# Patient Record
Sex: FEMALE | Race: WHITE | NOT HISPANIC OR LATINO | Employment: OTHER | URBAN - METROPOLITAN AREA
[De-identification: names, ages, dates, MRNs, and addresses within clinical notes are randomized per-mention and may not be internally consistent; named-entity substitution may affect disease eponyms.]

---

## 2017-07-09 ENCOUNTER — HOSPITAL ENCOUNTER (EMERGENCY)
Facility: HOSPITAL | Age: 72
Discharge: HOME | End: 2017-07-09
Attending: EMERGENCY MEDICINE | Admitting: EMERGENCY MEDICINE
Payer: MEDICARE

## 2017-07-09 ENCOUNTER — APPOINTMENT (EMERGENCY)
Dept: RADIOLOGY | Facility: HOSPITAL | Age: 72
End: 2017-07-09
Payer: MEDICARE

## 2017-07-09 VITALS
HEIGHT: 59 IN | RESPIRATION RATE: 26 BRPM | DIASTOLIC BLOOD PRESSURE: 69 MMHG | WEIGHT: 140 LBS | BODY MASS INDEX: 28.22 KG/M2 | SYSTOLIC BLOOD PRESSURE: 113 MMHG | HEART RATE: 92 BPM | TEMPERATURE: 97.7 F | OXYGEN SATURATION: 99 %

## 2017-07-09 DIAGNOSIS — J44.1 ACUTE EXACERBATION OF CHRONIC OBSTRUCTIVE PULMONARY DISEASE (COPD) (HCC): Primary | ICD-10-CM

## 2017-07-09 LAB
ALBUMIN SERPL BCP-MCNC: 3.1 G/DL (ref 3.5–5)
ALP SERPL-CCNC: 63 U/L (ref 46–116)
ALT SERPL W P-5'-P-CCNC: 56 U/L (ref 12–78)
ANION GAP SERPL CALCULATED.3IONS-SCNC: 10 MMOL/L (ref 4–13)
APTT PPP: 22 SECONDS (ref 23–35)
AST SERPL W P-5'-P-CCNC: 51 U/L (ref 5–45)
BASOPHILS # BLD AUTO: 0.02 THOUSANDS/ΜL (ref 0–0.1)
BASOPHILS NFR BLD AUTO: 0 % (ref 0–1)
BILIRUB SERPL-MCNC: 0.2 MG/DL (ref 0.2–1)
BUN SERPL-MCNC: 24 MG/DL (ref 5–25)
CALCIUM SERPL-MCNC: 9.6 MG/DL (ref 8.3–10.1)
CHLORIDE SERPL-SCNC: 103 MMOL/L (ref 100–108)
CO2 SERPL-SCNC: 27 MMOL/L (ref 21–32)
CREAT SERPL-MCNC: 1.67 MG/DL (ref 0.6–1.3)
EOSINOPHIL # BLD AUTO: 0 THOUSAND/ΜL (ref 0–0.61)
EOSINOPHIL NFR BLD AUTO: 0 % (ref 0–6)
ERYTHROCYTE [DISTWIDTH] IN BLOOD BY AUTOMATED COUNT: 14.6 % (ref 11.6–15.1)
GFR SERPL CREATININE-BSD FRML MDRD: 30.3 ML/MIN/1.73SQ M
GLUCOSE SERPL-MCNC: 146 MG/DL (ref 65–140)
HCT VFR BLD AUTO: 39.6 % (ref 34.8–46.1)
HGB BLD-MCNC: 13.4 G/DL (ref 11.5–15.4)
INR PPP: 1.03 (ref 0.86–1.16)
LACTATE SERPL-SCNC: 2.9 MMOL/L (ref 0.5–2)
LYMPHOCYTES # BLD AUTO: 3.04 THOUSANDS/ΜL (ref 0.6–4.47)
LYMPHOCYTES NFR BLD AUTO: 26 % (ref 14–44)
MCH RBC QN AUTO: 30.4 PG (ref 26.8–34.3)
MCHC RBC AUTO-ENTMCNC: 33.8 G/DL (ref 31.4–37.4)
MCV RBC AUTO: 90 FL (ref 82–98)
MONOCYTES # BLD AUTO: 0.61 THOUSAND/ΜL (ref 0.17–1.22)
MONOCYTES NFR BLD AUTO: 5 % (ref 4–12)
NEUTROPHILS # BLD AUTO: 8.13 THOUSANDS/ΜL (ref 1.85–7.62)
NEUTS SEG NFR BLD AUTO: 69 % (ref 43–75)
PLATELET # BLD AUTO: 304 THOUSANDS/UL (ref 149–390)
PMV BLD AUTO: 9.4 FL (ref 8.9–12.7)
POTASSIUM SERPL-SCNC: 4 MMOL/L (ref 3.5–5.3)
PROT SERPL-MCNC: 7.4 G/DL (ref 6.4–8.2)
PROTHROMBIN TIME: 13.3 SECONDS (ref 12.1–14.4)
RBC # BLD AUTO: 4.41 MILLION/UL (ref 3.81–5.12)
SODIUM SERPL-SCNC: 140 MMOL/L (ref 136–145)
WBC # BLD AUTO: 11.8 THOUSAND/UL (ref 4.31–10.16)

## 2017-07-09 PROCEDURE — 71020 HB CHEST X-RAY 2VW FRONTAL&LATL: CPT

## 2017-07-09 PROCEDURE — 99284 EMERGENCY DEPT VISIT MOD MDM: CPT

## 2017-07-09 PROCEDURE — 36415 COLL VENOUS BLD VENIPUNCTURE: CPT | Performed by: EMERGENCY MEDICINE

## 2017-07-09 PROCEDURE — 85025 COMPLETE CBC W/AUTO DIFF WBC: CPT | Performed by: EMERGENCY MEDICINE

## 2017-07-09 PROCEDURE — 80053 COMPREHEN METABOLIC PANEL: CPT | Performed by: EMERGENCY MEDICINE

## 2017-07-09 PROCEDURE — 94640 AIRWAY INHALATION TREATMENT: CPT

## 2017-07-09 PROCEDURE — 83605 ASSAY OF LACTIC ACID: CPT | Performed by: EMERGENCY MEDICINE

## 2017-07-09 PROCEDURE — 87040 BLOOD CULTURE FOR BACTERIA: CPT | Performed by: EMERGENCY MEDICINE

## 2017-07-09 PROCEDURE — 85610 PROTHROMBIN TIME: CPT | Performed by: EMERGENCY MEDICINE

## 2017-07-09 PROCEDURE — 85730 THROMBOPLASTIN TIME PARTIAL: CPT | Performed by: EMERGENCY MEDICINE

## 2017-07-09 RX ORDER — ALBUTEROL SULFATE 90 UG/1
2 AEROSOL, METERED RESPIRATORY (INHALATION) EVERY 4 HOURS PRN
Qty: 1 INHALER | Refills: 0 | Status: SHIPPED | OUTPATIENT
Start: 2017-07-09 | End: 2018-06-17 | Stop reason: SDUPTHER

## 2017-07-09 RX ORDER — PREDNISONE 10 MG/1
TABLET ORAL
Qty: 31 TABLET | Refills: 0 | Status: SHIPPED | OUTPATIENT
Start: 2017-07-09 | End: 2018-06-17 | Stop reason: ALTCHOICE

## 2017-07-09 RX ORDER — ALBUTEROL SULFATE 90 UG/1
2 AEROSOL, METERED RESPIRATORY (INHALATION) EVERY 6 HOURS PRN
COMMUNITY

## 2017-07-09 RX ORDER — DEXTROMETHORPHAN HYDROBROMIDE AND PROMETHAZINE HYDROCHLORIDE 15; 6.25 MG/5ML; MG/5ML
5 SYRUP ORAL 4 TIMES DAILY PRN
Qty: 118 ML | Refills: 0 | Status: SHIPPED | OUTPATIENT
Start: 2017-07-09 | End: 2018-06-17 | Stop reason: SDUPTHER

## 2017-07-09 RX ADMIN — IPRATROPIUM BROMIDE 0.5 MG: 0.5 SOLUTION RESPIRATORY (INHALATION) at 21:07

## 2017-07-09 RX ADMIN — ALBUTEROL SULFATE 2.5 MG: 2.5 SOLUTION RESPIRATORY (INHALATION) at 21:10

## 2017-07-09 RX ADMIN — ALBUTEROL SULFATE 2.5 MG: 2.5 SOLUTION RESPIRATORY (INHALATION) at 21:07

## 2017-07-15 LAB
BACTERIA BLD CULT: NORMAL
BACTERIA BLD CULT: NORMAL

## 2018-01-15 ENCOUNTER — TRANSCRIBE ORDERS (OUTPATIENT)
Dept: ADMINISTRATIVE | Facility: HOSPITAL | Age: 73
End: 2018-01-15

## 2018-01-15 ENCOUNTER — HOSPITAL ENCOUNTER (OUTPATIENT)
Dept: RADIOLOGY | Facility: HOSPITAL | Age: 73
Discharge: HOME | End: 2018-01-15
Payer: MEDICARE

## 2018-01-15 ENCOUNTER — GENERIC CONVERSION - ENCOUNTER (OUTPATIENT)
Dept: OTHER | Facility: OTHER | Age: 73
End: 2018-01-15

## 2018-01-15 DIAGNOSIS — M25.561 RIGHT KNEE PAIN, UNSPECIFIED CHRONICITY: ICD-10-CM

## 2018-01-15 DIAGNOSIS — M25.561 RIGHT KNEE PAIN, UNSPECIFIED CHRONICITY: Primary | ICD-10-CM

## 2018-01-15 PROCEDURE — 73562 X-RAY EXAM OF KNEE 3: CPT

## 2018-01-23 ENCOUNTER — APPOINTMENT (OUTPATIENT)
Dept: URGENT CARE | Facility: CLINIC | Age: 73
End: 2018-01-23
Payer: MEDICARE

## 2018-01-23 ENCOUNTER — APPOINTMENT (OUTPATIENT)
Dept: RADIOLOGY | Facility: CLINIC | Age: 73
End: 2018-01-23
Payer: MEDICARE

## 2018-01-23 DIAGNOSIS — R05.9 COUGH: ICD-10-CM

## 2018-01-23 PROCEDURE — 71046 X-RAY EXAM CHEST 2 VIEWS: CPT

## 2018-01-29 ENCOUNTER — TRANSCRIBE ORDERS (OUTPATIENT)
Dept: ADMINISTRATIVE | Facility: HOSPITAL | Age: 73
End: 2018-01-29

## 2018-01-29 ENCOUNTER — HOSPITAL ENCOUNTER (OUTPATIENT)
Dept: RADIOLOGY | Facility: HOSPITAL | Age: 73
Discharge: HOME | End: 2018-01-29
Payer: MEDICARE

## 2018-01-29 DIAGNOSIS — M19.041 PRIMARY LOCALIZED OSTEOARTHROSIS OF RIGHT HAND: Primary | ICD-10-CM

## 2018-01-29 DIAGNOSIS — M19.041 PRIMARY LOCALIZED OSTEOARTHROSIS OF RIGHT HAND: ICD-10-CM

## 2018-01-29 PROCEDURE — 73130 X-RAY EXAM OF HAND: CPT

## 2018-06-17 ENCOUNTER — OFFICE VISIT (OUTPATIENT)
Dept: URGENT CARE | Facility: CLINIC | Age: 73
End: 2018-06-17
Payer: MEDICARE

## 2018-06-17 VITALS
WEIGHT: 140 LBS | SYSTOLIC BLOOD PRESSURE: 149 MMHG | HEART RATE: 88 BPM | DIASTOLIC BLOOD PRESSURE: 86 MMHG | HEIGHT: 60 IN | BODY MASS INDEX: 27.48 KG/M2 | OXYGEN SATURATION: 96 % | TEMPERATURE: 97.1 F

## 2018-06-17 DIAGNOSIS — L03.213 PRESEPTAL CELLULITIS: Primary | ICD-10-CM

## 2018-06-17 PROCEDURE — 99213 OFFICE O/P EST LOW 20 MIN: CPT | Performed by: PHYSICIAN ASSISTANT

## 2018-06-17 PROCEDURE — G0463 HOSPITAL OUTPT CLINIC VISIT: HCPCS | Performed by: PHYSICIAN ASSISTANT

## 2018-06-17 RX ORDER — CLINDAMYCIN HYDROCHLORIDE 150 MG/1
300 CAPSULE ORAL EVERY 8 HOURS SCHEDULED
Qty: 21 CAPSULE | Refills: 0 | Status: SHIPPED | OUTPATIENT
Start: 2018-06-17 | End: 2018-06-24

## 2018-06-17 NOTE — PROGRESS NOTES
NAME: Javy Sotelo is a 71 y o  female  : 1945    MRN: [id number]      Assessment and Plan   Preseptal cellulitis [X69 [id number]]  1  Preseptal cellulitis  clindamycin (CLEOCIN) 150 mg capsule       Patient unable to take NSAIDs or steroids  Has been taking benadrly and zyrtec  Not considering steroid cream as it is too close to the eye  Instructed patient to f/u with ophthalmology for further evaluation  Will cover for infection with clinda as patient is allergic to cephalosporins  Patient Instructions   Patient Instructions   Take clinda as directed  Apply cool compresses to the area  Remove make up and keep area clean and do not itch  F/u with ophtho /PCP for further evaluation   If symptoms worsen, go to the ER    Proceed to ER if symptoms worsen  History of Present Illness     Patient presents complaining of swelling below her right eye  She states it started 2 days ago and has been getting bigger since  She reports the area is itchy along with the rest of her face  She denies new soaps, make up, detergents, or foods  She has a hx of allergies and takes OTC zyrtec for it  She has been taking benadryl for this and has not had any improvement  She denies injury or inciting event, blurry or double vision, fevers, chills, pain with movement of eye or eye pain  She states something like this happened before and she was given prednisone and it went away  Patient states she is no longer able to take steroids or NSAIDs due to long hx of "stomach issues" including gastroparesis, gastritis, and a failed nissen fundoplication  Review of Systems   Review of Systems   Constitutional: Negative for chills and fever  HENT: Negative for congestion, ear pain, postnasal drip, rhinorrhea, sinus pain, sinus pressure, sneezing and sore throat  Respiratory: Negative for cough, shortness of breath, wheezing and stridor      Skin:        Area of swelling below right eye         Current Medications Current Outpatient Prescriptions:     albuterol (PROVENTIL HFA,VENTOLIN HFA) 90 mcg/act inhaler, Inhale 2 puffs every 6 (six) hours as needed for wheezing, Disp: , Rfl:     amitriptyline (ELAVIL) 10 mg tablet, Take 10 mg by mouth daily at bedtime  , Disp: , Rfl:     amLODIPine (NORVASC) 5 mg tablet, Take 5 mg by mouth daily  , Disp: , Rfl:     cetirizine (ZyrTEC) 10 MG chewable tablet, Chew 10 mg daily  , Disp: , Rfl:     famotidine (PEPCID) 10 mg tablet, Take 10 mg by mouth once   , Disp: , Rfl:     multivitamin (THERAGRAN) TABS, Take 1 tablet by mouth daily  , Disp: , Rfl:     clindamycin (CLEOCIN) 150 mg capsule, Take 2 capsules (300 mg total) by mouth every 8 (eight) hours for 7 days, Disp: 21 capsule, Rfl: 0    Mometasone Furo-Formoterol Fum (DULERA) 100-5 MCG/ACT AERO, Inhale 1 puff 2 (two) times a day, Disp: , Rfl:     Current Allergies     Allergies as of 2018 - Reviewed 2018   Allergen Reaction Noted    Cephalosporins  2016    Latex  2016    Nsaids  2016    Penicillins  2016    Prednisone GI Intolerance 2017    Sulphasomidine  2017              Past Medical History:   Diagnosis Date    Anxiety     Arthritis     Chronic pain     Depression     Gastroparesis     GERD (gastroesophageal reflux disease)     Psychiatric disorder        Past Surgical History:   Procedure Laterality Date    BACK SURGERY       SECTION      CHOLECYSTECTOMY      HIATAL HERNIA REPAIR      SHOULDER SURGERY      WISDOM TOOTH EXTRACTION         No family history on file  Medications have been verified      The following portions of the patient's history were reviewed and updated as appropriate: allergies, current medications, past family history, past medical history, past social history, past surgical history and problem list     Objective   /86   Pulse 88   Temp (!) 97 1 °F (36 2 °C)   Ht 4' 11 5" (1 511 m)   Wt 63 5 kg (140 lb)   SpO2 96%   BMI 27 80 kg/m²      Physical Exam     Physical Exam   Constitutional: She appears well-developed and well-nourished  No distress  HENT:   TMs clear b/l without bulging  Nasal mucosa pale without erythema  Oropharynx clear without exudates  Cardiovascular: Normal rate, regular rhythm and normal heart sounds  Pulmonary/Chest: Effort normal and breath sounds normal  No respiratory distress  She has no wheezes  She has no rales  Lymphadenopathy:     She has no cervical adenopathy  Skin:   2 cm x  5 cm of edema without induration or warmth blow right eye  NTTP in that area  No pain with EOMI  Pupils PERRLA  No other lesions noted on the face  No scaling over the area of edema  Eye without injection or discharge

## 2018-06-17 NOTE — PATIENT INSTRUCTIONS
Take clinda as directed  Apply cool compresses to the area  Remove make up and keep area clean and do not itch  F/u with ophtho /PCP for further evaluation   If symptoms worsen, go to the ER

## 2018-11-20 ENCOUNTER — TRANSCRIBE ORDERS (OUTPATIENT)
Dept: ADMINISTRATIVE | Facility: HOSPITAL | Age: 73
End: 2018-11-20

## 2018-11-20 ENCOUNTER — HOSPITAL ENCOUNTER (OUTPATIENT)
Dept: RADIOLOGY | Facility: HOSPITAL | Age: 73
Discharge: HOME | End: 2018-11-20
Payer: MEDICARE

## 2018-11-20 DIAGNOSIS — M46.96 UNSPECIFIED INFLAMMATORY SPONDYLOPATHY, LUMBAR REGION (HCC): ICD-10-CM

## 2018-11-20 DIAGNOSIS — M19.049 PRIMARY LOCALIZED OSTEOARTHROSIS OF HAND, UNSPECIFIED LATERALITY: ICD-10-CM

## 2018-11-20 DIAGNOSIS — M19.049 PRIMARY LOCALIZED OSTEOARTHROSIS OF HAND, UNSPECIFIED LATERALITY: Primary | ICD-10-CM

## 2018-11-20 PROCEDURE — 72110 X-RAY EXAM L-2 SPINE 4/>VWS: CPT

## 2019-03-08 ENCOUNTER — OFFICE VISIT (OUTPATIENT)
Dept: GASTROENTEROLOGY | Facility: CLINIC | Age: 74
End: 2019-03-08
Payer: MEDICARE

## 2019-03-08 VITALS
HEART RATE: 92 BPM | HEIGHT: 60 IN | BODY MASS INDEX: 27.68 KG/M2 | WEIGHT: 141 LBS | SYSTOLIC BLOOD PRESSURE: 130 MMHG | DIASTOLIC BLOOD PRESSURE: 84 MMHG

## 2019-03-08 DIAGNOSIS — K21.9 GASTROESOPHAGEAL REFLUX DISEASE WITHOUT ESOPHAGITIS: ICD-10-CM

## 2019-03-08 DIAGNOSIS — K31.84 GASTROPARESIS: ICD-10-CM

## 2019-03-08 DIAGNOSIS — F32.9 REACTIVE DEPRESSION: ICD-10-CM

## 2019-03-08 DIAGNOSIS — Z12.11 COLON CANCER SCREENING: ICD-10-CM

## 2019-03-08 DIAGNOSIS — R13.19 ESOPHAGEAL DYSPHAGIA: Primary | ICD-10-CM

## 2019-03-08 DIAGNOSIS — R10.12 LEFT UPPER QUADRANT PAIN: ICD-10-CM

## 2019-03-08 DIAGNOSIS — Z98.890 HISTORY OF NISSEN FUNDOPLICATION: ICD-10-CM

## 2019-03-08 PROCEDURE — 99214 OFFICE O/P EST MOD 30 MIN: CPT | Performed by: INTERNAL MEDICINE

## 2019-03-08 RX ORDER — GABAPENTIN 100 MG/1
100 CAPSULE ORAL 3 TIMES DAILY
Qty: 90 CAPSULE | Refills: 3 | Status: SHIPPED | OUTPATIENT
Start: 2019-03-08 | End: 2020-03-13 | Stop reason: SDUPTHER

## 2019-03-08 RX ORDER — RABEPRAZOLE SODIUM 20 MG/1
TABLET, DELAYED RELEASE ORAL
Refills: 4 | COMMUNITY
Start: 2019-02-05 | End: 2019-05-12 | Stop reason: SDUPTHER

## 2019-03-08 RX ORDER — FAMOTIDINE 40 MG/1
40 TABLET, FILM COATED ORAL
Qty: 30 TABLET | Refills: 6 | Status: SHIPPED | OUTPATIENT
Start: 2019-03-08 | End: 2020-03-18 | Stop reason: SDUPTHER

## 2019-03-08 NOTE — PROGRESS NOTES
[facility] - Outpatient Follow-up Note  Basilio Alcantar 68 y o  female MRN: [id number]  Encounter: [id number]    ASSESSMENT AND PLAN:      1  Left upper quadrant pain  Patient has had this over the last several years  Interventions have not help so far  She has been on amitriptyline and is now at 50 mg daily  I believe this may be functional type of abdominal pain or perhaps related to her gastro paresis  - gabapentin (NEURONTIN) 100 mg capsule; Take 1 capsule (100 mg total) by mouth 3 (three) times a day for 30 days  Dispense: 90 capsule; Refill: 3  -I have told the patient to start with the gabapentin at 1 tablet a day for about 5 days then go up to 2 tablets a day for 5 days and if tolerated she can go up to a tablet 3 times a day  -note the patient had been placed on low-dose Elavil  This has been increased to 50 mg per day  She does not feel as though it has helped her all that much  2  Gastroesophageal reflux disease without esophagitis  -review of upper GI series from 2016 did reveal some pooling of contrast at the top of her Nissen fundoplication and reflux into the midesophagus  The patient has ongoing symptoms despite being on twice a day PPI  Will re-evaluate with upper endoscopy to evaluate mucosal integrity  - famotidine (PEPCID) 40 MG tablet; Take 1 tablet (40 mg total) by mouth daily at bedtime  Dispense: 30 tablet; Refill: 6    EGD at [facility]     3  Esophageal dysphagia  Has for solids and liquids  Probably related to her previous surgery    4  History of Nissen fundoplication  Status post surgery 10 years ago 2009 [facility]  -most likely I would be reluctant to recommend any type of attempted surgical revision given the patient's age and other factors    5  Gastroparesis  Last gastric emptying study 2016  This showed significant delay  60% emptying after 4 hours were reading should be 90%    6   Colon cancer screening  Up today with screening  Last colonoscopy was 2013  Negative exam     7  Reactive depression  Patient with a very difficult home situation  She is raising her 6year-old grandson  Her daughter has moved back within the house and this has created some complex  Patient is having counseling  I suspect that issues as outlined impact on some of the patient's digestive issues and pain  Followup Appointment[de-identified]  3 months   ______________________________________________________________________    Chief Complaint   Patient presents with    Follow-up     NISSEN FUNDOPLICATION    Reflux symptoms, left upper quadrant pain    HPI:  The patient is a very pleasant 67-year-old white female who returns to the office again for ongoing problems with reflux, left-sided abdominal pain, and difficulty swallowing  The patient had issues with gastroesophageal reflux gaining back over 10 years ago  She underwent a Nissen fundoplication for this problem in initially had very good results  She subsequently developed increasing problems with swallowing and recurrent heartburn  The patient had extensive workup by our group, Dr Basim Jarrett was  of [address] [place] [address] and expert and esophageal disease, and subsequently Dr Iftikhar Hollingsworth at [facility]  AND [facility] for her continued problems  The patient had multiple esophageal dilations between our group in the tertiary care centers  This did not really improve her swallowing  She also has a diagnosis a gastro paresis  Medication adjustment and Botox injection into the pyloric area has not helped her situation all that much  Her last upper GI series in 2016 did show significant gastroesophageal reflux  There was some pooling of barium better EG junction near the Nissen wrap which might indicate a breakdown of the wrap    When the patient was seen at [facility], surgical revision of the Nissen was discussed however it was decided not to proceed  The patient continues to complain of significant heartburn despite being on proton pump inhibitor therapy and H2 blockers at night  She also has left-sided abdominal pain  She has difficulty swallowing with both liquids and solids  It should be noted that prior to her referral to [facility] to the tertiary care center she did have 1 documented episode of esophageal candidiasis  She has not lost weight  It should be noted that the patient is under significant stress  She has been raising her 8year-old grandchild as her daughter had significant issues with drug abuse in the past   Now, her daughter has moved back in with her as well as her son who is undergoing divorce proceedings along with her grandson  There is frequent conflict between the grandson and his mother who is now closer in his life  The patient has to mediated between these difficult conflicts       Historical Information   Past Medical History:   Diagnosis Date    Anxiety     Arthritis     Asthma     Candida infection, esophageal (HCC)     Chronic pain     Depression     Depression     Gastroparesis     Gastroparesis     GERD (gastroesophageal reflux disease)     Hypertension     Irritable bowel syndrome (IBS)     Multiple thyroid nodules     Osteoporosis     Psychiatric disorder      Past Surgical History:   Procedure Laterality Date    BACK SURGERY       SECTION      CHOLECYSTECTOMY      COLONOSCOPY      ESOPHAGEAL DILATION       [place] [id number]    HIATAL HERNIA REPAIR      NISSEN FUNDOPLICATION      ROTATOR CUFF REPAIR      SHOULDER SURGERY      SPINAL FUSION      UPPER GASTROINTESTINAL ENDOSCOPY      US GUIDED THYROID BIOPSY      WISDOM TOOTH EXTRACTION       Social History     Substance and Sexual Activity   Alcohol Use No     Social History     Substance and Sexual Activity   Drug Use No     Social History     Tobacco Use   Smoking Status Former Smoker   Smokeless Tobacco Never Used     Family History   Problem Relation Age of Onset    Stomach cancer Father     Cancer Father     Hypertension Maternal Aunt     Colon cancer Neg Hx     Colon polyps Neg Hx          Current Outpatient Medications:     albuterol (PROVENTIL HFA,VENTOLIN HFA) 90 mcg/act inhaler    amitriptyline (ELAVIL) 10 mg tablet    amLODIPine (NORVASC) 5 mg tablet    cetirizine (ZyrTEC) 10 MG chewable tablet    multivitamin (THERAGRAN) TABS    RABEprazole (ACIPHEX) 20 MG tablet    famotidine (PEPCID) 40 MG tablet    gabapentin (NEURONTIN) 100 mg capsule    Mometasone Furo-Formoterol Fum (DULERA) 100-5 MCG/ACT AERO  Allergies   Allergen Reactions    Cephalosporins     Latex     Nsaids     Penicillins      Patient can only take levaquin and cipro    Prednisone GI Intolerance    Sulphasomidine        10 Point REVIEW OF SYSTEMS   --general positive for weight gain positive for fatigue  ENT positive for hoarseness   Respiratory positive for cough positive for shortness of breath with exertion  Cardiac positive for palpitation  GI see HPI  Hematologic-positive for bruising  Musculoskeletal positive for joint stiffness positive for muscle aches painful joints and leg cramps neurologic negative  Psych positive depression positive for anxiety trouble, positive for sleeping difficulty  Neurologic negative  Skin negative    PHYSICAL EXAM:    Blood pressure 130/84, pulse 92, height 5' (1 524 m), weight 64 kg (141 lb)  Body mass index is 27 54 kg/m²  General Appearance:  Alert, cooperative, no distress  HEENT:  Normocephalic, atraumatic, anicteric  Neck: Supple, symmetrical, trachea midline  Chest positive foot and kyphosis  Lungs: Clear to auscultation bilaterally; no rales, rhonchi or wheezing; respirations unlabored, slightly decreased breath sounds    Heart: Regular rate and rhythm; no murmur, rub, or gallop    Abdomen:   Soft, non-tender, non-distended; normal bowel sounds; no masses, no organomegaly   Rectal:  Deferred   Extremities:  No cyanosis, clubbing or edema   Skin:  No jaundice, rashes, or lesions   Lymph nodes: No palpable cervical lymphadenopathy     Lab Results:   November 2018 CMP essentially normal AST 37 ALT 35 minor elevated CBC normal limits hemoglobin 12 7 hematocrit 37 6    Radiology Results:   No results found

## 2019-03-08 NOTE — LETTER
March 8, 2019     Ermias Morin DO  [address]    Patient: Fracisco Padilla   YOB: 1945   Date of Visit: 3/8/2019       Dear Dr Ricky James Recipients: Thank you for referring Edson Anderson to me for evaluation  Below are my notes for this consultation  If you have questions, please do not hesitate to call me  I look forward to following your patient along with you  Sincerely,        Colette Chicas MD        CC: No Recipients  Colette Chicas MD  3/8/2019 11:01 PM  Sign at close encounter  [address] - Outpatient Follow-up Note  Fracisco Padilla 68 y o  female MRN: [id number]  Encounter: [id number]    ASSESSMENT AND PLAN:      1  Left upper quadrant pain  Patient has had this over the last several years  Interventions have not help so far  She has been on amitriptyline and is now at 50 mg daily  I believe this may be functional type of abdominal pain or perhaps related to her gastro paresis  - gabapentin (NEURONTIN) 100 mg capsule; Take 1 capsule (100 mg total) by mouth 3 (three) times a day for 30 days  Dispense: 90 capsule; Refill: 3  -I have told the patient to start with the gabapentin at 1 tablet a day for about 5 days then go up to 2 tablets a day for 5 days and if tolerated she can go up to a tablet 3 times a day  -note the patient had been placed on low-dose Elavil  This has been increased to 50 mg per day  She does not feel as though it has helped her all that much  2  Gastroesophageal reflux disease without esophagitis  -review of upper GI series from 2016 did reveal some pooling of contrast at the top of her Nissen fundoplication and reflux into the midesophagus  The patient has ongoing symptoms despite being on twice a day PPI  Will re-evaluate with upper endoscopy to evaluate mucosal integrity  - famotidine (PEPCID) 40 MG tablet;  Take 1 tablet (40 mg total) by mouth daily at bedtime      Dispense: 30 tablet; Refill: 6  EGD at [facility]     3  Esophageal dysphagia  Has for solids and liquids  Probably related to her previous surgery    4  History of Nissen fundoplication  Status post surgery 10 years ago [facility]  -most likely I would be reluctant to recommend any type of attempted surgical revision given the patient's age and other factors    5  Gastroparesis  Last gastric emptying study 2016  This showed significant delay  60% emptying after 4 hours were reading should be 90%    6  Colon cancer screening  Up today with screening  Last colonoscopy was 2013  Negative exam     7  Reactive depression  Patient with a very difficult home situation  She is raising her 6year-old grandson  Her daughter has moved back within the house and this has created some complex  Patient is having counseling  I suspect that issues as outlined impact on some of the patient's digestive issues and pain  Followup Appointment[de-identified]  3 months   ______________________________________________________________________    Chief Complaint   Patient presents with    Follow-up     NISSEN FUNDOPLICATION    Reflux symptoms, left upper quadrant pain    HPI:  The patient is a very pleasant 78-year-old white female who returns to the office again for ongoing problems with reflux, left-sided abdominal pain, and difficulty swallowing  The patient had issues with gastroesophageal reflux gaining back over 10 years ago  She underwent a Nissen fundoplication for this problem in initially had very good results  She subsequently developed increasing problems with swallowing and recurrent heartburn  The patient had extensive workup by our group, Dr Ivone Villasenor was  of [address] [place] [address] and expert and esophageal disease, and subsequently Dr Cori Fox at [facility]  AND [facility] for her continued problems  The patient had multiple esophageal dilations between our group in the tertiary care centers  This did not really improve her swallowing  She also has a diagnosis a gastro paresis  Medication adjustment and Botox injection into the pyloric area has not helped her situation all that much  Her last upper GI series in 2016 did show significant gastroesophageal reflux  There was some pooling of barium better EG junction near the Nissen wrap which might indicate a breakdown of the wrap  When the patient was seen at [facility], surgical revision of the Nissen was discussed however it was decided not to proceed  The patient continues to complain of significant heartburn despite being on proton pump inhibitor therapy and H2 blockers at night  She also has left-sided abdominal pain  She has difficulty swallowing with both liquids and solids  It should be noted that prior to her referral to [place] to the tertiary care center she did have 1 documented episode of esophageal candidiasis  She has not lost weight  It should be noted that the patient is under significant stress  She has been raising her 8year-old grandchild as her daughter had significant issues with drug abuse in the past   Now, her daughter has moved back in with her as well as her son who is undergoing divorce proceedings along with her grandson  There is frequent conflict between the grandson and his mother who is now closer in his life  The patient has to mediated between these difficult conflicts       Historical Information   Past Medical History:   Diagnosis Date    Anxiety     Arthritis     Asthma     Candida infection, esophageal (HCC)     Chronic pain     Depression     Depression     Gastroparesis     Gastroparesis     GERD (gastroesophageal reflux disease)     Hypertension     Irritable bowel syndrome (IBS)     Multiple thyroid nodules     Osteoporosis     Psychiatric disorder      Past Surgical History:   Procedure Laterality Date    BACK SURGERY       SECTION      CHOLECYSTECTOMY      COLONOSCOPY      ESOPHAGEAL DILATION      2017 [facility]    HIATAL HERNIA REPAIR      NISSEN FUNDOPLICATION      ROTATOR CUFF REPAIR      SHOULDER SURGERY      SPINAL FUSION      UPPER GASTROINTESTINAL ENDOSCOPY      US GUIDED THYROID BIOPSY      WISDOM TOOTH EXTRACTION       Social History     Substance and Sexual Activity   Alcohol Use No     Social History     Substance and Sexual Activity   Drug Use No     Social History     Tobacco Use   Smoking Status Former Smoker   Smokeless Tobacco Never Used     Family History   Problem Relation Age of Onset    Stomach cancer Father     Cancer Father     Hypertension Maternal Aunt     Colon cancer Neg Hx     Colon polyps Neg Hx          Current Outpatient Medications:     albuterol (PROVENTIL HFA,VENTOLIN HFA) 90 mcg/act inhaler    amitriptyline (ELAVIL) 10 mg tablet    amLODIPine (NORVASC) 5 mg tablet    cetirizine (ZyrTEC) 10 MG chewable tablet    multivitamin (THERAGRAN) TABS    RABEprazole (ACIPHEX) 20 MG tablet    famotidine (PEPCID) 40 MG tablet    gabapentin (NEURONTIN) 100 mg capsule    Mometasone Furo-Formoterol Fum (DULERA) 100-5 MCG/ACT AERO  Allergies   Allergen Reactions    Cephalosporins     Latex     Nsaids     Penicillins      Patient can only take levaquin and cipro    Prednisone GI Intolerance    Sulphasomidine        10 Point REVIEW OF SYSTEMS   --general positive for weight gain positive for fatigue  ENT positive for hoarseness   Respiratory positive for cough positive for shortness of breath with exertion  Cardiac positive for palpitation  GI see HPI  Hematologic-positive for bruising  Musculoskeletal positive for joint stiffness positive for muscle aches painful joints and leg cramps neurologic negative  Psych positive depression positive for anxiety trouble, positive for sleeping difficulty  Neurologic negative  Skin negative    PHYSICAL EXAM:    Blood pressure 130/84, pulse 92, height 5' (1 524 m), weight 64 kg (141 lb)   Body mass index is 27 54 kg/m²  General Appearance:  Alert, cooperative, no distress  HEENT:  Normocephalic, atraumatic, anicteric  Neck: Supple, symmetrical, trachea midline  Chest positive foot and kyphosis  Lungs: Clear to auscultation bilaterally; no rales, rhonchi or wheezing; respirations unlabored, slightly decreased breath sounds    Heart: Regular rate and rhythm; no murmur, rub, or gallop  Abdomen:   Soft, non-tender, non-distended; normal bowel sounds; no masses, no organomegaly   Rectal:  Deferred   Extremities:  No cyanosis, clubbing or edema   Skin:  No jaundice, rashes, or lesions   Lymph nodes: No palpable cervical lymphadenopathy     Lab Results:   November 2018 CMP essentially normal AST 37 ALT 35 minor elevated CBC normal limits hemoglobin 12 7 hematocrit 37 6    Radiology Results:   No results found

## 2019-03-08 NOTE — PATIENT INSTRUCTIONS
[facility] - Outpatient Follow-up Note  Micheal Hernandez 68 y o  female MRN: [id number]  Encounter: [id number]    ASSESSMENT AND PLAN:      1  Left upper quadrant pain  Patient has had this over the last several years  Interventions have not help so far  She has been on amitriptyline and is now at 50 mg daily  I believe this may be functional type of abdominal pain or perhaps related to her gastro paresis  - gabapentin (NEURONTIN) 100 mg capsule; Take 1 capsule (100 mg total) by mouth 3 (three) times a day for 30 days  Dispense: 90 capsule; Refill: 3  -I have told the patient to start with the gabapentin at 1 tablet a day for about 5 days then go up to 2 tablets a day for 5 days and if tolerated she can go up to a tablet 3 times a day    2  Gastroesophageal reflux disease without esophagitis  -review of upper GI series from 2016 did reveal some pooling of contrast at the top of her Nissen fundoplication and reflux into the midesophagus  The patient has ongoing symptoms despite being on twice a day PPI  Will re-evaluate with upper endoscopy to evaluate mucosal integrity  - famotidine (PEPCID) 40 MG tablet; Take 1 tablet (40 mg total) by mouth daily at bedtime      Dispense: 30 tablet; Refill: 6  EGD at [facility]     3  Esophageal dysphagia  Has for solids and liquids  Probably related to her previous surgery    4  History of Nissen fundoplication  Status post surgery 10 years ago 2009 [facility]    5  Gastroparesis  Last gastric emptying study 2016  This showed significant delay  60% emptying after 4 hours were reading should be 90%    6  Colon cancer screening  Up today with screening  Last colonoscopy was 2013  Negative exam     7  Reactive depression  Patient with a very difficult home situation  She is raising her 6year-old grandson  Her daughter has moved back within the house and this has created some complex  Patient is having counseling    Wonders if this helps impact on some of the patient's digestive issues and pain        Followup Appointment[de-identified]  3 months

## 2019-03-08 NOTE — LETTER
March 8, 2019     No Recipients    Patient: Arlin Sanchez   YOB: 1945   Date of Visit: 3/8/2019       Dear Dr Hanh Dickens Recipients: Thank you for referring Candice Cr to me for evaluation  Below are my notes for this consultation  If you have questions, please do not hesitate to call me  I look forward to following your patient along with you  Sincerely,        Marcio Souza MD        CC: No Recipients  Marcio Souza MD  3/8/2019 10:39 PM  Sign at close encounter  [id number] - Outpatient Follow-up Note  Arlin Sanchez 68 y o  female MRN: [id number]  Encounter: [id number]    ASSESSMENT AND PLAN:      1  Left upper quadrant pain  Patient has had this over the last several years  Interventions have not help so far  She has been on amitriptyline and is now at 50 mg daily  I believe this may be functional type of abdominal pain or perhaps related to her gastro paresis  - gabapentin (NEURONTIN) 100 mg capsule; Take 1 capsule (100 mg total) by mouth 3 (three) times a day for 30 days  Dispense: 90 capsule; Refill: 3  -I have told the patient to start with the gabapentin at 1 tablet a day for about 5 days then go up to 2 tablets a day for 5 days and if tolerated she can go up to a tablet 3 times a day  -note the patient had been placed on low-dose Elavil  This has been increased to 50 mg per day  She does not feel as though it has helped her all that much  2  Gastroesophageal reflux disease without esophagitis  -review of upper GI series from 2016 did reveal some pooling of contrast at the top of her Nissen fundoplication and reflux into the midesophagus  The patient has ongoing symptoms despite being on twice a day PPI  Will re-evaluate with upper endoscopy to evaluate mucosal integrity  - famotidine (PEPCID) 40 MG tablet; Take 1 tablet (40 mg total) by mouth daily at bedtime      Dispense: 30 tablet; Refill: 6  EGD at [facility]     3  Esophageal dysphagia  Has for solids and liquids  Probably related to her previous surgery    4  History of Nissen fundoplication  Status post surgery 10 years ago 2009 [facility]  -most likely I would be reluctant to recommend any type of attempted surgical revision given the patient's age and other factors    5  Gastroparesis  Last gastric emptying study 2016  This showed significant delay  60% emptying after 4 hours were reading should be 90%    6  Colon cancer screening  Up today with screening  Last colonoscopy was 2013  Negative exam     7  Reactive depression  Patient with a very difficult home situation  She is raising her 6year-old grandson  Her daughter has moved back within the house and this has created some complex  Patient is having counseling  I suspect that issues as outlined impact on some of the patient's digestive issues and pain  Followup Appointment[de-identified]  3 months   ______________________________________________________________________    Chief Complaint   Patient presents with    Follow-up     NISSEN FUNDOPLICATION    Reflux symptoms, left upper quadrant pain    HPI:  The patient is a very pleasant 76-year-old white female who returns to the office again for ongoing problems with reflux, left-sided abdominal pain, and difficulty swallowing  The patient had issues with gastroesophageal reflux gaining back over 10 years ago  She underwent a Nissen fundoplication for this problem in initially had very good results  She subsequently developed increasing problems with swallowing and recurrent heartburn  The patient had extensive workup by our group, Dr Isabel Vora was  of [address] [place] [address] and expert and esophageal disease, and subsequently Dr Rolan Fragoso at [facility], [facility]  AND [facility] for her continued problems  The patient had multiple esophageal dilations between our group in the tertiary care centers    This did not really improve her swallowing  She also has a diagnosis a gastro paresis  Medication adjustment and Botox injection into the pyloric area has not helped her situation all that much  Her last upper GI series in 2016 did show significant gastroesophageal reflux  There was some pooling of barium better EG junction near the Nissen wrap which might indicate a breakdown of the wrap  When the patient was seen at [facility], surgical revision of the Nissen was discussed however it was decided not to proceed  The patient continues to complain of significant heartburn despite being on proton pump inhibitor therapy and H2 blockers at night  She also has left-sided abdominal pain  She has difficulty swallowing with both liquids and solids  It should be noted that prior to her referral to Loida Carrington to the tertiary care center she did have 1 documented episode of esophageal candidiasis  She has not lost weight  It should be noted that the patient is under significant stress  She has been raising her 8year-old grandchild as her daughter had significant issues with drug abuse in the past   Now, her daughter has moved back in with her as well as her son who is undergoing divorce proceedings along with her grandson  There is frequent conflict between the grandson and his mother who is now closer in his life  The patient has to mediated between these difficult conflicts       Historical Information   Past Medical History:   Diagnosis Date    Anxiety     Arthritis     Asthma     Candida infection, esophageal (HCC)     Chronic pain     Depression     Depression     Gastroparesis     Gastroparesis     GERD (gastroesophageal reflux disease)     Hypertension     Irritable bowel syndrome (IBS)     Multiple thyroid nodules     Osteoporosis     Psychiatric disorder      Past Surgical History:   Procedure Laterality Date    BACK SURGERY       SECTION      CHOLECYSTECTOMY      COLONOSCOPY      ESOPHAGEAL DILATION 2017 [facility] 113    HIATAL HERNIA REPAIR      NISSEN FUNDOPLICATION      ROTATOR CUFF REPAIR      SHOULDER SURGERY      SPINAL FUSION      UPPER GASTROINTESTINAL ENDOSCOPY      US GUIDED THYROID BIOPSY      WISDOM TOOTH EXTRACTION       Social History     Substance and Sexual Activity   Alcohol Use No     Social History     Substance and Sexual Activity   Drug Use No     Social History     Tobacco Use   Smoking Status Former Smoker   Smokeless Tobacco Never Used     Family History   Problem Relation Age of Onset    Stomach cancer Father     Cancer Father     Hypertension Maternal Aunt     Colon cancer Neg Hx     Colon polyps Neg Hx          Current Outpatient Medications:     albuterol (PROVENTIL HFA,VENTOLIN HFA) 90 mcg/act inhaler    amitriptyline (ELAVIL) 10 mg tablet    amLODIPine (NORVASC) 5 mg tablet    cetirizine (ZyrTEC) 10 MG chewable tablet    multivitamin (THERAGRAN) TABS    RABEprazole (ACIPHEX) 20 MG tablet    famotidine (PEPCID) 40 MG tablet    gabapentin (NEURONTIN) 100 mg capsule    Mometasone Furo-Formoterol Fum (DULERA) 100-5 MCG/ACT AERO  Allergies   Allergen Reactions    Cephalosporins     Latex     Nsaids     Penicillins      Patient can only take levaquin and cipro    Prednisone GI Intolerance    Sulphasomidine        10 Point REVIEW OF SYSTEMS   --general positive for weight gain positive for fatigue  ENT positive for hoarseness   Respiratory positive for cough positive for shortness of breath with exertion  Cardiac positive for palpitation  GI see HPI  Hematologic-positive for bruising  Musculoskeletal positive for joint stiffness positive for muscle aches painful joints and leg cramps neurologic negative  Psych positive depression positive for anxiety trouble, positive for sleeping difficulty  Neurologic negative  Skin negative    PHYSICAL EXAM:    Blood pressure 130/84, pulse 92, height 5' (1 524 m), weight 64 kg (141 lb)  Body mass index is 27 54 kg/m²    General Appearance:  Alert, cooperative, no distress  HEENT:  Normocephalic, atraumatic, anicteric  Neck: Supple, symmetrical, trachea midline  Chest positive foot and kyphosis  Lungs: Clear to auscultation bilaterally; no rales, rhonchi or wheezing; respirations unlabored, slightly decreased breath sounds    Heart: Regular rate and rhythm; no murmur, rub, or gallop  Abdomen:   Soft, non-tender, non-distended; normal bowel sounds; no masses, no organomegaly   Rectal:  Deferred   Extremities:  No cyanosis, clubbing or edema   Skin:  No jaundice, rashes, or lesions   Lymph nodes: No palpable cervical lymphadenopathy     Lab Results:   November 2018 CMP essentially normal AST 37 ALT 35 minor elevated CBC normal limits hemoglobin 12 7 hematocrit 37 6    Radiology Results:   No results found

## 2019-03-25 ENCOUNTER — PREP FOR PROCEDURE (OUTPATIENT)
Dept: GASTROENTEROLOGY | Facility: CLINIC | Age: 74
End: 2019-03-25

## 2019-03-25 DIAGNOSIS — K21.9 CHRONIC GERD: Primary | ICD-10-CM

## 2019-03-25 DIAGNOSIS — R13.10 DYSPHAGIA: ICD-10-CM

## 2019-03-27 ENCOUNTER — APPOINTMENT (EMERGENCY)
Dept: CT IMAGING | Facility: HOSPITAL | Age: 74
End: 2019-03-27
Payer: MEDICARE

## 2019-03-27 ENCOUNTER — APPOINTMENT (EMERGENCY)
Dept: RADIOLOGY | Facility: HOSPITAL | Age: 74
End: 2019-03-27
Payer: MEDICARE

## 2019-03-27 ENCOUNTER — HOSPITAL ENCOUNTER (EMERGENCY)
Facility: HOSPITAL | Age: 74
Discharge: HOME | End: 2019-03-27
Attending: EMERGENCY MEDICINE
Payer: MEDICARE

## 2019-03-27 VITALS
OXYGEN SATURATION: 100 % | WEIGHT: 135.75 LBS | HEART RATE: 75 BPM | SYSTOLIC BLOOD PRESSURE: 145 MMHG | TEMPERATURE: 98.2 F | RESPIRATION RATE: 22 BRPM | DIASTOLIC BLOOD PRESSURE: 82 MMHG | BODY MASS INDEX: 26.51 KG/M2

## 2019-03-27 DIAGNOSIS — R07.89 ATYPICAL CHEST PAIN: Primary | ICD-10-CM

## 2019-03-27 LAB
ALBUMIN SERPL BCP-MCNC: 3.4 G/DL (ref 3.5–5)
ALP SERPL-CCNC: 68 U/L (ref 46–116)
ALT SERPL W P-5'-P-CCNC: 62 U/L (ref 12–78)
ANION GAP SERPL CALCULATED.3IONS-SCNC: 10 MMOL/L (ref 4–13)
AST SERPL W P-5'-P-CCNC: 38 U/L (ref 5–45)
BASOPHILS # BLD AUTO: 0.03 THOUSANDS/ΜL (ref 0–0.1)
BASOPHILS NFR BLD AUTO: 0 % (ref 0–1)
BILIRUB SERPL-MCNC: 0.3 MG/DL (ref 0.2–1)
BUN SERPL-MCNC: 32 MG/DL (ref 5–25)
CALCIUM SERPL-MCNC: 9.3 MG/DL (ref 8.3–10.1)
CHLORIDE SERPL-SCNC: 104 MMOL/L (ref 100–108)
CO2 SERPL-SCNC: 27 MMOL/L (ref 21–32)
CREAT SERPL-MCNC: 1.43 MG/DL (ref 0.6–1.3)
EOSINOPHIL # BLD AUTO: 0.07 THOUSAND/ΜL (ref 0–0.61)
EOSINOPHIL NFR BLD AUTO: 1 % (ref 0–6)
ERYTHROCYTE [DISTWIDTH] IN BLOOD BY AUTOMATED COUNT: 14 % (ref 11.6–15.1)
GFR SERPL CREATININE-BSD FRML MDRD: 37 ML/MIN/1.73SQ M
GLUCOSE SERPL-MCNC: 112 MG/DL (ref 65–140)
HCT VFR BLD AUTO: 40.7 % (ref 34.8–46.1)
HGB BLD-MCNC: 14 G/DL (ref 11.5–15.4)
IMM GRANULOCYTES # BLD AUTO: 0.09 THOUSAND/UL (ref 0–0.2)
IMM GRANULOCYTES NFR BLD AUTO: 1 % (ref 0–2)
LYMPHOCYTES # BLD AUTO: 3.1 THOUSANDS/ΜL (ref 0.6–4.47)
LYMPHOCYTES NFR BLD AUTO: 25 % (ref 14–44)
MCH RBC QN AUTO: 32.6 PG (ref 26.8–34.3)
MCHC RBC AUTO-ENTMCNC: 34.4 G/DL (ref 31.4–37.4)
MCV RBC AUTO: 95 FL (ref 82–98)
MONOCYTES # BLD AUTO: 0.74 THOUSAND/ΜL (ref 0.17–1.22)
MONOCYTES NFR BLD AUTO: 6 % (ref 4–12)
NEUTROPHILS # BLD AUTO: 8.5 THOUSANDS/ΜL (ref 1.85–7.62)
NEUTS SEG NFR BLD AUTO: 67 % (ref 43–75)
NRBC BLD AUTO-RTO: 0 /100 WBCS
PLATELET # BLD AUTO: 284 THOUSANDS/UL (ref 149–390)
PMV BLD AUTO: 8.8 FL (ref 8.9–12.7)
POTASSIUM SERPL-SCNC: 4.4 MMOL/L (ref 3.5–5.3)
PROT SERPL-MCNC: 7.9 G/DL (ref 6.4–8.2)
RBC # BLD AUTO: 4.3 MILLION/UL (ref 3.81–5.12)
SODIUM SERPL-SCNC: 141 MMOL/L (ref 136–145)
TROPONIN I SERPL-MCNC: <0.02 NG/ML
WBC # BLD AUTO: 12.53 THOUSAND/UL (ref 4.31–10.16)

## 2019-03-27 PROCEDURE — 99285 EMERGENCY DEPT VISIT HI MDM: CPT

## 2019-03-27 PROCEDURE — 80053 COMPREHEN METABOLIC PANEL: CPT | Performed by: EMERGENCY MEDICINE

## 2019-03-27 PROCEDURE — 84484 ASSAY OF TROPONIN QUANT: CPT | Performed by: EMERGENCY MEDICINE

## 2019-03-27 PROCEDURE — 96361 HYDRATE IV INFUSION ADD-ON: CPT

## 2019-03-27 PROCEDURE — 71046 X-RAY EXAM CHEST 2 VIEWS: CPT

## 2019-03-27 PROCEDURE — 85025 COMPLETE CBC W/AUTO DIFF WBC: CPT | Performed by: EMERGENCY MEDICINE

## 2019-03-27 PROCEDURE — 96374 THER/PROPH/DIAG INJ IV PUSH: CPT

## 2019-03-27 PROCEDURE — 74174 CTA ABD&PLVS W/CONTRAST: CPT

## 2019-03-27 PROCEDURE — 36415 COLL VENOUS BLD VENIPUNCTURE: CPT | Performed by: EMERGENCY MEDICINE

## 2019-03-27 PROCEDURE — 71275 CT ANGIOGRAPHY CHEST: CPT

## 2019-03-27 PROCEDURE — 93005 ELECTROCARDIOGRAM TRACING: CPT

## 2019-03-27 RX ADMIN — SODIUM CHLORIDE 1000 ML: 0.9 INJECTION, SOLUTION INTRAVENOUS at 11:53

## 2019-03-27 RX ADMIN — FAMOTIDINE 20 MG: 10 INJECTION, SOLUTION INTRAVENOUS at 14:27

## 2019-03-27 RX ADMIN — IODIXANOL 75 ML: 320 INJECTION, SOLUTION INTRAVASCULAR at 13:45

## 2019-03-27 NOTE — DISCHARGE INSTRUCTIONS
Follow up with family doctor for recheck in 1 day  Return to ER if symptoms worsen  Call your GI doctor to reschedule endoscopy

## 2019-03-27 NOTE — ED PROVIDER NOTES
History  Chief Complaint   Patient presents with    Chest Pain     To ED from [address] [place] [address] evaluation of chest pain for one week  Patient was scheduled for EGD today and was referred  States that pain has been on and off, denies any SOB, dizziness, radiation of pain  Patient is a 67 y/o F with h/o GERD, asthma, HTN, gastroparesis that presents to the ED with chest pain off and on x 1 week  SHe states she cannot describe the pain  It does radiate to her back  Nothing makes the pain worse, nothing makes it better  SHe denies SOB  She states she noticed her BP has been elevated  No recent surgeries, long trips or h/o blood clots  Patient was supposed to get an EGD today, but they sent her here since she had chest pain  SHe states this pain does not feel like her normal GERD pain  History provided by:  Patient  Chest Pain   Pain location:  Substernal area and epigastric  Pain quality: aching    Pain radiates to:  Upper back  Pain radiates to the back: yes    Pain severity:  Moderate  Onset quality:  Gradual  Duration:  1 week  Timing:  Intermittent  Progression:  Worsening  Chronicity:  New  Context: at rest    Relieved by:  Nothing  Worsened by:  Nothing tried  Ineffective treatments:  None tried  Associated symptoms: back pain    Associated symptoms: no abdominal pain, no altered mental status, no anorexia, no anxiety, no cough, no dizziness, no fever, no nausea, no numbness, no palpitations, no shortness of breath and no weakness    Risk factors: hypertension    Risk factors: no high cholesterol, no immobilization, not obese, no prior DVT/PE, no smoking and no surgery        Prior to Admission Medications   Prescriptions Last Dose Informant Patient Reported? Taking?    Mometasone Furo-Formoterol Fum (DULERA) 100-5 MCG/ACT AERO  Self Yes No   Sig: Inhale 1 puff 2 (two) times a day   RABEprazole (ACIPHEX) 20 MG tablet   Yes No   Sig: TAKE 2 TABLET ONCE A DAY   albuterol (PROVENTIL HFA,VENTOLIN HFA) 90 mcg/act inhaler  Self Yes No   Sig: Inhale 2 puffs every 6 (six) hours as needed for wheezing   amLODIPine (NORVASC) 5 mg tablet  Self Yes No   Sig: Take 5 mg by mouth daily  amitriptyline (ELAVIL) 10 mg tablet  Self Yes No   Sig: Take 50 mg by mouth daily at bedtime    cetirizine (ZyrTEC) 10 MG chewable tablet  Self Yes No   Sig: Chew 10 mg daily  cholecalciferol (VITAMIN D3) 1,000 units tablet  Self Yes No   Sig: Take 5,000 Units by mouth daily   famotidine (PEPCID) 40 MG tablet  Self No No   Sig: Take 1 tablet (40 mg total) by mouth daily at bedtime   Patient taking differently: Take 40 mg by mouth 2 (two) times a day    gabapentin (NEURONTIN) 100 mg capsule   No No   Sig: Take 1 capsule (100 mg total) by mouth 3 (three) times a day for 30 days   multivitamin (THERAGRAN) TABS  Self Yes No   Sig: Take 1 tablet by mouth daily  Facility-Administered Medications: None       Past Medical History:   Diagnosis Date    Anxiety     Arthritis     Asthma     Candida infection, esophageal (HCC)     Chronic pain     Depression     Depression     Gastroparesis     Gastroparesis     GERD (gastroesophageal reflux disease)     Hypertension     Irritable bowel syndrome (IBS)     Multiple thyroid nodules     Osteoporosis     Psychiatric disorder        Past Surgical History:   Procedure Laterality Date    BACK SURGERY       SECTION      CHOLECYSTECTOMY      COLONOSCOPY      [facility]       [facility]    HIATAL HERNIA REPAIR      NISSEN FUNDOPLICATION      ROTATOR CUFF REPAIR      SHOULDER SURGERY      SPINAL FUSION      UPPER GASTROINTESTINAL ENDOSCOPY      US GUIDED THYROID BIOPSY      WISDOM TOOTH EXTRACTION         Family History   Problem Relation Age of Onset    Stomach cancer Father     Cancer Father     Hypertension Maternal Aunt     Colon cancer Neg Hx     Colon polyps Neg Hx      I have reviewed and agree with the history as documented      Social History     Tobacco Use    Smoking status: Former Smoker     Last attempt to quit: 10/2003     Years since quitting: 15 4    Smokeless tobacco: Never Used   Substance Use Topics    Alcohol use: No    Drug use: No        Review of Systems   Constitutional: Negative for chills and fever  HENT: Negative  Respiratory: Negative for cough and shortness of breath  Cardiovascular: Positive for chest pain  Negative for palpitations and leg swelling  Gastrointestinal: Negative for abdominal pain, anorexia and nausea  Musculoskeletal: Positive for back pain  Skin: Negative for color change  Neurological: Negative for dizziness, weakness and numbness  Psychiatric/Behavioral: Negative for confusion  All other systems reviewed and are negative  Physical Exam  Physical Exam   Constitutional: She is oriented to person, place, and time  She appears well-developed and well-nourished  She is cooperative  She does not appear ill  No distress  HENT:   Head: Normocephalic and atraumatic  Nose: Nose normal    Mouth/Throat: Oropharynx is clear and moist    Eyes: Conjunctivae are normal    Neck: Normal range of motion  Cardiovascular: Normal rate, regular rhythm and normal heart sounds  No murmur heard  Pulmonary/Chest: Effort normal and breath sounds normal  She has no wheezes  Abdominal: Soft  Normal appearance and bowel sounds are normal  There is no tenderness  Musculoskeletal: Normal range of motion  She exhibits no edema or tenderness  Neurological: She is alert and oriented to person, place, and time  She has normal strength  No sensory deficit  Gait normal    Skin: Skin is warm and dry  No rash noted  She is not diaphoretic  No pallor  Psychiatric: She has a normal mood and affect  Cognition and memory are normal    Nursing note and vitals reviewed        Vital Signs  ED Triage Vitals [03/27/19 1028]   Temperature Pulse Respirations Blood Pressure SpO2   98 2 °F (36 8 °C) 95 20 (!) 181/93 98 %      Temp Source Heart Rate Source Patient Position - Orthostatic VS BP Location FiO2 (%)   Tympanic Monitor Lying Right arm --      Pain Score       7           Vitals:    03/27/19 1345 03/27/19 1400 03/27/19 1415 03/27/19 1430   BP: 165/77   145/82   Pulse:  74 77 75   Patient Position - Orthostatic VS:             Visual Acuity      ED Medications  Medications   sodium chloride 0 9 % bolus 1,000 mL (0 mL Intravenous Stopped 3/27/19 1426)   iodixanol (VISIPAQUE) 320 MG/ML injection 75 mL (75 mL Intravenous Given 3/27/19 1345)   famotidine (PEPCID) injection 20 mg (20 mg Intravenous Given 3/27/19 1427)       Diagnostic Studies  Results Reviewed     Procedure Component Value Units Date/Time    Troponin I [[id number]]  (Normal) Collected:  03/27/19 1036    Lab Status:  Final result Specimen:  Blood from Arm, Right Updated:  03/27/19 1105     Troponin I <0 02 ng/mL     Comprehensive metabolic panel [[id number]]  (Abnormal) Collected:  03/27/19 1036    Lab Status:  Final result Specimen:  Blood from Arm, Right Updated:  03/27/19 1103     Sodium 141 mmol/L      Potassium 4 4 mmol/L      Chloride 104 mmol/L      CO2 27 mmol/L      ANION GAP 10 mmol/L      BUN 32 mg/dL      Creatinine 1 43 mg/dL      Glucose 112 mg/dL      Calcium 9 3 mg/dL      AST 38 U/L      ALT 62 U/L      Alkaline Phosphatase 68 U/L      Total Protein 7 9 g/dL      Albumin 3 4 g/dL      Total Bilirubin 0 30 mg/dL      eGFR 37 ml/min/1 73sq m     Narrative:       National Kidney Disease Education Program recommendations are as follows:  GFR calculation is accurate only with a steady state creatinine  Chronic Kidney disease less than 60 ml/min/1 73 sq  meters  Kidney failure less than 15 ml/min/1 73 sq  meters      CBC and differential [[id number]]  (Abnormal) Collected:  03/27/19 1036    Lab Status:  Final result Specimen:  Blood from Arm, Right Updated:  03/27/19 1046     WBC 12 53 Thousand/uL      RBC 4 30 Million/uL      Hemoglobin 14 0 g/dL      Hematocrit 40 7 % MCV 95 fL      MCH 32 6 pg      MCHC 34 4 g/dL      RDW 14 0 %      MPV 8 8 fL      Platelets 094 Thousands/uL      nRBC 0 /100 WBCs      Neutrophils Relative 67 %      Immat GRANS % 1 %      Lymphocytes Relative 25 %      Monocytes Relative 6 %      Eosinophils Relative 1 %      Basophils Relative 0 %      Neutrophils Absolute 8 50 Thousands/µL      Immature Grans Absolute 0 09 Thousand/uL      Lymphocytes Absolute 3 10 Thousands/µL      Monocytes Absolute 0 74 Thousand/µL      Eosinophils Absolute 0 07 Thousand/µL      Basophils Absolute 0 03 Thousands/µL                  CTA dissection protocol chest abdomen pelvis w wo contrast   Final Result by Cherie Jean-Baptiste MD (03/27 1417)      No aortic dissection  No pulmonary embolus  No acute CT abnormality to account for the patient's symptoms  Complete fatty replacement of the pancreas  Evaluate for reduction and extracranial function of the pancreas and possible malabsorption syndrome  Hepatic steatosis  Surgical changes of hiatal hernia repair with mesh in place, unchanged in appearance from August 3, 2016  Workstation performed: [id number]         XR chest 2 views   Final Result by Prabhakar Sandoval MD (03/27 1318)      No acute cardiopulmonary disease              Workstation performed: [id number]                    Procedures  ECG 12 Lead Documentation  Date/Time: 3/27/2019 11:33 AM  Performed by: Golden Davis PA-C  Authorized by: Golden Davis PA-C     Indications / Diagnosis:  Chest pain  Patient location:  ED  Previous ECG:     Previous ECG:  Unavailable  Rate:     ECG rate:  82  Rhythm:     Rhythm: sinus rhythm    Conduction:     Conduction: normal    ST segments:     ST segments:  Normal  T waves:     T waves: normal             Phone Contacts  ED Phone Contact    ED Course         HEART Risk Score      Most Recent Value   History  0 Filed at: 03/27/2019 1156   ECG  0 Filed at: 03/27/2019 1156   Age  2 Filed at: 03/27/2019 1156   Risk Factors  1 Filed at: 03/27/2019 1156   Troponin  0 Filed at: 03/27/2019 1156   Heart Score Risk Calculator   History  0 Filed at: 03/27/2019 1156   ECG  0 Filed at: 03/27/2019 1156   Age  2 Filed at: 03/27/2019 1156   Risk Factors  1 Filed at: 03/27/2019 1156   Troponin  0 Filed at: 03/27/2019 1156   HEART Score  3 Filed at: 03/27/2019 1156   HEART Score  3 Filed at: 03/27/2019 1156                            [facility]  Number of Diagnoses or Management Options  Atypical chest pain: minor  Diagnosis management comments: Patient with chest pain, will order labs to r/o cardiac or pulmonary disease  Pain does radiate to back, will order CT to r/o dissection  Patient instructed to f/u with PCP concerning fatty pancreas  Amount and/or Complexity of Data Reviewed  Clinical lab tests: ordered and reviewed  Tests in the radiology section of CPT®: ordered and reviewed    Patient Progress  Patient progress: stable      Disposition  Final diagnoses:   Atypical chest pain     Time reflects when diagnosis was documented in both MDM as applicable and the Disposition within this note     Time User Action Codes Description Comment    3/27/2019  2:24 PM César Marquez Add [R07 89] Atypical chest pain       ED Disposition     ED Disposition Condition Date/Time Comment    Discharge Stable Wed Mar 27, 2019  2:24 PM Evert Monroe discharge to home/self care  Follow-up Information     Follow up With Specialties Details Why Contact Madie Buchanan DO  Call in 1 day For recheck P O   [address]  [phone]            Discharge Medication List as of 3/27/2019  2:25 PM      CONTINUE these medications which have NOT CHANGED    Details   albuterol (PROVENTIL HFA,VENTOLIN HFA) 90 mcg/act inhaler Inhale 2 puffs every 6 (six) hours as needed for wheezing, Historical Med      amitriptyline (ELAVIL) 10 mg tablet Take 50 mg by mouth daily at bedtime , Historical Med amLODIPine (NORVASC) 5 mg tablet Take 5 mg by mouth daily  , Historical Med      cetirizine (ZyrTEC) 10 MG chewable tablet Chew 10 mg daily  , Historical Med      cholecalciferol (VITAMIN D3) 1,000 units tablet Take 5,000 Units by mouth daily, Historical Med      famotidine (PEPCID) 40 MG tablet Take 1 tablet (40 mg total) by mouth daily at bedtime, Starting Fri 3/8/2019, Normal      gabapentin (NEURONTIN) 100 mg capsule Take 1 capsule (100 mg total) by mouth 3 (three) times a day for 30 days, Starting Fri 3/8/2019, Until Sun 4/7/2019, Normal      Mometasone Furo-Formoterol Fum (DULERA) 100-5 MCG/ACT AERO Inhale 1 puff 2 (two) times a day, Historical Med      multivitamin (THERAGRAN) TABS Take 1 tablet by mouth daily  , Historical Med      RABEprazole (ACIPHEX) 20 MG tablet TAKE 2 TABLET ONCE A DAY, Historical Med           No discharge procedures on file      ED Provider  Electronically Signed by           Yessy Cameron PA-C  03/27/19 1623

## 2019-03-28 LAB
ATRIAL RATE: 82 BPM
P AXIS: 43 DEGREES
PR INTERVAL: 132 MS
QRS AXIS: 40 DEGREES
QRSD INTERVAL: 90 MS
QT INTERVAL: 372 MS
QTC INTERVAL: 434 MS
T WAVE AXIS: 44 DEGREES
VENTRICULAR RATE: 82 BPM

## 2019-03-28 PROCEDURE — 93010 ELECTROCARDIOGRAM REPORT: CPT | Performed by: INTERNAL MEDICINE

## 2019-03-29 ENCOUNTER — TELEPHONE (OUTPATIENT)
Dept: GASTROENTEROLOGY | Facility: CLINIC | Age: 74
End: 2019-03-29

## 2019-03-29 NOTE — TELEPHONE ENCOUNTER
Dr Esperanza Fournier, patient called today to reschedule EGD at [facility]  She prefers you, but you do not have block time in April or May  She is flexible with another physician doing this as long as you are agreeable  She ended up going to ER and that is why it was cancelled- she cannot eat and having pain, that ER says is related to GI per patient  Please advise, thank you

## 2019-03-30 NOTE — TELEPHONE ENCOUNTER
I could try to do her exam at [facility] if this would work for her  Otherwise can have another provider to her exam at [address]  Given issues that the patient has had postprocedure with EGDs in the past at prefer not to have her done in the endoscopy center

## 2019-04-01 ENCOUNTER — PREP FOR PROCEDURE (OUTPATIENT)
Dept: GASTROENTEROLOGY | Facility: CLINIC | Age: 74
End: 2019-04-01

## 2019-04-01 ENCOUNTER — TELEPHONE (OUTPATIENT)
Dept: GASTROENTEROLOGY | Facility: CLINIC | Age: 74
End: 2019-04-01

## 2019-04-01 DIAGNOSIS — K21.9 CHRONIC GERD: Primary | ICD-10-CM

## 2019-04-01 PROCEDURE — 1124F ACP DISCUSS-NO DSCNMKR DOCD: CPT | Performed by: PATHOLOGY

## 2019-04-01 NOTE — TELEPHONE ENCOUNTER
Called patient and scheduled this Weds 4/3/19 at [facility] with Dr Alexandrea Kurtz  Will  not available at [facility] until mid may  Patient will try to make arrangements for transportation and let me know if she cannot

## 2019-04-03 ENCOUNTER — ANESTHESIA (OUTPATIENT)
Dept: PERIOP | Facility: HOSPITAL | Age: 74
End: 2019-04-03
Payer: MEDICARE

## 2019-04-03 ENCOUNTER — HOSPITAL ENCOUNTER (OUTPATIENT)
Facility: HOSPITAL | Age: 74
Setting detail: OUTPATIENT SURGERY
Discharge: HOME | End: 2019-04-03
Attending: INTERNAL MEDICINE | Admitting: INTERNAL MEDICINE
Payer: MEDICARE

## 2019-04-03 ENCOUNTER — ANESTHESIA EVENT (OUTPATIENT)
Dept: PERIOP | Facility: HOSPITAL | Age: 74
End: 2019-04-03
Payer: MEDICARE

## 2019-04-03 VITALS
HEART RATE: 82 BPM | TEMPERATURE: 98.6 F | RESPIRATION RATE: 18 BRPM | WEIGHT: 132.2 LBS | BODY MASS INDEX: 25.95 KG/M2 | SYSTOLIC BLOOD PRESSURE: 128 MMHG | DIASTOLIC BLOOD PRESSURE: 80 MMHG | OXYGEN SATURATION: 96 % | HEIGHT: 60 IN

## 2019-04-03 DIAGNOSIS — K21.9 CHRONIC GERD: ICD-10-CM

## 2019-04-03 PROCEDURE — 88305 TISSUE EXAM BY PATHOLOGIST: CPT | Performed by: PATHOLOGY

## 2019-04-03 PROCEDURE — 87077 CULTURE AEROBIC IDENTIFY: CPT | Performed by: INTERNAL MEDICINE

## 2019-04-03 PROCEDURE — 43239 EGD BIOPSY SINGLE/MULTIPLE: CPT | Performed by: INTERNAL MEDICINE

## 2019-04-03 PROCEDURE — 43450 DILATE ESOPHAGUS 1/MULT PASS: CPT | Performed by: INTERNAL MEDICINE

## 2019-04-03 RX ORDER — PROPOFOL 10 MG/ML
INJECTION, EMULSION INTRAVENOUS AS NEEDED
Status: DISCONTINUED | OUTPATIENT
Start: 2019-04-03 | End: 2019-04-03 | Stop reason: SURG

## 2019-04-03 RX ORDER — SODIUM CHLORIDE 9 MG/ML
20 INJECTION, SOLUTION INTRAVENOUS CONTINUOUS
Status: DISCONTINUED | OUTPATIENT
Start: 2019-04-03 | End: 2019-04-03 | Stop reason: HOSPADM

## 2019-04-03 RX ADMIN — PROPOFOL 50 MG: 10 INJECTION, EMULSION INTRAVENOUS at 09:29

## 2019-04-03 RX ADMIN — PROPOFOL 20 MG: 10 INJECTION, EMULSION INTRAVENOUS at 09:38

## 2019-04-03 RX ADMIN — PROPOFOL 30 MG: 10 INJECTION, EMULSION INTRAVENOUS at 09:36

## 2019-04-03 RX ADMIN — SODIUM CHLORIDE 20 ML/HR: 0.9 INJECTION, SOLUTION INTRAVENOUS at 09:02

## 2019-04-03 RX ADMIN — PROPOFOL 50 MG: 10 INJECTION, EMULSION INTRAVENOUS at 09:32

## 2019-04-04 LAB — UREASE TISS QL: NEGATIVE

## 2019-04-16 DIAGNOSIS — R10.12 LEFT UPPER QUADRANT PAIN: Primary | ICD-10-CM

## 2019-04-16 RX ORDER — AMITRIPTYLINE HYDROCHLORIDE 10 MG/1
50 TABLET, FILM COATED ORAL
Qty: 150 TABLET | Refills: 2 | Status: SHIPPED | OUTPATIENT
Start: 2019-04-16 | End: 2019-04-19 | Stop reason: SDUPTHER

## 2019-04-19 DIAGNOSIS — R10.12 LEFT UPPER QUADRANT PAIN: ICD-10-CM

## 2019-04-19 RX ORDER — AMITRIPTYLINE HYDROCHLORIDE 50 MG/1
50 TABLET, FILM COATED ORAL
Qty: 30 TABLET | Refills: 2 | Status: SHIPPED | OUTPATIENT
Start: 2019-04-19 | End: 2019-07-12 | Stop reason: SDUPTHER

## 2019-05-06 ENCOUNTER — OFFICE VISIT (OUTPATIENT)
Dept: GASTROENTEROLOGY | Facility: CLINIC | Age: 74
End: 2019-05-06
Payer: MEDICARE

## 2019-05-06 VITALS
BODY MASS INDEX: 26.5 KG/M2 | DIASTOLIC BLOOD PRESSURE: 82 MMHG | HEIGHT: 60 IN | SYSTOLIC BLOOD PRESSURE: 120 MMHG | HEART RATE: 68 BPM | WEIGHT: 135 LBS

## 2019-05-06 DIAGNOSIS — K59.03 DRUG-INDUCED CONSTIPATION: ICD-10-CM

## 2019-05-06 DIAGNOSIS — K31.84 GASTROPARESIS: ICD-10-CM

## 2019-05-06 DIAGNOSIS — Z12.11 COLON CANCER SCREENING: ICD-10-CM

## 2019-05-06 DIAGNOSIS — K21.9 GASTROESOPHAGEAL REFLUX DISEASE WITHOUT ESOPHAGITIS: Primary | ICD-10-CM

## 2019-05-06 DIAGNOSIS — R10.12 LEFT UPPER QUADRANT PAIN: ICD-10-CM

## 2019-05-06 PROCEDURE — 99214 OFFICE O/P EST MOD 30 MIN: CPT | Performed by: INTERNAL MEDICINE

## 2019-05-06 PROCEDURE — 1124F ACP DISCUSS-NO DSCNMKR DOCD: CPT | Performed by: INTERNAL MEDICINE

## 2019-05-12 DIAGNOSIS — K21.9 CHRONIC GERD: Primary | ICD-10-CM

## 2019-05-13 RX ORDER — RABEPRAZOLE SODIUM 20 MG/1
TABLET, DELAYED RELEASE ORAL
Qty: 60 TABLET | Refills: 4 | Status: SHIPPED | OUTPATIENT
Start: 2019-05-13 | End: 2020-03-12 | Stop reason: ALTCHOICE

## 2019-06-15 ENCOUNTER — APPOINTMENT (OUTPATIENT)
Dept: RADIOLOGY | Facility: CLINIC | Age: 74
End: 2019-06-15
Payer: MEDICARE

## 2019-06-15 ENCOUNTER — OFFICE VISIT (OUTPATIENT)
Dept: URGENT CARE | Facility: CLINIC | Age: 74
End: 2019-06-15
Payer: MEDICARE

## 2019-06-15 VITALS
SYSTOLIC BLOOD PRESSURE: 132 MMHG | RESPIRATION RATE: 16 BRPM | HEIGHT: 60 IN | HEART RATE: 102 BPM | BODY MASS INDEX: 26.5 KG/M2 | DIASTOLIC BLOOD PRESSURE: 88 MMHG | OXYGEN SATURATION: 97 % | WEIGHT: 135 LBS | TEMPERATURE: 98.4 F

## 2019-06-15 DIAGNOSIS — M79.674 PAIN AND SWELLING OF TOE, RIGHT: ICD-10-CM

## 2019-06-15 DIAGNOSIS — M79.674 PAIN AND SWELLING OF TOE, RIGHT: Primary | ICD-10-CM

## 2019-06-15 DIAGNOSIS — M79.89 PAIN AND SWELLING OF TOE, RIGHT: Primary | ICD-10-CM

## 2019-06-15 DIAGNOSIS — M79.89 PAIN AND SWELLING OF TOE, RIGHT: ICD-10-CM

## 2019-06-15 PROCEDURE — 73630 X-RAY EXAM OF FOOT: CPT

## 2019-06-15 PROCEDURE — 99213 OFFICE O/P EST LOW 20 MIN: CPT | Performed by: NURSE PRACTITIONER

## 2019-06-15 PROCEDURE — G0463 HOSPITAL OUTPT CLINIC VISIT: HCPCS | Performed by: NURSE PRACTITIONER

## 2019-07-12 DIAGNOSIS — R10.12 LEFT UPPER QUADRANT PAIN: ICD-10-CM

## 2019-07-12 RX ORDER — AMITRIPTYLINE HYDROCHLORIDE 50 MG/1
50 TABLET, FILM COATED ORAL
Qty: 30 TABLET | Refills: 1 | Status: SHIPPED | OUTPATIENT
Start: 2019-07-12 | End: 2019-08-08 | Stop reason: SDUPTHER

## 2019-07-12 NOTE — TELEPHONE ENCOUNTER
[facility] sent request for patient's amitriptyline 50mg refill  Patient is scheduled for follow up in August 2019  Please sign off to release

## 2019-08-08 DIAGNOSIS — R10.12 LEFT UPPER QUADRANT PAIN: ICD-10-CM

## 2019-08-08 RX ORDER — AMITRIPTYLINE HYDROCHLORIDE 50 MG/1
50 TABLET, FILM COATED ORAL
Qty: 30 TABLET | Refills: 1 | Status: SHIPPED | OUTPATIENT
Start: 2019-08-08 | End: 2019-10-15 | Stop reason: SDUPTHER

## 2019-09-30 ENCOUNTER — OFFICE VISIT (OUTPATIENT)
Dept: URGENT CARE | Facility: CLINIC | Age: 74
End: 2019-09-30
Payer: MEDICARE

## 2019-09-30 VITALS
SYSTOLIC BLOOD PRESSURE: 156 MMHG | DIASTOLIC BLOOD PRESSURE: 90 MMHG | HEART RATE: 98 BPM | BODY MASS INDEX: 26.7 KG/M2 | TEMPERATURE: 98.7 F | WEIGHT: 136 LBS | RESPIRATION RATE: 16 BRPM | HEIGHT: 60 IN

## 2019-09-30 DIAGNOSIS — L30.9 DERMATITIS: Primary | ICD-10-CM

## 2019-09-30 PROCEDURE — 99213 OFFICE O/P EST LOW 20 MIN: CPT | Performed by: PHYSICIAN ASSISTANT

## 2019-09-30 PROCEDURE — G0463 HOSPITAL OUTPT CLINIC VISIT: HCPCS | Performed by: PHYSICIAN ASSISTANT

## 2019-09-30 NOTE — PROGRESS NOTES
NAME: Baljinder Escobedo is a 68 y o  female  : 1945    MRN: [id number]      Assessment and Plan   Dermatitis [L30 9]  1  Dermatitis      Etiology unclear  Patient is reluctant to try triamcinolone cream         At this time recommended OTC Benadryl at night and Benadryl ointment during the day  Advised patient to follow up with dermatologist for further evaluation  Discussed plans and visit summary with patient  Patient verbalized understanding, all questions answered and patient in agreement  Educated patient that if signs and symptoms get worse go to ER  Ira was seen today for rash  Diagnoses and all orders for this visit:    Dermatitis        Patient Instructions   There are no Patient Instructions on file for this visit  Proceed to ER if symptoms worsen  Chief Complaint     Chief Complaint   Patient presents with    Rash     Itcy rash on extremities x 2 weeks and is getting worse  Pt is putting skin lotion for dryness  O2 is 93% RA         History of Present Illness        67 yo pt presents for rash on bilateral lower extremities x 2 wks  Pt reports rash having a rash past 2 weeks unsure of cause  Patient states she is allergic to prednisone and cortisone cream   Admits to itching  Patient reports she had a rash to months ago and had an in ointment prescribed by rheumatologist states she was unable to afford co-pay which was $40 at the time  Denies redness, swelling, open wound, discharge  Denies new soaps, detergents, clothes, sheets, foods, medications  Patient denies history of psoriasis or eczema  Review of Systems   Review of Systems   Constitutional: Negative  Respiratory: Negative  Cardiovascular: Negative  Skin: Positive for rash           Current Medications       Current Outpatient Medications:     albuterol (PROVENTIL HFA,VENTOLIN HFA) 90 mcg/act inhaler, Inhale 2 puffs every 6 (six) hours as needed for wheezing, Disp: , Rfl:     amitriptyline (ELAVIL) 50 mg tablet, Take 1 tablet (50 mg total) by mouth daily at bedtime, Disp: 30 tablet, Rfl: 1    amLODIPine (NORVASC) 5 mg tablet, Take 5 mg by mouth daily  , Disp: , Rfl:     cetirizine (ZyrTEC) 10 MG chewable tablet, Chew 10 mg daily  , Disp: , Rfl:     cholecalciferol (VITAMIN D3) 1,000 units tablet, Take 5,000 Units by mouth daily, Disp: , Rfl:     famotidine (PEPCID) 40 MG tablet, Take 1 tablet (40 mg total) by mouth daily at bedtime (Patient taking differently: Take 40 mg by mouth 2 (two) times a day ), Disp: 30 tablet, Rfl: 6    multivitamin (THERAGRAN) TABS, Take 1 tablet by mouth daily  , Disp: , Rfl:     RABEprazole (ACIPHEX) 20 MG tablet, TAKE 2 TABLET ONCE A DAY, Disp: 60 tablet, Rfl: 4    gabapentin (NEURONTIN) 100 mg capsule, Take 1 capsule (100 mg total) by mouth 3 (three) times a day for 30 days, Disp: 90 capsule, Rfl: 3    lubiprostone (AMITIZA) 24 mcg capsule, Take 1 capsule (24 mcg total) by mouth 2 (two) times a day with meals for 30 days, Disp: 60 capsule, Rfl: 0    Current Allergies     Allergies as of 2019 - Reviewed 2019   Allergen Reaction Noted    Latex Hives 2016    Doxycycline Hives 2019    Cephalosporins GI Intolerance 2016    Nsaids GI Intolerance 2016    Penicillins GI Intolerance 2016    Prednisone GI Intolerance 2017    Sulphasomidine GI Intolerance 2017              Past Medical History:   Diagnosis Date    Anxiety     Arthritis     Asthma     Candida infection, esophageal ([address] )     Chronic pain     COPD (chronic obstructive pulmonary disease) ([address]     Depression     Depression     Gastroparesis     Gastroparesis     GERD (gastroesophageal reflux disease)     Hypertension     Irritable bowel syndrome (IBS)     Migraines     Multiple thyroid nodules     Osteoporosis     Pneumonia     Psychiatric disorder        Past Surgical History:   Procedure Laterality Date    BACK SURGERY       SECTION      CHOLECYSTECTOMY      COLONOSCOPY  10/09/2013    ESOPHAGEAL DILATION      [address]    HIATAL HERNIA REPAIR      NISSEN FUNDOPLICATION      [place] ESOPHAGOGASTRODUODENOSCOPY TRANSORAL DIAGNOSTIC N/A 4/3/2019    Procedure: ESOPHAGOGASTRODUODENOSCOPY (EGD); Surgeon: Cristy Moser MD;  Location:  MAIN OR;  Service: Gastroenterology    ROTATOR CUFF REPAIR      SHOULDER SURGERY      SPINAL FUSION      UPPER GASTROINTESTINAL ENDOSCOPY      US GUIDED THYROID BIOPSY      WISDOM TOOTH EXTRACTION         Family History   Problem Relation Age of Onset    Other Mother         Esophageal disorder    Stomach cancer Father     Cancer Father     Hypertension Maternal Aunt     Colon cancer Neg Hx     Colon polyps Neg Hx          Medications have been verified  The following portions of the patient's history were reviewed and updated as appropriate: allergies, current medications, past family history, past medical history, past social history, past surgical history and problem list     Objective   /90 (BP Location: Left arm, Patient Position: Sitting, Cuff Size: Standard)   Pulse 98   Temp 98 7 °F (37 1 °C) (Tympanic)   Resp 16   Ht 5' (1 524 m)   Wt 61 7 kg (136 lb)   BMI 26 56 kg/m²      Physical Exam     Physical Exam   Constitutional: She is oriented to person, place, and time  She appears well-developed and well-nourished  No distress  Cardiovascular: Normal rate, regular rhythm, normal heart sounds and intact distal pulses  Exam reveals no gallop and no friction rub  No murmur heard  Pulmonary/Chest: Effort normal and breath sounds normal  No stridor  No respiratory distress  She has no wheezes  She has no rales  She exhibits no tenderness  Neurological: She is alert and oriented to person, place, and time  Skin: Skin is warm  Rash noted  Rash is maculopapular (Multiple maculopapular rash noted on bilateral lower leg extends to feet)  She is not diaphoretic     Nursing note and vitals reviewed        Diana Lanza PA-C

## 2019-10-14 ENCOUNTER — OFFICE VISIT (OUTPATIENT)
Dept: DERMATOLOGY | Facility: CLINIC | Age: 74
End: 2019-10-14
Payer: MEDICARE

## 2019-10-14 VITALS — WEIGHT: 135 LBS | BODY MASS INDEX: 26.5 KG/M2 | HEIGHT: 60 IN

## 2019-10-14 DIAGNOSIS — L82.1 SEBORRHEIC KERATOSES: ICD-10-CM

## 2019-10-14 DIAGNOSIS — D22.9 MULTIPLE MELANOCYTIC NEVI: ICD-10-CM

## 2019-10-14 DIAGNOSIS — R21 RASH: Primary | ICD-10-CM

## 2019-10-14 DIAGNOSIS — L57.8 ACTINIC SKIN DAMAGE: ICD-10-CM

## 2019-10-14 PROCEDURE — 88305 TISSUE EXAM BY PATHOLOGIST: CPT | Performed by: PATHOLOGY

## 2019-10-14 PROCEDURE — 88312 SPECIAL STAINS GROUP 1: CPT | Performed by: PATHOLOGY

## 2019-10-14 PROCEDURE — 99202 OFFICE O/P NEW SF 15 MIN: CPT | Performed by: DERMATOLOGY

## 2019-10-14 PROCEDURE — 11104 PUNCH BX SKIN SINGLE LESION: CPT | Performed by: DERMATOLOGY

## 2019-10-14 RX ORDER — IVERMECTIN 3 MG/1
TABLET ORAL
Qty: 8 TABLET | Refills: 0 | Status: SHIPPED | OUTPATIENT
Start: 2019-10-14 | End: 2019-10-21

## 2019-10-14 NOTE — PATIENT INSTRUCTIONS
1  RASH    Assessment and Plan:  Based on a thorough discussion of this condition and the management approach to it (including a comprehensive discussion of the known risks, side effects and potential benefits of treatment), the patient (family) agrees to implement the following specific plan:   Please take Ivermectin 4 tablets today and then 4 tablets in 7 days 10/21/2019   Triamcinolone Ointment can be applied two times a day to all affected areas avoiding face, groin, and armpits   It is recommended to thoroughly wash all bedding and clothing for every household member   Dr Meka Bravo will call you with results of biopsy       ACTINIC DAMAGE (Chronic Ultraviolet Radiation Exposure)    Photo-aging and actinic damage of skin is common on sites repeatedly exposed to the sun, especially the backs of the hands and the face, most often affecting the ears, nose, cheeks, upper lip, vermilion of the lower lip, temples, forehead and balding scalp  In severely chronically sun-exposed individuals, this condition may also be found on the upper trunk, upper and lower limbs, and dorsum of feet  Photo-aging induces cutaneous changes that vary among individuals, reflecting inherent differences in vulnerability to sun exposure and repair capacity  We discussed further steps to minimize or avoid UV exposure:     Be aware of daily UV index levels  In the [facility], this index is often reported on the [address]   Avoid outdoor activities during the middle of the day   Wear sun-protective clothing (e g , UPF-rated, broad-brimmed hats, long sleeves, and trousers or skirts)   Apply a high sun protection factor (60+) broad-spectrum sunscreen moisturizer at least three times a day to affected areas, year-round  I recommended Neutrogena Daily Defense or CeraVe AM or Aveeno   Do not smoke, and where possible, avoid exposure to pollutants     Get plenty of exercise -- active people appear younger than inactive people   Eat fruit and vegetables daily   Many oral supplements with antioxidant and anti-inflammatory properties have been advocated to mitigate skin aging and to improve skin health  These include carotenoids; polyphenols; chlorophyll; aloe vera; vitamins B, C, and E; red ginseng; squalene; and omega-3 fatty acids  Their role in combatting skin aging is unclear  SEBORRHEIC KERATOSIS; NON-INFLAMED    Seborrheic Keratosis  A seborrheic keratosis is a harmless warty spot that appears during adult life as a common sign of skin aging  Seborrheic keratoses can arise on any area of skin, covered or uncovered, with the usual exception of the palms and soles  They do not arise from mucous membranes  Seborrheic keratoses can have highly variable appearance  Seborrheic keratoses are extremely common  It has been estimated that over 90% of adults over the age of 61 years have one or more of them  They occur in males and females of all races, typically beginning to erupt in the 35s or 45s  They are uncommon under the age of 21 years  The precise cause of seborrhoeic keratoses is not known  Seborrhoeic keratoses are considered degenerative in nature  As time goes by, seborrheic keratoses tend to become more numerous  Some people inherit a tendency to develop a very large number of them; some people may have hundreds of them  The name "seborrheic keratosis" is misleading, because these lesions are not limited to a seborrhoeic distribution (scalp, mid-face, chest, upper back), nor are they formed from sebaceous glands, nor are they associated with sebum -- which is greasy    Seborrheic keratosis may also be called "SK," "Seb K," "basal cell papilloma," "senile wart," or "barnacle "      Researchers have noted:   Eruptive seborrhoeic keratoses can follow sunburn or dermatitis   Skin friction may be the reason they appear in body folds   Viral cause (e g , human papillomavirus) seems unlikely   Stable and clonal mutations or activation of FRFR3, PIK3CA, CHICHI, AKT1 and EGFR genes are found in seborrhoeic keratoses   Seborrhoeic keratosis can arise from solar lentigo   FRFR3 mutations also arise in solar lentigines  These mutations are associated with increased age and location on the head and neck, suggesting a role of ultraviolet radiation in these lesions   Seborrheic keratoses do not harbour tumour suppressor gene mutations   Epidermal growth factor receptor inhibitors, which are used to treat some cancers, often result in an increase in verrucal (warty) keratoses  There is no easy way to remove multiple lesions on a single occasion  Unless a specific lesion is "inflamed" and is causing pain or stinging/burning or is bleeding, most insurance companies do not authorize treatment  MELANOCYTIC NEVI ("Moles")     Melanocytic Nevi  Melanocytic nevi ("moles") are tan or brown, raised or flat areas of the skin which have an increased number of melanocytes  Melanocytes are the cells in our body which make pigment and account for skin color  Some moles are present at birth (I e , "congenital nevi"), while others come up later in life (i e , "acquired nevi")  The sun can stimulate the body to make more moles  Sunburns are not the only thing that triggers more moles  Chronic sun exposure can do it too  Clinically distinguishing a healthy mole from melanoma may be difficult, even for experienced dermatologists  The "ABCDE's" of moles have been suggested as a means of helping to alert a person to a suspicious mole and the possible increased risk of melanoma  The suggestions for raising alert are as follows:    Asymmetry: Healthy moles tend to be symmetric, while melanomas are often asymmetric  Asymmetry means if you draw a line through the mole, the two halves do not match in color, size, shape, or surface texture   Asymmetry can be a result of rapid enlargement of a mole, the development of a raised area on a previously flat lesion, scaling, ulceration, bleeding or scabbing within the mole  Any mole that starts to demonstrate "asymmetry" should be examined promptly by a board certified dermatologist      Border: Healthy moles tend to have discrete, even borders  The border of a melanoma often blends into the normal skin and does not sharply delineate the mole from normal skin  Any mole that starts to demonstrate "uneven borders" should be examined promptly by a board certified dermatologist      Color: Healthy moles tend to be one color throughout  Melanomas tend to be made up of different colors ranging from dark black, blue, white, or red  Any mole that demonstrates a color change should be examined promptly by a board certified dermatologist      Diameter: Healthy moles tend to be smaller than 0 6 cm in size; an exception are "congenital nevi" that can be larger  Melanomas tend to grow and can often be greater than 0 6 cm (1/4 of an inch, or the size of a pencil eraser)  This is only a guideline, and many normal moles may be larger than 0 6 cm without being unhealthy  Any mole that starts to change in size (small to bigger or bigger to smaller) should be examined promptly by a board certified dermatologist      Evolving: Healthy moles tend to "stay the same "  Melanomas may often show signs of change or evolution such as a change in size, shape, color, or elevation  Any mole that starts to itch, bleed, crust, burn, hurt, or ulcerate or demonstrate a change or evolution should be examined promptly by a board certified dermatologist       Dysplastic Nevi  Dysplastic moles are moles that fit the ABCDE rules of melanoma but are not identified as melanomas when examined under the microscope  They may indicate an increased risk of melanoma in that person  If there is a family history of melanoma, most experts agree that the person may be at an increased risk for developing a melanoma    Experts still do not agree on what dysplastic moles mean in patients without a personal or family history of melanoma  Dysplastic moles are usually larger than common moles and have different colors within it with irregular borders  The appearance can be very similar to a melanoma  Biopsies of dysplastic moles may show abnormalities which are different from a regular mole  Melanoma  Malignant melanoma is a type of skin cancer that can be deadly if it spreads throughout the body  The incidence of melanoma in the [place] is growing faster than any other cancer  Melanoma usually grows near the surface of the skin for a period of time, and then begins to grow deeper into the skin  Once it grows deeper into the skin, the risk of spread to other organs greatly increases  Therefore, early detection and removal of a malignant melanoma may result in a better chance at a complete cure; removal after the tumor has spread may not be as effective, leading to worse clinical outcomes such as death  The true rate of nevus transformation into a melanoma is unknown  It has been estimated that the lifetime risk for any acquired melanocytic nevus on any 21year-old individual transforming into melanoma by age [de-identified] is 0 03% (1 in 3,164) for men and 0 009% (1 in 10,800) for women  The appearance of a "new mole" remains one of the most reliable methods for identifying a malignant melanoma  Occasionally, melanomas appear as rapidly growing, blue-black, dome-shaped bumps within a previous mole or previous area of normal skin  Other times, melanomas are suspected when a mole suddenly appears or changes  Itching, burning, or pain in a pigmented lesion should increase suspicion, but most patients with early melanoma have no skin discomfort whatsoever  Melanoma can occur anywhere on the skin, including areas that are difficult for self-examination  Many melanomas are first noticed by other family members    Suspicious-looking moles may be removed for microscopic examination  You may be able to prevent death from melanoma by doing two simple things:    1  Try to avoid unnecessary sun exposure and protect your skin when it is exposed to the sun  People who live near the equator, people who have intermittent exposures to large amounts of sun, and people who have had sunburns in childhood or adolescence have an increased risk for melanoma  Sun sense and vigilant sun protection may be keys to helping to prevent melanoma  We recommend wearing UPF-rated sun protective clothing and sunglasses whenever possible and applying a moisturizer-sunscreen combination product (SPF 50+) such as Neutrogena Daily Defense to sun exposed areas of skin at least three times a day  2  Have your moles regularly examined by a board certified dermatologist AND by yourself or a family member/friend at home  We recommend that you have your moles examined at least once a year by a board certified dermatologist   Use your birthday as an annual reminder to have your "Birthday Suit" (I e , your skin) examined; it is a nice birthday gift to yourself to know that your skin is healthy appearing! Additionally, at-home self examinations may be helpful for detecting a possible melanoma  Use the ABCDEs we discussed and check your moles once a month at home  VERBAL INFORMED CONSENT FOR SKIN PUNCH BIOPSY    I   RATIONALE FOR PROCEDURE  I understand that a skin biopsy allows the dermatologist to test a lesion or rash under the microscope to obtain a diagnosis  I understand that it usually involves numbing the area with numbing medication and removing a small piece of skin; sometimes the area will be closed with sutures  I understand that if any sutures are placed, then they are usually need to be removed in 2 weeks or less  I understand that my Dermatologist recommends that a skin "punch" biopsy be performed today    A local anesthetic, similar to the kind that a dentist uses when filling a cavity, will be injected with a very small needle into the skin area to be sampled  The injected skin and tissue underneath "will go to sleep and become numb so no pain should be felt afterwards  An instrument shaped like a tiny "cookie cutter" (punch biopsy instrument) will be used to cut a small round piece of tissue and skin from the area so that a sample of tissue can be taken and examined more closely under the microscope  A slight amount of bleeding will occur, but it will be stopped with direct pressure and a pressure bandage and any other appropriate methods  A scar will form where the wound was created  Surgical ointment will be applied to help protect the wound  I understand that a few sutures/stitches may be applied to help aid in wound healing  II   RISKS AND POTENTIAL COMPLICATIONS   I understand that risks and potential complications of a skin biopsy include but are not limited to the following:   Bleeding   Infection   Pain   Scar/keloid   Skin discoloration   Incomplete Removal   Recurrence   Nerve Damage/Numbness/Loss of Function   Allergic Reaction to Anesthesia   Biopsies are diagnostic procedures and based on findings additional treatment or evaluation may be required   Loss or destruction of specimen resulting in no additional findings    My Dermatologist has explained to me the nature of the skin condition, the nature of the procedure, and the benefits to be reasonably expected compared with alternative approaches  My Dermatologist has discussed the likelihood of major risks or complications of this procedure including the specific risks listed above, such as bleeding, infection, and scarring/keloid  I understand that a scar is expected after this procedure  I understand that my Dermatologist cannot predict if the scar will form a "keloid," which extends beyond the borders of the wound that is created  A keloid is a thick, painful, and bumpy scar  A keloid can be difficult to treat, as it does not always respond well to therapy, which includes injecting cortisone directly into the keloid every few weeks  While this usually reduces the pain and size of the scar, it does not eliminate it  I understand that photographs may be taken before and after the procedure  These will be maintained as part of the medical providers confidential records and may not be made available to me  I further authorize the medical provider to use the photographs for teaching purposes or to illustrate scientific papers, books, or lectures if in his/her judgment, medical research, education, or science may benefit from its use  I have had an opportunity to fully inquire about the risks and benefits of this procedure and its alternatives  I have been given ample time and opportunity to ask questions and to seek a second opinion if I wished to do so  I acknowledge that there have specifically been no guarantees as to the cosmetic results from the procedure  I am aware that with any procedure there is always the possibility of an unexpected complication  III  POST-PROCEDURAL CARE (WHAT YOU WILL NEED TO DO "AFTER THE BIOPSY" TO OPTIMIZE HEALING)    3  Keep the area clean and dry  Try NOT to remove the bandage or get it wet for the first 24 hours  4  Gently clean the area and apply surgical ointment (such as Vaseline petrolatum ointment, which is available "over the counter" and not a prescription) to the biopsy site for up to 2 weeks straight  This acts to protect the wound from the outside world  5  If any sutures were placed, return for suture removal as instructed (generally 1 week for the face, 2 weeks for the body)  6  Take Acetaminophen (Tylenol) for discomfort, if no contraindications  Ibuprofen or aspirin could make bleeding worse      7  Call our office immediately for signs of infection: fever, chills, increased redness, warmth, tenderness, discomfort/pain, or pus or foul smell coming from the wound  WHAT TO DO IF THERE IS ANY BLEEDING? If a small amount of bleeding is noticed, place a clean cloth over the area and apply firm pressure for ten minutes  Check the wound after 10 minutes of direct pressure  If bleeding persists, try one more time for an additional 10 minutes of direct pressure on the area  If the bleeding becomes heavier or does not stop after the second attempt, or if you have any other questions about this procedure, then please call your [facility]atologist by calling [phone] (SKIN)  I hereby acknowledge that I have reviewed and verified the site with my Dermatologist and have requested and authorized my Dermatologist to proceed with the procedure  SCABIES    Scabies  Scabies is a very itchy rash that is caused by a mite named Sarcoptes scabiei that burrows into the skin surface  It can affect anyone but is most common in children and young adults  Risk factors include overcrowded living conditions such as prisons, rest homes, hospitals and refugee camps, and immunodeficiency  Scabies is almost always acquired by skin-to-skin contact with someone infected with scabies  The contact can be very brief such as holding hands or via bedding or furnishings  Typically, the female scabies mite burrows into the outside layers of the skin where she will lay eggs that adhikari in roughly 5-7 days; this is why the patient must repeat treatments in 5-7 days  Classically, scabies causes a very itchy rash that may be seen in multiple close contacts to the patient  The itch usually begins 4-6 weeks after first transmission and can be more severe at night   Clinically, the following features may be present:     Scabies affects the trunk and limbs, often sparing the scalp and face   Itch can persist for several weeks after successful treatment    You may see 0 5-1 5cm grey tracks in the web spaces between the fingers, one the palms, and wrists   Red rash that can have a varied appearance similar to hives, pimples, blisters, or crusty bumps   In children less than 3years old, the rash can be all over the body   Secondary infection of the rash by staphylococcal and streptococcal bacteria due to scratching can lead to painful swelling, redness, and fevers    Different medications can be used to treat scabies infections  These include the following approaches:   5% permethrin cream (NOT 1%) applied to the entire skin surface from neck to toes, being sure to cover the space between fingers, toes, and all body folds  It should be left on the skin for 8-14 hours (overnight) before being rinsed off the body  Treatment should be repeated in 5 to 7 days to kill the eggs that adhikari at that time   Oral ivermectin may be used, especially in severe or recurrent infections   Sulfur creams may be used in special situations    Other treatments include 0 5% malathion lotion left on for 24 hours and 25% benzyl benzoate lotion for 3 days   Crotamiton cream and calamine lotion can also be used to reduce itch     It is important to note that treatment is required at the same time for all household members and close contacts such as grandparents and babysitters  Careful cleaning of bed linens, clothing, towels and car seats are also recommended to completely eradicate the infestation  Fumigation of living areas is not necessary

## 2019-10-14 NOTE — PROGRESS NOTES
[address] [facility] Note     Patient Name: Sony Nichols  Encounter Date: 10/14/2019     Today's Chief Concerns:  Maggie Magana Concern #1:  Rash on body       Past Medical History:  Have you ever had or currently have any of the following medical conditions or treatments? · HIV/AIDS: No  · Hepatitis B: No  · Hepatitis C: No   · Diabetes: No  · Tuberculosis: No  · Biologic Therapy/Chemotherapy: No  · Organ or Bone Marrow Transplantation: No  · Radiation Treatment: No  · Cancer (If Yes, which types)- No      Have you ever had any of the following skin conditions? · Melanoma? (If Yes, please provide more detail)- No  · Basal Cell Carcinoma: No  · Squamous Cell Carcinoma: No  · Sebaceous Cell Carcinoma: No  · Merkel Cell Carcinoma: No  · Angiosarcoma: No  · Blistering Sunburns: No  · Eczema: No  · Psoriasis: No    Social History:    What is your current Smoking Status? Non smoker    What is/was your primary occupation? Retired    What are your hobbies/past-times? Play games on phone     Family history:  Do any of your "first degree relatives" (parent, brother, sister, or child) have any of the following conditions? · Melanoma? (If Yes, which relatives?) No  · Eczema: No  · Asthma: No  · Hay Fever/Seasonal Allergies: No  · Psoriasis: No  · Arthritis: No  · Thyroid Problems: No  · Lupus/Connective Tissue Disease: No  · Diabetes: No  · Stroke: No  · Blood Clots: No  · IBD/Crohn's/Ulcerative Colitis: No  · Vitiligo: No  · Scarring/Keloids: No  · Severe Acne: No  · Pancreatic Cancer: No  · Other known Skin Condition? If Yes, what condition and which relatives?   No    Current Medications:    Current Outpatient Medications:     albuterol (PROVENTIL HFA,VENTOLIN HFA) 90 mcg/act inhaler, Inhale 2 puffs every 6 (six) hours as needed for wheezing, Disp: , Rfl:     amitriptyline (ELAVIL) 50 mg tablet, Take 1 tablet (50 mg total) by mouth daily at bedtime, Disp: 30 tablet, Rfl: 1    amLODIPine (NORVASC) 5 mg tablet, Take 5 mg by mouth daily  , Disp: , Rfl:     cetirizine (ZyrTEC) 10 MG chewable tablet, Chew 10 mg daily  , Disp: , Rfl:     cholecalciferol (VITAMIN D3) 1,000 units tablet, Take 5,000 Units by mouth daily, Disp: , Rfl:     famotidine (PEPCID) 40 MG tablet, Take 1 tablet (40 mg total) by mouth daily at bedtime (Patient taking differently: Take 40 mg by mouth 2 (two) times a day ), Disp: 30 tablet, Rfl: 6    multivitamin (THERAGRAN) TABS, Take 1 tablet by mouth daily  , Disp: , Rfl:     RABEprazole (ACIPHEX) 20 MG tablet, TAKE 2 TABLET ONCE A DAY, Disp: 60 tablet, Rfl: 4    gabapentin (NEURONTIN) 100 mg capsule, Take 1 capsule (100 mg total) by mouth 3 (three) times a day for 30 days, Disp: 90 capsule, Rfl: 3    lubiprostone (AMITIZA) 24 mcg capsule, Take 1 capsule (24 mcg total) by mouth 2 (two) times a day with meals for 30 days, Disp: 60 capsule, Rfl: 0    Specific Alerts:    Have you been seen by a [facility]atologist in the last 3 years? No    Are you pregnant or planning to become pregnant? N/A    Are you currently or planning to be nursing or breast feeding? N/A    Allergies   Allergen Reactions    Latex Hives    Doxycycline Hives    Cephalosporins GI Intolerance    Nsaids GI Intolerance    Penicillins GI Intolerance     Patient can only take levaquin and cipro    Prednisone GI Intolerance    Sulphasomidine GI Intolerance       May we call your Preferred Phone number to discuss your specific medical information? YES    May we leave a detailed message that includes your specific medical information? YES     Have you traveled outside of the [facility] in the past 3 months? No    Do you currently have a pacemaker or defibrillator? No    Do you have any artificial heart valves, joints, plates, screws, rods, stents, pins, etc? Rods in back 2008   - If Yes, were any placed within the last 2 years? Do you require any medications prior to a surgical procedure?  No   - If Yes, for which procedure? - If Yes, what medications to you require? Are you taking any medications that cause you to bleed more easily ("blood thinners") No    Have you ever experienced a rapid heartbeat with epinephrine? No    Have you ever been treated with "gold" (gold sodium thiomalate) therapy? No    [facility] can help with wrinkles, "laugh lines," facial volume loss, "double chin," "love handles," age spots, and more  Are you interested in learning today about some of the skin enhancement procedures that we offer? (If Yes, please provide more detail) No    Review of Systems:  Have you recently had or currently have any of the following? · Fever or chills: No  · Night Sweats: No  · Headaches: No  · Weight Gain: No  · Weight Loss: No  · Blurry Vision: No  · Nausea: No  · Vomiting: No  · Diarrhea: YES  · Blood in Stool: No  · Abdominal Pain: No  · Itchy Skin: YES  · Painful Joints: No  · Swollen Joints: No  · Muscle Pain: No  · Irregular Mole: No  · Sun Burn: No  · Dry Skin: No  · Skin Color Changes: No  · Scar or Keloid: No  · Cold Sores/Fever Blisters: No  · Bacterial Infections/MRSA: No  · Anxiety: YES: sees a counselor   · Depression: YES  · Suicidal or Homicidal Thoughts: No      PHYSICAL EXAM:      Was a chaperone (Derm Clinical Assistant) present for the entirety of the Physical Exam? YES    Did the Dermatology Team specifically ask and  the patient on the importance of a Full Skin Exam to be sure that nothing is missed clinically?  YES    Did the patient request or accept a Full Skin Exam?  YES    Did the patient specifically refuse to have the areas "under-the-bra" examined by the Dermatologist? No    Did the patient specifically refuse to have the areas "under-the-underwear" examined by the Dermatologist? No      CONSTITUTIONAL:   Vitals:    10/14/19 1339   Weight: 61 2 kg (135 lb)   Height: 5' (1 524 m)       PSYCH: Normal mood and affect  EYES: Normal conjunctiva  ENT: Normal lips and oral mucosa  CARDIOVASCULAR: No edema  RESPIRATORY: Normal respirations      FULL ORGAN SYSTEM SKIN EXAM (SKIN)  Hair, Scalp, Ears, Face Normal except as noted below in Assessment   Neck, Cervical Chain Nodes Normal except as noted below in Assessment   Right Arm/Hand/Fingers Normal except as noted below in Assessment   Left Arm/Hand/Fingers Normal except as noted below in Assessment   Chest/Breasts/Axillae Viewed areas Normal except as noted below in Assessment   Abdomen, Umbilicus Normal except as noted below in Assessment   Back/Spine Normal except as noted below in Assessment   Groin/Genitalia/Buttocks deferred   Right Leg, Foot, Toes Normal except as noted below in Assessment   Left Leg, Foot, Toes Normal except as noted below in Assessment        ASSESSMENT AND PLAN BY DIAGNOSIS:    History of Present Condition:      Duration:  How long has this been an issue for you?    o  Two weeks   Location Affected:  Where on the body is this affecting you?    o  Bilateral legs, arms, and back; started on legs   Quality:  Is there any bleeding, pain, itch, burning/irritation, or redness associated with the skin lesion?    o  Itch   Severity:  Describe any bleeding, pain, itch, burning/irritation, or redness on a scale of 1 to 10 (with 10 being the worst)    o  10   Timing:  Does this condition seem to be there pretty constantly or do you notice it more at specific times throughout the day?    o  Constantly and is getting worse   Context:  Have you ever noticed that this condition seems to be associated with specific activities you do?    o  Denies   Modifying Factors:    o Anything that seems to make the condition worse?    -  Denies  o What have you tried to do to make the condition better?    -  Tried OTC hydrocortisone    Associated Signs and Symptoms:  Does this skin lesion seem to be associated with any of the following:  o  DERM ASSOCIATED SIGNS AND SYMPTOMS: Redness and Itching and Scratching, pain 1  Eczematous dermatitis- differential includes arthropod assault (scabies) vs contact dermatitis    Physical Exam:   (Anatomic Location); (Size and Morphological Description); (Differential Diagnosis):  o Excoriated scaly, pink papules  Coalescing to plaques on lower legs, thighs, inner arms and back; rule out arthropod(scabies)versus allergic contact dermatitis, not in photodistribution, finger web spaces clear, face spared    Pertinent Negatives: mineral oil prep negative for mites, scybala, and eggs    Additional History of Present Condition:  Began two weeks ago, started on legs and has spread to arms and back  Extremely pruritic  Of note, recently had bed bug infestation in home  Patient had a visitor who brought them  Had an   Also housed two people and their dog for several weeks recently  Lives with daughter and grandson, neither of who are itchy  Was prescribed a topical from PCP recently but was too expensive  Assessment and Plan:  Based on a thorough discussion of this condition and the management approach to it (including a comprehensive discussion of the known risks, side effects and potential benefits of treatment), the patient (family) agrees to implement the following specific plan:   Discussed different possible causes of widespread rash such as scabies or allergic contact dermatitis   Pt had recent outbreak of bedbugs in the house three weeks ago  Hired  who came twice   Punch biopsy was done in clinic today   Pt was prescribed Ivermectin; Pt to take 4 tablets today and 4 tablets on 10/21/2019   Pt was prescribed Triamcinolone Ointment to be applied two times a day to all affected areas avoiding face, groin, and armpits    · It is recommended to thoroughly wash all bedding and clothing for every household member    PROCEDURE NOTE:  PUNCH BIOPSY    Pre-operative Diagnosis: Arthropod (scabies) versus Allergic Contact Dermatitis    Performing Physician:  Luna     Anatomic Location; Clinical Description with size (cm); Pre-Op Diagnosis:     Excoriated scaly, pink papules coalescing on lower legs, thighs, inner arms and back; rule out arthropod(scabies)versus allergic contact dermatitis      Anesthesia: 1% xylocaine with epi       Topical anesthesia: None       Indications: To indicate diagnosis and management plan  Procedure Details     Patient informed of the risks (including bleeding,scaring and infection) and benefits of the procedure explained  Verbal and written informed consent obtained  The area was prepped and draped in the usual fashion  Anesthesia was obtained with 1% lidocaine with epinephrine  The skin was then stretched perpendicular to the skin tension lines and a punch biopsy to an appropriate sampling depth was obtained with a 4 mm punch with a forceps and iris scissors  Hemostasis was obtained with 4-0 Ethilon x 1 sutures  Complications:  None      Specimen has been sent for review by Dermatopathology  Plan:  1  Instructed to keep the wound dry and covered for 24-48h and clean thereafter  2  Warning signs of infection were reviewed  3  Recommended that the patient use acetaminophen as needed for pain  4  Sutures if any should be removed in 14 days      Standard post-procedure care has been explained and has been included in written form within the patient's copy of Informed Consent        2  ACTINIC DAMAGE (Chronic Ultraviolet Radiation Exposure)    Physical Exam:   Anatomic Location Affected:  Trunk and extremities   Morphological Description:  Mottled (hyper- and hypo-pigmented), slightly atrophic skin with overlying telangiectasia      Assessment and Plan:  Based on a thorough discussion of this condition and the management approach to it (including a comprehensive discussion of the known risks, side effects and potential benefits of treatment), the patient (family) agrees to implement the following specific plan:   Recommend using a moisturizer with SPF 30 or higher     Photo-aging and actinic damage of skin is common on sites repeatedly exposed to the sun, especially the backs of the hands and the face, most often affecting the ears, nose, cheeks, upper lip, vermilion of the lower lip, temples, forehead and balding scalp  In severely chronically sun-exposed individuals, this condition may also be found on the upper trunk, upper and lower limbs, and dorsum of feet  Photo-aging induces cutaneous changes that vary among individuals, reflecting inherent differences in vulnerability to sun exposure and repair capacity  We discussed further steps to minimize or avoid UV exposure:     Be aware of daily UV index levels  In the [facility], this index is often reported on the [address]   Avoid outdoor activities during the middle of the day   Wear sun-protective clothing (e g , UPF-rated, broad-brimmed hats, long sleeves, and trousers or skirts)   Apply a high sun protection factor (60+) broad-spectrum sunscreen moisturizer at least three times a day to affected areas, year-round  I recommended Neutrogena Daily Defense or CeraVe AM or Aveeno   Do not smoke, and where possible, avoid exposure to pollutants   Get plenty of exercise -- active people appear younger than inactive people   Eat fruit and vegetables daily   Many oral supplements with antioxidant and anti-inflammatory properties have been advocated to mitigate skin aging and to improve skin health  These include carotenoids; polyphenols; chlorophyll; aloe vera; vitamins B, C, and E; red ginseng; squalene; and omega-3 fatty acids  Their role in combatting skin aging is unclear      3  SEBORRHEIC KERATOSIS; NON-INFLAMED    Physical Exam:   Anatomic Location Affected:  Left arm, bilateral lower extremties   Morphological Description:  Flat and raised, waxy, smooth to warty textured, yellow to brownish-grey to dark brown to blackish, discrete, "stuck-on" appearing papules  Additional History of Present Condition:  Patient reports new bumps on the skin  Denies itch, burn, pain, bleeding or ulceration  Present constantly; nothing seems to make it worse or better  No prior treatment  Assessment and Plan:  Based on a thorough discussion of this condition and the management approach to it (including a comprehensive discussion of the known risks, side effects and potential benefits of treatment), the patient (family) agrees to implement the following specific plan:   No treatment required, discussed benign lesion, monitor for changes     Seborrheic Keratosis  A seborrheic keratosis is a harmless warty spot that appears during adult life as a common sign of skin aging  Seborrheic keratoses can arise on any area of skin, covered or uncovered, with the usual exception of the palms and soles  They do not arise from mucous membranes  Seborrheic keratoses can have highly variable appearance  Seborrheic keratoses are extremely common  It has been estimated that over 90% of adults over the age of 61 years have one or more of them  They occur in males and females of all races, typically beginning to erupt in the 35s or 45s  They are uncommon under the age of 21 years  The precise cause of seborrhoeic keratoses is not known  Seborrhoeic keratoses are considered degenerative in nature  As time goes by, seborrheic keratoses tend to become more numerous  Some people inherit a tendency to develop a very large number of them; some people may have hundreds of them  The name "seborrheic keratosis" is misleading, because these lesions are not limited to a seborrhoeic distribution (scalp, mid-face, chest, upper back), nor are they formed from sebaceous glands, nor are they associated with sebum -- which is greasy    Seborrheic keratosis may also be called "SK," "Seb K," "basal cell papilloma," "senile wart," or "barnacle "      Researchers have noted:   Eruptive seborrhoeic keratoses can follow sunburn or dermatitis   Skin friction may be the reason they appear in body folds   Viral cause (e g , human papillomavirus) seems unlikely   Stable and clonal mutations or activation of FRFR3, PIK3CA, CHICHI, AKT1 and EGFR genes are found in seborrhoeic keratoses   Seborrhoeic keratosis can arise from solar lentigo   FRFR3 mutations also arise in solar lentigines  These mutations are associated with increased age and location on the head and neck, suggesting a role of ultraviolet radiation in these lesions   Seborrheic keratoses do not harbour tumour suppressor gene mutations   Epidermal growth factor receptor inhibitors, which are used to treat some cancers, often result in an increase in verrucal (warty) keratoses  There is no easy way to remove multiple lesions on a single occasion  Unless a specific lesion is "inflamed" and is causing pain or stinging/burning or is bleeding, most insurance companies do not authorize treatment  4  MELANOCYTIC NEVI ("Moles")    Physical Exam:   Anatomic Location Affected:   Mostly on sun-exposed areas of the trunk and extremties   Morphological Description:  Scattered, 1-4mm round to ovoid, symmetrical-appearing, even bordered, brown to dark brown macules/papules, mostly in sun-exposed areas      Assessment and Plan:  Based on a thorough discussion of this condition and the management approach to it (including a comprehensive discussion of the known risks, side effects and potential benefits of treatment), the patient (family) agrees to implement the following specific plan:  · Recommend using a moisturizer with SPF 30 or higher  · Monitor for any changes      Melanocytic Nevi  Melanocytic nevi ("moles") are tan or brown, raised or flat areas of the skin which have an increased number of melanocytes  Melanocytes are the cells in our body which make pigment and account for skin color      Some moles are present at birth (I e , "congenital nevi"), while others come up later in life (i e , "acquired nevi")  The sun can stimulate the body to make more moles  Sunburns are not the only thing that triggers more moles  Chronic sun exposure can do it too  Clinically distinguishing a healthy mole from melanoma may be difficult, even for experienced dermatologists  The "ABCDE's" of moles have been suggested as a means of helping to alert a person to a suspicious mole and the possible increased risk of melanoma  The suggestions for raising alert are as follows:    Asymmetry: Healthy moles tend to be symmetric, while melanomas are often asymmetric  Asymmetry means if you draw a line through the mole, the two halves do not match in color, size, shape, or surface texture  Asymmetry can be a result of rapid enlargement of a mole, the development of a raised area on a previously flat lesion, scaling, ulceration, bleeding or scabbing within the mole  Any mole that starts to demonstrate "asymmetry" should be examined promptly by a board certified dermatologist      Border: Healthy moles tend to have discrete, even borders  The border of a melanoma often blends into the normal skin and does not sharply delineate the mole from normal skin  Any mole that starts to demonstrate "uneven borders" should be examined promptly by a board certified dermatologist      Color: Healthy moles tend to be one color throughout  Melanomas tend to be made up of different colors ranging from dark black, blue, white, or red  Any mole that demonstrates a color change should be examined promptly by a board certified dermatologist      Diameter: Healthy moles tend to be smaller than 0 6 cm in size; an exception are "congenital nevi" that can be larger  Melanomas tend to grow and can often be greater than 0 6 cm (1/4 of an inch, or the size of a pencil eraser)   This is only a guideline, and many normal moles may be larger than 0 6 cm without being unhealthy  Any mole that starts to change in size (small to bigger or bigger to smaller) should be examined promptly by a board certified dermatologist      Evolving: Healthy moles tend to "stay the same "  Melanomas may often show signs of change or evolution such as a change in size, shape, color, or elevation  Any mole that starts to itch, bleed, crust, burn, hurt, or ulcerate or demonstrate a change or evolution should be examined promptly by a board certified dermatologist       Dysplastic Nevi  Dysplastic moles are moles that fit the ABCDE rules of melanoma but are not identified as melanomas when examined under the microscope  They may indicate an increased risk of melanoma in that person  If there is a family history of melanoma, most experts agree that the person may be at an increased risk for developing a melanoma  Experts still do not agree on what dysplastic moles mean in patients without a personal or family history of melanoma  Dysplastic moles are usually larger than common moles and have different colors within it with irregular borders  The appearance can be very similar to a melanoma  Biopsies of dysplastic moles may show abnormalities which are different from a regular mole  Melanoma  Malignant melanoma is a type of skin cancer that can be deadly if it spreads throughout the body  The incidence of melanoma in the [place] is growing faster than any other cancer  Melanoma usually grows near the surface of the skin for a period of time, and then begins to grow deeper into the skin  Once it grows deeper into the skin, the risk of spread to other organs greatly increases  Therefore, early detection and removal of a malignant melanoma may result in a better chance at a complete cure; removal after the tumor has spread may not be as effective, leading to worse clinical outcomes such as death  The true rate of nevus transformation into a melanoma is unknown   It has been estimated that the lifetime risk for any acquired melanocytic nevus on any 21year-old individual transforming into melanoma by age [de-identified] is 0 03% (1 in 3,164) for men and 0 009% (1 in 10,800) for women  The appearance of a "new mole" remains one of the most reliable methods for identifying a malignant melanoma  Occasionally, melanomas appear as rapidly growing, blue-black, dome-shaped bumps within a previous mole or previous area of normal skin  Other times, melanomas are suspected when a mole suddenly appears or changes  Itching, burning, or pain in a pigmented lesion should increase suspicion, but most patients with early melanoma have no skin discomfort whatsoever  Melanoma can occur anywhere on the skin, including areas that are difficult for self-examination  Many melanomas are first noticed by other family members  Suspicious-looking moles may be removed for microscopic examination  You may be able to prevent death from melanoma by doing two simple things:    1  Try to avoid unnecessary sun exposure and protect your skin when it is exposed to the sun  People who live near the equator, people who have intermittent exposures to large amounts of sun, and people who have had sunburns in childhood or adolescence have an increased risk for melanoma  Sun sense and vigilant sun protection may be keys to helping to prevent melanoma  We recommend wearing UPF-rated sun protective clothing and sunglasses whenever possible and applying a moisturizer-sunscreen combination product (SPF 50+) such as Neutrogena Daily Defense to sun exposed areas of skin at least three times a day  2  Have your moles regularly examined by a board certified dermatologist AND by yourself or a family member/friend at home    We recommend that you have your moles examined at least once a year by a board certified dermatologist   Use your birthday as an annual reminder to have your "Birthday Suit" (I e , your skin) examined; it is a nice birthday gift to yourself to know that your skin is healthy appearing! Additionally, at-home self examinations may be helpful for detecting a possible melanoma  Use the   3  ABCDEs we discussed and check your moles once a month at home          Scribe Attestation    I,:   Harshil Marley RN am acting as a scribe while in the presence of the attending physician :        I,:   Lydia Sanderson MD personally performed the services described in this documentation    as scribed in my presence :

## 2019-10-15 DIAGNOSIS — R10.12 LEFT UPPER QUADRANT PAIN: ICD-10-CM

## 2019-10-15 RX ORDER — AMITRIPTYLINE HYDROCHLORIDE 50 MG/1
TABLET, FILM COATED ORAL
Qty: 30 TABLET | Refills: 1 | Status: SHIPPED | OUTPATIENT
Start: 2019-10-15 | End: 2019-12-20 | Stop reason: SDUPTHER

## 2019-10-16 ENCOUNTER — TELEPHONE (OUTPATIENT)
Dept: DERMATOLOGY | Facility: CLINIC | Age: 74
End: 2019-10-16

## 2019-10-16 ENCOUNTER — TELEPHONE (OUTPATIENT)
Dept: OTHER | Facility: OTHER | Age: 74
End: 2019-10-16

## 2019-10-16 NOTE — TELEPHONE ENCOUNTER
Called pt back in regards to follow up appt  Left voicemail stating to callback and schedule an appt for Oct 28th

## 2019-10-29 ENCOUNTER — OFFICE VISIT (OUTPATIENT)
Dept: DERMATOLOGY | Facility: CLINIC | Age: 74
End: 2019-10-29
Payer: MEDICARE

## 2019-10-29 VITALS — WEIGHT: 135 LBS | TEMPERATURE: 98.4 F | BODY MASS INDEX: 26.5 KG/M2 | HEIGHT: 60 IN

## 2019-10-29 DIAGNOSIS — L85.3 XEROSIS OF SKIN: ICD-10-CM

## 2019-10-29 DIAGNOSIS — B86 SCABIES: Primary | ICD-10-CM

## 2019-10-29 PROCEDURE — 99213 OFFICE O/P EST LOW 20 MIN: CPT | Performed by: DERMATOLOGY

## 2019-10-29 NOTE — PROGRESS NOTES
[facility] Follow Up Note    Patient Name: Sam Lopez  Encounter Date: 10/29/2019    Today's Chief Concerns:  Aleasamuel Lieberman Concern #1:  Follow up biopsy      Current Medications:    Current Outpatient Medications:     albuterol (PROVENTIL HFA,VENTOLIN HFA) 90 mcg/act inhaler, Inhale 2 puffs every 6 (six) hours as needed for wheezing, Disp: , Rfl:     amitriptyline (ELAVIL) 50 mg tablet, TAKE 1 TABLET BY MOUTH DAILY AT BEDTIME, Disp: 30 tablet, Rfl: 1    amLODIPine (NORVASC) 5 mg tablet, Take 5 mg by mouth daily  , Disp: , Rfl:     cetirizine (ZyrTEC) 10 MG chewable tablet, Chew 10 mg daily  , Disp: , Rfl:     cholecalciferol (VITAMIN D3) 1,000 units tablet, Take 5,000 Units by mouth daily, Disp: , Rfl:     famotidine (PEPCID) 40 MG tablet, Take 1 tablet (40 mg total) by mouth daily at bedtime (Patient taking differently: Take 40 mg by mouth 2 (two) times a day ), Disp: 30 tablet, Rfl: 6    multivitamin (THERAGRAN) TABS, Take 1 tablet by mouth daily  , Disp: , Rfl:     RABEprazole (ACIPHEX) 20 MG tablet, TAKE 2 TABLET ONCE A DAY, Disp: 60 tablet, Rfl: 4    triamcinolone (KENALOG) 0 1 % ointment, Apply topically to all affected areas two times a day  Avoid face, armpits, and groin , Disp: 453 6 g, Rfl: 0    gabapentin (NEURONTIN) 100 mg capsule, Take 1 capsule (100 mg total) by mouth 3 (three) times a day for 30 days, Disp: 90 capsule, Rfl: 3    lubiprostone (AMITIZA) 24 mcg capsule, Take 1 capsule (24 mcg total) by mouth 2 (two) times a day with meals for 30 days, Disp: 60 capsule, Rfl: 0    CONSTITUTIONAL:   Vitals:    10/29/19 1022   Temp: 98 4 °F (36 9 °C)   Weight: 61 2 kg (135 lb)   Height: 5' (1 524 m)           Specific Alerts:    Have you been seen by a [facility] in the last 3 years? YES    Are you pregnant or planning to become pregnant? No    Are you currently or planning to be nursing or breast feeding?  No    Allergies   Allergen Reactions    Latex Hives    Doxycycline Hives    Cephalosporins GI Intolerance    Nsaids GI Intolerance    Penicillins GI Intolerance     Patient can only take levaquin and cipro    Prednisone GI Intolerance    Sulphasomidine GI Intolerance       May we call your Preferred Phone number to discuss your specific medical information? No    May we leave a detailed message that includes your specific medical information? No    Have you traveled outside of the [facility] in the past 3 months? No    Do you currently have a pacemaker or defibrillator? No    Do you have any artificial heart valves, joints, plates, screws, rods, stents, pins, etc? No   - If Yes, were any placed within the last 2 years? Do you require any medications prior to a surgical procedure? No   - If Yes, for which procedure? - If Yes, what medications to you require? Are you taking any medications that cause you to bleed more easily ("blood thinners") No    Have you ever experienced a rapid heartbeat with epinephrine? No    Have you ever been treated with "gold" (gold sodium thiomalate) therapy? No    [facility] can help with wrinkles, "laugh lines," facial volume loss, "double chin," "love handles," age spots, and more  Are you interested in learning today about some of the skin enhancement procedures that we offer? (If Yes, please provide more detail) No    Review of Systems:  Have you recently had or currently have any of the following?     · Fever or chills: No  · Night Sweats: No  · Headaches: No  · Weight Gain: No  · Weight Loss: No  · Blurry Vision: No  · Nausea: No  · Vomiting: No  · Diarrhea: No  · Blood in Stool: No  · Abdominal Pain: No  · Itchy Skin: No  · Painful Joints: No  · Swollen Joints: No  · Muscle Pain: No  · Irregular Mole: No  · Sun Burn: No  · Dry Skin: No  · Skin Color Changes: No  · Scar or Keloid: No  · Cold Sores/Fever Blisters: No  · Bacterial Infections/MRSA: No  · Anxiety: No  · Depression: No  · Suicidal or Homicidal Thoughts: No      PSYCH: Normal mood and affect  EYES: Normal conjunctiva  ENT: Normal lips and oral mucosa  CARDIOVASCULAR: No edema  RESPIRATORY: Normal respirations      FULL ORGAN SYSTEM SKIN EXAM (SKIN)  Hair, Scalp, Ears, Face Normal except as noted below in Assessment   Neck, Cervical Chain Nodes Normal except as noted below in Assessment   Right Arm/Hand/Fingers Normal except as noted below in Assessment   Left Arm/Hand/Fingers Normal except as noted below in Assessment           Back/Spine Normal except as noted below in Assessment       Right Leg,  Normal except as noted below in Assessment   Left Leg,  Normal except as noted below in Assessment       1  SCABIES (RESOLVED)  Physical Exam:   Anatomic Location Affected:back and legs   Morphological Description:   clear (2 weeks ago, had pink eczematous eruption)    Additional History of Present Condition:  Patient states she only using the triamcinolone ointment at night since the ointment is too greasy  Patient states she is still itching with no improvement since last visit  Biopsy did confirm no mites  Finished course of ivermectin and cleaned linens  Of note, dog and family members were not treated      Tissue Exam: [id number]   Order: [id number]   Collected:  10/14/2019  4:23 PM   Status:  Final result   Visible to patient:  No (Not Released)   Dx:  Rash   Component    Case Report   Surgical Pathology Report                         Case: [id number]                                    Authorizing Provider: Terry Blevins MD            Collected:           10/14/2019 1625               Ordering Location:     [facility]      Received:            10/14/2019 1623                                    [address] [place] [address]                                                                 Pathologist:           Opal Becerril MD                                                                   Specimen:    Skin, Other, Right Back, 4mm punch                                                         Final Diagnosis   A  Skin, Right Back, 4mm punch biopsy:  - Epidermal excoriation with focal spongiotic vesicle and mild dermal inflammation; see note      Note:  Sections show an epidermis with foci of excoriation, a focal small spongiotic vesicle, mild spongiosis, and a rare eosinophil within the epidermis  The superficial dermis shows mild inflammation including perivascular neutrophils  A PAS stain does not reveal fungal hyphae  Scabietic parts are not identified  The extent of epidermal spongiosis (only involves a part of the biopsy) is somewhat less than expected for most allergic contact dermatitis  If lesions persist with scabies treatment, additional biopsy may be helpful  Clinical correlation is recommended  Assessment and Plan:  Based on a thorough discussion of this condition and the management approach to it (including a comprehensive discussion of the known risks, side effects and potential benefits of treatment), the patient (family) agrees to implement the following specific plan:   Continue the triamcinolone ointment along with the wet pajamas at night   Rash completely resolved, 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   Call office if symptoms become worse    Suture removed today, wound care discussed   Discussed importance of treating household members and prevent re-infection      Suture removal  Date/Time: 10/29/2019 10:54 AM  Performed by: [employer]  Authorized by: Senthil Espinal MD     Location:     Location:  Trunk    Trunk location:  Back  Procedure details: Tools used:  Suture removal kit    Wound appearance:  No sign(s) of infection    Number of sutures removed:  1  Post-procedure details:     Post-procedure assessment: Vaseline was applied  Patient tolerance of procedure:   Tolerated well, no immediate complications  Comments:      Pathology was discussed and copy was given to patient        XEROSIS ("DRY SKIN")    Physical Exam:   Anatomic Location Affected:  Diffuse    Morphological Description:  xerosis       Additional History of Present Condition:  Patient states she currently using Vaseline intensive itching lotion       Assessment and Plan:  Based on a thorough discussion of this condition and the management approach to it (including a comprehensive discussion of the known risks, side effects and potential benefits of treatment), the patient (family) agrees to implement the following specific plan:   Can continue using the Vaseline intensive care lotion but must keep it close so she can reapply often  Can try using  moisturizers like Eucerin,Cerave or Aveeno          Scribe Attestation    I,:   [employer] am acting as a scribe while in the presence of the attending physician :        I,:   Chantal Velazquez MD personally performed the services described in this documentation    as scribed in my presence :

## 2019-12-20 DIAGNOSIS — R10.12 LEFT UPPER QUADRANT PAIN: ICD-10-CM

## 2019-12-20 RX ORDER — AMITRIPTYLINE HYDROCHLORIDE 50 MG/1
TABLET, FILM COATED ORAL
Qty: 30 TABLET | Refills: 1 | Status: SHIPPED | OUTPATIENT
Start: 2019-12-20 | End: 2020-02-12

## 2020-02-12 DIAGNOSIS — R10.12 LEFT UPPER QUADRANT PAIN: ICD-10-CM

## 2020-02-12 RX ORDER — AMITRIPTYLINE HYDROCHLORIDE 50 MG/1
TABLET, FILM COATED ORAL
Qty: 30 TABLET | Refills: 1 | Status: SHIPPED | OUTPATIENT
Start: 2020-02-12 | End: 2020-04-10

## 2020-03-12 ENCOUNTER — OFFICE VISIT (OUTPATIENT)
Dept: GASTROENTEROLOGY | Facility: CLINIC | Age: 75
End: 2020-03-12
Payer: MEDICARE

## 2020-03-12 VITALS
SYSTOLIC BLOOD PRESSURE: 118 MMHG | WEIGHT: 136 LBS | BODY MASS INDEX: 26.56 KG/M2 | DIASTOLIC BLOOD PRESSURE: 80 MMHG | HEART RATE: 92 BPM

## 2020-03-12 DIAGNOSIS — K21.9 GASTROESOPHAGEAL REFLUX DISEASE WITHOUT ESOPHAGITIS: ICD-10-CM

## 2020-03-12 DIAGNOSIS — R11.2 NON-INTRACTABLE VOMITING WITH NAUSEA, UNSPECIFIED VOMITING TYPE: Primary | ICD-10-CM

## 2020-03-12 DIAGNOSIS — K31.84 GASTROPARESIS: ICD-10-CM

## 2020-03-12 PROCEDURE — 99214 OFFICE O/P EST MOD 30 MIN: CPT | Performed by: INTERNAL MEDICINE

## 2020-03-12 RX ORDER — SUCRALFATE 1 G/1
1 TABLET ORAL 4 TIMES DAILY PRN
Qty: 60 TABLET | Refills: 2 | Status: SHIPPED | OUTPATIENT
Start: 2020-03-12

## 2020-03-12 RX ORDER — RABEPRAZOLE SODIUM 20 MG/1
20 TABLET, DELAYED RELEASE ORAL 2 TIMES DAILY
COMMUNITY
End: 2020-03-17 | Stop reason: SDUPTHER

## 2020-03-12 RX ORDER — ONDANSETRON 4 MG/1
4 TABLET, FILM COATED ORAL EVERY 8 HOURS PRN
Qty: 5 TABLET | Refills: 3 | Status: SHIPPED | OUTPATIENT
Start: 2020-03-12 | End: 2022-02-20 | Stop reason: SDUPTHER

## 2020-03-12 NOTE — PROGRESS NOTES
[facility] - Outpatient Follow-up Note  Patel Kruger 69 y o  female MRN: [id number]  Encounter: [id number]    ASSESSMENT AND PLAN:      1  Non-intractable vomiting with nausea, unspecified vomiting type  Suspect infectious gastroenteritis  Less likely exacerbation of underlying gastroparesis and functional bowel disease   - continue AcipHex  - ondansetron (ZOFRAN) 4 mg tablet; Take 1 tablet (4 mg total) by mouth every 8 (eight) hours as needed for nausea or vomiting  Dispense: 5 tablet; Refill: 3  - sucralfate (CARAFATE) 1 g tablet; Take 1 tablet (1 g total) by mouth 4 (four) times a day as needed (nausea and vomiting)  Dispense: 60 tablet; Refill: 2    2  Gastroesophageal reflux disease without esophagitis  3  Gastroparesis  Chronic problem  History of Nissen fundoplication  Probably significant functional component      Followup Appointment: 4-6 weeks  ______________________________________________________________________    Chief Complaint   Patient presents with    Nausea, gagging and stomach pain     HPI:  The patient is seen in the office today emergently secondary to acute onset of nausea and vomiting  She has a long history of upper GI complaints felt secondary to GERD, gastroparesis, and functional dyspepsia  She has had a Nissen fundoplication in the past   She is chronically on AcipHex and Elavil  She reports this morning her grandson woke her up complaining of abdominal pain  Following this she began belching and then nauseous  She began trying to vomit but is unable to do so because of her fundoplication and just ended up dry heaving  She denies any fevers or chills  She denies any diarrhea  She denies any exposure to anybody known sick  She has been taking AcipHex 20 mg twice a day which has controlled her heartburn  She continues to have issues with intermittent solid food dysphagia    She is complaining of diffuse abdominal pain now which she attributes to the vomiting  Historical Information   Past Medical History:   Diagnosis Date    Anxiety     Arthritis     Asthma     Candida infection, esophageal (HCC)     Chronic pain     COPD (chronic obstructive pulmonary disease) (HCC)     Depression     Depression     Gastroparesis     Gastroparesis     GERD (gastroesophageal reflux disease)     Hypertension     Irritable bowel syndrome (IBS)     Migraines     Multiple thyroid nodules     Osteoporosis     Pneumonia     Psychiatric disorder      Past Surgical History:   Procedure Laterality Date    BACK SURGERY       SECTION      CHOLECYSTECTOMY      COLONOSCOPY  10/09/2013    ESOPHAGEAL DILATION       [address]    HIATAL HERNIA REPAIR      NISSEN FUNDOPLICATION      MS ESOPHAGOGASTRODUODENOSCOPY TRANSORAL DIAGNOSTIC N/A 4/3/2019    Procedure: ESOPHAGOGASTRODUODENOSCOPY (EGD);   Surgeon: Kristen Trejo MD;  Location:  MAIN OR;  Service: Gastroenterology    ROTATOR CUFF REPAIR      SHOULDER SURGERY      SPINAL FUSION      UPPER GASTROINTESTINAL ENDOSCOPY      US GUIDED THYROID BIOPSY      WISDOM TOOTH EXTRACTION       Social History     Substance and Sexual Activity   Alcohol Use No     Social History     Substance and Sexual Activity   Drug Use No     Social History     Tobacco Use   Smoking Status Former Smoker    Last attempt to quit: 10/2003    Years since quittin 4   Smokeless Tobacco Never Used     Family History   Problem Relation Age of Onset    Other Mother         Esophageal disorder    Stomach cancer Father     Cancer Father     Hypertension Maternal Aunt     Colon cancer Neg Hx     Colon polyps Neg Hx          Current Outpatient Medications:     amitriptyline (ELAVIL) 50 mg tablet    amLODIPine (NORVASC) 5 mg tablet    cetirizine (ZyrTEC) 10 MG chewable tablet    cholecalciferol (VITAMIN D3) 1,000 units tablet    famotidine (PEPCID) 40 MG tablet    gabapentin (NEURONTIN) 100 mg capsule   multivitamin (THERAGRAN) TABS    RABEprazole (ACIPHEX) 20 MG tablet    albuterol (PROVENTIL HFA,VENTOLIN HFA) 90 mcg/act inhaler    ondansetron (ZOFRAN) 4 mg tablet    sucralfate (CARAFATE) 1 g tablet    triamcinolone (KENALOG) 0 1 % ointment  Allergies   Allergen Reactions    Latex Hives    Doxycycline Hives    Cephalosporins GI Intolerance    Nsaids GI Intolerance    Penicillins GI Intolerance     Patient can only take levaquin and cipro    Prednisone GI Intolerance    Sulphasomidine GI Intolerance     Reviewed medications and allergies and updated as indicated    PHYSICAL EXAM:    Blood pressure 118/80, pulse 92, weight 61 7 kg (136 lb), not currently breastfeeding  Body mass index is 26 56 kg/m²  General Appearance: NAD, cooperative, alert  Eyes: Anicteric, PERRLA, EOMI  ENT:  Normocephalic, atraumatic, normal mucosa  Neck:  Supple, symmetrical, trachea midline  Resp:  Clear to auscultation bilaterally; no rales, rhonchi or wheezing; respirations unlabored   CV:  S1 S2, Regular rate and rhythm; no murmur, rub, or gallop  GI:  Soft, mild diffuse abdominal tenderness without rebound or guarding, non-distended; normal bowel sounds; no masses, no organomegaly   Rectal: Deferred  Musculoskeletal: No cyanosis, clubbing or edema  Normal ROM    Skin:  No jaundice, rashes, or lesions   Heme/Lymph: No palpable cervical lymphadenopathy  Psych: Normal affect, good eye contact  Neuro: No gross deficits, AAOx3    Lab Results:   Lab Results   Component Value Date    WBC 12 53 (H) 03/27/2019    HGB 14 0 03/27/2019    HCT 40 7 03/27/2019    MCV 95 03/27/2019     03/27/2019     Lab Results   Component Value Date     04/09/2015    K 4 4 03/27/2019     03/27/2019    CO2 27 03/27/2019    ANIONGAP 7 04/09/2015    BUN 32 (H) 03/27/2019    CREATININE 1 43 (H) 03/27/2019    GLUCOSE 110 04/09/2015    CALCIUM 9 3 03/27/2019    AST 38 03/27/2019    ALT 62 03/27/2019    ALKPHOS 68 03/27/2019    PROT 7 2 02/05/2015    BILITOT 0 4 02/05/2015    EGFR 37 03/27/2019     No results found for: IRON, TIBC, FERRITIN  Lab Results   Component Value Date    LIPASE 37 (L) 08/03/2016       Radiology Results:   No results found

## 2020-03-12 NOTE — PATIENT INSTRUCTIONS
Carafate suspension 4 times a day as needed  Zofran as needed for breakthrough nausea /vomiting  Continue AcipHex    Follow up in office 4-6 weeks

## 2020-03-12 NOTE — LETTER
March 12, 2020     Ivan Montero DO  P O  [address]    Patient: Link Ernie   YOB: 1945   Date of Visit: 3/12/2020       Dear Dr Casper Evans: Thank you for referring Everardo Paredes to me for evaluation  Below are my notes for this consultation  If you have questions, please do not hesitate to call me  I look forward to following your patient along with you  Sincerely,        Sherryle Finger Reyna Nixon, MD        CC: No Recipients  Sherryle Finger Reyna Nixon, MD  3/12/2020  4:43 PM  Sign at close encounter  [id number] - Outpatient Follow-up Note  Link Ernie 69 y o  female MRN: [id number]  Encounter: [id number]    ASSESSMENT AND PLAN:      1  Non-intractable vomiting with nausea, unspecified vomiting type  Suspect infectious gastroenteritis  Less likely exacerbation of underlying gastroparesis and functional bowel disease   - continue AcipHex  - ondansetron (ZOFRAN) 4 mg tablet; Take 1 tablet (4 mg total) by mouth every 8 (eight) hours as needed for nausea or vomiting  Dispense: 5 tablet; Refill: 3  - sucralfate (CARAFATE) 1 g tablet; Take 1 tablet (1 g total) by mouth 4 (four) times a day as needed (nausea and vomiting)  Dispense: 60 tablet; Refill: 2    2  Gastroesophageal reflux disease without esophagitis  3  Gastroparesis  Chronic problem  History of Nissen fundoplication  Probably significant functional component      Followup Appointment: 4-6 weeks  ______________________________________________________________________    Chief Complaint   Patient presents with    Nausea, gagging and stomach pain     HPI:  The patient is seen in the office today emergently secondary to acute onset of nausea and vomiting  She has a long history of upper GI complaints felt secondary to GERD, gastroparesis, and functional dyspepsia  She has had a Nissen fundoplication in the past   She is chronically on AcipHex and Elavil    She reports this morning her grandson woke her up complaining of abdominal pain  Following this she began belching and then nauseous  She began trying to vomit but is unable to do so because of her fundoplication and just ended up dry heaving  She denies any fevers or chills  She denies any diarrhea  She denies any exposure to anybody known sick  She has been taking AcipHex 20 mg twice a day which has controlled her heartburn  She continues to have issues with intermittent solid food dysphagia  She is complaining of diffuse abdominal pain now which she attributes to the vomiting  Historical Information   Past Medical History:   Diagnosis Date    Anxiety     Arthritis     Asthma     Candida infection, esophageal ([address] )     Chronic pain     COPD (chronic obstructive pulmonary disease) (HCC)     Depression     Depression     Gastroparesis     Gastroparesis     GERD (gastroesophageal reflux disease)     Hypertension     Irritable bowel syndrome (IBS)     Migraines     Multiple thyroid nodules     Osteoporosis     Pneumonia     Psychiatric disorder      Past Surgical History:   Procedure Laterality Date    BACK SURGERY       SECTION      CHOLECYSTECTOMY      COLONOSCOPY  10/09/2013    ESOPHAGEAL DILATION       [facility]    HIATAL HERNIA REPAIR      NISSEN FUNDOPLICATION      GA ESOPHAGOGASTRODUODENOSCOPY TRANSORAL DIAGNOSTIC N/A 4/3/2019    Procedure: ESOPHAGOGASTRODUODENOSCOPY (EGD);   Surgeon: Jalil Casarez MD;  Location:  MAIN OR;  Service: Gastroenterology    ROTATOR CUFF REPAIR      SHOULDER SURGERY      SPINAL FUSION      UPPER GASTROINTESTINAL ENDOSCOPY      US GUIDED THYROID BIOPSY      WISDOM TOOTH EXTRACTION       Social History     Substance and Sexual Activity   Alcohol Use No     Social History     Substance and Sexual Activity   Drug Use No     Social History     Tobacco Use   Smoking Status Former Smoker    Last attempt to quit: 10/2003    Years since quittin 4 Smokeless Tobacco Never Used     Family History   Problem Relation Age of Onset    Other Mother         Esophageal disorder    Stomach cancer Father     Cancer Father     Hypertension Maternal Aunt     Colon cancer Neg Hx     Colon polyps Neg Hx          Current Outpatient Medications:     amitriptyline (ELAVIL) 50 mg tablet    amLODIPine (NORVASC) 5 mg tablet    cetirizine (ZyrTEC) 10 MG chewable tablet    cholecalciferol (VITAMIN D3) 1,000 units tablet    famotidine (PEPCID) 40 MG tablet    gabapentin (NEURONTIN) 100 mg capsule    multivitamin (THERAGRAN) TABS    RABEprazole (ACIPHEX) 20 MG tablet    albuterol (PROVENTIL HFA,VENTOLIN HFA) 90 mcg/act inhaler    ondansetron (ZOFRAN) 4 mg tablet    sucralfate (CARAFATE) 1 g tablet    triamcinolone (KENALOG) 0 1 % ointment  Allergies   Allergen Reactions    Latex Hives    Doxycycline Hives    Cephalosporins GI Intolerance    Nsaids GI Intolerance    Penicillins GI Intolerance     Patient can only take levaquin and cipro    Prednisone GI Intolerance    Sulphasomidine GI Intolerance     Reviewed medications and allergies and updated as indicated    PHYSICAL EXAM:    Blood pressure 118/80, pulse 92, weight 61 7 kg (136 lb), not currently breastfeeding  Body mass index is 26 56 kg/m²  General Appearance: NAD, cooperative, alert  Eyes: Anicteric, PERRLA, EOMI  ENT:  Normocephalic, atraumatic, normal mucosa  Neck:  Supple, symmetrical, trachea midline  Resp:  Clear to auscultation bilaterally; no rales, rhonchi or wheezing; respirations unlabored   CV:  S1 S2, Regular rate and rhythm; no murmur, rub, or gallop  GI:  Soft,  mild diffuse abdominal tenderness without rebound or guarding, non-distended; normal bowel sounds; no masses, no organomegaly   Rectal: Deferred  Musculoskeletal: No cyanosis, clubbing or edema  Normal ROM    Skin:  No jaundice, rashes, or lesions   Heme/Lymph: No palpable cervical lymphadenopathy  Psych: Normal affect, good eye contact  Neuro: No gross deficits, AAOx3    Lab Results:   Lab Results   Component Value Date    WBC 12 53 (H) 03/27/2019    HGB 14 0 03/27/2019    HCT 40 7 03/27/2019    MCV 95 03/27/2019     03/27/2019     Lab Results   Component Value Date     04/09/2015    K 4 4 03/27/2019     03/27/2019    CO2 27 03/27/2019    ANIONGAP 7 04/09/2015    BUN 32 (H) 03/27/2019    CREATININE 1 43 (H) 03/27/2019    GLUCOSE 110 04/09/2015    CALCIUM 9 3 03/27/2019    AST 38 03/27/2019    ALT 62 03/27/2019    ALKPHOS 68 03/27/2019    PROT 7 2 02/05/2015    BILITOT 0 4 02/05/2015    EGFR 37 03/27/2019     No results found for: IRON, TIBC, FERRITIN  Lab Results   Component Value Date    LIPASE 37 (L) 08/03/2016       Radiology Results:   No results found

## 2020-03-13 DIAGNOSIS — R10.12 LEFT UPPER QUADRANT PAIN: ICD-10-CM

## 2020-03-13 RX ORDER — GABAPENTIN 100 MG/1
100 CAPSULE ORAL 3 TIMES DAILY
Qty: 90 CAPSULE | Refills: 3 | Status: SHIPPED | OUTPATIENT
Start: 2020-03-13 | End: 2022-03-08

## 2020-03-17 DIAGNOSIS — R13.19 ESOPHAGEAL DYSPHAGIA: Primary | ICD-10-CM

## 2020-03-17 RX ORDER — RABEPRAZOLE SODIUM 20 MG/1
20 TABLET, DELAYED RELEASE ORAL 2 TIMES DAILY
Qty: 60 TABLET | Refills: 6 | Status: SHIPPED | OUTPATIENT
Start: 2020-03-17 | End: 2020-10-27 | Stop reason: SDUPTHER

## 2020-03-18 DIAGNOSIS — K21.9 GASTROESOPHAGEAL REFLUX DISEASE WITHOUT ESOPHAGITIS: ICD-10-CM

## 2020-03-18 RX ORDER — FAMOTIDINE 40 MG/1
40 TABLET, FILM COATED ORAL
Qty: 30 TABLET | Refills: 12 | Status: SHIPPED | OUTPATIENT
Start: 2020-03-18 | End: 2020-11-12

## 2020-04-09 DIAGNOSIS — R10.12 LEFT UPPER QUADRANT PAIN: ICD-10-CM

## 2020-04-10 ENCOUNTER — TELEMEDICINE (OUTPATIENT)
Dept: GASTROENTEROLOGY | Facility: CLINIC | Age: 75
End: 2020-04-10
Payer: MEDICARE

## 2020-04-10 VITALS — BODY MASS INDEX: 26.11 KG/M2 | WEIGHT: 133 LBS | HEIGHT: 60 IN

## 2020-04-10 DIAGNOSIS — R10.12 LEFT UPPER QUADRANT PAIN: ICD-10-CM

## 2020-04-10 DIAGNOSIS — K31.84 GASTROPARESIS: ICD-10-CM

## 2020-04-10 DIAGNOSIS — K21.9 GASTROESOPHAGEAL REFLUX DISEASE WITHOUT ESOPHAGITIS: Primary | ICD-10-CM

## 2020-04-10 PROCEDURE — 99213 OFFICE O/P EST LOW 20 MIN: CPT | Performed by: INTERNAL MEDICINE

## 2020-04-10 RX ORDER — PREDNISONE 20 MG/1
TABLET ORAL DAILY
COMMUNITY

## 2020-04-10 RX ORDER — AMITRIPTYLINE HYDROCHLORIDE 50 MG/1
50 TABLET, FILM COATED ORAL
Qty: 30 TABLET | Refills: 12 | Status: SHIPPED | OUTPATIENT
Start: 2020-04-10 | End: 2021-05-16 | Stop reason: SDUPTHER

## 2020-04-10 RX ORDER — COLCHICINE 0.6 MG/1
0.6 TABLET ORAL DAILY
COMMUNITY

## 2020-04-10 RX ORDER — AMITRIPTYLINE HYDROCHLORIDE 50 MG/1
TABLET, FILM COATED ORAL
Qty: 30 TABLET | Refills: 1 | Status: SHIPPED | OUTPATIENT
Start: 2020-04-10 | End: 2020-04-10 | Stop reason: SDUPTHER

## 2020-05-07 ENCOUNTER — TELEMEDICINE (OUTPATIENT)
Dept: GASTROENTEROLOGY | Facility: CLINIC | Age: 75
End: 2020-05-07
Payer: MEDICARE

## 2020-05-07 VITALS — HEIGHT: 60 IN | BODY MASS INDEX: 26.11 KG/M2 | WEIGHT: 133 LBS

## 2020-05-07 DIAGNOSIS — R13.19 ESOPHAGEAL DYSPHAGIA: ICD-10-CM

## 2020-05-07 DIAGNOSIS — R10.12 LEFT UPPER QUADRANT PAIN: ICD-10-CM

## 2020-05-07 DIAGNOSIS — K31.84 GASTROPARESIS: ICD-10-CM

## 2020-05-07 DIAGNOSIS — K21.9 GASTROESOPHAGEAL REFLUX DISEASE WITHOUT ESOPHAGITIS: Primary | ICD-10-CM

## 2020-05-07 PROCEDURE — 99214 OFFICE O/P EST MOD 30 MIN: CPT | Performed by: INTERNAL MEDICINE

## 2020-06-18 ENCOUNTER — TELEMEDICINE (OUTPATIENT)
Dept: GASTROENTEROLOGY | Facility: CLINIC | Age: 75
End: 2020-06-18
Payer: MEDICARE

## 2020-06-18 VITALS — WEIGHT: 134 LBS | HEIGHT: 60 IN | BODY MASS INDEX: 26.31 KG/M2

## 2020-06-18 DIAGNOSIS — K21.9 GASTROESOPHAGEAL REFLUX DISEASE WITHOUT ESOPHAGITIS: Primary | ICD-10-CM

## 2020-06-18 DIAGNOSIS — R13.19 ESOPHAGEAL DYSPHAGIA: ICD-10-CM

## 2020-06-18 DIAGNOSIS — K31.84 GASTROPARESIS: ICD-10-CM

## 2020-06-18 PROCEDURE — 99213 OFFICE O/P EST LOW 20 MIN: CPT | Performed by: INTERNAL MEDICINE

## 2020-06-18 RX ORDER — LORAZEPAM 0.5 MG/1
0.5 TABLET ORAL
COMMUNITY

## 2020-07-21 ENCOUNTER — TELEPHONE (OUTPATIENT)
Dept: DERMATOLOGY | Facility: CLINIC | Age: 75
End: 2020-07-21

## 2020-07-29 ENCOUNTER — OFFICE VISIT (OUTPATIENT)
Dept: DERMATOLOGY | Facility: CLINIC | Age: 75
End: 2020-07-29
Payer: MEDICARE

## 2020-07-29 VITALS — WEIGHT: 137.87 LBS | BODY MASS INDEX: 26.93 KG/M2 | TEMPERATURE: 98.2 F

## 2020-07-29 DIAGNOSIS — D48.9 NEOPLASM OF UNCERTAIN BEHAVIOR: Primary | ICD-10-CM

## 2020-07-29 PROCEDURE — 99213 OFFICE O/P EST LOW 20 MIN: CPT | Performed by: STUDENT IN AN ORGANIZED HEALTH CARE EDUCATION/TRAINING PROGRAM

## 2020-07-29 PROCEDURE — 11102 TANGNTL BX SKIN SINGLE LES: CPT | Performed by: STUDENT IN AN ORGANIZED HEALTH CARE EDUCATION/TRAINING PROGRAM

## 2020-07-29 PROCEDURE — 88305 TISSUE EXAM BY PATHOLOGIST: CPT | Performed by: STUDENT IN AN ORGANIZED HEALTH CARE EDUCATION/TRAINING PROGRAM

## 2020-07-29 RX ORDER — KETOCONAZOLE 20 MG/G
CREAM TOPICAL AS NEEDED
COMMUNITY
Start: 2020-06-11 | End: 2022-02-20

## 2020-07-29 NOTE — PROGRESS NOTES
[facility] Follow Up Note    Patient Name: Evert Monroe  Encounter Date: 07/29/2020    Today's Chief Concerns:  [place] Concern #1:  Spot of concern on left cheek   Concern #2:  Spots on Face    Current Medications:    Current Outpatient Medications:     albuterol (PROVENTIL HFA,VENTOLIN HFA) 90 mcg/act inhaler, Inhale 2 puffs every 6 (six) hours as needed for wheezing, Disp: , Rfl:     amitriptyline (ELAVIL) 50 mg tablet, Take 1 tablet (50 mg total) by mouth daily at bedtime, Disp: 30 tablet, Rfl: 12    amLODIPine (NORVASC) 5 mg tablet, Take 5 mg by mouth daily  , Disp: , Rfl:     cetirizine (ZyrTEC) 10 MG chewable tablet, Chew 10 mg daily  , Disp: , Rfl:     cholecalciferol (VITAMIN D3) 1,000 units tablet, Take 5,000 Units by mouth daily, Disp: , Rfl:     colchicine (COLCRYS) 0 6 mg tablet, Take 0 6 mg by mouth daily, Disp: , Rfl:     famotidine (PEPCID) 40 MG tablet, Take 1 tablet (40 mg total) by mouth daily at bedtime, Disp: 30 tablet, Rfl: 12    gabapentin (NEURONTIN) 100 mg capsule, Take 1 capsule (100 mg total) by mouth 3 (three) times a day, Disp: 90 capsule, Rfl: 3    ketoconazole (NIZORAL) 2 % cream, 1 APPLICATION ONCE A DAY EXTERNALLY 14 DAYS, Disp: , Rfl:     LORazepam (ATIVAN) 0 5 mg tablet, Take 0 5 mg by mouth daily at bedtime, Disp: , Rfl:     multivitamin (THERAGRAN) TABS, Take 1 tablet by mouth daily  , Disp: , Rfl:     ondansetron (ZOFRAN) 4 mg tablet, Take 1 tablet (4 mg total) by mouth every 8 (eight) hours as needed for nausea or vomiting, Disp: 5 tablet, Rfl: 3    other medication, see sig,, Medication/product name: preservision  Strength:  Sig (include dose, route, frequency):, Disp: , Rfl:     predniSONE 20 mg tablet, Take by mouth daily For five days for gout flare, Disp: , Rfl:     RABEprazole (ACIPHEX) 20 MG tablet, Take 1 tablet (20 mg total) by mouth 2 (two) times a day, Disp: 60 tablet, Rfl: 6    sucralfate (CARAFATE) 1 g tablet, Take 1 tablet (1 g total) by mouth 4 (four) times a day as needed (nausea and vomiting), Disp: 60 tablet, Rfl: 2    triamcinolone (KENALOG) 0 1 % ointment, Apply topically to all affected areas two times a day  Avoid face, armpits, and groin , Disp: 453 6 g, Rfl: 0    CONSTITUTIONAL:   There were no vitals filed for this visit  Specific Alerts:    Have you been seen by a [facility] Dermatologist in the last 3 years? YES    Are you pregnant or planning to become pregnant? No    Are you currently or planning to be nursing or breast feeding? No    Allergies   Allergen Reactions    Latex Hives    Doxycycline Hives    Cephalosporins GI Intolerance    Nsaids GI Intolerance    Penicillins GI Intolerance     Patient can only take levaquin and cipro    Prednisone GI Intolerance    Sulphasomidine GI Intolerance       May we call your Preferred Phone number to discuss your specific medical information? YES    May we leave a detailed message that includes your specific medical information? YES    Have you traveled outside of the Health system in the past 3 months? No        Review of Systems:  Have you recently had or currently have any of the following?     · Fever or chills: No  · Night Sweats: No  · Headaches: No  · Weight Gain: No  · Weight Loss: No  · Blurry Vision: YES, treated with eye specialist   · Nausea: No  · Vomiting: No  · Diarrhea: No  · Blood in Stool: No  · Abdominal Pain: No  · Itchy Skin: YES  · Painful Joints: No  · Swollen Joints: No  · Muscle Pain: No  · Irregular Mole: No  · Sun Burn: No  · Dry Skin: No  · Skin Color Changes: No  · Scar or Keloid: No  · Cold Sores/Fever Blisters: No  · Bacterial Infections/MRSA: No  · Anxiety: YES  · Depression: No  · Suicidal or Homicidal Thoughts: No      PSYCH: Normal mood and affect  EYES: Normal conjunctiva  ENT: Normal lips and oral mucosa  CARDIOVASCULAR: No edema  RESPIRATORY: Normal respirations  HEME/LYMPH/IMMUNO:  No ostensible subQ swelling except as noted below in [address], [address] DIAGNOSIS    FULL ORGAN SYSTEM SKIN EXAM (SKIN)   Hair,Face Normal except as noted below in Assessment   Neck Normal except as noted below in Assessment   Right Fingers Normal except as noted below in Assessment   Left Hand/Fingers Normal except as noted below in Assessment                                 NEOPLASM OF UNCERTAIN BEHAVIOR OF SKIN    Physical Exam:   (Anatomic Location); (Size and Morphological Description); (Differential Diagnosis):  o Left Cheek; 2 4 by 2 cm pink plaque Rule out seborrheic keratosis versus other (pic taken)   Pertinent Positives:   Pertinent Negatives: Ivan Manuel Additional History of Present Condition:  Patient reports new spot on cheek that she assumed was a age spot but now its more irritated thinks it may have been caused with the coloring of her hair a month ago  Has been applying Triamcinolone Ointment  Denies itchiness, bleeding  Assessment and Plan:   I have discussed with the patient that a sample of skin via a "skin biopsy would be potentially helpful to further make a specific diagnosis under the microscope   Based on a thorough discussion of this condition and the management approach to it (including a comprehensive discussion of the known risks, side effects and potential benefits of treatment), the patient (family) agrees to implement the following specific plan:    o Procedure:  Skin Biopsy  After a thorough discussion of treatment options and risk/benefits/alternatives (including but not limited to local pain, scarring, dyspigmentation, blistering, possible superinfection, and inability to confirm a diagnosis via histopathology), verbal and written consent were obtained and portion of the rash was biopsied for tissue sample  See below for consent that was obtained from patient and subsequent Procedure Note      PROCEDURE SHAVE BIOPSY NOTE:     Performing Physician: Tami Carnes Anatomic Location; Clinical Description with size (cm); Pre-Op Diagnosis:   o Left Cheek; 2 4 by 2 cm pink plaque Rule out seborrheic keratosis versus other     Post-op diagnosis: Same      Local anesthesia: 1% xylocaine with epi       Topical anesthesia: None     Hemostasis: Aluminum chloride       After obtaining informed consent  at which time there was a discussion about the purpose of biopsy  and low risks of infection and bleeding  The area was prepped and draped in the usual fashion  Anesthesia was obtained with 1% lidocaine with epinephrine  A shave biopsy to an appropriate sampling depth was obtained with a sterile blade (such as a 15-blade or DermaBlade)  The resulting wound was covered with surgical ointment and bandaged appropriately  The patient tolerated the procedure well without complications and was without signs of functional compromise  Specimen has been sent for review by Dermatopathology  Standard post-procedure care has been explained and has been included in written form within the patient's copy of Informed Consent  INFORMED CONSENT DISCUSSION AND POST-OPERATIVE INSTRUCTIONS FOR PATIENT    I   RATIONALE FOR PROCEDURE  I understand that a skin biopsy allows the Dermatologist to test a lesion or rash under the microscope to obtain a diagnosis  It usually involves numbing the area with numbing medication and removing a small piece of skin; sometimes the area will be closed with sutures  In this specific procedure, sutures are not usually needed  If any sutures are placed, then they are usually need to be removed in 2 weeks or less  I understand that my Dermatologist recommends that a skin "shave" biopsy be performed today  A local anesthetic, similar to the kind that a dentist uses when filling a cavity, will be injected with a very small needle into the skin area to be sampled  The injected skin and tissue underneath "will go to sleep and become numb so no pain should be felt afterwards    An instrument shaped like a tiny "razor blade" (shave biopsy instrument) will be used to cut a small piece of tissue and skin from the area so that a sample of tissue can be taken and examined more closely under the microscope  A slight amount of bleeding will occur, but it will be stopped with direct pressure and a pressure bandage and any other appropriate methods  I understands that a scar will form where the wound was created  Surgical ointment will be applied to help protect the wound  Sutures are not usually needed  II   RISKS AND POTENTIAL COMPLICATIONS   I understand the risks and potential complications of a skin biopsy include but are not limited to the following:   Bleeding   Infection   Pain   Scar/keloid   Skin discoloration   Incomplete Removal   Recurrence   Nerve Damage/Numbness/Loss of Function   Allergic Reaction to Anesthesia   Biopsies are diagnostic procedures and based on findings additional treatment or evaluation may be required   Loss or destruction of specimen resulting in no additional findings    My Dermatologist has explained to me the nature of the condition, the nature of the procedure, and the benefits to be reasonably expected compared with alternative approaches  My Dermatologist has discussed the likelihood of major risks or complications of this procedure including the specific risks listed above, such as bleeding, infection, and scarring/keloid  I understand that a scar is expected after this procedure  I understand that my physician cannot predict if the scar will form a "keloid," which extends beyond the borders of the wound that is created  A keloid is a thick, painful, and bumpy scar  A keloid can be difficult to treat, as it does not always respond well to therapy, which includes injecting cortisone directly into the keloid every few weeks  While this usually reduces the pain and size of the scar, it does not eliminate it        I understand that photographs may be taken before and after the procedure  These will be maintained as part of the medical providers confidential records and may not be made available to me  I further authorize the medical provider to use the photographs for teaching purposes or to illustrate scientific papers, books, or lectures if in his/her judgment, medical research, education, or science may benefit from its use  I have had an opportunity to fully inquire about the risks and benefits of this procedure and its alternatives  I have been given ample time and opportunity to ask questions and to seek a second opinion if I wished to do so  I acknowledge that there have specifically been no guarantees as to the cosmetic results from the procedure  I am aware that with any procedure there is always the possibility of an unexpected complication  III  POST-PROCEDURAL CARE (WHAT YOU WILL NEED TO DO "AFTER THE BIOPSY" TO OPTIMIZE HEALING)     Keep the area clean and dry  Try NOT to remove the bandage or get it wet for the first 24 hours   Gently clean the area and apply surgical ointment (such as Vaseline petrolatum ointment, which is available "over the counter" and not a prescription) to the biopsy site for up to 2 weeks straight  This acts to protect the wound from the outside world   Sutures are not usually placed in this procedure  If any sutures were placed, return for suture removal as instructed (generally 1 week for the face, 2 weeks for the body)   Take Acetaminophen (Tylenol) for discomfort, if no contraindications  Ibuprofen or aspirin could make bleeding worse   Call our office immediately for signs of infection: fever, chills, increased redness, warmth, tenderness, discomfort/pain, or pus or foul smell coming from the wound  WHAT TO DO IF THERE IS ANY BLEEDING? If a small amount of bleeding is noticed, place a clean cloth over the area and apply firm pressure for ten minutes    Check the wound after 10 minutes of direct pressure  If bleeding persists, try one more time for an additional 10 minutes of direct pressure on the area  If the bleeding becomes heavier or does not stop after the second attempt, or if you have any other questions about this procedure, then please call your [facility] Dermatologist by calling [phone] (SKIN)  I hereby acknowledge that I have reviewed and verified the site with my Dermatologist and have requested and authorized my Dermatologist to proceed with the procedure  RASH on face: likely allergic contact dermatitis     Physical Exam:   (Anatomic Location); (Size and Morphological Description); (Differential Diagnosis):  o Pink faint papules over bilateral cheeks and neck   Pertinent Positives:   Pertinent Negatives: Additional History of Present Condition:  Patient reports new spot on cheek that she assumed was a age spot but now its more irritated thinks it may have been caused with the coloring of her hair a month ago  Has been applying Triamcinolone Ointment  Denies itchiness, bleeding  Assessment and Plan:  Based on a thorough discussion of this condition and the management approach to it (including a comprehensive discussion of the known risks, side effects and potential benefits of treatment), the patient (family) agrees to implement the following specific plan:   Apply Triamcinolone ointment  1 to 2 times a day for two weeks and avoid eye area  Discussed risk of thinning of skin if used for a long period of time   Pt has triamcinolone ointment at home and seems eager just to use that   Probably related to product that you are applying to face (concealer)    Follow up 3 months with [facility] at that time    Scribe Attestation    I,:   Jaja Meadows MA am acting as a scribe while in the presence of the attending physician :        I,:   Xiao Cee MD personally performed the services described in this documentation    as scribed in my presence :

## 2020-08-04 ENCOUNTER — TELEPHONE (OUTPATIENT)
Dept: DERMATOLOGY | Facility: CLINIC | Age: 75
End: 2020-08-04

## 2020-08-04 NOTE — RESULT ENCOUNTER NOTE
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

## 2020-08-04 NOTE — TELEPHONE ENCOUNTER
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

## 2020-10-16 ENCOUNTER — TELEMEDICINE (OUTPATIENT)
Dept: GASTROENTEROLOGY | Facility: CLINIC | Age: 75
End: 2020-10-16
Payer: MEDICARE

## 2020-10-16 VITALS — HEIGHT: 60 IN | WEIGHT: 135 LBS | BODY MASS INDEX: 26.5 KG/M2

## 2020-10-16 DIAGNOSIS — K21.9 GASTROESOPHAGEAL REFLUX DISEASE WITHOUT ESOPHAGITIS: ICD-10-CM

## 2020-10-16 DIAGNOSIS — R13.19 ESOPHAGEAL DYSPHAGIA: Primary | ICD-10-CM

## 2020-10-16 PROCEDURE — 99213 OFFICE O/P EST LOW 20 MIN: CPT | Performed by: INTERNAL MEDICINE

## 2020-10-26 DIAGNOSIS — R13.19 ESOPHAGEAL DYSPHAGIA: ICD-10-CM

## 2020-10-27 DIAGNOSIS — R13.19 ESOPHAGEAL DYSPHAGIA: ICD-10-CM

## 2020-10-27 RX ORDER — RABEPRAZOLE SODIUM 20 MG/1
TABLET, DELAYED RELEASE ORAL
Qty: 60 TABLET | Refills: 4 | Status: SHIPPED | OUTPATIENT
Start: 2020-10-27 | End: 2020-10-28

## 2020-10-28 RX ORDER — RABEPRAZOLE SODIUM 20 MG/1
TABLET, DELAYED RELEASE ORAL
Qty: 60 TABLET | Refills: 4 | Status: SHIPPED | OUTPATIENT
Start: 2020-10-28 | End: 2021-03-22

## 2020-11-12 DIAGNOSIS — K21.9 GASTROESOPHAGEAL REFLUX DISEASE WITHOUT ESOPHAGITIS: ICD-10-CM

## 2020-11-12 RX ORDER — FAMOTIDINE 40 MG/1
TABLET, FILM COATED ORAL
Qty: 30 TABLET | Refills: 12 | Status: SHIPPED | OUTPATIENT
Start: 2020-11-12 | End: 2021-02-08

## 2020-12-08 ENCOUNTER — OFFICE VISIT (OUTPATIENT)
Dept: URGENT CARE | Facility: CLINIC | Age: 75
End: 2020-12-08
Payer: MEDICARE

## 2020-12-08 VITALS
DIASTOLIC BLOOD PRESSURE: 100 MMHG | OXYGEN SATURATION: 96 % | SYSTOLIC BLOOD PRESSURE: 200 MMHG | TEMPERATURE: 97 F | WEIGHT: 144 LBS | HEART RATE: 96 BPM | BODY MASS INDEX: 28.27 KG/M2 | HEIGHT: 60 IN | RESPIRATION RATE: 18 BRPM

## 2020-12-08 DIAGNOSIS — L50.6 ALLERGIC CONTACT URTICARIA: Primary | ICD-10-CM

## 2020-12-08 PROCEDURE — 99213 OFFICE O/P EST LOW 20 MIN: CPT | Performed by: FAMILY MEDICINE

## 2020-12-08 PROCEDURE — G0463 HOSPITAL OUTPT CLINIC VISIT: HCPCS | Performed by: FAMILY MEDICINE

## 2020-12-08 RX ORDER — PREDNISONE 20 MG/1
40 TABLET ORAL DAILY
Qty: 10 TABLET | Refills: 0 | Status: SHIPPED | OUTPATIENT
Start: 2020-12-08 | End: 2020-12-11

## 2021-02-07 DIAGNOSIS — K21.9 GASTROESOPHAGEAL REFLUX DISEASE WITHOUT ESOPHAGITIS: ICD-10-CM

## 2021-02-08 RX ORDER — FAMOTIDINE 40 MG/1
TABLET, FILM COATED ORAL
Qty: 90 TABLET | Refills: 3 | Status: SHIPPED | OUTPATIENT
Start: 2021-02-08 | End: 2022-02-05

## 2021-03-15 ENCOUNTER — TRANSCRIBE ORDERS (OUTPATIENT)
Dept: PHYSICAL THERAPY | Facility: CLINIC | Age: 76
End: 2021-03-15

## 2021-03-15 ENCOUNTER — EVALUATION (OUTPATIENT)
Dept: OCCUPATIONAL THERAPY | Facility: CLINIC | Age: 76
End: 2021-03-15
Payer: COMMERCIAL

## 2021-03-15 DIAGNOSIS — M18.12 PRIMARY OSTEOARTHRITIS OF FIRST CARPOMETACARPAL JOINT OF LEFT HAND: Primary | ICD-10-CM

## 2021-03-15 PROCEDURE — 97166 OT EVAL MOD COMPLEX 45 MIN: CPT | Performed by: OCCUPATIONAL THERAPIST

## 2021-03-15 NOTE — PROGRESS NOTES
OT Evaluation     Today's date: 3/15/2021  Patient name: Adore Benjamin  : 1945  MRN: [id number]  Referring provider: Darwin Darnell MD  Dx:   Encounter Diagnosis     ICD-10-CM    1  Primary osteoarthritis of first carpometacarpal joint of left hand  M18 12                   Assessment  Assessment details: Pt  Presents to hand therapy evaluation s/p L thumb CMC arthroplasty  Pt  Has limited ROM, joint stiffness, limited opposition and FMC, edema and hand weakness impeding function  Pt  To benefit from hand therapy with treatment to include modalities, scar mobilization, joint mobilization, ROM, stretching and progressive strengthening  Impairments: abnormal or restricted ROM, impaired physical strength, lacks appropriate home exercise program and pain with function  Functional limitations: Pt  has pain with lifting and gripping  She wears a TSS for support of the thumb  She is unable to hold a pan, broom, wash dishes, and cook  Understanding of Dx/Px/POC: good   Prognosis: good    Goals  STG( 6 visits)  1  Compliant with splint and HEP  2  Improve L thumb ROM by 10 degrees  3  Decrease L hand edema to WNLS  LTG( 12 visits)  1  L thumb AROM WNLs for [address]  2  L thumb pinch strength >10lbs for ADS  3  Improve FOTO score to predicted outcome or greater  4  Full opposition of thumb for ADLS      Plan  Patient would benefit from: skilled occupational therapy  Planned modality interventions: thermotherapy: hydrocollator packs, cryotherapy and ultrasound  Planned therapy interventions: joint mobilization, manual therapy, orthotic fitting/training, stretching, strengthening, home exercise program, graded exercise, functional ROM exercises, fine motor coordination training, therapeutic activities and therapeutic exercise  Frequency: 2x week  Duration in visits: 2  Duration in weeks: 6  Plan of Care beginning date: 3/15/2021  Plan of Care expiration date: 2021  Treatment plan discussed with: patient        Subjective Evaluation    History of Present Illness  Mechanism of injury: Pt  S/p L CMC arthroplasty on 2021  She was casted for about 4 weeks and then transitioned to a TSS  Pt  Is referred to hand therapy for evaluation and treatment of L CMC  Quality of life: good    Pain  Current pain ratin  At worst pain ratin  Quality: discomfort, sharp, throbbing and tight  Relieving factors: heat  Progression: improved    Social Support  Lives in: multiple-level home  Lives with: adult children    Employment status: not working  Hand dominance: right    Treatments  Current treatment: occupational therapy  Patient Goals  Patient goals for therapy: increased motion, decreased pain, increased strength and independence with ADLs/IADLs          Objective     Tenderness     Left Wrist/Hand   Tenderness in the [facility]       Neurological Testing     Sensation     Wrist/Hand   Left   Intact: light touch    Active Range of Motion     Left Wrist   Wrist flexion: 10 degrees with pain  Wrist extension: 45 degrees with pain  Radial deviation: 10 degrees with pain  Ulnar deviation: 28 degrees with pain      Left Thumb   Flexion     CMC: 10 degrees    MP: 10 degrees    DIP: 20 degrees  Extension     CMC: 0 degrees    MP: 0 degrees    DIP: 0 degrees  Palmar Abduction     CMC: 40 degrees  Opposition: Opposes to 2nd digit with pain    Additional Active Range of Motion Details  Full ROM    Swelling     Left Wrist/Hand   Thumb     Proximal: 7 4 cm  Circumference wrist: 13 6 cm             Precautions: Universal       Manuals 3/15            retrograde 4m            Scar mob 3m            IP joint mob 2m            Wrist mob 2m            Neuro Re-Ed                          HEP- Thumb/ wrist A/PROM reviewd, HB  TSS                                                                             Ther Ex             Wrist PROM 1x10            Thumb PROM 1x10            opposition 1x10 Ther Activity                                       Gait Training                                       Modalities             P L  10m

## 2021-03-15 NOTE — LETTER
March 15, 2021    Esequiel Redd MD  [address] [facility]  [address] [facility] [address]    Patient: Eric Hu   YOB: 1945   Date of Visit: 3/15/2021     Encounter Diagnosis     ICD-10-CM    1  Primary osteoarthritis of first carpometacarpal joint of left hand  M18 12        Dear Dr Koby Adair: Thank you for your recent referral of Eric Hu  Please review the attached evaluation summary from Ira's recent visit  Please verify that you agree with the plan of care by signing the attached order  If you have any questions or concerns, please do not hesitate to call  I sincerely appreciate the opportunity to share in the care of one of your patients and hope to have another opportunity to work with you in the near future  Sincerely,    Isabell Erazo, OT      Referring Provider:     I certify that I have read the below Plan of Care and certify the need for these services furnished under this plan of treatment while under my care  Esequiel Redd MD  [address]  [address] [place] [address] [facility] [address]  Via Fax: [phone]        OT Evaluation     Today's date: 3/15/2021  Patient name: Eric Hu  : 1945  MRN: [id number]  Referring provider: Christina Unger MD  Dx:   Encounter Diagnosis     ICD-10-CM    1  Primary osteoarthritis of first carpometacarpal joint of left hand  M18 12                   Assessment  Assessment details: Pt  Presents to hand therapy evaluation s/p L thumb CMC arthroplasty  Pt  Has limited ROM, joint stiffness, limited opposition and FMC, edema and hand weakness impeding function  Pt  To benefit from hand therapy with treatment to include modalities, scar mobilization, joint mobilization, ROM, stretching and progressive strengthening    Impairments: abnormal or restricted ROM, impaired physical strength, lacks appropriate home exercise program and pain with function  Functional limitations: Pt  has pain with lifting and gripping  She wears a TSS for support of the thumb  She is unable to hold a pan, broom, wash dishes, and cook  Understanding of Dx/Px/POC: good   Prognosis: good    Goals  STG( 6 visits)  1  Compliant with splint and HEP  2  Improve L thumb ROM by 10 degrees  3  Decrease L hand edema to WNLS  LTG( 12 visits)  1  L thumb AROM WNLs for [address]  2  L thumb pinch strength >10lbs for ADS  3  Improve FOTO score to predicted outcome or greater  4  Full opposition of thumb for ADLS  Plan  Patient would benefit from: skilled occupational therapy  Planned modality interventions: thermotherapy: hydrocollator packs, cryotherapy and ultrasound  Planned therapy interventions: joint mobilization, manual therapy, orthotic fitting/training, stretching, strengthening, home exercise program, graded exercise, functional ROM exercises, fine motor coordination training, therapeutic activities and therapeutic exercise  Frequency: 2x week  Duration in visits: 2  Duration in weeks: 6  Plan of Care beginning date: 3/15/2021  Plan of Care expiration date: 2021  Treatment plan discussed with: patient        Subjective Evaluation    History of Present Illness  Mechanism of injury: Pt  S/p L CMC arthroplasty on 2021  She was casted for about 4 weeks and then transitioned to a TSS  Pt  Is referred to hand therapy for evaluation and treatment of L CMC  Quality of life: good    Pain  Current pain ratin  At worst pain ratin  Quality: discomfort, sharp, throbbing and tight  Relieving factors: heat  Progression: improved    Social Support  Lives in: multiple-level home  Lives with: adult children    Employment status: not working  Hand dominance: right    Treatments  Current treatment: occupational therapy  Patient Goals  Patient goals for therapy: increased motion, decreased pain, increased strength and independence with ADLs/IADLs          Objective     Tenderness     Left Wrist/Hand   Tenderness in the [facility]  Neurological Testing     Sensation     Wrist/Hand   Left   Intact: light touch    Active Range of Motion     Left Wrist   Wrist flexion: 10 degrees with pain  Wrist extension: 45 degrees with pain  Radial deviation: 10 degrees with pain  Ulnar deviation: 28 degrees with pain      Left Thumb   Flexion     CMC: 10 degrees    MP: 10 degrees    DIP: 20 degrees  Extension     CMC: 0 degrees    MP: 0 degrees    DIP: 0 degrees  Palmar Abduction     CMC: 40 degrees  Opposition: Opposes to 2nd digit with pain    Additional Active Range of Motion Details  Full ROM    Swelling     Left Wrist/Hand   Thumb     Proximal: 7 4 cm  Circumference wrist: 13 6 cm             Precautions: Universal       Manuals 3/15            retrograde 4m            Scar mob 3m            IP joint mob 2m            Wrist mob 2m            Neuro Re-Ed                          HEP- Thumb/ wrist A/PROM reviewd, HB  TSS                                                                             Ther Ex             Wrist PROM 1x10            Thumb PROM 1x10            opposition 1x10                                                                             Ther Activity                                       Gait Training                                       Modalities             MHP L  10m

## 2021-03-19 ENCOUNTER — OFFICE VISIT (OUTPATIENT)
Dept: OCCUPATIONAL THERAPY | Facility: CLINIC | Age: 76
End: 2021-03-19
Payer: COMMERCIAL

## 2021-03-19 DIAGNOSIS — M18.12 PRIMARY OSTEOARTHRITIS OF FIRST CARPOMETACARPAL JOINT OF LEFT HAND: Primary | ICD-10-CM

## 2021-03-19 PROCEDURE — 97530 THERAPEUTIC ACTIVITIES: CPT | Performed by: OCCUPATIONAL THERAPIST

## 2021-03-19 PROCEDURE — 97110 THERAPEUTIC EXERCISES: CPT | Performed by: OCCUPATIONAL THERAPIST

## 2021-03-19 PROCEDURE — 97140 MANUAL THERAPY 1/> REGIONS: CPT | Performed by: OCCUPATIONAL THERAPIST

## 2021-03-19 NOTE — PROGRESS NOTES
Daily Note     Today's date: 3/19/2021  Patient name: Francy Killian  : 1945  MRN: [id number]  Referring provider: Ayse Aldrich MD  Dx:   Encounter Diagnosis     ICD-10-CM    1  Primary osteoarthritis of first carpometacarpal joint of left hand  M18 12                   Subjective: My thumb hurts  Objective: See treatment diary below      Assessment: Tolerated treatment well  Patient has complaint of increased achiness, and pain in [facility]  Improved wrist ROM today  She has limited opposition to only the index finger  Plan: Continue per plan of care        Precautions: Universal       Manuals 3/15 3/19           retrograde 4m 4m           Scar mob 3m 3m           IP joint mob 2m 2m           Wrist mob 2m 2m           Neuro Re-Ed                          HEP- Thumb/ wrist A/PROM reviewd, HB  TSS                                                                             Ther Ex             Wrist PROM 1x10 1x10           Thumb PROM 1x10 1x10           opposition 1x10 1x10           Wrist curls  Cone 3x10                                                               Ther Activity             FMC/opposition  Small pegs x30                        Gait Training                                       Modalities             MHP L  10m 10m           CP post  6m

## 2021-03-20 DIAGNOSIS — R13.19 ESOPHAGEAL DYSPHAGIA: ICD-10-CM

## 2021-03-22 RX ORDER — RABEPRAZOLE SODIUM 20 MG/1
TABLET, DELAYED RELEASE ORAL
Qty: 60 TABLET | Refills: 6 | Status: SHIPPED | OUTPATIENT
Start: 2021-03-22 | End: 2021-06-09

## 2021-03-22 NOTE — TELEPHONE ENCOUNTER
Medication is entered via [employer], pharmacy confirmed  Could you please sign this as DR KRAUS is not available    Thanks

## 2021-03-22 NOTE — TELEPHONE ENCOUNTER
Pt left  mssg stating she is completely out of Rabeprazole; needs Rx called in to [facility]; it went to [facility] by mistake and they said [facility] needs to fill  # [phone]

## 2021-03-24 ENCOUNTER — APPOINTMENT (OUTPATIENT)
Dept: OCCUPATIONAL THERAPY | Facility: CLINIC | Age: 76
End: 2021-03-24
Payer: COMMERCIAL

## 2021-03-25 ENCOUNTER — IMMUNIZATIONS (OUTPATIENT)
Dept: FAMILY MEDICINE CLINIC | Facility: HOSPITAL | Age: 76
End: 2021-03-25

## 2021-03-25 DIAGNOSIS — Z23 ENCOUNTER FOR IMMUNIZATION: Primary | ICD-10-CM

## 2021-03-25 PROCEDURE — 0001A SARS-COV-2 / COVID-19 MRNA VACCINE (PFIZER-BIONTECH) 30 MCG: CPT

## 2021-03-25 PROCEDURE — 91300 SARS-COV-2 / COVID-19 MRNA VACCINE (PFIZER-BIONTECH) 30 MCG: CPT

## 2021-03-26 ENCOUNTER — OFFICE VISIT (OUTPATIENT)
Dept: OCCUPATIONAL THERAPY | Facility: CLINIC | Age: 76
End: 2021-03-26
Payer: COMMERCIAL

## 2021-03-26 DIAGNOSIS — M18.12 PRIMARY OSTEOARTHRITIS OF FIRST CARPOMETACARPAL JOINT OF LEFT HAND: Primary | ICD-10-CM

## 2021-03-26 PROCEDURE — 97140 MANUAL THERAPY 1/> REGIONS: CPT | Performed by: OCCUPATIONAL THERAPIST

## 2021-03-26 PROCEDURE — 97110 THERAPEUTIC EXERCISES: CPT | Performed by: OCCUPATIONAL THERAPIST

## 2021-03-26 NOTE — PROGRESS NOTES
Daily Note     Today's date: 3/26/2021  Patient name: Sony Nichols  : 1945  MRN: [id number]  Referring provider: Margo Briceno MD  Dx:   Encounter Diagnosis     ICD-10-CM    1  Primary osteoarthritis of first carpometacarpal joint of left hand  M18 12                   Subjective: I have burning pain, it really hurts  Objective: See treatment diary below      Assessment: Tolerated treatment well  Patient has complaint of burning pain in thumb and forearm  Her AROM is functional, she is now able to oppose to 4th digit  Pt  Able to perform there ex without complaint  Plan: Continue per plan of care        Precautions: Universal       Manuals 3/15 3/19 3/26          retrograde 4m 4m 4m          Scar mob 3m 3m 3m          IP joint mob 2m 2m 2m          Wrist mob 2m 2m 2m          Neuro Re-Ed                          HEP- Thumb/ wrist A/PROM reviewd, HB  TSS                                                                             Ther Ex             Wrist PROM 1x10 1x10 1x10          Thumb PROM 1x10 1x10 1x10          opposition 1x10 1x10 1x10          Wrist curls  Cone 3x10 #1 3x10                                                              Ther Activity             FMC/opposition  Small pegs x30 Small pegs x30                       Gait Training                                       Modalities             MHP L  10m 10m 10m          CP post  6m

## 2021-03-31 ENCOUNTER — OFFICE VISIT (OUTPATIENT)
Dept: OCCUPATIONAL THERAPY | Facility: CLINIC | Age: 76
End: 2021-03-31
Payer: COMMERCIAL

## 2021-03-31 DIAGNOSIS — M18.12 PRIMARY OSTEOARTHRITIS OF FIRST CARPOMETACARPAL JOINT OF LEFT HAND: Primary | ICD-10-CM

## 2021-03-31 PROCEDURE — 97110 THERAPEUTIC EXERCISES: CPT | Performed by: OCCUPATIONAL THERAPIST

## 2021-03-31 PROCEDURE — 97140 MANUAL THERAPY 1/> REGIONS: CPT | Performed by: OCCUPATIONAL THERAPIST

## 2021-03-31 NOTE — PROGRESS NOTES
Daily Note     Today's date: 3/31/2021  Patient name: Syeda Jones  : 1945  MRN: [id number]  Referring provider: Joann Lopez MD  Dx:   Encounter Diagnosis     ICD-10-CM    1  Primary osteoarthritis of first carpometacarpal joint of left hand  M18 12                   Subjective: I have really sharp pain, it feels like it is on fire  Objective: See treatment diary below      Assessment: Tolerated treatment well  Patient has complaint of burning pain and hypersensitivity to clothing, and tenderness over [address]  She has improved ROM, able to oppose to tip of 5th  Plan: Continue per plan of care        Precautions: Universal       Manuals 3/15 3/19 3/26 3/31         retrograde 4m 4m 4m 4m         Scar mob 3m 3m 3m 3m         IP joint mob 2m 2m 2m 2m         Wrist mob 2m 2m 2m 2m         Neuro Re-Ed                          HEP- Thumb/ wrist A/PROM reviewd, HB  TSS                                                                             Ther Ex             Wrist PROM 1x10 1x10 1x10 1x10         Thumb PROM 1x10 1x10 1x10 1x10         opposition 1x10 1x10 1x10 1x10         Wrist curls  Cone 3x10 #1 3x10 #1 3x10                                                             Ther Activity             FMC/opposition  Small pegs x30 Small pegs x30 Small pegs x30                      Gait Training                                       Modalities             MHP L  10m 10m 10m 10m         CP post  6m  5m

## 2021-04-02 ENCOUNTER — TELEPHONE (OUTPATIENT)
Dept: GASTROENTEROLOGY | Facility: CLINIC | Age: 76
End: 2021-04-02

## 2021-04-02 ENCOUNTER — OFFICE VISIT (OUTPATIENT)
Dept: OCCUPATIONAL THERAPY | Facility: CLINIC | Age: 76
End: 2021-04-02
Payer: COMMERCIAL

## 2021-04-02 DIAGNOSIS — M18.12 PRIMARY OSTEOARTHRITIS OF FIRST CARPOMETACARPAL JOINT OF LEFT HAND: Primary | ICD-10-CM

## 2021-04-02 PROCEDURE — 97140 MANUAL THERAPY 1/> REGIONS: CPT | Performed by: OCCUPATIONAL THERAPIST

## 2021-04-02 PROCEDURE — 97110 THERAPEUTIC EXERCISES: CPT | Performed by: OCCUPATIONAL THERAPIST

## 2021-04-02 NOTE — PROGRESS NOTES
Daily Note     Today's date: 2021  Patient name: Yunior Turner  : 1945  MRN: [id number]  Referring provider: Burman Schwab, MD  Dx:   Encounter Diagnosis     ICD-10-CM    1  Primary osteoarthritis of first carpometacarpal joint of left hand  M18 12                   Subjective: I am in pain  Objective: See treatment diary below      Assessment: Tolerated treatment well  Patient has complaint of burning pain and hypersensitivity to clothing, and tenderness over [address]  She has improved ROM, pain level reported is 7/10  Plan: Continue per plan of care        Precautions: Universal       Manuals 3/15 3/19 3/26 3/31 4/2        retrograde 4m 4m 4m 4m 4m        Scar mob 3m 3m 3m 3m 3m        IP joint mob 2m 2m 2m 2m 2m        Wrist mob 2m 2m 2m 2m 2m        Neuro Re-Ed                          HEP- Thumb/ wrist A/PROM reviewd, HB  TSS                                                                             Ther Ex             Wrist PROM 1x10 1x10 1x10 1x10 1x10        Thumb PROM 1x10 1x10 1x10 1x10 1x10        opposition 1x10 1x10 1x10 1x10 1x10        Wrist curls  Cone 3x10 #1 3x10 #1 3x10 #1 3x10        Manipulation balls    Small 2m Small 2m                                               Ther Activity             FMC/opposition  Small pegs x30 Small pegs x30 Small pegs x30 Small pegs  x30                     Gait Training                                       Modalities             MHP L  10m 10m 10m 10m 10m        CP post  6m  5m 5m

## 2021-04-02 NOTE — TELEPHONE ENCOUNTER
aciphex is formulary for patient but only after we let them know she has completed step therapy meds and justify why needs BID dosing    I started PA but we don't have info for insurance for future scripts/indpendence PC [facility]    Message left for pt to call us with rx card info

## 2021-04-05 ENCOUNTER — OFFICE VISIT (OUTPATIENT)
Dept: OCCUPATIONAL THERAPY | Facility: CLINIC | Age: 76
End: 2021-04-05
Payer: COMMERCIAL

## 2021-04-05 DIAGNOSIS — M18.12 PRIMARY OSTEOARTHRITIS OF FIRST CARPOMETACARPAL JOINT OF LEFT HAND: Primary | ICD-10-CM

## 2021-04-05 PROCEDURE — 97110 THERAPEUTIC EXERCISES: CPT | Performed by: OCCUPATIONAL THERAPIST

## 2021-04-05 PROCEDURE — 97140 MANUAL THERAPY 1/> REGIONS: CPT | Performed by: OCCUPATIONAL THERAPIST

## 2021-04-05 NOTE — PROGRESS NOTES
Daily Note     Today's date: 2021  Patient name: Marbin Thomas  : 1945  MRN: [id number]  Referring provider: Christian Huitron MD  Dx:   Encounter Diagnosis     ICD-10-CM    1  Primary osteoarthritis of first carpometacarpal joint of left hand  M18 12                   Subjective: I get burning pains  Objective: See treatment diary below      Assessment: Tolerated treatment well  Patient continues to have pain CMC  Limited opposition pre tx  Pt  Able to oppose to tip of 5th with tx  Plan: Continue per plan of care        Precautions: Universal       Manuals 3/15 3/19 3/26 3/31 4/2 4/5       retrograde 4m 4m 4m 4m 4m 4m       Scar mob 3m 3m 3m 3m 3m 3m       IP joint mob 2m 2m 2m 2m 2m 2m       Wrist mob 2m 2m 2m 2m 2m 2m       Neuro Re-Ed                          HEP- Thumb/ wrist A/PROM reviewd, HB  TSS                                                                             Ther Ex             Wrist PROM 1x10 1x10 1x10 1x10 1x10 1x10       Thumb PROM 1x10 1x10 1x10 1x10 1x10 1x10       opposition 1x10 1x10 1x10 1x10 1x10 1x10       Wrist curls  Cone 3x10 #1 3x10 #1 3x10 #1 3x10 #1 3x10       Manipulation balls    Small 2m Small 2m        Clips w/ rotation      Red 3 sets                                 Ther Activity             FMC/opposition  Small pegs x30 Small pegs x30 Small pegs x30 Small pegs  x30                     Gait Training                                       Modalities             MHP L  10m 10m 10m 10m 10m 10m       CP post  6m  5m 5m 5m

## 2021-04-07 ENCOUNTER — APPOINTMENT (OUTPATIENT)
Dept: OCCUPATIONAL THERAPY | Facility: CLINIC | Age: 76
End: 2021-04-07
Payer: COMMERCIAL

## 2021-04-09 ENCOUNTER — OFFICE VISIT (OUTPATIENT)
Dept: OCCUPATIONAL THERAPY | Facility: CLINIC | Age: 76
End: 2021-04-09
Payer: COMMERCIAL

## 2021-04-09 DIAGNOSIS — M18.12 PRIMARY OSTEOARTHRITIS OF FIRST CARPOMETACARPAL JOINT OF LEFT HAND: Primary | ICD-10-CM

## 2021-04-09 PROCEDURE — 97140 MANUAL THERAPY 1/> REGIONS: CPT | Performed by: OCCUPATIONAL THERAPIST

## 2021-04-09 PROCEDURE — 97110 THERAPEUTIC EXERCISES: CPT | Performed by: OCCUPATIONAL THERAPIST

## 2021-04-09 NOTE — PROGRESS NOTES
Daily Note     Today's date: 2021  Patient name: Soila Parikh  : 1945  MRN: [id number]  Referring provider: Victor Hugo Dawson MD  Dx:   Encounter Diagnosis     ICD-10-CM    1  Primary osteoarthritis of first carpometacarpal joint of left hand  M18 12                   Subjective: It still hurts    Objective: See treatment diary below      Assessment: Tolerated treatment well  Patient continues to have pain CMC  Limited opposition pre tx  Pt  Able to oppose to tip of 5th with tx  Plan: Continue per plan of care        Precautions: Universal       Manuals 3/15 3/19 3/26 3/31 4/2 4/5 4/9      retrograde 4m 4m 4m 4m 4m 4m 4m      Scar mob 3m 3m 3m 3m 3m 3m 3m      IP joint mob 2m 2m 2m 2m 2m 2m 2m      Wrist mob 2m 2m 2m 2m 2m 2m 2m      Neuro Re-Ed                          HEP- Thumb/ wrist A/PROM reviewd, HB  TSS                                                                             Ther Ex             Wrist PROM 1x10 1x10 1x10 1x10 1x10 1x10 1x10      Thumb PROM 1x10 1x10 1x10 1x10 1x10 1x10 1x10      opposition 1x10 1x10 1x10 1x10 1x10 1x10 1x10      Wrist curls  Cone 3x10 #1 3x10 #1 3x10 #1 3x10 #1 3x10 #1 3x10      Manipulation balls    Small 2m Small 2m        Clips w/ rotation      Red 3 sets Red 3 sets                                Ther Activity             FMC/opposition  Small pegs x30 Small pegs x30 Small pegs x30 Small pegs  x30                     Gait Training                                       Modalities             MHP L  10m 10m 10m 10m 10m 10m 10m      CP post  6m  5m 5m 5m 5m

## 2021-04-14 ENCOUNTER — OFFICE VISIT (OUTPATIENT)
Dept: OCCUPATIONAL THERAPY | Facility: CLINIC | Age: 76
End: 2021-04-14
Payer: COMMERCIAL

## 2021-04-14 DIAGNOSIS — M18.12 PRIMARY OSTEOARTHRITIS OF FIRST CARPOMETACARPAL JOINT OF LEFT HAND: Primary | ICD-10-CM

## 2021-04-14 PROCEDURE — 97140 MANUAL THERAPY 1/> REGIONS: CPT | Performed by: OCCUPATIONAL THERAPIST

## 2021-04-14 PROCEDURE — 97110 THERAPEUTIC EXERCISES: CPT | Performed by: OCCUPATIONAL THERAPIST

## 2021-04-14 NOTE — PROGRESS NOTES
Daily Note     Today's date: 2021  Patient name: Tk Alexis  : 1945  MRN: [id number]  Referring provider: Renee Crane MD  Dx:   Encounter Diagnosis     ICD-10-CM    1  Primary osteoarthritis of first carpometacarpal joint of left hand  M18 12                   Subjective: I am having really sharp pains  Objective: See treatment diary below      Assessment: Tolerated treatment well  Patient continues to have pain CMC  Progressing well with functional ROM and strength despite complaints of pain  Plan: Continue per plan of care        Precautions: Universal       Manuals 3/15 3/19 3/26 3/31 4/2 4/5 4/9 4/14     retrograde 4m 4m 4m 4m 4m 4m 4m 4m     Scar mob 3m 3m 3m 3m 3m 3m 3m 3m     IP joint mob 2m 2m 2m 2m 2m 2m 2m 2m     Wrist mob 2m 2m 2m 2m 2m 2m 2m 2m     Neuro Re-Ed                          HEP- Thumb/ wrist A/PROM reviewd, HB  TSS                                                                             Ther Ex             Wrist PROM 1x10 1x10 1x10 1x10 1x10 1x10 1x10 1x10     Thumb PROM 1x10 1x10 1x10 1x10 1x10 1x10 1x10 1x10     opposition 1x10 1x10 1x10 1x10 1x10 1x10 1x10 1x10     Wrist curls  Cone 3x10 #1 3x10 #1 3x10 #1 3x10 #1 3x10 #1 3x10 #2 3x10     Manipulation balls    Small 2m Small 2m        Clips w/ rotation      Red 3 sets Red 3 sets Red 3 sets     P/s therabar        Red 3x10                  Ther Activity             FMC/opposition  Small pegs x30 Small pegs x30 Small pegs x30 Small pegs  x30                     Gait Training                                       Modalities             MHP L  10m 10m 10m 10m 10m 10m 10m 10m     CP post  6m  5m 5m 5m 5m 5m

## 2021-04-15 ENCOUNTER — IMMUNIZATIONS (OUTPATIENT)
Dept: FAMILY MEDICINE CLINIC | Facility: HOSPITAL | Age: 76
End: 2021-04-15

## 2021-04-15 DIAGNOSIS — Z23 ENCOUNTER FOR IMMUNIZATION: Primary | ICD-10-CM

## 2021-04-15 PROCEDURE — 0002A SARS-COV-2 / COVID-19 MRNA VACCINE (PFIZER-BIONTECH) 30 MCG: CPT

## 2021-04-15 PROCEDURE — 91300 SARS-COV-2 / COVID-19 MRNA VACCINE (PFIZER-BIONTECH) 30 MCG: CPT

## 2021-04-16 ENCOUNTER — APPOINTMENT (OUTPATIENT)
Dept: OCCUPATIONAL THERAPY | Facility: CLINIC | Age: 76
End: 2021-04-16
Payer: COMMERCIAL

## 2021-04-19 ENCOUNTER — TRANSCRIBE ORDERS (OUTPATIENT)
Dept: OCCUPATIONAL THERAPY | Facility: CLINIC | Age: 76
End: 2021-04-19

## 2021-04-19 ENCOUNTER — OFFICE VISIT (OUTPATIENT)
Dept: OCCUPATIONAL THERAPY | Facility: CLINIC | Age: 76
End: 2021-04-19
Payer: COMMERCIAL

## 2021-04-19 DIAGNOSIS — M18.12 PRIMARY OSTEOARTHRITIS OF FIRST CARPOMETACARPAL JOINT OF LEFT HAND: Primary | ICD-10-CM

## 2021-04-19 PROCEDURE — 97140 MANUAL THERAPY 1/> REGIONS: CPT | Performed by: OCCUPATIONAL THERAPIST

## 2021-04-19 PROCEDURE — 97110 THERAPEUTIC EXERCISES: CPT | Performed by: OCCUPATIONAL THERAPIST

## 2021-04-19 NOTE — LETTER
2021    Ravin Guillen MD  [address] [facility]  [address] [facility] [address]    Patient: Ivette Lucas   YOB: 1945   Date of Visit: 2021     Encounter Diagnosis     ICD-10-CM    1  Primary osteoarthritis of first carpometacarpal joint of left hand  M18 12        Dear Dr Dick Certain: Thank you for your recent referral of Ivette Lucas  Please review the attached evaluation summary from Falkner's recent visit  Please verify that you agree with the plan of care by signing the attached order  If you have any questions or concerns, please do not hesitate to call  I sincerely appreciate the opportunity to share in the care of one of your patients and hope to have another opportunity to work with you in the near future  Sincerely,    Dorita Roblero, OT      Referring Provider:     I certify that I have read the below Plan of Care and certify the need for these services furnished under this plan of treatment while under my care  Ravin Guillen MD  [address]  [address] [address] [facility] [address]  Via Fax: [phone]        Daily Note     Today's date: 2021  Patient name: Ivette Lucas  : 1945  MRN: [id number]  Referring provider: Laly Crespo MD  Dx:   Encounter Diagnosis     ICD-10-CM    1  Primary osteoarthritis of first carpometacarpal joint of left hand  M18 12                   Subjective: I see the doctor on Wednesday  Objective: See treatment diary below      Assessment: Tolerated treatment well  Patient continues to have pain CMC  Progressing well with functional ROM and strength despite complaints of pain  She is able to oppose to 5th digit with effort  L pinch strength-  4lbs  Will continue to address weakness and pain in L hand with treatment  Goals  STG( 6 visits)  1  Compliant with splint and HEP- met  2  Improve L thumb ROM by 10 degrees- met  3   Decrease L hand edema to WNLS- met  LTG( 12 visits)  1  L thumb AROM WNLs for [facility]- met  2  L thumb pinch strength >10lbs for ADS- progress  3  Improve FOTO score to predicted outcome or greater  - progress  4  Full opposition of thumb for ADLS  - progress    Plan: Continue per plan of care        Precautions: Universal       Manuals 3/15 3/19 3/26 3/31 4/2 4/5 4/9 4/14 4/19    retrograde 4m 4m 4m 4m 4m 4m 4m 4m 4m    Scar mob 3m 3m 3m 3m 3m 3m 3m 3m 3m    IP joint mob 2m 2m 2m 2m 2m 2m 2m 2m 2m    Wrist mob 2m 2m 2m 2m 2m 2m 2m 2m 2m    Neuro Re-Ed                          HEP- Thumb/ wrist A/PROM reviewd, HB  TSS                                                                             Ther Ex             Wrist PROM 1x10 1x10 1x10 1x10 1x10 1x10 1x10 1x10 1x10    Thumb PROM 1x10 1x10 1x10 1x10 1x10 1x10 1x10 1x10 1x10    opposition 1x10 1x10 1x10 1x10 1x10 1x10 1x10 1x10 1x10    Wrist curls  Cone 3x10 #1 3x10 #1 3x10 #1 3x10 #1 3x10 #1 3x10 #2 3x10 #2 3x10    Manipulation balls    Small 2m Small 2m        Clips w/ rotation      Red 3 sets Red 3 sets Red 3 sets Red 3 sets    P/s therabar        Red 3x10 Red 3x10                 Ther Activity             FMC/opposition  Small pegs x30 Small pegs x30 Small pegs x30 Small pegs  x30                     Gait Training                                       Modalities             MHP L  10m 10m 10m 10m 10m 10m 10m 10m 10m    CP post  6m  5m 5m 5m 5m 5m 5m

## 2021-04-19 NOTE — PROGRESS NOTES
Daily Note     Today's date: 2021  Patient name: Clarissa Blancas  : 1945  MRN: [id number]  Referring provider: Zachary Mills MD  Dx:   Encounter Diagnosis     ICD-10-CM    1  Primary osteoarthritis of first carpometacarpal joint of left hand  M18 12                   Subjective: I see the doctor on Wednesday  Objective: See treatment diary below      Assessment: Tolerated treatment well  Patient continues to have pain CMC  Progressing well with functional ROM and strength despite complaints of pain  She is able to oppose to 5th digit with effort  L pinch strength-  4lbs  Will continue to address weakness and pain in L hand with treatment  Goals  STG( 6 visits)  1  Compliant with splint and HEP- met  2  Improve L thumb ROM by 10 degrees- met  3  Decrease L hand edema to WNLS- met  LTG( 12 visits)  1  L thumb AROM WNLs for [facility]- met  2  L thumb pinch strength >10lbs for ADS- progress  3  Improve FOTO score to predicted outcome or greater  - progress  4  Full opposition of thumb for ADLS  - progress    Plan: Continue per plan of care        Precautions: Universal       Manuals 3/15 3/19 3/26 3/31 4/2 4/5 4/9 4/14 4/19    retrograde 4m 4m 4m 4m 4m 4m 4m 4m 4m    Scar mob 3m 3m 3m 3m 3m 3m 3m 3m 3m    IP joint mob 2m 2m 2m 2m 2m 2m 2m 2m 2m    Wrist mob 2m 2m 2m 2m 2m 2m 2m 2m 2m    Neuro Re-Ed                          HEP- Thumb/ wrist A/PROM reviewd, HB  TSS                                                                             Ther Ex             Wrist PROM 1x10 1x10 1x10 1x10 1x10 1x10 1x10 1x10 1x10    Thumb PROM 1x10 1x10 1x10 1x10 1x10 1x10 1x10 1x10 1x10    opposition 1x10 1x10 1x10 1x10 1x10 1x10 1x10 1x10 1x10    Wrist curls  Cone 3x10 #1 3x10 #1 3x10 #1 3x10 #1 3x10 #1 3x10 #2 3x10 #2 3x10    Manipulation balls    Small 2m Small 2m        Clips w/ rotation      Red 3 sets Red 3 sets Red 3 sets Red 3 sets    P/s therabar        Red 3x10 Red 3x10                 Ther Activity FMC/opposition  Small pegs x30 Small pegs x30 Small pegs x30 Small pegs  x30                     Gait Training                                       Modalities             MHP L  10m 10m 10m 10m 10m 10m 10m 10m 10m    CP post  6m  5m 5m 5m 5m 5m 5m

## 2021-04-21 ENCOUNTER — APPOINTMENT (OUTPATIENT)
Dept: OCCUPATIONAL THERAPY | Facility: CLINIC | Age: 76
End: 2021-04-21
Payer: COMMERCIAL

## 2021-04-23 ENCOUNTER — OFFICE VISIT (OUTPATIENT)
Dept: OCCUPATIONAL THERAPY | Facility: CLINIC | Age: 76
End: 2021-04-23
Payer: COMMERCIAL

## 2021-04-23 DIAGNOSIS — M18.12 PRIMARY OSTEOARTHRITIS OF FIRST CARPOMETACARPAL JOINT OF LEFT HAND: Primary | ICD-10-CM

## 2021-04-23 PROCEDURE — 97140 MANUAL THERAPY 1/> REGIONS: CPT | Performed by: OCCUPATIONAL THERAPIST

## 2021-04-23 PROCEDURE — 97110 THERAPEUTIC EXERCISES: CPT | Performed by: OCCUPATIONAL THERAPIST

## 2021-04-23 NOTE — PROGRESS NOTES
Daily Note     Today's date: 2021  Patient name: Micheal Hernandez  : 1945  MRN: [id number]  Referring provider: Jose Palomo MD  Dx:   Encounter Diagnosis     ICD-10-CM    1  Primary osteoarthritis of first carpometacarpal joint of left hand  M18 12                   Subjective: The doctor said to just keep doing my exercises  Objective: See treatment diary below      Assessment: Tolerated treatment well  Patient continues to have pain CMC  Progressing well with functional ROM and strength despite complaints of pain  She will transition to independent HEP at this time  MD has released patient from treatment  No further skilled OT needed at this time  Goals  STG( 6 visits)  1  Compliant with splint and HEP- met  2  Improve L thumb ROM by 10 degrees- met  3  Decrease L hand edema to WNLS- met  LTG( 12 visits)  1  L thumb AROM WNLs for [address]- met  2  L thumb pinch strength >10lbs for ADS- progress  3  Improve FOTO score to predicted outcome or greater  - progress  4  Full opposition of thumb for ADLS  - progress    Plan: D/C from OT     Precautions: Universal       Manuals 3/15 3/19 3/26 3/31 4/2 4/5 4/9 4/14 4/19 4/23   retrograde 4m 4m 4m 4m 4m 4m 4m 4m 4m 4m   Scar mob 3m 3m 3m 3m 3m 3m 3m 3m 3m 3m   IP joint mob 2m 2m 2m 2m 2m 2m 2m 2m 2m 2m   Wrist mob 2m 2m 2m 2m 2m 2m 2m 2m 2m 2m   Neuro Re-Ed                          HEP- Thumb/ wrist A/PROM reviewd, HB  TSS                                                                             Ther Ex             Wrist PROM 1x10 1x10 1x10 1x10 1x10 1x10 1x10 1x10 1x10 1x10   Thumb PROM 1x10 1x10 1x10 1x10 1x10 1x10 1x10 1x10 1x10 1x10   opposition 1x10 1x10 1x10 1x10 1x10 1x10 1x10 1x10 1x10 1x10   Wrist curls  Cone 3x10 #1 3x10 #1 3x10 #1 3x10 #1 3x10 #1 3x10 #2 3x10 #2 3x10 #2 3x10   Manipulation balls    Small 2m Small 2m        Clips w/ rotation      Red 3 sets Red 3 sets Red 3 sets Red 3 sets Red 3 sets   P/s therabar        Red 3x10 Red 3x10 Green 3x10                Ther Activity             FMC/opposition  Small pegs x30 Small pegs x30 Small pegs x30 Small pegs  x30                     Gait Training                                       Modalities             MHP L  10m 10m 10m 10m 10m 10m 10m 10m 10m 10m   CP post  6m  5m 5m 5m 5m 5m 5m 5m

## 2021-05-15 DIAGNOSIS — R10.12 LEFT UPPER QUADRANT PAIN: ICD-10-CM

## 2021-05-16 RX ORDER — AMITRIPTYLINE HYDROCHLORIDE 50 MG/1
TABLET, FILM COATED ORAL
Qty: 90 TABLET | Refills: 4 | Status: SHIPPED | OUTPATIENT
Start: 2021-05-16 | End: 2021-11-22

## 2021-05-23 ENCOUNTER — TELEPHONE (OUTPATIENT)
Dept: GASTROENTEROLOGY | Facility: CLINIC | Age: 76
End: 2021-05-23

## 2021-05-23 NOTE — TELEPHONE ENCOUNTER
ON CALL PHYSICIAN called by patient for ongoing symptoms of nausea and belching  On PPI BID and pepcid at night  Able to tolerate liquids without dysphagia or vomiting  Advised if symptoms worsen, she can go to ER  Otherwise, she will need follow up in the office - patient would like to see Dr Robe Phan or Dr Manjit Albright  I will forward to scheduling

## 2021-06-09 ENCOUNTER — OFFICE VISIT (OUTPATIENT)
Dept: GASTROENTEROLOGY | Facility: CLINIC | Age: 76
End: 2021-06-09
Payer: COMMERCIAL

## 2021-06-09 VITALS
BODY MASS INDEX: 27.29 KG/M2 | SYSTOLIC BLOOD PRESSURE: 122 MMHG | DIASTOLIC BLOOD PRESSURE: 78 MMHG | HEART RATE: 88 BPM | WEIGHT: 139 LBS | HEIGHT: 60 IN

## 2021-06-09 DIAGNOSIS — K31.84 GASTROPARESIS: ICD-10-CM

## 2021-06-09 DIAGNOSIS — K21.9 CHRONIC GERD: ICD-10-CM

## 2021-06-09 DIAGNOSIS — Z98.890 HISTORY OF NISSEN FUNDOPLICATION: ICD-10-CM

## 2021-06-09 DIAGNOSIS — K21.9 CHRONIC GERD: Primary | ICD-10-CM

## 2021-06-09 DIAGNOSIS — R11.0 NAUSEA: ICD-10-CM

## 2021-06-09 DIAGNOSIS — R13.19 ESOPHAGEAL DYSPHAGIA: ICD-10-CM

## 2021-06-09 DIAGNOSIS — Z12.11 COLON CANCER SCREENING: ICD-10-CM

## 2021-06-09 PROCEDURE — 3008F BODY MASS INDEX DOCD: CPT | Performed by: INTERNAL MEDICINE

## 2021-06-09 PROCEDURE — 1160F RVW MEDS BY RX/DR IN RCRD: CPT | Performed by: INTERNAL MEDICINE

## 2021-06-09 PROCEDURE — 99214 OFFICE O/P EST MOD 30 MIN: CPT | Performed by: INTERNAL MEDICINE

## 2021-06-09 PROCEDURE — 1036F TOBACCO NON-USER: CPT | Performed by: INTERNAL MEDICINE

## 2021-06-09 RX ORDER — ONDANSETRON 4 MG/1
4 TABLET, FILM COATED ORAL EVERY 12 HOURS PRN
Qty: 30 TABLET | Refills: 3 | OUTPATIENT
Start: 2021-06-09 | End: 2022-02-20

## 2021-06-09 NOTE — LETTER
Hanny 10, 2021     Freddy Porter DO  P O  [address]    Patient: Marilyn Booker   YOB: 1945   Date of Visit: 6/9/2021       Dear Dr Kings Gerco Recipients: Thank you for referring Cloretkylee Budds to me for evaluation  Below are my notes for this consultation  If you have questions, please do not hesitate to call me  I look forward to following your patient along with you  Sincerely,        Joanna Ibrahim MD        CC: No Recipients  Joanna Ibrahim MD  6/10/2021 11:55 AM  Incomplete  [facility]and[facility] - Outpatient Follow-up Note  Marilyn Booker 77 y o  female MRN: [id number]  Encounter: [id number]    ASSESSMENT AND PLAN:      1  Chronic GERD  -- patient with ongoing issues with gastroesophageal reflux  Symptoms are manifested by belching and burning sensation in the esophageal area  Patient did have an  Upper GI series within the last 2 years which actually did demonstrate the reflux    - dexlansoprazole (Dexilant) 60 MG capsule; Take 1 capsule (60 mg total) by mouth daily  Dispense: 30 capsule; Refill: 5    - could potentially benefit from baclofen  - probably not a good candidate for repeat anti-reflux surgery    - might consider repeat manometry and evaluation in the network by  Dr Nohelia Kenny    2  Esophageal dysphagia  -- old patient without significant stricturing  Had esophageal dilations in the past without relief of symptoms  Patient has dysphagia for solids greater than liquids but even will have some pain and discomfort with swallowing water  Suspect a motility problem than structural issue  - FL barium swallow; Future    -EGD - at -[facility]- possible dilation but would like to avoid as the have not really helped the patient in the past      3  Gastroparesis  --- age a patient with previous history of abnormal gastric emptying study    Does feel a sense of early satiety   -  Had been on Reglan but there was concerns about long-term safety  Had pyloric Botox administered at [facility] which was not successful  Might be best to just repeat gastric emptying study at this time    - NM gastric emptying; Future    4  History of Nissen fundoplication  -- patient had anti-reflux surgery [facility] 2008  Was well for about 2 years but then began having multiple problems since that time    5  Colon cancer screening  -- patient up-to-date  Last examination 2013 -she would be due in 2 years    6  Nausea  -- patient does report nausea is been a recent symptom for her this is relatively new  Has had Zofran    In the past  - ondansetron (ZOFRAN) 4 mg tablet; Take 1 tablet (4 mg total) by mouth every 12 (twelve) hours as needed for nausea or vomiting  Dispense: 30 tablet; Refill: 3      Followup Appointment: 3 mo   ______________________________________________________________________    Chief Complaint   Patient presents with    Nausea, belching, abd  pain     HPI:  Patient returns to the office ongoing issues with heartburn and swallowing dysfunction if she has extensive evaluation many studies over the last 10 years  Patient had a successful Nissen chronic gastroesophageal reflux 2008  She was well for about 2 years then began having ongoing issues continued  Specifically patient had problems with dysphagia for solids and liquids  Currently she still has that problem  Patient reports significant problems with belching and upper abdominal chest gas  She has evaluated manometry around 6 years ago which showed clearly normal esophageal function somewhat LES relaxation probably related medication  She was seen by Don Pulliam  in [facility] and actually underwent a balloon esophageal dilation under his care  I personally done several Gaviria dilators on the patient for  symptomatic relief  None of these efforts however have been successful    Patient also has symptoms early satiety and 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 by the gastric emptying studies  Had been on Reglan with some very mild modest success  This had been discontinued a year side effects  Dr Angulo performed empiric Botox injection into the pyloric area time of endoscopy in the symptoms  She has has been on PPIs in the past   She has had double dose PPI for now just taking AcipHex once daily and famotidine evening  She has never  has really not any sustained  response to PPI on 1 occasion patient did test positive for Candida esophagitis  At last visit at [facility] patient was told by Courtney Nye -an expert in esophageal motility that the only thing that could perhaps fix her problem the a repeat Nissen procedure but risk of the procedure given her age and that it would be a redo procedure would be prohibitive  He advised that he did not have further therapeutic options to offer  Patient is under stress  She adopted her grandson is now 15years old   He is son patient's daughter had longstanding problems with addiction and substance abuse and unfortunately passed away from an overdose past year  The grandson's biologic father is was nursing home not seen his son years has threatening patient legal action  Patient has not weight loss  She does have chronic abdominal pain which is located left upper quadrant  She has had this years negative that  This is stable  Bowels regular  With the patient new symptoms is nausea at this time this is been a little bit different  Her upper GI may be escalated somewhat  About 5 years ago patient actually had GI series did show moderate reflux variant fluoroscopic  Patient is up-to-date with colorectal cancer screening  He has not had change bowel habit    Last 2000    Past Medical History:   Diagnosis Date    Anxiety     Arthritis     Asthma     Candida infection, esophageal (HCC)     Chronic pain     COPD (chronic obstructive pulmonary disease) (HCC)     Depression     Depression     Gastroparesis     Gastroparesis     GERD (gastroesophageal reflux disease)     Hypertension     Irritable bowel syndrome (IBS)     Migraines     Multiple thyroid nodules     Osteoporosis     Pneumonia     Psychiatric disorder      Past Surgical History:   Procedure Laterality Date    BACK SURGERY       SECTION      CHOLECYSTECTOMY      COLONOSCOPY  10/09/2013    ESOPHAGEAL DILATION       [address]    HIATAL HERNIA REPAIR      NISSEN FUNDOPLICATION      OK ESOPHAGOGASTRODUODENOSCOPY TRANSORAL DIAGNOSTIC N/A 4/3/2019    Procedure: ESOPHAGOGASTRODUODENOSCOPY (EGD);   Surgeon: Elzbieta Silverio MD;  Location:  MAIN OR;  Service: Gastroenterology    ROTATOR CUFF REPAIR      SHOULDER SURGERY      SPINAL FUSION      UPPER GASTROINTESTINAL ENDOSCOPY      US GUIDED THYROID BIOPSY      WISDOM TOOTH EXTRACTION       Social History     Substance and Sexual Activity   Alcohol Use No     Social History     Substance and Sexual Activity   Drug Use No     Social History     Tobacco Use   Smoking Status Former Smoker    Types: Cigarettes    Quit date: 10/2003    Years since quittin 7   Smokeless Tobacco Never Used     Family History   Problem Relation Age of Onset    Other Mother         Esophageal disorder    Stomach cancer Father     Cancer Father     Hypertension Maternal Aunt     Colon cancer Neg Hx     Colon polyps Neg Hx          Current Outpatient Medications:     albuterol (PROVENTIL HFA,VENTOLIN HFA) 90 mcg/act inhaler    amitriptyline (ELAVIL) 50 mg tablet    amLODIPine (NORVASC) 5 mg tablet    cetirizine (ZyrTEC) 10 MG chewable tablet    cholecalciferol (VITAMIN D3) 1,000 units tablet    famotidine (PEPCID) 40 MG tablet    ketoconazole (NIZORAL) 2 % cream    LORazepam (ATIVAN) 0 5 mg tablet    LUTEIN PO    multivitamin (THERAGRAN) TABS    Omega-3 Fat Ac-Cholecalciferol (DRY EYE OMEGA BENEFITS/VIT D-3 PO)    triamcinolone (KENALOG) 0 1 % ointment    colchicine (COLCRYS) 0 6 mg tablet   Dexilant 60 MG capsule    gabapentin (NEURONTIN) 100 mg capsule    ondansetron (ZOFRAN) 4 mg tablet    ondansetron (ZOFRAN) 4 mg tablet    other medication, see sig,    predniSONE 20 mg tablet    sucralfate (CARAFATE) 1 g tablet  Allergies   Allergen Reactions    Latex Hives    Doxycycline Hives    Cephalosporins GI Intolerance    Nsaids GI Intolerance    Penicillins GI Intolerance     Patient can only take levaquin and cipro    Prednisone GI Intolerance    Sulphasomidine GI Intolerance     Reviewed medications and allergies and updated as indicated  GI please see above   Psych positive for anxiety stress related issues  Rest of the 10 point of systems negative    PHYSICAL EXAM:    Blood pressure 122/78, pulse 88, height 5' (1 524 m), weight 63 kg (139 lb), not currently breastfeeding  Body mass index is 27 15 kg/m²  General Appearance: NAD, cooperative, alert  Eyes: Anicteric, conjunctiva pink  ENT:  Normocephalic, atraumatic, normal mucosa  Neck:  Supple, symmetrical, trachea midline  Resp:  Clear to auscultation bilaterally; no rales, rhonchi or wheezing; respirations unlabored   CV:  S1 S2, Regular rate and rhythm; no murmur, rub, or gallop  GI:  Soft, non-tender, non-distended; normal bowel sounds; no masses, no organomegaly   Rectal: Deferred  Musculoskeletal: No cyanosis, clubbing or edema  Normal ROM  Skin:  No jaundice, rashes, or lesions   Heme/Lymph: No palpable cervical lymphadenopathy  Psych: Normal affect, good eye contact  Neuro: No gross deficits, AAOx3        Crow Fairbanks MD  6/9/2021 12:25 PM  Sign when Signing Visit  [facility] - Outpatient Follow-up Note  Evert Monroe 77 y o  female MRN: [id number]  Encounter: [id number]    ASSESSMENT AND PLAN:      1  Chronic GERD  -- patient with ongoing issues with gastroesophageal reflux  Symptoms are manifested by belching and burning sensation in the esophageal area    Patient did have an  Upper GI series within the last 2 years which actually did demonstrate the reflux    - dexlansoprazole (Dexilant) 60 MG capsule; Take 1 capsule (60 mg total) by mouth daily  Dispense: 30 capsule; Refill: 5    - could potentially benefit from baclofen  - probably not a good candidate for repeat anti-reflux surgery    - might consider repeat manometry and evaluation in the network by  Dr Melanie Viera    2  Esophageal dysphagia  -- old patient without significant stricturing  Had esophageal dilations in the past without relief of symptoms  Patient has dysphagia for solids greater than liquids but even will have some pain and discomfort with swallowing water  Suspect a motility problem than structural issue  - FL barium swallow; Future    -EGD - at -[facility]- possible dilation but would like to avoid as the have not really helped the patient in the past      3  Gastroparesis  --- age a patient with previous history of abnormal gastric emptying study  Does feel a sense of early satiety   -  Had been on Reglan but there was concerns about long-term safety  Had pyloric Botox administered at [facility] which was not successful  Might be best to just repeat gastric emptying study at this time    - NM gastric emptying; Future    4  History of Nissen fundoplication  -- patient had anti-reflux surgery [facility] 2008  Was well for about 2 years but then began having multiple problems since that time    5  Colon cancer screening  -- patient up-to-date  Last examination 2013 2 she would be due in 2 years    6  Nausea  -- patient does report nausea is been a recent symptom for her this is relatively new  Has had Zofran    In the past  - ondansetron (ZOFRAN) 4 mg tablet; Take 1 tablet (4 mg total) by mouth every 12 (twelve) hours as needed for nausea or vomiting  Dispense: 30 tablet;  Refill: 3      Followup Appointment: 3 mo   ______________________________________________________________________    Chief Complaint   Patient presents with    Nausea, belching, abd  pain     HPI:      Historical Information   Past Medical History:   Diagnosis Date    Anxiety     Arthritis     Asthma     Candida infection, esophageal (HCC)     Chronic pain     COPD (chronic obstructive pulmonary disease) (HCC)     Depression     Depression     Gastroparesis     Gastroparesis     GERD (gastroesophageal reflux disease)     Hypertension     Irritable bowel syndrome (IBS)     Migraines     Multiple thyroid nodules     Osteoporosis     Pneumonia     Psychiatric disorder      Past Surgical History:   Procedure Laterality Date    BACK SURGERY       SECTION      CHOLECYSTECTOMY      COLONOSCOPY  10/09/2013    ESOPHAGEAL DILATION       [address]    HIATAL HERNIA REPAIR      NISSEN FUNDOPLICATION      DE ESOPHAGOGASTRODUODENOSCOPY TRANSORAL DIAGNOSTIC N/A 4/3/2019    Procedure: ESOPHAGOGASTRODUODENOSCOPY (EGD);   Surgeon: Baldemar Jc MD;  Location:  MAIN OR;  Service: Gastroenterology    ROTATOR CUFF REPAIR      SHOULDER SURGERY      SPINAL FUSION      UPPER GASTROINTESTINAL ENDOSCOPY      US GUIDED THYROID BIOPSY      WISDOM TOOTH EXTRACTION       Social History     Substance and Sexual Activity   Alcohol Use No     Social History     Substance and Sexual Activity   Drug Use No     Social History     Tobacco Use   Smoking Status Former Smoker    Types: Cigarettes    Quit date: 10/2003    Years since quittin 7   Smokeless Tobacco Never Used     Family History   Problem Relation Age of Onset    Other Mother         Esophageal disorder    Stomach cancer Father     Cancer Father     Hypertension Maternal Aunt     Colon cancer Neg Hx     Colon polyps Neg Hx          Current Outpatient Medications:     albuterol (PROVENTIL HFA,VENTOLIN HFA) 90 mcg/act inhaler    amitriptyline (ELAVIL) 50 mg tablet    amLODIPine (NORVASC) 5 mg tablet    cetirizine (ZyrTEC) 10 MG chewable tablet    cholecalciferol (VITAMIN D3) 1,000 units tablet    famotidine (PEPCID) 40 MG tablet    ketoconazole (NIZORAL) 2 % cream    LORazepam (ATIVAN) 0 5 mg tablet    LUTEIN PO    multivitamin (THERAGRAN) TABS    Omega-3 Fat Ac-Cholecalciferol (DRY EYE OMEGA BENEFITS/VIT D-3 PO)    triamcinolone (KENALOG) 0 1 % ointment    colchicine (COLCRYS) 0 6 mg tablet    dexlansoprazole (Dexilant) 60 MG capsule    gabapentin (NEURONTIN) 100 mg capsule    ondansetron (ZOFRAN) 4 mg tablet    ondansetron (ZOFRAN) 4 mg tablet    other medication, see sig,    predniSONE 20 mg tablet    sucralfate (CARAFATE) 1 g tablet  Allergies   Allergen Reactions    Latex Hives    Doxycycline Hives    Cephalosporins GI Intolerance    Nsaids GI Intolerance    Penicillins GI Intolerance     Patient can only take levaquin and cipro    Prednisone GI Intolerance    Sulphasomidine GI Intolerance     Reviewed medications and allergies and updated as indicated    PHYSICAL EXAM:    Blood pressure 122/78, pulse 88, height 5' (1 524 m), weight 63 kg (139 lb), not currently breastfeeding  Body mass index is 27 15 kg/m²  General Appearance: NAD, cooperative, alert  Eyes: Anicteric, PERRLA, EOMI  ENT:  Normocephalic, atraumatic, normal mucosa  Neck:  Supple, symmetrical, trachea midline  Resp:  Clear to auscultation bilaterally; no rales, rhonchi or wheezing; respirations unlabored   CV:  S1 S2, Regular rate and rhythm; no murmur, rub, or gallop  GI:  Soft, non-tender, non-distended; normal bowel sounds; no masses, no organomegaly   Rectal: Deferred  Musculoskeletal: No cyanosis, clubbing or edema  Normal ROM    Skin:  No jaundice, rashes, or lesions   Heme/Lymph: No palpable cervical lymphadenopathy  Psych: Normal affect, good eye contact  Neuro: No gross deficits, AAOx3    Lab Results:   Lab Results   Component Value Date    WBC 12 53 (H) 03/27/2019    HGB 14 0 03/27/2019    HCT 40 7 03/27/2019    MCV 95 03/27/2019     03/27/2019     Lab Results   Component Value Date     04/09/2015    K 4 4 03/27/2019     03/27/2019    CO2 27 03/27/2019    ANIONGAP 7 04/09/2015    BUN 32 (H) 03/27/2019    CREATININE 1 43 (H) 03/27/2019    GLUCOSE 110 04/09/2015    CALCIUM 9 3 03/27/2019    AST 38 03/27/2019    ALT 62 03/27/2019    ALKPHOS 68 03/27/2019    PROT 7 2 02/05/2015    BILITOT 0 4 02/05/2015    EGFR 37 03/27/2019     No results found for: IRON, TIBC, FERRITIN  Lab Results   Component Value Date    LIPASE 37 (L) 08/03/2016       Radiology Results:   No results found

## 2021-06-09 NOTE — TELEPHONE ENCOUNTER
Why does your doctor want you to have this procedure? Nausea, GERD    Do you have kidney disease?  no  If yes, are you on dialysis :     Have you had diverticulitis within the past 2 months? no    Are you diabetic?  no  If yes, insulin dependent: If yes, provide diabetic instructions sheet     Do take iron supplements?  no  If yes, instruct patient to hold iron supplement for 7 days prior    Are you on a blood thinner? no   Was the blood thinner sheet complete and faxed to cardiologist no  Plavix (clopidogrel), Coumadin (warfarin), Lovenox (enoxaparin), Xarelto (rivaroxaban), Pradaxa(dabigatran), Eliquis(apixaban) Savaysa/Lixiana (edoxapan)    Do you have an automatic implantable cardiac defibrillator (AICD)/pacemaker ([facility])? no  Was AICD/pacemaker sheet completed and faxed to cardiologist? no    Are you on home oxygen? no  If yes, continuous or nocturnal:     Have you been treated for MRSA, VRE or any communicable diseases? no    Heart attack, stroke, or stent within 3 months? no  Schedule at Hospital if within 3-6 months   Use nitroglycerin for chest pain in the last 6 months? no    History of organ  transplant?  no   If yes, notify Endo      History of neck/throat/tongue surgery or cancer? no  IF yes, notify Endo      Any problems with anesthesia in the past? no     Was stool C diff ordered?  no Stool specimen needs to be completed prior to procedure    Do have any facial or body piercings?no     Do you have a latex allergy? yes     Do have an allergy to metals? (Bravo study only) no     If pediatric patient, was consent faxed to pediatrician no     Patient rights reviewed yes    Instructions given at ov

## 2021-06-09 NOTE — PATIENT INSTRUCTIONS
[id number] - Outpatient Follow-up Note  Niharika Mcmahan 77 y o  female MRN: [id number]  Encounter: [id number]    ASSESSMENT AND PLAN:      1  Chronic GERD  -- patient with ongoing issues with gastroesophageal reflux  Symptoms are manifested by belching and burning sensation in the esophageal area  Patient did have an  Upper GI series within the last 2 years which actually did demonstrate the reflux    - dexlansoprazole (Dexilant) 60 MG capsule; Take 1 capsule (60 mg total) by mouth daily  Dispense: 30 capsule; Refill: 5    - could potentially benefit from baclofen  - probably not a good candidate for repeat anti-reflux surgery    - might consider repeat manometry and evaluation in the network by  Dr Jessica Aranda    2  Esophageal dysphagia  -- old patient without significant stricturing  Had esophageal dilations in the past without relief of symptoms  Patient has dysphagia for solids greater than liquids but even will have some pain and discomfort with swallowing water  Suspect a motility problem than structural issue  - FL barium swallow; Future    -EGD - at -[facility]- possible dilation but would like to avoid as the have not really helped the patient in the past      3  Gastroparesis  --- age a patient with previous history of abnormal gastric emptying study  Does feel a sense of early satiety   -  Had been on Reglan but there was concerns about long-term safety  Had pyloric Botox administered at [facility] which was not successful  Might be best to just repeat gastric emptying study at this time    - NM gastric emptying; Future    4  History of Nissen fundoplication  -- patient had anti-reflux surgery [facility] 2008  Was well for about 2 years but then began having multiple problems since that time    5  Colon cancer screening  -- patient up-to-date  Last examination 2013 2 she would be due in 2 years    6   Nausea  -- patient does report nausea is been a recent symptom for her this is relatively new  Has had Zofran    In the past  - ondansetron (ZOFRAN) 4 mg tablet; Take 1 tablet (4 mg total) by mouth every 12 (twelve) hours as needed for nausea or vomiting  Dispense: 30 tablet;  Refill: 3      Followup Appointment: 3 mo

## 2021-06-09 NOTE — PROGRESS NOTES
[id number] - Outpatient Follow-up Note  Radha Blood 77 y o  female MRN: [id number]  Encounter: [id number]    ASSESSMENT AND PLAN:      1  Chronic GERD  -- patient with ongoing issues with gastroesophageal reflux  Symptoms are manifested by belching and burning sensation in the esophageal area  Patient did have an  Upper GI series within the last 2 years which actually did demonstrate the reflux    - dexlansoprazole (Dexilant) 60 MG capsule; Take 1 capsule (60 mg total) by mouth daily  Dispense: 30 capsule; Refill: 5    - could potentially benefit from baclofen  - probably not a good candidate for repeat anti-reflux surgery    - might consider repeat manometry and evaluation in the network by  Dr Naima Linares    2  Esophageal dysphagia  -- old patient without significant stricturing  Had esophageal dilations in the past without relief of symptoms  Patient has dysphagia for solids greater than liquids but even will have some pain and discomfort with swallowing water  Suspect a motility problem than structural issue  - FL barium swallow; Future    -EGD - at [facility]- possible dilation but would like to avoid as the have not really helped the patient in the past      3  Gastroparesis  --- age a patient with previous history of abnormal gastric emptying study  Does feel a sense of early satiety   -  Had been on Reglan but there was concerns about long-term safety  Had pyloric Botox administered at [place] which was not successful  Might be best to just repeat gastric emptying study at this time    - NM gastric emptying; Future    4  History of Nissen fundoplication  -- patient had anti-reflux surgery [facility] 2008  Was well for about 2 years but then began having multiple problems since that time    5  Colon cancer screening  -- patient up-to-date  Last examination 2013 -she would be due in 2 years    6   Nausea  -- patient does report nausea is been a recent symptom for her this is relatively new  Has had Zofran    In the past  - ondansetron (ZOFRAN) 4 mg tablet; Take 1 tablet (4 mg total) by mouth every 12 (twelve) hours as needed for nausea or vomiting  Dispense: 30 tablet; Refill: 3      Followup Appointment: 3 mo   ______________________________________________________________________    Chief Complaint   Patient presents with    Nausea, belching, abd  pain     HPI:  Patient returns to the office ongoing issues with heartburn and swallowing dysfunction if she has extensive evaluation many studies over the last 10 years  Patient had a successful Nissen chronic gastroesophageal reflux 2008  She was well for about 2 years then began having ongoing issues continued  Specifically patient had problems with dysphagia for solids and liquids  Currently she still has that problem  Patient reports significant problems with belching and upper abdominal chest gas  She has evaluated manometry around 6 years ago which showed clearly normal esophageal function somewhat LES relaxation probably related medication  She was seen by Mirta Morales  in [facility] and actually underwent a balloon esophageal dilation under his care  I personally done several Gaviria dilators on the patient for  symptomatic relief  None of these efforts however have been successful  Patient also has symptoms early satiety and 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 by the gastric emptying studies  Had been on Reglan with some very mild modest success  This had been discontinued a year side effects  Dr Angulo performed empiric Botox injection into the pyloric area time of endoscopy in the symptoms  She has has been on PPIs in the past   She has had double dose PPI for now just taking AcipHex once daily and famotidine evening  She has never  has really not any sustained  response to PPI on 1 occasion patient did test positive for Candida esophagitis    At last visit at [facility] patient was told by Abdi Maldonado -an expert in esophageal motility that the only thing that could perhaps fix her problem the a repeat Nissen procedure but risk of the procedure given her age and that it would be a redo procedure would be prohibitive  He advised that he did not have further therapeutic options to offer  Patient is under stress  She adopted her grandson is now 15years old   He is son patient's daughter had longstanding problems with addiction and substance abuse and unfortunately passed away from an overdose past year  The grandson's biologic father is was group home not seen his son years has threatening patient legal action  Patient has not weight loss  She does have chronic abdominal pain which is located left upper quadrant  She has had this years negative that  This is stable  Bowels regular  With the patient new symptoms is nausea at this time this is been a little bit different  Her upper GI may be escalated somewhat  About 5 years ago patient actually had GI series did show moderate reflux variant fluoroscopic  Patient is up-to-date with colorectal cancer screening  He has not had change bowel habit    Last     Past Medical History:   Diagnosis Date    Anxiety     Arthritis     Asthma     Candida infection, esophageal (HCC)     Chronic pain     COPD (chronic obstructive pulmonary disease) (HCC)     Depression     Depression     Gastroparesis     Gastroparesis     GERD (gastroesophageal reflux disease)     Hypertension     Irritable bowel syndrome (IBS)     Migraines     Multiple thyroid nodules     Osteoporosis     Pneumonia     Psychiatric disorder      Past Surgical History:   Procedure Laterality Date    BACK SURGERY       SECTION      CHOLECYSTECTOMY      COLONOSCOPY  10/09/2013    ESOPHAGEAL DILATION       [id number]    HIATAL HERNIA REPAIR      NISSEN FUNDOPLICATION      NH ESOPHAGOGASTRODUODENOSCOPY TRANSORAL DIAGNOSTIC N/A 4/3/2019 Procedure: ESOPHAGOGASTRODUODENOSCOPY (EGD);   Surgeon: Warden Lavell MD;  Location: [facility];  Service: Gastroenterology    ROTATOR CUFF REPAIR      SHOULDER SURGERY      SPINAL FUSION      UPPER GASTROINTESTINAL ENDOSCOPY      US GUIDED THYROID BIOPSY      WISDOM TOOTH EXTRACTION       Social History     Substance and Sexual Activity   Alcohol Use No     Social History     Substance and Sexual Activity   Drug Use No     Social History     Tobacco Use   Smoking Status Former Smoker    Types: Cigarettes    Quit date: 10/2003    Years since quittin 7   Smokeless Tobacco Never Used     Family History   Problem Relation Age of Onset    Other Mother         Esophageal disorder    Stomach cancer Father     Cancer Father     Hypertension Maternal Aunt     Colon cancer Neg Hx     Colon polyps Neg Hx          Current Outpatient Medications:     albuterol (PROVENTIL HFA,VENTOLIN HFA) 90 mcg/act inhaler    amitriptyline (ELAVIL) 50 mg tablet    amLODIPine (NORVASC) 5 mg tablet    cetirizine (ZyrTEC) 10 MG chewable tablet    cholecalciferol (VITAMIN D3) 1,000 units tablet    famotidine (PEPCID) 40 MG tablet    ketoconazole (NIZORAL) 2 % cream    LORazepam (ATIVAN) 0 5 mg tablet    LUTEIN PO    multivitamin (THERAGRAN) TABS    Omega-3 Fat Ac-Cholecalciferol (DRY EYE OMEGA BENEFITS/VIT D-3 PO)    triamcinolone (KENALOG) 0 1 % ointment    colchicine (COLCRYS) 0 6 mg tablet    Dexilant 60 MG capsule    gabapentin (NEURONTIN) 100 mg capsule    ondansetron (ZOFRAN) 4 mg tablet    ondansetron (ZOFRAN) 4 mg tablet    other medication, see sig,    predniSONE 20 mg tablet    sucralfate (CARAFATE) 1 g tablet  Allergies   Allergen Reactions    Latex Hives    Doxycycline Hives    Cephalosporins GI Intolerance    Nsaids GI Intolerance    Penicillins GI Intolerance     Patient can only take levaquin and cipro    Prednisone GI Intolerance    Sulphasomidine GI Intolerance     Reviewed medications and allergies and updated as indicated  GI please see above   Psych positive for anxiety stress related issues  Rest of the 10 point of systems negative    PHYSICAL EXAM:    Blood pressure 122/78, pulse 88, height 5' (1 524 m), weight 63 kg (139 lb), not currently breastfeeding  Body mass index is 27 15 kg/m²  General Appearance: NAD, cooperative, alert  Eyes: Anicteric, conjunctiva pink  ENT:  Normocephalic, atraumatic, normal mucosa  Neck:  Supple, symmetrical, trachea midline  Resp:  Clear to auscultation bilaterally; no rales, rhonchi or wheezing; respirations unlabored   CV:  S1 S2, Regular rate and rhythm; no murmur, rub, or gallop  GI:  Soft, non-tender, non-distended; normal bowel sounds; no masses, no organomegaly   Rectal: Deferred  Musculoskeletal: No cyanosis, clubbing or edema  Normal ROM    Skin:  No jaundice, rashes, or lesions   Heme/Lymph: No palpable cervical lymphadenopathy  Psych: Normal affect, good eye contact  Neuro: No gross deficits, AAOx3

## 2021-06-10 PROBLEM — R11.0 NAUSEA: Status: ACTIVE | Noted: 2021-06-10

## 2021-06-24 ENCOUNTER — HOSPITAL ENCOUNTER (OUTPATIENT)
Dept: NUCLEAR MEDICINE | Facility: HOSPITAL | Age: 76
Discharge: HOME | End: 2021-06-24
Attending: INTERNAL MEDICINE
Payer: COMMERCIAL

## 2021-06-24 DIAGNOSIS — K31.84 GASTROPARESIS: ICD-10-CM

## 2021-06-24 PROCEDURE — 78264 GASTRIC EMPTYING IMG STUDY: CPT

## 2021-06-24 PROCEDURE — G1004 CDSM NDSC: HCPCS

## 2021-06-24 PROCEDURE — A9541 TC99M SULFUR COLLOID: HCPCS

## 2021-06-28 ENCOUNTER — TELEPHONE (OUTPATIENT)
Dept: GASTROENTEROLOGY | Facility: CLINIC | Age: 76
End: 2021-06-28

## 2021-06-28 DIAGNOSIS — K31.84 GASTROPARESIS: Primary | ICD-10-CM

## 2021-06-28 RX ORDER — METOCLOPRAMIDE 10 MG/1
10 TABLET ORAL
Qty: 90 TABLET | Refills: 2 | Status: SHIPPED | OUTPATIENT
Start: 2021-06-28

## 2021-06-28 NOTE — RESULT ENCOUNTER NOTE
I called the patient left voice message  Patient with fairly severe gastro paresis  Will try Reglan 10 mg twice a day before meals  Can increase to 3 times a day  Prescription sent in electronically

## 2021-06-28 NOTE — TELEPHONE ENCOUNTER
I called the patient and left a voice message  Patient does appear to have severe gastro paresis --50% material and the at 4:00 a m  where it should be over 90%  Advise Reglan 10 mg 1-1/2 hour a c  twice a day  Can increase to see ID  Will prescribe  If the patient has side effects she does discontinued

## 2021-06-30 NOTE — TELEPHONE ENCOUNTER
Pt has ques about new med Reglan and whether or not to continue other med Rabeprazole  # [phone]

## 2021-07-16 DIAGNOSIS — K21.9 CHRONIC GERD: Primary | ICD-10-CM

## 2021-07-16 RX ORDER — RABEPRAZOLE SODIUM 20 MG/1
TABLET, DELAYED RELEASE ORAL
Qty: 60 TABLET | Refills: 6 | Status: SHIPPED | OUTPATIENT
Start: 2021-07-16

## 2021-07-16 NOTE — TELEPHONE ENCOUNTER
Pt states she tried to get refill of Rabeprazole but [facility] said no more refills   If ques -[phone]   (sending to Clinical/looks like interface req is in to Raul)

## 2021-07-16 NOTE — TELEPHONE ENCOUNTER
Spoke with patient  She gets her rabeprazole BID with a coupon from [facility] [place]  The rx was cancelled (probably as result of ordering Dexilant instead)  The patient had called after being prescribed the Reglan and Dexilant by Gerardo Holden and asked if she was to continue the rabeprazole and I told her yes  Today when she requested this refill I noted that Gerardo Holden had sent in rx for Dexilant  Pt states [facility] said her insurance would not cover and they would let us know it needed PA  This did not occur which is why the pt had called asking about taking rabeprazole and Reglan together  Pt has continued the rabeprazole BID and only has 2 pills left  I am requesting refill so patient has the medication and will also forward message to Dr Esperanza Swain to advise if okay to continue with Rabeprazole  Patient also states that the Reglan is not really helping  Is scheduled for EGD 7/28/2021

## 2021-07-17 NOTE — TELEPHONE ENCOUNTER
I called the patient and left a voice mail --   yes continue BID Rabeprazole - stop reglan ( given for slow gastric emptying )  if not helping

## 2021-07-20 NOTE — TELEPHONE ENCOUNTER
Pt left  mssg stating she has pain in the center of her chest/when she eats it gets worse; has endo the 28th and Jose Carreon is aware; feels like food does not go down; is worse the last couple days  # [phone]

## 2021-07-25 ENCOUNTER — HOSPITAL ENCOUNTER (EMERGENCY)
Facility: HOSPITAL | Age: 76
Discharge: HOME | End: 2021-07-26
Attending: EMERGENCY MEDICINE | Admitting: EMERGENCY MEDICINE
Payer: COMMERCIAL

## 2021-07-25 ENCOUNTER — APPOINTMENT (EMERGENCY)
Dept: CT IMAGING | Facility: HOSPITAL | Age: 76
End: 2021-07-25
Payer: COMMERCIAL

## 2021-07-25 DIAGNOSIS — K11.20 PAROTITIS: Primary | ICD-10-CM

## 2021-07-25 DIAGNOSIS — E04.1 THYROID NODULE: ICD-10-CM

## 2021-07-25 LAB
ANION GAP SERPL CALCULATED.3IONS-SCNC: 12 MMOL/L (ref 4–13)
BASOPHILS # BLD AUTO: 0.05 THOUSANDS/ΜL (ref 0–0.1)
BASOPHILS NFR BLD AUTO: 1 % (ref 0–1)
BUN SERPL-MCNC: 22 MG/DL (ref 5–25)
CALCIUM SERPL-MCNC: 9.3 MG/DL (ref 8.3–10.1)
CHLORIDE SERPL-SCNC: 105 MMOL/L (ref 100–108)
CO2 SERPL-SCNC: 25 MMOL/L (ref 21–32)
CREAT SERPL-MCNC: 1.44 MG/DL (ref 0.6–1.3)
EOSINOPHIL # BLD AUTO: 0.21 THOUSAND/ΜL (ref 0–0.61)
EOSINOPHIL NFR BLD AUTO: 2 % (ref 0–6)
ERYTHROCYTE [DISTWIDTH] IN BLOOD BY AUTOMATED COUNT: 13.6 % (ref 11.6–15.1)
GFR SERPL CREATININE-BSD FRML MDRD: 36 ML/MIN/1.73SQ M
GLUCOSE SERPL-MCNC: 159 MG/DL (ref 65–140)
HCT VFR BLD AUTO: 37.9 % (ref 34.8–46.1)
HGB BLD-MCNC: 12.6 G/DL (ref 11.5–15.4)
IMM GRANULOCYTES # BLD AUTO: 0.04 THOUSAND/UL (ref 0–0.2)
IMM GRANULOCYTES NFR BLD AUTO: 0 % (ref 0–2)
LYMPHOCYTES # BLD AUTO: 3.25 THOUSANDS/ΜL (ref 0.6–4.47)
LYMPHOCYTES NFR BLD AUTO: 35 % (ref 14–44)
MCH RBC QN AUTO: 30.7 PG (ref 26.8–34.3)
MCHC RBC AUTO-ENTMCNC: 33.2 G/DL (ref 31.4–37.4)
MCV RBC AUTO: 92 FL (ref 82–98)
MONOCYTES # BLD AUTO: 0.5 THOUSAND/ΜL (ref 0.17–1.22)
MONOCYTES NFR BLD AUTO: 5 % (ref 4–12)
NEUTROPHILS # BLD AUTO: 5.34 THOUSANDS/ΜL (ref 1.85–7.62)
NEUTS SEG NFR BLD AUTO: 57 % (ref 43–75)
NRBC BLD AUTO-RTO: 0 /100 WBCS
PLATELET # BLD AUTO: 249 THOUSANDS/UL (ref 149–390)
PMV BLD AUTO: 9 FL (ref 8.9–12.7)
POTASSIUM SERPL-SCNC: 3.9 MMOL/L (ref 3.5–5.3)
RBC # BLD AUTO: 4.11 MILLION/UL (ref 3.81–5.12)
SODIUM SERPL-SCNC: 142 MMOL/L (ref 136–145)
WBC # BLD AUTO: 9.39 THOUSAND/UL (ref 4.31–10.16)

## 2021-07-25 PROCEDURE — 99284 EMERGENCY DEPT VISIT MOD MDM: CPT | Performed by: EMERGENCY MEDICINE

## 2021-07-25 PROCEDURE — 70491 CT SOFT TISSUE NECK W/DYE: CPT

## 2021-07-25 PROCEDURE — 36415 COLL VENOUS BLD VENIPUNCTURE: CPT | Performed by: EMERGENCY MEDICINE

## 2021-07-25 PROCEDURE — 99284 EMERGENCY DEPT VISIT MOD MDM: CPT

## 2021-07-25 PROCEDURE — 85025 COMPLETE CBC W/AUTO DIFF WBC: CPT | Performed by: EMERGENCY MEDICINE

## 2021-07-25 PROCEDURE — 80048 BASIC METABOLIC PNL TOTAL CA: CPT | Performed by: EMERGENCY MEDICINE

## 2021-07-25 RX ADMIN — IOHEXOL 100 ML: 350 INJECTION, SOLUTION INTRAVENOUS at 23:27

## 2021-07-26 VITALS
DIASTOLIC BLOOD PRESSURE: 93 MMHG | BODY MASS INDEX: 27.48 KG/M2 | HEART RATE: 78 BPM | WEIGHT: 140 LBS | OXYGEN SATURATION: 98 % | RESPIRATION RATE: 18 BRPM | HEIGHT: 60 IN | SYSTOLIC BLOOD PRESSURE: 182 MMHG | TEMPERATURE: 98.4 F

## 2021-07-26 RX ORDER — CIPROFLOXACIN 500 MG/1
500 TABLET, FILM COATED ORAL ONCE
Status: COMPLETED | OUTPATIENT
Start: 2021-07-26 | End: 2021-07-26

## 2021-07-26 RX ORDER — CIPROFLOXACIN 500 MG/1
500 TABLET, FILM COATED ORAL 2 TIMES DAILY
Qty: 14 TABLET | Refills: 0 | Status: SHIPPED | OUTPATIENT
Start: 2021-07-26 | End: 2021-08-02

## 2021-07-26 RX ADMIN — CIPROFLOXACIN HYDROCHLORIDE 500 MG: 500 TABLET, FILM COATED ORAL at 00:33

## 2021-07-26 NOTE — DISCHARGE INSTRUCTIONS
THYROID GLAND: 1 5 cm right thyroid lobe nodule  Incidental discovery of one or more thyroid nodule(s) measuring more than 1 5 cm and without suspicious features is noted in this patient who is above 28years old; according to guidelines published in the February 2015 white paper on incidental thyroid nodules in the Journal of the [employer] of Radiology Randi Rodriguez), further characterization with thyroid ultrasound is recommended

## 2021-07-26 NOTE — ED PROVIDER NOTES
History  Chief Complaint   Patient presents with    Facial Swelling     Pt c/o of left sided facial swelling thta started after eating  58-year-old female presents for evaluation of left-sided neck swelling that started shortly prior to arrival   Patient states she had dinner with her son and grandson but did not eat anything out of the ordinary  Shortly after dinner, she went into the bathroom and noticed in the mirror that the left side of her neck was swollen  She denies associated pain at that time but states over the last hour it has progressively become tender to touch  Denies having similar symptoms previously  Denies difficulty breathing or tongue swelling  Slightly painful to palpation  No specific alleviating factors  Prior to Admission Medications   Prescriptions Last Dose Informant Patient Reported? Taking? Dexilant 60 MG capsule   No No   Sig: TAKE 1 CAPSULE BY MOUTH EVERY DAY   LORazepam (ATIVAN) 0 5 mg tablet  Self Yes No   Sig: Take 0 5 mg by mouth daily at bedtime   LUTEIN PO  Self Yes No   Sig: Take by mouth daily   Omega-3 Fat Ac-Cholecalciferol (DRY EYE OMEGA BENEFITS/VIT D-3 PO)  Self Yes No   Sig: Take by mouth daily   RABEprazole (ACIPHEX) 20 MG tablet   No No   Sig: TAKE ONE TABLET BY MOUTH TWICE A DAY   albuterol (PROVENTIL HFA,VENTOLIN HFA) 90 mcg/act inhaler  Self Yes No   Sig: Inhale 2 puffs every 6 (six) hours as needed for wheezing   amLODIPine (NORVASC) 5 mg tablet  Self Yes No   Sig: Take 5 mg by mouth daily  amitriptyline (ELAVIL) 50 mg tablet  Self No No   Sig: TAKE 1 TABLET BY MOUTH DAILY AT BEDTIME   cetirizine (ZyrTEC) 10 MG chewable tablet  Self Yes No   Sig: Chew 10 mg daily     cholecalciferol (VITAMIN D3) 1,000 units tablet  Self Yes No   Sig: Take 5,000 Units by mouth daily   colchicine (COLCRYS) 0 6 mg tablet  Self Yes No   Sig: Take 0 6 mg by mouth daily   famotidine (PEPCID) 40 MG tablet  Self No No   Sig: TAKE 1 TABLET BY MOUTH EVERYDAY AT BEDTIME gabapentin (NEURONTIN) 100 mg capsule  Self No No   Sig: Take 1 capsule (100 mg total) by mouth 3 (three) times a day   Patient not taking: Reported on 10/16/2020   ketoconazole (NIZORAL) 2 % cream  Self Yes No   Sig: as needed    metoclopramide (REGLAN) 10 mg tablet   No No   Sig: Take 1 tablet (10 mg total) by mouth 3 (three) times a day before meals For the 1st 1-2 weeks just take twice a day before meals  multivitamin (THERAGRAN) TABS  Self Yes No   Sig: Take 1 tablet by mouth daily  ondansetron (ZOFRAN) 4 mg tablet  Self No No   Sig: Take 1 tablet (4 mg total) by mouth every 8 (eight) hours as needed for nausea or vomiting   Patient not taking: Reported on 6/9/2021   ondansetron (ZOFRAN) 4 mg tablet   No No   Sig: Take 1 tablet (4 mg total) by mouth every 12 (twelve) hours as needed for nausea or vomiting   other medication, see sig,  Self Yes No   Sig: Medication/product name: preservision   Strength:   Sig (include dose, route, frequency):   predniSONE 20 mg tablet  Self Yes No   Sig: Take by mouth daily For five days for gout flare   sucralfate (CARAFATE) 1 g tablet  Self No No   Sig: Take 1 tablet (1 g total) by mouth 4 (four) times a day as needed (nausea and vomiting)   Patient not taking: Reported on 6/9/2021   triamcinolone (KENALOG) 0 1 % ointment  Self No No   Sig: Apply topically to all affected areas two times a day  Avoid face, armpits, and groin  Patient taking differently: as needed Apply topically to all affected areas two times a day  Avoid face, armpits, and groin        Facility-Administered Medications: None       Past Medical History:   Diagnosis Date    Anxiety     Arthritis     Asthma     Candida infection, esophageal (HCC)     Chronic pain     COPD (chronic obstructive pulmonary disease) (HCC)     Depression     Depression     Gastroparesis     Gastroparesis     GERD (gastroesophageal reflux disease)     Hypertension     Irritable bowel syndrome (IBS)     Migraines  Multiple thyroid nodules     Osteoporosis     Pneumonia     Psychiatric disorder        Past Surgical History:   Procedure Laterality Date    BACK SURGERY       SECTION      CHOLECYSTECTOMY      COLONOSCOPY  10/09/2013    ESOPHAGEAL DILATION      [address]    HIATAL HERNIA REPAIR      NISSEN FUNDOPLICATION      [place] ESOPHAGOGASTRODUODENOSCOPY TRANSORAL DIAGNOSTIC N/A 4/3/2019    Procedure: ESOPHAGOGASTRODUODENOSCOPY (EGD); Surgeon: Lisandro Torres MD;  Location:  MAIN OR;  Service: Gastroenterology    ROTATOR CUFF REPAIR      SHOULDER SURGERY      SPINAL FUSION      UPPER GASTROINTESTINAL ENDOSCOPY      US GUIDED THYROID BIOPSY      WISDOM TOOTH EXTRACTION         Family History   Problem Relation Age of Onset    Other Mother         Esophageal disorder    Stomach cancer Father     Cancer Father     Hypertension Maternal Aunt     Colon cancer Neg Hx     Colon polyps Neg Hx      I have reviewed and agree with the history as documented  E-Cigarette/Vaping    E-Cigarette Use Never User      E-Cigarette/Vaping Substances    Nicotine No     THC No     CBD No     Flavoring No     Other No     Unknown No      Social History     Tobacco Use    Smoking status: Former Smoker     Types: Cigarettes     Quit date: 10/2003     Years since quittin 8    Smokeless tobacco: Never Used   Vaping Use    Vaping Use: Never used   Substance Use Topics    Alcohol use: No    Drug use: No       Review of Systems   Constitutional: Negative for fever  HENT: Positive for facial swelling  Negative for congestion, drooling, sinus pressure, sinus pain, sore throat, trouble swallowing and voice change  Left-sided neck swelling   Cardiovascular: Negative for chest pain  All other systems reviewed and are negative  Physical Exam  Physical Exam  Vitals and nursing note reviewed  Constitutional:       Appearance: She is well-developed     HENT:      Head: Normocephalic and atraumatic  Right Ear: External ear normal       Left Ear: External ear normal       Nose: Nose normal    Eyes:      General: No scleral icterus  Neck:      Comments: Mild left-sided anterior neck swelling  Mildly tender adenopathy  No sublingual or submandibular edema  Normal phonation  Cardiovascular:      Rate and Rhythm: Normal rate  Pulmonary:      Effort: Pulmonary effort is normal  No respiratory distress  Abdominal:      General: There is no distension  Musculoskeletal:         General: No deformity  Normal range of motion  Cervical back: Normal range of motion  Tenderness present  Skin:     Findings: No rash  Neurological:      General: No focal deficit present  Mental Status: She is alert and oriented to person, place, and time     Psychiatric:         Mood and Affect: Mood normal          Vital Signs  ED Triage Vitals   Temperature Pulse Respirations Blood Pressure SpO2   07/25/21 2243 07/25/21 2204 07/25/21 2204 07/25/21 2204 07/25/21 2204   98 4 °F (36 9 °C) 102 18 (!) 182/93 98 %      Temp Source Heart Rate Source Patient Position - Orthostatic VS BP Location FiO2 (%)   07/25/21 2243 07/25/21 2204 -- -- --   Oral Monitor         Pain Score       --                  Vitals:    07/25/21 2204 07/26/21 0026   BP: (!) 182/93    Pulse: 102 78         Visual Acuity      ED Medications  Medications   iohexol (OMNIPAQUE) 350 MG/ML injection (SINGLE-DOSE) 100 mL (100 mL Intravenous Given 7/25/21 2327)   ciprofloxacin (CIPRO) tablet 500 mg (500 mg Oral Given 7/26/21 0033)       Diagnostic Studies  Results Reviewed     Procedure Component Value Units Date/Time    Basic metabolic panel [[id number]]  (Abnormal) Collected: 07/25/21 2223    Lab Status: Final result Specimen: Blood from Line, Venous Updated: 07/25/21 2303     Sodium 142 mmol/L      Potassium 3 9 mmol/L      Chloride 105 mmol/L      CO2 25 mmol/L      ANION GAP 12 mmol/L      BUN 22 mg/dL      Creatinine 1 44 mg/dL Glucose 159 mg/dL      Calcium 9 3 mg/dL      eGFR 36 ml/min/1 73sq m     Narrative:      National Kidney Disease Foundation guidelines for Chronic Kidney Disease (CKD):     Stage 1 with normal or high GFR (GFR > 90 mL/min/1 73 square meters)    Stage 2 Mild CKD (GFR = 60-89 mL/min/1 73 square meters)    Stage 3A Moderate CKD (GFR = 45-59 mL/min/1 73 square meters)    Stage 3B Moderate CKD (GFR = 30-44 mL/min/1 73 square meters)    Stage 4 Severe CKD (GFR = 15-29 mL/min/1 73 square meters)    Stage 5 End Stage CKD (GFR <15 mL/min/1 73 square meters)  Note: GFR calculation is accurate only with a steady state creatinine    CBC and differential [[id number]] Collected: 07/25/21 2223    Lab Status: Final result Specimen: Blood from Line, Venous Updated: 07/25/21 2246     WBC 9 39 Thousand/uL      RBC 4 11 Million/uL      Hemoglobin 12 6 g/dL      Hematocrit 37 9 %      MCV 92 fL      MCH 30 7 pg      MCHC 33 2 g/dL      RDW 13 6 %      MPV 9 0 fL      Platelets 580 Thousands/uL      nRBC 0 /100 WBCs      Neutrophils Relative 57 %      Immat GRANS % 0 %      Lymphocytes Relative 35 %      Monocytes Relative 5 %      Eosinophils Relative 2 %      Basophils Relative 1 %      Neutrophils Absolute 5 34 Thousands/µL      Immature Grans Absolute 0 04 Thousand/uL      Lymphocytes Absolute 3 25 Thousands/µL      Monocytes Absolute 0 50 Thousand/µL      Eosinophils Absolute 0 21 Thousand/µL      Basophils Absolute 0 05 Thousands/µL                  CT soft tissue neck with contrast   Final Result by Luann Welch MD (07/26 0015)      Findings consistent with left parotitis  Workstation performed: [id number]                    Procedures  Procedures         ED Course                             SBIRT 20yo+      Most Recent Value   SBIRT (24 yo +)   In order to provide better care to our patients, we are screening all of our patients for alcohol and drug use  Would it be okay to ask you these screening questions? No Filed at: 07/25/2021 2324   Initial Alcohol Screen: US AUDIT-C    1  How often do you have a drink containing alcohol?  0 Filed at: 07/25/2021 2324   2  How many drinks containing alcohol do you have on a typical day you are drinking? 0 Filed at: 07/25/2021 2324   3b  FEMALE Any Age, or MALE 65+: How often do you have 4 or more drinks on one occassion? 0 Filed at: 07/25/2021 2324   Audit-C Score  0 Filed at: 07/25/2021 2324   SAMRA: How many times in the past year have you    Used an illegal drug or used a prescription medication for non-medical reasons? Never Filed at: 07/25/2021 2324                    MDM  Number of Diagnoses or Management Options  Parotitis: new and requires workup  Thyroid nodule: new and requires workup  Diagnosis management comments: 69-year-old female presenting with acute onset left sided adenopathy  Obtain labs and CT soft tissue neck  Discussed all results with patient's  Will administer antibiotics  Patient also states that she had a prior thyroid biopsy but has not been able to follow-up yet  Discussed importance of close follow-up  Will see PCP         Amount and/or Complexity of Data Reviewed  Clinical lab tests: ordered and reviewed  Tests in the radiology section of CPT®: ordered and reviewed  Tests in the medicine section of CPT®: reviewed and ordered  Decide to obtain previous medical records or to obtain history from someone other than the patient: yes  Review and summarize past medical records: yes        Disposition  Final diagnoses:   Parotitis   Thyroid nodule     Time reflects when diagnosis was documented in both MDM as applicable and the Disposition within this note     Time User Action Codes Description Comment    7/26/2021 12:26 AM Jeffrey Meredith Add [K11 20] Parotitis     7/26/2021 12:26 AM Jeffrey Meredith Add [E04 1] Thyroid nodule       ED Disposition     ED Disposition Condition Date/Time Comment    Discharge Stable Mon Jul 26, 2021 12:26 AM Ira KRAUS Amira Padilla discharge to home/self care  Follow-up Information     Follow up With Specialties Details Why Contact Info Additional Information    Yo Meredith, DO  In 3 days For reassessment of parotitis and also further evaluation of thyroid nodule [address] [facility] Emergency Department Emergency Medicine  If symptoms worsen [address] [place],[address] [id number]  [address] [facility] Emergency Department, [address] [place], [place], [address]          Discharge Medication List as of 7/26/2021 12:28 AM      START taking these medications    Details   ciprofloxacin (CIPRO) 500 mg tablet Take 1 tablet (500 mg total) by mouth 2 (two) times a day for 7 days, Starting Mon 7/26/2021, Until Mon 8/2/2021, Normal         CONTINUE these medications which have NOT CHANGED    Details   albuterol (PROVENTIL HFA,VENTOLIN HFA) 90 mcg/act inhaler Inhale 2 puffs every 6 (six) hours as needed for wheezing, Historical Med      amitriptyline (ELAVIL) 50 mg tablet TAKE 1 TABLET BY MOUTH DAILY AT BEDTIME, Normal      amLODIPine (NORVASC) 5 mg tablet Take 5 mg by mouth daily  , Historical Med      cetirizine (ZyrTEC) 10 MG chewable tablet Chew 10 mg daily  , Historical Med      cholecalciferol (VITAMIN D3) 1,000 units tablet Take 5,000 Units by mouth daily, Historical Med      colchicine (COLCRYS) 0 6 mg tablet Take 0 6 mg by mouth daily, Historical Med      Dexilant 60 MG capsule TAKE 1 CAPSULE BY MOUTH EVERY DAY, Normal      famotidine (PEPCID) 40 MG tablet TAKE 1 TABLET BY MOUTH EVERYDAY AT BEDTIME, Normal      gabapentin (NEURONTIN) 100 mg capsule Take 1 capsule (100 mg total) by mouth 3 (three) times a day, Starting Fri 3/13/2020, Until Thu 6/18/2020, Normal      ketoconazole (NIZORAL) 2 % cream as needed , Starting Thu 6/11/2020, Historical Med      LORazepam (ATIVAN) 0 5 mg tablet Take 0 5 mg by mouth daily at bedtime, Historical Med      LUTEIN PO Take by mouth daily, Historical Med      metoclopramide (REGLAN) 10 mg tablet Take 1 tablet (10 mg total) by mouth 3 (three) times a day before meals For the 1st 1-2 weeks just take twice a day before meals  , Starting Mon 6/28/2021, Normal      multivitamin (THERAGRAN) TABS Take 1 tablet by mouth daily  , Historical Med      Omega-3 Fat Ac-Cholecalciferol (DRY EYE OMEGA BENEFITS/VIT D-3 PO) Take by mouth daily, Historical Med      !! ondansetron (ZOFRAN) 4 mg tablet Take 1 tablet (4 mg total) by mouth every 8 (eight) hours as needed for nausea or vomiting, Starting Thu 3/12/2020, Normal      !! ondansetron (ZOFRAN) 4 mg tablet Take 1 tablet (4 mg total) by mouth every 12 (twelve) hours as needed for nausea or vomiting, Starting Wed 6/9/2021, Normal      other medication, see sig, Medication/product name: preservision   Strength:   Sig (include dose, route, frequency):, Historical Med      predniSONE 20 mg tablet Take by mouth daily For five days for gout flare, Historical Med      RABEprazole (ACIPHEX) 20 MG tablet TAKE ONE TABLET BY MOUTH TWICE A DAY, Normal      sucralfate (CARAFATE) 1 g tablet Take 1 tablet (1 g total) by mouth 4 (four) times a day as needed (nausea and vomiting), Starting Thu 3/12/2020, Normal      triamcinolone (KENALOG) 0 1 % ointment Apply topically to all affected areas two times a day  Avoid face, armpits, and groin , Normal       !! - Potential duplicate medications found  Please discuss with provider  No discharge procedures on file      PDMP Review     None          ED Provider  Electronically Signed by           Emiliano Rizvi DO  07/26/21 0106

## 2021-07-27 ENCOUNTER — TELEPHONE (OUTPATIENT)
Dept: SURGERY | Facility: HOSPITAL | Age: 76
End: 2021-07-27

## 2021-07-28 ENCOUNTER — ANESTHESIA (OUTPATIENT)
Dept: GASTROENTEROLOGY | Facility: HOSPITAL | Age: 76
End: 2021-07-28

## 2021-07-28 ENCOUNTER — HOSPITAL ENCOUNTER (OUTPATIENT)
Dept: GASTROENTEROLOGY | Facility: HOSPITAL | Age: 76
Setting detail: OUTPATIENT SURGERY
Discharge: HOME | End: 2021-07-28
Attending: INTERNAL MEDICINE
Payer: COMMERCIAL

## 2021-07-28 ENCOUNTER — ANESTHESIA EVENT (OUTPATIENT)
Dept: GASTROENTEROLOGY | Facility: HOSPITAL | Age: 76
End: 2021-07-28

## 2021-07-28 VITALS
DIASTOLIC BLOOD PRESSURE: 78 MMHG | HEIGHT: 60 IN | BODY MASS INDEX: 27.48 KG/M2 | HEART RATE: 81 BPM | TEMPERATURE: 97.5 F | RESPIRATION RATE: 22 BRPM | WEIGHT: 140 LBS | SYSTOLIC BLOOD PRESSURE: 137 MMHG | OXYGEN SATURATION: 94 %

## 2021-07-28 DIAGNOSIS — K31.84 GASTROPARESIS: ICD-10-CM

## 2021-07-28 DIAGNOSIS — K21.9 CHRONIC GERD: ICD-10-CM

## 2021-07-28 DIAGNOSIS — Z98.890 HISTORY OF NISSEN FUNDOPLICATION: ICD-10-CM

## 2021-07-28 DIAGNOSIS — R13.19 ESOPHAGEAL DYSPHAGIA: ICD-10-CM

## 2021-07-28 PROCEDURE — 43239 EGD BIOPSY SINGLE/MULTIPLE: CPT | Performed by: INTERNAL MEDICINE

## 2021-07-28 PROCEDURE — 88305 TISSUE EXAM BY PATHOLOGIST: CPT | Performed by: PATHOLOGY

## 2021-07-28 RX ORDER — SODIUM CHLORIDE 9 MG/ML
100 INJECTION, SOLUTION INTRAVENOUS CONTINUOUS
Status: DISCONTINUED | OUTPATIENT
Start: 2021-07-28 | End: 2021-08-01 | Stop reason: HOSPADM

## 2021-07-28 RX ORDER — PROPOFOL 10 MG/ML
INJECTION, EMULSION INTRAVENOUS AS NEEDED
Status: DISCONTINUED | OUTPATIENT
Start: 2021-07-28 | End: 2021-07-28

## 2021-07-28 RX ORDER — SODIUM CHLORIDE, SODIUM LACTATE, POTASSIUM CHLORIDE, CALCIUM CHLORIDE 600; 310; 30; 20 MG/100ML; MG/100ML; MG/100ML; MG/100ML
INJECTION, SOLUTION INTRAVENOUS CONTINUOUS PRN
Status: DISCONTINUED | OUTPATIENT
Start: 2021-07-28 | End: 2021-07-28

## 2021-07-28 RX ORDER — LIDOCAINE HYDROCHLORIDE 10 MG/ML
INJECTION, SOLUTION EPIDURAL; INFILTRATION; INTRACAUDAL; PERINEURAL AS NEEDED
Status: DISCONTINUED | OUTPATIENT
Start: 2021-07-28 | End: 2021-07-28

## 2021-07-28 RX ADMIN — LIDOCAINE HYDROCHLORIDE 50 MG: 10 INJECTION, SOLUTION EPIDURAL; INFILTRATION; INTRACAUDAL; PERINEURAL at 09:43

## 2021-07-28 RX ADMIN — SODIUM CHLORIDE, SODIUM LACTATE, POTASSIUM CHLORIDE, AND CALCIUM CHLORIDE: .6; .31; .03; .02 INJECTION, SOLUTION INTRAVENOUS at 09:39

## 2021-07-28 RX ADMIN — PROPOFOL 140 MG: 10 INJECTION, EMULSION INTRAVENOUS at 09:43

## 2021-07-28 RX ADMIN — PROPOFOL 20 MG: 10 INJECTION, EMULSION INTRAVENOUS at 09:44

## 2021-07-28 RX ADMIN — SODIUM CHLORIDE, SODIUM LACTATE, POTASSIUM CHLORIDE, AND CALCIUM CHLORIDE: .6; .31; .03; .02 INJECTION, SOLUTION INTRAVENOUS at 09:43

## 2021-07-28 RX ADMIN — PROPOFOL 20 MG: 10 INJECTION, EMULSION INTRAVENOUS at 09:50

## 2021-07-28 RX ADMIN — PROPOFOL 20 MG: 10 INJECTION, EMULSION INTRAVENOUS at 09:45

## 2021-07-28 RX ADMIN — PROPOFOL 20 MG: 10 INJECTION, EMULSION INTRAVENOUS at 09:47

## 2021-07-28 NOTE — ANESTHESIA PREPROCEDURE EVALUATION
Procedure:  EGD    Relevant Problems   ANESTHESIA (within normal limits)      CARDIO   (+) Hypertension      GI/HEPATIC   (+) Chronic GERD   (+) Esophageal dysphagia   (+) Gastroesophageal reflux disease without esophagitis      /RENAL   (+) Stage 3 chronic kidney disease (HCC)      NEURO/PSYCH   (+) Anxiety   (+) Depression      PULMONARY   (+) Asthma   (+) COPD (chronic obstructive pulmonary disease) (HCC) (used inhaler this am; chronic cough)   (-) Sleep apnea   (-) Smoking   (-) URI (upper respiratory infection)      Other   (+) Gastroparesis (has had food in stomach on prior EGDs)   (+) History of Nissen fundoplication      Physical Exam    Airway  Comment: Appears anterior  Mallampati score: III  TM Distance: <3 FB  Neck ROM: full     Dental   No notable dental hx     Cardiovascular      Pulmonary      Other Findings       Lab Results   Component Value Date    WBC 9 39 07/25/2021    HGB 12 6 07/25/2021     07/25/2021     Lab Results   Component Value Date    SODIUM 142 07/25/2021    K 3 9 07/25/2021    BUN 22 07/25/2021    CREATININE 1 44 (H) 07/25/2021    EGFR 36 07/25/2021    GLUCOSE 110 04/09/2015     No results found for: HGBA1C      Anesthesia Plan  ASA Score- 3     Anesthesia Type- IV sedation with anesthesia with ASA Monitors  Additional Monitors:   Airway Plan:           Plan Factors-Exercise tolerance (METS): >4 METS  Chart reviewed  Existing labs reviewed  Patient summary reviewed  Patient is not a current smoker  Induction- intravenous  Postoperative Plan-     Informed Consent- Anesthetic plan and risks discussed with patient  I personally reviewed this patient with the CRNA  Discussed and agreed on the Anesthesia Plan with the CRNA  Georgette Ormond

## 2021-07-28 NOTE — H&P
History and Physical - [facility]  Hugo Hwang 77 y o  female MRN: [id number]    HPI: Hugo Hwang is a 77y o  year old female who presents for longstanding history of GERD  She has had anti-reflux surgery  She also has dysphagia for solids and liquids  Has not been responsive to esophageal dilation in the past    REVIEW OF SYSTEMS: Per the HPI, and otherwise unremarkable  Historical Information   Past Medical History:   Diagnosis Date    Anxiety     Arthritis     Asthma     Candida infection, esophageal (HCC)     Chronic pain     COPD (chronic obstructive pulmonary disease) (HCC)     Depression     Depression     Gastroparesis     Gastroparesis     GERD (gastroesophageal reflux disease)     Hypertension     Irritable bowel syndrome (IBS)     Migraines     Multiple thyroid nodules     Osteoporosis     Pneumonia     Psychiatric disorder      Past Surgical History:   Procedure Laterality Date    BACK SURGERY       SECTION      CHOLECYSTECTOMY      COLONOSCOPY  10/09/2013    ESOPHAGEAL DILATION       [place]    HIATAL HERNIA REPAIR      NISSEN FUNDOPLICATION      [place] ESOPHAGOGASTRODUODENOSCOPY TRANSORAL DIAGNOSTIC N/A 4/3/2019    Procedure: ESOPHAGOGASTRODUODENOSCOPY (EGD);   Surgeon: Caroline Arce MD;  Location: [facility] OR;  Service: Gastroenterology    ROTATOR CUFF REPAIR      SHOULDER SURGERY      SPINAL FUSION      UPPER GASTROINTESTINAL ENDOSCOPY      US GUIDED THYROID BIOPSY      WISDOM TOOTH EXTRACTION       Social History   Social History     Substance and Sexual Activity   Alcohol Use No     Social History     Substance and Sexual Activity   Drug Use No     Social History     Tobacco Use   Smoking Status Former Smoker    Types: Cigarettes    Quit date: 10/2003    Years since quittin 8   Smokeless Tobacco Never Used     Family History   Problem Relation Age of Onset    Other Mother         Esophageal disorder    Stomach cancer Father     Cancer Father     Hypertension Maternal Aunt     Colon cancer Neg Hx     Colon polyps Neg Hx        Meds/Allergies       Current Outpatient Medications:     albuterol (PROVENTIL HFA,VENTOLIN HFA) 90 mcg/act inhaler    amitriptyline (ELAVIL) 50 mg tablet    amLODIPine (NORVASC) 5 mg tablet    cetirizine (ZyrTEC) 10 MG chewable tablet    cholecalciferol (VITAMIN D3) 1,000 units tablet    ciprofloxacin (CIPRO) 500 mg tablet    famotidine (PEPCID) 40 MG tablet    LUTEIN PO    metoclopramide (REGLAN) 10 mg tablet    multivitamin (THERAGRAN) TABS    Omega-3 Fat Ac-Cholecalciferol (DRY EYE OMEGA BENEFITS/VIT D-3 PO)    RABEprazole (ACIPHEX) 20 MG tablet    colchicine (COLCRYS) 0 6 mg tablet    Dexilant 60 MG capsule    gabapentin (NEURONTIN) 100 mg capsule    ketoconazole (NIZORAL) 2 % cream    LORazepam (ATIVAN) 0 5 mg tablet    ondansetron (ZOFRAN) 4 mg tablet    ondansetron (ZOFRAN) 4 mg tablet    other medication, see sig,    predniSONE 20 mg tablet    sucralfate (CARAFATE) 1 g tablet    triamcinolone (KENALOG) 0 1 % ointment    Current Facility-Administered Medications:     sodium chloride 0 9 % infusion, 100 mL/hr, Intravenous, Continuous    Allergies   Allergen Reactions    Latex Hives    Doxycycline Hives    Cephalosporins GI Intolerance    Nsaids GI Intolerance    Penicillins GI Intolerance     Patient can only take levaquin and cipro    Prednisone GI Intolerance    Sulphasomidine GI Intolerance       Objective     /88   Pulse 94   Temp 98 2 °F (36 8 °C) (Oral)   Resp 16   Ht 5' (1 524 m)   Wt 63 5 kg (140 lb)   SpO2 97%   BMI 27 34 kg/m²     PHYSICAL EXAM    Gen: NAD AAOx3  Head: Normocephalic, Atraumatic  CV: S1S2 RRR no m/r/g  CHEST: Clear b/l no c/r/w  ABD: soft, +BS NT/ND  EXT: no edema    ASSESSMENT/PLAN:  This is a 77y o  year old female here for upper endoscopy and she is stable and optimized for her procedure

## 2021-07-28 NOTE — ANESTHESIA POSTPROCEDURE EVALUATION
Post-Op Assessment Note    CV Status:  Stable    Pain management: adequate     Mental Status:  Sleepy   Hydration Status:  Euvolemic   PONV Controlled:  Controlled   Airway Patency:  Patent      Post Op Vitals Reviewed: Yes      Staff: CRNA         No complications documented      BP   132/69   Temp      Pulse 86   Resp   18   SpO2   97

## 2021-08-02 NOTE — RESULT ENCOUNTER NOTE
I called the patient and discuss biopsy results  No pathology noted on EGD or esophageal biopsies  Polyps were benign fundic gland polyps  Patient still has ongoing issues with heartburn  See if we could do a prior Auth for Dexilant as patient has not responded to twice a day AcipHex  Also schedule patient for barium swallow  Will consider baclofen to help with the reflux  Also consider consultation with Dr Kacy Buchanan for further evaluation    Will review patient's old records which are fairly extensive  No recall EGD

## 2021-08-06 ENCOUNTER — HOSPITAL ENCOUNTER (OUTPATIENT)
Dept: ULTRASOUND IMAGING | Facility: HOSPITAL | Age: 76
Discharge: HOME | End: 2021-08-06
Payer: COMMERCIAL

## 2021-08-06 DIAGNOSIS — E04.1 NONTOXIC SINGLE THYROID NODULE: ICD-10-CM

## 2021-08-06 PROCEDURE — 76536 US EXAM OF HEAD AND NECK: CPT

## 2021-09-13 ENCOUNTER — TELEPHONE (OUTPATIENT)
Dept: GASTROENTEROLOGY | Facility: CLINIC | Age: 76
End: 2021-09-13

## 2021-09-13 DIAGNOSIS — K21.9 CHRONIC GERD: ICD-10-CM

## 2021-09-13 DIAGNOSIS — R13.19 ESOPHAGEAL DYSPHAGIA: Primary | ICD-10-CM

## 2021-09-13 NOTE — TELEPHONE ENCOUNTER
----- Message from Panchito Arce MD sent at 8/2/2021 12:21 PM EDT -----  I called the patient and discuss biopsy results  No pathology noted on EGD or esophageal biopsies  Polyps were benign fundic gland polyps  Patient still has ongoing issues with heartburn  See if we could do a prior Auth for Dexilant as patient has not responded to twice a day AcipHex  Also schedule patient for barium swallow  Will consider baclofen to help with the reflux  Also consider consultation with Dr Sylvie Zepeda for further evaluation    Will review patient's old records which are fairly extensive  No recall EGD

## 2021-09-16 NOTE — TELEPHONE ENCOUNTER
I called patient, left  requesting callback  I wanted to follow up in regards to Dr Carlos De La Cruz recommendations from 8/2/21  I see the Barium Swallow order is in place since June 2021, it appears she uses [facility] as her imaging facility  In regards to the [address] [place] [address], I need to confirm prescription benefit information in order to proceed with Dexilant PA

## 2021-09-21 NOTE — TELEPHONE ENCOUNTER
I spoke with patient she has not completed her barium swallow as she is fearful of the Barium, she is afraid she will not make it home due to diarrhea  She has had so many test in the past, she doesn't want to go through it again  Unfortunately she continues with  "all day" reflux despite aciphex BID, famotidine 40 mg at HS  Uses Tums or Rolaids in between  She hasn't taken the sucralafate in a while, she was previously on 1 gm QID  She stopped because she didn't feel it was effective  She's on reglan QID for her gastroparesis  She experiences reflux even when just drinking water  She begins to feel full, has burning in the middle of her esophagus  She has a hx COPD, lately has been experiencing a lot of coughing/phlegm draining which sometimes makes her nauseated  I obtained her prescription insurance information to try to obtain a PA for Dexilant 60 mg daily    PCN: Sam Saldana: [id number]  RXGRP: [id number]  PA submitted through [id number] [facility] Key: [id number] - PA Case ID: [id number]  Hx: Nissen Fundoplication  Tried and failed lansoprazole 30 mg, Nexium 40 mg BID,  omeprazole 40 mg BID, Famotidine 40 mg, carafate

## 2021-09-22 NOTE — TELEPHONE ENCOUNTER
Dexilant Approved through 12/31/21  I spoke with Jus Mccallum at [address]  Phil Kelly goes through for $15 co-pay, available for  in 1 hour  I called her home phone; no answer and vmb full   I then tried calling her cell [phone] no answer I left a vm informing her of approval

## 2021-09-29 ENCOUNTER — OFFICE VISIT (OUTPATIENT)
Dept: GASTROENTEROLOGY | Facility: CLINIC | Age: 76
End: 2021-09-29
Payer: COMMERCIAL

## 2021-09-29 VITALS
DIASTOLIC BLOOD PRESSURE: 86 MMHG | BODY MASS INDEX: 27.8 KG/M2 | HEIGHT: 60 IN | SYSTOLIC BLOOD PRESSURE: 136 MMHG | WEIGHT: 141.6 LBS

## 2021-09-29 DIAGNOSIS — Z12.11 COLON CANCER SCREENING: ICD-10-CM

## 2021-09-29 DIAGNOSIS — R79.89 LFT ELEVATION: ICD-10-CM

## 2021-09-29 DIAGNOSIS — R13.14 PHARYNGOESOPHAGEAL DYSPHAGIA: ICD-10-CM

## 2021-09-29 DIAGNOSIS — K21.9 GASTROESOPHAGEAL REFLUX DISEASE WITHOUT ESOPHAGITIS: Primary | ICD-10-CM

## 2021-09-29 DIAGNOSIS — Z79.899 ENCOUNTER FOR LONG-TERM (CURRENT) USE OF MEDICATIONS: ICD-10-CM

## 2021-09-29 PROCEDURE — 99214 OFFICE O/P EST MOD 30 MIN: CPT | Performed by: INTERNAL MEDICINE

## 2021-09-29 NOTE — PATIENT INSTRUCTIONS
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 but on Reglan as this did not really  - continue Dexilant 60 mg daily and monitor response  - if symptoms of reflux get worse could consider consultation with Dr Rivera Gonzalez    - has some functional related chest pain  Seems to be in improved with amitriptyline 50 mg at bedtime    2  Pharyngoesophageal dysphagia  -- some ongoing issues but overall stable  Had solid and liquid food dysphagia  Did not respond to dilation in the past    3  Encounter for long-term (current) use of medications  -- has a history of osteoporosis  Probably should take supplemental calcium 1000- 1200 mg per day   taking a vitamin-D supplement  Does see rheumatology  Probably could check a vitamin-D level  Long-term PPI use can be associated bone related issues  - Vitamin D 25 hydroxy; Future      4  Colon cancer screening  - up-to-date with exams  Last colonoscopy 2013  She would be due in 2023      5  LFT-elevation --had elevation of liver function tests--patient did have elevated transaminases in July  In reviewing records no previous exam showed abnormal LFTs  Does not drink alcohol   -patient for repeat studies in the very near future    I would like a copy of these to see if further workup is indicated    Followup Appointment: 6 mo

## 2021-09-29 NOTE — LETTER
September 29, 2021     Nehemias Duenas DO  P [address]  [address]    Patient: Audra Crawford   YOB: 1945   Date of Visit: 9/29/2021       Dear Dr Philipp Priest: Thank you for referring Gabriela Ocasio to me for evaluation  Below are my notes for this consultation  If you have questions, please do not hesitate to call me  I look forward to following your patient along with you  Sincerely,        Emily Rogers MD        CC: No Recipients  Emily Rogers MD  9/29/2021  5:03 PM  [facility] - Outpatient Follow-up Note  Audra Crawford 77 y o  female MRN: [id number]  Encounter: [id number]    ASSESSMENT AND PLAN:      1  Gastroesophageal reflux disease without esophagitis  -Patient with recent upper endoscopy July 2021  No significant esophagitis or eosinophilic esophagitis  Recently changed over from AcipHex 20 mg twice a day to Dexilant 60 mg daily  Continues on famotidine at night  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 but on Reglan as this did not really  - continue Dexilant 60 mg daily and monitor response  - if symptoms of reflux get worse could consider consultation with Dr Rosmery Loaiza    - has some functional related chest pain  Seems to be in improved with amitriptyline 50 mg at bedtime    2  Pharyngoesophageal dysphagia  -- some ongoing issues but overall stable  Had solid and liquid food dysphagia  Did not respond to dilation in the past    3  Encounter for long-term (current) use of medications  -- has a history of osteoporosis  Probably should take supplemental calcium 1000- 1200 mg per day   taking a vitamin-D supplement  Does see rheumatology  Probably could check a vitamin-D level  Long-term PPI use can be associated bone related issues  - Vitamin D 25 hydroxy; Future      4  Colon cancer screening  - up-to-date with exams  Last colonoscopy 2013  She would be due in 2023    5   LFT-elevation --had elevation of liver function tests--patient did have elevated transaminases in July  In reviewing records no previous exam showed abnormal LFTs  Does not drink alcohol   -patient for repeat studies in the very near future  I would like a copy of these to see if further workup is indicated      Followup Appointment: 6 mo   ______________________________________________________________________    Chief Complaint   Patient presents with    Follow up EGD     still with pain     HPI:  Patient returns to the office for routine follow-up  She has had a lot issues with chronic gastroesophageal reflux and dysphagia in the past multiple procedures  She had been on rabeprazole 20 mg twice a day before meals and famotidine at night  Despite this she was having breakthrough symptoms of reflux  We did do a recent endoscopy on the patient--this past June and note was made of normal esophageal mucosa  Biopsies did not show any evidence of eosinophilic esophagitis  Our office obtain prior authorization for the use of Dexilant  Patient began taking this last week  A little early to tell but patient reports that her reflux seems a little bit better  She still has the problems with dysphagia  This had not been improved with multiple dilations  Patient does report some abdominal cramping and had wondered if this could be a potential side effect of the Dexilant  Patient is up-to-date with her colon cancer screening lb last examination was 2013  She would be due again in 2023  She is average risk  In reviewing patient's records she had elevated LFTs in June  At that time she had some type of facial swelling or reaction  She actually went to the ED  Other LFT checks in the past have been normal   Patient does not drink alcohol  Has no known history of hepatitis  She is due to have lab work done again soon  Lastly patient does have a history of osteoporosis    She does not take supplemental calcium-taking some vitamin-D but is not exactly sure the content  Has been seen by Rheumatology    Historical Information   Past Medical History:   Diagnosis Date    Anxiety     Arthritis     Asthma     Candida infection, esophageal (HCC)     Chronic pain     COPD (chronic obstructive pulmonary disease) (HCC)     Depression     Depression     Gastroparesis     Gastroparesis     GERD (gastroesophageal reflux disease)     Hypertension     Irritable bowel syndrome (IBS)     Migraines     Multiple thyroid nodules     Osteoporosis     Pneumonia     Psychiatric disorder      Past Surgical History:   Procedure Laterality Date    BACK SURGERY       SECTION      CHOLECYSTECTOMY      COLONOSCOPY  10/09/2013    [address]AL DILATION      [address]    HIATAL HERNIA REPAIR      NISSEN FUNDOPLICATION      [place] ESOPHAGOGASTRODUODENOSCOPY TRANSORAL DIAGNOSTIC N/A 4/3/2019    Procedure: ESOPHAGOGASTRODUODENOSCOPY (EGD);   Surgeon: Soila García MD;  Location:  MAIN OR;  Service: Gastroenterology    ROTATOR CUFF REPAIR      SHOULDER SURGERY      SPINAL FUSION      UPPER GASTROINTESTINAL ENDOSCOPY      US GUIDED THYROID BIOPSY      WISDOM TOOTH EXTRACTION       Social History     Substance and Sexual Activity   Alcohol Use No     Social History     Substance and Sexual Activity   Drug Use No     Social History     Tobacco Use   Smoking Status Former Smoker    Types: Cigarettes    Quit date: 10/2003    Years since quittin 0   Smokeless Tobacco Never Used     Family History   Problem Relation Age of Onset    Other Mother         Esophageal disorder    Stomach cancer Father     Cancer Father     Hypertension Maternal Aunt     Colon cancer Neg Hx     Colon polyps Neg Hx          Current Outpatient Medications:     albuterol (PROVENTIL HFA,VENTOLIN HFA) 90 mcg/act inhaler    amitriptyline (ELAVIL) 50 mg tablet    amLODIPine (NORVASC) 5 mg tablet    cetirizine (ZyrTEC) 10 MG chewable tablet    cholecalciferol (VITAMIN D3) 1,000 units tablet    colchicine (COLCRYS) 0 6 mg tablet    Dexilant 60 MG capsule    famotidine (PEPCID) 40 MG tablet    gabapentin (NEURONTIN) 100 mg capsule    ketoconazole (NIZORAL) 2 % cream    LORazepam (ATIVAN) 0 5 mg tablet    LUTEIN PO    Omega-3 Fat Ac-Cholecalciferol (DRY EYE OMEGA BENEFITS/VIT D-3 PO)    ondansetron (ZOFRAN) 4 mg tablet    metoclopramide (REGLAN) 10 mg tablet    multivitamin (THERAGRAN) TABS    ondansetron (ZOFRAN) 4 mg tablet    other medication, see sig,    predniSONE 20 mg tablet    RABEprazole (ACIPHEX) 20 MG tablet    sucralfate (CARAFATE) 1 g tablet    triamcinolone (KENALOG) 0 1 % ointment  Allergies   Allergen Reactions    Latex Hives    Doxycycline Hives    Cephalosporins GI Intolerance    Nsaids GI Intolerance    Penicillins GI Intolerance     Patient can only take levaquin and cipro    Prednisone GI Intolerance    Sulphasomidine GI Intolerance     Reviewed medications and allergies and updated as indicated    PHYSICAL EXAM:    Blood pressure 136/86, height 5' (1 524 m), weight 64 2 kg (141 lb 9 6 oz), not currently breastfeeding  Body mass index is 27 65 kg/m²  General Appearance: NAD, cooperative, alert  Eyes: Anicteric, conjunctiva pink  ENT:  Normocephalic, atraumatic, normal mucosa  Neck:  Supple, symmetrical, trachea midline  Resp:  Clear to auscultation bilaterally; no rales, rhonchi or wheezing; respirations unlabored   CV:  S1 S2, Regular rate and rhythm; no murmur, rub, or gallop  GI:  Soft, non-tender, non-distended; normal bowel sounds; no masses, no organomegaly   Rectal: Deferred  Musculoskeletal: No cyanosis, clubbing or edema  Normal ROM    Skin:  No jaundice, rashes, or lesions   Heme/Lymph: No palpable cervical lymphadenopathy  Psych: Normal affect, good eye contact  Neuro: No gross deficits, AAOx3    Lab Results:   Lab Results   Component Value Date    WBC 9 39 07/25/2021    HGB 12 6 07/25/2021    HCT 37 9 07/25/2021    MCV 92 07/25/2021     07/25/2021 July 30, 2021- CMP --AST 82  upper limits of normal 40 ALT 51--upper limits of normal 32  Other LFTs were normal CBC was normal     Radiology Results:   No results found  Efrain Smith MD  9/29/2021 12:41 PM  Sign when Signing Visit  [facility] - Outpatient Follow-up Note  Fabiana Mcfadden 77 y o  female MRN: [id number]  Encounter: [id number]    ASSESSMENT AND PLAN:      1  Gastroesophageal reflux disease without esophagitis  -Patient with recent upper endoscopy July 2021  No significant esophagitis or eosinophilic esophagitis  Recently changed over from AcipHex 20 mg twice a day to Dexilant 60 mg daily  Continues on famotidine at night  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 but on Reglan as this did not really  - continue Dexilant 60 mg daily and monitor response  - if symptoms of reflux get worse could consider consultation with Dr Geovanna Pierre    - has some functional related chest pain  Seems to be in improved with amitriptyline 50 mg at bedtime    2  Pharyngoesophageal dysphagia  -- some ongoing issues but overall stable  Had solid and liquid food dysphagia  Did not respond to dilation in the past    3  Encounter for long-term (current) use of medications  -- has a history of osteoporosis  Probably should take supplemental calcium 1000- 1200 mg per day   taking a vitamin-D supplement  Does see rheumatology  Probably could check a vitamin-D level  Long-term PPI use can be associated bone related issues  - Vitamin D 25 hydroxy; Future      4  Colon cancer screening  - up-to-date with exams  Last colonoscopy 2013    She would be due in 2023      Followup Appointment: 6 mo   ______________________________________________________________________    Chief Complaint   Patient presents with    Follow up EGD     still with pain     HPI:      Historical Information   Past Medical History:   Diagnosis Date    Anxiety     Arthritis     Asthma     Candida infection, esophageal (HCC)     Chronic pain     COPD (chronic obstructive pulmonary disease) (HCC)     Depression     Depression     Gastroparesis     Gastroparesis     GERD (gastroesophageal reflux disease)     Hypertension     Irritable bowel syndrome (IBS)     Migraines     Multiple thyroid nodules     Osteoporosis     Pneumonia     Psychiatric disorder      Past Surgical History:   Procedure Laterality Date    BACK SURGERY       SECTION      CHOLECYSTECTOMY      COLONOSCOPY  10/09/2013    ES[address]AL DILATION       [address]    HIATAL HERNIA REPAIR      NISSEN FUNDOPLICATION      [place] ESOPHAGOGASTRODUODENOSCOPY TRANSORAL DIAGNOSTIC N/A 4/3/2019    Procedure: ESOPHAGOGASTRODUODENOSCOPY (EGD);   Surgeon: Angely Chambers MD;  Location:  MAIN OR;  Service: Gastroenterology    ROTATOR CUFF REPAIR      SHOULDER SURGERY      SPINAL FUSION      UPPER GASTROINTESTINAL ENDOSCOPY      US GUIDED THYROID BIOPSY      WISDOM TOOTH EXTRACTION       Social History     Substance and Sexual Activity   Alcohol Use No     Social History     Substance and Sexual Activity   Drug Use No     Social History     Tobacco Use   Smoking Status Former Smoker    Types: Cigarettes    Quit date: 10/2003    Years since quittin 0   Smokeless Tobacco Never Used     Family History   Problem Relation Age of Onset    Other Mother         Esophageal disorder    Stomach cancer Father     Cancer Father     Hypertension Maternal Aunt     Colon cancer Neg Hx     Colon polyps Neg Hx          Current Outpatient Medications:     albuterol (PROVENTIL HFA,VENTOLIN HFA) 90 mcg/act inhaler    amitriptyline (ELAVIL) 50 mg tablet    amLODIPine (NORVASC) 5 mg tablet    cetirizine (ZyrTEC) 10 MG chewable tablet    cholecalciferol (VITAMIN D3) 1,000 units tablet    colchicine (COLCRYS) 0 6 mg tablet    Dexilant 60 MG capsule    famotidine (PEPCID) 40 MG tablet    gabapentin (NEURONTIN) 100 mg capsule    ketoconazole (NIZORAL) 2 % cream    LORazepam (ATIVAN) 0 5 mg tablet    LUTEIN PO    Omega-3 Fat Ac-Cholecalciferol (DRY EYE OMEGA BENEFITS/VIT D-3 PO)    ondansetron (ZOFRAN) 4 mg tablet    metoclopramide (REGLAN) 10 mg tablet    multivitamin (THERAGRAN) TABS    ondansetron (ZOFRAN) 4 mg tablet    other medication, see sig,    predniSONE 20 mg tablet    RABEprazole (ACIPHEX) 20 MG tablet    sucralfate (CARAFATE) 1 g tablet    triamcinolone (KENALOG) 0 1 % ointment  Allergies   Allergen Reactions    Latex Hives    Doxycycline Hives    Cephalosporins GI Intolerance    Nsaids GI Intolerance    Penicillins GI Intolerance     Patient can only take levaquin and cipro    Prednisone GI Intolerance    Sulphasomidine GI Intolerance     Reviewed medications and allergies and updated as indicated    PHYSICAL EXAM:    Blood pressure 136/86, height 5' (1 524 m), weight 64 2 kg (141 lb 9 6 oz), not currently breastfeeding  Body mass index is 27 65 kg/m²  General Appearance: NAD, cooperative, alert  Eyes: Anicteric, PERRLA, EOMI  ENT:  Normocephalic, atraumatic, normal mucosa  Neck:  Supple, symmetrical, trachea midline  Resp:  Clear to auscultation bilaterally; no rales, rhonchi or wheezing; respirations unlabored   CV:  S1 S2, Regular rate and rhythm; no murmur, rub, or gallop  GI:  Soft, non-tender, non-distended; normal bowel sounds; no masses, no organomegaly   Rectal: Deferred  Musculoskeletal: No cyanosis, clubbing or edema  Normal ROM    Skin:  No jaundice, rashes, or lesions   Heme/Lymph: No palpable cervical lymphadenopathy  Psych: Normal affect, good eye contact  Neuro: No gross deficits, AAOx3    Lab Results:   Lab Results   Component Value Date    WBC 9 39 07/25/2021    HGB 12 6 07/25/2021    HCT 37 9 07/25/2021    MCV 92 07/25/2021     07/25/2021     Lab Results   Component Value Date     04/09/2015    K 3 9 07/25/2021     07/25/2021    CO2 25 07/25/2021 ANIONGAP 7 04/09/2015    BUN 22 07/25/2021    CREATININE 1 44 (H) 07/25/2021    GLUCOSE 110 04/09/2015    CALCIUM 9 3 07/25/2021    AST 38 03/27/2019    ALT 62 03/27/2019    ALKPHOS 68 03/27/2019    PROT 7 2 02/05/2015    BILITOT 0 4 02/05/2015    EGFR 36 07/25/2021     No results found for: IRON, TIBC, FERRITIN  Lab Results   Component Value Date    LIPASE 37 (L) 08/03/2016       Radiology Results:   No results found

## 2021-09-29 NOTE — PROGRESS NOTES
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 but on Reglan as this did not really  - continue Dexilant 60 mg daily and monitor response  - if symptoms of reflux get worse could consider consultation with Dr Alexy Gomez    - has some functional related chest pain  Seems to be in improved with amitriptyline 50 mg at bedtime    2  Pharyngoesophageal dysphagia  -- some ongoing issues but overall stable  Had solid and liquid food dysphagia  Did not respond to dilation in the past    3  Encounter for long-term (current) use of medications  -- has a history of osteoporosis  Probably should take supplemental calcium 1000- 1200 mg per day   taking a vitamin-D supplement  Does see rheumatology  Probably could check a vitamin-D level  Long-term PPI use can be associated bone related issues  - Vitamin D 25 hydroxy; Future      4  Colon cancer screening  - up-to-date with exams  Last colonoscopy 2013  She would be due in 2023    5  LFT-elevation --had elevation of liver function tests--patient did have elevated transaminases in July  In reviewing records no previous exam showed abnormal LFTs  Does not drink alcohol   -patient for repeat studies in the very near future  I would like a copy of these to see if further workup is indicated      Followup Appointment: 6 mo   ______________________________________________________________________    Chief Complaint   Patient presents with    Follow up EGD     still with pain     HPI:  Patient returns to the office for routine follow-up    She has had a lot issues with chronic gastroesophageal reflux and dysphagia in the past multiple procedures  She had been on rabeprazole 20 mg twice a day before meals and famotidine at night  Despite this she was having breakthrough symptoms of reflux  We did do a recent endoscopy on the patient--this past June and note was made of normal esophageal mucosa  Biopsies did not show any evidence of eosinophilic esophagitis  Our office obtain prior authorization for the use of Dexilant  Patient began taking this last week  A little early to tell but patient reports that her reflux seems a little bit better  She still has the problems with dysphagia  This had not been improved with multiple dilations  Patient does report some abdominal cramping and had wondered if this could be a potential side effect of the Dexilant  Patient is up-to-date with her colon cancer screening lb last examination was 2013  She would be due again in 2023  She is average risk  In reviewing patient's records she had elevated LFTs in June  At that time she had some type of facial swelling or reaction  She actually went to the ED  Other LFT checks in the past have been normal   Patient does not drink alcohol  Has no known history of hepatitis  She is due to have lab work done again soon  Lastly patient does have a history of osteoporosis  She does not take supplemental calcium-taking some vitamin-D but is not exactly sure the content    Has been seen by Rheumatology    Historical Information   Past Medical History:   Diagnosis Date    Anxiety     Arthritis     Asthma     Candida infection, esophageal (HCC)     Chronic pain     COPD (chronic obstructive pulmonary disease) (HCC)     Depression     Depression     Gastroparesis     Gastroparesis     GERD (gastroesophageal reflux disease)     Hypertension     Irritable bowel syndrome (IBS)     Migraines     Multiple thyroid nodules     Osteoporosis     Pneumonia     Psychiatric disorder      Past Surgical History:   Procedure Laterality Date    BACK SURGERY       SECTION      CHOLECYSTECTOMY      COLONOSCOPY  10/09/2013    [address]AL DILATION      [address]    HIATAL HERNIA REPAIR      NISSEN FUNDOPLICATION      [place] ESOPHAGOGASTRODUODENOSCOPY TRANSORAL DIAGNOSTIC N/A 4/3/2019    Procedure: ESOPHAGOGASTRODUODENOSCOPY (EGD);   Surgeon: Bill Tabares MD;  Location:  MAIN OR;  Service: Gastroenterology    ROTATOR CUFF REPAIR      SHOULDER SURGERY      SPINAL FUSION      UPPER GASTROINTESTINAL ENDOSCOPY      US GUIDED THYROID BIOPSY      WISDOM TOOTH EXTRACTION       Social History     Substance and Sexual Activity   Alcohol Use No     Social History     Substance and Sexual Activity   Drug Use No     Social History     Tobacco Use   Smoking Status Former Smoker    Types: Cigarettes    Quit date: 10/2003    Years since quittin 0   Smokeless Tobacco Never Used     Family History   Problem Relation Age of Onset    Other Mother         Esophageal disorder    Stomach cancer Father     Cancer Father     Hypertension Maternal Aunt     Colon cancer Neg Hx     Colon polyps Neg Hx          Current Outpatient Medications:     albuterol (PROVENTIL HFA,VENTOLIN HFA) 90 mcg/act inhaler    amitriptyline (ELAVIL) 50 mg tablet    amLODIPine (NORVASC) 5 mg tablet    cetirizine (ZyrTEC) 10 MG chewable tablet    cholecalciferol (VITAMIN D3) 1,000 units tablet    colchicine (COLCRYS) 0 6 mg tablet    Dexilant 60 MG capsule    famotidine (PEPCID) 40 MG tablet    gabapentin (NEURONTIN) 100 mg capsule    ketoconazole (NIZORAL) 2 % cream    LORazepam (ATIVAN) 0 5 mg tablet    LUTEIN PO    Omega-3 Fat Ac-Cholecalciferol (DRY EYE OMEGA BENEFITS/VIT D-3 PO)    ondansetron (ZOFRAN) 4 mg tablet    metoclopramide (REGLAN) 10 mg tablet    multivitamin (THERAGRAN) TABS    ondansetron (ZOFRAN) 4 mg tablet    other medication, see sig,    predniSONE 20 mg tablet    RABEprazole (ACIPHEX) 20 MG tablet    sucralfate (CARAFATE) 1 g tablet    triamcinolone (KENALOG) 0 1 % ointment  Allergies   Allergen Reactions    Latex Hives    Doxycycline Hives    Cephalosporins GI Intolerance    Nsaids GI Intolerance    Penicillins GI Intolerance     Patient can only take levaquin and cipro    Prednisone GI Intolerance    Sulphasomidine GI Intolerance     Reviewed medications and allergies and updated as indicated    PHYSICAL EXAM:    Blood pressure 136/86, height 5' (1 524 m), weight 64 2 kg (141 lb 9 6 oz), not currently breastfeeding  Body mass index is 27 65 kg/m²  General Appearance: NAD, cooperative, alert  Eyes: Anicteric, conjunctiva pink  ENT:  Normocephalic, atraumatic, normal mucosa  Neck:  Supple, symmetrical, trachea midline  Resp:  Clear to auscultation bilaterally; no rales, rhonchi or wheezing; respirations unlabored   CV:  S1 S2, Regular rate and rhythm; no murmur, rub, or gallop  GI:  Soft, non-tender, non-distended; normal bowel sounds; no masses, no organomegaly   Rectal: Deferred  Musculoskeletal: No cyanosis, clubbing or edema  Normal ROM  Skin:  No jaundice, rashes, or lesions   Heme/Lymph: No palpable cervical lymphadenopathy  Psych: Normal affect, good eye contact  Neuro: No gross deficits, AAOx3    Lab Results:   Lab Results   Component Value Date    WBC 9 39 07/25/2021    HGB 12 6 07/25/2021    HCT 37 9 07/25/2021    MCV 92 07/25/2021     07/25/2021 July 30, 2021- CMP --AST 82  upper limits of normal 40 ALT 51--upper limits of normal 32  Other LFTs were normal CBC was normal     Radiology Results:   No results found

## 2021-10-13 LAB — 25(OH)D3+25(OH)D2 SERPL-MCNC: 41.5 NG/ML (ref 30–100)

## 2021-10-20 ENCOUNTER — TELEPHONE (OUTPATIENT)
Dept: GASTROENTEROLOGY | Facility: CLINIC | Age: 76
End: 2021-10-20

## 2021-11-20 DIAGNOSIS — R10.12 LEFT UPPER QUADRANT PAIN: ICD-10-CM

## 2021-11-22 RX ORDER — AMITRIPTYLINE HYDROCHLORIDE 50 MG/1
TABLET, FILM COATED ORAL
Qty: 90 TABLET | Refills: 4 | Status: SHIPPED | OUTPATIENT
Start: 2021-11-22

## 2021-12-27 DIAGNOSIS — K21.9 CHRONIC GERD: ICD-10-CM

## 2022-01-18 ENCOUNTER — TELEPHONE (OUTPATIENT)
Dept: GASTROENTEROLOGY | Facility: CLINIC | Age: 77
End: 2022-01-18

## 2022-01-18 NOTE — TELEPHONE ENCOUNTER
Called YESICA at [facility] [phone], information given and transferred to clinical pharmacist Severiano Overland  More info provided  She was able to approve the tier reduction for Dexilant from a tier 4 to a tier 3  Appproval dates are 1/1/22-12/31/22 Case # [id number]  The other option would be Pantoprazole which is a tier 1 if pt is willing to try for a cheaper price  She did note pt may have a high deductible at this time of year and may want to contact member services if has any questions  Pt notified, she will look into the price and call back with continued issues  Transferred to scheduling for an apt

## 2022-01-18 NOTE — TELEPHONE ENCOUNTER
Tier Lowering approved to Tier 3 01/01/22-12/31/2022  Patient notified  She will contact pharmacy at some point to check on cost  If still too high will need alternate tx

## 2022-02-05 DIAGNOSIS — K21.9 GASTROESOPHAGEAL REFLUX DISEASE WITHOUT ESOPHAGITIS: ICD-10-CM

## 2022-02-05 RX ORDER — FAMOTIDINE 40 MG/1
TABLET, FILM COATED ORAL
Qty: 90 TABLET | Refills: 3 | Status: SHIPPED | OUTPATIENT
Start: 2022-02-05

## 2022-02-20 ENCOUNTER — HOSPITAL ENCOUNTER (EMERGENCY)
Facility: HOSPITAL | Age: 77
Discharge: HOME | End: 2022-02-20
Attending: EMERGENCY MEDICINE
Payer: COMMERCIAL

## 2022-02-20 ENCOUNTER — APPOINTMENT (EMERGENCY)
Dept: CT IMAGING | Facility: HOSPITAL | Age: 77
End: 2022-02-20
Payer: COMMERCIAL

## 2022-02-20 VITALS
HEIGHT: 60 IN | HEART RATE: 101 BPM | RESPIRATION RATE: 20 BRPM | DIASTOLIC BLOOD PRESSURE: 70 MMHG | TEMPERATURE: 97.5 F | OXYGEN SATURATION: 97 % | SYSTOLIC BLOOD PRESSURE: 153 MMHG | BODY MASS INDEX: 27.68 KG/M2 | WEIGHT: 141 LBS

## 2022-02-20 DIAGNOSIS — K52.9 GASTROENTERITIS: ICD-10-CM

## 2022-02-20 DIAGNOSIS — R10.9 ABDOMINAL PAIN: Primary | ICD-10-CM

## 2022-02-20 DIAGNOSIS — K31.84 GASTROPARESIS: ICD-10-CM

## 2022-02-20 DIAGNOSIS — R11.2 NON-INTRACTABLE VOMITING WITH NAUSEA, UNSPECIFIED VOMITING TYPE: ICD-10-CM

## 2022-02-20 LAB
2HR DELTA HS TROPONIN: 0 NG/L
4HR DELTA HS TROPONIN: -1 NG/L
ALBUMIN SERPL BCP-MCNC: 3.2 G/DL (ref 3.5–5)
ALP SERPL-CCNC: 100 U/L (ref 46–116)
ALT SERPL W P-5'-P-CCNC: 73 U/L (ref 12–78)
ANION GAP SERPL CALCULATED.3IONS-SCNC: 14 MMOL/L (ref 4–13)
AST SERPL W P-5'-P-CCNC: 86 U/L (ref 5–45)
BASOPHILS # BLD AUTO: 0.03 THOUSANDS/ΜL (ref 0–0.1)
BASOPHILS NFR BLD AUTO: 0 % (ref 0–1)
BILIRUB SERPL-MCNC: 0.3 MG/DL (ref 0.2–1)
BUN SERPL-MCNC: 22 MG/DL (ref 5–25)
CALCIUM ALBUM COR SERPL-MCNC: 9.2 MG/DL (ref 8.3–10.1)
CALCIUM SERPL-MCNC: 8.6 MG/DL (ref 8.3–10.1)
CARDIAC TROPONIN I PNL SERPL HS: 6 NG/L
CARDIAC TROPONIN I PNL SERPL HS: 7 NG/L
CARDIAC TROPONIN I PNL SERPL HS: 7 NG/L
CHLORIDE SERPL-SCNC: 104 MMOL/L (ref 100–108)
CO2 SERPL-SCNC: 25 MMOL/L (ref 21–32)
CREAT SERPL-MCNC: 1.57 MG/DL (ref 0.6–1.3)
EOSINOPHIL # BLD AUTO: 0.01 THOUSAND/ΜL (ref 0–0.61)
EOSINOPHIL NFR BLD AUTO: 0 % (ref 0–6)
ERYTHROCYTE [DISTWIDTH] IN BLOOD BY AUTOMATED COUNT: 13.7 % (ref 11.6–15.1)
FLUAV RNA RESP QL NAA+PROBE: NEGATIVE
FLUBV RNA RESP QL NAA+PROBE: NEGATIVE
GFR SERPL CREATININE-BSD FRML MDRD: 32 ML/MIN/1.73SQ M
GLUCOSE SERPL-MCNC: 154 MG/DL (ref 65–140)
HCT VFR BLD AUTO: 42.1 % (ref 34.8–46.1)
HGB BLD-MCNC: 13.4 G/DL (ref 11.5–15.4)
IMM GRANULOCYTES # BLD AUTO: 0.05 THOUSAND/UL (ref 0–0.2)
IMM GRANULOCYTES NFR BLD AUTO: 0 % (ref 0–2)
LIPASE SERPL-CCNC: 13 U/L (ref 73–393)
LYMPHOCYTES # BLD AUTO: 0.6 THOUSANDS/ΜL (ref 0.6–4.47)
LYMPHOCYTES NFR BLD AUTO: 5 % (ref 14–44)
MCH RBC QN AUTO: 29.9 PG (ref 26.8–34.3)
MCHC RBC AUTO-ENTMCNC: 31.8 G/DL (ref 31.4–37.4)
MCV RBC AUTO: 94 FL (ref 82–98)
MONOCYTES # BLD AUTO: 0.26 THOUSAND/ΜL (ref 0.17–1.22)
MONOCYTES NFR BLD AUTO: 2 % (ref 4–12)
NEUTROPHILS # BLD AUTO: 11.87 THOUSANDS/ΜL (ref 1.85–7.62)
NEUTS SEG NFR BLD AUTO: 93 % (ref 43–75)
NRBC BLD AUTO-RTO: 0 /100 WBCS
PLATELET # BLD AUTO: 271 THOUSANDS/UL (ref 149–390)
PMV BLD AUTO: 8.8 FL (ref 8.9–12.7)
POTASSIUM SERPL-SCNC: 4.3 MMOL/L (ref 3.5–5.3)
PROT SERPL-MCNC: 7.9 G/DL (ref 6.4–8.2)
RBC # BLD AUTO: 4.48 MILLION/UL (ref 3.81–5.12)
RSV RNA RESP QL NAA+PROBE: NEGATIVE
SARS-COV-2 RNA RESP QL NAA+PROBE: NEGATIVE
SODIUM SERPL-SCNC: 143 MMOL/L (ref 136–145)
WBC # BLD AUTO: 12.82 THOUSAND/UL (ref 4.31–10.16)

## 2022-02-20 PROCEDURE — 36415 COLL VENOUS BLD VENIPUNCTURE: CPT

## 2022-02-20 PROCEDURE — 96374 THER/PROPH/DIAG INJ IV PUSH: CPT

## 2022-02-20 PROCEDURE — 99284 EMERGENCY DEPT VISIT MOD MDM: CPT

## 2022-02-20 PROCEDURE — 0241U HB NFCT DS VIR RESP RNA 4 TRGT: CPT | Performed by: EMERGENCY MEDICINE

## 2022-02-20 PROCEDURE — 83690 ASSAY OF LIPASE: CPT | Performed by: EMERGENCY MEDICINE

## 2022-02-20 PROCEDURE — 93005 ELECTROCARDIOGRAM TRACING: CPT

## 2022-02-20 PROCEDURE — G1004 CDSM NDSC: HCPCS

## 2022-02-20 PROCEDURE — 99285 EMERGENCY DEPT VISIT HI MDM: CPT | Performed by: EMERGENCY MEDICINE

## 2022-02-20 PROCEDURE — 85025 COMPLETE CBC W/AUTO DIFF WBC: CPT | Performed by: EMERGENCY MEDICINE

## 2022-02-20 PROCEDURE — 74177 CT ABD & PELVIS W/CONTRAST: CPT

## 2022-02-20 PROCEDURE — 80053 COMPREHEN METABOLIC PANEL: CPT | Performed by: EMERGENCY MEDICINE

## 2022-02-20 PROCEDURE — 84484 ASSAY OF TROPONIN QUANT: CPT | Performed by: EMERGENCY MEDICINE

## 2022-02-20 PROCEDURE — 96361 HYDRATE IV INFUSION ADD-ON: CPT

## 2022-02-20 RX ORDER — ONDANSETRON 4 MG/1
4 TABLET, ORALLY DISINTEGRATING ORAL ONCE
Status: COMPLETED | OUTPATIENT
Start: 2022-02-20 | End: 2022-02-20

## 2022-02-20 RX ORDER — ONDANSETRON 4 MG/1
4 TABLET, FILM COATED ORAL EVERY 8 HOURS PRN
Qty: 15 TABLET | Refills: 0 | Status: SHIPPED | OUTPATIENT
Start: 2022-02-20

## 2022-02-20 RX ORDER — DICYCLOMINE HCL 20 MG
20 TABLET ORAL ONCE
Status: COMPLETED | OUTPATIENT
Start: 2022-02-20 | End: 2022-02-20

## 2022-02-20 RX ORDER — DICYCLOMINE HCL 20 MG
20 TABLET ORAL EVERY 6 HOURS PRN
Qty: 20 TABLET | Refills: 0 | Status: SHIPPED | OUTPATIENT
Start: 2022-02-20 | End: 2022-07-14 | Stop reason: SDUPTHER

## 2022-02-20 RX ADMIN — IOHEXOL 100 ML: 350 INJECTION, SOLUTION INTRAVENOUS at 14:55

## 2022-02-20 RX ADMIN — SODIUM CHLORIDE 1000 ML: 0.9 INJECTION, SOLUTION INTRAVENOUS at 15:24

## 2022-02-20 RX ADMIN — MORPHINE SULFATE 2 MG: 2 INJECTION, SOLUTION INTRAMUSCULAR; INTRAVENOUS at 15:19

## 2022-02-20 RX ADMIN — ONDANSETRON 4 MG: 4 TABLET, ORALLY DISINTEGRATING ORAL at 12:22

## 2022-02-20 RX ADMIN — DICYCLOMINE HYDROCHLORIDE 20 MG: 20 TABLET ORAL at 16:39

## 2022-02-20 NOTE — ED NOTES
This staff member assisted the patient in the restroom in the waiting room  The patient had an episode of uncontrolled diarrhea  The patient was given wipes, scrub pants, a brief, and socks to change into and take off her soiled clothing  A bag was provided for the soiled clothing        Fay Kim  02/20/22 2879

## 2022-02-20 NOTE — ED PROVIDER NOTES
History  Chief Complaint   Patient presents with    Abdominal Pain     patient presents to ED with left sided abdominal pain that began last night, patient reports having pain now radiating across upper abdomen  patient reports multiple episodes of vomitting and diarrhea that began this morning     77year old female presents for evaluation of abdominal pain which has been worsening since 8 pm last night, 1 hour after eating [employer]  Patient describes the pain as a severe pain in the left abdomen which radiates to the right upper quadrant  Pain is associated with 2 episodes of diarrhea today and nausea since last night  Patient states she has not vomited as she is unable to do so since undergoing Serafin fundoplication  She denies fevers or chills, but had been diaphoretic last night with lightheadedness  No episodes of syncope  Patient has history of gastroparesis, IBS and GERD  She has not taken anything for her symptoms prior to arrival  She was recently exposed to a family member with URI symptoms  She has been vaccinated against COVID, but not influenza  Patient's last colonoscopy 9 years ago  She states she had been told she had polyps, but does not recal being diagnosed with diverticulosis  History provided by:  Patient  Abdominal Pain  Pain location:  LUQ and LLQ  Pain quality: sharp    Pain radiates to:  Epigastric region and RUQ  Pain severity:  Severe  Onset quality:  Sudden  Duration:  6 hours  Timing:  Constant  Progression:  Worsening  Relieved by:  Nothing  Worsened by:  Nothing  Ineffective treatments:  None tried  Associated symptoms: cough (chronic cough, unchanged), diarrhea and nausea    Associated symptoms: no chills, no constipation, no dysuria, no fever, no shortness of breath and no vomiting        Prior to Admission Medications   Prescriptions Last Dose Informant Patient Reported? Taking?    LORazepam (ATIVAN) 0 5 mg tablet  Self Yes Yes   Sig: Take 0 5 mg by mouth daily at bedtime LUTEIN PO Not Taking at Unknown time Self Yes No   Sig: Take by mouth daily   Patient not taking: Reported on 2/20/2022    Omega-3 Fat Ac-Cholecalciferol (DRY EYE OMEGA BENEFITS/VIT D-3 PO)  Self Yes Yes   Sig: Take by mouth daily   Omega-3 Fatty Acids (OMEGA 3 PO)   Yes Yes   Sig: Take by mouth   RABEprazole (ACIPHEX) 20 MG tablet  Self No No   Sig: TAKE ONE TABLET BY MOUTH TWICE A DAY   Patient not taking: Reported on 9/29/2021   albuterol (PROVENTIL HFA,VENTOLIN HFA) 90 mcg/act inhaler  Self Yes Yes   Sig: Inhale 2 puffs every 6 (six) hours as needed for wheezing   amLODIPine (NORVASC) 5 mg tablet  Self Yes Yes   Sig: Take 5 mg by mouth daily  amitriptyline (ELAVIL) 50 mg tablet   No Yes   Sig: TAKE 1 TABLET BY MOUTH DAILY AT BEDTIME   cetirizine (ZyrTEC) 10 MG chewable tablet  Self Yes Yes   Sig: Chew 10 mg daily  cholecalciferol (VITAMIN D3) 1,000 units tablet  Self Yes Yes   Sig: Take 5,000 Units by mouth daily   colchicine (COLCRYS) 0 6 mg tablet Not Taking at Unknown time Self Yes No   Sig: Take 0 6 mg by mouth daily   Patient not taking: Reported on 2/20/2022    dexlansoprazole (Dexilant) 60 MG capsule   No Yes   Sig: Take 1 capsule (60 mg total) by mouth daily   famotidine (PEPCID) 40 MG tablet   No Yes   Sig: TAKE 1 TABLET BY MOUTH EVERYDAY AT BEDTIME   gabapentin (NEURONTIN) 100 mg capsule  Self No No   Sig: Take 1 capsule (100 mg total) by mouth 3 (three) times a day   metoclopramide (REGLAN) 10 mg tablet Not Taking at Unknown time Self No No   Sig: Take 1 tablet (10 mg total) by mouth 3 (three) times a day before meals For the 1st 1-2 weeks just take twice a day before meals  Patient not taking: Reported on 9/29/2021   multivitamin (THERAGRAN) TABS Not Taking at Unknown time Self Yes No   Sig: Take 1 tablet by mouth daily     Patient not taking: Reported on 9/29/2021   ondansetron (ZOFRAN) 4 mg tablet Not Taking at Unknown time Self No No   Sig: Take 1 tablet (4 mg total) by mouth every 8 (eight) hours as needed for nausea or vomiting   Patient not taking: Reported on 2021   ondansetron (ZOFRAN) 4 mg tablet Not Taking at Unknown time Self No No   Sig: Take 1 tablet (4 mg total) by mouth every 12 (twelve) hours as needed for nausea or vomiting   Patient not taking: Reported on 2022    ondansetron (ZOFRAN) 4 mg tablet   No No   Sig: Take 1 tablet (4 mg total) by mouth every 8 (eight) hours as needed for nausea or vomiting   other medication, see sig,  Self Yes No   Sig: Medication/product name: preservision   Strength:   Sig (include dose, route, frequency):   Patient not taking: Reported on 2021   predniSONE 20 mg tablet  Self Yes No   Sig: Take by mouth daily For five days for gout flare   Patient not taking: Reported on 2021   sucralfate (CARAFATE) 1 g tablet  Self No No   Sig: Take 1 tablet (1 g total) by mouth 4 (four) times a day as needed (nausea and vomiting)   Patient not taking: Reported on 2021   triamcinolone (KENALOG) 0 1 % ointment  Self No No   Sig: Apply topically to all affected areas two times a day  Avoid face, armpits, and groin     Patient not taking: Reported on 2021      Facility-Administered Medications: None       Past Medical History:   Diagnosis Date    Anxiety     Arthritis     Asthma     Candida infection, esophageal (HCC)     Chronic pain     COPD (chronic obstructive pulmonary disease) (HCC)     Depression     Depression     Gastroparesis     Gastroparesis     GERD (gastroesophageal reflux disease)     Hypertension     Irritable bowel syndrome (IBS)     Migraines     Multiple thyroid nodules     Osteoporosis     Pneumonia     Psychiatric disorder        Past Surgical History:   Procedure Laterality Date    BACK SURGERY       SECTION      CHOLECYSTECTOMY      COLONOSCOPY  10/09/2013    ESOPHAGEAL DILATION       [address]    HIATAL HERNIA REPAIR      NISSEN FUNDOPLICATION      AK ESOPHAGOGASTRODUODENOSCOPY TRANSORAL DIAGNOSTIC N/A 4/3/2019    Procedure: ESOPHAGOGASTRODUODENOSCOPY (EGD); Surgeon: Julio Dumont MD;  Location:  MAIN OR;  Service: Gastroenterology    ROTATOR CUFF REPAIR      SHOULDER SURGERY      SPINAL FUSION      UPPER GASTROINTESTINAL ENDOSCOPY      US GUIDED THYROID BIOPSY      WISDOM TOOTH EXTRACTION         Family History   Problem Relation Age of Onset    Other Mother         Esophageal disorder    Stomach cancer Father     Cancer Father     Hypertension Maternal Aunt     Colon cancer Neg Hx     Colon polyps Neg Hx      I have reviewed and agree with the history as documented  E-Cigarette/Vaping    E-Cigarette Use Never User      E-Cigarette/Vaping Substances    Nicotine No     THC No     CBD No     Flavoring No     Other No     Unknown No      Social History     Tobacco Use    Smoking status: Former Smoker     Types: Cigarettes     Quit date: 10/2003     Years since quittin 4    Smokeless tobacco: Never Used   Vaping Use    Vaping Use: Never used   Substance Use Topics    Alcohol use: No    Drug use: No       Review of Systems   Constitutional: Positive for appetite change (unable to eat since 7 pm last night) and diaphoresis  Negative for chills and fever  HENT: Negative for congestion  Respiratory: Positive for cough (chronic cough, unchanged)  Negative for shortness of breath  Gastrointestinal: Positive for abdominal pain, diarrhea and nausea  Negative for constipation and vomiting  Genitourinary: Negative for dysuria  Musculoskeletal: Negative for back pain  Skin: Negative for rash and wound  Neurological: Positive for light-headedness  Negative for syncope and headaches  All other systems reviewed and are negative  Physical Exam  Physical Exam  Vitals and nursing note reviewed  Constitutional:       General: She is not in acute distress  Appearance: She is well-developed  She is not toxic-appearing or diaphoretic     HENT:      Head: Normocephalic and atraumatic  Right Ear: External ear normal       Left Ear: External ear normal       Nose: Nose normal    Eyes:      General: No scleral icterus  Cardiovascular:      Rate and Rhythm: Normal rate and regular rhythm  Heart sounds: Normal heart sounds  Pulmonary:      Effort: Pulmonary effort is normal  No respiratory distress  Breath sounds: Normal breath sounds  Abdominal:      General: There is no distension  Palpations: Abdomen is soft  Tenderness: There is abdominal tenderness in the left lower quadrant  There is no guarding or rebound  Musculoskeletal:         General: No deformity  Normal range of motion  Skin:     General: Skin is warm and dry  Findings: No rash  Neurological:      General: No focal deficit present  Mental Status: She is alert        Gait: Gait normal    Psychiatric:         Mood and Affect: Mood normal          Vital Signs  ED Triage Vitals [02/20/22 1206]   Temperature Pulse Respirations Blood Pressure SpO2   97 5 °F (36 4 °C) (!) 117 16 (!) 176/88 100 %      Temp Source Heart Rate Source Patient Position - Orthostatic VS BP Location FiO2 (%)   Tympanic Monitor Sitting Left arm --      Pain Score       9           Vitals:    02/20/22 1206 02/20/22 1518   BP: (!) 176/88 153/70   Pulse: (!) 117 101   Patient Position - Orthostatic VS: Sitting Lying         Visual Acuity      ED Medications  Medications   dicyclomine (BENTYL) tablet 20 mg (has no administration in time range)   ondansetron (ZOFRAN-ODT) dispersible tablet 4 mg (4 mg Oral Given 2/20/22 1222)   morphine injection 2 mg (2 mg Intravenous Given 2/20/22 1519)   sodium chloride 0 9 % bolus 1,000 mL (1,000 mL Intravenous New Bag 2/20/22 1524)   iohexol (OMNIPAQUE) 350 MG/ML injection (SINGLE-DOSE) 100 mL (100 mL Intravenous Given 2/20/22 1455)       Diagnostic Studies  Results Reviewed     Procedure Component Value Units Date/Time    HS Troponin I 4hr [[id number]] Collected: 02/20/22 1618    Lab Status: In process Specimen: Blood from Arm, Right Updated: 02/20/22 1629    COVID/FLU/RSV - 2 hour TAT [[id number]]  (Normal) Collected: 02/20/22 1531    Lab Status: Final result Specimen: Nares from Nasopharyngeal Swab Updated: 02/20/22 1619     SARS-CoV-2 Negative     INFLUENZA A PCR Negative     INFLUENZA B PCR Negative     RSV PCR Negative    Narrative:      FOR PEDIATRIC PATIENTS - copy/paste COVID Guidelines URL to browser: [email or web address]  [employer]    SARS-CoV-2 assay is a Nucleic Acid Amplification assay intended for the  qualitative detection of nucleic acid from SARS-CoV-2 in nasopharyngeal  swabs  Results are for the presumptive identification of SARS-CoV-2 RNA  Positive results are indicative of infection with SARS-CoV-2, the virus  causing COVID-19, but do not rule out bacterial infection or co-infection  with other viruses  Laboratories within the [place] and its  territories are required to report all positive results to the appropriate  public health authorities  Negative results do not preclude SARS-CoV-2  infection and should not be used as the sole basis for treatment or other  patient management decisions  Negative results must be combined with  clinical observations, patient history, and epidemiological information  This test has not been FDA cleared or approved  This test has been authorized by FDA under an Emergency Use Authorization  (EUA)  This test is only authorized for the duration of time the  declaration that circumstances exist justifying the authorization of the  emergency use of an in vitro diagnostic tests for detection of SARS-CoV-2  virus and/or diagnosis of COVID-19 infection under section 564(b)(1) of  the Act, 21 U  S C  968QSP-4(B)(1), unless the authorization is terminated  or revoked sooner  The test has been validated but independent review by FDA  and CLIA is pending      Test performed using [employer] GeneXpert: This RT-PCR assay targets N2,  a region unique to SARS-CoV-2  A conserved region in the E-gene was chosen  for pan-Sarbecovirus detection which includes SARS-CoV-2  HS Troponin I 2hr [[id number]]  (Normal) Collected: 02/20/22 1429    Lab Status: Final result Specimen: Blood from Arm, Right Updated: 02/20/22 1518     hs TnI 2hr 7 ng/L      Delta 2hr hsTnI 0 ng/L     CBC and differential [[id number]]  (Abnormal) Collected: 02/20/22 1218    Lab Status: Final result Specimen: Blood from Arm, Left Updated: 02/20/22 1304     WBC 12 82 Thousand/uL      RBC 4 48 Million/uL      Hemoglobin 13 4 g/dL      Hematocrit 42 1 %      MCV 94 fL      MCH 29 9 pg      MCHC 31 8 g/dL      RDW 13 7 %      MPV 8 8 fL      Platelets 234 Thousands/uL      nRBC 0 /100 WBCs      Neutrophils Relative 93 %      Immat GRANS % 0 %      Lymphocytes Relative 5 %      Monocytes Relative 2 %      Eosinophils Relative 0 %      Basophils Relative 0 %      Neutrophils Absolute 11 87 Thousands/µL      Immature Grans Absolute 0 05 Thousand/uL      Lymphocytes Absolute 0 60 Thousands/µL      Monocytes Absolute 0 26 Thousand/µL      Eosinophils Absolute 0 01 Thousand/µL      Basophils Absolute 0 03 Thousands/µL     Narrative: This is an appended report  These results have been appended to a previously verified report      HS Troponin 0hr (reflex protocol) [[id number]]  (Normal) Collected: 02/20/22 1218    Lab Status: Final result Specimen: Blood from Arm, Left Updated: 02/20/22 1252     hs TnI 0hr 7 ng/L     Comprehensive metabolic panel [[id number]]  (Abnormal) Collected: 02/20/22 1218    Lab Status: Final result Specimen: Blood from Arm, Left Updated: 02/20/22 1243     Sodium 143 mmol/L      Potassium 4 3 mmol/L      Chloride 104 mmol/L      CO2 25 mmol/L      ANION GAP 14 mmol/L      BUN 22 mg/dL      Creatinine 1 57 mg/dL      Glucose 154 mg/dL      Calcium 8 6 mg/dL      Corrected Calcium 9 2 mg/dL      AST 86 U/L      ALT 73 U/L      Alkaline Phosphatase 100 U/L      Total Protein 7 9 g/dL      Albumin 3 2 g/dL      Total Bilirubin 0 30 mg/dL      eGFR 32 ml/min/1 73sq m     Narrative:      [place] guidelines for Chronic Kidney Disease (CKD):     Stage 1 with normal or high GFR (GFR > 90 mL/min/1 73 square meters)    Stage 2 Mild CKD (GFR = 60-89 mL/min/1 73 square meters)    Stage 3A Moderate CKD (GFR = 45-59 mL/min/1 73 square meters)    Stage 3B Moderate CKD (GFR = 30-44 mL/min/1 73 square meters)    Stage 4 Severe CKD (GFR = 15-29 mL/min/1 73 square meters)    Stage 5 End Stage CKD (GFR <15 mL/min/1 73 square meters)  Note: GFR calculation is accurate only with a steady state creatinine    Lipase [[id number]]  (Abnormal) Collected: 02/20/22 1218    Lab Status: Final result Specimen: Blood from Arm, Left Updated: 02/20/22 1243     Lipase 13 u/L                  CT abdomen pelvis with contrast   Final Result by Himanshu Pak MD (02/20 9594)      Findings suggestive of mild enteritis and diarrhea  No evidence of colitis, diverticulitis or bowel obstruction              Workstation performed: [id number]                    Procedures  ECG 12 Lead Documentation Only    Date/Time: 2/20/2022 12:20 PM  Performed by: Juan Neff MD  Authorized by: Juan Neff MD     Indications / Diagnosis:  Abdominal pain  ECG reviewed by me, the ED Provider: yes    Patient location:  ED  Previous ECG:     Previous ECG:  Unavailable  Interpretation:     Interpretation: abnormal    Rate:     ECG rate:  120    ECG rate assessment: tachycardic    Rhythm:     Rhythm: sinus tachycardia    Ectopy:     Ectopy: none    QRS:     QRS axis:  Normal    QRS intervals:  Normal  Conduction:     Conduction: normal    ST segments:     ST segments:  Normal  T waves:     T waves: inverted      Inverted:  III             ED Course  ED Course as of 02/20/22 1634   Sun Feb 20, 2022   1406 Creatinine(!): 1 57  1 44 seven months ago SBIRT 20yo+      Most Recent Value   SBIRT (24 yo +)    In order to provide better care to our patients, we are screening all of our patients for alcohol and drug use  Would it be okay to ask you these screening questions? No Filed at: 02/20/2022 1232   Initial Alcohol Screen: US AUDIT-C     1  How often do you have a drink containing alcohol? 0 Filed at: 02/20/2022 1232   3b  FEMALE Any Age, or MALE 65+: How often do you have 4 or more drinks on one occassion? 0 Filed at: 02/20/2022 1232   Audit-C Score 0 Filed at: 02/20/2022 1232   SAMRA: How many times in the past year have you    Used an illegal drug or used a prescription medication for non-medical reasons? Never Filed at: 02/20/2022 1232                    [facility]  Number of Diagnoses or Management Options  Abdominal pain: new and requires workup  Gastroenteritis: new and requires workup  Diagnosis management comments: 77year old female presents for evaluation of abdominal pain associated with nausea and diarrhea  Diaphoresis and lightheadedness last night  LLQ tenderness on exam  Labs with nonspecific leukocytosis  Creatinine slightly above baseline likely secondary to dehydration  1 L NS  CT consistent with enteritis  No hematochezia  Afebrile  Symptomatic management  PCP follow up  Discussed return precautions with patient         Amount and/or Complexity of Data Reviewed  Clinical lab tests: ordered and reviewed  Tests in the radiology section of CPT®: ordered and reviewed    Patient Progress  Patient progress: stable      Disposition  Final diagnoses:   Abdominal pain   Gastroenteritis     Time reflects when diagnosis was documented in both MDM as applicable and the Disposition within this note     Time User Action Codes Description Comment    2/20/2022  4:21 PM Rajiv Chan [R10 9] Abdominal pain     2/20/2022  4:21 PM Bridget King [K52 9] Gastroenteritis     2/20/2022  4:23 PM Maciel MENDEZ Add [K31 84] Gastroparesis     2/20/2022  4:24 PM Dowell Lo Modify [K31 84] Gastroparesis     2/20/2022  4:24 PM Dowell Lo Modify [K31 84] Gastroparesis     2/20/2022  4:24 PM Desirae Eubanks Free Add [R11 2] Non-intractable vomiting with nausea, unspecified vomiting type     2/20/2022  4:24 PM Dowell Lo Modify [Z03 47] Gastroparesis     2/20/2022  4:25 PM Dowell Lo Modify [D72 34] Gastroparesis       ED Disposition     ED Disposition Condition Date/Time Comment    Discharge Stable Sun Feb 20, 2022  4:21 PM Jen Garcia discharge to home/self care  Follow-up Information     Follow up With Specialties Details Why Contact Info Additional Information    Vannesa Cortez DO  Schedule an appointment as soon as possible for a visit in 1 week for re-evaluation [address] [facility] Emergency Department Emergency Medicine Go to  If symptoms worsen, fever >101F, blood in your bowel movements [address] [id number]  [address] [facility] Emergency Department, [address] [place] [address] [address]          Patient's Medications   Discharge Prescriptions    DICYCLOMINE (BENTYL) 20 MG TABLET    Take 1 tablet (20 mg total) by mouth every 6 (six) hours as needed (abdominal cramping)       Start Date: 2/20/2022 End Date: --       Order Dose: 20 mg       Quantity: 20 tablet    Refills: 0       No discharge procedures on file      PDMP Review     None          ED Provider  Electronically Signed by           Brittany Kinney MD  02/20/22 9690

## 2022-02-20 NOTE — DISCHARGE INSTRUCTIONS
Abdominal Pain   WHAT YOU NEED TO KNOW:   Abdominal pain can be dull, achy, or sharp  You may have pain in one area of your abdomen, or in your entire abdomen  Your pain may be caused by a condition such as constipation, food sensitivity or poisoning, infection, or a blockage  Abdominal pain can also be from a hernia, appendicitis, or an ulcer  Liver, gallbladder, or kidney conditions can also cause abdominal pain  The cause of your abdominal pain may not be known  DISCHARGE INSTRUCTIONS:   Call your local emergency number (911 in the [address] [facility]) if:   · You have new chest pain or shortness of breath  Return to the emergency department if:   · You have pulsing pain in your upper abdomen or lower back that suddenly becomes constant  · Your pain is in the right lower abdominal area and worsens with movement  · You have a fever over 100 4°F (38°C) or shaking chills  · You are vomiting and cannot keep food or liquids down  · Your pain does not improve or gets worse over the next 8 to 12 hours  · You see blood in your vomit or bowel movements, or they look black and tarry  · Your skin or the whites of your eyes turn yellow  · You are a woman and have a large amount of vaginal bleeding that is not your monthly period  Call your doctor if:   · You have pain in your lower back  · You are a man and have pain in your testicles  · You have pain when you urinate  · You have questions or concerns about your condition or care  Medicines:   · Prescription pain medicine  may be given  Ask your healthcare provider how to take this medicine safely  Some prescription pain medicines contain acetaminophen  Do not take other medicines that contain acetaminophen without talking to your healthcare provider  Too much acetaminophen may cause liver damage  Prescription pain medicine may cause constipation  Ask your healthcare provider how to prevent or treat constipation       · Medicines  may be given to calm your stomach or prevent vomiting  · Take your medicine as directed  Contact your healthcare provider if you think your medicine is not helping or if you have side effects  Tell him of her if you are allergic to any medicine  Keep a list of the medicines, vitamins, and herbs you take  Include the amounts, and when and why you take them  Bring the list or the pill bottles to follow-up visits  Carry your medicine list with you in case of an emergency  Manage your symptoms:   · Apply heat  on your abdomen for 20 to 30 minutes every 2 hours for as many days as directed  Heat helps decrease pain and muscle spasms  · Make changes to the food you eat, if needed  Do not eat foods that cause abdominal pain or other symptoms  Eat small meals more often  The following changes may also help:    ? Eat more high-fiber foods if you are constipated  High-fiber foods include fruits, vegetables, whole-grain foods, and legumes  ? Do not eat foods that cause gas if you have bloating  Examples include broccoli, cabbage, and cauliflower  Do not drink soda or carbonated drinks  These may also cause gas  ? Do not eat foods or drinks that contain sorbitol or fructose if you have diarrhea and bloating  Some examples are fruit juices, candy, jelly, and sugar-free gum  ? Do not eat high-fat foods  Examples include fried foods, cheeseburgers, hot dogs, and desserts  ? Limit or do not have caffeine  Caffeine may make symptoms such as heartburn or nausea worse  ? Drink more liquids to prevent dehydration from diarrhea or vomiting  Ask your healthcare provider how much liquid to drink each day and which liquids are best for you  · Keep a diary of your abdominal pain  A diary may help your healthcare provider learn what is causing your abdominal pain  Include when the pain happens, how long it lasts, and what the pain feels like  Write down any other symptoms you have with abdominal pain   Also write down what you eat, and what symptoms you have after you eat  · Manage your stress  Stress may cause abdominal pain  Your healthcare provider may recommend relaxation techniques and deep breathing exercises to help decrease your stress  Your healthcare provider may recommend you talk to someone about your stress or anxiety, such as a counselor or a trusted friend  Get plenty of sleep and exercise regularly  · Limit or do not drink alcohol  Alcohol can make your abdominal pain worse  Ask your healthcare provider if it is safe for you to drink alcohol  Also ask how much is safe for you to drink  · Do not smoke  Nicotine and other chemicals in cigarettes can damage your esophagus and stomach  Ask your healthcare provider for information if you currently smoke and need help to quit  E-cigarettes or smokeless tobacco still contain nicotine  Talk to your healthcare provider before you use these products  Follow up with your doctor within 24 hours or as directed:  Write down your questions so you remember to ask them during your visits  © [employer] 2021 Information is for End User's use only and may not be sold, redistributed or otherwise used for commercial purposes  All illustrations and images included in CareNotes® are the copyrighted property of [employer]  or [facility] Msiael Randolph   The above information is an  only  It is not intended as medical advice for individual conditions or treatments  Talk to your doctor, nurse or pharmacist before following any medical regimen to see if it is safe and effective for you

## 2022-02-21 DIAGNOSIS — K21.9 CHRONIC GERD: ICD-10-CM

## 2022-02-21 LAB
ATRIAL RATE: 120 BPM
P AXIS: 45 DEGREES
PR INTERVAL: 130 MS
QRS AXIS: 192 DEGREES
QRSD INTERVAL: 114 MS
QT INTERVAL: 360 MS
QTC INTERVAL: 508 MS
T WAVE AXIS: 16 DEGREES
VENTRICULAR RATE: 120 BPM

## 2022-02-21 PROCEDURE — 93010 ELECTROCARDIOGRAM REPORT: CPT | Performed by: INTERNAL MEDICINE

## 2022-02-21 RX ORDER — DEXLANSOPRAZOLE 60 MG/1
CAPSULE, DELAYED RELEASE ORAL
Qty: 30 CAPSULE | Refills: 5 | Status: SHIPPED | OUTPATIENT
Start: 2022-02-21

## 2022-03-08 ENCOUNTER — OFFICE VISIT (OUTPATIENT)
Dept: GASTROENTEROLOGY | Facility: CLINIC | Age: 77
End: 2022-03-08
Payer: COMMERCIAL

## 2022-03-08 VITALS
DIASTOLIC BLOOD PRESSURE: 90 MMHG | WEIGHT: 132 LBS | HEIGHT: 60 IN | BODY MASS INDEX: 25.91 KG/M2 | SYSTOLIC BLOOD PRESSURE: 128 MMHG

## 2022-03-08 DIAGNOSIS — K31.84 GASTROPARESIS: ICD-10-CM

## 2022-03-08 DIAGNOSIS — K21.9 GASTROESOPHAGEAL REFLUX DISEASE WITHOUT ESOPHAGITIS: ICD-10-CM

## 2022-03-08 DIAGNOSIS — R13.14 PHARYNGOESOPHAGEAL DYSPHAGIA: Primary | ICD-10-CM

## 2022-03-08 DIAGNOSIS — R79.89 LFTS ABNORMAL: ICD-10-CM

## 2022-03-08 DIAGNOSIS — K52.9 GASTROENTERITIS: ICD-10-CM

## 2022-03-08 DIAGNOSIS — R10.13 EPIGASTRIC PAIN: ICD-10-CM

## 2022-03-08 DIAGNOSIS — Z98.890 HISTORY OF NISSEN FUNDOPLICATION: ICD-10-CM

## 2022-03-08 PROCEDURE — 99214 OFFICE O/P EST MOD 30 MIN: CPT | Performed by: INTERNAL MEDICINE

## 2022-03-08 RX ORDER — BACLOFEN 10 MG/1
10 TABLET ORAL 2 TIMES DAILY
Qty: 30 TABLET | Refills: 2 | Status: SHIPPED | OUTPATIENT
Start: 2022-03-08 | End: 2022-04-02

## 2022-03-08 RX ORDER — SUCRALFATE 1 G/1
1 TABLET ORAL 2 TIMES DAILY
Qty: 60 TABLET | Refills: 3 | Status: SHIPPED | OUTPATIENT
Start: 2022-03-08

## 2022-03-08 NOTE — PROGRESS NOTES
A of this is due to his [facility] - Outpatient Follow-up Note  Preeti Horne 77 y o  female MRN: [id number]  Encounter: [id number]    ASSESSMENT AND PLAN:      1  Pharyngoesophageal dysphagia  -Issues of dysphagia  Patient has had multiple endoscopies and esophageal dilations without success and extensive evaluation  Hopefully patient will just get back to baseline after her recent gastroenteritis  Will hold on barium swallow and upper endoscopy at this point-  Considered a barium swallow but patient does not want undergo testing at this    2  Gastroesophageal reflux disease without esophagitis  - patient on maximum therapy for gastroesophageal reflux  She still has symptoms however major symptom now is epigastric abdominal pain  Once that resolves and patient has more issues with reflux can try the baclofen  Hold for present for which she takes it she could take 1 tablet twice a day  This is a low dose  - wait for epigastric pain to improve and start Carafate alone initially     - baclofen 10 mg tablet; Take 1 tablet (10 mg total) by mouth 2 (two) times a day  Dispense: 30 tablet; Refill: 2    3  Epigastric pain  --  Patient with more epigastric and left upper quadrant pain  This has been worse since February 20th when she was seen in the ED for acute nausea retching and diarrhea  CT scan revealed fluid filled colon and small bowel consistent with gastroenteritis  Has possibly responded to Carafate in the past     - Carafate 1 g twice a day    4  Gastroenteritis  -- patient with recent gastroenteritis  Family members including grandson and son and son's partner all came down with similar symptoms  Patient much more slow to recover    5  History of Nissen fundoplication  -- had been  Intact on upper endoscopy last June and on scanning    Patient having trouble with vomiting because of the presence of the fundoplication when she was ill   -  Probably contributed to problem number some    6  LFTs abnormal  --  Laboratory values when patient was in the ED revealed an elevated AST  She has had this in past as well  Last July LFTs were normal   Will re-evaluate with autoimmune serologies and viral serologies along repeat LFTs  - LFTs, CHAYO, smooth muscle antibody, hepatitis-C antibody hepatitis B surface antigen and surface antibodies ordered    7  Gastroparesis  -- had significantly abnormal study in the past year  70% retention after 4 hours  Reglan really not helping  Could consider  Motegrity      Followup Appointment: 3 mo   ______________________________________________________________________    Chief Complaint   Patient presents with    Follow-up     HPI:  Patient returns to the office for routine follow-up  He had followed the patient over the years for chronic reflux symptoms in spite of the fact that she had a Nissen fundoplication in the remote past   This seems to be intact still  Major symptoms over the years have included dysphagia for solids and less so to live with her she has had multiple esophageal dilations which was not help the situation  She has been seen down at [facility] for tertiary consultation with no conclusive recommendations on her management  Reflux symptoms are reasonably controlled at this point  Patient had a recent problem with nausea and diarrhea  She had a lot of dry heaving but was unable to vomit  Other relatives including her son son's girlfriend a grandson all had similar symptoms albeit for much shorter duration  Patient has had abdominal pain since that time and continues with the same  Somewhat frustrated in that she almost is afraid to eat presently  CT scan was unremarkable except for postsurgical changes but she did have some fluid-filled loops of small and large bowel  It should be noted the patient did have some abnormal liver function studies  She did have an isolated elevation of her AST    About 8 months ago she had lab studies in liver function studies were normal   Prior the patient did have some elevation of her transaminases of other examinations  Patient does have a history of gastro paresis  Trials of Reglan have not been helpful  Patient's most recent upper endoscopy was performed in 2021 and was essentially normal   There was some incidental gastric polyps  Biopsies were negative for eosinophilic esophagitis  Historical Information   Past Medical History:   Diagnosis Date    Anxiety     Arthritis     Asthma     Candida infection, esophageal (HCC)     Chronic pain     COPD (chronic obstructive pulmonary disease) (HCC)     Depression     Depression     Gastroparesis     Gastroparesis     GERD (gastroesophageal reflux disease)     Hypertension     Irritable bowel syndrome (IBS)     Migraines     Multiple thyroid nodules     Osteoporosis     Pneumonia     Psychiatric disorder      Past Surgical History:   Procedure Laterality Date    BACK SURGERY       SECTION      CHOLECYSTECTOMY      COLONOSCOPY  10/09/2013    ESOPHAGEAL DILATION       [facility]    HIATAL HERNIA REPAIR      NISSEN FUNDOPLICATION      PA ESOPHAGOGASTRODUODENOSCOPY TRANSORAL DIAGNOSTIC N/A 4/3/2019    Procedure: ESOPHAGOGASTRODUODENOSCOPY (EGD);   Surgeon: Pablo Laurent MD;  Location:  MAIN OR;  Service: Gastroenterology    ROTATOR CUFF REPAIR      SHOULDER SURGERY      SPINAL FUSION      UPPER GASTROINTESTINAL ENDOSCOPY      US GUIDED THYROID BIOPSY      WISDOM TOOTH EXTRACTION       Social History     Substance and Sexual Activity   Alcohol Use No     Social History     Substance and Sexual Activity   Drug Use No     Social History     Tobacco Use   Smoking Status Former Smoker    Types: Cigarettes    Quit date: 10/2003    Years since quittin 4   Smokeless Tobacco Never Used     Family History   Problem Relation Age of Onset    Other Mother         Esophageal disorder    Stomach cancer Father     Cancer Father     Hypertension Maternal Aunt     Colon cancer Neg Hx     Colon polyps Neg Hx          Current Outpatient Medications:     albuterol (PROVENTIL HFA,VENTOLIN HFA) 90 mcg/act inhaler    amitriptyline (ELAVIL) 50 mg tablet    cetirizine (ZyrTEC) 10 MG chewable tablet    cholecalciferol (VITAMIN D3) 1,000 units tablet    dexlansoprazole (DEXILANT) 60 MG capsule    dicyclomine (BENTYL) 20 mg tablet    famotidine (PEPCID) 40 MG tablet    LORazepam (ATIVAN) 0 5 mg tablet    Omega-3 Fat Ac-Cholecalciferol (DRY EYE OMEGA BENEFITS/VIT D-3 PO)    Omega-3 Fatty Acids (OMEGA 3 PO)    ondansetron (ZOFRAN) 4 mg tablet    baclofen 10 mg tablet    colchicine (COLCRYS) 0 6 mg tablet    LUTEIN PO    metoclopramide (REGLAN) 10 mg tablet    multivitamin (THERAGRAN) TABS    other medication, see sig,    predniSONE 20 mg tablet    RABEprazole (ACIPHEX) 20 MG tablet    sucralfate (CARAFATE) 1 g tablet    triamcinolone (KENALOG) 0 1 % ointment  Allergies   Allergen Reactions    Latex Hives    Doxycycline Hives    Cephalosporins GI Intolerance    Nsaids GI Intolerance    Penicillins GI Intolerance     Patient can only take levaquin and cipro    Prednisone GI Intolerance    Sulphasomidine GI Intolerance     Reviewed medications and allergies and updated as indicated  GI please see above   General positive for recent weight loss   Psych positive for anxiety and stress  The rest of the review of systems essentially negative    PHYSICAL EXAM:    Blood pressure 128/90, height 5' (1 524 m), weight 59 9 kg (132 lb), not currently breastfeeding  Body mass index is 25 78 kg/m²  General Appearance: NAD, cooperative, alert  Eyes: Anicteric, conjunctiva pink  ENT:  Normocephalic, atraumatic, normal mucosa      Neck:  Supple, symmetrical, trachea midline  Resp:  Clear to auscultation bilaterally; no rales, rhonchi or wheezing; respirations unlabored   CV:  S1 S2, Regular rate and rhythm; no murmur, rub, or gallop  GI:  Soft, non-tender, non-distended; normal bowel sounds; no masses, no organomegaly   Rectal: Deferred  Musculoskeletal: No cyanosis, clubbing or edema  Normal ROM  Skin:  No jaundice, rashes, or lesions   Heme/Lymph: No palpable cervical lymphadenopathy  Psych: Normal affect, good eye contact  Neuro: No gross deficits, AAOx3    Lab Results:   Lab Results   Component Value Date    WBC 12 82 (H) 02/20/2022    HGB 13 4 02/20/2022    HCT 42 1 02/20/2022    MCV 94 02/20/2022     02/20/2022     Lab Results   Component Value Date     04/09/2015    K 4 3 02/20/2022     02/20/2022    CO2 25 02/20/2022    ANIONGAP 7 04/09/2015    BUN 22 02/20/2022    CREATININE 1 57 (H) 02/20/2022    GLUCOSE 110 04/09/2015    CALCIUM 8 6 02/20/2022    CORRECTEDCA 9 2 02/20/2022    AST 86 (H) 02/20/2022    ALT 73 02/20/2022    ALKPHOS 100 02/20/2022    PROT 7 2 02/05/2015    BILITOT 0 4 02/05/2015    EGFR 32 02/20/2022     No results found for: IRON, TIBC, FERRITIN  Lab Results   Component Value Date    LIPASE 13 (L) 02/20/2022       Radiology Results:   CT abdomen pelvis with contrast    Result Date: 2/20/2022  Narrative: CT ABDOMEN AND PELVIS WITH IV CONTRAST INDICATION: Left lower quadrant pain  COMPARISON:  3/27/2019  TECHNIQUE:  CT examination of the abdomen and pelvis was performed  Axial, sagittal, and coronal 2D reformatted images were created from the source data and submitted for interpretation  Radiation dose length product (DLP) for this visit:  445 79 mGy-cm   This examination, like all CT scans performed in the [facility], was performed utilizing techniques to minimize radiation dose exposure, including the use of iterative  reconstruction and automated exposure control  IV Contrast:  100 mL of iohexol (OMNIPAQUE) Enteric Contrast:  Enteric contrast was not administered  FINDINGS: ABDOMEN LOWER CHEST:  Clear lung bases  LIVER/BILIARY TREE:  Unremarkable  GALLBLADDER:  Cholecystectomy  SPLEEN:  Unremarkable  PANCREAS:  February replacement  ADRENAL GLANDS:  Unremarkable  KIDNEYS/URETERS:  Symmetric nephrographic phase enhancement of the kidneys  Stable right renal 2 cm cyst   No obstructive uropathy  [place] Organ STOMACH AND BOWEL:  Postsurgical change at the gastroesophageal junction  Fluid-filled loops of distal small bowel  Fluid throughout the colon  No evidence of colitis, diverticulitis or bowel obstruction  APPENDIX:  Normal appendix  ABDOMINOPELVIC CAVITY:  No ascites  No pneumoperitoneum  No lymphadenopathy  VESSELS:  Extensive calcified plaque in the abdominal aorta  No aneurysm  PELVIS REPRODUCTIVE ORGANS:  No adnexal mass or cyst  URINARY BLADDER:  Unremarkable  ABDOMINAL WALL/INGUINAL REGIONS:  Unremarkable  OSSEOUS STRUCTURES:  L4 S1 posterior fusion  Normal alignment No acute fracture or destructive osseous lesion  Impression: Findings suggestive of mild enteritis and diarrhea  No evidence of colitis, diverticulitis or bowel obstruction   Workstation performed: [id number]

## 2022-03-08 NOTE — LETTER
March 10, 2022     Vannesa Cortez DO  [address]    Patient: Jen Garcia   YOB: 1945   Date of Visit: 3/8/2022       Dear Dr Yamila Medina: Thank you for referring Gino Pena to me for evaluation  Below are my notes for this consultation  If you have questions, please do not hesitate to call me  I look forward to following your patient along with you  Sincerely,        Bria Conner MD        CC: No Recipients  Bria Conner MD  3/10/2022  8:22 PM  Incomplete  A of this is due to his [address] [facility] - Outpatient Follow-up Note  Jen Garcia 77 y o  female MRN: [id number]  Encounter: [id number]    ASSESSMENT AND PLAN:      1  Pharyngoesophageal dysphagia  -Issues of dysphagia  Patient has had multiple endoscopies and esophageal dilations without success and extensive evaluation  Hopefully patient will just get back to baseline after her recent gastroenteritis  Will hold on barium swallow and upper endoscopy at this point-  Considered a barium swallow but patient does not want undergo testing at this    2  Gastroesophageal reflux disease without esophagitis  - patient on maximum therapy for gastroesophageal reflux  She still has symptoms however major symptom now is epigastric abdominal pain  Once that resolves and patient has more issues with reflux can try the baclofen  Hold for present for which she takes it she could take 1 tablet twice a day  This is a low dose  - wait for epigastric pain to improve and start Carafate alone initially     - baclofen 10 mg tablet; Take 1 tablet (10 mg total) by mouth 2 (two) times a day  Dispense: 30 tablet; Refill: 2    3  Epigastric pain  --  Patient with more epigastric and left upper quadrant pain  This has been worse since February 20th when she was seen in the ED for acute nausea retching and diarrhea    CT scan revealed fluid filled colon and small bowel consistent with gastroenteritis  Has possibly responded to Carafate in the past     - Carafate 1 g twice a day    4  Gastroenteritis  -- patient with recent gastroenteritis  Family members including grandson and son and son's partner all came down with similar symptoms  Patient much more slow to recover    5  History of Nissen fundoplication  -- had been  Intact on upper endoscopy last June and on scanning  Patient having trouble with vomiting because of the presence of the fundoplication when she was ill   -  Probably contributed to problem number some    6  LFTs abnormal  --  Laboratory values when patient was in the ED revealed an elevated AST  She has had this in past as well  Last July LFTs were normal   Will re-evaluate with autoimmune serologies and viral serologies along repeat LFTs  - LFTs, CHAYO, smooth muscle antibody, hepatitis-C antibody hepatitis B surface antigen and surface antibodies ordered    7  Gastroparesis  -- had significantly abnormal study in the past year  70% retention after 4 hours  Reglan really not helping  Could consider  Motegrity      Followup Appointment: 3 mo   ______________________________________________________________________    Chief Complaint   Patient presents with    Follow-up     HPI:  Patient returns to the office for routine follow-up  He had followed the patient over the years for chronic reflux symptoms in spite of the fact that she had a Nissen fundoplication in the remote past   This seems to be intact still  Major symptoms over the years have included dysphagia for solids and less so to live with her she has had multiple esophageal dilations which was not help the situation  She has been seen [facility] at [facility] for tertiary consultation with no conclusive recommendations on her management  Reflux symptoms are reasonably controlled at this point  Patient had a recent problem with nausea and diarrhea  She had a lot of dry heaving but was unable to vomit    Other relatives including her son son's girlfriend a grandson all had similar symptoms albeit for much shorter duration  Patient has had abdominal pain since that time and continues with the same  Somewhat frustrated in that she almost is afraid to eat presently  CT scan was unremarkable except for postsurgical changes but she did have some fluid-filled loops of small and large bowel  It should be noted the patient did have some abnormal liver function studies  She did have an isolated elevation of her AST  About 8 months ago she had lab studies in liver function studies were normal   Prior the patient did have some elevation of her transaminases of other examinations  Patient does have a history of gastro paresis  Trials of Reglan have not been helpful  Patient's most recent upper endoscopy was performed in 2021 and was essentially normal   There was some incidental gastric polyps  Biopsies were negative for eosinophilic esophagitis  Historical Information   Past Medical History:   Diagnosis Date    Anxiety     Arthritis     Asthma     Candida infection, esophageal (HCC)     Chronic pain     COPD (chronic obstructive pulmonary disease) (HCC)     Depression     Depression     Gastroparesis     Gastroparesis     GERD (gastroesophageal reflux disease)     Hypertension     Irritable bowel syndrome (IBS)     Migraines     Multiple thyroid nodules     Osteoporosis     Pneumonia     Psychiatric disorder      Past Surgical History:   Procedure Laterality Date    BACK SURGERY       SECTION      CHOLECYSTECTOMY      COLONOSCOPY  10/09/2013    ESOPHAGEAL DILATION       [facility]    HIATAL HERNIA REPAIR      NISSEN FUNDOPLICATION      IN ESOPHAGOGASTRODUODENOSCOPY TRANSORAL DIAGNOSTIC N/A 4/3/2019    Procedure: ESOPHAGOGASTRODUODENOSCOPY (EGD);   Surgeon: Maurice Zavala MD;  Location: [facility];  Service: Gastroenterology    ROTATOR CUFF REPAIR      SHOULDER SURGERY      SPINAL FUSION      UPPER GASTROINTESTINAL ENDOSCOPY      US GUIDED THYROID BIOPSY      WISDOM TOOTH EXTRACTION       Social History     Substance and Sexual Activity   Alcohol Use No     Social History     Substance and Sexual Activity   Drug Use No     Social History     Tobacco Use   Smoking Status Former Smoker    Types: Cigarettes    Quit date: 10/2003    Years since quittin 4   Smokeless Tobacco Never Used     Family History   Problem Relation Age of Onset    Other Mother         Esophageal disorder    Stomach cancer Father     Cancer Father     Hypertension Maternal Aunt     Colon cancer Neg Hx     Colon polyps Neg Hx          Current Outpatient Medications:     albuterol (PROVENTIL HFA,VENTOLIN HFA) 90 mcg/act inhaler    amitriptyline (ELAVIL) 50 mg tablet    cetirizine (ZyrTEC) 10 MG chewable tablet    cholecalciferol (VITAMIN D3) 1,000 units tablet    dexlansoprazole (DEXILANT) 60 MG capsule    dicyclomine (BENTYL) 20 mg tablet    famotidine (PEPCID) 40 MG tablet    LORazepam (ATIVAN) 0 5 mg tablet    Omega-3 Fat Ac-Cholecalciferol (DRY EYE OMEGA BENEFITS/VIT D-3 PO)    Omega-3 Fatty Acids (OMEGA 3 PO)    ondansetron (ZOFRAN) 4 mg tablet    baclofen 10 mg tablet    colchicine (COLCRYS) 0 6 mg tablet    LUTEIN PO    metoclopramide (REGLAN) 10 mg tablet    multivitamin (THERAGRAN) TABS    other medication, see sig,    predniSONE 20 mg tablet    RABEprazole (ACIPHEX) 20 MG tablet    sucralfate (CARAFATE) 1 g tablet    triamcinolone (KENALOG) 0 1 % ointment  Allergies   Allergen Reactions    Latex Hives    Doxycycline Hives    Cephalosporins GI Intolerance    Nsaids GI Intolerance    Penicillins GI Intolerance     Patient can only take levaquin and cipro    Prednisone GI Intolerance    Sulphasomidine GI Intolerance     Reviewed medications and allergies and updated as indicated  GI please see above   General positive for recent weight loss   Psych positive for anxiety and stress  The rest of the review of systems essentially negative    PHYSICAL EXAM:    Blood pressure 128/90, height 5' (1 524 m), weight 59 9 kg (132 lb), not currently breastfeeding  Body mass index is 25 78 kg/m²  General Appearance: NAD, cooperative, alert  Eyes: Anicteric, conjunctiva pink  ENT:  Normocephalic, atraumatic, normal mucosa  Neck:  Supple, symmetrical, trachea midline  Resp:  Clear to auscultation bilaterally; no rales, rhonchi or wheezing; respirations unlabored   CV:  S1 S2, Regular rate and rhythm; no murmur, rub, or gallop  GI:  Soft, non-tender, non-distended; normal bowel sounds; no masses, no organomegaly   Rectal: Deferred  Musculoskeletal: No cyanosis, clubbing or edema  Normal ROM  Skin:  No jaundice, rashes, or lesions   Heme/Lymph: No palpable cervical lymphadenopathy  Psych: Normal affect, good eye contact  Neuro: No gross deficits, AAOx3    Lab Results:   Lab Results   Component Value Date    WBC 12 82 (H) 02/20/2022    HGB 13 4 02/20/2022    HCT 42 1 02/20/2022    MCV 94 02/20/2022     02/20/2022     Lab Results   Component Value Date     04/09/2015    K 4 3 02/20/2022     02/20/2022    CO2 25 02/20/2022    ANIONGAP 7 04/09/2015    BUN 22 02/20/2022    CREATININE 1 57 (H) 02/20/2022    GLUCOSE 110 04/09/2015    CALCIUM 8 6 02/20/2022    CORRECTEDCA 9 2 02/20/2022    AST 86 (H) 02/20/2022    ALT 73 02/20/2022    ALKPHOS 100 02/20/2022    PROT 7 2 02/05/2015    BILITOT 0 4 02/05/2015    EGFR 32 02/20/2022     No results found for: IRON, TIBC, FERRITIN  Lab Results   Component Value Date    LIPASE 13 (L) 02/20/2022       Radiology Results:   CT abdomen pelvis with contrast    Result Date: 2/20/2022  Narrative: CT ABDOMEN AND PELVIS WITH IV CONTRAST INDICATION: Left lower quadrant pain  COMPARISON:  3/27/2019  TECHNIQUE:  CT examination of the abdomen and pelvis was performed   Axial, sagittal, and coronal 2D reformatted images were created from the source data and submitted for interpretation  Radiation dose length product (DLP) for this visit:  445 79 mGy-cm   This examination, like all CT scans performed in the [facility], was performed utilizing techniques to minimize radiation dose exposure, including the use of iterative  reconstruction and automated exposure control  IV Contrast:  100 mL of iohexol (OMNIPAQUE) Enteric Contrast:  Enteric contrast was not administered  FINDINGS: ABDOMEN LOWER CHEST:  Clear lung bases  LIVER/BILIARY TREE:  Unremarkable  GALLBLADDER:  Cholecystectomy  SPLEEN:  Unremarkable  PANCREAS:  February replacement  ADRENAL GLANDS:  Unremarkable  KIDNEYS/URETERS:  Symmetric nephrographic phase enhancement of the kidneys  Stable right renal 2 cm cyst   No obstructive uropathy  [facility] STOMACH AND BOWEL:  Postsurgical change at the gastroesophageal junction  Fluid-filled loops of distal small bowel  Fluid throughout the colon  No evidence of colitis, diverticulitis or bowel obstruction  APPENDIX:  Normal appendix  ABDOMINOPELVIC CAVITY:  No ascites  No pneumoperitoneum  No lymphadenopathy  VESSELS:  Extensive calcified plaque in the abdominal aorta  No aneurysm  PELVIS REPRODUCTIVE ORGANS:  No adnexal mass or cyst  URINARY BLADDER:  Unremarkable  ABDOMINAL WALL/INGUINAL REGIONS:  Unremarkable  OSSEOUS STRUCTURES:  L4 S1 posterior fusion  Normal alignment No acute fracture or destructive osseous lesion  Impression: Findings suggestive of mild enteritis and diarrhea  No evidence of colitis, diverticulitis or bowel obstruction  Workstation performed: [id number]     Marcio Souza MD  3/10/2022  8:13 PM  Sign when Signing Visit  A of this is due to his [address] [facility] - Outpatient Follow-up Note  Arlin Sanchez 77 y o  female MRN: [id number]  Encounter: [id number]    ASSESSMENT AND PLAN:      1  Pharyngoesophageal dysphagia  -Issues of dysphagia    Patient has had multiple endoscopies and esophageal dilations without success and extensive evaluation  Hopefully patient will just get back to baseline after her recent gastroenteritis  Will hold on barium swallow and upper endoscopy at this point-  Considered a barium swallow but patient does not want undergo testing at this    2  Gastroesophageal reflux disease without esophagitis  - patient on maximum therapy for gastroesophageal reflux  She still has symptoms however major symptom now is epigastric abdominal pain  Once that resolves and patient has more issues with reflux can try the baclofen  Hold for present for which she takes it she could take 1 tablet twice a day  This is a low dose  - baclofen 10 mg tablet; Take 1 tablet (10 mg total) by mouth 2 (two) times a day  Dispense: 30 tablet; Refill: 2    3  Epigastric pain  --  Patient with more epigastric and left upper quadrant pain  This has been worse since February 20th when she was seen in the ED for acute nausea retching and diarrhea  CT scan revealed fluid filled colon and small bowel consistent with gastroenteritis  Has possibly responded to Carafate in the past     - Carafate 1 g twice a day    4  Gastroenteritis  -- patient with recent gastroenteritis  Family members including grandson and son and son's partner all came down with similar symptoms  Patient much more slow to recover    5  History of Nissen fundoplication  -- had been  Intact on upper endoscopy last June and on scanning  Patient having trouble with vomiting because of the presence of the fundoplication when she was ill   -  Probably contributed to problem number some    6  LFTs abnormal  --  Laboratory values when patient was in the ED revealed an elevated AST  She has had this in past as well    Last July LFTs were normal   Will re-evaluate with autoimmune serologies and viral serologies along repeat LFTs  - LFTs, CHAYO, smooth muscle antibody, hepatitis-C antibody hepatitis B surface antigen and surface antibodies ordered    7  Gastroparesis  -- had significantly abnormal study in the past year  70% retention after 4 hours  Reglan really not helping  Could consider  Motegrity      Followup Appointment: 3 mo   ______________________________________________________________________    Chief Complaint   Patient presents with    Follow-up     HPI:  Patient returns to the office for routine follow-up  He had followed the patient over the years for chronic reflux symptoms in spite of the fact that she had a Nissen fundoplication in the remote past   This seems to be intact still  Major symptoms over the years have included dysphagia for solids and less so to live with her she has had multiple esophageal dilations which was not help the situation  She has been seen down at [facility] for tertiary consultation with no conclusive recommendations on her management  Reflux symptoms are reasonably controlled at this point  Patient had a recent problem with nausea and diarrhea  She had a lot of dry heaving but was unable to vomit  Other relatives including her son son's girlfriend a grandson all had similar symptoms albeit for much shorter duration  Patient has had abdominal pain since that time and continues with the same  Somewhat frustrated in that she almost is afraid to eat presently  CT scan was unremarkable except for postsurgical changes but she did have some fluid-filled loops of small and large bowel  It should be noted the patient did have some abnormal liver function studies  She did have an isolated elevation of her AST  About 8 months ago she had lab studies in liver function studies were normal   Prior the patient did have some elevation of her transaminases of other examinations  Patient does have a history of gastro paresis  Trials of Reglan have not been helpful  Patient's most recent upper endoscopy was performed in June 2021 and was essentially normal   There was some incidental gastric polyps  Biopsies were negative for eosinophilic esophagitis  Historical Information   Past Medical History:   Diagnosis Date    Anxiety     Arthritis     Asthma     Candida infection, esophageal (HCC)     Chronic pain     COPD (chronic obstructive pulmonary disease) (HCC)     Depression     Depression     Gastroparesis     Gastroparesis     GERD (gastroesophageal reflux disease)     Hypertension     Irritable bowel syndrome (IBS)     Migraines     Multiple thyroid nodules     Osteoporosis     Pneumonia     Psychiatric disorder      Past Surgical History:   Procedure Laterality Date    BACK SURGERY       SECTION      CHOLECYSTECTOMY      COLONOSCOPY  10/09/2013    ESOPHAGEAL DILATION       [facility] [address]    HIATAL HERNIA REPAIR      NISSEN FUNDOPLICATION      AL ESOPHAGOGASTRODUODENOSCOPY TRANSORAL DIAGNOSTIC N/A 4/3/2019    Procedure: ESOPHAGOGASTRODUODENOSCOPY (EGD);   Surgeon: Maycol Faria MD;  Location:  MAIN OR;  Service: Gastroenterology    ROTATOR CUFF REPAIR      SHOULDER SURGERY      SPINAL FUSION      UPPER GASTROINTESTINAL ENDOSCOPY      US GUIDED THYROID BIOPSY      WISDOM TOOTH EXTRACTION       Social History     Substance and Sexual Activity   Alcohol Use No     Social History     Substance and Sexual Activity   Drug Use No     Social History     Tobacco Use   Smoking Status Former Smoker    Types: Cigarettes    Quit date: 10/2003    Years since quittin 4   Smokeless Tobacco Never Used     Family History   Problem Relation Age of Onset    Other Mother         Esophageal disorder    Stomach cancer Father     Cancer Father     Hypertension Maternal Aunt     Colon cancer Neg Hx     Colon polyps Neg Hx          Current Outpatient Medications:     albuterol (PROVENTIL HFA,VENTOLIN HFA) 90 mcg/act inhaler    amitriptyline (ELAVIL) 50 mg tablet    cetirizine (ZyrTEC) 10 MG chewable tablet    cholecalciferol (VITAMIN D3) 1,000 units tablet    dexlansoprazole (DEXILANT) 60 MG capsule    dicyclomine (BENTYL) 20 mg tablet    famotidine (PEPCID) 40 MG tablet    LORazepam (ATIVAN) 0 5 mg tablet    Omega-3 Fat Ac-Cholecalciferol (DRY EYE OMEGA BENEFITS/VIT D-3 PO)    Omega-3 Fatty Acids (OMEGA 3 PO)    ondansetron (ZOFRAN) 4 mg tablet    baclofen 10 mg tablet    colchicine (COLCRYS) 0 6 mg tablet    LUTEIN PO    metoclopramide (REGLAN) 10 mg tablet    multivitamin (THERAGRAN) TABS    other medication, see sig,    predniSONE 20 mg tablet    RABEprazole (ACIPHEX) 20 MG tablet    sucralfate (CARAFATE) 1 g tablet    triamcinolone (KENALOG) 0 1 % ointment  Allergies   Allergen Reactions    Latex Hives    Doxycycline Hives    Cephalosporins GI Intolerance    Nsaids GI Intolerance    Penicillins GI Intolerance     Patient can only take levaquin and cipro    Prednisone GI Intolerance    Sulphasomidine GI Intolerance     Reviewed medications and allergies and updated as indicated  GI please see above   General positive for recent weight loss   Psych positive for anxiety and stress  The rest of the review of systems essentially negative    PHYSICAL EXAM:    Blood pressure 128/90, height 5' (1 524 m), weight 59 9 kg (132 lb), not currently breastfeeding  Body mass index is 25 78 kg/m²  General Appearance: NAD, cooperative, alert  Eyes: Anicteric, conjunctiva pink  ENT:  Normocephalic, atraumatic, normal mucosa  Neck:  Supple, symmetrical, trachea midline  Resp:  Clear to auscultation bilaterally; no rales, rhonchi or wheezing; respirations unlabored   CV:  S1 S2, Regular rate and rhythm; no murmur, rub, or gallop  GI:  Soft, non-tender, non-distended; normal bowel sounds; no masses, no organomegaly   Rectal: Deferred  Musculoskeletal: No cyanosis, clubbing or edema  Normal ROM    Skin:  No jaundice, rashes, or lesions   Heme/Lymph: No palpable cervical lymphadenopathy  Psych: Normal affect, good eye contact  Neuro: No gross deficits, AAOx3    Lab Results: Lab Results   Component Value Date    WBC 12 82 (H) 02/20/2022    HGB 13 4 02/20/2022    HCT 42 1 02/20/2022    MCV 94 02/20/2022     02/20/2022     Lab Results   Component Value Date     04/09/2015    K 4 3 02/20/2022     02/20/2022    CO2 25 02/20/2022    ANIONGAP 7 04/09/2015    BUN 22 02/20/2022    CREATININE 1 57 (H) 02/20/2022    GLUCOSE 110 04/09/2015    CALCIUM 8 6 02/20/2022    CORRECTEDCA 9 2 02/20/2022    AST 86 (H) 02/20/2022    ALT 73 02/20/2022    ALKPHOS 100 02/20/2022    PROT 7 2 02/05/2015    BILITOT 0 4 02/05/2015    EGFR 32 02/20/2022     No results found for: IRON, TIBC, FERRITIN  Lab Results   Component Value Date    LIPASE 13 (L) 02/20/2022       Radiology Results:   CT abdomen pelvis with contrast    Result Date: 2/20/2022  Narrative: CT ABDOMEN AND PELVIS WITH IV CONTRAST INDICATION: Left lower quadrant pain  COMPARISON:  3/27/2019  TECHNIQUE:  CT examination of the abdomen and pelvis was performed  Axial, sagittal, and coronal 2D reformatted images were created from the source data and submitted for interpretation  Radiation dose length product (DLP) for this visit:  445 79 mGy-cm   This examination, like all CT scans performed in the [place], was performed utilizing techniques to minimize radiation dose exposure, including the use of iterative  reconstruction and automated exposure control  IV Contrast:  100 mL of iohexol (OMNIPAQUE) Enteric Contrast:  Enteric contrast was not administered  FINDINGS: ABDOMEN LOWER CHEST:  Clear lung bases  LIVER/BILIARY TREE:  Unremarkable  GALLBLADDER:  Cholecystectomy  SPLEEN:  Unremarkable  PANCREAS:  February replacement  ADRENAL GLANDS:  Unremarkable  KIDNEYS/URETERS:  Symmetric nephrographic phase enhancement of the kidneys  Stable right renal 2 cm cyst   No obstructive uropathy  [facility] STOMACH AND BOWEL:  Postsurgical change at the gastroesophageal junction  Fluid-filled loops of distal small bowel    Fluid throughout the colon  No evidence of colitis, diverticulitis or bowel obstruction  APPENDIX:  Normal appendix  ABDOMINOPELVIC CAVITY:  No ascites  No pneumoperitoneum  No lymphadenopathy  VESSELS:  Extensive calcified plaque in the abdominal aorta  No aneurysm  PELVIS REPRODUCTIVE ORGANS:  No adnexal mass or cyst  URINARY BLADDER:  Unremarkable  ABDOMINAL WALL/INGUINAL REGIONS:  Unremarkable  OSSEOUS STRUCTURES:  L4 S1 posterior fusion  Normal alignment No acute fracture or destructive osseous lesion  Impression: Findings suggestive of mild enteritis and diarrhea  No evidence of colitis, diverticulitis or bowel obstruction   Workstation performed: [id number]

## 2022-03-08 NOTE — PATIENT INSTRUCTIONS
[facility] - Outpatient Follow-up Note  Jen Garcia 77 y o  female MRN: [id number]  Encounter: [id number]    ASSESSMENT AND PLAN:      1  Pharyngoesophageal dysphagia  -Issues of dysphagia  Patient has had multiple endoscopies and esophageal dilations without success and extensive evaluation  Hopefully patient will just get back to baseline after her recent gastroenteritis  Will hold on barium swallow and upper endoscopy at this point-  Considered a barium swallow but patient does not want undergo testing at this    2  Gastroesophageal reflux disease without esophagitis  - patient on maximum therapy for gastroesophageal reflux  She still has symptoms however major symptom now is epigastric abdominal pain  Once that resolves and patient has more issues with reflux can try the baclofen  Hold for present for which she takes it she could take 1 tablet twice a day  This is a low dose  wait for epigastric pain to improve and start Carafate alone initially     - baclofen 10 mg tablet; Take 1 tablet (10 mg total) by mouth 2 (two) times a day  Dispense: 30 tablet; Refill: 2    3  Epigastric pain  --  Patient with more epigastric and left upper quadrant pain  This has been worse since February 20th when she was seen in the ED for acute nausea retching and diarrhea  CT scan revealed fluid filled colon and small bowel consistent with gastroenteritis  Has possibly responded to Carafate in the past     - Carafate 1 g twice a day    4  Gastroenteritis  -- patient with recent gastroenteritis  Family members including grandson and son and son's partner all came down with similar symptoms  Patient much more slow to recover    5  History of Nissen fundoplication  -- had been  Intact on upper endoscopy last June and on scanning    Patient having trouble with vomiting because of the presence of the fundoplication when she was ill   -  Probably contributed to problem number some    6  LFTs abnormal  --  Laboratory values when patient was in the ED revealed an elevated AST  She has had this in past as well  Last July LFTs were normal   Will re-evaluate with autoimmune serologies and viral serologies along repeat LFTs  - LFTs, CHAYO, smooth muscle antibody, hepatitis-C antibody hepatitis B surface antigen and surface antibodies ordered    7  Gastroparesis  -- had significantly abnormal study in the past year  70% retention after 4 hours  Reglan really not helping    Could consider  Motegrity      Followup Appointment: 3 mo

## 2022-03-17 LAB
ALBUMIN SERPL-MCNC: 3.8 G/DL (ref 3.6–5.1)
ALBUMIN/GLOB SERPL: 1.1 (CALC) (ref 1–2.5)
ALP SERPL-CCNC: 94 U/L (ref 37–153)
ALT SERPL-CCNC: 37 U/L (ref 6–29)
ANA SER QL IF: NEGATIVE
AST SERPL-CCNC: 51 U/L (ref 10–35)
BILIRUB DIRECT SERPL-MCNC: 0.1 MG/DL
BILIRUB INDIRECT SERPL-MCNC: 0.2 MG/DL (CALC) (ref 0.2–1.2)
BILIRUB SERPL-MCNC: 0.3 MG/DL (ref 0.2–1.2)
GLOBULIN SER CALC-MCNC: 3.5 G/DL (CALC) (ref 1.9–3.7)
HBV SURFACE AB SERPL IA-ACNC: <5 MIU/ML
HBV SURFACE AG SERPL QL IA: NORMAL
HCV AB S/CO SERPL IA: 0.03
HCV AB SERPL QL IA: NORMAL
MITOCHONDRIA AB SER QL IF: NEGATIVE
PROT SERPL-MCNC: 7.3 G/DL (ref 6.1–8.1)
SMOOTH MUSCLE AB SER QL IF: NEGATIVE

## 2022-03-21 DIAGNOSIS — R13.14 PHARYNGOESOPHAGEAL DYSPHAGIA: ICD-10-CM

## 2022-03-21 DIAGNOSIS — K21.9 GASTROESOPHAGEAL REFLUX DISEASE WITHOUT ESOPHAGITIS: ICD-10-CM

## 2022-03-21 DIAGNOSIS — R10.13 EPIGASTRIC PAIN: Primary | ICD-10-CM

## 2022-03-21 DIAGNOSIS — K31.84 GASTROPARESIS: ICD-10-CM

## 2022-03-21 DIAGNOSIS — R79.89 LFT ELEVATION: ICD-10-CM

## 2022-03-21 NOTE — TELEPHONE ENCOUNTER
Allyson Frausto considers FibroTest-ActiTest/HCV-FibroSure medically necessary for distinguishing hepatic cirrhosis from non-cirrhosis in persons with hepatitis C and other chronic liver diseases (e g , hereditary hemochromatosis, NAFLD and HAMILTON)  Performance of this test more than twice per year is considered not medically necessary  Performance of this test within 6 months following a liver biopsy or transient elastography is considered not medically necessary  This test is considered experimental and investigational for all other indications (e g , diagnosis and/or monitoring of primary biliary cholangitis)

## 2022-03-21 NOTE — TELEPHONE ENCOUNTER
Message left for patient Gerlene Landau wants repeat LFT's in six months  Will order and mail to her

## 2022-03-21 NOTE — RESULT ENCOUNTER NOTE
Reviewed results in left voice message for the patient  Viral hepatitis studies all negative  Autoimmune studies negative  AST slightly high 51 ALT slightly high  Probably related to mild fatty liver  Will check a fib 4 study  Patient did have a CT scan in February which showed normal liver and spleen   -likely no major worry or pathology

## 2022-03-21 NOTE — TELEPHONE ENCOUNTER
I called the patient and left voice mail -liver function studies appear improved  Studies for viral hepatitis all negative an autoimmune hepatitis negative  Suspect patient may have some mild fatty liver  Will check a fib 4 study  She did have a CT scan back in February when she was in the ED in the showed normal liver normal spleen  Could consider elasticity study in the future  Probably no major problem with respect to abnormal liver functions

## 2022-03-21 NOTE — TELEPHONE ENCOUNTER
Order that was placed is not for [facility]   In addition elevated liver enzymes will not work to get the test covered

## 2022-03-22 ENCOUNTER — HOSPITAL ENCOUNTER (OUTPATIENT)
Dept: ULTRASOUND IMAGING | Facility: HOSPITAL | Age: 77
Discharge: HOME | End: 2022-03-22
Payer: COMMERCIAL

## 2022-03-22 DIAGNOSIS — R33.9 RETENTION OF URINE, UNSPECIFIED: ICD-10-CM

## 2022-03-22 PROCEDURE — 76770 US EXAM ABDO BACK WALL COMP: CPT

## 2022-03-30 NOTE — TELEPHONE ENCOUNTER
Called pt back, she did not receive Dr Marley Holstein voice message so was unclear about the repeat labs  Reviewed his message and she will repeat as suggested  She also noted that the pharmacy informed her the baclofen that was ordered at her ov may not be covered  Called [facility] [phone]  Pharmacist notes needs a prior auth from [id number] [facility] at [phone]  ID [id number]  Called [facility], spoke with Evon Sorensen, he will fax form for completion

## 2022-03-31 DIAGNOSIS — K21.9 GASTROESOPHAGEAL REFLUX DISEASE WITHOUT ESOPHAGITIS: ICD-10-CM

## 2022-04-02 RX ORDER — BACLOFEN 10 MG/1
TABLET ORAL
Qty: 180 TABLET | Refills: 1 | Status: SHIPPED | OUTPATIENT
Start: 2022-04-02 | End: 2022-04-06

## 2022-04-04 NOTE — TELEPHONE ENCOUNTER
Baclofen denied  Pt called back, she feels about the same as she did at her 3/8 ov  About once weekly she has a 5 minute episode of feeling like she cant get anything down or up  "It just sits in my throat and then I have pain "  Sometimes occurs with her large pills as well  She takes Dexilant 60 mg daily, Pepcid 40 mg HS along with Amitriptyline HS  She does not need the Zofran or Bentyl and is not taking the Carafate  Any recommendations as the Baclofen is denied? [phone]  Pt aware Dr Jeremy Conley is off today

## 2022-04-04 NOTE — TELEPHONE ENCOUNTER
Called [facility] at [phone], spoke with Lexa Kennedy, the Baclofen has been denied by her insurance      Message left for pt that the Baclofen was denied and requested she call back with how she is feeling and confirm medications she is taking to relay to Dr Rashawn Bustillo

## 2022-04-05 NOTE — TELEPHONE ENCOUNTER
Baclofen prior Fleming Charissa has already been attempted and denied  Message left for pt to call back, can try [facility] if pt is willing

## 2022-04-06 NOTE — TELEPHONE ENCOUNTER
Prior Miguel Angel Klever was already attempted and denied  Pt called back, she is willing to use [facility] and  at [facility] as should cost her about $12 per month  Medication entered for your review and signature if appropriate

## 2022-04-06 NOTE — TELEPHONE ENCOUNTER
Pt states Jimbo Velez called but she cannot get the [facility]asks for [place]  CB# [phone]

## 2022-04-07 RX ORDER — BACLOFEN 10 MG/1
10 TABLET ORAL 2 TIMES DAILY
Qty: 60 TABLET | Refills: 5 | Status: SHIPPED | OUTPATIENT
Start: 2022-04-07 | End: 2022-05-07

## 2022-04-07 RX ORDER — BACLOFEN 10 MG/1
10 TABLET ORAL 2 TIMES DAILY
Qty: 60 TABLET | Refills: 5 | Status: SHIPPED | OUTPATIENT
Start: 2022-04-07

## 2022-04-11 NOTE — TELEPHONE ENCOUNTER
Pt called stating she went to  the Baclofen over the weekend and was told $47   Called [facility] and they noted when pt came in the computers were down and was unable to run the [facility] coupon  They now have run the coupon and the cost is $12 01  Pt notified  She also mentioned she had a GI bug (diarrhea and abdominal cramping) yesterday that her grandson had the day before  Encouraged her to call back with continued symptoms  She is increasing her hydration and able to keep food down with improvement today

## 2022-04-15 ENCOUNTER — TELEPHONE (OUTPATIENT)
Dept: GASTROENTEROLOGY | Facility: CLINIC | Age: 77
End: 2022-04-15

## 2022-04-15 NOTE — TELEPHONE ENCOUNTER
Pt reports she was given Baclofen from [facility]acyn[facility] Y[facility]ra to calm her esophagus; has taken for 2 days and is "out of it"; feels groggy/is lightheaded/is afraid to drive; no pain, no nausea; has ques about pharmacist questioning why she was given this med  # [phone]

## 2022-04-17 ENCOUNTER — OFFICE VISIT (OUTPATIENT)
Dept: URGENT CARE | Facility: CLINIC | Age: 77
End: 2022-04-17
Payer: COMMERCIAL

## 2022-04-17 ENCOUNTER — APPOINTMENT (OUTPATIENT)
Dept: RADIOLOGY | Facility: CLINIC | Age: 77
End: 2022-04-17
Payer: COMMERCIAL

## 2022-04-17 VITALS
DIASTOLIC BLOOD PRESSURE: 99 MMHG | OXYGEN SATURATION: 97 % | HEART RATE: 97 BPM | TEMPERATURE: 97.5 F | RESPIRATION RATE: 20 BRPM | SYSTOLIC BLOOD PRESSURE: 178 MMHG

## 2022-04-17 DIAGNOSIS — S23.41XA RIB SPRAIN, INITIAL ENCOUNTER: Primary | ICD-10-CM

## 2022-04-17 DIAGNOSIS — S23.41XA RIB SPRAIN, INITIAL ENCOUNTER: ICD-10-CM

## 2022-04-17 PROCEDURE — 99213 OFFICE O/P EST LOW 20 MIN: CPT | Performed by: PHYSICIAN ASSISTANT

## 2022-04-17 PROCEDURE — S9083 URGENT CARE CENTER GLOBAL: HCPCS | Performed by: PHYSICIAN ASSISTANT

## 2022-04-17 PROCEDURE — 71101 X-RAY EXAM UNILAT RIBS/CHEST: CPT

## 2022-04-17 RX ORDER — LIDOCAINE 50 MG/G
1 PATCH TOPICAL DAILY
Qty: 7 PATCH | Refills: 0 | Status: SHIPPED | OUTPATIENT
Start: 2022-04-17 | End: 2022-04-24

## 2022-04-17 NOTE — PROGRESS NOTES
[facility]        NAME: Kimberley Tsai is a 77 y o  female  : 1945    MRN: [id number]  DATE: 2022  TIME: 1:37 PM    BP (!) 178/99   Pulse 97   Temp 97 5 °F (36 4 °C)   Resp 20   SpO2 97%     Assessment and Plan   Rib sprain, initial encounter [S23 41XA]  1  Rib sprain, initial encounter  XR ribs left w pa chest min 3 views    lidocaine (Lidoderm) 5 %         Patient Instructions       Follow up with PCP in 3-5 days  Proceed to  ER if symptoms worsen  Chief Complaint     Chief Complaint   Patient presents with    Rib Injury     Today:  Pt was kneed in the R ribcage by 15year old grandson  Pain /10, L anteriolateral ribcage  Painful to breath  History of Present Illness       Pt with left lower rib  Pain since playing around and getting hit in left lower rib       Review of Systems   Review of Systems   Constitutional: Negative  HENT: Negative  Eyes: Negative  Respiratory: Negative  Cardiovascular: Negative  Gastrointestinal: Negative  Endocrine: Negative  Genitourinary: Negative  Musculoskeletal: Negative  Skin: Negative  Allergic/Immunologic: Negative  Neurological: Negative  Hematological: Negative  Psychiatric/Behavioral: Negative  All other systems reviewed and are negative  Current Medications       Current Outpatient Medications:     cetirizine (ZyrTEC) 10 MG chewable tablet, Chew 10 mg daily  , Disp: , Rfl:     cholecalciferol (VITAMIN D3) 1,000 units tablet, Take 5,000 Units by mouth daily, Disp: , Rfl:     dexlansoprazole (DEXILANT) 60 MG capsule, TAKE 1 CAPSULE BY MOUTH EVERY DAY, Disp: 30 capsule, Rfl: 5    dicyclomine (BENTYL) 20 mg tablet, Take 1 tablet (20 mg total) by mouth every 6 (six) hours as needed (abdominal cramping), Disp: 20 tablet, Rfl: 0    famotidine (PEPCID) 40 MG tablet, TAKE 1 TABLET BY MOUTH EVERYDAY AT BEDTIME, Disp: 90 tablet, Rfl: 3    LORazepam (ATIVAN) 0 5 mg tablet, Take 0 5 mg by mouth daily at bedtime, Disp: , Rfl:     Omega-3 Fat Ac-Cholecalciferol (DRY EYE OMEGA BENEFITS/VIT D-3 PO), Take by mouth daily, Disp: , Rfl:     Omega-3 Fatty Acids (OMEGA 3 PO), Take by mouth, Disp: , Rfl:     sucralfate (CARAFATE) 1 g tablet, Take 1 tablet (1 g total) by mouth 2 (two) times a day, Disp: 60 tablet, Rfl: 3    albuterol (PROVENTIL HFA,VENTOLIN HFA) 90 mcg/act inhaler, Inhale 2 puffs every 6 (six) hours as needed for wheezing, Disp: , Rfl:     amitriptyline (ELAVIL) 50 mg tablet, TAKE 1 TABLET BY MOUTH DAILY AT BEDTIME, Disp: 90 tablet, Rfl: 4    baclofen 10 mg tablet, Take 1 tablet (10 mg total) by mouth 2 (two) times a day (Patient not taking: Reported on 4/17/2022 ), Disp: 60 tablet, Rfl: 5    baclofen 10 mg tablet, Take 1 tablet (10 mg total) by mouth 2 (two) times a day (Patient not taking: Reported on 4/17/2022 ), Disp: 60 tablet, Rfl: 5    colchicine (COLCRYS) 0 6 mg tablet, Take 0 6 mg by mouth daily (Patient not taking: Reported on 2/20/2022 ), Disp: , Rfl:     lidocaine (Lidoderm) 5 %, Apply 1 patch topically daily for 7 days Remove & Discard patch within 12 hours or as directed by MD, Disp: 7 patch, Rfl: 0    LUTEIN PO, Take by mouth daily (Patient not taking: Reported on 2/20/2022 ), Disp: , Rfl:     metoclopramide (REGLAN) 10 mg tablet, Take 1 tablet (10 mg total) by mouth 3 (three) times a day before meals For the 1st 1-2 weeks just take twice a day before meals  (Patient not taking: Reported on 9/29/2021), Disp: 90 tablet, Rfl: 2    multivitamin (THERAGRAN) TABS, Take 1 tablet by mouth daily   (Patient not taking: Reported on 9/29/2021), Disp: , Rfl:     ondansetron (ZOFRAN) 4 mg tablet, Take 1 tablet (4 mg total) by mouth every 8 (eight) hours as needed for nausea or vomiting, Disp: 15 tablet, Rfl: 0    other medication, see sig,, Medication/product name: preservision  Strength:  Sig (include dose, route, frequency): (Patient not taking: Reported on 9/29/2021), Disp: , Rfl:     predniSONE 20 mg tablet, Take by mouth daily For five days for gout flare (Patient not taking: Reported on 2021), Disp: , Rfl:     RABEprazole (ACIPHEX) 20 MG tablet, TAKE ONE TABLET BY MOUTH TWICE A DAY (Patient not taking: Reported on 2021), Disp: 60 tablet, Rfl: 6    sucralfate (CARAFATE) 1 g tablet, Take 1 tablet (1 g total) by mouth 4 (four) times a day as needed (nausea and vomiting) (Patient not taking: Reported on 2021), Disp: 60 tablet, Rfl: 2    triamcinolone (KENALOG) 0 1 % ointment, Apply topically to all affected areas two times a day  Avoid face, armpits, and groin   (Patient not taking: Reported on 2021), Disp: 453 6 g, Rfl: 0    Current Allergies     Allergies as of 2022 - Reviewed 2022   Allergen Reaction Noted    Latex Hives 2016    Doxycycline Hives 2019    Cephalosporins GI Intolerance 2016    Nsaids GI Intolerance 2016    Penicillins GI Intolerance 2016    Prednisone GI Intolerance 2017    Sulphasomidine GI Intolerance 2017            The following portions of the patient's history were reviewed and updated as appropriate: allergies, current medications, past family history, past medical history, past social history, past surgical history and problem list      Past Medical History:   Diagnosis Date    Anxiety     Arthritis     Asthma     Candida infection, esophageal [facility] [address] )     Chronic pain     COPD (chronic obstructive pulmonary disease) ([facility]     Depression     Depression     Gastroparesis     Gastroparesis     GERD (gastroesophageal reflux disease)     Hypertension     Irritable bowel syndrome (IBS)     Migraines     Multiple thyroid nodules     Osteoporosis     Pneumonia     Psychiatric disorder        Past Surgical History:   Procedure Laterality Date    BACK SURGERY       SECTION      CHOLECYSTECTOMY      COLONOSCOPY  10/09/2013    ESOPHAGEAL DILATION       [address]    HIATAL HERNIA REPAIR      NISSEN FUNDOPLICATION      DE ESOPHAGOGASTRODUODENOSCOPY TRANSORAL DIAGNOSTIC N/A 4/3/2019    Procedure: ESOPHAGOGASTRODUODENOSCOPY (EGD); Surgeon: Constanza New MD;  Location:  MAIN OR;  Service: Gastroenterology    ROTATOR CUFF REPAIR      SHOULDER SURGERY      SPINAL FUSION      UPPER GASTROINTESTINAL ENDOSCOPY      US GUIDED THYROID BIOPSY      WISDOM TOOTH EXTRACTION         Family History   Problem Relation Age of Onset    Other Mother         Esophageal disorder    Stomach cancer Father     Cancer Father     Hypertension Maternal Aunt     Colon cancer Neg Hx     Colon polyps Neg Hx          Medications have been verified  Objective   BP (!) 178/99   Pulse 97   Temp 97 5 °F (36 4 °C)   Resp 20   SpO2 97%        Physical Exam     Physical Exam  Vitals and nursing note reviewed  Constitutional:       Appearance: Normal appearance  She is normal weight  HENT:      Head: Normocephalic and atraumatic  Right Ear: Tympanic membrane, ear canal and external ear normal       Left Ear: Tympanic membrane, ear canal and external ear normal       Nose: Nose normal       Mouth/Throat:      Mouth: Mucous membranes are moist       Pharynx: Oropharynx is clear  Eyes:      Extraocular Movements: Extraocular movements intact  Conjunctiva/sclera: Conjunctivae normal       Pupils: Pupils are equal, round, and reactive to light  Cardiovascular:      Rate and Rhythm: Normal rate and regular rhythm  Pulses: Normal pulses  Heart sounds: Normal heart sounds  Comments: Left lower rib pain mid axillary to mid clavicular line   noluq or left abd pain   Pulmonary:      Effort: Pulmonary effort is normal       Breath sounds: Normal breath sounds  Abdominal:      General: Abdomen is flat  Bowel sounds are normal       Palpations: Abdomen is soft  Musculoskeletal:         General: Normal range of motion        Cervical back: Normal range of motion and neck supple  Skin:     General: Skin is warm  Capillary Refill: Capillary refill takes less than 2 seconds  Neurological:      General: No focal deficit present  Mental Status: She is alert and oriented to person, place, and time     Psychiatric:         Mood and Affect: Mood normal          Behavior: Behavior normal

## 2022-04-17 NOTE — PATIENT INSTRUCTIONS
Rib Contusion   WHAT YOU NEED TO KNOW:   A rib contusion is a bruise on one or more of your ribs  DISCHARGE INSTRUCTIONS:   Return to the emergency department if:   · You have increased chest pain  · You have shortness of breath  · You start to cough up blood  · Your pain does not improve with pain medicine  Contact your healthcare provider if:   · You have a cough  · You have a fever  · You have questions or concerns about your condition or care  Medicines: You may need any of the following:  · NSAIDs , such as ibuprofen, help decrease swelling, pain, and fever  This medicine is available with or without a doctor's order  NSAIDs can cause stomach bleeding or kidney problems in certain people  If you take blood thinner medicine, always ask if NSAIDs are safe for you  Always read the medicine label and follow directions  Do not give these medicines to children under 10months of age without direction from your child's healthcare provider  · Prescription pain medicine  may be given  Ask how to take this medicine safely  · Take your medicine as directed  Contact your healthcare provider if you think your medicine is not helping or if you have side effects  Tell him of her if you are allergic to any medicine  Keep a list of the medicines, vitamins, and herbs you take  Include the amounts, and when and why you take them  Bring the list or the pill bottles to follow-up visits  Carry your medicine list with you in case of an emergency  Deep breathing:   · To help prevent pneumonia, take 10 deep breaths every hour, even when you wake up during the night  Brace your ribs with your hands or a pillow while you take deep breaths or cough  This will help decrease your pain  · You may need to use an incentive spirometer to help you take deeper breaths  Put the plastic piece into your mouth and take a very deep breath  Hold your breath as long as you can  Then let out your breath   Do this 10 times in a row every hour while you are awake  Rest:  Rest your ribs to decrease swelling and allow the injury to heal faster  Avoid activities that may cause more pain or damage to your ribs  As your pain decreases, begin movements slowly  Ice:  Ice helps decrease swelling and pain  Ice may also help prevent tissue damage  Use an ice pack or put crushed ice in a plastic bag  Cover it with a towel and place it on your bruised area for 15 to 20 minutes every hour as directed  Follow up with your doctor as directed:  Write down your questions so you remember to ask them during your visits  © Copyright [employer] 2022 Information is for End User's use only and may not be sold, redistributed or otherwise used for commercial purposes  All illustrations and images included in CareNotes® are the copyrighted property of [employer]  or [facility] Misael Randolph   The above information is an  only  It is not intended as medical advice for individual conditions or treatments  Talk to your doctor, nurse or pharmacist before following any medical regimen to see if it is safe and effective for you

## 2022-07-13 ENCOUNTER — NURSE TRIAGE (OUTPATIENT)
Dept: OTHER | Facility: OTHER | Age: 77
End: 2022-07-13

## 2022-07-14 ENCOUNTER — OFFICE VISIT (OUTPATIENT)
Dept: GASTROENTEROLOGY | Facility: CLINIC | Age: 77
End: 2022-07-14
Payer: COMMERCIAL

## 2022-07-14 VITALS
SYSTOLIC BLOOD PRESSURE: 134 MMHG | HEIGHT: 60 IN | DIASTOLIC BLOOD PRESSURE: 82 MMHG | BODY MASS INDEX: 25.25 KG/M2 | WEIGHT: 128.6 LBS

## 2022-07-14 DIAGNOSIS — K21.9 GASTROESOPHAGEAL REFLUX DISEASE WITHOUT ESOPHAGITIS: ICD-10-CM

## 2022-07-14 DIAGNOSIS — R13.19 ESOPHAGEAL DYSPHAGIA: Primary | ICD-10-CM

## 2022-07-14 DIAGNOSIS — R10.84 GENERALIZED ABDOMINAL PAIN: ICD-10-CM

## 2022-07-14 DIAGNOSIS — K52.9 GASTROENTERITIS: ICD-10-CM

## 2022-07-14 PROCEDURE — 99214 OFFICE O/P EST MOD 30 MIN: CPT | Performed by: INTERNAL MEDICINE

## 2022-07-14 RX ORDER — DICYCLOMINE HCL 20 MG
20 TABLET ORAL EVERY 6 HOURS PRN
Qty: 20 TABLET | Refills: 0 | Status: SHIPPED | OUTPATIENT
Start: 2022-07-14

## 2022-07-14 NOTE — PROGRESS NOTES
[facility] - Outpatient Follow-up Note  Jessi Mckinney 77 y o  female MRN: [id number]  Encounter: [id number]    ASSESSMENT AND PLAN:      1  Esophageal dysphagia  2  Gastroesophageal reflux disease without esophagitis  Chronic issue  Related to esophageal dysmotility versus GERD but significant functional component  - increase Dexilant to twice a day for a week  - continue Pepcid at bedtime  - soft diet  - offered to do EGD with dilation but does not want to do that at this point time  I told her if she is no better in a couple weeks to let us know and we will arrange the procedure    3  Gastroenteritis  4  Generalized abdominal pain  Episode that resulted in emergency room visit last month  Now with diffuse abdominal pain  Suspect this is all irritable bowel related    - restart dicyclomine (BENTYL) 20 mg tablet; Take 1 tablet (20 mg total) by mouth every 6 (six) hours as needed (abdominal cramping)  Dispense: 20 tablet; Refill: 0        Followup Appointment:  3 months  ______________________________________________________________________    Chief Complaint   Patient presents with    Difficulty Swallowing    Abdominal Pain     Upper - esophagus       HPI:  Patient is seen in the office today emergently secondary to abdominal pain and an episode of dysphagia  She has a long history of upper GI symptoms related to GERD as well as functional dyspepsia  She has had a Nissen fundoplication in the past   She has had intermittent episodes of dysphagia meat impactions  She has had EGDs with dilations in the past   Her last EGD was 2021  Her last dilation was 2019  She is maintaine on Dexilant in the morning and Pepcid at bedtime  She reports last month had an episode of abdominal pain and diarrhea  She went to the emergency room  She was told she had gastroenteritis  She reports some intermittent diarrhea  She reports some abdominal pain  She is not taking Bentyl  Yesterday was he was eating had an episode of dysphagia  She has trouble swallowing her food  Eventually she vomited  She reports increasing abdominal pain during this episode  She denies any fevers or chills  She denies any GI bleeding  She reports some increasing abdominal bloating and diarrhea last night  She does admit that she is under lot of stress  She is in the process of selling her house and moving in with her son and grandson    Historical Information   Past Medical History:   Diagnosis Date    Anxiety     Arthritis     Asthma     Candida infection, esophageal ([address] )     Chronic pain     COPD (chronic obstructive pulmonary disease) (HCC)     Depression     Depression     Gastroparesis     Gastroparesis     GERD (gastroesophageal reflux disease)     Hypertension     Irritable bowel syndrome (IBS)     Migraines     Multiple thyroid nodules     Osteoporosis     Pneumonia     Psychiatric disorder      Past Surgical History:   Procedure Laterality Date    BACK SURGERY       SECTION      CHOLECYSTECTOMY      COLONOSCOPY  10/09/2013    ESOPHAGEAL DILATION       [facility]    HIATAL HERNIA REPAIR      NISSEN FUNDOPLICATION      [place] ESOPHAGOGASTRODUODENOSCOPY TRANSORAL DIAGNOSTIC N/A 4/3/2019    Procedure: ESOPHAGOGASTRODUODENOSCOPY (EGD);   Surgeon: Og Dickinson MD;  Location: [facility];  Service: Gastroenterology    ROTATOR CUFF REPAIR      SHOULDER SURGERY      SPINAL FUSION      UPPER GASTROINTESTINAL ENDOSCOPY      US GUIDED THYROID BIOPSY      WISDOM TOOTH EXTRACTION       Social History     Substance and Sexual Activity   Alcohol Use No     Social History     Substance and Sexual Activity   Drug Use No     Social History     Tobacco Use   Smoking Status Former Smoker    Types: Cigarettes    Quit date: 10/2003    Years since quittin 7   Smokeless Tobacco Never Used     Family History   Problem Relation Age of Onset    Other Mother         Esophageal disorder    Stomach cancer Father     Cancer Father     Hypertension Maternal Aunt     Colon cancer Neg Hx     Colon polyps Neg Hx          Current Outpatient Medications:     cetirizine (ZyrTEC) 10 MG chewable tablet    cholecalciferol (VITAMIN D3) 1,000 units tablet    dexlansoprazole (DEXILANT) 60 MG capsule    dicyclomine (BENTYL) 20 mg tablet    famotidine (PEPCID) 40 MG tablet    LORazepam (ATIVAN) 0 5 mg tablet    Omega-3 Fat Ac-Cholecalciferol (DRY EYE OMEGA BENEFITS/VIT D-3 PO)    Omega-3 Fatty Acids (OMEGA 3 PO)    ondansetron (ZOFRAN) 4 mg tablet    albuterol (PROVENTIL HFA,VENTOLIN HFA) 90 mcg/act inhaler    amitriptyline (ELAVIL) 50 mg tablet    baclofen 10 mg tablet    baclofen 10 mg tablet    colchicine (COLCRYS) 0 6 mg tablet    lidocaine (Lidoderm) 5 %    LUTEIN PO    metoclopramide (REGLAN) 10 mg tablet    multivitamin (THERAGRAN) TABS    other medication, see sig,    predniSONE 20 mg tablet    RABEprazole (ACIPHEX) 20 MG tablet    sucralfate (CARAFATE) 1 g tablet    sucralfate (CARAFATE) 1 g tablet    triamcinolone (KENALOG) 0 1 % ointment  Allergies   Allergen Reactions    Latex Hives    Doxycycline Hives    Cephalosporins GI Intolerance    Nsaids GI Intolerance    Penicillins GI Intolerance     Patient can only take levaquin and cipro    Prednisone GI Intolerance    Sulphasomidine GI Intolerance     Reviewed medications and allergies and updated as indicated    PHYSICAL EXAM:    Blood pressure 134/82, height 5' (1 524 m), weight 58 3 kg (128 lb 9 6 oz), not currently breastfeeding  Body mass index is 25 12 kg/m²  General Appearance: NAD, cooperative, alert  Eyes: Anicteric, PERRLA, EOMI  ENT:  Normocephalic, atraumatic, normal mucosa  Neck:  Supple, symmetrical, trachea midline  Resp:  Clear to auscultation bilaterally; no rales, rhonchi or wheezing; respirations unlabored   CV:  S1 S2, Regular rate and rhythm; no murmur, rub, or gallop    GI:  Soft, mild diffuse abdominal tenderness without rebound or guarding, non-distended; normal bowel sounds; no masses, no organomegaly   Rectal: Deferred  Musculoskeletal: No cyanosis, clubbing or edema  Normal ROM  Skin:  No jaundice, rashes, or lesions   Heme/Lymph: No palpable cervical lymphadenopathy  Psych: Normal affect, good eye contact  Neuro: No gross deficits, AAOx3    Lab Results:   Lab Results   Component Value Date    WBC 12 82 (H) 02/20/2022    HGB 13 4 02/20/2022    HCT 42 1 02/20/2022    MCV 94 02/20/2022     02/20/2022     Lab Results   Component Value Date     04/09/2015    K 4 3 02/20/2022     02/20/2022    CO2 25 02/20/2022    ANIONGAP 7 04/09/2015    BUN 22 02/20/2022    CREATININE 1 57 (H) 02/20/2022    GLUCOSE 110 04/09/2015    CALCIUM 8 6 02/20/2022    CORRECTEDCA 9 2 02/20/2022    AST 51 (H) 03/16/2022    ALT 37 (H) 03/16/2022    ALKPHOS 94 03/16/2022    PROT 7 2 02/05/2015    BILITOT 0 4 02/05/2015    EGFR 32 02/20/2022       Lab Results   Component Value Date    LIPASE 13 (L) 02/20/2022       Radiology Results:   No results found

## 2022-07-14 NOTE — TELEPHONE ENCOUNTER
I spoke with patient she is still having pain  I placed her on schedule to see [facility] today

## 2022-07-14 NOTE — TELEPHONE ENCOUNTER
I was contacted by health call nurse last night  Patient had dysphagia/food bolus sensation that eventually passed but she still had pain  I reviewed records  She is well known to Dr Gladys Skelton with similar complaints  I recommended a liquid antacid like Gaviscon and a clear liquid diet    Please contact the patient to see if symptoms persist   If so, please arrange office visit with any provider ASAP

## 2022-07-14 NOTE — TELEPHONE ENCOUNTER
Regarding: trouble swallowing  ----- Message from [place] sent at 7/13/2022  8:42 PM EDT -----  Pt called, " I am having issues swallowing my food   It feel it gets stuck in my esophagus "

## 2022-07-14 NOTE — TELEPHONE ENCOUNTER
Reason for Disposition   Patient sounds very sick or weak to the triager    Answer Assessment - Initial Assessment Questions  1  SYMPTOM: "Are you having difficulty swallowing liquids, solids, or both?"     Try to take a bite of food, but when goes into esophagus gets a lot of pain and unable to eat for about 10 minutes until she is able to swallow the food, unable to swallow due to pain and then she was throwing up, unable to swallow the liquids due to pain, now can get water to go down, but is afraid to eat due to pain    2  ONSET: "When did the swallowing problems begin?"       One week ago    3  CAUSE: "What do you think is causing the problem?"       Unknown    4  CHRONIC/RECURRENT: "Is this a new problem for you?"  If no, ask: "How long have you had this problem?" (e g , days, weeks, months)       Yes    5  OTHER SYMPTOMS: "Do you have any other symptoms?" (e g , difficulty breathing, sore throat, swollen tongue, chest pain)      Burping, acid reflux up to throat, gagging, diarrhea    6   PREGNANCY: "Is there any chance you are pregnant?" "When was your last menstrual period?"      N/A    Protocols used: SWALLOWING DIFFICULTY-ADULT-

## 2022-07-14 NOTE — PATIENT INSTRUCTIONS
Crease Dexilant to twice a day for 1 week and then decrease back down to once a day  Continue Pepcid at bedtime  Use Bentyl 4 times a day as needed for abdominal pain    If still having issues in a couple weeks reach out to us so we will arrange an EGD with dilation

## 2022-07-14 NOTE — LETTER
July 14, 2022     Vannesa Cortez DO  P O  [address]    Patient: Jen Garcia   YOB: 1945   Date of Visit: 7/14/2022       Dear Dr Yamila Medina: Thank you for referring Constantinricardo Pena to me for evaluation  Below are my notes for this consultation  If you have questions, please do not hesitate to call me  I look forward to following your patient along with you  Sincerely,        Elly Gyu MD        CC: No Recipients  Elly Guy MD  7/14/2022  3:23 PM  Sign when Signing Visit  [facility] - Outpatient Follow-up Note  Jen Garcia 77 y o  female MRN: [id number]  Encounter: [id number]    ASSESSMENT AND PLAN:      1  Esophageal dysphagia  2  Gastroesophageal reflux disease without esophagitis  Chronic issue  Related to esophageal dysmotility versus GERD but significant functional component  - increase Dexilant to twice a day for a week  - continue Pepcid at bedtime  - soft diet  - offered to do EGD with dilation but does not want to do that at this point time  I told her if she is no better in a couple weeks to let us know and we will arrange the procedure    3  Gastroenteritis  4  Generalized abdominal pain  Episode that resulted in emergency room visit last month  Now with diffuse abdominal pain  Suspect this is all irritable bowel related    - restart dicyclomine (BENTYL) 20 mg tablet; Take 1 tablet (20 mg total) by mouth every 6 (six) hours as needed (abdominal cramping)  Dispense: 20 tablet; Refill: 0        Followup Appointment:  3 months  ______________________________________________________________________    Chief Complaint   Patient presents with    Difficulty Swallowing    Abdominal Pain     Upper - esophagus       HPI:  Patient is seen in the office today emergently secondary to abdominal pain and an episode of dysphagia    She has a long history of upper GI symptoms related to GERD as well as functional dyspepsia  She has had a Nissen fundoplication in the past   She has had intermittent episodes of dysphagia meat impactions  She has had EGDs with dilations in the past   Her last EGD was   Her last dilation was   She is maintaine on Dexilant in the morning and Pepcid at bedtime  She reports last month had an episode of abdominal pain and diarrhea  She went to the emergency room  She was told she had gastroenteritis  She reports some intermittent diarrhea  She reports some abdominal pain  She is not taking Bentyl  Yesterday was he was eating had an episode of dysphagia  She has trouble swallowing her food  Eventually she vomited  She reports increasing abdominal pain during this episode  She denies any fevers or chills  She denies any GI bleeding  She reports some increasing abdominal bloating and diarrhea last night  She does admit that she is under lot of stress  She is in the process of selling her house and moving in with her son and grandson    Historical Information   Past Medical History:   Diagnosis Date    Anxiety     Arthritis     Asthma     Candida infection, esophageal ([address] )     Chronic pain     COPD (chronic obstructive pulmonary disease) (HCC)     Depression     Depression     Gastroparesis     Gastroparesis     GERD (gastroesophageal reflux disease)     Hypertension     Irritable bowel syndrome (IBS)     Migraines     Multiple thyroid nodules     Osteoporosis     Pneumonia     Psychiatric disorder      Past Surgical History:   Procedure Laterality Date    BACK SURGERY       SECTION      CHOLECYSTECTOMY      COLONOSCOPY  10/09/2013    ESOPHAGEAL DILATION       [facility]    HIATAL HERNIA REPAIR      NISSEN FUNDOPLICATION      [place] ESOPHAGOGASTRODUODENOSCOPY TRANSORAL DIAGNOSTIC N/A 4/3/2019    Procedure: ESOPHAGOGASTRODUODENOSCOPY (EGD);   Surgeon: Bea Maloney MD;  Location:  MAIN OR;  Service: Gastroenterology   Myrna Jean CUFF REPAIR      SHOULDER SURGERY      SPINAL FUSION      UPPER GASTROINTESTINAL ENDOSCOPY      US GUIDED THYROID BIOPSY      WISDOM TOOTH EXTRACTION       Social History     Substance and Sexual Activity   Alcohol Use No     Social History     Substance and Sexual Activity   Drug Use No     Social History     Tobacco Use   Smoking Status Former Smoker    Types: Cigarettes    Quit date: 10/2003    Years since quittin 7   Smokeless Tobacco Never Used     Family History   Problem Relation Age of Onset    Other Mother         Esophageal disorder    Stomach cancer Father     Cancer Father     Hypertension Maternal Aunt     Colon cancer Neg Hx     Colon polyps Neg Hx          Current Outpatient Medications:     cetirizine (ZyrTEC) 10 MG chewable tablet    cholecalciferol (VITAMIN D3) 1,000 units tablet    dexlansoprazole (DEXILANT) 60 MG capsule    dicyclomine (BENTYL) 20 mg tablet    famotidine (PEPCID) 40 MG tablet    LORazepam (ATIVAN) 0 5 mg tablet    Omega-3 Fat Ac-Cholecalciferol (DRY EYE OMEGA BENEFITS/VIT D-3 PO)    Omega-3 Fatty Acids (OMEGA 3 PO)    ondansetron (ZOFRAN) 4 mg tablet    albuterol (PROVENTIL HFA,VENTOLIN HFA) 90 mcg/act inhaler    amitriptyline (ELAVIL) 50 mg tablet    baclofen 10 mg tablet    baclofen 10 mg tablet    colchicine (COLCRYS) 0 6 mg tablet    lidocaine (Lidoderm) 5 %    LUTEIN PO    metoclopramide (REGLAN) 10 mg tablet    multivitamin (THERAGRAN) TABS    other medication, see sig,    predniSONE 20 mg tablet    RABEprazole (ACIPHEX) 20 MG tablet    sucralfate (CARAFATE) 1 g tablet    sucralfate (CARAFATE) 1 g tablet    triamcinolone (KENALOG) 0 1 % ointment  Allergies   Allergen Reactions    Latex Hives    Doxycycline Hives    Cephalosporins GI Intolerance    Nsaids GI Intolerance    Penicillins GI Intolerance     Patient can only take levaquin and cipro    Prednisone GI Intolerance    Sulphasomidine GI Intolerance     Reviewed medications and allergies and updated as indicated    PHYSICAL EXAM:    Blood pressure 134/82, height 5' (1 524 m), weight 58 3 kg (128 lb 9 6 oz), not currently breastfeeding  Body mass index is 25 12 kg/m²  General Appearance: NAD, cooperative, alert  Eyes: Anicteric, PERRLA, EOMI  ENT:  Normocephalic, atraumatic, normal mucosa  Neck:  Supple, symmetrical, trachea midline  Resp:  Clear to auscultation bilaterally; no rales, rhonchi or wheezing; respirations unlabored   CV:  S1 S2, Regular rate and rhythm; no murmur, rub, or gallop  GI:  Soft, mild diffuse abdominal tenderness without rebound or guarding, non-distended; normal bowel sounds; no masses, no organomegaly   Rectal: Deferred  Musculoskeletal: No cyanosis, clubbing or edema  Normal ROM  Skin:  No jaundice, rashes, or lesions   Heme/Lymph: No palpable cervical lymphadenopathy  Psych: Normal affect, good eye contact  Neuro: No gross deficits, AAOx3    Lab Results:   Lab Results   Component Value Date    WBC 12 82 (H) 02/20/2022    HGB 13 4 02/20/2022    HCT 42 1 02/20/2022    MCV 94 02/20/2022     02/20/2022     Lab Results   Component Value Date     04/09/2015    K 4 3 02/20/2022     02/20/2022    CO2 25 02/20/2022    ANIONGAP 7 04/09/2015    BUN 22 02/20/2022    CREATININE 1 57 (H) 02/20/2022    GLUCOSE 110 04/09/2015    CALCIUM 8 6 02/20/2022    CORRECTEDCA 9 2 02/20/2022    AST 51 (H) 03/16/2022    ALT 37 (H) 03/16/2022    ALKPHOS 94 03/16/2022    PROT 7 2 02/05/2015    BILITOT 0 4 02/05/2015    EGFR 32 02/20/2022       Lab Results   Component Value Date    LIPASE 13 (L) 02/20/2022       Radiology Results:   No results found

## 2022-07-15 ENCOUNTER — TELEPHONE (OUTPATIENT)
Dept: GASTROENTEROLOGY | Facility: CLINIC | Age: 77
End: 2022-07-15

## 2022-07-15 NOTE — TELEPHONE ENCOUNTER
[employer] approved coverage of dicyclomine 01/01/22-12/31/22  Msg left on [facility] pharmacy message line to update them  I also asked that per Dr Tino Tamez they add 2 refills to script since he did not place order on original Rx

## 2022-08-25 DIAGNOSIS — K21.9 CHRONIC GERD: ICD-10-CM

## 2022-08-25 RX ORDER — DEXLANSOPRAZOLE 60 MG/1
CAPSULE, DELAYED RELEASE ORAL
Qty: 30 CAPSULE | Refills: 5 | Status: SHIPPED | OUTPATIENT
Start: 2022-08-25 | End: 2022-09-17

## 2022-09-17 DIAGNOSIS — K21.9 CHRONIC GERD: ICD-10-CM

## 2022-09-17 RX ORDER — DEXLANSOPRAZOLE 60 MG/1
CAPSULE, DELAYED RELEASE ORAL
Qty: 90 CAPSULE | Refills: 2 | Status: SHIPPED | OUTPATIENT
Start: 2022-09-17

## 2022-11-02 ENCOUNTER — OFFICE VISIT (OUTPATIENT)
Dept: GASTROENTEROLOGY | Facility: CLINIC | Age: 77
End: 2022-11-02

## 2022-11-02 VITALS
WEIGHT: 130 LBS | HEIGHT: 60 IN | SYSTOLIC BLOOD PRESSURE: 122 MMHG | DIASTOLIC BLOOD PRESSURE: 82 MMHG | BODY MASS INDEX: 25.52 KG/M2

## 2022-11-02 DIAGNOSIS — K21.9 GASTROESOPHAGEAL REFLUX DISEASE WITHOUT ESOPHAGITIS: ICD-10-CM

## 2022-11-02 DIAGNOSIS — R13.19 ESOPHAGEAL DYSPHAGIA: ICD-10-CM

## 2022-11-02 DIAGNOSIS — R79.89 LFT ELEVATION: ICD-10-CM

## 2022-11-02 DIAGNOSIS — R10.13 EPIGASTRIC PAIN: Primary | ICD-10-CM

## 2022-11-02 DIAGNOSIS — L50.6 ALLERGIC CONTACT URTICARIA: ICD-10-CM

## 2022-11-02 DIAGNOSIS — N18.30 STAGE 3 CHRONIC KIDNEY DISEASE, UNSPECIFIED WHETHER STAGE 3A OR 3B CKD (HCC): ICD-10-CM

## 2022-11-02 NOTE — PATIENT INSTRUCTIONS
[facility] - Outpatient Follow-up Note  Arvind Monday 69 y o  female MRN: [id number]  Encounter: [id number]    ASSESSMENT AND PLAN:      1  Epigastric pain  ---Patient with crampy abdominal pain  Sometimes this will occur several times a week  Not necessarily with eating and sometimes without warning  Can be somewhat severe at times  -  Patient's abdominal pain can last within the time frame of 2 hours or so  - hyoscyamine (LEVSIN/SL) 0 125 mg SL tablet; Take 1 tablet (0 125 mg total) by mouth every 12 (twelve) hours  Dispense: 60 tablet; Refill: 3    -  Does not have nausea or vomiting  Could consider going to the ED for a CT scan at the time of the pain  -- does feel significant gaseousness and bloating  Has a remote history of a Nissen    - be at risk for small bowel bacterial overgrowth  Will check breath test if patient is willing    2  Gastroesophageal reflux disease without esophagitis  -- does have ongoing symptoms of reflux particularly with spicy foods or tomato based products  Is on Dexilant 60 mg daily  Symptoms may be worse if she was not on the medication    3  Esophageal dysphagia  -- still with solid-food dysphagia  Even crackers will sometimes get somewhat lodged in the esophagus  Patient has had multiple EGDs and dilations without improvement  Continue observation at least for now    4  Allergic contact urticaria  --- has not been an issue recently    5  Stage 3 chronic kidney disease, unspecified whether stage 3a or 3b CKD ([facility] [address] )  -- patient with chronic kidney disease  Creatinine is last spring 1 5  She reports higher levels and her family doctor seeing to Nephrology-- in the [place] group  Patient is on Dexilant so this could have an effect on the kidney  Will see if it is advisable to decrease her change her PPI  Might have some problems while off medication  Patient still has symptoms regardless       6  Elevated LFTs    - last examination mild elevation of transaminases an AST of 51 and ALT at 37  -- these were obtained last March  Needs follow-up     7  Colon cancer screening-- average risk  Patient is up-to-date with respect to screening  Could consider repeat colonoscopy next year  Last examination 2013    Also consider Cologuard study    Followup Appointment: 4 mo

## 2022-11-02 NOTE — LETTER
November 2, 2022     Marcia Green DO  P O  [address]    Patient: Cade Diaz   YOB: 1945   Date of Visit: 11/2/2022       Dear Dr Sherrell Bernabe: Thank you for referring Nat Lafleur to me for evaluation  Below are my notes for this consultation  If you have questions, please do not hesitate to call me  I look forward to following your patient along with you  Sincerely,        Enos Brittle, MD        CC: No Recipients  Enos Brittle, MD  11/2/2022  5:18 PM  Sign when Signing Visit  [id number] - Outpatient Follow-up Note  Cade Diaz 69 y o  female MRN: [id number]  Encounter: [id number]    ASSESSMENT AND PLAN:      1  Epigastric pain  ---Patient with crampy abdominal pain  Sometimes this will occur several times a week  Not necessarily with eating and sometimes without warning  Can be somewhat severe at times  -  Patient's abdominal pain can last within the time frame of 2 hours or so  - hyoscyamine (LEVSIN/SL) 0 125 mg SL tablet; Take 1 tablet (0 125 mg total) by mouth every 12 (twelve) hours  Dispense: 60 tablet; Refill: 3    -  Does not have nausea or vomiting  Could consider going to the ED for a CT scan at the time of the pain  -- does feel significant gaseousness and bloating  Has a remote history of a Nissen    - be at risk for small bowel bacterial overgrowth  Will check breath test if patient is willing    2  Gastroesophageal reflux disease without esophagitis  -- does have ongoing symptoms of reflux particularly with spicy foods or tomato based products  Is on Dexilant 60 mg daily  Symptoms may be worse if she was not on the medication    3  Esophageal dysphagia  -- still with solid-food dysphagia  Even crackers will sometimes get somewhat lodged in the esophagus  Patient has had multiple EGDs and dilations without improvement  Continue observation at least for now    4   Allergic contact urticaria  --- has not been an issue recently    5  Stage 3 chronic kidney disease, unspecified whether stage 3a or 3b CKD ([facility] [address] )  -- patient with chronic kidney disease  Creatinine is last spring 1 5  She reports higher levels and her family doctor seeing to Nephrology-- in the [place] group  Patient is on Dexilant so this could have an effect on the kidney  Will see if it is advisable to decrease her change her PPI  Might have some problems while off medication  Patient still has symptoms regardless       6  Elevated LFTs  - last examination mild elevation of transaminases an AST of 51 and ALT at 37  -- these were obtained last March  Needs follow-up     7  Colon cancer screening-- average risk  Patient is up-to-date with respect to screening  Could consider repeat colonoscopy next year  Last examination 2013  Also consider Cologuard study    Followup Appointment: 4 mo  ______________________________________________________________________    Chief Complaint   Patient presents with   • Follow up-GERD, dysphagia     HPI:  Patient returns to the office for routine follow-up  She has longstanding history GERD  Over 20 years ago she had Nissen fundoplication initial good results reflux  Within a couple of years after procedure she has had dysphagia and recurrent heartburn  EGDs have been performed the last being spring of 2021  Patient had esophageal dilations with balloon dilator Gaviria dilators without successful resolution her dysphagia  In spite of this she also significant problems with heartburn  Tomato based aggravate situation spicy foods  Been eating a little bit better since she is in her son  She has been maintained on Dexilant during the day and famotidine at night  Baclofen has not helped  She other PPIs prescribed in the past   Presently she is just continuing on    She had been seen previously at [facility] by Dr Ally Oliva - of esophageal motility specialist to advised that there was nothing much else that we could do to help manage her problem  in addition patient reports intermittent significant epigastric abdominal pain  Pain will come on for no apparent reason  Not necessarily associated with nausea vomiting  May or may not be related to eating  It is crampy and can last upwards of 2 hours  Sometimes patient was has to hold her abdomen in in that eventually will dissipate  She has been taking Bentyl as needed but not necessarily conjunction with the pain  Patient  Had some abnormal transaminases earlier this year  Screening for viral hepatitis was negative as well as CHAYO smooth muscle antibody anti mitochondrial antibodies  Iron studies also were normal   Presumably patient may have had some mild fatty liver  Last liver studies were done in the early spring  Patient has been diagnosed with chronic kidney disease  Creatinine is in the spring were about 1 5  They may have been worse on repeat exams  She is seeing [address] [place] [address] for nephrology consultation in the near future  Patient is up-to-date with respect to colon cancer screening  Last examination was 9 years ago      Historical Information   Past Medical History:   Diagnosis Date   • Anxiety    • Arthritis    • Asthma    • Cancer ([facility] )    • Candida infection, esophageal ([facility] )    • Chronic pain    • COPD (chronic obstructive pulmonary disease) (HCC)    • Depression    • Depression    • Gastroparesis    • Gastroparesis    • GERD (gastroesophageal reflux disease)    • Hypertension    • Irritable bowel syndrome (IBS)    • Lactose intolerance Not sure   • Migraines    • Multiple thyroid nodules    • Osteoporosis    • Pneumonia    • Psychiatric disorder      Past Surgical History:   Procedure Laterality Date   • BACK SURGERY     •  SECTION     • CHOLECYSTECTOMY     • COLONOSCOPY  10/09/2013   • ESOPHAGEAL DILATION       [address]   • HERNIA REPAIR     • HIATAL HERNIA REPAIR     • NISSEN FUNDOPLICATION • [place] ESOPHAGOGASTRODUODENOSCOPY TRANSORAL DIAGNOSTIC N/A 2019    Procedure: ESOPHAGOGASTRODUODENOSCOPY (EGD);   Surgeon: Payton Renner MD;  Location: [facility];  Service: Gastroenterology   • ROTATOR CUFF REPAIR     • SHOULDER SURGERY     • SPINAL FUSION     • TUBAL LIGATION  May 1975    Last child born   • UPPER GASTROINTESTINAL ENDOSCOPY     • US GUIDED THYROID BIOPSY     • WISDOM TOOTH EXTRACTION       Social History     Substance and Sexual Activity   Alcohol Use No     Social History     Substance and Sexual Activity   Drug Use No     Social History     Tobacco Use   Smoking Status Former Smoker   • Packs/day:    • Years: 15    • Pack years: 15 00   • Types: Cigarettes   • Start date: 10/5/1998   • Quit date: 10/1/2003   • Years since quittin    Smokeless Tobacco Former User     Family History   Problem Relation Age of Onset   • Other Mother         Esophageal disorder   • Stomach cancer Father    • Cancer Father    • Hypertension Maternal Aunt    • COPD Maternal Grandmother    • Colon cancer Neg Hx    • Colon polyps Neg Hx          Current Outpatient Medications:   •  albuterol (PROVENTIL HFA,VENTOLIN HFA) 90 mcg/act inhaler  •  amitriptyline (ELAVIL) 50 mg tablet  •  cetirizine (ZyrTEC) 10 MG chewable tablet  •  cholecalciferol (VITAMIN D3) 1,000 units tablet  •  dexlansoprazole (DEXILANT) 60 MG capsule  •  dicyclomine (BENTYL) 20 mg tablet  •  famotidine (PEPCID) 40 MG tablet  •  hyoscyamine (LEVSIN/SL) 0 125 mg SL tablet  •  LORazepam (ATIVAN) 0 5 mg tablet  •  Omega-3 Fat Ac-Cholecalciferol (DRY EYE OMEGA BENEFITS/VIT D-3 PO)  •  Omega-3 Fatty Acids (OMEGA 3 PO)  •  ondansetron (ZOFRAN) 4 mg tablet  •  colchicine (COLCRYS) 0 6 mg tablet  •  lidocaine (Lidoderm) 5 %  •  LUTEIN PO  •  metoclopramide (REGLAN) 10 mg tablet  •  multivitamin (THERAGRAN) TABS  •  other medication, see sig,  •  predniSONE 20 mg tablet  •  RABEprazole (ACIPHEX) 20 MG tablet  •  sucralfate (CARAFATE) 1 g tablet  •  sucralfate (CARAFATE) 1 g tablet  •  triamcinolone (KENALOG) 0 1 % ointment  Allergies   Allergen Reactions   • Latex Hives   • Doxycycline Hives   • Cephalosporins GI Intolerance   • Nsaids GI Intolerance   • Penicillins GI Intolerance     Patient can only take levaquin and cipro   • Prednisone GI Intolerance   • Sulphasomidine GI Intolerance     Reviewed medications and allergies and updated as indicated    PHYSICAL EXAM:    Blood pressure 122/82, height 5' (1 524 m), weight 59 kg (130 lb), not currently breastfeeding  Body mass index is 25 39 kg/m²  General Appearance: NAD, cooperative, alert  Eyes: Anicteric,   Conjunctiva pink  ENT:  Normocephalic, atraumatic, normal mucosa  Neck:  Supple, symmetrical, trachea midline  Resp:  Clear to auscultation bilaterally; no rales, rhonchi or wheezing; respirations unlabored   CV:  S1 S2, Regular rate and rhythm; no murmur, rub, or gallop  GI:  Soft, non-tender, non-distended; normal bowel sounds; no masses, no organomegaly   Rectal: Deferred  Musculoskeletal: No cyanosis, clubbing or edema  Normal ROM    Skin:  No jaundice, rashes, or lesions   Heme/Lymph: No palpable cervical lymphadenopathy  Psych: Normal affect, good eye contact  Neuro: No gross deficits, AAOx3    Lab Results:   Lab Results   Component Value Date    WBC 12 82 (H) 02/20/2022    HGB 13 4 02/20/2022    HCT 42 1 02/20/2022    MCV 94 02/20/2022     02/20/2022     Lab Results   Component Value Date     04/09/2015    K 4 3 02/20/2022     02/20/2022    CO2 25 02/20/2022    ANIONGAP 7 04/09/2015    BUN 22 02/20/2022    CREATININE 1 57 (H) 02/20/2022    GLUCOSE 110 04/09/2015    CALCIUM 8 6 02/20/2022    CORRECTEDCA 9 2 02/20/2022    AST 51 (H) 03/16/2022    ALT 37 (H) 03/16/2022    ALKPHOS 94 03/16/2022    PROT 7 2 02/05/2015    BILITOT 0 4 02/05/2015    EGFR 32 02/20/2022     No results found for: IRON, TIBC, FERRITIN  Lab Results   Component Value Date    LIPASE 13 (L) 02/20/2022       Radiology Results:   No results found    Answers for HPI/ROS submitted by the patient on 10/30/2022  Chronicity: recurrent  Onset: more than 1 year ago  Onset quality: gradual  Frequency: constantly  Episode duration: 24 months  Pain location: epigastric region  Pain - numeric: 7/10  Pain quality: a sensation of fullness  Radiates to: epigastric region  anorexia: Yes  arthralgias: Yes  belching: Yes  constipation: No  diarrhea: Yes  dysuria: No  fever: No  flatus: Yes  frequency: No  headaches: No  hematochezia: No  hematuria: No  melena: No  myalgias: Yes  nausea: Yes  weight loss: No  vomiting: No  Aggravated by: belching, bowel movement, eating  Relieved by: nothing

## 2022-11-02 NOTE — PROGRESS NOTES
[id number] - Outpatient Follow-up Note  Daniel Escobar 69 y o  female MRN: [id number]  Encounter: [id number]    ASSESSMENT AND PLAN:      1  Epigastric pain  ---Patient with crampy abdominal pain  Sometimes this will occur several times a week  Not necessarily with eating and sometimes without warning  Can be somewhat severe at times  -  Patient's abdominal pain can last within the time frame of 2 hours or so  - hyoscyamine (LEVSIN/SL) 0 125 mg SL tablet; Take 1 tablet (0 125 mg total) by mouth every 12 (twelve) hours  Dispense: 60 tablet; Refill: 3    -  Does not have nausea or vomiting  Could consider going to the ED for a CT scan at the time of the pain  -- does feel significant gaseousness and bloating  Has a remote history of a Nissen    - be at risk for small bowel bacterial overgrowth  Will check breath test if patient is willing    2  Gastroesophageal reflux disease without esophagitis  -- does have ongoing symptoms of reflux particularly with spicy foods or tomato based products  Is on Dexilant 60 mg daily  Symptoms may be worse if she was not on the medication    3  Esophageal dysphagia  -- still with solid-food dysphagia  Even crackers will sometimes get somewhat lodged in the esophagus  Patient has had multiple EGDs and dilations without improvement  Continue observation at least for now    4  Allergic contact urticaria  --- has not been an issue recently    5  Stage 3 chronic kidney disease, unspecified whether stage 3a or 3b CKD ([facility] [address] )  -- patient with chronic kidney disease  Creatinine is last spring 1 5  She reports higher levels and her family doctor seeing to Nephrology-- in the [place] group  Patient is on Dexilant so this could have an effect on the kidney  Will see if it is advisable to decrease her change her PPI  Might have some problems while off medication  Patient still has symptoms regardless       6  Elevated LFTs    - last examination mild elevation of transaminases an AST of 51 and ALT at 37  -- these were obtained last March  Needs follow-up     7  Colon cancer screening-- average risk  Patient is up-to-date with respect to screening  Could consider repeat colonoscopy next year  Last examination 2013  Also consider Cologuard study    Followup Appointment: 4 mo  ______________________________________________________________________    Chief Complaint   Patient presents with   • Follow up-GERD, dysphagia     HPI:  Patient returns to the office for routine follow-up  She has longstanding history GERD  Over 20 years ago she had Nissen fundoplication initial good results reflux  Within a couple of years after procedure she has had dysphagia and recurrent heartburn  EGDs have been performed the last being spring of 2021  Patient had esophageal dilations with balloon dilator Gaviria dilators without successful resolution her dysphagia  In spite of this she also significant problems with heartburn  Tomato based aggravate situation spicy foods  Been eating a little bit better since she is in her son  She has been maintained on Dexilant during the day and famotidine at night  Baclofen has not helped  She other PPIs prescribed in the past   Presently she is just continuing on  She had been seen previously at [facility] by Dr Audra Parmar - of esophageal motility specialist to advised that there was nothing much else that we could do to help manage her problem  in addition patient reports intermittent significant epigastric abdominal pain  Pain will come on for no apparent reason  Not necessarily associated with nausea vomiting  May or may not be related to eating  It is crampy and can last upwards of 2 hours  Sometimes patient was has to hold her abdomen in in that eventually will dissipate  She has been taking Bentyl as needed but not necessarily conjunction with the pain      Patient  Had some abnormal transaminases earlier this year  Screening for viral hepatitis was negative as well as CHAYO smooth muscle antibody anti mitochondrial antibodies  Iron studies also were normal   Presumably patient may have had some mild fatty liver  Last liver studies were done in the early spring  Patient has been diagnosed with chronic kidney disease  Creatinine is in the spring were about 1 5  They may have been worse on repeat exams  She is seeing [facility] group for nephrology consultation in the near future  Patient is up-to-date with respect to colon cancer screening  Last examination was 9 years ago  Historical Information   Past Medical History:   Diagnosis Date   • Anxiety    • Arthritis    • Asthma    • Cancer ([facility] )    • Candida infection, esophageal ([facility] )    • Chronic pain    • COPD (chronic obstructive pulmonary disease) (HCC)    • Depression    • Depression    • Gastroparesis    • Gastroparesis    • GERD (gastroesophageal reflux disease)    • Hypertension    • Irritable bowel syndrome (IBS)    • Lactose intolerance Not sure   • Migraines    • Multiple thyroid nodules    • Osteoporosis    • Pneumonia    • Psychiatric disorder      Past Surgical History:   Procedure Laterality Date   • BACK SURGERY     •  SECTION     • CHOLECYSTECTOMY     • COLONOSCOPY  10/09/2013   • ESOPHAGEAL DILATION      2017 113   • HERNIA REPAIR     • HIATAL HERNIA REPAIR     • NISSEN FUNDOPLICATION     • [place] ESOPHAGOGASTRODUODENOSCOPY TRANSORAL DIAGNOSTIC N/A 2019    Procedure: ESOPHAGOGASTRODUODENOSCOPY (EGD);   Surgeon: Carla Rasheed MD;  Location: [facility] OR;  Service: Gastroenterology   • ROTATOR CUFF REPAIR     • SHOULDER SURGERY     • SPINAL FUSION     • TUBAL LIGATION  May 1975    Last child born   • UPPER GASTROINTESTINAL ENDOSCOPY     • US GUIDED THYROID BIOPSY     • WISDOM TOOTH EXTRACTION       Social History     Substance and Sexual Activity   Alcohol Use No     Social History     Substance and Sexual Activity   Drug Use No     Social History     Tobacco Use   Smoking Status Former Smoker   • Packs/day: 1 00   • Years: 15 00   • Pack years: 15    • Types: Cigarettes   • Start date: 10/5/1998   • Quit date: 10/1/2003   • Years since quittin 1   Smokeless Tobacco Former User     Family History   Problem Relation Age of Onset   • Other Mother         Esophageal disorder   • Stomach cancer Father    • Cancer Father    • Hypertension Maternal Aunt    • COPD Maternal Grandmother    • Colon cancer Neg Hx    • Colon polyps Neg Hx          Current Outpatient Medications:   •  albuterol (PROVENTIL HFA,VENTOLIN HFA) 90 mcg/act inhaler  •  amitriptyline (ELAVIL) 50 mg tablet  •  cetirizine (ZyrTEC) 10 MG chewable tablet  •  cholecalciferol (VITAMIN D3) 1,000 units tablet  •  dexlansoprazole (DEXILANT) 60 MG capsule  •  dicyclomine (BENTYL) 20 mg tablet  •  famotidine (PEPCID) 40 MG tablet  •  hyoscyamine (LEVSIN/SL) 0 125 mg SL tablet  •  LORazepam (ATIVAN) 0 5 mg tablet  •  Omega-3 Fat Ac-Cholecalciferol (DRY EYE OMEGA BENEFITS/VIT D-3 PO)  •  Omega-3 Fatty Acids (OMEGA 3 PO)  •  ondansetron (ZOFRAN) 4 mg tablet  •  colchicine (COLCRYS) 0 6 mg tablet  •  lidocaine (Lidoderm) 5 %  •  LUTEIN PO  •  metoclopramide (REGLAN) 10 mg tablet  •  multivitamin (THERAGRAN) TABS  •  other medication, see sig,  •  predniSONE 20 mg tablet  •  RABEprazole (ACIPHEX) 20 MG tablet  •  sucralfate (CARAFATE) 1 g tablet  •  sucralfate (CARAFATE) 1 g tablet  •  triamcinolone (KENALOG) 0 1 % ointment  Allergies   Allergen Reactions   • Latex Hives   • Doxycycline Hives   • Cephalosporins GI Intolerance   • Nsaids GI Intolerance   • Penicillins GI Intolerance     Patient can only take levaquin and cipro   • Prednisone GI Intolerance   • Sulphasomidine GI Intolerance     Reviewed medications and allergies and updated as indicated    PHYSICAL EXAM:    Blood pressure 122/82, height 5' (1 524 m), weight 59 kg (130 lb), not currently breastfeeding   Body mass index is 25 39 kg/m²  General Appearance: NAD, cooperative, alert  Eyes: Anicteric,   Conjunctiva pink  ENT:  Normocephalic, atraumatic, normal mucosa  Neck:  Supple, symmetrical, trachea midline  Resp:  Clear to auscultation bilaterally; no rales, rhonchi or wheezing; respirations unlabored   CV:  S1 S2, Regular rate and rhythm; no murmur, rub, or gallop  GI:  Soft, non-tender, non-distended; normal bowel sounds; no masses, no organomegaly   Rectal: Deferred  Musculoskeletal: No cyanosis, clubbing or edema  Normal ROM  Skin:  No jaundice, rashes, or lesions   Heme/Lymph: No palpable cervical lymphadenopathy  Psych: Normal affect, good eye contact  Neuro: No gross deficits, AAOx3    Lab Results:   Lab Results   Component Value Date    WBC 12 82 (H) 02/20/2022    HGB 13 4 02/20/2022    HCT 42 1 02/20/2022    MCV 94 02/20/2022     02/20/2022     Lab Results   Component Value Date     04/09/2015    K 4 3 02/20/2022     02/20/2022    CO2 25 02/20/2022    ANIONGAP 7 04/09/2015    BUN 22 02/20/2022    CREATININE 1 57 (H) 02/20/2022    GLUCOSE 110 04/09/2015    CALCIUM 8 6 02/20/2022    CORRECTEDCA 9 2 02/20/2022    AST 51 (H) 03/16/2022    ALT 37 (H) 03/16/2022    ALKPHOS 94 03/16/2022    PROT 7 2 02/05/2015    BILITOT 0 4 02/05/2015    EGFR 32 02/20/2022     No results found for: IRON, TIBC, FERRITIN  Lab Results   Component Value Date    LIPASE 13 (L) 02/20/2022       Radiology Results:   No results found    Answers for HPI/ROS submitted by the patient on 10/30/2022  Chronicity: recurrent  Onset: more than 1 year ago  Onset quality: gradual  Frequency: constantly  Episode duration: 24 months  Pain location: epigastric region  Pain - numeric: 7/10  Pain quality: a sensation of fullness  Radiates to: epigastric region  anorexia: Yes  arthralgias: Yes  belching: Yes  constipation: No  diarrhea: Yes  dysuria: No  fever: No  flatus: Yes  frequency: No  headaches: No  hematochezia: No  hematuria: No  melena: No  myalgias: Yes  nausea: Yes  weight loss: No  vomiting: No  Aggravated by: belching, bowel movement, eating  Relieved by: nothing

## 2022-11-25 DIAGNOSIS — R10.13 EPIGASTRIC PAIN: ICD-10-CM

## 2022-11-28 DIAGNOSIS — R10.12 LEFT UPPER QUADRANT PAIN: ICD-10-CM

## 2022-11-28 DIAGNOSIS — K21.9 GASTROESOPHAGEAL REFLUX DISEASE WITHOUT ESOPHAGITIS: ICD-10-CM

## 2022-11-28 RX ORDER — AMITRIPTYLINE HYDROCHLORIDE 50 MG/1
TABLET, FILM COATED ORAL
Qty: 90 TABLET | Refills: 1 | Status: SHIPPED | OUTPATIENT
Start: 2022-11-28

## 2022-11-28 RX ORDER — FAMOTIDINE 40 MG/1
TABLET, FILM COATED ORAL
Qty: 90 TABLET | Refills: 3 | Status: SHIPPED | OUTPATIENT
Start: 2022-11-28

## 2023-03-03 ENCOUNTER — NURSE TRIAGE (OUTPATIENT)
Dept: OTHER | Facility: OTHER | Age: 78
End: 2023-03-03

## 2023-03-03 NOTE — TELEPHONE ENCOUNTER
Reason for Disposition  • SEVERE abdominal pain (e g , excruciating)    Answer Assessment - Initial Assessment Questions  1  LOCATION: "Where does it hurt?"       Left upper quadrant     2  RADIATION: "Does the pain shoot anywhere else?" (e g , chest, back)      Back     3  ONSET: "When did the pain begin?" (e g , minutes, hours or days ago)       Today     4  SUDDEN: "Gradual or sudden onset?"      Gradual     5  PATTERN "Does the pain come and go, or is it constant?"     - If constant: "Is it getting better, staying the same, or worsening?"       (Note: Constant means the pain never goes away completely; most serious pain is constant and it progresses)      - If intermittent: "How long does it last?" "Do you have pain now?"      (Note: Intermittent means the pain goes away completely between bouts)      Constant     6  SEVERITY: "How bad is the pain?"  (e g , Scale 1-10; mild, moderate, or severe)    - MILD (1-3): doesn't interfere with normal activities, abdomen soft and not tender to touch     - MODERATE (4-7): interferes with normal activities or awakens from sleep, tender to touch     - SEVERE (8-10): excruciating pain, doubled over, unable to do any normal activities       8 5/10    7  RECURRENT SYMPTOM: "Have you ever had this type of stomach pain before?" If Yes, ask: "When was the last time?" and "What happened that time?"       Yes    8  CAUSE: "What do you think is causing the stomach pain?"      Unknown     9  RELIEVING/AGGRAVATING FACTORS: "What makes it better or worse?" (e g , movement, antacids, bowel movement)      Nothing makes the pain better    10   OTHER SYMPTOMS: "Has there been any vomiting, diarrhea, constipation, or urine problems?"        Nausea, gagging a lot    Zofran at 1345-no relief in symptoms    Protocols used: ABDOMINAL PAIN - [facility] - YOUSIF PAPPAS

## 2023-03-03 NOTE — TELEPHONE ENCOUNTER
Patient states she is going to rest and see how she feels, if no improvement in pain will be going to [facility] Emergency Department

## 2023-03-03 NOTE — TELEPHONE ENCOUNTER
Regarding: stomach pains / gagging / nauseous  ----- Message from Dorina Badillo sent at 3/3/2023  3:10 PM EST -----  "Ariadna Gain been having stomach pains and Im gagging a lot, Im also nauseous"

## 2023-03-24 DIAGNOSIS — K21.9 CHRONIC GERD: ICD-10-CM

## 2023-03-24 RX ORDER — DEXLANSOPRAZOLE 60 MG/1
CAPSULE, DELAYED RELEASE ORAL
Qty: 90 CAPSULE | Refills: 2 | Status: SHIPPED | OUTPATIENT
Start: 2023-03-24

## 2023-05-01 ENCOUNTER — TELEPHONE (OUTPATIENT)
Dept: GASTROENTEROLOGY | Facility: CLINIC | Age: 78
End: 2023-05-01

## 2023-05-01 NOTE — TELEPHONE ENCOUNTER
Out of it since 3/23/23, conference call placed to [facility] with patient  Insurance & her PACE will not cover Dexilant (brand or generic)  A new authorization request will need to be initiated

## 2023-05-01 NOTE — TELEPHONE ENCOUNTER
Patients GI provider:  Dr Henrietta Salazar    Number to return call: [phone]    Reason for call: Pt calling stating her pharmacy informed her she will need a substitute for her dexilant medication  Please reach out to pt regarding this matter      Scheduled procedure/appointment date if applicable: Appt: 9/26/5693

## 2023-05-11 ENCOUNTER — OFFICE VISIT (OUTPATIENT)
Dept: GASTROENTEROLOGY | Facility: CLINIC | Age: 78
End: 2023-05-11

## 2023-05-11 VITALS
HEIGHT: 60 IN | SYSTOLIC BLOOD PRESSURE: 118 MMHG | BODY MASS INDEX: 26.5 KG/M2 | WEIGHT: 135 LBS | DIASTOLIC BLOOD PRESSURE: 76 MMHG

## 2023-05-11 DIAGNOSIS — Z98.890 HISTORY OF NISSEN FUNDOPLICATION: Primary | ICD-10-CM

## 2023-05-11 DIAGNOSIS — R13.19 ESOPHAGEAL DYSPHAGIA: ICD-10-CM

## 2023-05-11 DIAGNOSIS — K31.84 GASTROPARESIS: ICD-10-CM

## 2023-05-11 RX ORDER — LISINOPRIL 5 MG/1
5 TABLET ORAL DAILY
COMMUNITY
Start: 2023-05-04

## 2023-05-11 NOTE — PROGRESS NOTES
[id number] - Outpatient Follow-up Note  Lucia Valiente 69 y o  female MRN: [id number]  Encounter: [id number]    ASSESSMENT AND PLAN:      1  History of Nissen fundoplication  Due to her dysphagia and her most recent upper GI series showing pooling at the bottom of her esophagus above the Nissen, I think her fundoplication is too tight  Her symptoms of burping could be due to air trapping in the esophagus and her atypical heartburn could be due to stasis  I will check a barium esophagram with tablet to see if this is indeed too tight  She has no response to antisecretory therapy because I do not think she has reflux  My suspicion is confirmed on the barium esophagram, I will send her to a surgeon to discuss her options   - FL barium swallow; Future    2  Gastroparesis  I also think that this is at play in causing some of her symptoms, in particular her dry heaving  I am guessing that her stomach at times does not empty and that it tries to throw up, but because of the tightness and she cannot  3  Esophageal dysphagia  The fact that she has dysphagia in the setting of normal manometry (done at [facility]) after the Nissen tells me that the Nissen is likely too tight   - FL barium swallow; Future      Follow up appointment: For barium esophagram  ______________________________________________________________________    Chief Complaint   Patient presents with   • Follow up-dysphagia, epigastric pain     HPI:   Patient is a 69 y o  female with a significant PMH of GERD status post Nissen fundoplication presenting for follow up regarding continued issues with solid food dysphagia, belching, and heartburn  She states that soon after getting her of Nissen fundoplication, she had issues with food getting stuck at the base of her sternum and continued issues with belching and heartburn  She states that she is afraid to eat because of fear of things getting stuck    She has been to multiple gastroenterologist and specialists  It does sound like the esophageal specialist she is seeing at [facility] confirm that the Nissen fundoplication was too tight and that it could respond to balloon dilation, however her symptoms did not  She has had normal manometry and normal pH impedance off therapy since the Nissen, but continues to be prescribed PPI therapy  She is very frustrated that she has been told she has no further options  At one point it was brought up that she may need surgery, but this was ultimately decided against it  Her symptoms are severe and affects her way of life and even affects her relationships with family, as she cannot eat with them  Historical Information   Past Medical History:   Diagnosis Date   • Anxiety    • Arthritis    • Asthma    • Cancer ([facility] [address] )    • Candida infection, esophageal ([facility] [address] )    • Chronic pain    • COPD (chronic obstructive pulmonary disease) (HCC)    • Depression    • Depression    • Gastroparesis    • Gastroparesis    • GERD (gastroesophageal reflux disease)    • Hypertension    • Irritable bowel syndrome (IBS)    • Lactose intolerance Not sure   • Migraines    • Multiple thyroid nodules    • Osteoporosis    • Pneumonia    • Psychiatric disorder      Past Surgical History:   Procedure Laterality Date   • BACK SURGERY     •  SECTION     • CHOLECYSTECTOMY     • COLONOSCOPY  10/09/2013   • ESOPHAGEAL DILATION       [address]   • HERNIA REPAIR     • HIATAL HERNIA REPAIR     • NISSEN FUNDOPLICATION     • SD ESOPHAGOGASTRODUODENOSCOPY TRANSORAL DIAGNOSTIC N/A 2019    Procedure: ESOPHAGOGASTRODUODENOSCOPY (EGD);   Surgeon: Brianne Weber MD;  Location: [facility] OR;  Service: Gastroenterology   • ROTATOR CUFF REPAIR     • SHOULDER SURGERY     • SPINAL FUSION     • TUBAL LIGATION  May 1975    Last child born   • UPPER GASTROINTESTINAL ENDOSCOPY     • US GUIDED THYROID BIOPSY     • WISDOM TOOTH EXTRACTION       Social History     Substance and Sexual Activity   Alcohol Use No     Social History     Substance and Sexual Activity   Drug Use No     Social History     Tobacco Use   Smoking Status Former   • Packs/day: 1 00   • Years: 15 00   • Pack years: 15 00   • Types: Cigarettes   • Start date: 10/5/1998   • Quit date: 10/1/2003   • Years since quittin 6   Smokeless Tobacco Former     Family History   Problem Relation Age of Onset   • Other Mother         Esophageal disorder   • Stomach cancer Father    • Cancer Father    • Hypertension Maternal Aunt    • COPD Maternal Grandmother    • Colon cancer Neg Hx    • Colon polyps Neg Hx          Current Outpatient Medications:   •  albuterol (PROVENTIL HFA,VENTOLIN HFA) 90 mcg/act inhaler  •  amitriptyline (ELAVIL) 50 mg tablet  •  cetirizine (ZyrTEC) 10 MG chewable tablet  •  cholecalciferol (VITAMIN D3) 1,000 units tablet  •  dexlansoprazole (DEXILANT) 60 MG capsule  •  dicyclomine (BENTYL) 20 mg tablet  •  famotidine (PEPCID) 40 MG tablet  •  lisinopril (ZESTRIL) 5 mg tablet  •  LORazepam (ATIVAN) 0 5 mg tablet  •  multivitamin (THERAGRAN) TABS  •  Omega-3 Fat Ac-Cholecalciferol (DRY EYE OMEGA BENEFITS/VIT D-3 PO)  •  Omega-3 Fatty Acids (OMEGA 3 PO)  •  ondansetron (ZOFRAN) 4 mg tablet  •  colchicine (COLCRYS) 0 6 mg tablet  •  hyoscyamine (LEVSIN/SL) 0 125 mg SL tablet  •  lidocaine (Lidoderm) 5 %  •  LUTEIN PO  •  metoclopramide (REGLAN) 10 mg tablet  •  other medication, see sig,  •  predniSONE 20 mg tablet  •  RABEprazole (ACIPHEX) 20 MG tablet  •  sucralfate (CARAFATE) 1 g tablet  •  sucralfate (CARAFATE) 1 g tablet  •  triamcinolone (KENALOG) 0 1 % ointment  Allergies   Allergen Reactions   • Latex Hives   • Doxycycline Hives   • Cephalosporins GI Intolerance   • Nsaids GI Intolerance   • Penicillins GI Intolerance     Patient can only take levaquin and cipro   • Prednisone GI Intolerance   • Sulphasomidine GI Intolerance     Reviewed medications and allergies and updated as indicated    PHYSICAL EXAM: Blood pressure 118/76, height 5' (1 524 m), weight 61 2 kg (135 lb), not currently breastfeeding  Body mass index is 26 37 kg/m²  General Appearance: NAD, cooperative, alert  Eyes: Anicteric  GI:  Soft, non-tender, non-distended; normal bowel sounds; no masses, no organomegaly   Rectal: Deferred  Musculoskeletal: No edema  Skin:  No jaundice    Lab Results:   Lab Results   Component Value Date    WBC 12 82 (H) 02/20/2022    WBC 9 39 07/25/2021    WBC 12 53 (H) 03/27/2019    HGB 13 4 02/20/2022    HGB 12 6 07/25/2021    HGB 14 0 03/27/2019    MCV 94 02/20/2022     02/20/2022     07/25/2021     03/27/2019     Lab Results   Component Value Date     04/09/2015    K 4 3 02/20/2022     02/20/2022    CO2 25 02/20/2022    ANIONGAP 7 04/09/2015    BUN 22 02/20/2022    CREATININE 1 57 (H) 02/20/2022    GLUCOSE 110 04/09/2015    CALCIUM 8 6 02/20/2022    CORRECTEDCA 9 2 02/20/2022    AST 51 (H) 03/16/2022    AST 86 (H) 02/20/2022    AST 38 03/27/2019    ALT 37 (H) 03/16/2022    ALT 73 02/20/2022    ALT 62 03/27/2019    ALKPHOS 94 03/16/2022    ALKPHOS 100 02/20/2022    ALKPHOS 68 03/27/2019    PROT 7 2 02/05/2015    BILITOT 0 4 02/05/2015    BILITOT 0 4 06/24/2014    EGFR 32 02/20/2022     No results found for: IRON, TIBC, FERRITIN  Lab Results   Component Value Date    LIPASE 13 (L) 02/20/2022       Radiology Results:   No results found

## 2023-06-21 ENCOUNTER — NURSE TRIAGE (OUTPATIENT)
Age: 78
End: 2023-06-21

## 2023-06-21 ENCOUNTER — TELEPHONE (OUTPATIENT)
Age: 78
End: 2023-06-21

## 2023-06-21 NOTE — TELEPHONE ENCOUNTER
SPOKE WITH PT, TRYING TO SCHEDULE [place] BARIUM SWALLOW TEST ORDERED AT [facility], REQUESTING ADDITIONAL INFORMATION TO SCHEDULE- PLEASE CALL SCHEDULING [phone] AND F/U WITH PT, THANK YOU

## 2023-06-21 NOTE — TELEPHONE ENCOUNTER
----- Message from Mary Hansen sent at 6/21/2023  4:09 PM EDT -----  Pt calling stating she is going to [facility]] w/ Barium Swallow script Dr Shira Chu ordered  Pt stated [facility]] needs more information  Pt would like a call to discuss this

## 2023-06-26 DIAGNOSIS — R13.19 OTHER DYSPHAGIA: Primary | ICD-10-CM

## 2023-06-26 NOTE — TELEPHONE ENCOUNTER
Revised order for barium swallow was placed to include the following statement per [facility] radiology; barium swallow, esophagram with double contrast please  Copy of order will be faxed to [facility] radiology and patient will be notified that the order has been placed

## 2023-07-05 ENCOUNTER — HOSPITAL ENCOUNTER (OUTPATIENT)
Dept: RADIOLOGY | Facility: HOSPITAL | Age: 78
Discharge: HOME | End: 2023-07-05
Payer: COMMERCIAL

## 2023-07-05 DIAGNOSIS — R13.19 OTHER DYSPHAGIA: ICD-10-CM

## 2023-07-05 PROCEDURE — 74220 X-RAY XM ESOPHAGUS 1CNTRST: CPT

## 2023-07-11 ENCOUNTER — TELEPHONE (OUTPATIENT)
Age: 78
End: 2023-07-11

## 2023-07-11 NOTE — TELEPHONE ENCOUNTER
Patients GI provider:      Number to return call: [phone]    Reason for call: Pt calling for the results of her barium swallow done 7/5/2023 stating the doctor had mentioned there being some options based on the results. Patient has no follow up scheduled at this time.  Please contact with results    Scheduled procedure/appointment date if applicable: Apt/procedure

## 2023-07-14 ENCOUNTER — TELEPHONE (OUTPATIENT)
Age: 78
End: 2023-07-14

## 2023-07-14 NOTE — TELEPHONE ENCOUNTER
Patients GI provider:  Dr. Katheryn Brady    Number to return call: [phone]    Reason for call: Pt calling because she states she just missed a call and was waiting to hear back from Dr Katheryn Brady about her barium swallow results.      Scheduled procedure/appointment date if applicable: N/A

## 2023-07-14 NOTE — TELEPHONE ENCOUNTER
Called patient and reviewed Dr. Alan Boyle message with her. Patient cannot access her MyChart. Patient would appreciate recommendation for provider. She does not wish to return to the original provider who performed the fundiplication.

## 2023-07-18 ENCOUNTER — TELEPHONE (OUTPATIENT)
Dept: HEMATOLOGY ONCOLOGY | Facility: CLINIC | Age: 78
End: 2023-07-18

## 2023-07-18 NOTE — TELEPHONE ENCOUNTER
Patients GI provider:  Dr. Isabella Renner        Reason for call: Pt calling stating she called [facility], they stated a referral is needed for patient to be seen.  Please place referral and send to referring doctors office as per patient request.

## 2023-07-18 NOTE — TELEPHONE ENCOUNTER
Patient called to schedule a new patient appointment for [facility]. She doesn't have a referral.  She wasn't sure of diagnosis. I informed her to have physician send a referral and gave hopeline fax number.

## 2023-07-18 NOTE — TELEPHONE ENCOUNTER
Patient advised as below.     Derik Weber MD  Physician (Active)  [address]     (z) [phone]

## 2023-07-20 ENCOUNTER — TELEPHONE (OUTPATIENT)
Dept: HEMATOLOGY ONCOLOGY | Facility: CLINIC | Age: 78
End: 2023-07-20

## 2023-07-20 NOTE — TELEPHONE ENCOUNTER
I called Ira in response to a referral that was received for patient to establish care with [facility]. Outreach was made to complete patient's intake questionnaire . I left a voicemail explaining the reason for my call and advised patient to call [facility] at [phone]. Another attempt will be made to contact patient.

## 2023-07-21 ENCOUNTER — TELEPHONE (OUTPATIENT)
Dept: HEMATOLOGY ONCOLOGY | Facility: CLINIC | Age: 78
End: 2023-07-21

## 2023-07-21 NOTE — TELEPHONE ENCOUNTER
I called Ira in response to a referral that was received for patient to establish care with [facility]. Outreach was made to complete patient's intake questionnaire . Patient's intake questionnaire was reviewed and complete. Patient's intake has been sent to the team for clinical review.

## 2023-08-24 ENCOUNTER — OFFICE VISIT (OUTPATIENT)
Dept: CARDIAC SURGERY | Facility: CLINIC | Age: 78
End: 2023-08-24
Payer: COMMERCIAL

## 2023-08-24 VITALS
TEMPERATURE: 97.4 F | WEIGHT: 126.54 LBS | HEART RATE: 87 BPM | HEIGHT: 60 IN | OXYGEN SATURATION: 98 % | RESPIRATION RATE: 15 BRPM | BODY MASS INDEX: 24.84 KG/M2 | SYSTOLIC BLOOD PRESSURE: 124 MMHG | DIASTOLIC BLOOD PRESSURE: 77 MMHG

## 2023-08-24 DIAGNOSIS — R13.19 ESOPHAGEAL DYSPHAGIA: ICD-10-CM

## 2023-08-24 DIAGNOSIS — K21.9 GASTROESOPHAGEAL REFLUX DISEASE WITHOUT ESOPHAGITIS: Primary | ICD-10-CM

## 2023-08-24 DIAGNOSIS — R13.10 DYSPHAGIA, UNSPECIFIED TYPE: ICD-10-CM

## 2023-08-24 DIAGNOSIS — Z98.890 HISTORY OF NISSEN FUNDOPLICATION: ICD-10-CM

## 2023-08-24 DIAGNOSIS — K21.9 CHRONIC GERD: ICD-10-CM

## 2023-08-24 DIAGNOSIS — K31.84 GASTROPARESIS: ICD-10-CM

## 2023-08-24 PROCEDURE — 99215 OFFICE O/P EST HI 40 MIN: CPT | Performed by: THORACIC SURGERY (CARDIOTHORACIC VASCULAR SURGERY)

## 2023-08-24 RX ORDER — AMLODIPINE BESYLATE 10 MG/1
TABLET ORAL
COMMUNITY
Start: 2023-06-12

## 2023-08-24 RX ORDER — CLOTRIMAZOLE AND BETAMETHASONE DIPROPIONATE 10; .64 MG/G; MG/G
CREAM TOPICAL
COMMUNITY
Start: 2023-08-22

## 2023-08-24 RX ORDER — NEOMYCIN SULFATE, POLYMYXIN B SULFATE AND DEXAMETHASONE 3.5; 10000; 1 MG/ML; [USP'U]/ML; MG/ML
SUSPENSION/ DROPS OPHTHALMIC
COMMUNITY
Start: 2023-08-23

## 2023-08-24 RX ORDER — TAVABOROLE TOPICAL SOLUTION, 5% 43.5 MG/ML
SOLUTION TOPICAL
COMMUNITY
Start: 2023-08-23

## 2023-08-24 NOTE — ASSESSMENT & PLAN NOTE
Ms Gaye Johnson has a prior aparsoscopic hiatal hernia repair with Nissen fundoplication by Dr Trice Jimenez on 7/20/2009. She has reported that since about a year after surgery she has struggled with dysphagia, abdominal pain and GERD. She has been seen by multiple GI specialists at [facility] and [facility] and has undergone at least 3 esophageal dilations in 11/2016, 2/2017 and 8/2017 as well as pyloric botox injection in 8/2017. She reports no relief from any of these procedures. She continues to have daily symptoms of dysphagia and heartburn. We will have our office obtain the operative report from Dr Yo Wade surgery on 7/20/2009 as well as from office visits and procedures with [facility] and [facility]. Her barium swallow is concerning for a tight Nissen fundoplication and she has evidence of gastroparesis on her NM gastric emptying study. For further evaluation of we will obtain esopahgeal manometry with 24hr pH. We will refer her to Dr. Bing Puckett for evaluation of her gastroparesis to evaluate if we could perform a joint re-do surgery with a pyloroplasty. We will also schedule an EGD and dilation with Dr Josy Werner. We will see her back after these are all complete to discuss next steps. All questions were answered and she is in agreement with this plan.

## 2023-08-24 NOTE — ASSESSMENT & PLAN NOTE
AS evident on NM gastric emptying study. Referral placed to Dr Shagufta Bingham with general surgery to evaluate for possibility of a joint surgery with pyloroplasty.

## 2023-08-24 NOTE — LETTER
August 24, 2023     Gregg Calhoun DO  [address] [facility]    Patient: Preethi Shook   YOB: 1945   Date of Visit: 8/24/2023       Dear Dr. Nell Dick: Thank you for referring Magaly Ramsey to me for evaluation. Below are my notes for this consultation. If you have questions, please do not hesitate to call me. I look forward to following your patient along with you. Sincerely,        Leonardo Sapp MD        CC: Anand Silva DO    Lianne Castelan  8/24/2023 12:19 PM  Attested  Thoracic Consult  Assessment/Plan:    Gastroparesis  AS evident on NM gastric emptying study. Referral placed to Dr Jeff See with general surgery to evaluate for possibility of a joint surgery with pyloroplasty. Dysphagia  Ms Filomena Flanagan has a prior aparsoscopic hiatal hernia repair with Nissen fundoplication by Dr Juni Shelby on 7/20/2009. She has reported that since about a year after surgery she has struggled with dysphagia, abdominal pain and GERD. She has been seen by multiple GI specialists at [facility] and [facility] and has undergone at least 3 esophageal dilations in 11/2016, 2/2017 and 8/2017 as well as pyloric botox injection in 8/2017. She reports no relief from any of these procedures. She continues to have daily symptoms of dysphagia and heartburn. We will have our office obtain the operative report from Dr Jasiel Malloy surgery on 7/20/2009 as well as from office visits and procedures with [facility] and [facility]. Her barium swallow is concerning for a tight Nissen fundoplication and she has evidence of gastroparesis on her NM gastric emptying study. For further evaluation of we will obtain esopahgeal manometry with 24hr pH. We will refer her to Dr. Oracio Grimes for evaluation of her gastroparesis to evaluate if we could perform a joint re-do surgery with a pyloroplasty. We will also schedule an EGD and dilation with Dr Del Madrigal.  We will see her back after these are all complete to discuss next steps. All questions were answered and she is in agreement with this plan. Diagnoses and all orders for this visit:    Gastroesophageal reflux disease without esophagitis  -     Espoh manometry/24hr ph; Future  -     Case request operating room: ESOPHAGOGASTRODUODENOSCOPY (EGD), DILATATION ESOPHAGEAL; Standing  -     Type and screen; Future  -     Type and screen  -     Case request operating room: ESOPHAGOGASTRODUODENOSCOPY (EGD), DILATATION ESOPHAGEAL    History of Nissen fundoplication  -     Ambulatory Referral to Thoracic Surgery  -     Espoh manometry/24hr ph; Future  -     Case request operating room: ESOPHAGOGASTRODUODENOSCOPY (EGD), DILATATION ESOPHAGEAL; Standing  -     Type and screen; Future  -     Type and screen  -     Case request operating room: ESOPHAGOGASTRODUODENOSCOPY (EGD), DILATATION ESOPHAGEAL    Esophageal dysphagia  -     Espoh manometry/24hr ph; Future  -     Case request operating room: ESOPHAGOGASTRODUODENOSCOPY (EGD), DILATATION ESOPHAGEAL; Standing  -     Type and screen; Future  -     Type and screen  -     Case request operating room: ESOPHAGOGASTRODUODENOSCOPY (EGD), DILATATION ESOPHAGEAL    Chronic GERD  -     Espoh manometry/24hr ph; Future  -     Case request operating room: ESOPHAGOGASTRODUODENOSCOPY (EGD), DILATATION ESOPHAGEAL; Standing  -     Type and screen; Future  -     Type and screen  -     Case request operating room: ESOPHAGOGASTRODUODENOSCOPY (EGD), DILATATION ESOPHAGEAL    Dysphagia, unspecified type  -     Espoh manometry/24hr ph; Future  -     Case request operating room: ESOPHAGOGASTRODUODENOSCOPY (EGD), DILATATION ESOPHAGEAL; Standing  -     Type and screen; Future  -     Type and screen  -     Case request operating room: ESOPHAGOGASTRODUODENOSCOPY (EGD), DILATATION ESOPHAGEAL    Gastroparesis  -     Cancel: Ambulatory Referral to General Surgery;  Future  -     Ambulatory Referral to General Surgery; Future    Other orders  -     amLODIPine (NORVASC) 10 mg tablet; TAKE ONE TABLET BY MOUTH DAILY ORAL ONCE A DAY 90 DAYS  -     clotrimazole-betamethasone (LOTRISONE) 1-0.05 % cream  -     neomycin-polymyxin-dexamethasone (MAXITROL) ophthalmic suspension  -     Tavaborole 5 % SOLN; APPLY TO NAILS DAILY AS DIRECTED  -     Diet NPO; Sips with meds; Standing  -     Void on call to OR; Standing  -     Insert peripheral IV; Standing  -     Place sequential compression device; Standing         Thoracic History   Diagnosis: History of laparsoscopic hiatal hernia repair with Nissen fundoplication, now with dysphagia   Procedures/Surgeries:    Pathology:    Adjuvant Therapy:      Subjective:   Patient ID: Lauren Jeffries is a 69 y.o. female. HPI   ECOG 0    Ms Suzan Castellano is a 69year old female with PMH GERD s/p laparsoscopic hiatal hernia repair with Nissen fundoplication by Dr Talat Hill on 7/20/2009. A Bard crura soft patch was used for the repair who was referred to our office by Dr. Froylan Wallace for evaluation of dysphagia, GERD, gastroparesis. Per patient she report and review of medical records that after surgery in July 2009, she initially had control of her symptoms and felt well for about a year post-operatively. She then started again with dysphagia, abdominal pain and GERD. She was seen by Dr Viola Castro at [facility] for these complaints, we will obtain records as she does not remember what was performed or what intervention were trialed. She was seen by Dr. Sonja Chapa general surgery with [facility] who started her on Reglan for gastroparesis. She then saw Dr Sadnra Mathew at [facility] who performed multiple esophageal dilations (November 2016, February 2017, August 2017) and pyloric botox injection (August 2017). She reports no relief after those procedures. She most recently saw Dr Froylan Wallace with [facility] who ordered a barium swallow and referred her to us for further evaluation.          Barium swallow on 7/5/2023 was was personally reviewed by myself in PACS and reveals barium pill remained within the esophagus throughout the 10 minutes of the study indicative of narrowing at the GEJ in this patient with previous Nissen fundoplication. EGD on 7/28/2021Normal appearing esophagus without stricture-biopsies taken to rule out eosinophilic esophagitis. Normal gastric and duodenal mucosa Nissen fundoplication wrap appears to be intact    PSH: laparsoscopic hiatal hernia repair with Nissen fundoplication by Dr Mariela Sheppard on 7/20/2009, cholecystectomy (remote, unknown date), tubal ligation 1975    They are on no AC/AP     On discussion her most bothersome symptoms are dysphagia which occurs with just about everything she heats. She has pain in her chest that feels like the food is stuck/ She is unable to vomit but feels a gagging sensation with these events. She denies regurgitation. She also has increased gas and bloating as well as abdominal pain. She has heartburn and reflux. These have been ongoing since about 2010, a year after her initial surgery. She has recently lost 8lbs in the last about month, but has made some dietary changes due to recent dx of kidney disease.      The following portions of the patient's history were reviewed and updated as appropriate: allergies, current medications, past family history, past medical history, past social history, past surgical history and problem list.    Past Medical History:   Diagnosis Date   • Anxiety    • Arthritis    • Asthma    • Cancer ([address])    • Candida infection, esophageal ([address])    • Chronic pain    • COPD (chronic obstructive pulmonary disease) ([address])    • Depression    • Depression    • Gastroparesis    • Gastroparesis    • GERD (gastroesophageal reflux disease)    • Hypertension    • Irritable bowel syndrome (IBS)    • Lactose intolerance Not sure   • Migraines    • Multiple thyroid nodules    • Osteoporosis    • Pneumonia    • Psychiatric disorder       Past Surgical History: Procedure Laterality Date   • BACK SURGERY     •  SECTION     • CHOLECYSTECTOMY     • COLONOSCOPY  10/09/2013   • ESOPHAGEAL DILATION       [address] [place] [address] [place]   • HERNIA REPAIR     • HIATAL HERNIA REPAIR     • NISSEN FUNDOPLICATION     • VT ESOPHAGOGASTRODUODENOSCOPY TRANSORAL DIAGNOSTIC N/A 2019    Procedure: ESOPHAGOGASTRODUODENOSCOPY (EGD);   Surgeon: Lilly Velásquez MD;  Location: [facility] OR;  Service: Gastroenterology   • ROTATOR CUFF REPAIR     • SHOULDER SURGERY     • SPINAL FUSION     • TUBAL LIGATION  May 1975    Last child born   • UPPER GASTROINTESTINAL ENDOSCOPY     • US GUIDED THYROID BIOPSY     • WISDOM TOOTH EXTRACTION        Family History   Problem Relation Age of Onset   • Other Mother         Esophageal disorder   • Stomach cancer Father    • Cancer Father    • Hypertension Maternal Aunt    • COPD Maternal Grandmother    • Colon cancer Neg Hx    • Colon polyps Neg Hx       Social History     Socioeconomic History   • Marital status:      Spouse name: Not on file   • Number of children: Not on file   • Years of education: Not on file   • Highest education level: Not on file   Occupational History   • Not on file   Tobacco Use   • Smoking status: Former     Packs/day: 1.00     Years: 15.00     Total pack years: 15.00     Types: Cigarettes     Start date: 10/5/1998     Quit date: 10/1/2003     Years since quittin.9   • Smokeless tobacco: Former   Vaping Use   • Vaping Use: Never used   Substance and Sexual Activity   • Alcohol use: No   • Drug use: No   • Sexual activity: Not Currently     Partners: Male     Birth control/protection: None   Other Topics Concern   • Not on file   Social History Narrative   • Not on file     Social Determinants of Health     Financial Resource Strain: Not on file   Food Insecurity: Not on file   Transportation Needs: Not on file   Physical Activity: Not on file   Stress: Not on file   Social Connections: Not on file   Intimate Partner Violence: Not on file   Housing Stability: Not on file      Review of Systems   Constitutional: Negative for chills, diaphoresis and unexpected weight change. HENT: Negative. Eyes: Negative. Respiratory: Negative for cough, shortness of breath and wheezing. Cardiovascular: Negative for chest pain and leg swelling. Gastrointestinal: Positive for abdominal pain. Negative for diarrhea, nausea and vomiting.        + gagging, +dysphagia, +heartburn   Endocrine: Negative. Genitourinary: Negative. Musculoskeletal: Negative. Skin: Negative. Allergic/Immunologic: Negative. Neurological: Negative. Hematological: Negative for adenopathy. Does not bruise/bleed easily. Psychiatric/Behavioral: Negative. All other systems reviewed and are negative. Objective:  Physical Exam  Vitals reviewed. Constitutional:       General: She is not in acute distress. Appearance: Normal appearance. She is not ill-appearing. HENT:      Head: Normocephalic. Nose: Nose normal.      Mouth/Throat:      Mouth: Mucous membranes are moist.   Eyes:      Pupils: Pupils are equal, round, and reactive to light. Cardiovascular:      Rate and Rhythm: Normal rate and regular rhythm. Heart sounds: Normal heart sounds. Pulmonary:      Effort: Pulmonary effort is normal.      Breath sounds: Normal breath sounds. No wheezing, rhonchi or rales. Abdominal:      Palpations: Abdomen is soft. Musculoskeletal:         General: No swelling. Normal range of motion. Lymphadenopathy:      Cervical: No cervical adenopathy. Skin:     General: Skin is warm. Neurological:      General: No focal deficit present. Mental Status: She is alert and oriented to person, place, and time.    Psychiatric:         Mood and Affect: Mood normal.     /77 (BP Location: Right arm, Patient Position: Sitting, Cuff Size: Standard)   Pulse 87   Temp (!) 97.4 °F (36.3 °C) (Temporal)   Resp 15   Ht 5' (1.524 m)   Wt 57.4 kg (126 lb 8.7 oz)   SpO2 98%   BMI 24.71 kg/m²    No Chest XR results available for this patient. No CT Chest results available for this patient. No CT Chest,Abdomen,Pelvis results available for this patient. No NM PET CT results available for this patient. FL barium swallow    Result Date: 7/5/2023  Narrative BARIUM SWALLOW-ESOPHAGRAM INDICATION:   R13.19: Other dysphagia. COMPARISON: Upper GI fluoroscopic study May 26, 2016 IMAGES: 231 FLUOROSCOPY TIME:   2.9 minutes TECHNIQUE: The patient was given and barium pill and barium liquid and images of the esophagus were obtained. FINDINGS: Patient was given a barium pill at the start of the examination. The pill did not pass into the stomach. The patient remained in the upright position for at least 4 minutes and intermittently drank water, though the pill did not pass into the stomach. The patient then drank in the prone position to assess for dysmotility, and the pill did not pass into the stomach throughout this phase of the study. At time of study termination (total study time was 10 minutes), the pill was still within the distal esophagus. Fluid passed beyond the pill when drinking in the prone position. Esophageal dysmotility is evident with a weakened primary stripping wave, though the presence of the pill slightly limits assessment esophageal motility. No esophageal reflux was detected during the study. Impression Barium pill remained within the esophagus throughout the 10 minutes of the study indicative of narrowing at the gastroesophageal junction in this patient who has previous undergone a Nissen fundoplication. Workstation performed: [id number]       Attestation signed by Alfonso Kee MD at 8/24/2023  1:08 PM:  I supervised the Advanced Practitioner. I discussed the case with the Advanced Practitioner, reviewed the note and agree.     Ms. Chandrika Morales is a 69-year-old female who presents to me with a history of a laparoscopic Nissen fundoplication with hernia repair in 2009. Unfortunately, it appears that her wrap is too tight. She has undergone multiple dilations without success in abating her symptoms. She presents to me for surgical evaluation for takedown of her Nissen and redo partial fundoplication. We had a long conversation in the office today about the implications of a redo surgery and that this does not come without significant risk secondary to any adhesions that she may have from her for surgery. At this time, I do need the patient to undergo a work-up to see if she is a candidate for a redo operation. I have ordered esophageal manometry and 24-hour pH testing. Additionally, the patient has severe gastroparesis and I will send the patient to Dr. Danie Gutiérrez of invasive general surgery in order to potentially plan for a surgery to address her gastroparesis at the same time as the redo operation. Additionally, we will attempt to get her old op notes from [facility] from 2009. The patient understands and she agrees with the plan. In addition, I will plan to perform my own scope and dilation on the patient to see if she responds to this. Her last scope was approximately 1 to 2 years ago. The patient does not remember exactly when it was.     Justin Murray MD  Thoracic Surgery  (Available on [employer])  Office: [phone]      Fernando Engel MD 08/24/23

## 2023-08-24 NOTE — PROGRESS NOTES
Thoracic Consult  Assessment/Plan:    Gastroparesis  AS evident on NM gastric emptying study. Referral placed to Dr Rosamaria Larsen with general surgery to evaluate for possibility of a joint surgery with pyloroplasty. Dysphagia  Ms Azeem Herring has a prior aparsoscopic hiatal hernia repair with Nissen fundoplication by Dr Pattie Hughes on 7/20/2009. She has reported that since about a year after surgery she has struggled with dysphagia, abdominal pain and GERD. She has been seen by multiple GI specialists at [facility] and [facility] and has undergone at least 3 esophageal dilations in 11/2016, 2/2017 and 8/2017 as well as pyloric botox injection in 8/2017. She reports no relief from any of these procedures. She continues to have daily symptoms of dysphagia and heartburn. We will have our office obtain the operative report from Dr Beth Canada surgery on 7/20/2009 as well as from office visits and procedures with [facility] and [facility]. Her barium swallow is concerning for a tight Nissen fundoplication and she has evidence of gastroparesis on her NM gastric emptying study. For further evaluation of we will obtain esopahgeal manometry with 24hr pH. We will refer her to Dr. Sandy Carter for evaluation of her gastroparesis to evaluate if we could perform a joint re-do surgery with a pyloroplasty. We will also schedule an EGD and dilation with Dr Danika Hayden. We will see her back after these are all complete to discuss next steps. All questions were answered and she is in agreement with this plan. Diagnoses and all orders for this visit:    Gastroesophageal reflux disease without esophagitis  -     Espoh manometry/24hr ph; Future  -     Case request operating room: ESOPHAGOGASTRODUODENOSCOPY (EGD), DILATATION ESOPHAGEAL; Standing  -     Type and screen;  Future  -     Type and screen  -     Case request operating room: ESOPHAGOGASTRODUODENOSCOPY (EGD), DILATATION ESOPHAGEAL    History of Nissen fundoplication  - Ambulatory Referral to Thoracic Surgery  -     Espoh manometry/24hr ph; Future  -     Case request operating room: ESOPHAGOGASTRODUODENOSCOPY (EGD), DILATATION ESOPHAGEAL; Standing  -     Type and screen; Future  -     Type and screen  -     Case request operating room: ESOPHAGOGASTRODUODENOSCOPY (EGD), DILATATION ESOPHAGEAL    Esophageal dysphagia  -     Espoh manometry/24hr ph; Future  -     Case request operating room: ESOPHAGOGASTRODUODENOSCOPY (EGD), DILATATION ESOPHAGEAL; Standing  -     Type and screen; Future  -     Type and screen  -     Case request operating room: ESOPHAGOGASTRODUODENOSCOPY (EGD), DILATATION ESOPHAGEAL    Chronic GERD  -     Espoh manometry/24hr ph; Future  -     Case request operating room: ESOPHAGOGASTRODUODENOSCOPY (EGD), DILATATION ESOPHAGEAL; Standing  -     Type and screen; Future  -     Type and screen  -     Case request operating room: ESOPHAGOGASTRODUODENOSCOPY (EGD), DILATATION ESOPHAGEAL    Dysphagia, unspecified type  -     Espoh manometry/24hr ph; Future  -     Case request operating room: ESOPHAGOGASTRODUODENOSCOPY (EGD), DILATATION ESOPHAGEAL; Standing  -     Type and screen; Future  -     Type and screen  -     Case request operating room: ESOPHAGOGASTRODUODENOSCOPY (EGD), DILATATION ESOPHAGEAL    Gastroparesis  -     Cancel: Ambulatory Referral to General Surgery; Future  -     Ambulatory Referral to General Surgery; Future    Other orders  -     amLODIPine (NORVASC) 10 mg tablet; TAKE ONE TABLET BY MOUTH DAILY ORAL ONCE A DAY 90 DAYS  -     clotrimazole-betamethasone (LOTRISONE) 1-0.05 % cream  -     neomycin-polymyxin-dexamethasone (MAXITROL) ophthalmic suspension  -     Tavaborole 5 % SOLN; APPLY TO NAILS DAILY AS DIRECTED  -     Diet NPO; Sips with meds; Standing  -     Void on call to OR; Standing  -     Insert peripheral IV;  Standing  -     Place sequential compression device; Standing          Thoracic History   Diagnosis: History of laparsoscopic hiatal hernia repair with Nissen fundoplication, now with dysphagia   Procedures/Surgeries:    Pathology:    Adjuvant Therapy:       Subjective:    Patient ID: Jamal Copeland is a 69 y.o. female. HPI   ECOG 0    Ms Mallorie Flores is a 69year old female with PMH GERD s/p laparsoscopic hiatal hernia repair with Nissen fundoplication by Dr Toribio Dominguez on 7/20/2009. A Bard crura soft patch was used for the repair who was referred to our office by Dr. Teresa Walker for evaluation of dysphagia, GERD, gastroparesis. Per patient she report and review of medical records that after surgery in July 2009, she initially had control of her symptoms and felt well for about a year post-operatively. She then started again with dysphagia, abdominal pain and GERD. She was seen by Dr Cline Homans at [facility] for these complaints, we will obtain records as she does not remember what was performed or what intervention were trialed. She was seen by Dr. Gloria Neff general surgery with [facility] who started her on Reglan for gastroparesis. She then saw Dr Rohan Zaman at [facility] who performed multiple esophageal dilations (November 2016, February 2017, August 2017) and pyloric botox injection (August 2017). She reports no relief after those procedures. She most recently saw Dr Teresa Walker with [facility] who ordered a barium swallow and referred her to us for further evaluation. Barium swallow on 7/5/2023 was was personally reviewed by myself in PACS and reveals barium pill remained within the esophagus throughout the 10 minutes of the study indicative of narrowing at the GEJ in this patient with previous Nissen fundoplication. EGD on 7/28/2021Normal appearing esophagus without stricture-biopsies taken to rule out eosinophilic esophagitis.  Normal gastric and duodenal mucosa Nissen fundoplication wrap appears to be intact    PSH: laparsoscopic hiatal hernia repair with Nissen fundoplication by Dr Toribio Dominguez on 7/20/2009, cholecystectomy (remote, unknown date), tubal 1975    They are on no AC/AP     On discussion her most bothersome symptoms are dysphagia which occurs with just about everything she heats. She has pain in her chest that feels like the food is stuck/ She is unable to vomit but feels a gagging sensation with these events. She denies regurgitation. She also has increased gas and bloating as well as abdominal pain. She has heartburn and reflux. These have been ongoing since about , a year after her initial surgery. She has recently lost 8lbs in the last about month, but has made some dietary changes due to recent dx of kidney disease. The following portions of the patient's history were reviewed and updated as appropriate: allergies, current medications, past family history, past medical history, past social history, past surgical history and problem list.    Past Medical History:   Diagnosis Date   • Anxiety    • Arthritis    • Asthma    • Cancer ([address])    • Candida infection, esophageal ([address])    • Chronic pain    • COPD (chronic obstructive pulmonary disease) ([address])    • Depression    • Depression    • Gastroparesis    • Gastroparesis    • GERD (gastroesophageal reflux disease)    • Hypertension    • Irritable bowel syndrome (IBS)    • Lactose intolerance Not sure   • Migraines    • Multiple thyroid nodules    • Osteoporosis    • Pneumonia    • Psychiatric disorder       Past Surgical History:   Procedure Laterality Date   • BACK SURGERY     •  SECTION     • CHOLECYSTECTOMY     • COLONOSCOPY  10/09/2013   • ESOPHAGEAL DILATION       [address] [place] [address] [place]   • HERNIA REPAIR     • HIATAL HERNIA REPAIR     • NISSEN FUNDOPLICATION     • ID ESOPHAGOGASTRODUODENOSCOPY TRANSORAL DIAGNOSTIC N/A 2019    Procedure: ESOPHAGOGASTRODUODENOSCOPY (EGD);   Surgeon: Ron Wilkins MD;  Location:  MAIN OR;  Service: Gastroenterology   • ROTATOR CUFF REPAIR     • SHOULDER SURGERY     • SPINAL FUSION     • TUBAL LIGATION  May 1975    Last child born   • UPPER GASTROINTESTINAL ENDOSCOPY     • US GUIDED THYROID BIOPSY     • WISDOM TOOTH EXTRACTION        Family History   Problem Relation Age of Onset   • Other Mother         Esophageal disorder   • Stomach cancer Father    • Cancer Father    • Hypertension Maternal Aunt    • COPD Maternal Grandmother    • Colon cancer Neg Hx    • Colon polyps Neg Hx       Social History     Socioeconomic History   • Marital status:      Spouse name: Not on file   • Number of children: Not on file   • Years of education: Not on file   • Highest education level: Not on file   Occupational History   • Not on file   Tobacco Use   • Smoking status: Former     Packs/day: 1.00     Years: 15.00     Total pack years: 15.00     Types: Cigarettes     Start date: 10/5/1998     Quit date: 10/1/2003     Years since quittin.9   • Smokeless tobacco: Former   Vaping Use   • Vaping Use: Never used   Substance and Sexual Activity   • Alcohol use: No   • Drug use: No   • Sexual activity: Not Currently     Partners: Male     Birth control/protection: None   Other Topics Concern   • Not on file   Social History Narrative   • Not on file     Social Determinants of Health     Financial Resource Strain: Not on file   Food Insecurity: Not on file   Transportation Needs: Not on file   Physical Activity: Not on file   Stress: Not on file   Social Connections: Not on file   Intimate Partner Violence: Not on file   Housing Stability: Not on file      Review of Systems   Constitutional: Negative for chills, diaphoresis and unexpected weight change. HENT: Negative. Eyes: Negative. Respiratory: Negative for cough, shortness of breath and wheezing. Cardiovascular: Negative for chest pain and leg swelling. Gastrointestinal: Positive for abdominal pain. Negative for diarrhea, nausea and vomiting.        + gagging, +dysphagia, +heartburn   Endocrine: Negative. Genitourinary: Negative. Musculoskeletal: Negative. Skin: Negative. Allergic/Immunologic: Negative. Neurological: Negative. Hematological: Negative for adenopathy. Does not bruise/bleed easily. Psychiatric/Behavioral: Negative. All other systems reviewed and are negative. Objective:   Physical Exam  Vitals reviewed. Constitutional:       General: She is not in acute distress. Appearance: Normal appearance. She is not ill-appearing. HENT:      Head: Normocephalic. Nose: Nose normal.      Mouth/Throat:      Mouth: Mucous membranes are moist.   Eyes:      Pupils: Pupils are equal, round, and reactive to light. Cardiovascular:      Rate and Rhythm: Normal rate and regular rhythm. Heart sounds: Normal heart sounds. Pulmonary:      Effort: Pulmonary effort is normal.      Breath sounds: Normal breath sounds. No wheezing, rhonchi or rales. Abdominal:      Palpations: Abdomen is soft. Musculoskeletal:         General: No swelling. Normal range of motion. Lymphadenopathy:      Cervical: No cervical adenopathy. Skin:     General: Skin is warm. Neurological:      General: No focal deficit present. Mental Status: She is alert and oriented to person, place, and time. Psychiatric:         Mood and Affect: Mood normal.     /77 (BP Location: Right arm, Patient Position: Sitting, Cuff Size: Standard)   Pulse 87   Temp (!) 97.4 °F (36.3 °C) (Temporal)   Resp 15   Ht 5' (1.524 m)   Wt 57.4 kg (126 lb 8.7 oz)   SpO2 98%   BMI 24.71 kg/m²     No Chest XR results available for this patient. No CT Chest results available for this patient. No CT Chest,Abdomen,Pelvis results available for this patient. No NM PET CT results available for this patient. FL barium swallow    Result Date: 7/5/2023  Narrative BARIUM SWALLOW-ESOPHAGRAM INDICATION:   R13.19: Other dysphagia.  COMPARISON: Upper GI fluoroscopic study May 26, 2016 IMAGES: 231 FLUOROSCOPY TIME:   2.9 minutes TECHNIQUE: The patient was given and barium pill and barium liquid and images of the esophagus were obtained. FINDINGS: Patient was given a barium pill at the start of the examination. The pill did not pass into the stomach. The patient remained in the upright position for at least 4 minutes and intermittently drank water, though the pill did not pass into the stomach. The patient then drank in the prone position to assess for dysmotility, and the pill did not pass into the stomach throughout this phase of the study. At time of study termination (total study time was 10 minutes), the pill was still within the distal esophagus. Fluid passed beyond the pill when drinking in the prone position. Esophageal dysmotility is evident with a weakened primary stripping wave, though the presence of the pill slightly limits assessment esophageal motility. No esophageal reflux was detected during the study. Impression Barium pill remained within the esophagus throughout the 10 minutes of the study indicative of narrowing at the gastroesophageal junction in this patient who has previous undergone a Nissen fundoplication.  Workstation performed: [id number]

## 2023-08-24 NOTE — H&P (VIEW-ONLY)
Thoracic Consult  Assessment/Plan:    Gastroparesis  AS evident on NM gastric emptying study. Referral placed to Dr Sole Guy with general surgery to evaluate for possibility of a joint surgery with pyloroplasty. Dysphagia  Ms Gaye Johnson has a prior aparsoscopic hiatal hernia repair with Nissen fundoplication by Dr Trice Jimenez on 7/20/2009. She has reported that since about a year after surgery she has struggled with dysphagia, abdominal pain and GERD. She has been seen by multiple GI specialists at [facility] and [facility] and has undergone at least 3 esophageal dilations in 11/2016, 2/2017 and 8/2017 as well as pyloric botox injection in 8/2017. She reports no relief from any of these procedures. She continues to have daily symptoms of dysphagia and heartburn. We will have our office obtain the operative report from Dr Yo Wade surgery on 7/20/2009 as well as from office visits and procedures with [facility] and [facility]. Her barium swallow is concerning for a tight Nissen fundoplication and she has evidence of gastroparesis on her NM gastric emptying study. For further evaluation of we will obtain esopahgeal manometry with 24hr pH. We will refer her to Dr. Bing Puckett for evaluation of her gastroparesis to evaluate if we could perform a joint re-do surgery with a pyloroplasty. We will also schedule an EGD and dilation with Dr Josy Werner. We will see her back after these are all complete to discuss next steps. All questions were answered and she is in agreement with this plan. Diagnoses and all orders for this visit:    Gastroesophageal reflux disease without esophagitis  -     Espoh manometry/24hr ph; Future  -     Case request operating room: ESOPHAGOGASTRODUODENOSCOPY (EGD), DILATATION ESOPHAGEAL; Standing  -     Type and screen;  Future  -     Type and screen  -     Case request operating room: ESOPHAGOGASTRODUODENOSCOPY (EGD), DILATATION ESOPHAGEAL    History of Nissen fundoplication  - Ambulatory Referral to Thoracic Surgery  -     Espoh manometry/24hr ph; Future  -     Case request operating room: ESOPHAGOGASTRODUODENOSCOPY (EGD), DILATATION ESOPHAGEAL; Standing  -     Type and screen; Future  -     Type and screen  -     Case request operating room: ESOPHAGOGASTRODUODENOSCOPY (EGD), DILATATION ESOPHAGEAL    Esophageal dysphagia  -     Espoh manometry/24hr ph; Future  -     Case request operating room: ESOPHAGOGASTRODUODENOSCOPY (EGD), DILATATION ESOPHAGEAL; Standing  -     Type and screen; Future  -     Type and screen  -     Case request operating room: ESOPHAGOGASTRODUODENOSCOPY (EGD), DILATATION ESOPHAGEAL    Chronic GERD  -     Espoh manometry/24hr ph; Future  -     Case request operating room: ESOPHAGOGASTRODUODENOSCOPY (EGD), DILATATION ESOPHAGEAL; Standing  -     Type and screen; Future  -     Type and screen  -     Case request operating room: ESOPHAGOGASTRODUODENOSCOPY (EGD), DILATATION ESOPHAGEAL    Dysphagia, unspecified type  -     Espoh manometry/24hr ph; Future  -     Case request operating room: ESOPHAGOGASTRODUODENOSCOPY (EGD), DILATATION ESOPHAGEAL; Standing  -     Type and screen; Future  -     Type and screen  -     Case request operating room: ESOPHAGOGASTRODUODENOSCOPY (EGD), DILATATION ESOPHAGEAL    Gastroparesis  -     Cancel: Ambulatory Referral to General Surgery; Future  -     Ambulatory Referral to General Surgery; Future    Other orders  -     amLODIPine (NORVASC) 10 mg tablet; TAKE ONE TABLET BY MOUTH DAILY ORAL ONCE A DAY 90 DAYS  -     clotrimazole-betamethasone (LOTRISONE) 1-0.05 % cream  -     neomycin-polymyxin-dexamethasone (MAXITROL) ophthalmic suspension  -     Tavaborole 5 % SOLN; APPLY TO NAILS DAILY AS DIRECTED  -     Diet NPO; Sips with meds; Standing  -     Void on call to OR; Standing  -     Insert peripheral IV;  Standing  -     Place sequential compression device; Standing          Thoracic History   Diagnosis: History of laparsoscopic hiatal hernia repair with Nissen fundoplication, now with dysphagia   Procedures/Surgeries:    Pathology:    Adjuvant Therapy:       Subjective:    Patient ID: Gabe Bobo is a 69 y.o. female. HPI   ECOG 0    Ms Blake Michaud is a 69year old female with PMH GERD s/p laparsoscopic hiatal hernia repair with Nissen fundoplication by Dr Rupali Staplteon on 7/20/2009. A Bard crura soft patch was used for the repair who was referred to our office by Dr. Shaq Kat for evaluation of dysphagia, GERD, gastroparesis. Per patient she report and review of medical records that after surgery in July 2009, she initially had control of her symptoms and felt well for about a year post-operatively. She then started again with dysphagia, abdominal pain and GERD. She was seen by Dr Jenae Coelho at [facility] for these complaints, we will obtain records as she does not remember what was performed or what intervention were trialed. She was seen by Dr. Naida Lackey general surgery with [facility] who started her on Reglan for gastroparesis. She then saw Dr Rocky Metcalf at [facility] who performed multiple esophageal dilations (November 2016, February 2017, August 2017) and pyloric botox injection (August 2017). She reports no relief after those procedures. She most recently saw Dr Shaq Kat with [facility] who ordered a barium swallow and referred her to us for further evaluation. Barium swallow on 7/5/2023 was was personally reviewed by myself in PACS and reveals barium pill remained within the esophagus throughout the 10 minutes of the study indicative of narrowing at the GEJ in this patient with previous Nissen fundoplication. EGD on 7/28/2021Normal appearing esophagus without stricture-biopsies taken to rule out eosinophilic esophagitis.  Normal gastric and duodenal mucosa Nissen fundoplication wrap appears to be intact    PSH: laparsoscopic hiatal hernia repair with Nissen fundoplication by Dr Rupali Stapleton on 7/20/2009, cholecystectomy (remote, unknown date), tubal 1975    They are on no AC/AP     On discussion her most bothersome symptoms are dysphagia which occurs with just about everything she heats. She has pain in her chest that feels like the food is stuck/ She is unable to vomit but feels a gagging sensation with these events. She denies regurgitation. She also has increased gas and bloating as well as abdominal pain. She has heartburn and reflux. These have been ongoing since about , a year after her initial surgery. She has recently lost 8lbs in the last about month, but has made some dietary changes due to recent dx of kidney disease. The following portions of the patient's history were reviewed and updated as appropriate: allergies, current medications, past family history, past medical history, past social history, past surgical history and problem list.    Past Medical History:   Diagnosis Date   • Anxiety    • Arthritis    • Asthma    • Cancer ([address])    • Candida infection, esophageal ([address])    • Chronic pain    • COPD (chronic obstructive pulmonary disease) ([address])    • Depression    • Depression    • Gastroparesis    • Gastroparesis    • GERD (gastroesophageal reflux disease)    • Hypertension    • Irritable bowel syndrome (IBS)    • Lactose intolerance Not sure   • Migraines    • Multiple thyroid nodules    • Osteoporosis    • Pneumonia    • Psychiatric disorder       Past Surgical History:   Procedure Laterality Date   • BACK SURGERY     •  SECTION     • CHOLECYSTECTOMY     • COLONOSCOPY  10/09/2013   • ESOPHAGEAL DILATION       [address] [place] [address] [place]   • HERNIA REPAIR     • HIATAL HERNIA REPAIR     • NISSEN FUNDOPLICATION     • LA ESOPHAGOGASTRODUODENOSCOPY TRANSORAL DIAGNOSTIC N/A 2019    Procedure: ESOPHAGOGASTRODUODENOSCOPY (EGD);   Surgeon: Radha Carrington MD;  Location:  MAIN OR;  Service: Gastroenterology   • ROTATOR CUFF REPAIR     • SHOULDER SURGERY     • SPINAL FUSION     • TUBAL LIGATION  May 1975    Last child born   • UPPER GASTROINTESTINAL ENDOSCOPY     • US GUIDED THYROID BIOPSY     • WISDOM TOOTH EXTRACTION        Family History   Problem Relation Age of Onset   • Other Mother         Esophageal disorder   • Stomach cancer Father    • Cancer Father    • Hypertension Maternal Aunt    • COPD Maternal Grandmother    • Colon cancer Neg Hx    • Colon polyps Neg Hx       Social History     Socioeconomic History   • Marital status:      Spouse name: Not on file   • Number of children: Not on file   • Years of education: Not on file   • Highest education level: Not on file   Occupational History   • Not on file   Tobacco Use   • Smoking status: Former     Packs/day: 1.00     Years: 15.00     Total pack years: 15.00     Types: Cigarettes     Start date: 10/5/1998     Quit date: 10/1/2003     Years since quittin.9   • Smokeless tobacco: Former   Vaping Use   • Vaping Use: Never used   Substance and Sexual Activity   • Alcohol use: No   • Drug use: No   • Sexual activity: Not Currently     Partners: Male     Birth control/protection: None   Other Topics Concern   • Not on file   Social History Narrative   • Not on file     Social Determinants of Health     Financial Resource Strain: Not on file   Food Insecurity: Not on file   Transportation Needs: Not on file   Physical Activity: Not on file   Stress: Not on file   Social Connections: Not on file   Intimate Partner Violence: Not on file   Housing Stability: Not on file      Review of Systems   Constitutional: Negative for chills, diaphoresis and unexpected weight change. HENT: Negative. Eyes: Negative. Respiratory: Negative for cough, shortness of breath and wheezing. Cardiovascular: Negative for chest pain and leg swelling. Gastrointestinal: Positive for abdominal pain. Negative for diarrhea, nausea and vomiting.        + gagging, +dysphagia, +heartburn   Endocrine: Negative. Genitourinary: Negative. Musculoskeletal: Negative. Skin: Negative. Allergic/Immunologic: Negative. Neurological: Negative. Hematological: Negative for adenopathy. Does not bruise/bleed easily. Psychiatric/Behavioral: Negative. All other systems reviewed and are negative. Objective:   Physical Exam  Vitals reviewed. Constitutional:       General: She is not in acute distress. Appearance: Normal appearance. She is not ill-appearing. HENT:      Head: Normocephalic. Nose: Nose normal.      Mouth/Throat:      Mouth: Mucous membranes are moist.   Eyes:      Pupils: Pupils are equal, round, and reactive to light. Cardiovascular:      Rate and Rhythm: Normal rate and regular rhythm. Heart sounds: Normal heart sounds. Pulmonary:      Effort: Pulmonary effort is normal.      Breath sounds: Normal breath sounds. No wheezing, rhonchi or rales. Abdominal:      Palpations: Abdomen is soft. Musculoskeletal:         General: No swelling. Normal range of motion. Lymphadenopathy:      Cervical: No cervical adenopathy. Skin:     General: Skin is warm. Neurological:      General: No focal deficit present. Mental Status: She is alert and oriented to person, place, and time. Psychiatric:         Mood and Affect: Mood normal.     /77 (BP Location: Right arm, Patient Position: Sitting, Cuff Size: Standard)   Pulse 87   Temp (!) 97.4 °F (36.3 °C) (Temporal)   Resp 15   Ht 5' (1.524 m)   Wt 57.4 kg (126 lb 8.7 oz)   SpO2 98%   BMI 24.71 kg/m²     No Chest XR results available for this patient. No CT Chest results available for this patient. No CT Chest,Abdomen,Pelvis results available for this patient. No NM PET CT results available for this patient. FL barium swallow    Result Date: 7/5/2023  Narrative BARIUM SWALLOW-ESOPHAGRAM INDICATION:   R13.19: Other dysphagia.  COMPARISON: Upper GI fluoroscopic study May 26, 2016 IMAGES: 231 FLUOROSCOPY TIME:   2.9 minutes TECHNIQUE: The patient was given and barium pill and barium liquid and images of the esophagus were obtained. FINDINGS: Patient was given a barium pill at the start of the examination. The pill did not pass into the stomach. The patient remained in the upright position for at least 4 minutes and intermittently drank water, though the pill did not pass into the stomach. The patient then drank in the prone position to assess for dysmotility, and the pill did not pass into the stomach throughout this phase of the study. At time of study termination (total study time was 10 minutes), the pill was still within the distal esophagus. Fluid passed beyond the pill when drinking in the prone position. Esophageal dysmotility is evident with a weakened primary stripping wave, though the presence of the pill slightly limits assessment esophageal motility. No esophageal reflux was detected during the study. Impression Barium pill remained within the esophagus throughout the 10 minutes of the study indicative of narrowing at the gastroesophageal junction in this patient who has previous undergone a Nissen fundoplication.  Workstation performed: [id number]

## 2023-08-29 ENCOUNTER — TELEPHONE (OUTPATIENT)
Dept: GASTROENTEROLOGY | Facility: HOSPITAL | Age: 78
End: 2023-08-29

## 2023-08-31 RX ORDER — TOBRAMYCIN AND DEXAMETHASONE 3; 1 MG/ML; MG/ML
SUSPENSION/ DROPS OPHTHALMIC
COMMUNITY
Start: 2023-08-30

## 2023-08-31 NOTE — PRE-PROCEDURE INSTRUCTIONS
Pre-Surgery Instructions:   Medication Instructions   • albuterol (PROVENTIL HFA,VENTOLIN HFA) 90 mcg/act inhaler Uses PRN- OK to take day of surgery   • amitriptyline (ELAVIL) 50 mg tablet Take night before surgery   • amLODIPine (NORVASC) 10 mg tablet Take day of surgery. • cetirizine (ZyrTEC) 10 MG chewable tablet Hold day of surgery. • cholecalciferol (VITAMIN D3) 1,000 units tablet Stop taking 7 days prior to surgery. • clotrimazole-betamethasone (LOTRISONE) 1-0.05 % cream Uses PRN- DO NOT take day of surgery   • dexlansoprazole (DEXILANT) 60 MG capsule Take day of surgery. • dicyclomine (BENTYL) 20 mg tablet Uses PRN- OK to take day of surgery   • famotidine (PEPCID) 40 MG tablet Take night before surgery   • LORazepam (ATIVAN) 0.5 mg tablet Uses PRN- OK to take day of surgery   • multivitamin (THERAGRAN) TABS Stop taking 7 days prior to surgery. • Omega-3 Fatty Acids (OMEGA 3 PO) pt to discuss hold instructions with eye doctor who prescribes it. Anes guidelines to stop 7 days prior to sx   • ondansetron (ZOFRAN) 4 mg tablet Uses PRN- OK to take day of surgery   • Tavaborole 5 % SOLN Uses PRN- DO NOT take day of surgery   • tobramycin-dexamethasone (TOBRADEX) ophthalmic suspension Take day of surgery. Medication instructions for day surgery reviewed. Please use only a sip of water to take your instructed medications. Avoid all over the counter vitamins, supplements and NSAIDS for one week prior to surgery per anesthesia guidelines. Tylenol is ok to take as needed. You will receive a call one business day prior to surgery with an arrival time and hospital directions. If your surgery is scheduled on a Monday, the hospital will be calling you on the Friday prior to your surgery. If you have not heard from anyone by 8pm, please call the hospital supervisor through the hospital  at [phone]. Gary Orr [phone]).     Do not eat or drink anything after midnight the night before your surgery, including candy, mints, lifesavers, or chewing gum. Do not drink alcohol 24hrs before your surgery. Try not to smoke at least 24hrs before your surgery. Follow the pre surgery showering instructions as listed in the [facility] Surgical Experience Booklet” or otherwise provided by your surgeon's office. Do not shave the surgical area 24 hours before surgery. Do not apply any lotions, creams, including makeup, cologne, deodorant, or perfumes after showering on the day of your surgery. No contact lenses, eye make-up, or artificial eyelashes. Remove nail polish, including gel polish, and any artificial, gel, or acrylic nails if possible. Remove all jewelry including rings and body piercing jewelry. Wear causal clothing that is easy to take on and off. Consider your type of surgery. Keep any valuables, jewelry, piercings at home. Please bring any specially ordered equipment (sling, braces) if indicated. Arrange for a responsible person to drive you to and from the hospital on the day of your surgery. Visitor Guidelines discussed. Call the surgeon's office with any new illnesses, exposures, or additional questions prior to surgery. Please reference your [facility] Surgical Experience Booklet” for additional information to prepare for your upcoming surgery.

## 2023-09-01 ENCOUNTER — TELEPHONE (OUTPATIENT)
Dept: CARDIAC SURGERY | Facility: CLINIC | Age: 78
End: 2023-09-01

## 2023-09-01 NOTE — TELEPHONE ENCOUNTER
I called and LVM for pt to see if she is ok moving her surgery from 9/12 to 9/13.  Please call back to confirm

## 2023-09-01 NOTE — TELEPHONE ENCOUNTER
I called [facility] and spoke to a Mayra Colon to obtain an operative note from 07/20/2009 done by a Dr. Lauren Evans. They informed me that those records are archived being that it is more than 10 years ago. Mayra Colon states she will send the request to a manager to take a deeper look into the pt's record. If they recover the op note they will send it to us but if they don't have it anymore they will send us a letter informing us of this.

## 2023-09-05 ENCOUNTER — TELEPHONE (OUTPATIENT)
Dept: CARDIAC SURGERY | Facility: CLINIC | Age: 78
End: 2023-09-05

## 2023-09-06 ENCOUNTER — LAB (OUTPATIENT)
Dept: LAB | Facility: CLINIC | Age: 78
End: 2023-09-06
Payer: COMMERCIAL

## 2023-09-06 ENCOUNTER — LAB REQUISITION (OUTPATIENT)
Dept: LAB | Facility: HOSPITAL | Age: 78
End: 2023-09-06
Payer: COMMERCIAL

## 2023-09-06 ENCOUNTER — HOSPITAL ENCOUNTER (OUTPATIENT)
Dept: GASTROENTEROLOGY | Facility: HOSPITAL | Age: 78
Discharge: HOME | End: 2023-09-06
Payer: COMMERCIAL

## 2023-09-06 ENCOUNTER — TELEPHONE (OUTPATIENT)
Dept: GASTROENTEROLOGY | Facility: HOSPITAL | Age: 78
End: 2023-09-06

## 2023-09-06 DIAGNOSIS — Z98.890 HISTORY OF NISSEN FUNDOPLICATION: ICD-10-CM

## 2023-09-06 DIAGNOSIS — Z98.890 OTHER SPECIFIED POSTPROCEDURAL STATES: ICD-10-CM

## 2023-09-06 DIAGNOSIS — R13.10 DYSPHAGIA, UNSPECIFIED TYPE: ICD-10-CM

## 2023-09-06 DIAGNOSIS — K21.9 CHRONIC GERD: ICD-10-CM

## 2023-09-06 DIAGNOSIS — K21.9 GASTROESOPHAGEAL REFLUX DISEASE WITHOUT ESOPHAGITIS: ICD-10-CM

## 2023-09-06 DIAGNOSIS — R13.19 ESOPHAGEAL DYSPHAGIA: ICD-10-CM

## 2023-09-06 DIAGNOSIS — R79.89 LFT ELEVATION: ICD-10-CM

## 2023-09-06 LAB
ABO GROUP BLD: NORMAL
ALBUMIN SERPL BCP-MCNC: 4 G/DL (ref 3.5–5)
ALP SERPL-CCNC: 71 U/L (ref 34–104)
ALT SERPL W P-5'-P-CCNC: 32 U/L (ref 7–52)
AST SERPL W P-5'-P-CCNC: 29 U/L (ref 13–39)
BILIRUB DIRECT SERPL-MCNC: 0.02 MG/DL (ref 0–0.2)
BILIRUB SERPL-MCNC: 0.24 MG/DL (ref 0.2–1)
BLD GP AB SCN SERPL QL: NEGATIVE
PROT SERPL-MCNC: 7.1 G/DL (ref 6.4–8.4)
RH BLD: POSITIVE
SPECIMEN EXPIRATION DATE: NORMAL

## 2023-09-06 PROCEDURE — 80076 HEPATIC FUNCTION PANEL: CPT

## 2023-09-06 PROCEDURE — 86900 BLOOD TYPING SEROLOGIC ABO: CPT | Performed by: PHYSICIAN ASSISTANT

## 2023-09-06 PROCEDURE — 86901 BLOOD TYPING SEROLOGIC RH(D): CPT | Performed by: PHYSICIAN ASSISTANT

## 2023-09-06 PROCEDURE — 36415 COLL VENOUS BLD VENIPUNCTURE: CPT

## 2023-09-06 PROCEDURE — 86850 RBC ANTIBODY SCREEN: CPT | Performed by: PHYSICIAN ASSISTANT

## 2023-09-11 ENCOUNTER — HOSPITAL ENCOUNTER (OUTPATIENT)
Dept: GASTROENTEROLOGY | Facility: HOSPITAL | Age: 78
Discharge: HOME | End: 2023-09-11
Payer: COMMERCIAL

## 2023-09-11 VITALS
RESPIRATION RATE: 16 BRPM | SYSTOLIC BLOOD PRESSURE: 174 MMHG | TEMPERATURE: 97.8 F | DIASTOLIC BLOOD PRESSURE: 81 MMHG | HEART RATE: 83 BPM | OXYGEN SATURATION: 99 %

## 2023-09-11 PROCEDURE — 91038 ESOPH IMPED FUNCT TEST > 1HR: CPT

## 2023-09-11 PROCEDURE — 91010 ESOPHAGUS MOTILITY STUDY: CPT

## 2023-09-11 NOTE — PERIOPERATIVE NURSING NOTE
Patient brought in the room and explained the esophageal manometry procedure. After the confirmation of allergies, lidocaine 2% inserted via right /left nostril and  a transnasal insertion of the High Resolution esophageal manometry catheter was inserted via left nostril. Patient given water to drink during the insertion and once the catheter inserted pressure bands of both Upper esophageal sphincter  (UES) and Lower esophageal sphincter ( LES) were observed on the color contour. Patient instructed to take a deep breath to verify placement of the catheter, diaphragmatic pinch noted on inspiration. Catheter was secured to left cheek. Patient was assisted to supine position . Patient was instructed to relax  while acclimating the catheter for about 5 minutes. A 30 second baseline resting pressure was obtained to identify the UES and LES followed by a series of 10 liquid swallows using 5 cc room temperature normal saline to assess esophageal motility and bolus transit. Patient administered 10 viscous swallows using 5 cc viscous solution, 1 multiple rapid drink swallow using 2 cc room temperature normal saline given a total of 5 drinks. Patient instructed to sit up at the edge of the stretcher and given 5 upright liquid swallows using 5 cc room temperature normal saline and 1 rapid drink challenge using 190 cc room temperature water. At the end of the procedure the high resolution esophageal manometry catheter was removed from the nostril intact. sensor PH probe inserted via left nostril and secured. Zephr recorder teachback performed and patient verbalized understanding. Patient instructed to return next day to have probe remove. Discharge instructions given and patient ambulated out of room in stable condition.

## 2023-09-13 ENCOUNTER — CONSULT (OUTPATIENT)
Dept: SURGERY | Facility: CLINIC | Age: 78
End: 2023-09-13
Payer: COMMERCIAL

## 2023-09-13 VITALS
SYSTOLIC BLOOD PRESSURE: 152 MMHG | DIASTOLIC BLOOD PRESSURE: 89 MMHG | HEIGHT: 60 IN | WEIGHT: 125.25 LBS | HEART RATE: 92 BPM | TEMPERATURE: 96.9 F | BODY MASS INDEX: 24.59 KG/M2

## 2023-09-13 DIAGNOSIS — K31.84 GASTROPARESIS: ICD-10-CM

## 2023-09-13 PROCEDURE — 99205 OFFICE O/P NEW HI 60 MIN: CPT | Performed by: SURGERY

## 2023-09-13 NOTE — ASSESSMENT & PLAN NOTE
71-year-old female presents with dysphagia after Nissen fundoplication as well as gastroparesis. Plan:  I discussed at length with the patient the pathophysiology of gastroparesis, and its symptomatology. Given that the patient has objective findings on gastric emptying study of gastroparesis, and has failed medical management we had a discussion about surgical and endoscopic procedures that can aid is symptom resolution of gastroparesis. After lengthy discussion, we will plan to proceed with robotic pyloroplasty as a first-line procedure all option for management of gastroparesis. Will be done in conjunction with Dr. Josselyn Gordon of thoracic surgery who will take down the patient's prior Nissen fundoplication. We discussed at length the risks and benefits of surgery including, but not limited to suture line leakage, failure of symptom improvement, and need for additional procedures. After discussion, the patient was amenable to proceed to the operating room, and consent was signed.

## 2023-09-13 NOTE — PROGRESS NOTES
Assessment/Plan:    Gastroparesis  50-year-old female presents with dysphagia after Nissen fundoplication as well as gastroparesis. Plan:  I discussed at length with the patient the pathophysiology of gastroparesis, and its symptomatology. Given that the patient has objective findings on gastric emptying study of gastroparesis, and has failed medical management we had a discussion about surgical and endoscopic procedures that can aid is symptom resolution of gastroparesis. After lengthy discussion, we will plan to proceed with robotic pyloroplasty as a first-line procedure all option for management of gastroparesis. Will be done in conjunction with Dr. Eileen Rodriguez of thoracic surgery who will take down the patient's prior Nissen fundoplication. We discussed at length the risks and benefits of surgery including, but not limited to suture line leakage, failure of symptom improvement, and need for additional procedures. After discussion, the patient was amenable to proceed to the operating room, and consent was signed. Diagnoses and all orders for this visit:    Gastroparesis  -     Ambulatory Referral to General Surgery          Subjective:      Patient ID: Mayito Hansen is a 69 y.o. female. 50-year-old female presents with a history of gastroparesis. She has a history of laparoscopic Nissen fundoplication in 1281 at [facility], with subsequent recurrent reflux as well as dysphagia starting about a year postoperatively. She has undergone an extensive work-up and endoscopic management of this with numerous balloon dilations of her GE junction and Botox injections. Recent upper GI series shows retained barium with her esophagus that does not pass due to some tightening at her GE junction. Currently she complains of heartburn, dysphagia, as well as postprandial fullness, nausea, and abdominal distention.   She was diagnosed many years ago with gastroparesis and has undergone numerous gastric emptying studies. Emptying study which I reviewed in PACS from  shows 50% emptying at 4 hours consistent with gastroparesis. She has been seen and evaluated by Dr. Madelyn Han of thoracic surgery for potential takedown of her Nissen fundoplication and rewrapping with a partial fundoplication          The following portions of the patient's history were reviewed and updated as appropriate:   She  has a past medical history of Anxiety, Arthritis, Asthma, Cancer ([address]), Candida infection, esophageal ([address]), Chronic kidney disease, Chronic pain, COPD (chronic obstructive pulmonary disease) ([address]), Depression, Depression, Gastroparesis, Gastroparesis, GERD (gastroesophageal reflux disease), Hypertension, Irritable bowel syndrome (IBS), Lactose intolerance (Not sure), Migraines, Multiple thyroid nodules, Osteoporosis, Pneumonia, and Psychiatric disorder. She   Patient Active Problem List    Diagnosis Date Noted   • Asthma    • COPD (chronic obstructive pulmonary disease) ([address])    • Hypertension    • Migraines    • Depression    • Anxiety    • Stage 3 chronic kidney disease ([address])    • Nausea 06/10/2021   • Allergic contact urticaria 2020   • Chronic GERD 2019   • Dysphagia 2019   • Esophageal dysphagia 2019   • History of Nissen fundoplication    • Left upper quadrant pain 2019   • Gastroparesis 2019   • Colon cancer screening 2019   • Gastroesophageal reflux disease without esophagitis 2019     She  has a past surgical history that includes Shoulder surgery; Hiatal hernia repair; Cholecystectomy; Back surgery;  section; [place] tooth extraction; Spinal fusion; Rotator cuff repair; Nissen fundoplication; US guided thyroid biopsy; Colonoscopy (10/09/2013); Upper gastrointestinal endoscopy; Esophageal dilation; pr esophagogastroduodenoscopy transoral diagnostic (N/A, 2019); Hernia repair; and Tubal ligation (May 1975).   Her family history includes COPD in her maternal grandmother; Cancer in her father; Hypertension in her maternal aunt; Other in her mother; Stomach cancer in her father. She  reports that she quit smoking about 19 years ago. Her smoking use included cigarettes. She started smoking about 24 years ago. She has a 15.00 pack-year smoking history. She has quit using smokeless tobacco. She reports that she does not drink alcohol and does not use drugs. Current Outpatient Medications   Medication Sig Dispense Refill   • albuterol (PROVENTIL HFA,VENTOLIN HFA) 90 mcg/act inhaler Inhale 2 puffs every 6 (six) hours as needed for wheezing     • amitriptyline (ELAVIL) 50 mg tablet TAKE 1 TABLET BY MOUTH EVERYDAY AT BEDTIME 90 tablet 1   • amLODIPine (NORVASC) 10 mg tablet TAKE ONE TABLET BY MOUTH DAILY ORAL ONCE A DAY 90 DAYS     • cetirizine (ZyrTEC) 10 MG chewable tablet Chew 10 mg daily.      • cholecalciferol (VITAMIN D3) 1,000 units tablet Take 5,000 Units by mouth daily     • clotrimazole-betamethasone (LOTRISONE) 1-0.05 % cream      • colchicine (COLCRYS) 0.6 mg tablet Take 0.6 mg by mouth daily (Patient not taking: Reported on 2/20/2022)     • dexlansoprazole (DEXILANT) 60 MG capsule TAKE 1 CAPSULE BY MOUTH EVERY DAY 90 capsule 2   • dicyclomine (BENTYL) 20 mg tablet Take 1 tablet (20 mg total) by mouth every 6 (six) hours as needed (abdominal cramping) 20 tablet 0   • famotidine (PEPCID) 40 MG tablet TAKE 1 TABLET BY MOUTH EVERYDAY AT BEDTIME 90 tablet 3   • hyoscyamine (LEVSIN/SL) 0.125 mg SL tablet TAKE 1 TABLET (0.125 MG TOTAL) BY MOUTH EVERY 12 (TWELVE) HOURS (Patient not taking: Reported on 5/11/2023) 180 tablet 2   • lidocaine (Lidoderm) 5 % Apply 1 patch topically daily for 7 days Remove & Discard patch within 12 hours or as directed by MD (Patient not taking: Reported on 11/2/2022) 7 patch 0   • lisinopril (ZESTRIL) 5 mg tablet Take 5 mg by mouth daily (Patient not taking: Reported on 8/31/2023)     • LORazepam (ATIVAN) 0.5 mg tablet Take 0.5 mg by mouth daily at bedtime     • LUTEIN PO Take by mouth daily (Patient not taking: Reported on 2/20/2022)     • metoclopramide (REGLAN) 10 mg tablet Take 1 tablet (10 mg total) by mouth 3 (three) times a day before meals For the 1st 1-2 weeks just take twice a day before meals. (Patient not taking: Reported on 9/29/2021) 90 tablet 2   • multivitamin (THERAGRAN) TABS Take 1 tablet by mouth daily     • neomycin-polymyxin-dexamethasone (MAXITROL) ophthalmic suspension  (Patient not taking: Reported on 8/31/2023)     • Omega-3 Fat Ac-Cholecalciferol (DRY EYE OMEGA BENEFITS/VIT D-3 PO) Take by mouth daily (Patient not taking: Reported on 8/31/2023)     • Omega-3 Fatty Acids (OMEGA 3 PO) Take by mouth     • ondansetron (ZOFRAN) 4 mg tablet Take 1 tablet (4 mg total) by mouth every 8 (eight) hours as needed for nausea or vomiting 15 tablet 0   • other medication, see sig, Medication/product name: preservision   Strength:   Sig (include dose, route, frequency): (Patient not taking: Reported on 7/14/2022)     • predniSONE 20 mg tablet Take by mouth daily For five days for gout flare (Patient not taking: Reported on 9/29/2021)     • RABEprazole (ACIPHEX) 20 MG tablet TAKE ONE TABLET BY MOUTH TWICE A DAY (Patient not taking: No sig reported) 60 tablet 6   • sucralfate (CARAFATE) 1 g tablet Take 1 tablet (1 g total) by mouth 4 (four) times a day as needed (nausea and vomiting) (Patient not taking: Reported on 6/9/2021) 60 tablet 2   • sucralfate (CARAFATE) 1 g tablet Take 1 tablet (1 g total) by mouth 2 (two) times a day (Patient not taking: Reported on 7/14/2022) 60 tablet 3   • Tavaborole 5 % SOLN APPLY TO NAILS DAILY AS DIRECTED     • tobramycin-dexamethasone (TOBRADEX) ophthalmic suspension      • triamcinolone (KENALOG) 0.1 % ointment Apply topically to all affected areas two times a day. Avoid face, armpits, and groin.  (Patient not taking: Reported on 9/29/2021) 453.6 g 0     No current facility-administered medications for this visit. Current Outpatient Medications on File Prior to Visit   Medication Sig   • albuterol (PROVENTIL HFA,VENTOLIN HFA) 90 mcg/act inhaler Inhale 2 puffs every 6 (six) hours as needed for wheezing   • amitriptyline (ELAVIL) 50 mg tablet TAKE 1 TABLET BY MOUTH EVERYDAY AT BEDTIME   • amLODIPine (NORVASC) 10 mg tablet TAKE ONE TABLET BY MOUTH DAILY ORAL ONCE A DAY 90 DAYS   • cetirizine (ZyrTEC) 10 MG chewable tablet Chew 10 mg daily. • cholecalciferol (VITAMIN D3) 1,000 units tablet Take 5,000 Units by mouth daily   • clotrimazole-betamethasone (LOTRISONE) 1-0.05 % cream    • colchicine (COLCRYS) 0.6 mg tablet Take 0.6 mg by mouth daily (Patient not taking: Reported on 2/20/2022)   • dexlansoprazole (DEXILANT) 60 MG capsule TAKE 1 CAPSULE BY MOUTH EVERY DAY   • dicyclomine (BENTYL) 20 mg tablet Take 1 tablet (20 mg total) by mouth every 6 (six) hours as needed (abdominal cramping)   • famotidine (PEPCID) 40 MG tablet TAKE 1 TABLET BY MOUTH EVERYDAY AT BEDTIME   • hyoscyamine (LEVSIN/SL) 0.125 mg SL tablet TAKE 1 TABLET (0.125 MG TOTAL) BY MOUTH EVERY 12 (TWELVE) HOURS (Patient not taking: Reported on 5/11/2023)   • lidocaine (Lidoderm) 5 % Apply 1 patch topically daily for 7 days Remove & Discard patch within 12 hours or as directed by MD (Patient not taking: Reported on 11/2/2022)   • lisinopril (ZESTRIL) 5 mg tablet Take 5 mg by mouth daily (Patient not taking: Reported on 8/31/2023)   • LORazepam (ATIVAN) 0.5 mg tablet Take 0.5 mg by mouth daily at bedtime   • LUTEIN PO Take by mouth daily (Patient not taking: Reported on 2/20/2022)   • metoclopramide (REGLAN) 10 mg tablet Take 1 tablet (10 mg total) by mouth 3 (three) times a day before meals For the 1st 1-2 weeks just take twice a day before meals.  (Patient not taking: Reported on 9/29/2021)   • multivitamin (THERAGRAN) TABS Take 1 tablet by mouth daily   • neomycin-polymyxin-dexamethasone (MAXITROL) ophthalmic suspension  (Patient not taking: Reported on 8/31/2023)   • Omega-3 Fat Ac-Cholecalciferol (DRY EYE OMEGA BENEFITS/VIT D-3 PO) Take by mouth daily (Patient not taking: Reported on 8/31/2023)   • Omega-3 Fatty Acids (OMEGA 3 PO) Take by mouth   • ondansetron (ZOFRAN) 4 mg tablet Take 1 tablet (4 mg total) by mouth every 8 (eight) hours as needed for nausea or vomiting   • other medication, see sig, Medication/product name: preservision   Strength:   Sig (include dose, route, frequency): (Patient not taking: Reported on 7/14/2022)   • predniSONE 20 mg tablet Take by mouth daily For five days for gout flare (Patient not taking: Reported on 9/29/2021)   • RABEprazole (ACIPHEX) 20 MG tablet TAKE ONE TABLET BY MOUTH TWICE A DAY (Patient not taking: No sig reported)   • sucralfate (CARAFATE) 1 g tablet Take 1 tablet (1 g total) by mouth 4 (four) times a day as needed (nausea and vomiting) (Patient not taking: Reported on 6/9/2021)   • sucralfate (CARAFATE) 1 g tablet Take 1 tablet (1 g total) by mouth 2 (two) times a day (Patient not taking: Reported on 7/14/2022)   • Tavaborole 5 % SOLN APPLY TO NAILS DAILY AS DIRECTED   • tobramycin-dexamethasone (TOBRADEX) ophthalmic suspension    • triamcinolone (KENALOG) 0.1 % ointment Apply topically to all affected areas two times a day. Avoid face, armpits, and groin. (Patient not taking: Reported on 9/29/2021)     No current facility-administered medications on file prior to visit. She is allergic to latex, doxycycline, cephalosporins, nsaids, penicillins, prednisone, and sulphasomidine. .    Review of Systems   Constitutional: Negative for chills, fatigue and fever. HENT: Negative for ear pain, facial swelling, sinus pressure and sinus pain. Eyes: Negative for pain. Respiratory: Negative for cough, shortness of breath and wheezing. Cardiovascular: Negative for chest pain. Gastrointestinal: Positive for abdominal distention, abdominal pain and nausea.  Negative for constipation, diarrhea and vomiting. Endocrine: Negative for cold intolerance and heat intolerance. Genitourinary: Negative for dysuria and flank pain. Musculoskeletal: Negative for back pain and neck pain. Skin: Negative for wound. Neurological: Negative for syncope, facial asymmetry, light-headedness and numbness. Psychiatric/Behavioral: Negative for behavioral problems, confusion and suicidal ideas. Objective:      /89 (BP Location: Left arm, Patient Position: Sitting, Cuff Size: Standard)   Pulse 92   Temp (!) 96.9 °F (36.1 °C) (Skin)   Ht 5' (1.524 m)   Wt 56.8 kg (125 lb 4 oz)   BMI 24.46 kg/m²          Physical Exam  Vitals and nursing note reviewed. Constitutional:       General: She is not in acute distress. Appearance: Normal appearance. She is not toxic-appearing. HENT:      Head: Normocephalic and atraumatic. Mouth/Throat:      Mouth: Mucous membranes are moist.   Eyes:      Extraocular Movements: Extraocular movements intact. Pupils: Pupils are equal, round, and reactive to light. Cardiovascular:      Rate and Rhythm: Normal rate and regular rhythm. Pulses: Normal pulses. Pulmonary:      Effort: Pulmonary effort is normal. No respiratory distress. Breath sounds: Normal breath sounds. No wheezing. Abdominal:      General: There is no distension. Palpations: Abdomen is soft. There is no mass. Tenderness: There is no abdominal tenderness. There is no guarding or rebound. Hernia: No hernia is present. Comments: Healed laparoscopic port sites, no evidence of hernia. Musculoskeletal:         General: No swelling or deformity. Normal range of motion. Cervical back: Normal range of motion and neck supple. Right lower leg: No edema. Left lower leg: No edema. Skin:     General: Skin is warm and dry. Coloration: Skin is not jaundiced. Neurological:      General: No focal deficit present.       Mental Status: She is alert and oriented to person, place, and time.    Psychiatric:         Mood and Affect: Mood normal.         Behavior: Behavior normal.

## 2023-09-15 LAB — MISCELLANEOUS LAB TEST RESULT: NORMAL

## 2023-09-20 ENCOUNTER — ANESTHESIA EVENT (OUTPATIENT)
Dept: PERIOP | Facility: HOSPITAL | Age: 78
End: 2023-09-20
Payer: COMMERCIAL

## 2023-09-20 ENCOUNTER — HOSPITAL ENCOUNTER (OUTPATIENT)
Facility: HOSPITAL | Age: 78
Setting detail: OUTPATIENT SURGERY
Discharge: HOME | End: 2023-09-20
Attending: THORACIC SURGERY (CARDIOTHORACIC VASCULAR SURGERY) | Admitting: THORACIC SURGERY (CARDIOTHORACIC VASCULAR SURGERY)
Payer: COMMERCIAL

## 2023-09-20 ENCOUNTER — ANESTHESIA (OUTPATIENT)
Dept: PERIOP | Facility: HOSPITAL | Age: 78
End: 2023-09-20
Payer: COMMERCIAL

## 2023-09-20 ENCOUNTER — HOSPITAL ENCOUNTER (OUTPATIENT)
Dept: RADIOLOGY | Facility: HOSPITAL | Age: 78
Setting detail: OUTPATIENT SURGERY
Discharge: HOME | End: 2023-09-20
Payer: COMMERCIAL

## 2023-09-20 VITALS
TEMPERATURE: 98 F | BODY MASS INDEX: 24.54 KG/M2 | HEIGHT: 60 IN | DIASTOLIC BLOOD PRESSURE: 82 MMHG | OXYGEN SATURATION: 99 % | WEIGHT: 125 LBS | HEART RATE: 70 BPM | RESPIRATION RATE: 14 BRPM | SYSTOLIC BLOOD PRESSURE: 130 MMHG

## 2023-09-20 DIAGNOSIS — Z98.890 HISTORY OF NISSEN FUNDOPLICATION: ICD-10-CM

## 2023-09-20 PROCEDURE — C1726 CATH, BAL DIL, NON-VASCULAR: HCPCS | Performed by: THORACIC SURGERY (CARDIOTHORACIC VASCULAR SURGERY)

## 2023-09-20 PROCEDURE — 43249 ESOPH EGD DILATION <30 MM: CPT | Performed by: THORACIC SURGERY (CARDIOTHORACIC VASCULAR SURGERY)

## 2023-09-20 RX ORDER — FENTANYL CITRATE/PF 50 MCG/ML
25 SYRINGE (ML) INJECTION
Status: DISCONTINUED | OUTPATIENT
Start: 2023-09-20 | End: 2023-09-20 | Stop reason: HOSPADM

## 2023-09-20 RX ORDER — FENTANYL CITRATE 50 UG/ML
INJECTION, SOLUTION INTRAMUSCULAR; INTRAVENOUS AS NEEDED
Status: DISCONTINUED | OUTPATIENT
Start: 2023-09-20 | End: 2023-09-20

## 2023-09-20 RX ORDER — ALBUTEROL SULFATE 90 UG/1
AEROSOL, METERED RESPIRATORY (INHALATION) AS NEEDED
Status: DISCONTINUED | OUTPATIENT
Start: 2023-09-20 | End: 2023-09-20

## 2023-09-20 RX ORDER — ROCURONIUM BROMIDE 10 MG/ML
INJECTION, SOLUTION INTRAVENOUS AS NEEDED
Status: DISCONTINUED | OUTPATIENT
Start: 2023-09-20 | End: 2023-09-20

## 2023-09-20 RX ORDER — SODIUM CHLORIDE, SODIUM LACTATE, POTASSIUM CHLORIDE, CALCIUM CHLORIDE 600; 310; 30; 20 MG/100ML; MG/100ML; MG/100ML; MG/100ML
INJECTION, SOLUTION INTRAVENOUS CONTINUOUS PRN
Status: DISCONTINUED | OUTPATIENT
Start: 2023-09-20 | End: 2023-09-20

## 2023-09-20 RX ORDER — PROPOFOL 10 MG/ML
INJECTION, EMULSION INTRAVENOUS AS NEEDED
Status: DISCONTINUED | OUTPATIENT
Start: 2023-09-20 | End: 2023-09-20

## 2023-09-20 RX ORDER — ONDANSETRON 2 MG/ML
INJECTION INTRAMUSCULAR; INTRAVENOUS AS NEEDED
Status: DISCONTINUED | OUTPATIENT
Start: 2023-09-20 | End: 2023-09-20

## 2023-09-20 RX ORDER — ONDANSETRON 2 MG/ML
4 INJECTION INTRAMUSCULAR; INTRAVENOUS ONCE AS NEEDED
Status: DISCONTINUED | OUTPATIENT
Start: 2023-09-20 | End: 2023-09-20 | Stop reason: HOSPADM

## 2023-09-20 RX ADMIN — PROPOFOL 150 MG: 10 INJECTION, EMULSION INTRAVENOUS at 10:50

## 2023-09-20 RX ADMIN — ALBUTEROL SULFATE 2 PUFF: 90 AEROSOL, METERED RESPIRATORY (INHALATION) at 10:35

## 2023-09-20 RX ADMIN — SODIUM CHLORIDE, SODIUM LACTATE, POTASSIUM CHLORIDE, AND CALCIUM CHLORIDE: .6; .31; .03; .02 INJECTION, SOLUTION INTRAVENOUS at 10:45

## 2023-09-20 RX ADMIN — PROPOFOL 50 MCG/KG/MIN: 10 INJECTION, EMULSION INTRAVENOUS at 10:55

## 2023-09-20 RX ADMIN — FENTANYL CITRATE 100 MCG: 50 INJECTION INTRAMUSCULAR; INTRAVENOUS at 10:46

## 2023-09-20 RX ADMIN — SUGAMMADEX 250 MG: 100 INJECTION, SOLUTION INTRAVENOUS at 11:10

## 2023-09-20 RX ADMIN — ONDANSETRON 4 MG: 2 INJECTION INTRAMUSCULAR; INTRAVENOUS at 11:02

## 2023-09-20 RX ADMIN — ROCURONIUM BROMIDE 50 MG: 10 INJECTION, SOLUTION INTRAVENOUS at 10:50

## 2023-09-20 NOTE — OP NOTE
OPERATIVE REPORT  PATIENT NAME: Selina Corrales    :  1945  MRN: [id number]  Pt Location: [facility] OR ROOM 07    SURGERY DATE: 2023    Surgeon(s) and Role:     * Andrea Sol MD - Primary     * Sivan Montero MD - Assisting    Preop Diagnosis:  History of Nissen fundoplication [B48.220]  Gastroesophageal reflux disease without esophagitis [K21.9]  Esophageal dysphagia [R13.19]  Chronic GERD [K21.9]  Dysphagia, unspecified type [R13.10]    Post-Op Diagnosis Codes:     * History of Nissen fundoplication [T23.278]     * Gastroesophageal reflux disease without esophagitis [K21.9]     * Esophageal dysphagia [R13.19]     * Chronic GERD [K21.9]     * Dysphagia, unspecified type [R13.10]    Procedure(s):  ESOPHAGOGASTRODUODENOSCOPY (EGD)  CRE WIRE GUIDED BALLOON DILATATION ESOPHAGEAL    Specimen(s):  * No specimens in log *    Estimated Blood Loss:   Minimal    Drains:  * No LDAs found *    Anesthesia Type:   General    Operative Indications:  History of Nissen fundoplication [I17.936]  Gastroesophageal reflux disease without esophagitis [K21.9]  Esophageal dysphagia [R13.19]  Chronic GERD [K21.9]  Dysphagia, unspecified type [R13.10]    Operative Findings:  GEJxn a little tight    Complications:   None    Procedure and Technique:  Selina Corrales was brought to the operating room and placed in the supine position. After institution of adequate general anesthesia, fiberoptic endoscopy is performed. The scope was passed to the GEJxn. This was mildly narrowed. The scope was then advanced through the GEJxn and down to the pylorus. The duodenum was then entered without any difficulty. The stomach distended easily. The pylorus appeared to be functioning normally. The proximal duodenum appeared normal. The scope was brought back into the stomach. Upon retroflexion, her Nissen was intact. She did not have a hernia. At this time, a 20 mm CRE balloon was passed through the scope.   The balloon was placed across the GE junction. This was inflated to 20 mm under 6 JOSELUIS of pressure. After dilation, the balloon was removed. The scope was readvanced. There was no injury noted. The stomach was desufflated and the scope was removed at this time. The patient was extubated and brought to the recovery in stable condition having tolerated the procedure well. I was present for the entire procedure.     Patient Disposition:  PACU  and extubated and stable        SIGNATURE: Shana Hurt MD  DATE: September 20, 2023  TIME: 11:36 AM

## 2023-09-20 NOTE — ANESTHESIA PREPROCEDURE EVALUATION
Procedure:  ESOPHAGOGASTRODUODENOSCOPY (EGD) (Esophagus)  DILATATION ESOPHAGEAL (Esophagus)    Relevant Problems   CARDIO   (+) Hypertension   (+) Migraines      GI/HEPATIC   (+) Chronic GERD   (+) Dysphagia   (+) Esophageal dysphagia   (+) Gastroesophageal reflux disease without esophagitis      /RENAL   (+) Stage 3 chronic kidney disease (HCC)      NEURO/PSYCH   (+) Anxiety   (+) Depression   (+) Migraines      PULMONARY   (+) Asthma   (+) COPD (chronic obstructive pulmonary disease) (HCC)      Other   (+) History of Nissen fundoplication      Sinus tachycardia  Right superior axis deviation  Incomplete right bundle branch block  Right ventricular hypertrophy  Cannot rule out Anterior infarct , age undetermined  Abnormal ECG  When compared with ECG of 27-MAR-2019 10:21,  Incomplete right bundle branch block is now Present  Minimal criteria for Anterior infarct are now Present  Confirmed by Ressie Comment [id number]) on 2/21/2022 8:16:36 AM  Physical Exam    Airway  Comment: Small mouth opening  Mallampati score: II  TM Distance: >3 FB  Neck ROM: full     Dental   No notable dental hx     Cardiovascular  Rhythm: regular, Rate: normal,     Pulmonary  Breath sounds clear to auscultation,     Other Findings        Anesthesia Plan  ASA Score- 3     Anesthesia Type- general with ASA Monitors. Additional Monitors:   Airway Plan: ETT. Plan Factors-Exercise tolerance (METS): >4 METS. Chart reviewed. EKG reviewed. Patient summary reviewed. Patient is not a current smoker. Induction- intravenous. Postoperative Plan- Plan for postoperative opioid use. Planned trial extubation    Informed Consent- Anesthetic plan and risks discussed with patient. I personally reviewed this patient with the CRNA. Discussed and agreed on the Anesthesia Plan with the CRNA. Kathleen Handley

## 2023-09-20 NOTE — INTERVAL H&P NOTE
H&P reviewed. After examining the patient I find no changes in the patients condition since the H&P had been written. Vitals:    09/20/23 0859   BP: 135/98   Pulse: 78   Resp: 17   Temp: (!) 97 °F (36.1 °C)   SpO2: 98%     Safe to proceed.  EGD with balloon dilation    Jaron Medina MD  Thoracic Surgery  (Available on [employer])  Office: [phone]

## 2023-09-20 NOTE — DISCHARGE INSTR - AVS FIRST PAGE
No dressing is necessary because your skin is skin sealed with surgical glue. You may shower starting 24 hours after your surgery. Do not scrub the incision, gently wash with soap and water and rinse. Do not soak the incision including baths, hot tubs, swimming, until your postoperative visit. You may use over-the-counter pain medicines such as Tylenol and ibuprofen as directed by the bottle. It is okay to take Tylenol and ibuprofen concurrently. Do not lift weight greater than 10 lb until  your postoperative visit. Schedule a postoperative visit with your surgeon for 2 weeks from date of surgery.

## 2023-09-20 NOTE — ANESTHESIA POSTPROCEDURE EVALUATION
Post-Op Assessment Note    CV Status:  Stable  Pain Score: 0    Pain management: adequate     Mental Status:  Alert and awake   Hydration Status:  Euvolemic   PONV Controlled:  Controlled   Airway Patency:  Patent      Post Op Vitals Reviewed: Yes      Staff: CRNA         No notable events documented.     BP   126/70   Temp  97.6   Pulse  81   Resp   16   SpO2 98 2l nc

## 2023-09-21 ENCOUNTER — OFFICE VISIT (OUTPATIENT)
Dept: CARDIAC SURGERY | Facility: CLINIC | Age: 78
End: 2023-09-21
Payer: COMMERCIAL

## 2023-09-21 VITALS
WEIGHT: 127.43 LBS | HEIGHT: 60 IN | HEART RATE: 90 BPM | DIASTOLIC BLOOD PRESSURE: 82 MMHG | RESPIRATION RATE: 14 BRPM | OXYGEN SATURATION: 94 % | SYSTOLIC BLOOD PRESSURE: 135 MMHG | TEMPERATURE: 98.2 F | BODY MASS INDEX: 25.02 KG/M2

## 2023-09-21 DIAGNOSIS — K31.84 GASTROPARESIS: ICD-10-CM

## 2023-09-21 DIAGNOSIS — R13.19 ESOPHAGEAL DYSPHAGIA: Primary | ICD-10-CM

## 2023-09-21 DIAGNOSIS — Z98.890 HISTORY OF NISSEN FUNDOPLICATION: ICD-10-CM

## 2023-09-21 PROCEDURE — 99214 OFFICE O/P EST MOD 30 MIN: CPT | Performed by: THORACIC SURGERY (CARDIOTHORACIC VASCULAR SURGERY)

## 2023-09-21 RX ORDER — CEFAZOLIN SODIUM 2 G/50ML
2000 SOLUTION INTRAVENOUS ONCE
OUTPATIENT
Start: 2023-09-21 | End: 2023-09-21

## 2023-09-21 RX ORDER — METRONIDAZOLE 500 MG/100ML
500 INJECTION, SOLUTION INTRAVENOUS ONCE
OUTPATIENT
Start: 2023-09-21 | End: 2023-09-21

## 2023-09-21 RX ORDER — GABAPENTIN 100 MG/1
CAPSULE ORAL
COMMUNITY
Start: 2023-09-05

## 2023-09-21 NOTE — ASSESSMENT & PLAN NOTE
Ms. Siena Cole presents for follow up after EGD. She underwent a laparoscopic hiatal hernia repair with Nissen fundoplication in the past, and has required multiple dilations without relief in her symptoms of dysphagia, chest pressure and burping. Her GEJ was slightly tight on EGD, and her studies indicate a tight Nissen as well as severe gastroparesis. Dr. Royal Hutton discussed a robotic takedown of Nissen, redo hiatal hernia repair and partial fundoplication. All risks and benefits discussed. She is aware she may begin to experience heartburn after this without a Nissen. Dr. Rony Castrejon will perform a pyloroplasty at the same time. Consent for surgery was obtained. She will need some pre-operative lab work and EKG. Diet sheet provided.

## 2023-09-21 NOTE — PROGRESS NOTES
Assessment/Plan:    History of Nissen fundoplication  Ms. Michelle Dillard presents for follow up after EGD. She underwent a laparoscopic hiatal hernia repair with Nissen fundoplication in the past, and has required multiple dilations without relief in her symptoms of dysphagia, chest pressure and burping. Her GEJ was slightly tight on EGD, and her studies indicate a tight Nissen as well as severe gastroparesis. Dr. Nat Murphy discussed a robotic takedown of Nissen, redo hiatal hernia repair and partial fundoplication. All risks and benefits discussed. She is aware she may begin to experience heartburn after this without a Nissen. Dr. Marjorie Wasserman will perform a pyloroplasty at the same time. Consent for surgery was obtained. She will need some pre-operative lab work and EKG. Diet sheet provided. Diagnoses and all orders for this visit:    Esophageal dysphagia  -     EKG 12 lead; Future  -     Type and screen; Future  -     CBC and Platelet; Future  -     APTT; Future  -     Protime-INR; Future  -     Basic metabolic panel;  Future  -     Case request operating room: TAKEDOWN NISSEN, REPAIR HERNIA PARAESOPHAGEAL LAPAROSCOPIC W ROBOTICS, PARTIAL FUNDOPLICATION, ESOPHAGOGASTRODUODENOSCOPY (EGD), PYLOROPLASTY LAPAROSCOPIC WITH ROBOT; Standing  -     Type and screen  -     CBC and Platelet  -     APTT  -     Protime-INR  -     Basic metabolic panel  -     Case request operating room: TAKEDOWN NISSEN, REPAIR HERNIA PARAESOPHAGEAL LAPAROSCOPIC W ROBOTICS, PARTIAL FUNDOPLICATION, ESOPHAGOGASTRODUODENOSCOPY (EGD), PYLOROPLASTY LAPAROSCOPIC WITH ROBOT    Gastroparesis  -     Case request operating room: TAKEDOWN NISSEN, REPAIR HERNIA PARAESOPHAGEAL LAPAROSCOPIC W ROBOTICS, PARTIAL FUNDOPLICATION, ESOPHAGOGASTRODUODENOSCOPY (EGD), PYLOROPLASTY LAPAROSCOPIC WITH ROBOT; Standing  -     Case request operating room: TAKEDOWN NISSEN, REPAIR HERNIA PARAESOPHAGEAL LAPAROSCOPIC W ROBOTICS, PARTIAL FUNDOPLICATION, ESOPHAGOGASTRODUODENOSCOPY (EGD), PYLOROPLASTY LAPAROSCOPIC WITH ROBOT    History of Nissen fundoplication  -     EKG 12 lead; Future  -     Type and screen; Future  -     CBC and Platelet; Future  -     APTT; Future  -     Protime-INR; Future  -     Basic metabolic panel; Future  -     Case request operating room: TAKEDOWN NISSEN, REPAIR HERNIA PARAESOPHAGEAL LAPAROSCOPIC W ROBOTICS, PARTIAL FUNDOPLICATION, ESOPHAGOGASTRODUODENOSCOPY (EGD), PYLOROPLASTY LAPAROSCOPIC WITH ROBOT; Standing  -     Type and screen  -     CBC and Platelet  -     APTT  -     Protime-INR  -     Basic metabolic panel  -     Case request operating room: TAKEDOWN NISSEN, REPAIR HERNIA PARAESOPHAGEAL LAPAROSCOPIC W ROBOTICS, PARTIAL FUNDOPLICATION, ESOPHAGOGASTRODUODENOSCOPY (EGD), PYLOROPLASTY LAPAROSCOPIC WITH ROBOT    Other orders  -     gabapentin (NEURONTIN) 100 mg capsule; PLEASE SEE ATTACHED FOR DETAILED DIRECTIONS (Patient not taking: Reported on 9/21/2023)  -     Diet NPO; Sips with meds; Standing  -     Void on call to OR; Standing  -     Insert peripheral IV; Standing  -     Place sequential compression device; Standing  -     ceFAZolin (ANCEF) IVPB (premix in dextrose) 2,000 mg 50 mL  -     metroNIDAZOLE (FLAGYL) IVPB (premix) 500 mg 100 mL          Thoracic History    Diagnosis: dysphagia, history of Nissen Fundoplication      Subjective:    Patient ID: Olya Savage is a 69 y.o. female. Ecog 0    HPI   Ms. April Ayala is a 69year old female with a history of a laparoscopic Nissen fundoplication with hernia repair in 2009 at [facility]. She has undergone multiple dilations without relief of her dysphagia. She underwent an EGD yesterday and the GE junction was tight. She was evaluated by Dr. Pavan Cortez 9/13 who discussed robotic pyloroplasty for her gastroparesis. Manometry 9/11/23 shows normal esophageal motility, mean DCI 2145. Median IRP normal. 24 hour pH testing shows 0% acid exposure time and she was off of her PPI for 3 days.    Gastric emptying 6/24/23 shows severely delayed rate of gastric emptying. On discussion, dysphagia, burping and chest pressure are her biggest complaints. The following portions of the patient's history were reviewed and updated as appropriate: allergies, current medications, past family history, past medical history, past social history, past surgical history and problem list.    Review of Systems   HENT: Positive for trouble swallowing. Cardiovascular: Positive for chest pain. Gastrointestinal:        +burping   All other systems reviewed and are negative. Objective:   Physical Exam  Constitutional:       General: She is not in acute distress. Appearance: Normal appearance. HENT:      Head: Normocephalic and atraumatic. Eyes:      Conjunctiva/sclera: Conjunctivae normal.   Cardiovascular:      Rate and Rhythm: Normal rate and regular rhythm. Pulmonary:      Effort: Pulmonary effort is normal. No respiratory distress. Breath sounds: Normal breath sounds. Abdominal:      General: There is no distension. Palpations: Abdomen is soft. Musculoskeletal:      Cervical back: Neck supple. Right lower leg: No edema. Left lower leg: No edema. Lymphadenopathy:      Cervical: No cervical adenopathy. Skin:     General: Skin is warm and dry. Neurological:      General: No focal deficit present. Mental Status: She is alert and oriented to person, place, and time. Psychiatric:         Mood and Affect: Mood normal.     /82 (BP Location: Left arm, Patient Position: Sitting, Cuff Size: Standard)   Pulse 90   Temp 98.2 °F (36.8 °C) (Temporal)   Resp 14   Ht 5' (1.524 m)   Wt 57.8 kg (127 lb 6.8 oz)   SpO2 94%   BMI 24.89 kg/m²       FL barium swallow    Result Date: 7/5/2023  Narrative BARIUM SWALLOW-ESOPHAGRAM INDICATION:   R13.19: Other dysphagia.  COMPARISON: Upper GI fluoroscopic study May 26, 2016 IMAGES: 231 FLUOROSCOPY TIME:   2.9 minutes TECHNIQUE: The patient was given and barium pill and barium liquid and images of the esophagus were obtained. FINDINGS: Patient was given a barium pill at the start of the examination. The pill did not pass into the stomach. The patient remained in the upright position for at least 4 minutes and intermittently drank water, though the pill did not pass into the stomach. The patient then drank in the prone position to assess for dysmotility, and the pill did not pass into the stomach throughout this phase of the study. At time of study termination (total study time was 10 minutes), the pill was still within the distal esophagus. Fluid passed beyond the pill when drinking in the prone position. Esophageal dysmotility is evident with a weakened primary stripping wave, though the presence of the pill slightly limits assessment esophageal motility. No esophageal reflux was detected during the study. Impression Barium pill remained within the esophagus throughout the 10 minutes of the study indicative of narrowing at the gastroesophageal junction in this patient who has previous undergone a Nissen fundoplication.  Workstation performed: [id number]

## 2023-09-22 ENCOUNTER — TELEPHONE (OUTPATIENT)
Dept: GASTROENTEROLOGY | Facility: CLINIC | Age: 78
End: 2023-09-22

## 2023-09-22 NOTE — TELEPHONE ENCOUNTER
I called the patient. She did have a liver fibrosis panel mild elevation of her LFTs. Probably related to fatty liver. Noninvasive lab work revealed F3 which indicates possible mild fibrosis. Patient is having upcoming laparoscopic surgery and redo alternatives, plication with Dr. Alexandra Feldman. We will check with Dr. Alexandra Feldman whether at the time of surgery if it is feasible for him liver biopsy the same time.

## 2023-09-22 NOTE — RESULT ENCOUNTER NOTE
Had a F-3 finding --on liver fibrosis  panel   Discussed with patient   See if Dr Cory Lassiter can do liver biopsy at time of laparoscopic surgery

## 2023-09-26 ENCOUNTER — TELEPHONE (OUTPATIENT)
Dept: CARDIAC SURGERY | Facility: CLINIC | Age: 78
End: 2023-09-26

## 2023-09-26 NOTE — TELEPHONE ENCOUNTER
I spoke with pt and confirmed surgery date of 11/8 with Dr. Madelyn Han and Dr. Susan Hernández. Pt is aware she needs bloodwork and an EKG and knows she needs to go to a [facility] facility. Pt will call if she has any other questions prior to surgery.

## 2023-10-10 PROBLEM — Z01.89 ENCOUNTER FOR GERIATRIC ASSESSMENT: Status: ACTIVE | Noted: 2023-10-10

## 2023-10-12 ENCOUNTER — ANESTHESIA EVENT (OUTPATIENT)
Dept: PERIOP | Facility: HOSPITAL | Age: 78
DRG: 326 | End: 2023-10-12
Payer: COMMERCIAL

## 2023-10-18 NOTE — PRE-PROCEDURE INSTRUCTIONS
Pre-Surgery Instructions:   Medication Instructions    albuterol (PROVENTIL HFA,VENTOLIN HFA) 90 mcg/act inhaler Uses PRN- OK to take day of surgery    amitriptyline (ELAVIL) 50 mg tablet Take night before surgery    amLODIPine (NORVASC) 10 mg tablet Take day of surgery. cetirizine (ZyrTEC) 10 MG chewable tablet Hold day of surgery. cholecalciferol (VITAMIN D3) 1,000 units tablet Stop taking 7 days prior to surgery. clotrimazole-betamethasone (LOTRISONE) 1-0.05 % cream Hold day of surgery. dexlansoprazole (DEXILANT) 60 MG capsule Take day of surgery. dicyclomine (BENTYL) 20 mg tablet Uses PRN- OK to take day of surgery    famotidine (PEPCID) 40 MG tablet Take day of surgery. multivitamin (THERAGRAN) TABS Stop taking 7 days prior to surgery. Omega-3 Fatty Acids (OMEGA 3 PO) Stop taking 7 days prior to surgery. ondansetron (ZOFRAN) 4 mg tablet Uses PRN- OK to take day of surgery    Tavaborole 5 % SOLN Hold day of surgery. tobramycin-dexamethasone (TOBRADEX) ophthalmic suspension Take day of surgery. Medication instructions for day surgery reviewed. Please use only a sip of water to take your instructed medications. Avoid all over the counter vitamins, supplements and NSAIDS for one week prior to surgery per anesthesia guidelines. Tylenol is ok to take as needed. You will receive a call one business day prior to surgery with an arrival time and hospital directions. If your surgery is scheduled on a Monday, the hospital will be calling you on the Friday prior to your surgery. If you have not heard from anyone by 8pm, please call the hospital supervisor through the hospital  at [phone]. Marissa Fabian [phone]). Do not eat or drink anything after midnight the night before your surgery, including candy, mints, lifesavers, or chewing gum. Do not drink alcohol 24hrs before your surgery. Try not to smoke at least 24hrs before your surgery.        Follow the pre surgery showering instructions as listed in the [facility] Surgical Experience Booklet” or otherwise provided by your surgeon's office. Do not shave the surgical area 24 hours before surgery. Do not apply any lotions, creams, including makeup, cologne, deodorant, or perfumes after showering on the day of your surgery. No contact lenses, eye make-up, or artificial eyelashes. Remove nail polish, including gel polish, and any artificial, gel, or acrylic nails if possible. Remove all jewelry including rings and body piercing jewelry. Wear causal clothing that is easy to take on and off. Consider your type of surgery. Keep any valuables, jewelry, piercings at home. Please bring any specially ordered equipment (sling, braces) if indicated. Arrange for a responsible person to drive you to and from the hospital on the day of your surgery. Visitor Guidelines discussed. Call the surgeon's office with any new illnesses, exposures, or additional questions prior to surgery. Please reference your [facility] Surgical Experience Booklet” for additional information to prepare for your upcoming surgery. Pt instructed to stop nsaids and supplements one week prior to surgery. Pt verbalized understanding of shower and med instructions.

## 2023-10-18 NOTE — PRE-PROCEDURE INSTRUCTIONS
Pre-Surgery Instructions:   Medication Instructions    albuterol (PROVENTIL HFA,VENTOLIN HFA) 90 mcg/act inhaler Uses PRN- OK to take day of surgery    amitriptyline (ELAVIL) 50 mg tablet Take night before surgery    amLODIPine (NORVASC) 10 mg tablet Take day of surgery. cetirizine (ZyrTEC) 10 MG chewable tablet Hold day of surgery. cholecalciferol (VITAMIN D3) 1,000 units tablet Stop taking 7 days prior to surgery. clotrimazole-betamethasone (LOTRISONE) 1-0.05 % cream Hold day of surgery. dexlansoprazole (DEXILANT) 60 MG capsule Take day of surgery. dicyclomine (BENTYL) 20 mg tablet Uses PRN- OK to take day of surgery    famotidine (PEPCID) 40 MG tablet Take day of surgery. multivitamin (THERAGRAN) TABS Stop taking 7 days prior to surgery. Omega-3 Fatty Acids (OMEGA 3 PO) Stop taking 7 days prior to surgery. ondansetron (ZOFRAN) 4 mg tablet Uses PRN- OK to take day of surgery    Tavaborole 5 % SOLN Hold day of surgery. tobramycin-dexamethasone (TOBRADEX) ophthalmic suspension Take day of surgery. Medication instructions for day surgery reviewed. Please use only a sip of water to take your instructed medications. Avoid all over the counter vitamins, supplements and NSAIDS for one week prior to surgery per anesthesia guidelines. Tylenol is ok to take as needed. You will receive a call one business day prior to surgery with an arrival time and hospital directions. If your surgery is scheduled on a Monday, the hospital will be calling you on the Friday prior to your surgery. If you have not heard from anyone by 8pm, please call the hospital supervisor through the hospital  at [phone]. Holmes Pace [phone]). Do not eat or drink anything after midnight the night before your surgery, including candy, mints, lifesavers, or chewing gum. Do not drink alcohol 24hrs before your surgery. Try not to smoke at least 24hrs before your surgery.        Follow the pre surgery showering instructions as listed in the [facility] Surgical Experience Booklet” or otherwise provided by your surgeon's office. Do not shave the surgical area 24 hours before surgery. Do not apply any lotions, creams, including makeup, cologne, deodorant, or perfumes after showering on the day of your surgery. No contact lenses, eye make-up, or artificial eyelashes. Remove nail polish, including gel polish, and any artificial, gel, or acrylic nails if possible. Remove all jewelry including rings and body piercing jewelry. Wear causal clothing that is easy to take on and off. Consider your type of surgery. Keep any valuables, jewelry, piercings at home. Please bring any specially ordered equipment (sling, braces) if indicated. Arrange for a responsible person to drive you to and from the hospital on the day of your surgery. Visitor Guidelines discussed. Call the surgeon's office with any new illnesses, exposures, or additional questions prior to surgery. Please reference your [facility] Surgical Experience Booklet” for additional information to prepare for your upcoming surgery.

## 2023-10-27 ENCOUNTER — APPOINTMENT (OUTPATIENT)
Dept: LAB | Facility: HOSPITAL | Age: 78
End: 2023-10-27
Payer: COMMERCIAL

## 2023-10-27 ENCOUNTER — OFFICE VISIT (OUTPATIENT)
Dept: LAB | Facility: HOSPITAL | Age: 78
End: 2023-10-27
Payer: COMMERCIAL

## 2023-10-27 ENCOUNTER — LAB REQUISITION (OUTPATIENT)
Dept: LAB | Facility: HOSPITAL | Age: 78
End: 2023-10-27
Payer: COMMERCIAL

## 2023-10-27 DIAGNOSIS — Z98.890 OTHER SPECIFIED POSTPROCEDURAL STATES: ICD-10-CM

## 2023-10-27 DIAGNOSIS — I10 ESSENTIAL HYPERTENSION, MALIGNANT: ICD-10-CM

## 2023-10-27 DIAGNOSIS — R80.9 PROTEINURIA, UNSPECIFIED TYPE: ICD-10-CM

## 2023-10-27 DIAGNOSIS — Z98.890 HISTORY OF NISSEN FUNDOPLICATION: ICD-10-CM

## 2023-10-27 DIAGNOSIS — Z01.818 ENCOUNTER FOR PREADMISSION TESTING: ICD-10-CM

## 2023-10-27 DIAGNOSIS — N18.32 CHRONIC KIDNEY DISEASE (CKD) STAGE G3B/A1, MODERATELY DECREASED GLOMERULAR FILTRATION RATE (GFR) BETWEEN 30-44 ML/MIN/1.73 SQUARE METER AND ALBUMINURIA CREATININE RATIO LESS THAN 30 MG/G (HCC): ICD-10-CM

## 2023-10-27 DIAGNOSIS — R13.19 ESOPHAGEAL DYSPHAGIA: ICD-10-CM

## 2023-10-27 LAB
ABO GROUP BLD: NORMAL
ANION GAP SERPL CALCULATED.3IONS-SCNC: 7 MMOL/L
APTT PPP: 26 SECONDS (ref 23–37)
BASOPHILS # BLD AUTO: 0.03 THOUSANDS/ÂΜL (ref 0–0.1)
BASOPHILS NFR BLD AUTO: 0 % (ref 0–1)
BLD GP AB SCN SERPL QL: NEGATIVE
BUN SERPL-MCNC: 20 MG/DL (ref 5–25)
CALCIUM SERPL-MCNC: 9.8 MG/DL (ref 8.4–10.2)
CHLORIDE SERPL-SCNC: 107 MMOL/L (ref 96–108)
CO2 SERPL-SCNC: 27 MMOL/L (ref 21–32)
CREAT SERPL-MCNC: 1.42 MG/DL (ref 0.6–1.3)
EOSINOPHIL # BLD AUTO: 0.14 THOUSAND/ÂΜL (ref 0–0.61)
EOSINOPHIL NFR BLD AUTO: 2 % (ref 0–6)
ERYTHROCYTE [DISTWIDTH] IN BLOOD BY AUTOMATED COUNT: 12.8 % (ref 11.6–15.1)
GFR SERPL CREATININE-BSD FRML MDRD: 35 ML/MIN/1.73SQ M
GLUCOSE P FAST SERPL-MCNC: 74 MG/DL (ref 65–99)
HCT VFR BLD AUTO: 35.5 % (ref 34.8–46.1)
HGB BLD-MCNC: 11.8 G/DL (ref 11.5–15.4)
IMM GRANULOCYTES # BLD AUTO: 0.03 THOUSAND/UL (ref 0–0.2)
IMM GRANULOCYTES NFR BLD AUTO: 0 % (ref 0–2)
INR PPP: 0.97 (ref 0.84–1.19)
LYMPHOCYTES # BLD AUTO: 2.55 THOUSANDS/ÂΜL (ref 0.6–4.47)
LYMPHOCYTES NFR BLD AUTO: 33 % (ref 14–44)
MCH RBC QN AUTO: 31.8 PG (ref 26.8–34.3)
MCHC RBC AUTO-ENTMCNC: 33.2 G/DL (ref 31.4–37.4)
MCV RBC AUTO: 96 FL (ref 82–98)
MONOCYTES # BLD AUTO: 0.38 THOUSAND/ÂΜL (ref 0.17–1.22)
MONOCYTES NFR BLD AUTO: 5 % (ref 4–12)
NEUTROPHILS # BLD AUTO: 4.59 THOUSANDS/ÂΜL (ref 1.85–7.62)
NEUTS SEG NFR BLD AUTO: 60 % (ref 43–75)
NRBC BLD AUTO-RTO: 0 /100 WBCS
PLATELET # BLD AUTO: 237 THOUSANDS/UL (ref 149–390)
PMV BLD AUTO: 8.8 FL (ref 8.9–12.7)
POTASSIUM SERPL-SCNC: 4.9 MMOL/L (ref 3.5–5.3)
PROTHROMBIN TIME: 13.3 SECONDS (ref 11.6–14.5)
RBC # BLD AUTO: 3.71 MILLION/UL (ref 3.81–5.12)
RH BLD: POSITIVE
SODIUM SERPL-SCNC: 141 MMOL/L (ref 135–147)
SPECIMEN EXPIRATION DATE: NORMAL
WBC # BLD AUTO: 7.72 THOUSAND/UL (ref 4.31–10.16)

## 2023-10-27 PROCEDURE — 80048 BASIC METABOLIC PNL TOTAL CA: CPT

## 2023-10-27 PROCEDURE — 85610 PROTHROMBIN TIME: CPT

## 2023-10-27 PROCEDURE — 85730 THROMBOPLASTIN TIME PARTIAL: CPT

## 2023-10-27 PROCEDURE — 36415 COLL VENOUS BLD VENIPUNCTURE: CPT

## 2023-10-27 PROCEDURE — 86850 RBC ANTIBODY SCREEN: CPT | Performed by: THORACIC SURGERY (CARDIOTHORACIC VASCULAR SURGERY)

## 2023-10-27 PROCEDURE — 86900 BLOOD TYPING SEROLOGIC ABO: CPT | Performed by: THORACIC SURGERY (CARDIOTHORACIC VASCULAR SURGERY)

## 2023-10-27 PROCEDURE — 93005 ELECTROCARDIOGRAM TRACING: CPT

## 2023-10-27 PROCEDURE — 86901 BLOOD TYPING SEROLOGIC RH(D): CPT | Performed by: THORACIC SURGERY (CARDIOTHORACIC VASCULAR SURGERY)

## 2023-10-27 PROCEDURE — 85025 COMPLETE CBC W/AUTO DIFF WBC: CPT

## 2023-10-31 ENCOUNTER — TELEPHONE (OUTPATIENT)
Dept: CARDIAC SURGERY | Facility: CLINIC | Age: 78
End: 2023-10-31

## 2023-10-31 LAB
ATRIAL RATE: 79 BPM
P AXIS: 48 DEGREES
PR INTERVAL: 142 MS
QRS AXIS: -7 DEGREES
QRSD INTERVAL: 94 MS
QT INTERVAL: 374 MS
QTC INTERVAL: 428 MS
T WAVE AXIS: 22 DEGREES
VENTRICULAR RATE: 79 BPM

## 2023-10-31 PROCEDURE — 93010 ELECTROCARDIOGRAM REPORT: CPT | Performed by: INTERNAL MEDICINE

## 2023-11-02 ENCOUNTER — TELEPHONE (OUTPATIENT)
Age: 78
End: 2023-11-02

## 2023-11-02 NOTE — TELEPHONE ENCOUNTER
PA submitted for Dexlansoprazole via [employer]. Clinical questions answered, chart notes sent. Awaiting determination.

## 2023-11-02 NOTE — TELEPHONE ENCOUNTER
PA approved for Dexlansoprazole, Patient notified via [address] [place] [address]. And pharmacy called.

## 2023-11-08 ENCOUNTER — HOSPITAL ENCOUNTER (INPATIENT)
Facility: HOSPITAL | Age: 78
LOS: 6 days | Discharge: HOME HEALTH | DRG: 326 | End: 2023-11-14
Attending: THORACIC SURGERY (CARDIOTHORACIC VASCULAR SURGERY) | Admitting: THORACIC SURGERY (CARDIOTHORACIC VASCULAR SURGERY)
Payer: COMMERCIAL

## 2023-11-08 ENCOUNTER — ANESTHESIA (OUTPATIENT)
Dept: PERIOP | Facility: HOSPITAL | Age: 78
DRG: 326 | End: 2023-11-08
Payer: COMMERCIAL

## 2023-11-08 DIAGNOSIS — R13.19 ESOPHAGEAL DYSPHAGIA: ICD-10-CM

## 2023-11-08 DIAGNOSIS — N18.30 STAGE 3 CHRONIC KIDNEY DISEASE, UNSPECIFIED WHETHER STAGE 3A OR 3B CKD (HCC): ICD-10-CM

## 2023-11-08 DIAGNOSIS — D69.6 THROMBOCYTOPENIA (HCC): ICD-10-CM

## 2023-11-08 DIAGNOSIS — K31.84 GASTROPARESIS: ICD-10-CM

## 2023-11-08 DIAGNOSIS — Z01.89 ENCOUNTER FOR GERIATRIC ASSESSMENT: Primary | ICD-10-CM

## 2023-11-08 DIAGNOSIS — Z98.890 HISTORY OF NISSEN FUNDOPLICATION: ICD-10-CM

## 2023-11-08 DIAGNOSIS — R74.01 TRANSAMINITIS: ICD-10-CM

## 2023-11-08 PROCEDURE — 8E0W4CZ ROBOTIC ASSISTED PROCEDURE OF TRUNK REGION, PERCUTANEOUS ENDOSCOPIC APPROACH: ICD-10-PCS | Performed by: THORACIC SURGERY (CARDIOTHORACIC VASCULAR SURGERY)

## 2023-11-08 PROCEDURE — 43659 UNLISTED LAPS PX STOMACH: CPT | Performed by: SURGERY

## 2023-11-08 PROCEDURE — 88313 SPECIAL STAINS GROUP 2: CPT | Performed by: PATHOLOGY

## 2023-11-08 PROCEDURE — 0DV44ZZ RESTRICTION OF ESOPHAGOGASTRIC JUNCTION, PERCUTANEOUS ENDOSCOPIC APPROACH: ICD-10-PCS | Performed by: THORACIC SURGERY (CARDIOTHORACIC VASCULAR SURGERY)

## 2023-11-08 PROCEDURE — NC001 PR NO CHARGE: Performed by: SURGERY

## 2023-11-08 PROCEDURE — 0DQ74ZZ REPAIR STOMACH, PYLORUS, PERCUTANEOUS ENDOSCOPIC APPROACH: ICD-10-PCS | Performed by: THORACIC SURGERY (CARDIOTHORACIC VASCULAR SURGERY)

## 2023-11-08 PROCEDURE — S2900 ROBOTIC SURGICAL SYSTEM: HCPCS | Performed by: THORACIC SURGERY (CARDIOTHORACIC VASCULAR SURGERY)

## 2023-11-08 PROCEDURE — 0DJ08ZZ INSPECTION OF UPPER INTESTINAL TRACT, VIA NATURAL OR ARTIFICIAL OPENING ENDOSCOPIC: ICD-10-PCS | Performed by: THORACIC SURGERY (CARDIOTHORACIC VASCULAR SURGERY)

## 2023-11-08 PROCEDURE — 43282 LAP PARAESOPH HER RPR W/MESH: CPT | Performed by: THORACIC SURGERY (CARDIOTHORACIC VASCULAR SURGERY)

## 2023-11-08 PROCEDURE — 0DQV4ZZ REPAIR MESENTERY, PERCUTANEOUS ENDOSCOPIC APPROACH: ICD-10-PCS | Performed by: THORACIC SURGERY (CARDIOTHORACIC VASCULAR SURGERY)

## 2023-11-08 PROCEDURE — C1781 MESH (IMPLANTABLE): HCPCS | Performed by: THORACIC SURGERY (CARDIOTHORACIC VASCULAR SURGERY)

## 2023-11-08 PROCEDURE — 88307 TISSUE EXAM BY PATHOLOGIST: CPT | Performed by: PATHOLOGY

## 2023-11-08 PROCEDURE — 47001 NDL BIOPSY LVR TM OTH MAJ PX: CPT | Performed by: SURGERY

## 2023-11-08 PROCEDURE — 0FB14ZX EXCISION OF RIGHT LOBE LIVER, PERCUTANEOUS ENDOSCOPIC APPROACH, DIAGNOSTIC: ICD-10-PCS | Performed by: THORACIC SURGERY (CARDIOTHORACIC VASCULAR SURGERY)

## 2023-11-08 PROCEDURE — 43235 EGD DIAGNOSTIC BRUSH WASH: CPT | Performed by: SURGERY

## 2023-11-08 PROCEDURE — 99024 POSTOP FOLLOW-UP VISIT: CPT | Performed by: THORACIC SURGERY (CARDIOTHORACIC VASCULAR SURGERY)

## 2023-11-08 DEVICE — BIO-A TISSUE REINFORCEMENT 7CMX10CM
Type: IMPLANTABLE DEVICE | Site: STOMACH | Status: FUNCTIONAL
Brand: GORE BIO-A TISSUE REINFORCEMENT

## 2023-11-08 RX ORDER — SODIUM CHLORIDE 9 MG/ML
INJECTION, SOLUTION INTRAVENOUS CONTINUOUS PRN
Status: DISCONTINUED | OUTPATIENT
Start: 2023-11-08 | End: 2023-11-08

## 2023-11-08 RX ORDER — ONDANSETRON 2 MG/ML
4 INJECTION INTRAMUSCULAR; INTRAVENOUS ONCE AS NEEDED
Status: DISCONTINUED | OUTPATIENT
Start: 2023-11-08 | End: 2023-11-08 | Stop reason: HOSPADM

## 2023-11-08 RX ORDER — LIDOCAINE HYDROCHLORIDE 10 MG/ML
INJECTION, SOLUTION EPIDURAL; INFILTRATION; INTRACAUDAL; PERINEURAL AS NEEDED
Status: DISCONTINUED | OUTPATIENT
Start: 2023-11-08 | End: 2023-11-08

## 2023-11-08 RX ORDER — FENTANYL CITRATE 50 UG/ML
INJECTION, SOLUTION INTRAMUSCULAR; INTRAVENOUS AS NEEDED
Status: DISCONTINUED | OUTPATIENT
Start: 2023-11-08 | End: 2023-11-08

## 2023-11-08 RX ORDER — CEFAZOLIN SODIUM 2 G/50ML
2000 SOLUTION INTRAVENOUS ONCE
Status: COMPLETED | OUTPATIENT
Start: 2023-11-08 | End: 2023-11-08

## 2023-11-08 RX ORDER — POLYETHYLENE GLYCOL 3350 17 G/17G
17 POWDER, FOR SOLUTION ORAL DAILY
Status: DISCONTINUED | OUTPATIENT
Start: 2023-11-09 | End: 2023-11-14 | Stop reason: HOSPADM

## 2023-11-08 RX ORDER — SUCRALFATE 1 G/1
1 TABLET ORAL
Status: DISCONTINUED | OUTPATIENT
Start: 2023-11-08 | End: 2023-11-14 | Stop reason: HOSPADM

## 2023-11-08 RX ORDER — ALBUMIN, HUMAN INJ 5% 5 %
SOLUTION INTRAVENOUS CONTINUOUS PRN
Status: DISCONTINUED | OUTPATIENT
Start: 2023-11-08 | End: 2023-11-08

## 2023-11-08 RX ORDER — ALBUTEROL SULFATE 90 UG/1
2 AEROSOL, METERED RESPIRATORY (INHALATION) 2 TIMES DAILY
Status: DISCONTINUED | OUTPATIENT
Start: 2023-11-08 | End: 2023-11-14 | Stop reason: HOSPADM

## 2023-11-08 RX ORDER — SODIUM CHLORIDE, SODIUM LACTATE, POTASSIUM CHLORIDE, CALCIUM CHLORIDE 600; 310; 30; 20 MG/100ML; MG/100ML; MG/100ML; MG/100ML
125 INJECTION, SOLUTION INTRAVENOUS CONTINUOUS
Status: DISCONTINUED | OUTPATIENT
Start: 2023-11-08 | End: 2023-11-08

## 2023-11-08 RX ORDER — ENOXAPARIN SODIUM 100 MG/ML
40 INJECTION SUBCUTANEOUS DAILY
Status: DISCONTINUED | OUTPATIENT
Start: 2023-11-09 | End: 2023-11-09

## 2023-11-08 RX ORDER — MAGNESIUM HYDROXIDE 1200 MG/15ML
LIQUID ORAL AS NEEDED
Status: DISCONTINUED | OUTPATIENT
Start: 2023-11-08 | End: 2023-11-08 | Stop reason: HOSPADM

## 2023-11-08 RX ORDER — DOCUSATE SODIUM 100 MG/1
100 CAPSULE, LIQUID FILLED ORAL 2 TIMES DAILY
Status: DISCONTINUED | OUTPATIENT
Start: 2023-11-08 | End: 2023-11-14 | Stop reason: HOSPADM

## 2023-11-08 RX ORDER — BUPIVACAINE HYDROCHLORIDE 2.5 MG/ML
INJECTION, SOLUTION EPIDURAL; INFILTRATION; INTRACAUDAL AS NEEDED
Status: DISCONTINUED | OUTPATIENT
Start: 2023-11-08 | End: 2023-11-08 | Stop reason: HOSPADM

## 2023-11-08 RX ORDER — AMLODIPINE BESYLATE 10 MG/1
10 TABLET ORAL DAILY
Status: DISCONTINUED | OUTPATIENT
Start: 2023-11-09 | End: 2023-11-09

## 2023-11-08 RX ORDER — SENNOSIDES 8.6 MG
1 TABLET ORAL DAILY
Status: DISCONTINUED | OUTPATIENT
Start: 2023-11-09 | End: 2023-11-14 | Stop reason: HOSPADM

## 2023-11-08 RX ORDER — LORATADINE 10 MG/1
5 TABLET ORAL DAILY
Status: DISCONTINUED | OUTPATIENT
Start: 2023-11-09 | End: 2023-11-10

## 2023-11-08 RX ORDER — OXYCODONE HCL 5 MG/5 ML
10 SOLUTION, ORAL ORAL EVERY 4 HOURS PRN
Status: DISCONTINUED | OUTPATIENT
Start: 2023-11-08 | End: 2023-11-09

## 2023-11-08 RX ORDER — PANTOPRAZOLE SODIUM 40 MG/10ML
40 INJECTION, POWDER, LYOPHILIZED, FOR SOLUTION INTRAVENOUS DAILY
Status: DISCONTINUED | OUTPATIENT
Start: 2023-11-09 | End: 2023-11-14

## 2023-11-08 RX ORDER — ACETAMINOPHEN 160 MG/5ML
650 SUSPENSION ORAL EVERY 6 HOURS
Status: DISCONTINUED | OUTPATIENT
Start: 2023-11-08 | End: 2023-11-14 | Stop reason: HOSPADM

## 2023-11-08 RX ORDER — ONDANSETRON 2 MG/ML
INJECTION INTRAMUSCULAR; INTRAVENOUS AS NEEDED
Status: DISCONTINUED | OUTPATIENT
Start: 2023-11-08 | End: 2023-11-08

## 2023-11-08 RX ORDER — LIDOCAINE HYDROCHLORIDE 10 MG/ML
0.5 INJECTION, SOLUTION EPIDURAL; INFILTRATION; INTRACAUDAL; PERINEURAL ONCE AS NEEDED
Status: COMPLETED | OUTPATIENT
Start: 2023-11-08 | End: 2023-11-08

## 2023-11-08 RX ORDER — ONDANSETRON 2 MG/ML
4 INJECTION INTRAMUSCULAR; INTRAVENOUS EVERY 6 HOURS
Status: DISCONTINUED | OUTPATIENT
Start: 2023-11-08 | End: 2023-11-14 | Stop reason: HOSPADM

## 2023-11-08 RX ORDER — OXYCODONE HCL 5 MG/5 ML
5 SOLUTION, ORAL ORAL EVERY 4 HOURS PRN
Status: DISCONTINUED | OUTPATIENT
Start: 2023-11-08 | End: 2023-11-09

## 2023-11-08 RX ORDER — ROCURONIUM BROMIDE 10 MG/ML
INJECTION, SOLUTION INTRAVENOUS AS NEEDED
Status: DISCONTINUED | OUTPATIENT
Start: 2023-11-08 | End: 2023-11-08

## 2023-11-08 RX ORDER — SODIUM CHLORIDE, SODIUM LACTATE, POTASSIUM CHLORIDE, CALCIUM CHLORIDE 600; 310; 30; 20 MG/100ML; MG/100ML; MG/100ML; MG/100ML
INJECTION, SOLUTION INTRAVENOUS CONTINUOUS PRN
Status: DISCONTINUED | OUTPATIENT
Start: 2023-11-08 | End: 2023-11-08

## 2023-11-08 RX ORDER — HYDROMORPHONE HYDROCHLORIDE 1 MG/ML
INJECTION, SOLUTION INTRAMUSCULAR; INTRAVENOUS; SUBCUTANEOUS AS NEEDED
Status: DISCONTINUED | OUTPATIENT
Start: 2023-11-08 | End: 2023-11-08

## 2023-11-08 RX ORDER — SODIUM CHLORIDE, SODIUM LACTATE, POTASSIUM CHLORIDE, CALCIUM CHLORIDE 600; 310; 30; 20 MG/100ML; MG/100ML; MG/100ML; MG/100ML
50 INJECTION, SOLUTION INTRAVENOUS CONTINUOUS
Status: DISCONTINUED | OUTPATIENT
Start: 2023-11-08 | End: 2023-11-09

## 2023-11-08 RX ORDER — FENTANYL CITRATE/PF 50 MCG/ML
12.5 SYRINGE (ML) INJECTION
Status: DISCONTINUED | OUTPATIENT
Start: 2023-11-08 | End: 2023-11-08 | Stop reason: HOSPADM

## 2023-11-08 RX ORDER — HYDROMORPHONE HCL/PF 1 MG/ML
0.5 SYRINGE (ML) INJECTION
Status: DISCONTINUED | OUTPATIENT
Start: 2023-11-08 | End: 2023-11-09

## 2023-11-08 RX ORDER — LIDOCAINE HYDROCHLORIDE 10 MG/ML
INJECTION, SOLUTION EPIDURAL; INFILTRATION; INTRACAUDAL; PERINEURAL
Status: DISPENSED
Start: 2023-11-08 | End: 2023-11-08

## 2023-11-08 RX ORDER — SUCCINYLCHOLINE/SOD CL,ISO/PF 100 MG/5ML
SYRINGE (ML) INTRAVENOUS AS NEEDED
Status: DISCONTINUED | OUTPATIENT
Start: 2023-11-08 | End: 2023-11-08

## 2023-11-08 RX ORDER — AMITRIPTYLINE HYDROCHLORIDE 50 MG/1
50 TABLET, FILM COATED ORAL
Status: DISCONTINUED | OUTPATIENT
Start: 2023-11-08 | End: 2023-11-14 | Stop reason: HOSPADM

## 2023-11-08 RX ORDER — HYDROMORPHONE HCL IN WATER/PF 6 MG/30 ML
0.2 PATIENT CONTROLLED ANALGESIA SYRINGE INTRAVENOUS
Status: DISCONTINUED | OUTPATIENT
Start: 2023-11-08 | End: 2023-11-08 | Stop reason: HOSPADM

## 2023-11-08 RX ORDER — METRONIDAZOLE 500 MG/100ML
500 INJECTION, SOLUTION INTRAVENOUS ONCE
Status: DISCONTINUED | OUTPATIENT
Start: 2023-11-08 | End: 2023-11-08 | Stop reason: HOSPADM

## 2023-11-08 RX ORDER — PROPOFOL 10 MG/ML
INJECTION, EMULSION INTRAVENOUS AS NEEDED
Status: DISCONTINUED | OUTPATIENT
Start: 2023-11-08 | End: 2023-11-08

## 2023-11-08 RX ADMIN — SODIUM CHLORIDE, SODIUM LACTATE, POTASSIUM CHLORIDE, AND CALCIUM CHLORIDE 125 ML/HR: .6; .31; .03; .02 INJECTION, SOLUTION INTRAVENOUS at 11:30

## 2023-11-08 RX ADMIN — ROCURONIUM BROMIDE 50 MG: 10 INJECTION, SOLUTION INTRAVENOUS at 14:45

## 2023-11-08 RX ADMIN — FENTANYL CITRATE 50 MCG: 50 INJECTION, SOLUTION INTRAMUSCULAR; INTRAVENOUS at 13:44

## 2023-11-08 RX ADMIN — ALBUMIN (HUMAN): 12.5 INJECTION, SOLUTION INTRAVENOUS at 13:56

## 2023-11-08 RX ADMIN — PROPOFOL 150 MG: 10 INJECTION, EMULSION INTRAVENOUS at 13:44

## 2023-11-08 RX ADMIN — LIDOCAINE HYDROCHLORIDE 5 ML: 10 INJECTION, SOLUTION EPIDURAL; INFILTRATION; INTRACAUDAL; PERINEURAL at 13:44

## 2023-11-08 RX ADMIN — SUGAMMADEX 225 MG: 100 INJECTION, SOLUTION INTRAVENOUS at 17:31

## 2023-11-08 RX ADMIN — HYDROMORPHONE HYDROCHLORIDE 0.5 MG: 1 INJECTION, SOLUTION INTRAMUSCULAR; INTRAVENOUS; SUBCUTANEOUS at 14:28

## 2023-11-08 RX ADMIN — ONDANSETRON 4 MG: 2 INJECTION INTRAMUSCULAR; INTRAVENOUS at 19:51

## 2023-11-08 RX ADMIN — ONDANSETRON 4 MG: 2 INJECTION INTRAMUSCULAR; INTRAVENOUS at 17:30

## 2023-11-08 RX ADMIN — AMITRIPTYLINE HYDROCHLORIDE 50 MG: 50 TABLET, FILM COATED ORAL at 21:57

## 2023-11-08 RX ADMIN — ROCURONIUM BROMIDE 20 MG: 10 INJECTION, SOLUTION INTRAVENOUS at 16:34

## 2023-11-08 RX ADMIN — ROCURONIUM BROMIDE 50 MG: 10 INJECTION, SOLUTION INTRAVENOUS at 14:00

## 2023-11-08 RX ADMIN — ROCURONIUM BROMIDE 30 MG: 10 INJECTION, SOLUTION INTRAVENOUS at 16:45

## 2023-11-08 RX ADMIN — SUCRALFATE 1 G: 1 TABLET ORAL at 21:57

## 2023-11-08 RX ADMIN — SODIUM CHLORIDE, SODIUM LACTATE, POTASSIUM CHLORIDE, AND CALCIUM CHLORIDE 50 ML/HR: .6; .31; .03; .02 INJECTION, SOLUTION INTRAVENOUS at 18:55

## 2023-11-08 RX ADMIN — FENTANYL CITRATE 50 MCG: 50 INJECTION, SOLUTION INTRAMUSCULAR; INTRAVENOUS at 13:41

## 2023-11-08 RX ADMIN — LIDOCAINE HYDROCHLORIDE 0.5 ML: 10 INJECTION, SOLUTION EPIDURAL; INFILTRATION; INTRACAUDAL; PERINEURAL at 11:30

## 2023-11-08 RX ADMIN — SODIUM CHLORIDE, SODIUM LACTATE, POTASSIUM CHLORIDE, AND CALCIUM CHLORIDE: .6; .31; .03; .02 INJECTION, SOLUTION INTRAVENOUS at 13:35

## 2023-11-08 RX ADMIN — PHENYLEPHRINE HYDROCHLORIDE 20 MCG/MIN: 10 INJECTION INTRAVENOUS at 13:56

## 2023-11-08 RX ADMIN — OXYCODONE HYDROCHLORIDE 10 MG: 5 SOLUTION ORAL at 21:56

## 2023-11-08 RX ADMIN — HYDROMORPHONE HYDROCHLORIDE 0.5 MG: 1 INJECTION, SOLUTION INTRAMUSCULAR; INTRAVENOUS; SUBCUTANEOUS at 16:19

## 2023-11-08 RX ADMIN — SODIUM CHLORIDE: 0.9 INJECTION, SOLUTION INTRAVENOUS at 13:54

## 2023-11-08 RX ADMIN — ROCURONIUM BROMIDE 10 MG: 10 INJECTION, SOLUTION INTRAVENOUS at 17:24

## 2023-11-08 RX ADMIN — ALBUTEROL SULFATE 2 PUFF: 90 AEROSOL, METERED RESPIRATORY (INHALATION) at 21:59

## 2023-11-08 RX ADMIN — SODIUM CHLORIDE, SODIUM LACTATE, POTASSIUM CHLORIDE, AND CALCIUM CHLORIDE: .6; .31; .03; .02 INJECTION, SOLUTION INTRAVENOUS at 13:49

## 2023-11-08 RX ADMIN — Medication 60 MG: at 13:44

## 2023-11-08 RX ADMIN — CEFAZOLIN SODIUM 2000 MG: 2 SOLUTION INTRAVENOUS at 14:12

## 2023-11-08 NOTE — ANESTHESIA POSTPROCEDURE EVALUATION
Post-Op Assessment Note    CV Status:  Stable  Pain Score: 0    Pain management: adequate     Mental Status:  Awake   Hydration Status:  Stable   PONV Controlled:  None   Airway Patency:  Patent      Post Op Vitals Reviewed: Yes      Staff: Anesthesiologist, CRNA         No notable events documented.     BP   119/66   Temp   98.2   Pulse  96   Resp   12   SpO2   97

## 2023-11-08 NOTE — H&P
H&P Exam - General Surgery   Cortez Morris 69 y.o. female MRN: [id number]  Unit/Bed#: OR [place] Encounter: [id number]    Assessment/Plan     Assessment:  60-year-old female with dysphagia following a Nissen fundoplication as well as gastroparesis. Plan: We will plan for robotic pyloroplasty and EGD in the setting of a hiatal hernia repair, and redo fundoplication with Dr. Yoandy Stack of thoracic surgery. Additionally, I will perform a core needle biopsy of her liver. Patient is amenable to risk and benefits, proceed back to the operating room expeditiously. History of Present Illness     HPI:  Cortez Morris is a 69 y.o. female who presents with dysphagia and gastroparesis. Has a history of Nissen fundoplication in the past with symptoms of recurrent hernia as well as dysphagia, as well as newly diagnosed gastroparesis. Preoperative work-up confirmed delayed gastric emptying on gastric emptying study. Additionally, some elevated liver function tests were noted on lab work. Review of Systems   Constitutional:  Negative for chills, fatigue and fever. HENT:  Negative for ear pain, facial swelling, sinus pressure and sinus pain. Eyes:  Negative for pain. Respiratory:  Negative for cough, shortness of breath and wheezing. Cardiovascular:  Negative for chest pain. Gastrointestinal:  Negative for abdominal pain, constipation, diarrhea, nausea and vomiting. Endocrine: Negative for cold intolerance and heat intolerance. Genitourinary:  Negative for dysuria and flank pain. Musculoskeletal:  Negative for back pain and neck pain. Skin:  Negative for wound. Neurological:  Negative for syncope, facial asymmetry, light-headedness and numbness. Psychiatric/Behavioral:  Negative for behavioral problems, confusion and suicidal ideas.         Historical Information   Past Medical History:   Diagnosis Date    Anxiety     Arthritis     Asthma     Cancer ([address])     Candida infection, esophageal ([address]) Chronic kidney disease     Chronic pain     COPD (chronic obstructive pulmonary disease) (HCC)     Depression     Depression     Gastroparesis     Gastroparesis     GERD (gastroesophageal reflux disease)     Hypertension     Irritable bowel syndrome (IBS)     Lactose intolerance Not sure    Migraines     MRSA (methicillin resistant Staphylococcus aureus)     Multiple thyroid nodules     Osteoporosis     Pneumonia     Psychiatric disorder      Past Surgical History:   Procedure Laterality Date    BACK SURGERY       SECTION      CHOLECYSTECTOMY      COLONOSCOPY  10/09/2013    ESOPHAGEAL DILATION       [address] [place] [address]    HIATAL HERNIA REPAIR      NISSEN FUNDOPLICATION      [place] ESOPHAGOGASTRODUODENOSCOPY TRANSORAL DIAGNOSTIC N/A 2019    Procedure: ESOPHAGOGASTRODUODENOSCOPY (EGD); Surgeon: Stephanie Montero MD;  [facility];  Service: Gastroenterology    [place] ESOPHAGOGASTRODUODENOSCOPY TRANSORAL DIAGNOSTIC N/A 2023    Procedure: ESOPHAGOGASTRODUODENOSCOPY (EGD);   Surgeon: Ella Garcia MD;  [facility];  Service: Thoracic    [place] ESOPHAGOSCOPY FLEX BALLOON DILAT <30 MM DIAM N/A 2023    Procedure: CRE WIRE GUIDED BALLOON DILATATION ESOPHAGEAL;  Surgeon: Ella Garcia MD;  [facility];  Service: Thoracic    ROTATOR CUFF REPAIR      SHOULDER SURGERY      SPINAL FUSION      TUBAL LIGATION  May 1975    Last child born    UPPER GASTROINTESTINAL ENDOSCOPY      US GUIDED THYROID BIOPSY      WISDOM TOOTH EXTRACTION       Social History   Social History     Substance and Sexual Activity   Alcohol Use Not Currently     Social History     Substance and Sexual Activity   Drug Use Never     Social History     Tobacco Use   Smoking Status Former    Packs/day: 1.00    Years: 15.00    Total pack years: 15.00    Types: Cigarettes    Start date: 10/5/1998    Quit date: 10/1/2003    Years since quittin.1   Smokeless Tobacco Former     E-Cigarette/Vaping    E-Cigarette Use Never User      E-Cigarette/Vaping Substances    Nicotine No     THC No     CBD No     Flavoring No     Other No     Unknown No      Family History:   Family History   Problem Relation Age of Onset    Other Mother         Esophageal disorder    Stomach cancer Father     Cancer Father     Hypertension Maternal Aunt     COPD Maternal Grandmother     Colon cancer Neg Hx     Colon polyps Neg Hx        Meds/Allergies   PTA meds:   Prior to Admission Medications   Prescriptions Last Dose Informant Patient Reported? Taking? Omega-3 Fatty Acids (OMEGA 3 PO) 11/1/2023 Self Yes Yes   Sig: Take by mouth in the morning   Tavaborole 5 % SOLN Unknown  Yes No   Sig: APPLY TO NAILS DAILY AS DIRECTED   albuterol (PROVENTIL HFA,VENTOLIN HFA) 90 mcg/act inhaler 11/7/2023 Self Yes Yes   Sig: Inhale 2 puffs 2 (two) times a day   amLODIPine (NORVASC) 10 mg tablet 11/8/2023 at 0900  Yes Yes   Sig: TAKE ONE TABLET BY MOUTH DAILY ORAL ONCE A DAY 90 DAYS   amitriptyline (ELAVIL) 50 mg tablet 11/7/2023 Self No Yes   Sig: TAKE 1 TABLET BY MOUTH EVERYDAY AT BEDTIME   cetirizine (ZyrTEC) 10 MG chewable tablet 11/7/2023 Self Yes Yes   Sig: Chew 10 mg daily.    cholecalciferol (VITAMIN D3) 1,000 units tablet 11/1/2023 Self Yes Yes   Sig: Take 5,000 Units by mouth daily   clotrimazole-betamethasone (LOTRISONE) 1-0.05 % cream   Yes Yes   Sig: if needed   dexlansoprazole (DEXILANT) 60 MG capsule 11/8/2023 at 0900 Self No Yes   Sig: TAKE 1 CAPSULE BY MOUTH EVERY DAY   dicyclomine (BENTYL) 20 mg tablet Past Week Self No Yes   Sig: Take 1 tablet (20 mg total) by mouth every 6 (six) hours as needed (abdominal cramping)   famotidine (PEPCID) 40 MG tablet 11/7/2023 Self No Yes   Sig: TAKE 1 TABLET BY MOUTH EVERYDAY AT BEDTIME   multivitamin (THERAGRAN) TABS 11/1/2023 Self Yes Yes   Sig: Take 1 tablet by mouth daily   ondansetron (ZOFRAN) 4 mg tablet  Self No Yes   Sig: Take 1 tablet (4 mg total) by mouth every 8 (eight) hours as needed for nausea or vomiting tobramycin-dexamethasone (TOBRADEX) ophthalmic suspension Unknown  Yes No   Sig: if needed      Facility-Administered Medications: None     Allergies   Allergen Reactions    Latex Hives    Augmentin [Amoxicillin-Pot Clavulanate] GI Intolerance    Bactrim [Sulfamethoxazole-Trimethoprim] GI Intolerance    Clarithromycin GI Intolerance    Doxycycline Hives    Neomycin GI Intolerance    Polymyxin B GI Intolerance    Zyvox [Linezolid] GI Bleeding    Cephalosporins GI Intolerance    Nsaids GI Intolerance    Penicillins GI Intolerance     Patient can only take levaquin and cipro    Prednisone GI Intolerance    Sulphasomidine GI Intolerance       Objective   First Vitals:   Blood Pressure: 156/85 (11/08/23 1049)  Pulse: 92 (11/08/23 1049)  Temperature: 97.6 °F (36.4 °C) (11/08/23 1049)  Temp Source: Temporal (11/08/23 1049)  Respirations: 17 (11/08/23 1049)  Height: 5' (152.4 cm) (11/08/23 1058)  Weight - Scale: 57.8 kg (127 lb 6.8 oz) (11/08/23 1058)  SpO2: 100 % (11/08/23 1049)    Current Vitals:   Blood Pressure: 156/85 (11/08/23 1049)  Pulse: 92 (11/08/23 1049)  Temperature: 97.6 °F (36.4 °C) (11/08/23 1049)  Temp Source: Temporal (11/08/23 1049)  Respirations: 17 (11/08/23 1049)  Height: 5' (152.4 cm) (11/08/23 1058)  Weight - Scale: 57.8 kg (127 lb 6.8 oz) (11/08/23 1058)  SpO2: 100 % (11/08/23 1049)    No intake or output data in the 24 hours ending 11/08/23 1318    Invasive Devices       Peripheral Intravenous Line  Duration             Peripheral IV 11/08/23 Dorsal (posterior); Left Forearm <1 day                    Physical Exam  Vitals and nursing note reviewed. Constitutional:       General: She is not in acute distress. Appearance: She is well-developed. HENT:      Head: Normocephalic and atraumatic. Eyes:      Conjunctiva/sclera: Conjunctivae normal.   Cardiovascular:      Rate and Rhythm: Normal rate and regular rhythm. Heart sounds: No murmur heard.   Pulmonary:      Effort: Pulmonary effort is normal. No respiratory distress. Breath sounds: Normal breath sounds. Abdominal:      Palpations: Abdomen is soft. Tenderness: There is no abdominal tenderness. Comments: Well-healed laparoscopic port sites. Musculoskeletal:         General: No swelling. Cervical back: Neck supple. Skin:     General: Skin is warm and dry. Capillary Refill: Capillary refill takes less than 2 seconds. Neurological:      General: No focal deficit present. Mental Status: She is alert and oriented to person, place, and time. Psychiatric:         Mood and Affect: Mood normal.         Lab Results: I have personally reviewed pertinent lab results. , CBC: No results found for: "WBC", "HGB", "HCT", "MCV", "PLT", "ADJUSTEDWBC", "RBC", "MCH", "MCHC", "RDW", "MPV", "NRBC", CMP: No results found for: "SODIUM", "K", "CL", "CO2", "ANIONGAP", "BUN", "CREATININE", "GLUCOSE", "CALCIUM", "AST", "ALT", "ALKPHOS", "PROT", "BILITOT", "EGFR", Coagulation: No results found for: "PT", "INR", "APTT", Urinalysis: No results found for: "COLORU", "CLARITYU", "Megan Clay", "PHUR", "LEUKOCYTESUR", "NITRITE", "PROTEINUA", "GLUCOSEU", "Jacqulin Thao", "Irven Patter", "BLOODU", Amylase: No results found for: "AMYLASE", Lipase: No results found for: "LIPASE"  Imaging: I have personally reviewed pertinent films in PACS  EKG, Pathology, and Other Studies: I have personally reviewed pertinent reports.       Code Status: No Order  Advance Directive and Living Will:      Power of :    POLST:

## 2023-11-08 NOTE — ANESTHESIA PREPROCEDURE EVALUATION
Procedure:  robotic takedown of nissen fundoplication, redo hiatal hernia, partial fundoplication (Abdomen)  ESOPHAGOGASTRODUODENOSCOPY (EGD) (Esophagus)  PYLOROPLASTY LAPAROSCOPIC WITH ROBOT (Abdomen)  EXCISION BIOPSY LIVER (Abdomen)    Relevant Problems   CARDIO   (+) Hypertension   (+) Migraines      GI/HEPATIC   (+) Chronic GERD   (+) Dysphagia   (+) Esophageal dysphagia   (+) Gastroesophageal reflux disease without esophagitis      /RENAL   (+) Stage 3 chronic kidney disease (HCC)      NEURO/PSYCH   (+) Anxiety   (+) Depression   (+) Migraines      PULMONARY   (+) Asthma   (+) COPD (chronic obstructive pulmonary disease) (HCC)    Physical Exam    Airway  Comment: Small mouth opening  Mallampati score: II  TM Distance: >3 FB  Neck ROM: full     Dental   No notable dental hx     Cardiovascular  Rhythm: regular, Rate: normal, Cardiovascular exam normal    Pulmonary  Pulmonary exam normal Breath sounds clear to auscultation    Other Findings      Anesthesia Plan  ASA Score- 3     Anesthesia Type- general with ASA Monitors. Additional Monitors: arterial line. Airway Plan: ETT. Plan Factors-Exercise tolerance (METS): >4 METS. Chart reviewed. EKG reviewed. Existing labs reviewed. Patient summary reviewed. Patient is not a current smoker. Induction- intravenous. Postoperative Plan- Plan for postoperative opioid use. Planned trial extubation    Informed Consent- Anesthetic plan and risks discussed with patient. I personally reviewed this patient with the CRNA. Discussed and agreed on the Anesthesia Plan with the CRNA. Trevor Ross

## 2023-11-08 NOTE — OP NOTE
OPERATIVE REPORT  PATIENT NAME: Edinson Montero    :  1945  MRN: [id number]  Pt Location: [facility] ROOM 15    SURGERY DATE: 2023    Surgeon(s) and Role:  Panel 1:     * Karla Rhodes MD - Primary     * Lianne Villa - Assisting     * Avinash Pedroza MD - Assisting  Panel 2:     * Kristie Warren MD - Primary    Preop Diagnosis:  History of Nissen fundoplication [W55.924]  Esophageal dysphagia [R13.19]  Gastroparesis [K31.84]    Post-Op Diagnosis Codes:     * History of Nissen fundoplication [R86.360]     * Esophageal dysphagia [R13.19]     * Gastroparesis [K31.84]    Procedure(s):  robotic takedown of nissen fundoplication. redo hiatal hernia. partial fundoplication. with mesh  PYLOROPLASTY LAPAROSCOPIC WITH ROBOT  EXCISION BIOPSY LIVER  ESOPHAGOGASTRODUODENOSCOPY (EGD)    Specimen(s):  ID Type Source Tests Collected by Time Destination   1 : liver biopsy Tissue Liver TISSUE EXAM Karla Rhodes MD 2023 1426        Estimated Blood Loss:   20 mL    Drains:  Urethral Catheter Non-latex 16 Fr. (Active)   Number of days: 0       Anesthesia Type:   General    Operative Indications:  History of Nissen fundoplication [V05.412]  Esophageal dysphagia [R13.19]  Gastroparesis []  81-year-old female with a history of gastroparesis following prior Nissen fundoplication. She presented with a slipped Nissen, in addition to gastroparesis. After discussion of risks and benefits, she opted to proceed to the operating room for planned redo hiatal hernia repair, as well as pyloroplasty with Dr. Samira Duarte of thoracic surgery. 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 Core needle liver biopsy taken from the right lobe of the liver. Complications:   None    Procedure and Technique:  The patient was seen in the Holding Area.  The risks, benefits, complications, treatment options, and expected outcomes were discussed with the patient and/or family. There was concurrence with the proposed plan and informed consent was obtained. The site of surgery was properly noted/marked. The patient was taken to Operating Room, correctly identified and the procedure verified as robotic pyloroplasty liver biopsy. Please see Dr. Michaels All operative dictation for placement of trocars and instrumentation. After trocars were placed a laparoscopic liver biopsy Pawel-Cut was placed into the right lobe of the liver and a liver biopsy was taken. Bleeding was controlled with Bovie electrocautery. Please see Dr. Brando Stcay dictation for further details regarding the hiatal hernia repair. Upon the completion of her procedure, I took over. Began with an upper endoscopy. The adult gastroscope was placed through the mouth, through the esophagus, into the stomach. The pylorus was noted to be relatively tight, with no pathology noted within the duodenum. We identified the pylorus through laparoscopic palpation, as well as the characteristic vein of Magana crossing over top. The patient was placed in steep reverse Trendelenburg. I then placed 2 stay sutures 1 on the superior and 1 on the inferior aspect of the gastroduodenal junction, for retraction using 3-0 Vicryl. We began by making a 5 cm enterotomy spanning 3 cm of stomach and 2 cm of duodenum, using Bovie electrocautery. We clearly identified all layers including mucosa and were able to enter the bowel without injury. We then began to close the enterotomy in a Heineke-Mikulicz fashion. This was accomplished using a running 3 of 0 V lock absorbable suture, in a 2 layer fashion. The patient was then flattened and irrigation was placed in the right upper quadrant and an air leak test was performed. I performed a repeat upper endoscopy which showed a widely patent pyloroplasty with significant reflux of bile into the stomach and the scope easily passes into the duodenum.   The air leak test was negative with no bubbles appreciated. All skin incisions were closed using MonocryI suture in a subcuticular fashion. The instrument, sponge, and needle counts were correct at the conclusion of the case. The patient was then taken to PACU in stable condition. I was present for the entire procedure. and A physician assistant was required during the procedure for retraction, tissue handling, dissection and suturing.     Patient Disposition:  PACU  and hemodynamically stable        SIGNATURE: Naresh Salvador MD  DATE: November 8, 2023  TIME: 5:41 PM

## 2023-11-08 NOTE — H&P
H&P - Thoracic Surgery  Preethi Shook 69 y.o. female MRN: [id number]  Unit/Bed#: OR [place] Encounter: [id number]      Assessment/Plan      Assessment: To OR for a robotic re-do hiatal hernia repair with takedown nissen fundoplication, toupet fundoplication, pyloroplasty, liver biopsy  Plan: To OR for a robotic re-do hiatal hernia repair with takedown nissen fundoplication, toupet fundoplication, pyloroplasty, liver biopsy    History of Present Illness     HPI: Preethi Shook is a 69y.o. year old female who presents with slipped Nissen and symptomatic from this. Additionally, she has gastroparesis    Review of Systems   Constitutional:  Negative for appetite change, fever and unexpected weight change. HENT:  Positive for trouble swallowing. Eyes: Negative. Respiratory:  Negative for cough, choking, chest tightness, shortness of breath, wheezing and stridor. Cardiovascular:  Negative for chest pain and leg swelling. Gastrointestinal:  Positive for nausea. Negative for abdominal distention, abdominal pain and vomiting. Endocrine: Negative. Genitourinary: Negative. Musculoskeletal: Negative. Skin: Negative. Allergic/Immunologic: Negative. Neurological: Negative. Hematological: Negative. Psychiatric/Behavioral: Negative.            Historical Information   Past Medical History:   Diagnosis Date    Anxiety     Arthritis     Asthma     Cancer ([address])     Candida infection, esophageal (HCC)     Chronic kidney disease     Chronic pain     COPD (chronic obstructive pulmonary disease) (HCC)     Depression     Depression     Gastroparesis     Gastroparesis     GERD (gastroesophageal reflux disease)     Hypertension     Irritable bowel syndrome (IBS)     Lactose intolerance Not sure    Migraines     MRSA (methicillin resistant Staphylococcus aureus)     Multiple thyroid nodules     Osteoporosis     Pneumonia     Psychiatric disorder      Past Surgical History:   Procedure Laterality Date BACK SURGERY       SECTION      CHOLECYSTECTOMY      COLONOSCOPY  10/09/2013    ESOPHAGEAL DILATION       [address] [place] [address] [place]    HIATAL HERNIA REPAIR      NISSEN FUNDOPLICATION      [place] ESOPHAGOGASTRODUODENOSCOPY TRANSORAL DIAGNOSTIC N/A 2019    Procedure: ESOPHAGOGASTRODUODENOSCOPY (EGD); Surgeon: Ron Wilkins MD;  [facility];  Service: Gastroenterology    [place] ESOPHAGOGASTRODUODENOSCOPY TRANSORAL DIAGNOSTIC N/A 2023    Procedure: ESOPHAGOGASTRODUODENOSCOPY (EGD);   Surgeon: Jennifer Yoo MD;  [facility];  Service: Thoracic    [place] ESOPHAGOSCOPY FLEX BALLOON DILAT <30 MM DIAM N/A 2023    Procedure: CRE WIRE GUIDED BALLOON DILATATION ESOPHAGEAL;  Surgeon: Jennifer Yoo MD;  [facility];  Service: Thoracic    ROTATOR CUFF REPAIR      SHOULDER SURGERY      SPINAL FUSION      TUBAL LIGATION  May 1975    Last child born    UPPER GASTROINTESTINAL ENDOSCOPY      US GUIDED THYROID BIOPSY      WISDOM TOOTH EXTRACTION       Social History   Social History     Substance and Sexual Activity   Alcohol Use Not Currently     Social History     Substance and Sexual Activity   Drug Use Never     Social History     Tobacco Use   Smoking Status Former    Packs/day: 1.00    Years: 15.00    Total pack years: 15.00    Types: Cigarettes    Start date: 10/5/1998    Quit date: 10/1/2003    Years since quittin.1   Smokeless Tobacco Former     E-Cigarette/Vaping    E-Cigarette Use Never User      E-Cigarette/Vaping Substances    Nicotine No     THC No     CBD No     Flavoring No     Other No     Unknown No      Family History   Problem Relation Age of Onset    Other Mother         Esophageal disorder    Stomach cancer Father     Cancer Father     Hypertension Maternal Aunt     COPD Maternal Grandmother     Colon cancer Neg Hx     Colon polyps Neg Hx        Meds/Allergies      Current Meds:   Current Facility-Administered Medications   Medication Dose Route Frequency    ceFAZolin (ANCEF) IVPB (premix in dextrose) 2,000 mg 50 mL  2,000 mg Intravenous Once    And    metroNIDAZOLE (FLAGYL) IVPB (premix) 500 mg 100 mL  500 mg Intravenous Once    lactated ringers infusion  125 mL/hr Intravenous Continuous    lidocaine (PF) (XYLOCAINE-MPF) 1 % injection **ADS Override Pull**           Allergies   Allergen Reactions    Latex Hives    Augmentin [Amoxicillin-Pot Clavulanate] GI Intolerance    Bactrim [Sulfamethoxazole-Trimethoprim] GI Intolerance    Clarithromycin GI Intolerance    Doxycycline Hives    Neomycin GI Intolerance    Polymyxin B GI Intolerance    Zyvox [Linezolid] GI Bleeding    Cephalosporins GI Intolerance    Nsaids GI Intolerance    Penicillins GI Intolerance     Patient can only take levaquin and cipro    Prednisone GI Intolerance    Sulphasomidine GI Intolerance       Objective   No intake or output data in the 24 hours ending 11/08/23 1230    Invasive Devices       Peripheral Intravenous Line  Duration             Peripheral IV 11/08/23 Dorsal (posterior); Left Forearm <1 day                    Physical Exam  Vitals and nursing note reviewed. Constitutional:       General: She is not in acute distress. Appearance: Normal appearance. She is well-developed and normal weight. HENT:      Head: Normocephalic and atraumatic. Mouth/Throat:      Mouth: Mucous membranes are moist.      Pharynx: Oropharynx is clear. Eyes:      Conjunctiva/sclera: Conjunctivae normal.   Cardiovascular:      Rate and Rhythm: Normal rate and regular rhythm. Pulses: Normal pulses. Heart sounds: Normal heart sounds. No murmur heard. Pulmonary:      Effort: Pulmonary effort is normal. No respiratory distress. Breath sounds: Normal breath sounds. Abdominal:      General: Abdomen is flat. Palpations: Abdomen is soft. Tenderness: There is no abdominal tenderness. Musculoskeletal:         General: No swelling. Cervical back: Normal range of motion and neck supple. Skin:     General: Skin is warm and dry. Capillary Refill: Capillary refill takes less than 2 seconds. Neurological:      Mental Status: She is alert. Psychiatric:         Mood and Affect: Mood normal.         Lab Results: I have personally reviewed pertinent reports. Imaging Studies: I have personally reviewed pertinent reports. Pathology, and Other Studies:     Code Status: No Order  Advance Directive and Living Will:      Power of :    POLST:      Counseling / Coordination of Care  Total floor / unit time spent today 15 minutes. Greater than 50% of total time was spent with the patient and / or family counseling and / or coordination of care.  A description of the counseling / coordination of care:

## 2023-11-09 ENCOUNTER — APPOINTMENT (INPATIENT)
Dept: RADIOLOGY | Facility: HOSPITAL | Age: 78
DRG: 326 | End: 2023-11-09
Payer: COMMERCIAL

## 2023-11-09 LAB
ANION GAP SERPL CALCULATED.3IONS-SCNC: 7 MMOL/L
BUN SERPL-MCNC: 19 MG/DL (ref 5–25)
CALCIUM SERPL-MCNC: 8.4 MG/DL (ref 8.4–10.2)
CHLORIDE SERPL-SCNC: 108 MMOL/L (ref 96–108)
CO2 SERPL-SCNC: 25 MMOL/L (ref 21–32)
CREAT SERPL-MCNC: 1.36 MG/DL (ref 0.6–1.3)
ERYTHROCYTE [DISTWIDTH] IN BLOOD BY AUTOMATED COUNT: 13.1 % (ref 11.6–15.1)
GFR SERPL CREATININE-BSD FRML MDRD: 37 ML/MIN/1.73SQ M
GLUCOSE SERPL-MCNC: 108 MG/DL (ref 65–140)
HCT VFR BLD AUTO: 30.2 % (ref 34.8–46.1)
HGB BLD-MCNC: 10 G/DL (ref 11.5–15.4)
MAGNESIUM SERPL-MCNC: 1.6 MG/DL (ref 1.9–2.7)
MCH RBC QN AUTO: 32.2 PG (ref 26.8–34.3)
MCHC RBC AUTO-ENTMCNC: 33.1 G/DL (ref 31.4–37.4)
MCV RBC AUTO: 97 FL (ref 82–98)
PLATELET # BLD AUTO: 63 THOUSANDS/UL (ref 149–390)
PMV BLD AUTO: 8.2 FL (ref 8.9–12.7)
POTASSIUM SERPL-SCNC: 4.2 MMOL/L (ref 3.5–5.3)
RBC # BLD AUTO: 3.11 MILLION/UL (ref 3.81–5.12)
SODIUM SERPL-SCNC: 140 MMOL/L (ref 135–147)
WBC # BLD AUTO: 7.52 THOUSAND/UL (ref 4.31–10.16)

## 2023-11-09 PROCEDURE — 99223 1ST HOSP IP/OBS HIGH 75: CPT | Performed by: INTERNAL MEDICINE

## 2023-11-09 PROCEDURE — 74220 X-RAY XM ESOPHAGUS 1CNTRST: CPT

## 2023-11-09 PROCEDURE — 85027 COMPLETE CBC AUTOMATED: CPT | Performed by: PHYSICIAN ASSISTANT

## 2023-11-09 PROCEDURE — 88313 SPECIAL STAINS GROUP 2: CPT | Performed by: PATHOLOGY

## 2023-11-09 PROCEDURE — 83735 ASSAY OF MAGNESIUM: CPT | Performed by: PHYSICIAN ASSISTANT

## 2023-11-09 PROCEDURE — 99024 POSTOP FOLLOW-UP VISIT: CPT | Performed by: THORACIC SURGERY (CARDIOTHORACIC VASCULAR SURGERY)

## 2023-11-09 PROCEDURE — 80048 BASIC METABOLIC PNL TOTAL CA: CPT | Performed by: PHYSICIAN ASSISTANT

## 2023-11-09 PROCEDURE — 99222 1ST HOSP IP/OBS MODERATE 55: CPT | Performed by: NURSE PRACTITIONER

## 2023-11-09 PROCEDURE — 88307 TISSUE EXAM BY PATHOLOGIST: CPT | Performed by: PATHOLOGY

## 2023-11-09 PROCEDURE — C9113 INJ PANTOPRAZOLE SODIUM, VIA: HCPCS | Performed by: PHYSICIAN ASSISTANT

## 2023-11-09 RX ORDER — HYDROMORPHONE HCL IN WATER/PF 6 MG/30 ML
0.2 PATIENT CONTROLLED ANALGESIA SYRINGE INTRAVENOUS
Status: DISCONTINUED | OUTPATIENT
Start: 2023-11-09 | End: 2023-11-14 | Stop reason: HOSPADM

## 2023-11-09 RX ORDER — MAGNESIUM SULFATE HEPTAHYDRATE 40 MG/ML
2 INJECTION, SOLUTION INTRAVENOUS ONCE
Status: COMPLETED | OUTPATIENT
Start: 2023-11-09 | End: 2023-11-09

## 2023-11-09 RX ORDER — AMLODIPINE BESYLATE 10 MG/1
10 TABLET ORAL DAILY
Status: DISCONTINUED | OUTPATIENT
Start: 2023-11-10 | End: 2023-11-10

## 2023-11-09 RX ORDER — OXYCODONE HCL 5 MG/5 ML
2.5 SOLUTION, ORAL ORAL EVERY 4 HOURS PRN
Status: DISCONTINUED | OUTPATIENT
Start: 2023-11-09 | End: 2023-11-10

## 2023-11-09 RX ORDER — ENOXAPARIN SODIUM 100 MG/ML
30 INJECTION SUBCUTANEOUS
Status: DISCONTINUED | OUTPATIENT
Start: 2023-11-10 | End: 2023-11-14 | Stop reason: HOSPADM

## 2023-11-09 RX ORDER — OXYCODONE HCL 5 MG/5 ML
5 SOLUTION, ORAL ORAL EVERY 4 HOURS PRN
Status: DISCONTINUED | OUTPATIENT
Start: 2023-11-09 | End: 2023-11-10

## 2023-11-09 RX ADMIN — SENNOSIDES 8.6 MG: 8.6 TABLET, FILM COATED ORAL at 09:28

## 2023-11-09 RX ADMIN — SUCRALFATE 1 G: 1 TABLET ORAL at 06:44

## 2023-11-09 RX ADMIN — OXYCODONE HYDROCHLORIDE 10 MG: 5 SOLUTION ORAL at 06:45

## 2023-11-09 RX ADMIN — ALBUTEROL SULFATE 2 PUFF: 90 AEROSOL, METERED RESPIRATORY (INHALATION) at 09:32

## 2023-11-09 RX ADMIN — ALBUTEROL SULFATE 2 PUFF: 90 AEROSOL, METERED RESPIRATORY (INHALATION) at 20:10

## 2023-11-09 RX ADMIN — DOCUSATE SODIUM 100 MG: 100 CAPSULE, LIQUID FILLED ORAL at 17:48

## 2023-11-09 RX ADMIN — OXYCODONE HYDROCHLORIDE 5 MG: 5 SOLUTION ORAL at 16:53

## 2023-11-09 RX ADMIN — ACETAMINOPHEN 650 MG: 650 SUSPENSION ORAL at 06:44

## 2023-11-09 RX ADMIN — SODIUM CHLORIDE, SODIUM LACTATE, POTASSIUM CHLORIDE, AND CALCIUM CHLORIDE 50 ML/HR: .6; .31; .03; .02 INJECTION, SOLUTION INTRAVENOUS at 06:00

## 2023-11-09 RX ADMIN — ENOXAPARIN SODIUM 40 MG: 40 INJECTION SUBCUTANEOUS at 09:28

## 2023-11-09 RX ADMIN — HYDROMORPHONE HYDROCHLORIDE 0.2 MG: 0.2 INJECTION, SOLUTION INTRAMUSCULAR; INTRAVENOUS; SUBCUTANEOUS at 14:13

## 2023-11-09 RX ADMIN — AMITRIPTYLINE HYDROCHLORIDE 50 MG: 50 TABLET, FILM COATED ORAL at 21:05

## 2023-11-09 RX ADMIN — OXYCODONE HYDROCHLORIDE 5 MG: 5 SOLUTION ORAL at 21:05

## 2023-11-09 RX ADMIN — LORATADINE 5 MG: 10 TABLET ORAL at 09:28

## 2023-11-09 RX ADMIN — MAGNESIUM SULFATE HEPTAHYDRATE 2 G: 40 INJECTION, SOLUTION INTRAVENOUS at 11:31

## 2023-11-09 RX ADMIN — SUCRALFATE 1 G: 1 TABLET ORAL at 11:31

## 2023-11-09 RX ADMIN — ONDANSETRON 4 MG: 2 INJECTION INTRAMUSCULAR; INTRAVENOUS at 18:38

## 2023-11-09 RX ADMIN — SUCRALFATE 1 G: 1 TABLET ORAL at 21:05

## 2023-11-09 RX ADMIN — OXYCODONE HYDROCHLORIDE 10 MG: 5 SOLUTION ORAL at 02:01

## 2023-11-09 RX ADMIN — SUCRALFATE 1 G: 1 TABLET ORAL at 15:43

## 2023-11-09 RX ADMIN — OXYCODONE HYDROCHLORIDE 10 MG: 5 SOLUTION ORAL at 11:31

## 2023-11-09 RX ADMIN — ACETAMINOPHEN 650 MG: 650 SUSPENSION ORAL at 18:38

## 2023-11-09 RX ADMIN — ACETAMINOPHEN 650 MG: 650 SUSPENSION ORAL at 14:15

## 2023-11-09 RX ADMIN — DOCUSATE SODIUM 100 MG: 100 CAPSULE, LIQUID FILLED ORAL at 09:28

## 2023-11-09 RX ADMIN — IOHEXOL 50 ML: 350 INJECTION, SOLUTION INTRAVENOUS at 10:15

## 2023-11-09 RX ADMIN — PANTOPRAZOLE SODIUM 40 MG: 40 INJECTION, POWDER, FOR SOLUTION INTRAVENOUS at 09:28

## 2023-11-09 RX ADMIN — AMLODIPINE BESYLATE 10 MG: 10 TABLET ORAL at 09:28

## 2023-11-09 NOTE — OP NOTE
OPERATIVE REPORT  PATIENT NAME: Jamal Copeland    :  1945  MRN: [id number]  Pt Location: [facility] OR ROOM 15    SURGERY DATE: 2023    Surgeon(s) and Role:  Panel 1:     * Jennifer Yoo MD - Primary     * Stacie Herr PA-C - Assisting     * Fausto Katz MD - Assisting  Panel 2:     * Yanely Bustillos MD - Primary    Preop Diagnosis:  History of Nissen fundoplication [R35.995]  Esophageal dysphagia [R13.19]  Gastroparesis [K31.84]    Post-Op Diagnosis Codes:     * History of Nissen fundoplication [F04.860]     * Esophageal dysphagia [R13.19]     * Gastroparesis [K31.84]    Procedure(s):  Panel 1:  robotic takedown of nissen fundoplication. redo hiatal hernia. partial toupet fundoplication. with mesh    Panel 2:  PYLOROPLASTY LAPAROSCOPIC WITH ROBOT  EXCISION BIOPSY LIVER  ESOPHAGOGASTRODUODENOSCOPY (EGD)    Specimen(s):  ID Type Source Tests Collected by Time Destination   1 : liver biopsy Tissue Liver TISSUE EXAM Jennifer Yoo MD 2023 1426        Estimated Blood Loss:   20 mL    Drains:  [REMOVED] Urethral Catheter Non-latex 16 Fr. (Removed)   Number of days: 0       Anesthesia Type:   General    Operative Indications:  History of Nissen fundoplication [I92.673]  Esophageal dysphagia [R13.19]  Gastroparesis [K31.84]    Operative Findings:  Densely adherent wrap, stomach and esophagus to the liver and to the hiatus requiring multiple hours of adhesiolysis and takedown of the previous wrap    Complications:   None    Procedure and Technique:  Procedure and Technique:  INDICATION:  Jamal Copeland is a 69 y.o. female who underwent a laparoscopic Nissen fundoplication in the past.  Since that time, she has had progressively worsening dysphagia. She was interested in undergoing a redo surgery to take down the Nissen and fix her small recurrent hiatal hernia. She underwent work-up.   Her manometry and reflux testing demonstrated that she had excellent control of her reflux with her current wrap. However, her upper GI showed tightening at the previous Nissen. This was so tight that a barium tablet would not pass with ease on her upper GI. She underwent an EGD with dilation by myself. This confirmed the tightness of her GE junction. Her symptoms did not improve with the dilation. I had a long discussion with the patient and her son and I explained that a redo operation is technically very difficult and recovery is not nearly as easy after the first operation it comes with greater morbidity. Additionally, because her wrap was successfully preventing any reflux, I did explain to her that she may have more reflux after I take that wrap down. The patient understands all of this and she still elected to proceed with the operation. Consent was obtained after all of her questions were answered. OPERATION IN DETAIL:    The patient was correctly identified by name and medical record number in the holding area and brought to the operative suite, where she was placed supine on the operative table. After satisfactory induction of general endotracheal anesthesia, the patient was appropriately positioned, prepped and draped. A time-out was performed confirming patient and procedure. A small incision was made at Pradhan's point in the midclavicular line. The Veress needle was inserted through the abdominal wall and the abdomen was then insufflated without issue. A supraumbilical 8 mm incision was made and a robotic trocar was placed. The 30 degree robotic camera was inserted and the abdomen was thoroughly inspected. There was no sign Veress needle or trocar injury. All other ports were placed under direct visualization using the robotic camera. 3mL of 0.25% Marcaine was used to anesthetize the peritoneum for all additional port placements. Ports were placed across the abdomen at least 8 cm from each other.   Two ports were placed on the left of the umbilicus and 1 additional 8 mm port was placed to the left of the umbilicus. A RLQ 8mm assistant port was placed between the camera and the right robotic port. Finally a 5 mm incision was made just to the left of the xiphoid process. A José Luis liver retractor was placed through here and secured to the bedside with a sterile esparza. The patient was then placed in full reverse [place]. The [employer] robot was docked at this time. A tip-up fenestrated grasper, caudiere grasper and robotic vessel sealer were inserted and tested. I un-scrubbed at this time and went to the robotic console. Inspection, the patient had dense adhesions from her previous wrap to both the liver and the hiatus. The dissection was started at the previous pars flaccida. Slowly and carefully, the adhesions were taken down off of the liver. This required a significant amount of time to get the wrap off of the liver. After this was off, attention was turned over to the left rosario as this was more free than the right rosario. In starting this dissection, it was clear that there was permanent mesh that was left behind by the previous surgeon. This mesh was overlying the left rosario. This was absolutely adherent to the left rosario and was completely unable to be removed after multiple attempts were made to explant this permanent mesh. Eventually, the esophagus was able to be mobilized off of the left rosario. Some additional greater curvature was then taken down. Attention was turned back to the right rosario. Stitches from the previous operative ration were identified and these were taken with a hot scissor. In doing this, some of the wrap became more apparent and this was able to be dissected off of the esophagus. However, the this was densely adherent down to the posterior lesser sac. This was concerning as this was densely adherent to the celiac trunk as well as the left gastric artery. Because of this, the wrap was not 100% completely unfurled.   However, it was mobilized enough that the Nissen was taken down in its entirety and access was gained to the right rosario. The esophagus was fully mobilized off of the right rosario. Again, permanent mesh was identified on the right rosario. This had clearly been previously placed by the first surgeon. Again, multiple attempts were made to explant this mesh and it was impossible to do so unfortunately, the permanent mesh was left behind. After the mobilization was complete, it was clear that there was at least 2 to 3 cm of intra-abdominal esophageal length and attention was turned to performing the hiatal crural repair. Attention was then turned to performing a tension free hiatal closure. The crura were reapproximated with 2 interrupted sutures of #0 Ethibond. At the completion of the hiatal closure, a grasper was easily passed through the hiatus and the closure did not appear to be too tight. Attention was then turned to creating a Toupet wrap. A grasper was passed through the retroesophageal window and the fundus was grasped at the level of the divided short gastric vessels along the greater curvature and pulled through the window to the right side. A "shoe-shine" maneuver was then performed to ensure adequate mobility without twists or tension. The Toupet was then secured with #2-0 Ethibond. Each stitch incorporated full thickness of the stomach and partial thickness of the esophagus, spaced 1cm apart on both the left and right side of the esophagus. After completion of the wrap, a Bio-A mesh was placed in the retro-esophageal space to buttress the crural repair. Time, my part of the procedure was complete. The operation was turned over to Dr. Maritza Barron to perform the EGD, liver biopsy and pyloroplasty. For More information on this part of the procedure, please refer to his separate dictated operative report. After Dr. Dhiraj Casillas finished his portion, the procedure was complete.  All ports were removed under direct visualization. The port sites were closed with #4-0 Monocryl. The wounds were dressed with Dermabond. I was present for the entire procedure. This was a redo surgery. This required an exceptional amount of time and its technical difficulty surpasses that of a regular hernia. Because of this, I am using a modifier 22 for this surgery.     Patient Disposition:  PACU  and extubated and stable        SIGNATURE: Dariana Pa MD  DATE: November 9, 2023  TIME: 8:56 AM

## 2023-11-09 NOTE — UTILIZATION REVIEW
Initial Clinical Review    Elective Inpatient surgical procedure  Age/Sex: 69 y.o. female  Surgery Date: 11/08/2023  Procedure: Panel 1:  robotic takedown of nissen fundoplication. redo hiatal hernia. partial toupet fundoplication. 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 Core needle liver biopsy taken from the right lobe of the liver. POD#1 Progress Note (11/09/2023): S/P robotic Nissen takedown, hiatal hernia repair and partial fundoplication with mesh along with excisional liver biopsy. Reports some difficulty with swallowing liquids. This is not surprising to me. Her surgery required an extensive amount of dissection and manipulation. She likely has significant postoperative edema. Pain controlled PRN. UGI this AM.  If looks okay, will advance to full liquids.     Admission Orders: Date/Time/Statement:   Admission Orders (From admission, onward)       Ordered        11/08/23 1748  Inpatient Admission  Once                          Orders Placed This Encounter   Procedures    Inpatient Admission     Standing Status:   Standing     Number of Occurrences:   1     Order Specific Question:   Level of Care     Answer:   Med Surg [16]     Order Specific Question:   Estimated length of stay     Answer:   Inpatient Only Surgery     Vital Signs: /71 (BP Location: Right arm)   Pulse 101   Temp 98.8 °F (37.1 °C) (Oral)   Resp (!) 25   Ht 5' (1.524 m)   Wt 59.9 kg (132 lb 0.9 oz)   SpO2 (!) 88%   BMI 25.79 kg/m²     Pertinent Labs/Diagnostic Test Results:   FL esophagram complete   Final Result by Joel Simpson MD (11/09 1111)   Mild esophageal distention with a focal narrowed segment at the GE junction likely related to recent hernia repair. Esophageal dysmotility noted with delayed emptying and periods of tertiary contraction without nathalia obstruction or contrast    extravasation. The patient could not drink anymore contrast at the time of the study and therefore gastric emptying cannot be observed during the study. Results from last 7 days   Lab Units 11/09/23  0504   WBC Thousand/uL 7.52   HEMOGLOBIN g/dL 10.0*   HEMATOCRIT % 30.2*   PLATELETS Thousands/uL 63*     Results from last 7 days   Lab Units 11/09/23  0504   SODIUM mmol/L 140   POTASSIUM mmol/L 4.2   CHLORIDE mmol/L 108   CO2 mmol/L 25   ANION GAP mmol/L 7   BUN mg/dL 19   CREATININE mg/dL 1.36*   EGFR ml/min/1.73sq m 37   CALCIUM mg/dL 8.4   MAGNESIUM mg/dL 1.6*     Results from last 7 days   Lab Units 11/09/23  0504   GLUCOSE RANDOM mg/dL 108     Diet: Full liquid no carbonation  Mobility: Ambulate TID  DVT Prophylaxis: Lovenox / Mauro SCDs    Medications/Pain Control:   Scheduled Medications:  acetaminophen, 650 mg, Oral, Q6H  albuterol, 2 puff, Inhalation, BID  amitriptyline, 50 mg, Oral, HS  [START ON 11/10/2023] amLODIPine, 10 mg, Oral, Daily  docusate sodium, 100 mg, Oral, BID  enoxaparin, 40 mg, Subcutaneous, Daily  loratadine, 5 mg, Oral, Daily  magnesium sulfate, 2 g, Intravenous, Once  ondansetron, 4 mg, Intravenous, Q6H  pantoprazole, 40 mg, Intravenous, Daily  polyethylene glycol, 17 g, Oral, Daily  senna, 1 tablet, Oral, Daily  sucralfate, 1 g, Oral, 4x Daily (AC & HS)      Continuous IV Infusions:     PRN Meds:  HYDROmorphone, 0.2 mg, Intravenous, Q3H PRN  oxyCODONE, 2.5 mg, Oral, Q4H PRN  oxyCODONE, 5 mg, Oral, Q4H PRN        Network Utilization Review Department  ATTENTION: Please call with any questions or concerns to [phone] and carefully listen to the prompts so that you are directed to the right person.  All voicemails are confidential.   For Discharge needs, contact Care Management DC Support Team at [phone] opt. 2  Send all requests for admission clinical reviews, approved or denied determinations and any other requests to dedicated fax number below belonging to the campus where the patient is receiving treatment.  List of dedicated fax numbers for the Facilities:  [place] DENIALS (Administrative/Medical Necessity) [phone]   DISCHARGE SUPPORT TEAM (NETWORK) [address] [facility] (Maternity/NICU/Pediatrics) [phone]   [address] [place] [address] [address] [facility] [address] [facility] [phone]   [address] [place] [address] [address] [place] [address] [address] [address] [facility] [address] [phone]

## 2023-11-09 NOTE — PROGRESS NOTES
Follow up Consultation    Nephrology   Olya Savage 69 y.o. female MRN: [id number]  Unit/Bed#: [facility] 431-01 Encounter: [id number]      Physician Requesting Consult: Екатерина Owen MD        ASSESSMENT:    43-year-old female with multiple committees including hypertension, GERD status post previous Nissen fundoplication and CKD stage IIIb presented with ongoing dysphagia status post elective robotic takedown of Nissen fundoplication on 62/2/1252 nephrology consulted for perioperative optimization to reduce incidence for acute kidney injury. Perioperative optimization to reduce incidence for acute kidney injury/CKD stage IIIb and now with JOSE:    JOSE most likely secondary to ischemic injury secondary hemodynamic perturbations postoperatively  After review of records it appears that the patient has a baseline Creatinine of 1.4-1.7 mg/dL. Most recent labs prior to admission from 10/27/2023 with a creatinine 1.42 mg/dL  Creatinine today is at 2.76 mg/dL increase likely secondary to ischemic injury from hemodynamic perturbations postoperatively. check BMP, magnesium, phosphorus in a.m. For now clinically euvolemic  Place on a renal diet when allowed diet order. Strict I/O. Daily weights  Urinary retention protocol  Avoid nephrotoxins, adjust meds to appropriate GFR. Likely has underlying CKD secondary to age-related nephron loss plus hypertensive nephrosclerosis  Outpatient for nephrology follows up with nephrologist in [facility]     H&H/anemia:  most recent hemoglobin at 10 g/dL  Maintain hemoglobin greater than 8 grams/deciliter     Acid-base electrolytes:  Hypomagnesemia: Most recent magnesium 1.9. Resolved  Acid-base most recent bicarb 24 stable     Blood pressure/hypertension:   Optimize hemodynamic status to avoid delay in renal recovery. Maintain MAP > 65mmHg  Avoid BP fluctuations.   Home medications: Norvasc 10 mg p.o. daily  Current medications: Norvasc 10 mg p.o. daily  Avoid ACE inhibitors. As an outpatient was tried per outpatient nephrologist had rise in creatinine and for now continues to hold. DC Norvasc for now placed on midodrine 5 mg p.o. 3 times daily hold for SBP greater than 110     Other medical problems:  Previous Nissen fundoplication/GERD:  Management per primary team.  Status post elective robotic takedown of Nissen fundoplication on . Follow-up with thoracic surgery. Plan below is what was discussed with the primary team that they agree with:  check BMP, magnesium, phosphorus in a.m. Hold further IV fluids for now  Dc Norvasc for now  Start midodrine 5 mg p.o. 3 times daily hold for SBP greater than 110  Avoid ACE inhibitors  Not stable for discharge from renal standpoint at this point  Thanks for the consult  Will continue to follow  Please call with questions/ concerns. Charla Nowak MD, [facility], 11/10/2023, 1:18 PM                Objective :   Patient seen and examined in her room yesterday late and early this a.m. had issues with hypotension responded well to fluid bolus. Doing well at this time. No overt complaints urine output 100 cc plus. PHYSICAL EXAM  /59   Pulse 87   Temp (!) 97.2 °F (36.2 °C) (Oral)   Resp 16   Ht 5' (1.524 m)   Wt 60.7 kg (133 lb 13.1 oz)   SpO2 99%   BMI 26.13 kg/m²   Temp (24hrs), Av.1 °F (36.7 °C), Min:97.2 °F (36.2 °C), Max:98.6 °F (37 °C)        Intake/Output Summary (Last 24 hours) at 11/10/2023 1318  Last data filed at 11/10/2023 1148  Gross per 24 hour   Intake 500 ml   Output 200 ml   Net 300 ml       I/O last 24 hours: In: 1000.8 [I.V.:500.8; IV Piggyback:500]  Out: 200 [Urine:200]      Current Weight: Weight - Scale: 60.7 kg (133 lb 13.1 oz)  First Weight: Weight - Scale: 57.8 kg (127 lb 6.8 oz)  Physical Exam  Vitals and nursing note reviewed. Constitutional:       General: She is not in acute distress. Appearance: Normal appearance. She is normal weight.  She is not ill-appearing, toxic-appearing or diaphoretic. HENT:      Head: Normocephalic and atraumatic. Mouth/Throat:      Mouth: Mucous membranes are moist.      Pharynx: No oropharyngeal exudate. Eyes:      General: No scleral icterus. Cardiovascular:      Rate and Rhythm: Normal rate. Pulmonary:      Effort: No respiratory distress. Breath sounds: Normal breath sounds. No stridor. No wheezing. Abdominal:      General: There is no distension. Palpations: Abdomen is soft. Tenderness: There is no abdominal tenderness. Musculoskeletal:         General: No swelling. Cervical back: Normal range of motion. No rigidity. Skin:     Coloration: Skin is not jaundiced. Neurological:      General: No focal deficit present. Mental Status: She is alert and oriented to person, place, and time. Psychiatric:         Mood and Affect: Mood normal.         Behavior: Behavior normal.             Review of Systems   Constitutional:  Positive for fatigue. Negative for chills. HENT:  Negative for congestion. Respiratory:  Negative for cough and shortness of breath. Cardiovascular:  Negative for leg swelling. Gastrointestinal:  Positive for abdominal pain. Genitourinary:  Negative for dysuria. Musculoskeletal:  Negative for back pain. Neurological:  Negative for headaches. Psychiatric/Behavioral:  Negative for agitation and confusion. All other systems reviewed and are negative.       Scheduled Meds:  Current Facility-Administered Medications   Medication Dose Route Frequency Provider Last Rate    acetaminophen  650 mg Oral Q6H Smith Rodriguez PA-C      albuterol  2 puff Inhalation BID Smith Rodriguez PA-C      amitriptyline  50 mg Oral HS Smith Rodriguez PA-C      docusate sodium  100 mg Oral BID Smith Rodriguez PA-C      enoxaparin  30 mg Subcutaneous Q24H [address] Smith Rodriguez PA-C      HYDROmorphone  0.2 mg Intravenous Q3H PRN Tuesday M RUSS Rodgers      hydrOXYzine HCL  10 mg Oral Q6H PRN Horace Silva MD      midodrine  5 mg Oral TID [facility] Hroace Silva MD      ondansetron  4 mg Intravenous Q6H Lance Quinonez PA-C      pantoprazole  40 mg Intravenous Daily Lance Quinonez PA-C      polyethylene glycol  17 g Oral Daily Lance Quinonez PA-C      senna  1 tablet Oral Daily Lance Quinonez PA-C      sucralfate  1 g Oral 4x Daily (AC & HS) Lance Quinonez PA-C         PRN Meds:. HYDROmorphone    hydrOXYzine HCL    Continuous Infusions:       Invasive Devices: Invasive Devices       Peripheral Intravenous Line  Duration             Peripheral IV 11/08/23 Dorsal (posterior); Left Forearm 2 days    Peripheral IV 11/08/23 Distal;Right Wrist 1 day                      LABORATORY:    Results from last 7 days   Lab Units 11/10/23  1234 11/10/23  0446 11/09/23  0504   WBC Thousand/uL  --   --  7.52   HEMOGLOBIN g/dL  --   --  10.0*   HEMATOCRIT %  --   --  30.2*   PLATELETS Thousands/uL  --   --  63*   POTASSIUM mmol/L 4.0 3.8 4.2   CHLORIDE mmol/L 102 102 108   CO2 mmol/L 24 25 25   BUN mg/dL 25 22 19   CREATININE mg/dL 2.76* 2.62* 1.36*   CALCIUM mg/dL 8.3* 8.2* 8.4   MAGNESIUM mg/dL 1.9 2.0 1.6*   PHOSPHORUS mg/dL  --  3.7  --       rest all reviewed    RADIOLOGY:  XR chest 1 view   Final Result by Ev Turk MD (62/24 8232)      Low lung volumes with bibasilar atelectasis. Pneumonia not excluded in the appropriate clinical setting. Workstation performed: [id number]         FL esophagram complete   Final Result by Manoj Schneider MD (11/09 1111)      Mild esophageal distention with a focal narrowed segment at the GE junction likely related to recent hernia repair. Esophageal dysmotility noted with delayed emptying and periods of tertiary contraction without nathalia obstruction or contrast    extravasation. The patient could not drink anymore contrast at the time of the study and therefore gastric emptying cannot be observed during the study.                Workstation performed: [id number]           Rest all reviewed    Portions of the record may have been created with voice recognition software. Occasional wrong word or "sound a like" substitutions may have occurred due to the inherent limitations of voice recognition software. Read the chart carefully and recognize, using context, where substitutions have occurred. If you have any questions, please contact the dictating provider.

## 2023-11-09 NOTE — CASE MANAGEMENT
Case Management Assessment & Discharge Planning Note    Patient name Selina Corrales  Location [facility] 431/[facility] 452-24 MRN [id number]  : 1945 Date 2023       Current Admission Date: 2023  Current Admission Diagnosis:History of Nissen fundoplication, Esophageal dysphagia, Gastroparesis   Patient Active Problem List    Diagnosis Date Noted    Encounter for geriatric assessment 10/10/2023    Asthma     COPD (chronic obstructive pulmonary disease) ([address] )     Hypertension     Migraines     Depression     Anxiety     Stage 3 chronic kidney disease ([address])     Nausea 06/10/2021    Allergic contact urticaria 2020    Chronic GERD 2019    Dysphagia 2019    Esophageal dysphagia 2019    History of Nissen fundoplication     Left upper quadrant pain 2019    Gastroparesis 2019    Colon cancer screening 2019    Gastroesophageal reflux disease without esophagitis 2019      LOS (days): 1  Geometric Mean LOS (GMLOS) (days): 2.10  Days to GMLOS:1.2     OBJECTIVE:    Risk of Unplanned Readmission Score: 10.97         Current admission status: Inpatient       [facility] Pharmacy:   [facility], [address], [address] [place] [address]  Phone: [phone] Fax: [phone]    Primary Care Provider: Gustavo Cespedes DO    Primary Insurance: [employer] [address] [place] [address]  Secondary Insurance:     ASSESSMENT:  [facility] Proxies    There are no active Health Care Proxies on file. Readmission Root Cause  30 Day Readmission: No    Patient Information  Admitted from[de-identified] Home  Mental Status: Alert  During Assessment patient was accompanied by: Not accompanied during assessment  Assessment information provided by[de-identified] Patient  Primary Caregiver: Self  Support Systems: Son, Family members  [place] of Residence: [address] [place] [address] Street do you live in?: [address] entry access options.  Select all that apply.: Garage  Number of steps to enter home. : 1  Do the steps have railings?: No  Type of Current Residence: Bi-level  Upon entering residence, is there a bedroom on the main floor (no further steps)?: Yes  Upon entering residence, is there a bathroom on the main floor (no further steps)?: Yes  In the last 12 months, was there a time when you were not able to pay the mortgage or rent on time?: No  In the last 12 months, how many places have you lived?: 1  In the last 12 months, was there a time when you did not have a steady place to sleep or slept in a shelter (including now)?: No  Homeless/housing insecurity resource given?: N/A  Living Arrangements: Lives w/ Son  Is patient a ?: No    Activities of Daily Living Prior to Admission  Functional Status: Independent  Completes ADLs independently?: Yes  Ambulates independently?: Yes  Does patient use assisted devices?: Yes  Assisted Devices (DME) used: Straight Cane  Does patient currently own DME?: Yes  What DME does the patient currently own?: Straight Cane  Does patient have a history of Outpatient Therapy (PT/OT)?: Yes  Does the patient have a history of Short-Term Rehab?: No  Does patient have a history of HHC?: Yes  Does patient currently have [address]?: No         Patient Information Continued  Income Source: Pension/intermediate  Does patient have prescription coverage?: Yes  Within the past 12 months, you worried that your food would run out before you got the money to buy more.: Never true  Within the past 12 months, the food you bought just didn't last and you didn't have money to get more.: Never true  Food insecurity resource given?: N/A  Does patient receive dialysis treatments?: No  Does patient have a history of substance abuse?: No  Does patient have a history of Mental Health Diagnosis?: Yes  Is patient receiving treatment for mental health?: Yes         Means of Transportation  Means of Transport to Appts[de-identified] Drives Self  In the past 12 months, has lack of transportation kept you from medical appointments or from getting medications?: No  In the past 12 months, has lack of transportation kept you from meetings, work, or from getting things needed for daily living?: No  Was application for public transport provided?: N/A        DISCHARGE DETAILS:    Discharge planning discussed with[de-identified] pt at bedside  [place] of Choice: Yes  Comments - [place] of Choice: No discharge recommendations discussed with pt at this time. CM contacted family/caregiver?: No- see comments  Were Treatment Team discharge recommendations reviewed with patient/caregiver?: Yes  Did patient/caregiver verbalize understanding of patient care needs?: Yes  Were patient/caregiver advised of the risks associated with not following Treatment Team discharge recommendations?: Yes    Contacts  Patient Contacts: Pantera Richardson  Relationship to Patient[de-identified] Family  Contact Method: Phone  Phone Number: [phone]  Reason/Outcome: Continuity of Care, Emergency Contact, Discharge [facility]         Is the patient interested in [address] at discharge?: No    DME Referral Provided  Referral made for DME?: No         Would you like to participate in our [facility] service program?  : No - Declined    Treatment Team Recommendation: Home  Discharge Destination Plan[de-identified] Home  Transport at Discharge : Family                                      Additional Comments: CM introduced herself and role to pt at bedside. Pt stated she lives with her son in a bi-level home. Pt stated she is independent at baseline with ADLS/IADLS. Pt stated she uses a cane. Pt stated she takes medication for MH. Pt stated her son Horace Bassett is POA. Pt stated once medically cleared and ready for discharge her son Horace Bassett will be able to transport. CM will continue to follow as needed.

## 2023-11-09 NOTE — PROGRESS NOTES
Thoracic surgery  Progress Note   Elizabeth Rosas 69 y.o. female MRN: [id number]  Unit/Bed#: [facility] 431-01 Encounter: [id number]    Assessment:  78-year-old female with dysphagia and gastroparesis s/p prior Nissen fundoplication in 5000, now s/p robotic Nissen takedown, hiatal hernia repair and partial fundoplication with mesh along with excisional liver biopsy. Afebrile, O2 saturations 85-95% on room air, otherwise VSS  A.m. labs pending    UOP: 685 cc    Plan:  -Clear liquid diet  - Maintenance IV fluids  - Upper GI study today  - Follow-up geriatric recommendations  - Follow-up nephrology recommendations  - DVT prophylaxis  - Incentive spirometry  - Encourage ambulation  - Home meds    Subjective/Objective     Subjective: Patient seen and examined at bedside, in no acute distress. No acute events overnight. Patient's pain is partially controlled. Pt denies nausea or vomiting. She was able to tolerate some clear liquids. Reports passing flatus. Objective:     Vitals:Blood pressure 137/84, pulse 99, temperature 98.6 °F (37 °C), temperature source Oral, resp. rate 18, height 5' (1.524 m), weight 59.9 kg (132 lb 0.9 oz), SpO2 (!) 85 %, not currently breastfeeding. ,Body mass index is 25.79 kg/m². Temp (24hrs), Av.2 °F (36.8 °C), Min:97.6 °F (36.4 °C), Max:98.6 °F (37 °C)  Current: Temperature: 98.6 °F (37 °C)      Intake/Output Summary (Last 24 hours) at 2023 0533  Last data filed at 2023 0201  Gross per 24 hour   Intake 2804.17 ml   Output 705 ml   Net 2099.17 ml       Invasive Devices       Peripheral Intravenous Line  Duration             Peripheral IV 23 Distal;Right Wrist <1 day    Peripheral IV 23 Dorsal (posterior); Left Forearm <1 day                    Physical Exam:  General: No acute distress, alert and oriented  CV: Well perfused, regular rate and rhythm  Lungs: Normal work of breathing, no increased respiratory effort  Abdomen: Soft, appropriately-tender, non-distended. Incisions clean, dry and intact.   Extremities: No edema, clubbing or cyanosis  Skin: Warm, dry    Lab Results: Results: I have personally reviewed all pertinent laboratory/tests results  VTE Prophylaxis: Sequential compression device (Venodyne)  and Enoxaparin (Lovenox)    Santana Downs MD  11/9/2023

## 2023-11-09 NOTE — PLAN OF CARE
Problem: MOBILITY - ADULT  Goal: Maintain or return to baseline ADL function  Description: INTERVENTIONS:  -  Assess patient's ability to carry out ADLs; assess patient's baseline for ADL function and identify physical deficits which impact ability to perform ADLs (bathing, care of mouth/teeth, toileting, grooming, dressing, etc.)  - Assess/evaluate cause of self-care deficits   - Assess range of motion  - Assess patient's mobility; develop plan if impaired  - Assess patient's need for assistive devices and provide as appropriate  - Encourage maximum independence but intervene and supervise when necessary  - Involve family in performance of ADLs  - Assess for home care needs following discharge   - Consider OT consult to assist with ADL evaluation and planning for discharge  - Provide patient education as appropriate  Outcome: Progressing     Problem: PAIN - ADULT  Goal: Verbalizes/displays adequate comfort level or baseline comfort level  Description: Interventions:  - Encourage patient to monitor pain and request assistance  - Assess pain using appropriate pain scale  - Administer analgesics based on type and severity of pain and evaluate response  - Implement non-pharmacological measures as appropriate and evaluate response  - Consider cultural and social influences on pain and pain management  - Notify physician/advanced practitioner if interventions unsuccessful or patient reports new pain  Outcome: Progressing     Problem: INFECTION - ADULT  Goal: Absence or prevention of progression during hospitalization  Description: INTERVENTIONS:  - Assess and monitor for signs and symptoms of infection  - Monitor lab/diagnostic results  - Monitor all insertion sites, i.e. indwelling lines, tubes, and drains  - Monitor endotracheal if appropriate and nasal secretions for changes in amount and color  - [place] appropriate cooling/warming therapies per order  - Administer medications as ordered  - Instruct and encourage patient and family to use good hand hygiene technique  - Identify and instruct in appropriate isolation precautions for identified infection/condition  Outcome: Progressing     Problem: SAFETY ADULT  Goal: Maintain or return to baseline ADL function  Description: INTERVENTIONS:  -  Assess patient's ability to carry out ADLs; assess patient's baseline for ADL function and identify physical deficits which impact ability to perform ADLs (bathing, care of mouth/teeth, toileting, grooming, dressing, etc.)  - Assess/evaluate cause of self-care deficits   - Assess range of motion  - Assess patient's mobility; develop plan if impaired  - Assess patient's need for assistive devices and provide as appropriate  - Encourage maximum independence but intervene and supervise when necessary  - Involve family in performance of ADLs  - Assess for home care needs following discharge   - Consider OT consult to assist with ADL evaluation and planning for discharge  - Provide patient education as appropriate  Outcome: Progressing     Problem: DISCHARGE PLANNING  Goal: Discharge to home or other facility with appropriate resources  Description: INTERVENTIONS:  - Identify barriers to discharge w/patient and caregiver  - Arrange for needed discharge resources and transportation as appropriate  - Identify discharge learning needs (meds, wound care, etc.)  - Arrange for interpretive services to assist at discharge as needed  - Refer to Case Management Department for coordinating discharge planning if the patient needs post-hospital services based on physician/advanced practitioner order or complex needs related to functional status, cognitive ability, or social support system  Outcome: Progressing     Problem: Knowledge Deficit  Goal: Patient/family/caregiver demonstrates understanding of disease process, treatment plan, medications, and discharge instructions  Description: Complete learning assessment and assess knowledge base.  Interventions:  - Provide teaching at level of understanding  - Provide teaching via preferred learning methods  Outcome: Progressing

## 2023-11-09 NOTE — CONSULTS
Consultation - Geriatrics   Alexandra Aguilar 69 y.o. female MRN: [id number]  Unit/Bed#: [facility] 431-01 Encounter: [id number]      Assessment/Plan    Gastroparesis, esophageal dysphagia, Nissen fundoplication  S/p robotic takedown of nissen fundoplication, redo hiatal hernia, partial fundoplication with mesh, robotic laparoscopic pyloroplasty, EGD, liver biopsy  Gen surgery and thoracic surgery following    Acute pain due to trauma  Geriatric pain protocol  IV dilaudid 0.2 mg prn breakthrough pain  Scheduled acetaminophen 975 mg po Q8  Oxycodone 2.5 mg po Q 4 prn moderate pain  Oxycodone 5 mg po Q 4 prn severe pain   Monitor for constipation    Ambulatory dysfunction  At risk for falls secondary to age, hx of fall, gait instability, polypharmacy, visual impairment  Fall precautions  Ambulates with a cane at baseline  PT/OT  Increased physical exercise, recommend balance and strengthening exercises  Rehab post hospitalization     Frailty  At risk secondary to age, hospitalization,  Albumin 4.0, maintain protein in diet  PT/OT  Continue supportive care     Cognitive screening  No reported prior memory issues  Recommend check TSH and vitamin B12  No prior brain imaging on file  Keep physically, mentally and socially active    Depression  Uses elavil 50 mg po QHS at home  Continue home elavil  Follow up with PCP     Delirium precautions  Baseline: alert and oriented x 3  Provide frequent redirection, reorientation, distraction techniques  Avoid deliriogenic medications such as tramadol, benzodiazepines, anticholinergics,  Benadryl  Treat pain, See geriatric pain protocol  Monitor for constipation and urinary retention  Encourage early and frequent moblization, OOB  Encourage Hydration/ Nutrition  Implement sleep hygiene, limit night time interuptions, group activities     Insomnia  Related to hospitalization  First line is behavioral therapy  Avoid sedative hypnotics such as benzodiazepines and benadryl  Encourage staying awake during the day  Encourage daytime activity, morning exercise  Decrease or eliminate day time naps  Avoid caffeine especially during late afternoon and evening hours  Establish a night time routine    Advanced Care Planning  Full code    [facility] medication review  [facility]  Amlodipine 10 mg po daily, last refill 9/9/23   Dexilant 60 mg po daily, last refill 8/12/23 # 90 days  Famotidine 40 mg po daily, last refill 10/28/23 # 90 days  Amitriptyline 50 mg po QHS last refill 9/21/23 # 90 days      No current refill:  Bentyl 20 mg po daily, last refill 7/14/23   Zofran  4 mg po prn, last refill 2/20/22        History of Present Illness   Physician Requesting Consult: Fernando Engel MD  Reason for Consult / Principal Problem: geriatric assessment  Hx and PE limited by: MA  HPI: Solomon Murillo is a 69y.o. year old female who presents with dysphagia and gastroparesis after a Nissen fundoplication. She is admitted for robotic pyloroplasty, EGD, redo fundoplication and core needle liver biopsy. Preioperatively she was seen by [facility]. Post op Geriatrics is consulted for assessment/ frailty/ delirium precautions. She has anxiety, arthritis, COPD, depression, GERD, HTN, CDK3. Prior to arrival she lives at home. She is independent with IADLs and ADLs. She ambulates with a cane. She denies prior falls. She is alert and oriented x 3, she states she slept well last night. Inpatient consult to Gerontology  Consult performed by: Tuesday RUSS Wilkinson  Consult ordered by: RUSS Case          Review of Systems   Constitutional:  Negative for unexpected weight change. HENT:  Negative for hearing loss. Eyes:  Negative for visual disturbance. Respiratory:  Negative for cough. Gastrointestinal:  Positive for nausea. Genitourinary:  Negative for difficulty urinating. Musculoskeletal:  Negative for gait problem. Neurological:  Negative for weakness. Psychiatric/Behavioral:  Negative for sleep disturbance. Historical Information   Past Medical History:   Diagnosis Date    Anxiety     Arthritis     Asthma     Cancer ([address])     Candida infection, esophageal ([address])     Chronic kidney disease     Chronic pain     COPD (chronic obstructive pulmonary disease) (HCC)     Depression     Depression     Gastroparesis     Gastroparesis     GERD (gastroesophageal reflux disease)     Hypertension     Irritable bowel syndrome (IBS)     Lactose intolerance Not sure    Migraines     MRSA (methicillin resistant Staphylococcus aureus)     Multiple thyroid nodules     Osteoporosis     Pneumonia     Psychiatric disorder      Past Surgical History:   Procedure Laterality Date    BACK SURGERY       SECTION      CHOLECYSTECTOMY      COLONOSCOPY  10/09/2013    ESOPHAGEAL DILATION       [address] [place] [address] [place]    ESOPHAGOGASTRODUODENOSCOPY N/A 2023    Procedure: ESOPHAGOGASTRODUODENOSCOPY (EGD); Surgeon: Rohini Byers MD;  [facility];  Service: General    HIATAL HERNIA REPAIR      NISSEN FUNDOPLICATION      PA BIOPSY LIVER NEEDLE PERCUTANEOUS N/A 2023    Procedure: EXCISION BIOPSY LIVER;  Surgeon: Rohini Byers MD;  [facility];  Service: General    PA ESOPHAGOGASTRODUODENOSCOPY TRANSORAL DIAGNOSTIC N/A 2019    Procedure: ESOPHAGOGASTRODUODENOSCOPY (EGD); Surgeon: Stephanie Montero MD;  [facility];  Service: Gastroenterology    PA ESOPHAGOGASTRODUODENOSCOPY TRANSORAL DIAGNOSTIC N/A 2023    Procedure: ESOPHAGOGASTRODUODENOSCOPY (EGD);   Surgeon: Ella Garcia MD;  [facility];  Service: Thoracic    PA ESOPHAGOSCOPY FLEX BALLOON DILAT <30 MM DIAM N/A 2023    Procedure: CRE WIRE GUIDED BALLOON DILATATION ESOPHAGEAL;  Surgeon: Ella Garcia MD;  [facility];  Service: Thoracic    PA LAPS RPR PARAESPHGL HRNA INCL FUNDPLSTY W/O MESH N/A 2023    Procedure: robotic takedown of nissen fundoplication, redo hiatal hernia, partial fundoplication, with mesh;  Surgeon: Jairo Montero MD;  [facility];  Service: Thoracic    OR PYLOROPLASTY N/A 2023    Procedure: PYLOROPLASTY LAPAROSCOPIC WITH ROBOT;  Surgeon:  Augusto Dowling MD;  [facility];  Service: General    ROTATOR CUFF REPAIR      SHOULDER SURGERY      SPINAL FUSION      TUBAL LIGATION  May 1975    Last child born    UPPER GASTROINTESTINAL ENDOSCOPY      US GUIDED THYROID BIOPSY      WISDOM TOOTH EXTRACTION       Social History   Social History     Substance and Sexual Activity   Alcohol Use Not Currently     Social History     Substance and Sexual Activity   Drug Use Never     Social History     Tobacco Use   Smoking Status Former    Packs/day: 1.00    Years: 15.00    Total pack years: 15.00    Types: Cigarettes    Start date: 10/5/1998    Quit date: 10/1/2003    Years since quittin.1   Smokeless Tobacco Former         Family History:   Family History   Problem Relation Age of Onset    Other Mother         Esophageal disorder    Stomach cancer Father     Cancer Father     Hypertension Maternal Aunt     COPD Maternal Grandmother     Colon cancer Neg Hx     Colon polyps Neg Hx        Meds/Allergies   Current meds:   Current Facility-Administered Medications   Medication Dose Route Frequency    acetaminophen (TYLENOL) oral suspension 650 mg  650 mg Oral Q6H    albuterol (PROVENTIL HFA,VENTOLIN HFA) inhaler 2 puff  2 puff Inhalation BID    amitriptyline (ELAVIL) tablet 50 mg  50 mg Oral HS    [START ON 11/10/2023] amLODIPine (NORVASC) tablet 10 mg  10 mg Oral Daily    docusate sodium (COLACE) capsule 100 mg  100 mg Oral BID    enoxaparin (LOVENOX) subcutaneous injection 40 mg  40 mg Subcutaneous Daily    HYDROmorphone (DILAUDID) injection 0.5 mg  0.5 mg Intravenous Q3H PRN    lactated ringers infusion  50 mL/hr Intravenous Continuous    loratadine (CLARITIN) tablet 5 mg  5 mg Oral Daily    magnesium sulfate 2 g/50 mL IVPB (premix) 2 g  2 g Intravenous Once    ondansetron (ZOFRAN) injection 4 mg  4 mg Intravenous Q6H    oxyCODONE (ROXICODONE) oral solution 10 mg  10 mg Oral Q4H PRN    oxyCODONE (ROXICODONE) oral solution 5 mg  5 mg Oral Q4H PRN    pantoprazole (PROTONIX) injection 40 mg  40 mg Intravenous Daily    polyethylene glycol (MIRALAX) packet 17 g  17 g Oral Daily    senna (SENOKOT) tablet 8.6 mg  1 tablet Oral Daily    sucralfate (CARAFATE) tablet 1 g  1 g Oral 4x Daily (AC & HS)      Current PTA meds:  Medications Prior to Admission   Medication    albuterol (PROVENTIL HFA,VENTOLIN HFA) 90 mcg/act inhaler    amitriptyline (ELAVIL) 50 mg tablet    amLODIPine (NORVASC) 10 mg tablet    cetirizine (ZyrTEC) 10 MG chewable tablet    cholecalciferol (VITAMIN D3) 1,000 units tablet    clotrimazole-betamethasone (LOTRISONE) 1-0.05 % cream    dexlansoprazole (DEXILANT) 60 MG capsule    dicyclomine (BENTYL) 20 mg tablet    famotidine (PEPCID) 40 MG tablet    multivitamin (THERAGRAN) TABS    Omega-3 Fatty Acids (OMEGA 3 PO)    ondansetron (ZOFRAN) 4 mg tablet    Tavaborole 5 % SOLN    tobramycin-dexamethasone (TOBRADEX) ophthalmic suspension        Allergies   Allergen Reactions    Latex Hives    Augmentin [Amoxicillin-Pot Clavulanate] GI Intolerance    Bactrim [Sulfamethoxazole-Trimethoprim] GI Intolerance    Clarithromycin GI Intolerance    Doxycycline Hives    Neomycin GI Intolerance    Polymyxin B GI Intolerance    Zyvox [Linezolid] GI Bleeding    Cephalosporins GI Intolerance    Nsaids GI Intolerance    Penicillins GI Intolerance     Patient can only take levaquin and cipro    Prednisone GI Intolerance    Sulphasomidine GI Intolerance       Objective   Vitals: Blood pressure 131/69, pulse 93, temperature 98.9 °F (37.2 °C), temperature source Oral, resp. rate (!) 25, height 5' (1.524 m), weight 59.9 kg (132 lb 0.9 oz), SpO2 (!) 88 %, not currently breastfeeding. ,Body mass index is 25.79 kg/m².       Physical Exam  Vitals and nursing note reviewed. HENT:      Head: Normocephalic. Nose: No congestion. Mouth/Throat:      Mouth: Mucous membranes are moist.   Eyes:      General:         Right eye: No discharge. Left eye: No discharge. Cardiovascular:      Rate and Rhythm: Normal rate and regular rhythm. Pulses: Normal pulses. Pulmonary:      Effort: Pulmonary effort is normal.      Breath sounds: Normal breath sounds. Abdominal:      Tenderness: There is abdominal tenderness. Musculoskeletal:         General: Normal range of motion. Cervical back: Normal range of motion. Skin:     General: Skin is warm and dry. Neurological:      Mental Status: She is alert and oriented to person, place, and time. Mental status is at baseline. Psychiatric:         Mood and Affect: Mood normal.         Lab Results:   Results from last 7 days   Lab Units 11/09/23  0504   WBC Thousand/uL 7.52   HEMOGLOBIN g/dL 10.0*   HEMATOCRIT % 30.2*   PLATELETS Thousands/uL 63*        Results from last 7 days   Lab Units 11/09/23  0504   POTASSIUM mmol/L 4.2   CHLORIDE mmol/L 108   CO2 mmol/L 25   BUN mg/dL 19   CREATININE mg/dL 1.36*   CALCIUM mg/dL 8.4       Imaging Studies: I have personally reviewed pertinent films in PACS  EKG, Pathology, and Other Studies: I have personally reviewed pertinent reports.     VTE Prophylaxis: Sequential compression device (Venodyne)     Code Status: Level 1 - Full Code

## 2023-11-09 NOTE — CONSULTS
Consultation - Nephrology   Robin Brooke 69 y.o. female MRN: [id number]  Unit/Bed#: [facility] 431-01 Encounter: [id number]    ASSESSMENT and PLAN:  Chronic kidney disease IIIB:    Etiology: Suspect secondary to Hypertension, ongoing prerenal state in the setting of dysphagia and age-related nephron loss  Baseline creatinine 1.4-1.6 dating back to 2017  Recent outpatient creatinine 1.42 on 10/27/2023  Current creatinine 1.36   Not on ACE/ARB as outpatient-of note lisinopril recently discontinued per primary nephrologist in the setting of rising creatinine  Patient follows with nephrologist in [facility]  Currently on IV lactated Ringer's at 50 cc an hour  Recommend changing to Isolyte at 50 cc an hour until able to tolerate oral intake well  Avoid hypotension of pertubation's of blood pressure to prevent decreased renal perfusion  Avoid nephrotoxins, NSAIDs and IV contrast  Check BMP in a.m. Blood pressure/hypertension:  Current BP acceptable 130s to 150s  Home medications include: Amlodipine 10 mg daily  Current medications include: Amlodipine 10 mg daily  With parameters for SBP less than 130  Maximize hemodynamics to maintain MAP >65  Avoid hypotension or fluctuations in blood pressure  Will continue to trend    Esophageal dysphagia, history of Nissen fundoplication  Status post robotic takedown of Nissen fundoplication, redo hiatal hernia, partial Toupet fundoplication with mesh along with biopsy 11/8/2023  Thoracic surgery following    Electrolytes/acid-base  Mild hypomagnesia  Magnesium 1.6  We will give magnesium IV 2 g over 4 hours  Yair magnesium in a.m.     Medical records through [address] has been reviewed for this patient encounter    HISTORY OF PRESENT ILLNESS:  Requesting Physician: Geremias Shankar MD  Reason for Consult: Perioperative optimization to reduce the incidence of acute kidney injury in the setting of CKD    Borisrebecca Herring is a 69 y.o. female who has PMH of  CKD IIIB, hypertension, GERD s/p previous nissen fundoplication who presented with ongoing dysphagia and now s/p elective robotic takedown of nissen fundoplication. redo hiatal hernia. partial toupet fundoplication. with mesh along with liver biopsy. A renal consultation is requested for perioperative optimization to reduce the incidence of acute kidney injury in the setting of CKD. On discussion the patient follows with a nephrologist in [facility]. Only was taken off lisinopril in the setting of rising creatinine. Patient also reports poor oral intake in the setting of dysphagia. Currently having incisional discomfort. Get waves of nausea but denies vomiting. Denies urinary issues. PAST MEDICAL HISTORY:  Past Medical History:   Diagnosis Date    Anxiety     Arthritis     Asthma     Cancer ([address])     Candida infection, esophageal ([address])     Chronic kidney disease     Chronic pain     COPD (chronic obstructive pulmonary disease) (HCC)     Depression     Depression     Gastroparesis     Gastroparesis     GERD (gastroesophageal reflux disease)     Hypertension     Irritable bowel syndrome (IBS)     Lactose intolerance Not sure    Migraines     MRSA (methicillin resistant Staphylococcus aureus)     Multiple thyroid nodules     Osteoporosis     Pneumonia     Psychiatric disorder        PAST SURGICAL HISTORY:  Past Surgical History:   Procedure Laterality Date    BACK SURGERY       SECTION      CHOLECYSTECTOMY      COLONOSCOPY  10/09/2013    ESOPHAGEAL DILATION      [address] [place] [address] [place]    ESOPHAGOGASTRODUODENOSCOPY N/A 2023    Procedure: ESOPHAGOGASTRODUODENOSCOPY (EGD); Surgeon: Ifrah Eng MD;  Location: [facility];  Service: General    HIATAL HERNIA REPAIR      NISSEN FUNDOPLICATION      AR BIOPSY LIVER NEEDLE PERCUTANEOUS N/A 2023    Procedure: EXCISION BIOPSY LIVER;  Surgeon:  Ifrah Eng MD;  Location: [facility];  Service: General    AR ESOPHAGOGASTRODUODENOSCOPY TRANSORAL DIAGNOSTIC N/A 04/03/2019    Procedure: ESOPHAGOGASTRODUODENOSCOPY (EGD); Surgeon: Ron Wilkins MD;  Location: [facility];  Service: Gastroenterology    NH ESOPHAGOGASTRODUODENOSCOPY TRANSORAL DIAGNOSTIC N/A 09/20/2023    Procedure: ESOPHAGOGASTRODUODENOSCOPY (EGD); Surgeon: Jennifer Yoo MD;  [facility];  Service: Thoracic    NH ESOPHAGOSCOPY FLEX BALLOON DILAT <30 MM DIAM N/A 09/20/2023    Procedure: CRE WIRE GUIDED BALLOON DILATATION ESOPHAGEAL;  Surgeon: Jennifer Yoo MD;  [facility];  Service: Thoracic    NH LAPS RPR PARAESPHGL HRNA INCL [address] [place] [address] W/O MESH N/A 11/8/2023    Procedure: robotic takedown of nissen fundoplication, redo hiatal hernia, partial fundoplication, with mesh;  Surgeon: Jennifer Yoo MD;  [facility];  Service: Thoracic    NH PYLOROPLASTY N/A 11/8/2023    Procedure: PYLOROPLASTY LAPAROSCOPIC WITH ROBOT;  Surgeon:  Yanely Bustillos MD;  Location[facility];  Service: General    ROTATOR CUFF REPAIR      SHOULDER SURGERY      SPINAL FUSION      TUBAL LIGATION  May 1975    Last child born    UPPER GASTROINTESTINAL ENDOSCOPY      US GUIDED THYROID BIOPSY      WISDOM TOOTH EXTRACTION         ALLERGIES:  Allergies   Allergen Reactions    Latex Hives    Augmentin [Amoxicillin-Pot Clavulanate] GI Intolerance    Bactrim [Sulfamethoxazole-Trimethoprim] GI Intolerance    Clarithromycin GI Intolerance    Doxycycline Hives    Neomycin GI Intolerance    Polymyxin B GI Intolerance    Zyvox [Linezolid] GI Bleeding    Cephalosporins GI Intolerance    Nsaids GI Intolerance    Penicillins GI Intolerance     Patient can only take levaquin and cipro    Prednisone GI Intolerance    Sulphasomidine GI Intolerance       SOCIAL HISTORY:  Social History     Substance and Sexual Activity   Alcohol Use Not Currently     Social History     Substance and Sexual Activity   Drug Use Never     Social History     Tobacco Use   Smoking Status Former    Packs/day: 1.00    Years: 15.00 Total pack years: 15.00    Types: Cigarettes    Start date: 10/5/1998    Quit date: 10/1/2003    Years since quittin.1   Smokeless Tobacco Former       FAMILY HISTORY:  Family History   Problem Relation Age of Onset    Other Mother         Esophageal disorder    Stomach cancer Father     Cancer Father     Hypertension Maternal Aunt     COPD Maternal Grandmother     Colon cancer Neg Hx     Colon polyps Neg Hx        MEDICATIONS:    Current Facility-Administered Medications:     acetaminophen (TYLENOL) oral suspension 650 mg, 650 mg, Oral, Q6H, Samantha Gaviria PA-C, 650 mg at 23 0644    albuterol (PROVENTIL HFA,VENTOLIN HFA) inhaler 2 puff, 2 puff, Inhalation, BID, Kristy Ames PA-C, 2 puff at 23    amitriptyline (ELAVIL) tablet 50 mg, 50 mg, Oral, HS, Samantha Gaviria PA-C, 50 mg at 23    amLODIPine (NORVASC) tablet 10 mg, 10 mg, Oral, Daily, Kristy Ames PA-C    docusate sodium (COLACE) capsule 100 mg, 100 mg, Oral, BID, Samantha Gaviria PA-C    enoxaparin (LOVENOX) subcutaneous injection 40 mg, 40 mg, Subcutaneous, Daily, Samantha Gaviria PA-C    HYDROmorphone (DILAUDID) injection 0.5 mg, 0.5 mg, Intravenous, Q3H PRN, Kristy Ames PA-C    lactated ringers infusion, 50 mL/hr, Intravenous, Continuous, Nicolle June CRNA, Last Rate: 50 mL/hr at 23 0600, 50 mL/hr at 23 0600    loratadine (CLARITIN) tablet 5 mg, 5 mg, Oral, Daily, Samantha Gaviria PA-C    ondansetron (ZOFRAN) injection 4 mg, 4 mg, Intravenous, Q6H, Samantha Gaviria PA-C, 4 mg at 23    oxyCODONE (ROXICODONE) oral solution 10 mg, 10 mg, Oral, Q4H PRN, Kristy Ames PA-C, 10 mg at 23 0645    oxyCODONE (ROXICODONE) oral solution 5 mg, 5 mg, Oral, Q4H PRN, Kristy Ames PA-C    pantoprazole (PROTONIX) injection 40 mg, 40 mg, Intravenous, Daily, Samantha Gaviria PA-C    polyethylene glycol (MIRALAX) packet 17 g, 17 g, Oral, Daily, Kristy Ames PA-C    senna (SENOKOT) tablet 8.6 mg, 1 tablet, Oral, Daily, Samantha Gaviria PA-C    sucralfate (CARAFATE) tablet 1 g, 1 g, Oral, 4x Daily (AC & HS), Alconemmanuel Lord PA-C, 1 g at 11/09/23 6944      REVIEW OF SYSTEMS:  Patient seen and examined at bedside  Review of Systems   Constitutional: Negative. Negative for activity change, appetite change, chills, diaphoresis, fatigue and fever. HENT: Negative. Negative for congestion and facial swelling. Respiratory: Negative. Cardiovascular: Negative. Gastrointestinal:  Positive for abdominal pain and nausea. Endocrine: Negative. Genitourinary: Negative. Musculoskeletal: Negative. Skin: Negative. Allergic/Immunologic: Negative. Neurological: Negative. Hematological: Negative. Psychiatric/Behavioral: Negative. PHYSICAL EXAM:  Current weight: Weight - Scale: 59.9 kg (132 lb 0.9 oz)  Vitals:    11/09/23 0045 11/09/23 0531 11/09/23 0700 11/09/23 0700   BP: 137/84  155/87 155/87   BP Location:    Right arm   Pulse: 99  101 101   Resp:    (!) 25   Temp:   98.9 °F (37.2 °C) 98.9 °F (37.2 °C)   TempSrc:    Oral   SpO2: (!) 85%   91%   Weight:  59.9 kg (132 lb 0.9 oz)     Height:           Physical Exam  Vitals and nursing note reviewed. Constitutional:       Appearance: Normal appearance. HENT:      Head: Normocephalic and atraumatic. Nose: Nose normal.      Mouth/Throat:      Mouth: Mucous membranes are dry. Pharynx: Oropharynx is clear. Eyes:      Extraocular Movements: Extraocular movements intact. Conjunctiva/sclera: Conjunctivae normal.   Cardiovascular:      Rate and Rhythm: Regular rhythm. Tachycardia present. Pulmonary:      Effort: Pulmonary effort is normal.      Breath sounds: Normal breath sounds. Comments: Decreased bases  Abdominal:      General: Bowel sounds are normal.      Palpations: Abdomen is soft. Tenderness: There is abdominal tenderness. Musculoskeletal:         General: Normal range of motion. Cervical back: Normal range of motion and neck supple. Skin:     General: Skin is warm and dry. Neurological:      General: No focal deficit present. Mental Status: She is alert and oriented to person, place, and time. Psychiatric:         Mood and Affect: Mood normal.         Behavior: Behavior normal.                  Lab Results:   Results from last 7 days   Lab Units 11/09/23  0504   WBC Thousand/uL 7.52   HEMOGLOBIN g/dL 10.0*   HEMATOCRIT % 30.2*   PLATELETS Thousands/uL 63*   SODIUM mmol/L 140   POTASSIUM mmol/L 4.2   CHLORIDE mmol/L 108   CO2 mmol/L 25   BUN mg/dL 19   CREATININE mg/dL 1.36*   CALCIUM mg/dL 8.4   MAGNESIUM mg/dL 1.6*         Portions of the record may have been created with voice recognition software. Occasional wrong word or "sound a like" substitutions may have occurred due to the inherent limitations of voice recognition software.  Read the chart carefully and recognize,

## 2023-11-10 ENCOUNTER — APPOINTMENT (OUTPATIENT)
Dept: RADIOLOGY | Facility: HOSPITAL | Age: 78
DRG: 326 | End: 2023-11-10
Payer: COMMERCIAL

## 2023-11-10 ENCOUNTER — APPOINTMENT (INPATIENT)
Dept: RADIOLOGY | Facility: HOSPITAL | Age: 78
DRG: 326 | End: 2023-11-10
Payer: COMMERCIAL

## 2023-11-10 LAB
ALBUMIN SERPL BCP-MCNC: 4 G/DL (ref 3.5–5)
ALP SERPL-CCNC: 121 U/L (ref 34–104)
ALT SERPL W P-5'-P-CCNC: 1051 U/L (ref 7–52)
ANION GAP SERPL CALCULATED.3IONS-SCNC: 9 MMOL/L
ANION GAP SERPL CALCULATED.3IONS-SCNC: 9 MMOL/L
APTT PPP: 38 SECONDS (ref 23–37)
AST SERPL W P-5'-P-CCNC: 1781 U/L (ref 13–39)
BASOPHILS # BLD AUTO: 0 THOUSANDS/ÂΜL (ref 0–0.1)
BASOPHILS NFR BLD AUTO: 0 % (ref 0–1)
BILIRUB DIRECT SERPL-MCNC: 2.05 MG/DL (ref 0–0.2)
BILIRUB SERPL-MCNC: 3.14 MG/DL (ref 0.2–1)
BLD SMEAR INTERP: NORMAL
BUN SERPL-MCNC: 22 MG/DL (ref 5–25)
BUN SERPL-MCNC: 25 MG/DL (ref 5–25)
CALCIUM SERPL-MCNC: 8.2 MG/DL (ref 8.4–10.2)
CALCIUM SERPL-MCNC: 8.3 MG/DL (ref 8.4–10.2)
CFFFLEV: 330.3 MG/DL (ref 278–581)
CFFMA (FUNCTIONAL FIBRINOGEN MAX AMPLITUDE): 18.1 MM (ref 15–32)
CHLORIDE SERPL-SCNC: 102 MMOL/L (ref 96–108)
CHLORIDE SERPL-SCNC: 102 MMOL/L (ref 96–108)
CKA(ANGLE): 55.8 DEG (ref 63–78)
CKHR(HEPARINASE REACTION TIME): 7.9 MIN (ref 4.3–8.3)
CKK(CLOT KINETICS): 3.1 MIN (ref 0.8–2.1)
CKMA(MAX AMPLITUDE): 46.6 MM (ref 52–69)
CKR(REACTION TIME): 10.1 MIN (ref 4.6–9.1)
CO2 SERPL-SCNC: 24 MMOL/L (ref 21–32)
CO2 SERPL-SCNC: 25 MMOL/L (ref 21–32)
CREAT SERPL-MCNC: 2.62 MG/DL (ref 0.6–1.3)
CREAT SERPL-MCNC: 2.76 MG/DL (ref 0.6–1.3)
CRTMA(RAPIDTEG MAX AMPLITUDE): 44.8 MM (ref 52–70)
EOSINOPHIL # BLD AUTO: 0.08 THOUSAND/ÂΜL (ref 0–0.61)
EOSINOPHIL NFR BLD AUTO: 1 % (ref 0–6)
ERYTHROCYTE [DISTWIDTH] IN BLOOD BY AUTOMATED COUNT: 13.6 % (ref 11.6–15.1)
FERRITIN SERPL-MCNC: 384 NG/ML (ref 11–307)
FIBRINOGEN PPP-MCNC: 331 MG/DL (ref 207–520)
FOLATE SERPL-MCNC: >22.3 NG/ML
GFR SERPL CREATININE-BSD FRML MDRD: 15 ML/MIN/1.73SQ M
GFR SERPL CREATININE-BSD FRML MDRD: 17 ML/MIN/1.73SQ M
GLUCOSE SERPL-MCNC: 124 MG/DL (ref 65–140)
GLUCOSE SERPL-MCNC: 126 MG/DL (ref 65–140)
HCT VFR BLD AUTO: 26.2 % (ref 34.8–46.1)
HGB BLD-MCNC: 8.9 G/DL (ref 11.5–15.4)
IMM GRANULOCYTES # BLD AUTO: 0.03 THOUSAND/UL (ref 0–0.2)
IMM GRANULOCYTES NFR BLD AUTO: 0 % (ref 0–2)
INR PPP: 1.48 (ref 0.84–1.19)
IRON SATN MFR SERPL: 18 % (ref 15–50)
IRON SERPL-MCNC: 44 UG/DL (ref 50–212)
LACTATE SERPL-SCNC: 1.2 MMOL/L (ref 0.5–2)
LDH SERPL-CCNC: 1128 U/L (ref 140–271)
LYMPHOCYTES # BLD AUTO: 0.93 THOUSANDS/ÂΜL (ref 0.6–4.47)
LYMPHOCYTES NFR BLD AUTO: 13 % (ref 14–44)
MAGNESIUM SERPL-MCNC: 1.9 MG/DL (ref 1.9–2.7)
MAGNESIUM SERPL-MCNC: 2 MG/DL (ref 1.9–2.7)
MCH RBC QN AUTO: 32.6 PG (ref 26.8–34.3)
MCHC RBC AUTO-ENTMCNC: 34 G/DL (ref 31.4–37.4)
MCV RBC AUTO: 96 FL (ref 82–98)
MONOCYTES # BLD AUTO: 0.38 THOUSAND/ÂΜL (ref 0.17–1.22)
MONOCYTES NFR BLD AUTO: 5 % (ref 4–12)
NEUTROPHILS # BLD AUTO: 6.02 THOUSANDS/ÂΜL (ref 1.85–7.62)
NEUTS SEG NFR BLD AUTO: 81 % (ref 43–75)
NRBC BLD AUTO-RTO: 0 /100 WBCS
PHOSPHATE SERPL-MCNC: 3.7 MG/DL (ref 2.3–4.1)
PLATELET # BLD AUTO: 38 THOUSANDS/UL (ref 149–390)
PMV BLD AUTO: 9.5 FL (ref 8.9–12.7)
POTASSIUM SERPL-SCNC: 3.8 MMOL/L (ref 3.5–5.3)
POTASSIUM SERPL-SCNC: 4 MMOL/L (ref 3.5–5.3)
PROT SERPL-MCNC: 6.4 G/DL (ref 6.4–8.4)
PROTHROMBIN TIME: 17.7 SECONDS (ref 11.6–14.5)
RBC # BLD AUTO: 2.73 MILLION/UL (ref 3.81–5.12)
RETICS # AUTO: NORMAL 10*3/UL (ref 14097–95744)
RETICS # CALC: 1.82 % (ref 0.37–1.87)
SODIUM SERPL-SCNC: 135 MMOL/L (ref 135–147)
SODIUM SERPL-SCNC: 136 MMOL/L (ref 135–147)
TIBC SERPL-MCNC: 249 UG/DL (ref 250–450)
UIBC SERPL-MCNC: 205 UG/DL (ref 155–355)
VIT B12 SERPL-MCNC: 1702 PG/ML (ref 180–914)
WBC # BLD AUTO: 7.44 THOUSAND/UL (ref 4.31–10.16)

## 2023-11-10 PROCEDURE — 82542 COL CHROMOTOGRAPHY QUAL/QUAN: CPT | Performed by: SURGERY

## 2023-11-10 PROCEDURE — 83605 ASSAY OF LACTIC ACID: CPT | Performed by: SURGERY

## 2023-11-10 PROCEDURE — 83550 IRON BINDING TEST: CPT | Performed by: STUDENT IN AN ORGANIZED HEALTH CARE EDUCATION/TRAINING PROGRAM

## 2023-11-10 PROCEDURE — 85347 COAGULATION TIME ACTIVATED: CPT | Performed by: SURGERY

## 2023-11-10 PROCEDURE — 83540 ASSAY OF IRON: CPT | Performed by: STUDENT IN AN ORGANIZED HEALTH CARE EDUCATION/TRAINING PROGRAM

## 2023-11-10 PROCEDURE — NC001 PR NO CHARGE: Performed by: THORACIC SURGERY (CARDIOTHORACIC VASCULAR SURGERY)

## 2023-11-10 PROCEDURE — 80048 BASIC METABOLIC PNL TOTAL CA: CPT | Performed by: SURGERY

## 2023-11-10 PROCEDURE — 85384 FIBRINOGEN ACTIVITY: CPT | Performed by: STUDENT IN AN ORGANIZED HEALTH CARE EDUCATION/TRAINING PROGRAM

## 2023-11-10 PROCEDURE — C9113 INJ PANTOPRAZOLE SODIUM, VIA: HCPCS | Performed by: PHYSICIAN ASSISTANT

## 2023-11-10 PROCEDURE — 85045 AUTOMATED RETICULOCYTE COUNT: CPT | Performed by: STUDENT IN AN ORGANIZED HEALTH CARE EDUCATION/TRAINING PROGRAM

## 2023-11-10 PROCEDURE — 83615 LACTATE (LD) (LDH) ENZYME: CPT | Performed by: STUDENT IN AN ORGANIZED HEALTH CARE EDUCATION/TRAINING PROGRAM

## 2023-11-10 PROCEDURE — G1004 CDSM NDSC: HCPCS

## 2023-11-10 PROCEDURE — 85397 CLOTTING FUNCT ACTIVITY: CPT | Performed by: SURGERY

## 2023-11-10 PROCEDURE — 85025 COMPLETE CBC W/AUTO DIFF WBC: CPT | Performed by: SURGERY

## 2023-11-10 PROCEDURE — 85384 FIBRINOGEN ACTIVITY: CPT | Performed by: SURGERY

## 2023-11-10 PROCEDURE — 94664 DEMO&/EVAL PT USE INHALER: CPT

## 2023-11-10 PROCEDURE — 85730 THROMBOPLASTIN TIME PARTIAL: CPT | Performed by: STUDENT IN AN ORGANIZED HEALTH CARE EDUCATION/TRAINING PROGRAM

## 2023-11-10 PROCEDURE — 86022 PLATELET ANTIBODIES: CPT | Performed by: SURGERY

## 2023-11-10 PROCEDURE — 82746 ASSAY OF FOLIC ACID SERUM: CPT | Performed by: STUDENT IN AN ORGANIZED HEALTH CARE EDUCATION/TRAINING PROGRAM

## 2023-11-10 PROCEDURE — 99024 POSTOP FOLLOW-UP VISIT: CPT | Performed by: SURGERY

## 2023-11-10 PROCEDURE — 80048 BASIC METABOLIC PNL TOTAL CA: CPT | Performed by: NURSE PRACTITIONER

## 2023-11-10 PROCEDURE — 82607 VITAMIN B-12: CPT | Performed by: STUDENT IN AN ORGANIZED HEALTH CARE EDUCATION/TRAINING PROGRAM

## 2023-11-10 PROCEDURE — 83735 ASSAY OF MAGNESIUM: CPT | Performed by: SURGERY

## 2023-11-10 PROCEDURE — 85610 PROTHROMBIN TIME: CPT | Performed by: STUDENT IN AN ORGANIZED HEALTH CARE EDUCATION/TRAINING PROGRAM

## 2023-11-10 PROCEDURE — 99024 POSTOP FOLLOW-UP VISIT: CPT | Performed by: THORACIC SURGERY (CARDIOTHORACIC VASCULAR SURGERY)

## 2023-11-10 PROCEDURE — 76705 ECHO EXAM OF ABDOMEN: CPT

## 2023-11-10 PROCEDURE — 83735 ASSAY OF MAGNESIUM: CPT | Performed by: NURSE PRACTITIONER

## 2023-11-10 PROCEDURE — 82728 ASSAY OF FERRITIN: CPT | Performed by: STUDENT IN AN ORGANIZED HEALTH CARE EDUCATION/TRAINING PROGRAM

## 2023-11-10 PROCEDURE — 80076 HEPATIC FUNCTION PANEL: CPT | Performed by: STUDENT IN AN ORGANIZED HEALTH CARE EDUCATION/TRAINING PROGRAM

## 2023-11-10 PROCEDURE — 85576 BLOOD PLATELET AGGREGATION: CPT | Performed by: SURGERY

## 2023-11-10 PROCEDURE — 99232 SBSQ HOSP IP/OBS MODERATE 35: CPT | Performed by: INTERNAL MEDICINE

## 2023-11-10 PROCEDURE — 74176 CT ABD & PELVIS W/O CONTRAST: CPT

## 2023-11-10 PROCEDURE — 71250 CT THORAX DX C-: CPT

## 2023-11-10 PROCEDURE — 84100 ASSAY OF PHOSPHORUS: CPT

## 2023-11-10 PROCEDURE — 83010 ASSAY OF HAPTOGLOBIN QUANT: CPT | Performed by: STUDENT IN AN ORGANIZED HEALTH CARE EDUCATION/TRAINING PROGRAM

## 2023-11-10 PROCEDURE — 71045 X-RAY EXAM CHEST 1 VIEW: CPT

## 2023-11-10 RX ORDER — ALBUMIN, HUMAN INJ 5% 5 %
SOLUTION INTRAVENOUS
Status: COMPLETED
Start: 2023-11-10 | End: 2023-11-10

## 2023-11-10 RX ORDER — HYDROXYZINE HYDROCHLORIDE 25 MG/1
25 TABLET, FILM COATED ORAL EVERY 6 HOURS PRN
Status: DISCONTINUED | OUTPATIENT
Start: 2023-11-10 | End: 2023-11-10

## 2023-11-10 RX ORDER — ALBUMIN, HUMAN INJ 5% 5 %
12.5 SOLUTION INTRAVENOUS ONCE
Status: DISCONTINUED | OUTPATIENT
Start: 2023-11-10 | End: 2023-11-10

## 2023-11-10 RX ORDER — HYDROXYZINE HYDROCHLORIDE 10 MG/1
10 TABLET, FILM COATED ORAL EVERY 6 HOURS PRN
Status: DISCONTINUED | OUTPATIENT
Start: 2023-11-10 | End: 2023-11-14 | Stop reason: HOSPADM

## 2023-11-10 RX ORDER — MIDODRINE HYDROCHLORIDE 5 MG/1
5 TABLET ORAL
Status: DISCONTINUED | OUTPATIENT
Start: 2023-11-10 | End: 2023-11-12

## 2023-11-10 RX ORDER — ALBUMIN, HUMAN INJ 5% 5 %
25 SOLUTION INTRAVENOUS ONCE
Status: COMPLETED | OUTPATIENT
Start: 2023-11-10 | End: 2023-11-10

## 2023-11-10 RX ADMIN — HYDROMORPHONE HYDROCHLORIDE 0.2 MG: 0.2 INJECTION, SOLUTION INTRAMUSCULAR; INTRAVENOUS; SUBCUTANEOUS at 21:46

## 2023-11-10 RX ADMIN — LORATADINE 5 MG: 10 TABLET ORAL at 08:53

## 2023-11-10 RX ADMIN — PANTOPRAZOLE SODIUM 40 MG: 40 INJECTION, POWDER, FOR SOLUTION INTRAVENOUS at 08:54

## 2023-11-10 RX ADMIN — ACETAMINOPHEN 649.6 MG: 650 SUSPENSION ORAL at 06:31

## 2023-11-10 RX ADMIN — ONDANSETRON 4 MG: 2 INJECTION INTRAMUSCULAR; INTRAVENOUS at 18:54

## 2023-11-10 RX ADMIN — ACETAMINOPHEN 650 MG: 650 SUSPENSION ORAL at 12:32

## 2023-11-10 RX ADMIN — ALBUMIN (HUMAN) 25 G: 12.5 INJECTION, SOLUTION INTRAVENOUS at 10:25

## 2023-11-10 RX ADMIN — HYDROXYZINE HYDROCHLORIDE 10 MG: 10 TABLET ORAL at 18:56

## 2023-11-10 RX ADMIN — SUCRALFATE 1 G: 1 TABLET ORAL at 06:31

## 2023-11-10 RX ADMIN — ACETAMINOPHEN 649.6 MG: 650 SUSPENSION ORAL at 18:54

## 2023-11-10 RX ADMIN — HYDROXYZINE HYDROCHLORIDE 25 MG: 25 TABLET, FILM COATED ORAL at 08:23

## 2023-11-10 RX ADMIN — OXYCODONE HYDROCHLORIDE 5 MG: 5 SOLUTION ORAL at 06:27

## 2023-11-10 RX ADMIN — MIDODRINE HYDROCHLORIDE 5 MG: 5 TABLET ORAL at 11:28

## 2023-11-10 RX ADMIN — ALBUMIN (HUMAN) 12.5 G: 12.5 INJECTION, SOLUTION INTRAVENOUS at 11:28

## 2023-11-10 RX ADMIN — POLYETHYLENE GLYCOL 3350 17 G: 17 POWDER, FOR SOLUTION ORAL at 08:54

## 2023-11-10 RX ADMIN — SUCRALFATE 1 G: 1 TABLET ORAL at 17:34

## 2023-11-10 RX ADMIN — DOCUSATE SODIUM 100 MG: 100 CAPSULE, LIQUID FILLED ORAL at 08:53

## 2023-11-10 RX ADMIN — ALBUTEROL SULFATE 2 PUFF: 90 AEROSOL, METERED RESPIRATORY (INHALATION) at 09:05

## 2023-11-10 RX ADMIN — SODIUM CHLORIDE, SODIUM LACTATE, POTASSIUM CHLORIDE, AND CALCIUM CHLORIDE 500 ML: .6; .31; .03; .02 INJECTION, SOLUTION INTRAVENOUS at 06:26

## 2023-11-10 RX ADMIN — SENNOSIDES 8.6 MG: 8.6 TABLET, FILM COATED ORAL at 08:54

## 2023-11-10 RX ADMIN — ACETAMINOPHEN 650 MG: 650 SUSPENSION ORAL at 00:04

## 2023-11-10 RX ADMIN — ENOXAPARIN SODIUM 30 MG: 30 INJECTION SUBCUTANEOUS at 08:54

## 2023-11-10 RX ADMIN — MIDODRINE HYDROCHLORIDE 5 MG: 5 TABLET ORAL at 17:34

## 2023-11-10 RX ADMIN — ONDANSETRON 4 MG: 2 INJECTION INTRAMUSCULAR; INTRAVENOUS at 12:32

## 2023-11-10 RX ADMIN — ONDANSETRON 4 MG: 2 INJECTION INTRAMUSCULAR; INTRAVENOUS at 00:04

## 2023-11-10 RX ADMIN — DOCUSATE SODIUM 100 MG: 100 CAPSULE, LIQUID FILLED ORAL at 17:34

## 2023-11-10 NOTE — PROGRESS NOTES
surgery, general anesthesia and pain medications, evidenced by Hypoxia with Sats 84%>88%. 89% 87%>85% on room air, Tachycardia, >100>95>96 and poor Incentive Spirometry, treated with Oxygen at 2L NC> 4L NC>5l NC>6L NC, Incentive Spirometry protocol, pain control and early ambulation. Findings:  O2 Alli with oxygen 86%>90%>90%>84%. Patient reaching IS of 500ml and goal is 1000ml. Oxycodone d/c'd.      I was asked to document this through a CDI query    Trent Peralta MD  Thoracic Surgery  (Available on [facility] Text)  Office: [phone]

## 2023-11-10 NOTE — QUICK NOTE
Quick Note - Thoracic Surgery   Lorenzo Strange 69 y.o. female MRN: [id number]  Unit/Bed#: [facility] 431-01 Encounter: [id number]    Patient seen and examined several times this morning and afternoon due to worsening hypoxia and hypotension today. She received 500 cc crystalloid and 500 cc albumin 5% and was started on midodrine 5 mg TID, and her most recent vitals are within normal limits. She was also noted to have JOSE this morning and most recent labs also demonstrate precipitous drop in platelets as well slight interval increase in creatinine. Hemoglobin was 8.9 from 10.0, although she has also been receiving IV fluids. Abdominal exam has consistently demonstrated epigastric tenderness without any peritoneal signs, and her abdomen has remained soft throughout. After discussion with nephrology as well as hematology, we will obtain a stat CT chest/abdomen/pelvis without IV contrast to rule out bleeding as a potential cause of consumptive thrombocytopenia as well as to evaluate her lungs given the increase in her oxygen requirements. Should CT demonstrate any concerning findings, she may require repeat CT with IV contrast despite the potential risk of AVELINO.        Chapis Turner MD   PGY5, General Surgery

## 2023-11-10 NOTE — PROGRESS NOTES
Follow up Consultation    Nephrology   Carin Mcfarlane 69 y.o. female MRN: [id number]  Unit/Bed#: [facility] 431-01 Encounter: [id number]      Physician Requesting Consult: Jimmy Woodall MD        ASSESSMENT:    72-year-old female with multiple committees including hypertension, GERD status post previous Nissen fundoplication and CKD stage IIIb presented with ongoing dysphagia status post elective robotic takedown of Nissen fundoplication on 60/9/3942 nephrology consulted for perioperative optimization to reduce incidence for acute kidney injury. Perioperative optimization to reduce incidence for acute kidney injury/CKD stage IIIb and now with JOSE:    JOSE most likely secondary to ischemic injury secondary hemodynamic perturbations postoperatively and some component of prerenal azotemia. After review of records it appears that the patient has a baseline Creatinine of 1.4-1.7 mg/dL. Most recent labs prior to admission from 10/27/2023 with a creatinine 1.42 mg/dL  Creatinine today is at 2.23 mg/dL stable and improving   Some component of urinary retention needed straight cath. Since patient with decreased p.o. intake will place on Plasma-Lyte 50 cc an hour for 500 cc then DC  check BMP, magnesium, phosphorus in a.m. For now clinically euvolemic to minimally hypovolemic  Place on a renal diet when allowed diet order. Strict I/O. Daily weights  CT abdomen pelvis without contrast from 11/10/2023 no hydronephrosis, large wedge-shaped area of decreased attenuation in the left hepatic lobe suspicious for hepatic infarct  Urinary retention protocol  Avoid nephrotoxins, adjust meds to appropriate GFR.   Likely has underlying CKD secondary to age-related nephron loss plus hypertensive nephrosclerosis  Outpatient for nephrology follows up with nephrologist in [facility]     H&H/anemia/thrombocytopenia:  most recent hemoglobin at 8.8 g/dL  Maintain hemoglobin greater than 8 grams/deciliter  Follow-up with hematology oncology     Acid-base electrolytes:  Hypomagnesemia: Most recent magnesium 1.9. Resolved  Acid-base most recent bicarb 23 stable     Blood pressure/hypertension:   Optimize hemodynamic status to avoid delay in renal recovery. Maintain MAP > 65mmHg  Avoid BP fluctuations. Home medications: Norvasc 10 mg p.o. daily  Current medications: Midodrine 5 mg p.o. 3 times daily  Avoid ACE inhibitors. As an outpatient was tried per outpatient nephrologist had rise in creatinine and for now continues to hold. Hold Norvasc for now     Other medical problems:  Previous Nissen fundoplication/GERD:  Management per primary team.  Status post elective robotic takedown of Nissen fundoplication on . Follow-up with thoracic surgery. Elevated LFTs: Follow with primary team.  CT abdomen suspicious for hepatic infarct  Urinary retention: Needing straight cath. Start Flomax 0.4 mg daily     Plan below is what was discussed with the primary team that they agree with:  check BMP, magnesium, phosphorus in a.m. Placed on Plasma-Lyte 500 cc total  Hold Norvasc  Continue on midodrine titrate off as tolerable   Follow-up with hematology oncology   avoid ACE inhibitors  Not stable for discharge from renal standpoint at this point  Start Flomax 0.4 mg  Thanks for the consult  Will continue to follow  Please call with questions/ concerns. Kailash Price MD, [facility], 2023, 9:59 AM                Objective :   Patient seen and examined in her room had issues with urinary retention in last 24 hours total urine output 875 cc urine output improved as well as blood pressures. However reports she is not consuming much food. Or liquids.       PHYSICAL EXAM  BP (!) 132/47 (BP Location: Right arm)   Pulse 96   Temp 98.3 °F (36.8 °C) (Oral)   Resp (!) 24   Ht 5' (1.524 m)   Wt 61.3 kg (135 lb 2.3 oz)   SpO2 97%   BMI 26.39 kg/m²   Temp (24hrs), Av.1 °F (36.7 °C), Min:97.2 °F (36.2 °C), Max:98.4 °F (36.9 °C)        Intake/Output Summary (Last 24 hours) at 11/11/2023 0959  Last data filed at 11/11/2023 0101  Gross per 24 hour   Intake 666.2 ml   Output 875 ml   Net -208.8 ml       I/O last 24 hours: In: 666.2 [P.O.:120; I.V.:46.2; IV Piggyback:500]  Out: 875 [Urine:875]      Current Weight: Weight - Scale: 61.3 kg (135 lb 2.3 oz)  First Weight: Weight - Scale: 57.8 kg (127 lb 6.8 oz)  Physical Exam  Vitals and nursing note reviewed. Constitutional:       General: She is not in acute distress. Appearance: Normal appearance. She is normal weight. She is not ill-appearing, toxic-appearing or diaphoretic. HENT:      Head: Normocephalic and atraumatic. Mouth/Throat:      Mouth: Mucous membranes are dry. Pharynx: No oropharyngeal exudate. Eyes:      General: No scleral icterus. Cardiovascular:      Rate and Rhythm: Normal rate. Pulmonary:      Effort: No respiratory distress. Breath sounds: Normal breath sounds. No stridor. Abdominal:      General: There is no distension. Palpations: Abdomen is soft. Tenderness: There is no abdominal tenderness. Musculoskeletal:         General: No swelling. Cervical back: Normal range of motion. No rigidity. Skin:     Coloration: Skin is not jaundiced. Neurological:      General: No focal deficit present. Mental Status: She is alert and oriented to person, place, and time. Psychiatric:         Mood and Affect: Mood normal.         Behavior: Behavior normal.             Review of Systems   Constitutional:  Positive for appetite change and fatigue. HENT:  Negative for congestion. Respiratory:  Negative for cough and shortness of breath. Cardiovascular:  Negative for leg swelling. Gastrointestinal:  Positive for abdominal pain. Genitourinary:  Positive for difficulty urinating. Musculoskeletal:  Negative for back pain. Neurological:  Negative for dizziness and headaches.    Psychiatric/Behavioral:  Negative for agitation and confusion. All other systems reviewed and are negative. Scheduled Meds:  Current Facility-Administered Medications   Medication Dose Route Frequency Provider Last Rate    acetaminophen  650 mg Oral Q6H Unk Xena, PA-C      albuterol  2 puff Inhalation BID Unk Xena, PA-C      amitriptyline  50 mg Oral HS Unk Xena, PA-C      docusate sodium  100 mg Oral BID Unk Xena, PA-C      enoxaparin  30 mg Subcutaneous Q24H [address] Unk Xena, PA-C      HYDROmorphone  0.2 mg Intravenous Q3H PRN Tuesday M RUSS Rodgers      hydrOXYzine HCL  10 mg Oral Q6H PRN Jose David Foster MD      midodrine  5 mg Oral TID AC Jose David Foster MD      ondansetron  4 mg Intravenous Q6H Unk Xena, PA-C      pantoprazole  40 mg Intravenous Daily Unk Xena, PA-C      polyethylene glycol  17 g Oral Daily Unk Xena, PA-C      senna  1 tablet Oral Daily Unk Xena, PA-C      sucralfate  1 g Oral 4x Daily (AC & HS) Unk Xena, PA-C         PRN Meds:. HYDROmorphone    hydrOXYzine HCL    Continuous Infusions:       Invasive Devices: Invasive Devices       Peripheral Intravenous Line  Duration             Peripheral IV 11/08/23 Distal;Right Wrist 2 days    Peripheral IV 11/08/23 Dorsal (posterior); Left Forearm 2 days              Arterial Line  Duration             Arterial Line 11/11/23 Radial <1 day                      LABORATORY:    Results from last 7 days   Lab Units 11/11/23  0445 11/10/23  1234 11/10/23  0446 11/09/23  0504   WBC Thousand/uL 6.99 7.44  --  7.52   HEMOGLOBIN g/dL 8.8* 8.9*  --  10.0*   HEMATOCRIT % 25.6* 26.2*  --  30.2*   PLATELETS Thousands/uL 40* 38*  --  63*   POTASSIUM mmol/L 3.8 4.0 3.8 4.2   CHLORIDE mmol/L 105 102 102 108   CO2 mmol/L 23 24 25 25   BUN mg/dL 22 25 22 19   CREATININE mg/dL 2.23* 2.76* 2.62* 1.36*   CALCIUM mg/dL 8.6 8.3* 8.2* 8.4   MAGNESIUM mg/dL 1.9 1.9 2.0 1.6*   PHOSPHORUS mg/dL 2.3  --  3.7  --       rest all reviewed    RADIOLOGY:  US right upper quadrant with liver dopplers   Final Result by Carmen Cihang MD (11/10 2122)      Patent portal veins with normal direction of flow in the main and right portal veins, but bidirectional flow in the left portal vein. Normal hepatic artery and veins. Hepatic steatosis. Workstation performed: [id number]         CT chest abdomen pelvis wo contrast   Final Result by Joneen Phoenix, MD (11/10 1924)      1) Large, wedge-shaped area of decreased attenuation involving the majority of the left hepatic lobe, not seen on the prior CT from 2022. Evaluation is limited without IV contrast, however this is suspicious for a hepatic infarct, especially in light of    markedly elevated LFTs. Other considerations include geographic hepatic steatosis. 2) Re-do fundoplication not well visualized without IV contrast. Hyperdense mesh from a prior repair extends from left hepatic lobe along the diaphragmatic crura. 3) Straight, tubular-shaped hypodense structure measuring approximately 1 cm in diameter and approximately 9.5 -10 cm in length interposed between the diaphragm and the proximal stomach, of unclear etiology. This likely represents a mesh, possibly curled    up, as the straight edges suggest a manmade structure. 4) Small amount of hyperdense material likely representing residual oral contrast from the 11/9/2023 esophagram seen in the distal esophagus. The rest of the oral contrast is seen throughout the colon. No extravasated oral contrast seen. 5) No significant gastric distention. No bowel obstruction or convincing inflammation. 6) No abdominal or pelvic fluid collections. 7) Somewhat distended urinary bladder. Recommend correlation for urinary retention. 8) Trace bilateral pleural effusions. Subsegmental atelectasis dependently. Trace groundglass opacity at left apex, possibly infectious. 9) Additional findings as above.       I personally discussed this study with Dr. Cuauhtemoc Manley on 11/10/2023 at 7:22 PM.                     Workstation performed: [id number]         XR chest 1 view   Final Result by Isabel Tillman MD (68/80 1564)      Low lung volumes with bibasilar atelectasis. Pneumonia not excluded in the appropriate clinical setting. Workstation performed: [id number]         FL esophagram complete   Final Result by James Riddle MD (11/09 1111)      Mild esophageal distention with a focal narrowed segment at the GE junction likely related to recent hernia repair. Esophageal dysmotility noted with delayed emptying and periods of tertiary contraction without nathalia obstruction or contrast    extravasation. The patient could not drink anymore contrast at the time of the study and therefore gastric emptying cannot be observed during the study. Workstation performed: [id number]           Rest all reviewed    Portions of the record may have been created with voice recognition software. Occasional wrong word or "sound a like" substitutions may have occurred due to the inherent limitations of voice recognition software. Read the chart carefully and recognize, using context, where substitutions have occurred. If you have any questions, please contact the dictating provider.

## 2023-11-10 NOTE — PROGRESS NOTES
JOSE, post-operative, evidenced Cr 2.62,  low urine output and Hypotension, treated with LR 500ml bolus, Albumin 25G and Nephrology follow-up. Findings:   Cr  1.36  on admission with Cr baseline 1.4-1.6.     I was asked to document this through a CDI query    Charley Orozco MD  Thoracic Surgery  (Available on [facility] Text)  Office: [phone]

## 2023-11-10 NOTE — PROGRESS NOTES
Progress Note - thoracic Surgery   Robin Brooke 69 y.o. female MRN: [id number]  Unit/Bed#: [facility] 431-01 Encounter: [id number]    Assessment:  67-year-old female with dysphagia and gastroparesis s/p prior Nissen fundoplication in 9190, now s/p robotic Nissen takedown, hiatal hernia repair and partial fundoplication with mesh along with excisional liver biopsy. 11/9 UGI, Mild esophageal distention with a focal narrowed segment at the GE junction likely related to recent hernia repair. Esophageal dysmotility was noted and delayed emptying. Pt unable to complete study to evaluate for gastric emptying. No evidence of leak. Afebrile, 5L nasal canula   U.O: 400cc + unmeasured. Having flatus  Hgb 10 (11.8)  Wbc 7.5 (7.7)  Cr: 1.36 (1.42)    Plan:  -Clear liquid diet, slowly advance   - Maintenance IV fluids  - Geriatric med: fall precautions, PT, delirium precautions  - Nephro: avoid ACE-I, check labs, stable from renal standpoint for d/c  - DVT prophylaxis  - Incentive spirometry  - Encourage ambulation  -dispo planning   - Home meds      Subjective/Objective     Subjective:   No acute events overnight. Pt reports continued pain with movement. She states she has passed flatus. She has been tolerating liquids. Objective:     Blood pressure 114/89, pulse 91, temperature 98.1 °F (36.7 °C), temperature source Oral, resp. rate 18, height 5' (1.524 m), weight 59.9 kg (132 lb 0.9 oz), SpO2 90 %, not currently breastfeeding. ,Body mass index is 25.79 kg/m². Intake/Output Summary (Last 24 hours) at 11/10/2023 0570  Last data filed at 11/9/2023 2101  Gross per 24 hour   Intake 500.83 ml   Output 400 ml   Net 100.83 ml       Invasive Devices       Peripheral Intravenous Line  Duration             Peripheral IV 11/08/23 Distal;Right Wrist 1 day    Peripheral IV 11/08/23 Dorsal (posterior); Left Forearm 1 day                    Physical Exam:   Gen: NAD, Comfortable  Neuro: A&O  Head: Normal Cephalic, Atraumatic  Eye: EOMI, PERRLA, No scleral icterus  Neck: Supple, No JVD  CV: RRR  Pulm: Normal work of breathing, no respiratory distress  Abd: Soft, Non-Distended, appropriately, incisions c/d/i  Ext: No edema, Non-tender  Skin: warm, dry, intact

## 2023-11-10 NOTE — PROGRESS NOTES
Patient seen and examined. Persistent abdominal pain without peritoneal signs, with odynophagia. Worsening hypoxia today, now on [facility]. Labs show significant thrombocytopenia, with worsening creatinine. Will obtain Stat CT to eval for intraabdominal pathology while also pursuing a lab workup for her thrombocytopenia.

## 2023-11-10 NOTE — PLAN OF CARE
Problem: Prexisting or High Potential for Compromised Skin Integrity  Goal: Skin integrity is maintained or improved  Description: INTERVENTIONS:  - Identify patients at risk for skin breakdown  - Assess and monitor skin integrity  - Assess and monitor nutrition and hydration status  - Monitor labs   - Assess for incontinence   - Turn and reposition patient  - Assist with mobility/ambulation  - Relieve pressure over bony prominences  - Avoid friction and shearing  - Provide appropriate hygiene as needed including keeping skin clean and dry  - Evaluate need for skin moisturizer/barrier cream  - Collaborate with interdisciplinary team   - Patient/family teaching  - Consider wound care consult   Outcome: Progressing     Problem: MOBILITY - ADULT  Goal: Maintain or return to baseline ADL function  Description: INTERVENTIONS:  -  Assess patient's ability to carry out ADLs; assess patient's baseline for ADL function and identify physical deficits which impact ability to perform ADLs (bathing, care of mouth/teeth, toileting, grooming, dressing, etc.)  - Assess/evaluate cause of self-care deficits   - Assess range of motion  - Assess patient's mobility; develop plan if impaired  - Assess patient's need for assistive devices and provide as appropriate  - Encourage maximum independence but intervene and supervise when necessary  - Involve family in performance of ADLs  - Assess for home care needs following discharge   - Consider OT consult to assist with ADL evaluation and planning for discharge  - Provide patient education as appropriate  Outcome: Progressing  Goal: Maintains/Returns to pre admission functional level  Description: INTERVENTIONS:  - Perform BMAT or MOVE assessment daily.   - Set and communicate daily mobility goal to care team and patient/family/caregiver. - Collaborate with rehabilitation services on mobility goals if consulted  - Perform Range of Motion *** times a day.   - Reposition patient every *** hours.  - Dangle patient *** times a day  - Stand patient *** times a day  - Ambulate patient *** times a day  - Out of bed to chair *** times a day   - Out of bed for meals *** times a day  - Out of bed for toileting  - Record patient progress and toleration of activity level   Outcome: Progressing     Problem: PAIN - ADULT  Goal: Verbalizes/displays adequate comfort level or baseline comfort level  Description: Interventions:  - Encourage patient to monitor pain and request assistance  - Assess pain using appropriate pain scale  - Administer analgesics based on type and severity of pain and evaluate response  - Implement non-pharmacological measures as appropriate and evaluate response  - Consider cultural and social influences on pain and pain management  - Notify physician/advanced practitioner if interventions unsuccessful or patient reports new pain  Outcome: Progressing     Problem: INFECTION - ADULT  Goal: Absence or prevention of progression during hospitalization  Description: INTERVENTIONS:  - Assess and monitor for signs and symptoms of infection  - Monitor lab/diagnostic results  - Monitor all insertion sites, i.e. indwelling lines, tubes, and drains  - Monitor endotracheal if appropriate and nasal secretions for changes in amount and color  - [place] appropriate cooling/warming therapies per order  - Administer medications as ordered  - Instruct and encourage patient and family to use good hand hygiene technique  - Identify and instruct in appropriate isolation precautions for identified infection/condition  Outcome: Progressing  Goal: Absence of fever/infection during neutropenic period  Description: INTERVENTIONS:  - Monitor WBC    Outcome: Progressing     Problem: SAFETY ADULT  Goal: Maintain or return to baseline ADL function  Description: INTERVENTIONS:  -  Assess patient's ability to carry out ADLs; assess patient's baseline for ADL function and identify physical deficits which impact ability to perform ADLs (bathing, care of mouth/teeth, toileting, grooming, dressing, etc.)  - Assess/evaluate cause of self-care deficits   - Assess range of motion  - Assess patient's mobility; develop plan if impaired  - Assess patient's need for assistive devices and provide as appropriate  - Encourage maximum independence but intervene and supervise when necessary  - Involve family in performance of ADLs  - Assess for home care needs following discharge   - Consider OT consult to assist with ADL evaluation and planning for discharge  - Provide patient education as appropriate  Outcome: Progressing  Goal: Maintains/Returns to pre admission functional level  Description: INTERVENTIONS:  - Perform BMAT or MOVE assessment daily.   - Set and communicate daily mobility goal to care team and patient/family/caregiver. - Collaborate with rehabilitation services on mobility goals if consulted  - Perform Range of Motion *** times a day. - Reposition patient every *** hours.   - Dangle patient *** times a day  - Stand patient *** times a day  - Ambulate patient *** times a day  - Out of bed to chair *** times a day   - Out of bed for meals *** times a day  - Out of bed for toileting  - Record patient progress and toleration of activity level   Outcome: Progressing  Goal: Patient will remain free of falls  Description: INTERVENTIONS:  - Educate patient/family on patient safety including physical limitations  - Instruct patient to call for assistance with activity   - Consult OT/PT to assist with strengthening/mobility   - Keep Call bell within reach  - Keep bed low and locked with side rails adjusted as appropriate  - Keep care items and personal belongings within reach  - Initiate and maintain comfort rounds  - Make Fall Risk Sign visible to staff  - Offer Toileting every *** Hours, in advance of need  - Initiate/Maintain ***alarm  - Obtain necessary fall risk management equipment: ***  - Apply yellow socks and bracelet for high fall risk patients  - Consider moving patient to room near nurses station  Outcome: Progressing     Problem: DISCHARGE PLANNING  Goal: Discharge to home or other facility with appropriate resources  Description: INTERVENTIONS:  - Identify barriers to discharge w/patient and caregiver  - Arrange for needed discharge resources and transportation as appropriate  - Identify discharge learning needs (meds, wound care, etc.)  - Arrange for interpretive services to assist at discharge as needed  - Refer to Case Management Department for coordinating discharge planning if the patient needs post-hospital services based on physician/advanced practitioner order or complex needs related to functional status, cognitive ability, or social support system  Outcome: Progressing     Problem: Knowledge Deficit  Goal: Patient/family/caregiver demonstrates understanding of disease process, treatment plan, medications, and discharge instructions  Description: Complete learning assessment and assess knowledge base.   Interventions:  - Provide teaching at level of understanding  - Provide teaching via preferred learning methods  Outcome: Progressing

## 2023-11-10 NOTE — PROGRESS NOTES
Hypoxia secondary to fluid overload, inability to use IS, grogginess likely from opioid    Will d/c oxycodone, re-consult nephrology, consider lasix after nephrology sees her    Annika Martinez MD  Thoracic Surgery  (Available on [employer])  Office: [phone]

## 2023-11-10 NOTE — CONSULTS
Medical Oncology/Hematology Consult Note  Sajan Toscano, female, 69 y. o., 1945,  PPHP 431/PPHP 431-01, [id number]     Assessment and Plan    1. Acute anemia and thrombocytopenia  2. S/p hiatal hernia repair, pyloroplasty, and liver biopsy    Ms. Consuelo Pennington is a 69 y.o. female with severe gastroparesis, hx of hiatal hernia, and esophageal dysphagia. Patient presented to the hospital on 11/8/2023 for elective robotic pyloroplasty and redo of prior Nissen fundoplication due to persistent gastroparesis symptoms. Additionally, she underwent a liver biopsy for further evaluation of the elevated LFTs (mildly elevated AST and ALT) noted on routine blood work. Hematology team was consulted on 11/10/2023 for recommendations regarding patient's acute development of thrombocytopenia and anemia. Patient with normal hemoglobin ranging in the 12-14g/L range and normal platelet count at baseline since at least 2017. However, starting on 11/9, we start noticing a drop in the Hgb (11.8 > 10 > 8.9) and plt counts (237 > 63 > 38). Rule out any acute bleeding episodes potentially causing an acute drop in hemoglobin and subsequent consumptive thrombocytopenia - CT abd/pelvis w/o contrast recommended to evaluate for any abdominopelvic hematoma. Doubt an acute hemolysis or DIC process contributing to pt's CBC abnormalities at this time, but we will start by checking a hemolysis smear, LD, haptoglobin, retic count, coags, and fibrinogen level  Check iron panel, vit B12, and folate levels to see if there are any nutritional deficiencies contributing to patient's anemia, although this is less likely the etiology of the significant level of drop in hemoglobin and platelet counts we are seeing  Continue to trend CBC parameters.   Transfuse for hemoglobin less than 7g/dL and platelet count less than 20k  Low clinical suspicion for heparin-induced thrombocytopenia at this time given that her anemia and thrombocytopenia first observed on yesterday's am labs started even before exposure to any heparin products this admission. HIT typically manifests after 4-5 days of heparin exposure (it doesn't typically cause anemia either). 4T score 2, indicating low probability of HIT (<5%). Outpatient follow up plan: We can arrange for hematology follow-up if deemed necessary based on hospital course and findings    Communication with patient/family:  Patient was updated regarding the above recommendations    Communication with team:  Primary team was updated regarding the above recommendations    Case was discussed with hematology/oncology attending, Dr. Claiborne Mortimer      Reason for consultation: acute development of anemia and thrombocytopenia    History of present illness:  Leela Martins is a 69 y.o. female with underlying COPD, CKD, HTN, irritable bowel syndrome, GERD, severe gastroparesis, hiatal hernia, and esophageal dysphagia. Patient with history of laparoscopic hiatal hernia repair with Nissen fundoplication back in 2569. She has required multiple dilations since then without relief of her dysphagia and gastroparesis symptoms. As there was concern for a tight Nissen, patient was recommended a robotic takedown of the nisin, redo of the hiatal hernia repair, with partial fundoplication and pyloroplasty. Patient presented to the hospital on 11/8/2023 for robotic pyloroplasty and redo fundoplication. Additionally, she underwent a liver biopsy for further work-up of the elevated LFTs (mildly elevated AST and ALT) noted on routine blood work. Liver biopsy from 11/8/2023 showed steatohepatitis with periportal fibrosis. Hematology team was consulted on 11/10/2023 for recommendations regarding patient's acute development of thrombocytopenia and anemia. Patient with normal hemoglobin ranging in the 12-14 g/L range and normal platelet count since at least 2017.   However, starting on 11/9, we start noticing a drop in the Hgb (11.8 > 10 > 8.9) and plt counts (237 > 63 > 38). I met with MsAsuncion Chad at the bedside. Patient reported expected diffuse abdominal ache, but otherwise denied any acute complaints. Denied any history of having any anemia or thrombocytopenia in the past.    Review of Systems:   Review of Systems   Constitutional:  Negative for chills, fatigue, fever and unexpected weight change. HENT:  Negative for facial swelling, mouth sores, sinus pressure, trouble swallowing and voice change. Eyes:  Negative for pain, redness and visual disturbance. Respiratory:  Negative for cough, chest tightness, shortness of breath and wheezing. Cardiovascular:  Negative for chest pain, palpitations and leg swelling. Gastrointestinal:  Positive for abdominal pain. Negative for blood in stool, constipation, diarrhea, nausea and vomiting. Heartburn   Genitourinary:  Negative for difficulty urinating, dysuria, frequency and hematuria. Musculoskeletal:  Negative for arthralgias, back pain, myalgias, neck pain and neck stiffness. Skin:  Negative for color change, pallor, rash and wound. Neurological:  Negative for dizziness, seizures, facial asymmetry, speech difficulty, light-headedness and headaches. Hematological:  Negative for adenopathy. Does not bruise/bleed easily. Psychiatric/Behavioral:  Negative for agitation, behavioral problems and confusion.         Past Medical History:   Past Medical History:   Diagnosis Date    Anxiety     Arthritis     Asthma     Cancer ([address])     Candida infection, esophageal (HCC)     Chronic kidney disease     Chronic pain     COPD (chronic obstructive pulmonary disease) (HCC)     Depression     Depression     Gastroparesis     Gastroparesis     GERD (gastroesophageal reflux disease)     Hypertension     Irritable bowel syndrome (IBS)     Lactose intolerance Not sure    Migraines     MRSA (methicillin resistant Staphylococcus aureus)     Multiple thyroid nodules     Osteoporosis Pneumonia     Psychiatric disorder        Past Surgical History:   Procedure Laterality Date    BACK SURGERY       SECTION      CHOLECYSTECTOMY      COLONOSCOPY  10/09/2013    ESOPHAGEAL DILATION       [address] [place] [address] [place]    ESOPHAGOGASTRODUODENOSCOPY N/A 2023    Procedure: ESOPHAGOGASTRODUODENOSCOPY (EGD); Surgeon: Yanely Bustillos MD;  [facility];  Service: General    HIATAL HERNIA REPAIR      NISSEN FUNDOPLICATION      PA BIOPSY LIVER NEEDLE PERCUTANEOUS N/A 2023    Procedure: EXCISION BIOPSY LIVER;  Surgeon: Yanely Bustillos MD;  [facility];  Service: General    PA ESOPHAGOGASTRODUODENOSCOPY TRANSORAL DIAGNOSTIC N/A 2019    Procedure: ESOPHAGOGASTRODUODENOSCOPY (EGD); Surgeon: Ron Wilkins MD;  Location: [facility];  Service: Gastroenterology    PA ESOPHAGOGASTRODUODENOSCOPY TRANSORAL DIAGNOSTIC N/A 2023    Procedure: ESOPHAGOGASTRODUODENOSCOPY (EGD); Surgeon: Jennifer Yoo MD;  [facility];  Service: Thoracic    PA ESOPHAGOSCOPY FLEX BALLOON DILAT <30 MM DIAM N/A 2023    Procedure: CRE WIRE GUIDED BALLOON DILATATION ESOPHAGEAL;  Surgeon: Jennifer Yoo MD;  [facility];  Service: Thoracic    PA LAPS RPR PARAESPHGL HRNA INCL [facility] W/O MESH N/A 2023    Procedure: robotic takedown of nissen fundoplication, redo hiatal hernia, partial fundoplication, with mesh;  Surgeon: Jennifer Yoo MD;  [facility];  Service: Thoracic    PA PYLOROPLASTY N/A 2023    Procedure: PYLOROPLASTY LAPAROSCOPIC WITH ROBOT;  Surgeon:  Yanely Bustillos MD;  [facility];  Service: General    ROTATOR CUFF REPAIR      SHOULDER SURGERY      SPINAL FUSION      TUBAL LIGATION  May 1975    Last child born    UPPER GASTROINTESTINAL ENDOSCOPY      US GUIDED THYROID BIOPSY      WISDOM TOOTH EXTRACTION         Family History   Problem Relation Age of Onset    Other Mother         Esophageal disorder    Stomach cancer Father     Cancer Father     Hypertension Maternal Aunt     COPD Maternal Grandmother     Colon cancer Neg Hx     Colon polyps Neg Hx        Social History     Socioeconomic History    Marital status:      Spouse name: None    Number of children: None    Years of education: None    Highest education level: None   Occupational History    None   Tobacco Use    Smoking status: Former     Packs/day: 1.00     Years: 15.00     Total pack years: 15.00     Types: Cigarettes     Start date: 10/5/1998     Quit date: 10/1/2003     Years since quittin.1    Smokeless tobacco: Former   Vaping Use    Vaping Use: Never used   Substance and Sexual Activity    Alcohol use: Not Currently    Drug use: Never    Sexual activity: Not Currently     Partners: Male     Birth control/protection: None   Other Topics Concern    None   Social History Narrative    None     Social Determinants of Health     Financial Resource Strain: Not on file   Food Insecurity: No Food Insecurity (2023)    Hunger Vital Sign     Worried About Running Out of Food in the Last Year: Never true     Ran Out of Food in the Last Year: Never true   Transportation Needs: No Transportation Needs (2023)    PRAPARE - Transportation     Lack of Transportation (Medical): No     Lack of Transportation (Non-Medical):  No   Physical Activity: Not on file   Stress: Not on file   Social Connections: Not on file   Intimate Partner Violence: Not on file   Housing Stability: Low Risk  (2023)    Housing Stability Vital Sign     Unable to Pay for Housing in the Last Year: No     Number of Places Lived in the Last Year: 1     Unstable Housing in the Last Year: No         Current Facility-Administered Medications:     acetaminophen (TYLENOL) oral suspension 650 mg, 650 mg, Oral, Q6H, Samantha Gaviria PA-C, 650 mg at 11/10/23 1232    albuterol (PROVENTIL HFA,VENTOLIN HFA) inhaler 2 puff, 2 puff, Inhalation, BID, Soham Garcia PA-C, 2 puff at 11/10/23 0905    amitriptyline (ELAVIL) tablet 50 mg, 50 mg, Oral, HS, Samantha Gaviria PA-C, 50 mg at 11/09/23 2105    docusate sodium (COLACE) capsule 100 mg, 100 mg, Oral, BID, Ludin Ashton PA-C, 100 mg at 11/10/23 0853    enoxaparin (LOVENOX) subcutaneous injection 30 mg, 30 mg, Subcutaneous, Q24H VINCE, Samantha Gaviria PA-C, 30 mg at 11/10/23 0854    HYDROmorphone HCl (DILAUDID) injection 0.2 mg, 0.2 mg, Intravenous, Q3H PRN, RUSS Khan, 0.2 mg at 11/09/23 1413    hydrOXYzine HCL (ATARAX) tablet 10 mg, 10 mg, Oral, Q6H PRN, Joslyn Forbes MD    midodrine (PROAMATINE) tablet 5 mg, 5 mg, Oral, TID AC, Joslyn Forbes MD, 5 mg at 11/10/23 1128    ondansetron (ZOFRAN) injection 4 mg, 4 mg, Intravenous, Q6H, Samantha Gaviria PA-C, 4 mg at 11/10/23 1232    pantoprazole (PROTONIX) injection 40 mg, 40 mg, Intravenous, Daily, Ludin Ashton PA-C, 40 mg at 11/10/23 0854    polyethylene glycol (MIRALAX) packet 17 g, 17 g, Oral, Daily, Ludin Ashton PA-C, 17 g at 11/10/23 0854    senna (SENOKOT) tablet 8.6 mg, 1 tablet, Oral, Daily, Samantha Gaviria PA-C, 8.6 mg at 11/10/23 0854    sucralfate (CARAFATE) tablet 1 g, 1 g, Oral, 4x Daily (AC & HS), Ludin Ashton PA-C, 1 g at 11/10/23 0631    Medications Prior to Admission   Medication    albuterol (PROVENTIL HFA,VENTOLIN HFA) 90 mcg/act inhaler    amitriptyline (ELAVIL) 50 mg tablet    amLODIPine (NORVASC) 10 mg tablet    cetirizine (ZyrTEC) 10 MG chewable tablet    cholecalciferol (VITAMIN D3) 1,000 units tablet    clotrimazole-betamethasone (LOTRISONE) 1-0.05 % cream    dexlansoprazole (DEXILANT) 60 MG capsule    dicyclomine (BENTYL) 20 mg tablet    famotidine (PEPCID) 40 MG tablet    multivitamin (THERAGRAN) TABS    Omega-3 Fatty Acids (OMEGA 3 PO)    ondansetron (ZOFRAN) 4 mg tablet    Tavaborole 5 % SOLN    tobramycin-dexamethasone (TOBRADEX) ophthalmic suspension       Allergies   Allergen Reactions    Latex Hives    Augmentin [Amoxicillin-Pot Clavulanate] GI Intolerance Bactrim [Sulfamethoxazole-Trimethoprim] GI Intolerance    Clarithromycin GI Intolerance    Doxycycline Hives    Neomycin GI Intolerance    Polymyxin B GI Intolerance    Zyvox [Linezolid] GI Bleeding    Cephalosporins GI Intolerance    Nsaids GI Intolerance    Penicillins GI Intolerance     Patient can only take levaquin and cipro    Prednisone GI Intolerance    Sulphasomidine GI Intolerance         Physical Exam:     /58   Pulse 91   Temp 98.4 °F (36.9 °C)   Resp 16   Ht 5' (1.524 m)   Wt 60.7 kg (133 lb 13.1 oz)   SpO2 96%   BMI 26.13 kg/m²     Physical Exam  Vitals reviewed. Constitutional:       General: She is in acute distress (mild distress 2/2 abd pain). Appearance: She is not ill-appearing, toxic-appearing or diaphoretic. HENT:      Head: Normocephalic and atraumatic. Mouth/Throat:      Mouth: Mucous membranes are moist.      Pharynx: No oropharyngeal exudate or posterior oropharyngeal erythema. Eyes:      General: No scleral icterus. Extraocular Movements: Extraocular movements intact. Conjunctiva/sclera: Conjunctivae normal.   Cardiovascular:      Rate and Rhythm: Normal rate and regular rhythm. Heart sounds: No murmur heard. No gallop. Pulmonary:      Effort: No respiratory distress. Breath sounds: Normal breath sounds. No wheezing, rhonchi or rales. Abdominal:      General: Bowel sounds are normal. There is no distension. Palpations: Abdomen is soft. There is no mass. Tenderness: There is abdominal tenderness (diffuse). Musculoskeletal:         General: No swelling, tenderness or deformity. Cervical back: Normal range of motion. No rigidity. Right lower leg: No edema. Left lower leg: No edema. Lymphadenopathy:      Cervical: No cervical adenopathy. Skin:     Findings: Bruising (small area of bruising noted in right flank area) present. No erythema, lesion or rash.    Neurological:      General: No focal deficit present. Mental Status: She is alert and oriented to person, place, and time.    Psychiatric:         Mood and Affect: Mood normal.         Judgment: Judgment normal.       Recent Results (from the past 48 hour(s))   CBC    Collection Time: 11/09/23  5:04 AM   Result Value Ref Range    WBC 7.52 4.31 - 10.16 Thousand/uL    RBC 3.11 (L) 3.81 - 5.12 Million/uL    Hemoglobin 10.0 (L) 11.5 - 15.4 g/dL    Hematocrit 30.2 (L) 34.8 - 46.1 %    MCV 97 82 - 98 fL    MCH 32.2 26.8 - 34.3 pg    MCHC 33.1 31.4 - 37.4 g/dL    RDW 13.1 11.6 - 15.1 %    Platelets 63 (L) 832 - 390 Thousands/uL    MPV 8.2 (L) 8.9 - 12.7 fL   Basic metabolic panel    Collection Time: 11/09/23  5:04 AM   Result Value Ref Range    Sodium 140 135 - 147 mmol/L    Potassium 4.2 3.5 - 5.3 mmol/L    Chloride 108 96 - 108 mmol/L    CO2 25 21 - 32 mmol/L    ANION GAP 7 mmol/L    BUN 19 5 - 25 mg/dL    Creatinine 1.36 (H) 0.60 - 1.30 mg/dL    Glucose 108 65 - 140 mg/dL    Calcium 8.4 8.4 - 10.2 mg/dL    eGFR 37 ml/min/1.73sq m   Magnesium    Collection Time: 11/09/23  5:04 AM   Result Value Ref Range    Magnesium 1.6 (L) 1.9 - 2.7 mg/dL   Basic metabolic panel    Collection Time: 11/10/23  4:46 AM   Result Value Ref Range    Sodium 136 135 - 147 mmol/L    Potassium 3.8 3.5 - 5.3 mmol/L    Chloride 102 96 - 108 mmol/L    CO2 25 21 - 32 mmol/L    ANION GAP 9 mmol/L    BUN 22 5 - 25 mg/dL    Creatinine 2.62 (H) 0.60 - 1.30 mg/dL    Glucose 124 65 - 140 mg/dL    Calcium 8.2 (L) 8.4 - 10.2 mg/dL    eGFR 17 ml/min/1.73sq m   Magnesium    Collection Time: 11/10/23  4:46 AM   Result Value Ref Range    Magnesium 2.0 1.9 - 2.7 mg/dL   Phosphorus    Collection Time: 11/10/23  4:46 AM   Result Value Ref Range    Phosphorus 3.7 2.3 - 4.1 mg/dL   CBC and differential    Collection Time: 11/10/23 12:34 PM   Result Value Ref Range    WBC 7.44 4.31 - 10.16 Thousand/uL    RBC 2.73 (L) 3.81 - 5.12 Million/uL    Hemoglobin 8.9 (L) 11.5 - 15.4 g/dL    Hematocrit 26.2 (L) 34.8 - 46.1 %    MCV 96 82 - 98 fL    MCH 32.6 26.8 - 34.3 pg    MCHC 34.0 31.4 - 37.4 g/dL    RDW 13.6 11.6 - 15.1 %    MPV 9.5 8.9 - 12.7 fL    Platelets 38 (LL) 837 - 390 Thousands/uL    nRBC 0 /100 WBCs    Neutrophils Relative 81 (H) 43 - 75 %    Immat GRANS % 0 0 - 2 %    Lymphocytes Relative 13 (L) 14 - 44 %    Monocytes Relative 5 4 - 12 %    Eosinophils Relative 1 0 - 6 %    Basophils Relative 0 0 - 1 %    Neutrophils Absolute 6.02 1.85 - 7.62 Thousands/µL    Immature Grans Absolute 0.03 0.00 - 0.20 Thousand/uL    Lymphocytes Absolute 0.93 0.60 - 4.47 Thousands/µL    Monocytes Absolute 0.38 0.17 - 1.22 Thousand/µL    Eosinophils Absolute 0.08 0.00 - 0.61 Thousand/µL    Basophils Absolute 0.00 0.00 - 0.10 Thousands/µL   Lactic acid, plasma (w/reflex if result > 2.0)    Collection Time: 11/10/23 12:34 PM   Result Value Ref Range    LACTIC ACID 1.2 0.5 - 2.0 mmol/L   Basic metabolic panel    Collection Time: 11/10/23 12:34 PM   Result Value Ref Range    Sodium 135 135 - 147 mmol/L    Potassium 4.0 3.5 - 5.3 mmol/L    Chloride 102 96 - 108 mmol/L    CO2 24 21 - 32 mmol/L    ANION GAP 9 mmol/L    BUN 25 5 - 25 mg/dL    Creatinine 2.76 (H) 0.60 - 1.30 mg/dL    Glucose 126 65 - 140 mg/dL    Calcium 8.3 (L) 8.4 - 10.2 mg/dL    eGFR 15 ml/min/1.73sq m   Magnesium    Collection Time: 11/10/23 12:34 PM   Result Value Ref Range    Magnesium 1.9 1.9 - 2.7 mg/dL   Fibrinogen    Collection Time: 11/10/23  3:03 PM   Result Value Ref Range    Fibrinogen 331 207 - 520 mg/dL   Protime-INR    Collection Time: 11/10/23  3:03 PM   Result Value Ref Range    Protime 17.7 (H) 11.6 - 14.5 seconds    INR 1.48 (H) 0.84 - 1.19   APTT    Collection Time: 11/10/23  3:03 PM   Result Value Ref Range    PTT 38 (H) 23 - 37 seconds       XR chest 1 view    Result Date: 11/10/2023  Narrative: CHEST INDICATION:   Hypoxia. COMPARISON: CXR 4/17/2022, chest CT 3/27/2019. EXAM PERFORMED/VIEWS:  XR CHEST 1 VIEW.  FINDINGS: Cardiomediastinal silhouette normal. Low lung volumes with bibasilar atelectasis. No effusion or pneumothorax. Upper abdomen normal. Hernia mesh along the left hemidiaphragm. Bones normal for age. Impression: Low lung volumes with bibasilar atelectasis. Pneumonia not excluded in the appropriate clinical setting. Workstation performed: [id number]     FL esophagram complete    Result Date: 11/9/2023  Narrative: BARIUM SWALLOW-ESOPHAGRAM INDICATION:   s/p paraesopahgeal hernia repair/ pyloroplasty. COMPARISON: 7/5/2023. IMAGES: 18 FLUOROSCOPY TIME:   1 minute 14 seconds TECHNIQUE: The patient was given water-soluble contrast by mouth and images of the esophagus were obtained. FINDINGS: There is mild esophageal distention noted with focal narrowed segment at the GE junction likely related to recent hernia repair. Delayed esophageal emptying noted with periods of tertiary contraction suggestive of esophageal dysmotility. No contrast extravasation seen. The patient could not drink anymore contrast at the time of the study and therefore gastric emptying could not be observed. Initial images demonstrate a mesh overlying the region of the GE junction as well as cholecystectomy clips. Impression: Mild esophageal distention with a focal narrowed segment at the GE junction likely related to recent hernia repair. Esophageal dysmotility noted with delayed emptying and periods of tertiary contraction without nathalia obstruction or contrast extravasation. The patient could not drink anymore contrast at the time of the study and therefore gastric emptying cannot be observed during the study. Workstation performed: [id number]       Labs and pertinent reports reviewed.

## 2023-11-11 LAB
ALBUMIN SERPL BCP-MCNC: 3.2 G/DL (ref 3.5–5)
ALBUMIN SERPL BCP-MCNC: 3.3 G/DL (ref 3.5–5)
ALBUMIN SERPL BCP-MCNC: 3.4 G/DL (ref 3.5–5)
ALP SERPL-CCNC: 142 U/L (ref 34–104)
ALP SERPL-CCNC: 163 U/L (ref 34–104)
ALP SERPL-CCNC: 186 U/L (ref 34–104)
ALT SERPL W P-5'-P-CCNC: 522 U/L (ref 7–52)
ALT SERPL W P-5'-P-CCNC: 623 U/L (ref 7–52)
ALT SERPL W P-5'-P-CCNC: 700 U/L (ref 7–52)
ANION GAP SERPL CALCULATED.3IONS-SCNC: 10 MMOL/L
AST SERPL W P-5'-P-CCNC: 466 U/L (ref 13–39)
AST SERPL W P-5'-P-CCNC: 688 U/L (ref 13–39)
AST SERPL W P-5'-P-CCNC: 906 U/L (ref 13–39)
BASOPHILS # BLD AUTO: 0.01 THOUSANDS/ÂΜL (ref 0–0.1)
BASOPHILS NFR BLD AUTO: 0 % (ref 0–1)
BILIRUB DIRECT SERPL-MCNC: 3.05 MG/DL (ref 0–0.2)
BILIRUB DIRECT SERPL-MCNC: 3.35 MG/DL (ref 0–0.2)
BILIRUB DIRECT SERPL-MCNC: 3.4 MG/DL (ref 0–0.2)
BILIRUB SERPL-MCNC: 4.02 MG/DL (ref 0.2–1)
BILIRUB SERPL-MCNC: 4.24 MG/DL (ref 0.2–1)
BILIRUB SERPL-MCNC: 4.3 MG/DL (ref 0.2–1)
BUN SERPL-MCNC: 22 MG/DL (ref 5–25)
CALCIUM SERPL-MCNC: 8.6 MG/DL (ref 8.4–10.2)
CHLORIDE SERPL-SCNC: 105 MMOL/L (ref 96–108)
CO2 SERPL-SCNC: 23 MMOL/L (ref 21–32)
CREAT SERPL-MCNC: 2.23 MG/DL (ref 0.6–1.3)
EOSINOPHIL # BLD AUTO: 0.08 THOUSAND/ÂΜL (ref 0–0.61)
EOSINOPHIL NFR BLD AUTO: 1 % (ref 0–6)
ERYTHROCYTE [DISTWIDTH] IN BLOOD BY AUTOMATED COUNT: 13.5 % (ref 11.6–15.1)
GFR SERPL CREATININE-BSD FRML MDRD: 20 ML/MIN/1.73SQ M
GLUCOSE SERPL-MCNC: 104 MG/DL (ref 65–140)
GLUCOSE SERPL-MCNC: 113 MG/DL (ref 65–140)
HAPTOGLOB SERPL-MCNC: 102 MG/DL (ref 42–346)
HCT VFR BLD AUTO: 25.6 % (ref 34.8–46.1)
HGB BLD-MCNC: 8.8 G/DL (ref 11.5–15.4)
IMM GRANULOCYTES # BLD AUTO: 0.03 THOUSAND/UL (ref 0–0.2)
IMM GRANULOCYTES NFR BLD AUTO: 0 % (ref 0–2)
LYMPHOCYTES # BLD AUTO: 0.66 THOUSANDS/ÂΜL (ref 0.6–4.47)
LYMPHOCYTES NFR BLD AUTO: 9 % (ref 14–44)
MAGNESIUM SERPL-MCNC: 1.9 MG/DL (ref 1.9–2.7)
MCH RBC QN AUTO: 32.7 PG (ref 26.8–34.3)
MCHC RBC AUTO-ENTMCNC: 34.4 G/DL (ref 31.4–37.4)
MCV RBC AUTO: 95 FL (ref 82–98)
MONOCYTES # BLD AUTO: 0.34 THOUSAND/ÂΜL (ref 0.17–1.22)
MONOCYTES NFR BLD AUTO: 5 % (ref 4–12)
NEUTROPHILS # BLD AUTO: 5.87 THOUSANDS/ÂΜL (ref 1.85–7.62)
NEUTS SEG NFR BLD AUTO: 85 % (ref 43–75)
NRBC BLD AUTO-RTO: 0 /100 WBCS
PHOSPHATE SERPL-MCNC: 2.3 MG/DL (ref 2.3–4.1)
PLATELET # BLD AUTO: 40 THOUSANDS/UL (ref 149–390)
PMV BLD AUTO: 10 FL (ref 8.9–12.7)
POTASSIUM SERPL-SCNC: 3.8 MMOL/L (ref 3.5–5.3)
PROT SERPL-MCNC: 5.4 G/DL (ref 6.4–8.4)
PROT SERPL-MCNC: 5.6 G/DL (ref 6.4–8.4)
PROT SERPL-MCNC: 5.8 G/DL (ref 6.4–8.4)
RBC # BLD AUTO: 2.69 MILLION/UL (ref 3.81–5.12)
SODIUM SERPL-SCNC: 138 MMOL/L (ref 135–147)
WBC # BLD AUTO: 6.99 THOUSAND/UL (ref 4.31–10.16)

## 2023-11-11 PROCEDURE — 84100 ASSAY OF PHOSPHORUS: CPT | Performed by: SURGERY

## 2023-11-11 PROCEDURE — 85025 COMPLETE CBC W/AUTO DIFF WBC: CPT | Performed by: SURGERY

## 2023-11-11 PROCEDURE — 80076 HEPATIC FUNCTION PANEL: CPT | Performed by: SURGERY

## 2023-11-11 PROCEDURE — C9113 INJ PANTOPRAZOLE SODIUM, VIA: HCPCS | Performed by: PHYSICIAN ASSISTANT

## 2023-11-11 PROCEDURE — 80048 BASIC METABOLIC PNL TOTAL CA: CPT | Performed by: SURGERY

## 2023-11-11 PROCEDURE — 99024 POSTOP FOLLOW-UP VISIT: CPT | Performed by: THORACIC SURGERY (CARDIOTHORACIC VASCULAR SURGERY)

## 2023-11-11 PROCEDURE — 80076 HEPATIC FUNCTION PANEL: CPT

## 2023-11-11 PROCEDURE — 99232 SBSQ HOSP IP/OBS MODERATE 35: CPT | Performed by: INTERNAL MEDICINE

## 2023-11-11 PROCEDURE — NC001 PR NO CHARGE: Performed by: THORACIC SURGERY (CARDIOTHORACIC VASCULAR SURGERY)

## 2023-11-11 PROCEDURE — 99024 POSTOP FOLLOW-UP VISIT: CPT | Performed by: SURGERY

## 2023-11-11 PROCEDURE — 82948 REAGENT STRIP/BLOOD GLUCOSE: CPT

## 2023-11-11 PROCEDURE — 83735 ASSAY OF MAGNESIUM: CPT | Performed by: SURGERY

## 2023-11-11 RX ORDER — TAMSULOSIN HYDROCHLORIDE 0.4 MG/1
0.4 CAPSULE ORAL
Status: DISCONTINUED | OUTPATIENT
Start: 2023-11-11 | End: 2023-11-14 | Stop reason: HOSPADM

## 2023-11-11 RX ORDER — LIDOCAINE HYDROCHLORIDE 10 MG/ML
10 INJECTION, SOLUTION EPIDURAL; INFILTRATION; INTRACAUDAL; PERINEURAL ONCE
Status: COMPLETED | OUTPATIENT
Start: 2023-11-11 | End: 2023-11-11

## 2023-11-11 RX ORDER — SODIUM CHLORIDE, SODIUM GLUCONATE, SODIUM ACETATE, POTASSIUM CHLORIDE, MAGNESIUM CHLORIDE, SODIUM PHOSPHATE, DIBASIC, AND POTASSIUM PHOSPHATE .53; .5; .37; .037; .03; .012; .00082 G/100ML; G/100ML; G/100ML; G/100ML; G/100ML; G/100ML; G/100ML
50 INJECTION, SOLUTION INTRAVENOUS CONTINUOUS
Status: DISPENSED | OUTPATIENT
Start: 2023-11-11 | End: 2023-11-11

## 2023-11-11 RX ADMIN — HYDROMORPHONE HYDROCHLORIDE 0.2 MG: 0.2 INJECTION, SOLUTION INTRAMUSCULAR; INTRAVENOUS; SUBCUTANEOUS at 15:39

## 2023-11-11 RX ADMIN — SENNOSIDES 8.6 MG: 8.6 TABLET, FILM COATED ORAL at 08:26

## 2023-11-11 RX ADMIN — ONDANSETRON 4 MG: 2 INJECTION INTRAMUSCULAR; INTRAVENOUS at 06:57

## 2023-11-11 RX ADMIN — LIDOCAINE HYDROCHLORIDE 10 ML: 10 INJECTION, SOLUTION EPIDURAL; INFILTRATION; INTRACAUDAL; PERINEURAL at 04:53

## 2023-11-11 RX ADMIN — ALBUTEROL SULFATE 2 PUFF: 90 AEROSOL, METERED RESPIRATORY (INHALATION) at 21:45

## 2023-11-11 RX ADMIN — HYDROMORPHONE HYDROCHLORIDE 0.2 MG: 0.2 INJECTION, SOLUTION INTRAMUSCULAR; INTRAVENOUS; SUBCUTANEOUS at 01:36

## 2023-11-11 RX ADMIN — HYDROMORPHONE HYDROCHLORIDE 0.2 MG: 0.2 INJECTION, SOLUTION INTRAMUSCULAR; INTRAVENOUS; SUBCUTANEOUS at 21:45

## 2023-11-11 RX ADMIN — HYDROXYZINE HYDROCHLORIDE 10 MG: 10 TABLET ORAL at 13:34

## 2023-11-11 RX ADMIN — ONDANSETRON 4 MG: 2 INJECTION INTRAMUSCULAR; INTRAVENOUS at 18:44

## 2023-11-11 RX ADMIN — ENOXAPARIN SODIUM 30 MG: 30 INJECTION SUBCUTANEOUS at 08:26

## 2023-11-11 RX ADMIN — HYDROXYZINE HYDROCHLORIDE 10 MG: 10 TABLET ORAL at 21:45

## 2023-11-11 RX ADMIN — HYDROXYZINE HYDROCHLORIDE 10 MG: 10 TABLET ORAL at 05:08

## 2023-11-11 RX ADMIN — HYDROMORPHONE HYDROCHLORIDE 0.2 MG: 0.2 INJECTION, SOLUTION INTRAMUSCULAR; INTRAVENOUS; SUBCUTANEOUS at 05:08

## 2023-11-11 RX ADMIN — LIDOCAINE HYDROCHLORIDE 10 ML: 10 INJECTION, SOLUTION EPIDURAL; INFILTRATION; INTRACAUDAL; PERINEURAL at 04:54

## 2023-11-11 RX ADMIN — POLYETHYLENE GLYCOL 3350 17 G: 17 POWDER, FOR SOLUTION ORAL at 08:26

## 2023-11-11 RX ADMIN — SUCRALFATE 1 G: 1 TABLET ORAL at 01:35

## 2023-11-11 RX ADMIN — SUCRALFATE 1 G: 1 TABLET ORAL at 21:45

## 2023-11-11 RX ADMIN — ALBUTEROL SULFATE 2 PUFF: 90 AEROSOL, METERED RESPIRATORY (INHALATION) at 08:26

## 2023-11-11 RX ADMIN — SUCRALFATE 1 G: 1 TABLET ORAL at 15:37

## 2023-11-11 RX ADMIN — MIDODRINE HYDROCHLORIDE 5 MG: 5 TABLET ORAL at 06:57

## 2023-11-11 RX ADMIN — SUCRALFATE 1 G: 1 TABLET ORAL at 06:57

## 2023-11-11 RX ADMIN — ONDANSETRON 4 MG: 2 INJECTION INTRAMUSCULAR; INTRAVENOUS at 13:34

## 2023-11-11 RX ADMIN — SODIUM CHLORIDE, SODIUM GLUCONATE, SODIUM ACETATE, POTASSIUM CHLORIDE, MAGNESIUM CHLORIDE, SODIUM PHOSPHATE, DIBASIC, AND POTASSIUM PHOSPHATE 50 ML/HR: .53; .5; .37; .037; .03; .012; .00082 INJECTION, SOLUTION INTRAVENOUS at 10:47

## 2023-11-11 RX ADMIN — PANTOPRAZOLE SODIUM 40 MG: 40 INJECTION, POWDER, FOR SOLUTION INTRAVENOUS at 08:26

## 2023-11-11 RX ADMIN — TAMSULOSIN HYDROCHLORIDE 0.4 MG: 0.4 CAPSULE ORAL at 15:37

## 2023-11-11 RX ADMIN — DOCUSATE SODIUM 100 MG: 100 CAPSULE, LIQUID FILLED ORAL at 18:44

## 2023-11-11 RX ADMIN — HYDROMORPHONE HYDROCHLORIDE 0.2 MG: 0.2 INJECTION, SOLUTION INTRAMUSCULAR; INTRAVENOUS; SUBCUTANEOUS at 08:33

## 2023-11-11 RX ADMIN — DOCUSATE SODIUM 100 MG: 100 CAPSULE, LIQUID FILLED ORAL at 08:26

## 2023-11-11 RX ADMIN — SUCRALFATE 1 G: 1 TABLET ORAL at 10:47

## 2023-11-11 RX ADMIN — HYDROMORPHONE HYDROCHLORIDE 0.2 MG: 0.2 INJECTION, SOLUTION INTRAMUSCULAR; INTRAVENOUS; SUBCUTANEOUS at 11:35

## 2023-11-11 RX ADMIN — AMITRIPTYLINE HYDROCHLORIDE 50 MG: 50 TABLET, FILM COATED ORAL at 21:45

## 2023-11-11 NOTE — PROGRESS NOTES
Progress Note - thoracic Surgery   Selina Corrales 69 y.o. female MRN: [id number]  Unit/Bed#: [facility] 431-01 Encounter: [id number]    Assessment:  81-year-old female with dysphagia and gastroparesis s/p prior Nissen fundoplication in 5211, now s/p robotic Nissen takedown, hiatal hernia repair and partial fundoplication with mesh along with excisional liver biopsy. 11/9 UGI, Mild esophageal distention with a focal narrowed segment at the GE junction likely related to recent hernia repair. Esophageal dysmotility was noted and delayed emptying. Pt unable to complete study to evaluate for gastric emptying. No evidence of leak. Afebrile, BP WNL maps in 70s; satting 94% on 4L NC   cc x 1 unmeasured  WBC 6.9 from 7.4  Hgb 8.8 from 8.9  Platelets 40 from 38  T. bili 4.3 from 3.14  AST//700 from 1781/1051  Plan:  -Clear liquid diet, slowly advance   - Maintenance IV fluids  - Maintain A-line  - LFTs tonight  - Geriatric med: fall precautions, PT, delirium precautions  - Nephro: avoid ACE-I, check labs, stable from renal standpoint for d/c  Heme/onc-continue -hemolysis smear haptoglobin, reticulocyte count, low clinical suspicion for HIT. - DVT prophylaxis   - Incentive spirometry  - Encourage ambulation  -dispo planning   - Home meds      Subjective/Objective     Subjective:   No acute events overnight. Pt reports continued pain with movement. She states she has passed flatus. She has been tolerating liquids. Objective:     Blood pressure 104/67, pulse 91, temperature 98.4 °F (36.9 °C), resp. rate 16, height 5' (1.524 m), weight 60.7 kg (133 lb 13.1 oz), SpO2 93 %, not currently breastfeeding. ,Body mass index is 26.13 kg/m².       Intake/Output Summary (Last 24 hours) at 11/11/2023 0655  Last data filed at 11/11/2023 0101  Gross per 24 hour   Intake 546.2 ml   Output 875 ml   Net -328.8 ml         Invasive Devices       Peripheral Intravenous Line  Duration             Peripheral IV 11/08/23 Distal;Right Wrist 2 days    Peripheral IV 11/08/23 Dorsal (posterior); Left Forearm 2 days              Arterial Line  Duration             Arterial Line 11/11/23 Radial <1 day                    Physical Exam:   Gen: NAD, Comfortable  Neuro: A&O  Head: Normal Cephalic, Atraumatic  Eye: EOMI, PERRLA, No scleral icterus  Neck: Supple, No JVD  CV: RRR  Pulm: Normal work of breathing, no respiratory distress  Abd: Soft, Non-Distended, appropriately, incisions c/d/i  Ext: No edema, Non-tender  Skin: warm, dry, intact

## 2023-11-11 NOTE — PROCEDURES
Insert arterial line    Date/Time: 11/11/2023 4:33 AM    Performed by: Shanique Scales MD  Authorized by: Charisma Aguirre MD    Patient location:  Other (comment)  Consent:     Consent obtained:  Verbal    Consent given by:  Patient    Risks discussed:  Bleeding, repeat procedure and ischemia  Universal protocol:     Procedure explained and questions answered to patient or proxy's satisfaction: yes      Patient identity confirmed:  Verbally with patient  Indications:     Indications: hemodynamic monitoring, multiple ABGs, continuous blood pressure monitoring and frequent labs / infusion    Pre-procedure details:     Skin preparation:  Chlorhexidine  Anesthesia (see MAR for exact dosages): Anesthesia method:  Local infiltration    Local anesthetic:  Lidocaine 1% w/o epi  Procedure details:     Location / Tip of Catheter:  Radial    Laterality:  Right    Erick's test performed: yes      Needle gauge:  20 G    Placement technique:  Percutaneous and ultrasound guided    Ultrasound image availability:  Not obtained due to urgency    Number of attempts:  2    Successful placement: yes      Transducer: waveform confirmed    Post-procedure details:     Post-procedure:  Sutured and sterile dressing applied    CMS:  Normal    Patient tolerance of procedure:   Tolerated well, no immediate complications

## 2023-11-11 NOTE — QUICK NOTE
Quick Note - Thoracic Surgery   Lauren Jeffries 69 y.o. female MRN: [id number]  Unit/Bed#: [facility] 431-01 Encounter: [id number]    CT C/A/P from this afternoon demonstrated large wedge-shaped area encompassing large portion of left hepatic lobe consistent with infarct. Hepatic function panel from earlier today further corroborates this given significant elevation in LFTs. LD is also elevated to >1100 but is nonspecific. Unclear if this is a result of intraoperative liver retraction versus hematologic process given her concurrent thrombocytopenia. Patient actually appears slightly improved from prior exams, with less epigastric tenderness. She remains on 6L nasal cannula with stable blood pressure. We will check RUQ US with liver dopplers to evaluate hepatic blood flow and hold off on contrast CT imaging given worsening JOSE. Should contrast imaging be required, would likely obtain CTA chest and CT abdomen/pelvis with IV contrast and delays. Continue stepdown level of care for now with low threshold to upgrade pending clinical progress.        Jaiden Jorgensen MD   PGY5, General Surgery

## 2023-11-11 NOTE — PROGRESS NOTES
Follow up Consultation    Nephrology   Aime Coburn 69 y.o. female MRN: [id number]  Unit/Bed#: [facility] 431-01 Encounter: [id number]      Physician Requesting Consult: Cassie Guerra MD        ASSESSMENT:    67-year-old female with multiple committees including hypertension, GERD status post previous Nissen fundoplication and CKD stage IIIb presented with ongoing dysphagia status post elective robotic takedown of Nissen fundoplication on 85/7/3292 nephrology consulted for perioperative optimization to reduce incidence for acute kidney injury. Perioperative optimization to reduce incidence for acute kidney injury/CKD stage IIIb and now with JOSE:    JOSE most likely secondary to ischemic injury secondary hemodynamic perturbations postoperatively and some component of prerenal azotemia. After review of records it appears that the patient has a baseline Creatinine of 1.4-1.7 mg/dL. Most recent labs prior to admission from 10/27/2023 with a creatinine 1.42 mg/dL  Creatinine today is at 1.71 mg/dL stable and continues to improve and nearly back at baseline  Some component of urinary retention needed straight cath. Since patient with decreased p.o. intake will place on Plasma-Lyte 50 cc an hour for 1 L then DC, hopefully by then tolerating adequate p.o.   check BMP, magnesium, phosphorus in a.m. For now clinically euvolemic to minimally hypovolemic  Place on a renal diet when allowed diet order. Strict I/O. Daily weights  CT abdomen pelvis without contrast from 11/10/2023 no hydronephrosis, large wedge-shaped area of decreased attenuation in the left hepatic lobe suspicious for hepatic infarct  Urinary retention protocol  Avoid nephrotoxins, adjust meds to appropriate GFR.   Likely has underlying CKD secondary to age-related nephron loss plus hypertensive nephrosclerosis  Outpatient for nephrology follows up with nephrologist in [facility]     H&H/anemia/thrombocytopenia:  most recent hemoglobin at 8.4 g/dL  Maintain hemoglobin greater than 8 grams/deciliter  Follow-up with hematology oncology  Platelets improved to 87     Acid-base electrolytes:  Hypomagnesemia: Most recent magnesium 2.0. Resolved  Acid-base most recent bicarb 27 stable     Blood pressure/hypertension:   Optimize hemodynamic status to avoid delay in renal recovery. Maintain MAP > 65mmHg  Avoid BP fluctuations. Home medications: Norvasc 10 mg p.o. daily  Current medications: Midodrine 5 mg p.o. 3 times daily  Avoid ACE inhibitors. As an outpatient was tried per outpatient nephrologist had rise in creatinine and for now continues to hold. Hold Norvasc for now  We will decrease midodrine to 2.5 mg p.o. daily for now if continues not to need any further doses will DC     Other medical problems:  Previous Nissen fundoplication/GERD:  Management per primary team.  Status post elective robotic takedown of Nissen fundoplication on 86/4/7535. Follow-up with thoracic surgery. Elevated LFTs: Follow with primary team.  CT abdomen suspicious for hepatic infarct. Follow-up with oncology LFTs improving  Urinary retention: Needing straight cath. Started on 11/11 Flomax 0.4 mg daily, had Horton placed 11/11/2023 will need urology outpatient follow-up for voiding trial     Plan below is what was discussed with the primary team that they agree with:  check BMP, magnesium, phosphorus in a.m. Place on Plasma-Lyte 50 cc an hour for 1 L as patient continues to have poor p.o. intake. Hopefully by then patient's diet improves  Hold Norvasc  Continue on midodrine titrate off as tolerable   Decrease midodrine to 2.5 mg p.o. twice daily for now  Follow-up with hematology oncology   avoid ACE inhibitors  Not stable for discharge from renal standpoint at this point  Continue Flomax 0.4 mg      Thanks for the consult  Will continue to follow  Please call with questions/ concerns.       Brittany Martínez MD, FASN, 11/12/2023, 9:08 AM                Objective :   Patient seen and examined in room no overnight events hemodynamic stable remains afebrile still had issues with urinary retention needed Horton catheter placement yesterday reports still not eating much unable to take much p.o. Overall happy renal parameters are improving satting 97% on 4 L nasal cannula, status post IV fluids yesterday urine output 1 L. PHYSICAL EXAM  /75 (BP Location: Right arm)   Pulse 93   Temp 98.4 °F (36.9 °C) (Oral)   Resp (!) 25   Ht 5' (1.524 m)   Wt 61.2 kg (134 lb 14.4 oz)   SpO2 96%   BMI 26.35 kg/m²   Temp (24hrs), Av.7 °F (37.1 °C), Min:98.4 °F (36.9 °C), Max:99.4 °F (37.4 °C)        Intake/Output Summary (Last 24 hours) at 2023 0908  Last data filed at 2023 0700  Gross per 24 hour   Intake 868 ml   Output 1050 ml   Net -182 ml       I/O last 24 hours: In: 235 [P.O.:358; I.V.:510]  Out: 1050 [Urine:1050]      Current Weight: Weight - Scale: 61.2 kg (134 lb 14.4 oz)  First Weight: Weight - Scale: 57.8 kg (127 lb 6.8 oz)  Physical Exam  Vitals and nursing note reviewed. Constitutional:       General: She is not in acute distress. Appearance: Normal appearance. She is normal weight. She is ill-appearing. She is not toxic-appearing or diaphoretic. HENT:      Head: Normocephalic and atraumatic. Mouth/Throat:      Mouth: Mucous membranes are dry. Pharynx: No oropharyngeal exudate. Eyes:      General: No scleral icterus. Cardiovascular:      Rate and Rhythm: Normal rate. Pulmonary:      Effort: No respiratory distress. Breath sounds: No stridor. No wheezing. Abdominal:      Palpations: Abdomen is soft. Tenderness: There is abdominal tenderness. Musculoskeletal:         General: No swelling. Cervical back: Normal range of motion. No rigidity. Skin:     Coloration: Skin is not jaundiced. Neurological:      General: No focal deficit present. Mental Status: She is alert and oriented to person, place, and time. Psychiatric:         Mood and Affect: Mood normal.         Behavior: Behavior normal.             Review of Systems   Constitutional:  Positive for appetite change. Negative for fatigue and fever. HENT:  Negative for congestion. Respiratory:  Negative for cough and shortness of breath. Cardiovascular:  Negative for leg swelling. Gastrointestinal:  Positive for abdominal pain. Negative for constipation. Genitourinary:  Positive for difficulty urinating. Musculoskeletal:  Negative for back pain. Neurological:  Negative for headaches. Psychiatric/Behavioral:  Negative for agitation and confusion. All other systems reviewed and are negative. Scheduled Meds:  Current Facility-Administered Medications   Medication Dose Route Frequency Provider Last Rate    acetaminophen  650 mg Oral Q6H Lance Quinonez PA-C      albuterol  2 puff Inhalation BID Lance Quinonez PA-C      amitriptyline  50 mg Oral HS Lance Quinonez PA-C      docusate sodium  100 mg Oral BID Lance Quinonez PA-C      enoxaparin  30 mg Subcutaneous Q24H [facility] Lance Quinonez PA-C      HYDROmorphone  0.2 mg Intravenous Q3H PRN RUSS Khan      hydrOXYzine HCL  10 mg Oral Q6H PRN Horace Silva MD      midodrine  5 mg Oral TID  Horace Silva MD      ondansetron  4 mg Intravenous Q6H Lance Quinonez PA-C      pantoprazole  40 mg Intravenous Daily Lance Quinonez PA-C      polyethylene glycol  17 g Oral Daily Lance Quinonez PA-C      senna  1 tablet Oral Daily Lance Quinonez PA-C      sucralfate  1 g Oral 4x Daily (AC & HS) Lance Quinonez PA-C      tamsulosin  0.4 mg Oral Daily With Roopa Ballard MD         PRN Meds:. HYDROmorphone    hydrOXYzine HCL    Continuous Infusions:       Invasive Devices:      Invasive Devices       Peripheral Intravenous Line  Duration             Peripheral IV 11/12/23 Left Antecubital <1 day              Drain  Duration             Urethral Catheter Non-latex 16 Fr. <1 day                      LABORATORY:    Results from last 7 days   Lab Units 11/12/23  0440 11/11/23  0445 11/10/23  1234 11/10/23  0446 11/09/23  0504   WBC Thousand/uL 7.02 6.99 7.44  --  7.52   HEMOGLOBIN g/dL 8.4* 8.8* 8.9*  --  10.0*   HEMATOCRIT % 25.3* 25.6* 26.2*  --  30.2*   PLATELETS Thousands/uL 87* 40* 38*  --  63*   POTASSIUM mmol/L 3.7 3.8 4.0 3.8 4.2   CHLORIDE mmol/L 107 105 102 102 108   CO2 mmol/L 27 23 24 25 25   BUN mg/dL 21 22 25 22 19   CREATININE mg/dL 1.71* 2.23* 2.76* 2.62* 1.36*   CALCIUM mg/dL 8.6 8.6 8.3* 8.2* 8.4   MAGNESIUM mg/dL 2.0 1.9 1.9 2.0 1.6*   PHOSPHORUS mg/dL 2.5 2.3  --  3.7  --       rest all reviewed    RADIOLOGY:  US right upper quadrant with liver dopplers   Final Result by Wille Dandy, MD (11/10 2122)      Patent portal veins with normal direction of flow in the main and right portal veins, but bidirectional flow in the left portal vein. Normal hepatic artery and veins. Hepatic steatosis. Workstation performed: [id number]         CT chest abdomen pelvis wo contrast   Final Result by Addy Fitzpatrick MD (11/10 1924)      1) Large, wedge-shaped area of decreased attenuation involving the majority of the left hepatic lobe, not seen on the prior CT from 2022. Evaluation is limited without IV contrast, however this is suspicious for a hepatic infarct, especially in light of    markedly elevated LFTs. Other considerations include geographic hepatic steatosis. 2) Re-do fundoplication not well visualized without IV contrast. Hyperdense mesh from a prior repair extends from left hepatic lobe along the diaphragmatic crura. 3) Straight, tubular-shaped hypodense structure measuring approximately 1 cm in diameter and approximately 9.5 -10 cm in length interposed between the diaphragm and the proximal stomach, of unclear etiology. This likely represents a mesh, possibly curled    up, as the straight edges suggest a manmade structure.       4) Small amount of hyperdense material likely representing residual oral contrast from the 11/9/2023 esophagram seen in the distal esophagus. The rest of the oral contrast is seen throughout the colon. No extravasated oral contrast seen. 5) No significant gastric distention. No bowel obstruction or convincing inflammation. 6) No abdominal or pelvic fluid collections. 7) Somewhat distended urinary bladder. Recommend correlation for urinary retention. 8) Trace bilateral pleural effusions. Subsegmental atelectasis dependently. Trace groundglass opacity at left apex, possibly infectious. 9) Additional findings as above. I personally discussed this study with Dr. Kayla Ye on 11/10/2023 at 7:22 PM.                     Workstation performed: [id number]         XR chest 1 view   Final Result by Nikolai Rubio MD (73/07 1411)      Low lung volumes with bibasilar atelectasis. Pneumonia not excluded in the appropriate clinical setting. Workstation performed: [id number]         FL esophagram complete   Final Result by Jer Riley MD (11/09 1111)      Mild esophageal distention with a focal narrowed segment at the GE junction likely related to recent hernia repair. Esophageal dysmotility noted with delayed emptying and periods of tertiary contraction without nathalia obstruction or contrast    extravasation. The patient could not drink anymore contrast at the time of the study and therefore gastric emptying cannot be observed during the study. Workstation performed: [id number]           Rest all reviewed    Portions of the record may have been created with voice recognition software. Occasional wrong word or "sound a like" substitutions may have occurred due to the inherent limitations of voice recognition software. Read the chart carefully and recognize, using context, where substitutions have occurred. If you have any questions, please contact the dictating provider.

## 2023-11-11 NOTE — PROGRESS NOTES
Progress Note - General Surgery   Karla Choe 69 y.o. female MRN: [id number]  Unit/Bed#: [facility] 431-01 Encounter: [id number]    Assessment:  81-year-old female with dysphagia and gastroparesis s/p prior Nissen fundoplication in 3031, now s/p robotic Nissen takedown, hiatal hernia repair and partial fundoplication with mesh along with excisional liver biopsy. 11/9 UGI, Mild esophageal distention with a focal narrowed segment at the GE junction likely related to recent hernia repair. Esophageal dysmotility was noted and delayed emptying. Pt unable to complete study to evaluate for gastric emptying. No evidence of leak. Afebrile, BP WNL maps in 70s; satting 94% on 4L NC   cc x 1 unmeasured  WBC 6.9 from 7.4  Hgb 8.8 from 8.9  Platelets 40 from 38  T. bili 4.3 from 3.14  AST//700 from 1781/1051  Plan:  -Clear liquid diet, slowly advance   - Maintenance IV fluids  - Maintain A-line  - LFTs tonight  - Geriatric med: fall precautions, PT, delirium precautions  - Nephro: avoid ACE-I, check labs, stable from renal standpoint for d/c  Heme/onc-continue -hemolysis smear haptoglobin, reticulocyte count, low clinical suspicion for HIT. - DVT prophylaxis   - Incentive spirometry  - Encourage ambulation  -dispo planning   - Home meds      Subjective/Objective     Subjective:   No acute events overnight. Pt reports continued pain with movement. She states she has passed flatus. She has been tolerating liquids. Objective:     Blood pressure 104/67, pulse 91, temperature 98.4 °F (36.9 °C), resp. rate 16, height 5' (1.524 m), weight 61.3 kg (135 lb 2.3 oz), SpO2 93 %, not currently breastfeeding. ,Body mass index is 26.39 kg/m².       Intake/Output Summary (Last 24 hours) at 11/11/2023 0754  Last data filed at 11/11/2023 0101  Gross per 24 hour   Intake 666.2 ml   Output 875 ml   Net -208.8 ml         Invasive Devices       Peripheral Intravenous Line  Duration             Peripheral IV 11/08/23 Distal;Right Wrist 2 days    Peripheral IV 11/08/23 Dorsal (posterior); Left Forearm 2 days              Arterial Line  Duration             Arterial Line 11/11/23 Radial <1 day                    Physical Exam:   Gen: NAD, Comfortable  Neuro: A&O  Head: Normal Cephalic, Atraumatic  Eye: EOMI, PERRLA, No scleral icterus  Neck: Supple, No JVD  CV: RRR  Pulm: Normal work of breathing, no respiratory distress  Abd: Soft, Non-Distended, appropriately, incisions c/d/i  Ext: No edema, Non-tender  Skin: warm, dry, intact

## 2023-11-11 NOTE — PROGRESS NOTES
Medical Oncology/Hematology Progress Note  Alexandra Aguilar, female, 69 y. o., 1945,  PPHP 431/PPHP [id number], [id number]     Assessment and Plan    1. Acute anemia and thrombocytopenia  2. S/p hiatal hernia repair, pyloroplasty, and liver biopsy  3. Ischemic hepatitis, acute liver failure    Ms. Shobha Mcintosh is a 69 y.o. female with severe gastroparesis, hx of hiatal hernia, and esophageal dysphagia. Patient presented to the hospital on 11/8/2023 for elective robotic pyloroplasty and redo of prior Nissen fundoplication due to persistent gastroparesis symptoms. Additionally, she underwent a liver biopsy for further evaluation of the elevated LFTs (mildly elevated AST and ALT) noted on routine blood work. Hematology team was consulted on 11/10/2023 for recommendations regarding patient's acute development of thrombocytopenia and anemia. Patient with normal hemoglobin ranging in the 12-14g/L range and normal platelet count at baseline since at least 2017. However, starting on 11/9, we start noticing a drop in the Hgb (11.8 > 10 > 8.9) and plt counts (237 > 63 > 38). AM CBC panel shows stable plt and Hgb levels, however, interim CMP resulted with significant elevation of LFTs and T bili levels concerning for ischemic liver. CT also confirmed large wedge shaped infarct in the left hepatic lobe; no large fluid collections were seen in the abdomen or pelvis indicative of blood pooling or hematoma. Liver doppler showed patent hepatic vessels. Patient may have had a prolonged period of low perfusion/hypotension as a sequale of recent operation, causing ischemic hepatitis and acute liver failure. Acute liver injury can often manifest with severe drop in platelet count 2/2 depressed TPO levels being available to stimulate platelet production.  Additionally, there may be a component of severe myelosuppression from acute shock/ischemic event  Doubt an acute hemolysis or DIC process with normal fibrinogen and haptoglobin levels. Iron panel, vit B12, and folate levels are not indicative of any nutritional deficiencies. Continue to trend CBC parameters. Expect gradual recovery of CBC parameters as patient improves from the ongoing ischemic hepatitis with supportive measures. Transfuse for hemoglobin less than 7g/dL and platelet count less than 20k  Low clinical suspicion for heparin-induced thrombocytopenia at this time given that her anemia and thrombocytopenia first observed on yesterday's am labs started even before exposure to any heparin products this admission. HIT typically manifests after 4-5 days of heparin exposure (it doesn't typically cause anemia either). 4T score 2, indicating low probability of HIT (<5%)  Continue measures to optimize pain control    Outpatient follow up plan: We can arrange for hematology follow-up if deemed necessary based on hospital course and findings    Communication with patient/family:  Patient was updated regarding the above recommendations    Case was discussed with hematology/oncology attending, Dr. Tino Matias    Reason for consultation: acute development of anemia and thrombocytopenia    History of present illness:  Samir Coelho is a 69 y.o. female with underlying COPD, CKD, HTN, irritable bowel syndrome, GERD, severe gastroparesis, hiatal hernia, and esophageal dysphagia. Patient with history of laparoscopic hiatal hernia repair with Nissen fundoplication back in 7066. She has required multiple dilations since then without relief of her dysphagia and gastroparesis symptoms. As there was concern for a tight Nissen, patient was recommended a robotic takedown of the nisin, redo of the hiatal hernia repair, with partial fundoplication and pyloroplasty. Patient presented to the hospital on 11/8/2023 for robotic pyloroplasty and redo fundoplication.   Additionally, she underwent a liver biopsy for further work-up of the elevated LFTs (mildly elevated AST and ALT) noted on routine blood work. Liver biopsy from 11/8/2023 showed steatohepatitis with periportal fibrosis. Hematology team was consulted on 11/10/2023 for recommendations regarding patient's acute development of thrombocytopenia and anemia. Patient with normal hemoglobin ranging in the 12-14 g/L range and normal platelet count since at least 2017. However, starting on 11/9, we start noticing a drop in the Hgb (11.8 > 10 > 8.9) and plt counts (237 > 63 > 38). I met with Ms. Bonilla at the bedside. Patient reported expected diffuse abdominal ache, but otherwise denied any acute complaints. Denied any history of having any anemia or thrombocytopenia in the past.    Interval History:     Patient was seen at bedside this morning. Continues to have significant diffuse abdominal pain and heartburn sensation. Denied any episodes of vomiting. Hepatic panel from yesterday resulted with significant elevation of AST (1781), ALT (1051), ALT (121), and T. bili (3.14) with direct bili 2.05, raising concern for hepatic ischemia. A.m. hepatic panel shows improvement of AST and ALT levels, although alk phos and T. bili levels remain elevated. Patient CT CAP from last night revealed a large wedge-shaped area of decreased attenuation in the left hepatic lobe, concerning for hepatic infarct. There was no hematoma or large abd/pelvic fluid collections seen, suggestive of large amounts of bleeding. Liver Doppler study showed patent portal veins. CBC parameters from this morning reviewed, overall stable with platelet count 40 and hemoglobin 8.8. Hemolysis markers reviewed; haptoglobin within normal range, reticulocyte count not elevated, T. bili elevation noted, however it is the direct bili component that is elevated. So overall, this is not suggestive of an ongoing hemolysis process. Review of Systems:   Review of Systems   Constitutional:  Negative for chills, fatigue, fever and unexpected weight change.    HENT: Negative for facial swelling, mouth sores, sinus pressure, trouble swallowing and voice change. Eyes:  Negative for pain, redness and visual disturbance. Respiratory:  Negative for cough, chest tightness, shortness of breath and wheezing. Cardiovascular:  Negative for chest pain, palpitations and leg swelling. Gastrointestinal:  Positive for abdominal pain. Negative for blood in stool, constipation, diarrhea, nausea and vomiting. Heartburn   Genitourinary:  Negative for difficulty urinating, dysuria, frequency and hematuria. Musculoskeletal:  Negative for arthralgias, back pain, myalgias, neck pain and neck stiffness. Skin:  Negative for color change, pallor, rash and wound. Neurological:  Negative for dizziness, seizures, facial asymmetry, speech difficulty, light-headedness and headaches. Hematological:  Negative for adenopathy. Does not bruise/bleed easily. Psychiatric/Behavioral:  Negative for agitation, behavioral problems and confusion. Past Medical History:   Past Medical History:   Diagnosis Date    Anxiety     Arthritis     Asthma     Cancer ([address])     Candida infection, esophageal ([address])     Chronic kidney disease     Chronic pain     COPD (chronic obstructive pulmonary disease) (HCC)     Depression     Depression     Gastroparesis     Gastroparesis     GERD (gastroesophageal reflux disease)     Hypertension     Irritable bowel syndrome (IBS)     Lactose intolerance Not sure    Migraines     MRSA (methicillin resistant Staphylococcus aureus)     Multiple thyroid nodules     Osteoporosis     Pneumonia     Psychiatric disorder        Past Surgical History:   Procedure Laterality Date    BACK SURGERY       SECTION      CHOLECYSTECTOMY      COLONOSCOPY  10/09/2013    ESOPHAGEAL DILATION       [address] [place] [address]    ESOPHAGOGASTRODUODENOSCOPY N/A 2023    Procedure: ESOPHAGOGASTRODUODENOSCOPY (EGD); Surgeon:  Chuckie Mehta MD;  [facility];  Service: General    HIATAL HERNIA REPAIR      NISSEN FUNDOPLICATION      ID BIOPSY LIVER NEEDLE PERCUTANEOUS N/A 11/8/2023    Procedure: EXCISION BIOPSY LIVER;  Surgeon: Haley Mejias MD;  [facility];  Service: General    ID ESOPHAGOGASTRODUODENOSCOPY TRANSORAL DIAGNOSTIC N/A 04/03/2019    Procedure: ESOPHAGOGASTRODUODENOSCOPY (EGD); Surgeon: Patricio Blair MD;  Location: [facility];  Service: Gastroenterology    ID ESOPHAGOGASTRODUODENOSCOPY TRANSORAL DIAGNOSTIC N/A 09/20/2023    Procedure: ESOPHAGOGASTRODUODENOSCOPY (EGD); Surgeon: Paramjit Lieberman MD;  [facility];  Service: Thoracic    ID ESOPHAGOSCOPY FLEX BALLOON DILAT <30 MM DIAM N/A 09/20/2023    Procedure: CRE WIRE GUIDED BALLOON DILATATION ESOPHAGEAL;  Surgeon: Paramjit Lieberman MD;  [facility];  Service: Thoracic    ID LAPS RPR PARAESPHGL HRNA INCL [address] [place] [address] W/O MESH N/A 11/8/2023    Procedure: robotic takedown of nissen fundoplication, redo hiatal hernia, partial fundoplication, with mesh;  Surgeon: Paramjit Leiberman MD;  [facility];  Service: Thoracic    ID PYLOROPLASTY N/A 11/8/2023    Procedure: PYLOROPLASTY LAPAROSCOPIC WITH ROBOT;  Surgeon:  Haley Mejias MD;  [facility];  Service: General    ROTATOR CUFF REPAIR      SHOULDER SURGERY      SPINAL FUSION      TUBAL LIGATION  May 1975    Last child born    UPPER GASTROINTESTINAL ENDOSCOPY      US GUIDED THYROID BIOPSY      WISDOM TOOTH EXTRACTION         Family History   Problem Relation Age of Onset    Other Mother         Esophageal disorder    Stomach cancer Father     Cancer Father     Hypertension Maternal Aunt     COPD Maternal Grandmother     Colon cancer Neg Hx     Colon polyps Neg Hx        Social History     Socioeconomic History    Marital status:      Spouse name: None    Number of children: None    Years of education: None    Highest education level: None   Occupational History    None   Tobacco Use    Smoking status: Former     Packs/day: 1.00 Years: 15.00     Total pack years: 15.00     Types: Cigarettes     Start date: 10/5/1998     Quit date: 10/1/2003     Years since quittin.1    Smokeless tobacco: Former   Vaping Use    Vaping Use: Never used   Substance and Sexual Activity    Alcohol use: Not Currently    Drug use: Never    Sexual activity: Not Currently     Partners: Male     Birth control/protection: None   Other Topics Concern    None   Social History Narrative    None     Social Determinants of Health     Financial Resource Strain: Not on file   Food Insecurity: No Food Insecurity (2023)    Hunger Vital Sign     Worried About Running Out of Food in the Last Year: Never true     Ran Out of Food in the Last Year: Never true   Transportation Needs: No Transportation Needs (2023)    PRAPARE - Transportation     Lack of Transportation (Medical): No     Lack of Transportation (Non-Medical):  No   Physical Activity: Not on file   Stress: Not on file   Social Connections: Not on file   Intimate Partner Violence: Not on file   Housing Stability: Low Risk  (2023)    Housing Stability Vital Sign     Unable to Pay for Housing in the Last Year: No     Number of Places Lived in the Last Year: 1     Unstable Housing in the Last Year: No         Current Facility-Administered Medications:     acetaminophen (TYLENOL) oral suspension 650 mg, 650 mg, Oral, Q6H, Kvng Chauhan, PA-C, 649.6 mg at 11/10/23 1854    albuterol (PROVENTIL HFA,VENTOLIN HFA) inhaler 2 puff, 2 puff, Inhalation, BID, Kvng Danielsa, PA-C, 2 puff at 23 0826    amitriptyline (ELAVIL) tablet 50 mg, 50 mg, Oral, HS, Samantha Gaviria, PA-C, 50 mg at 23    docusate sodium (COLACE) capsule 100 mg, 100 mg, Oral, BID, Samantha Gaviria, PA-C, 100 mg at 23 0826    enoxaparin (LOVENOX) subcutaneous injection 30 mg, 30 mg, Subcutaneous, Q24H [address], Samantha Gaviria, PA-C, 30 mg at 23 0826    HYDROmorphone HCl (DILAUDID) injection 0.2 mg, 0.2 mg, Intravenous, Q3H PRN, JeffreykarenRUSS Smalls, 0.2 mg at 11/11/23 6075    hydrOXYzine HCL (ATARAX) tablet 10 mg, 10 mg, Oral, Q6H PRN, Rose Trejo MD, 10 mg at 11/11/23 0508    midodrine (PROAMATINE) tablet 5 mg, 5 mg, Oral, TID AC, Rose Trejo MD, 5 mg at 11/11/23 7950    multi-electrolyte (PLASMALYTE-A/ISOLYTE-S PH 7.4) IV solution, 50 mL/hr, Intravenous, Continuous, Nancy Parish Ariza MD, Last Rate: 50 mL/hr at 11/11/23 1047, 50 mL/hr at 11/11/23 1047    ondansetron (ZOFRAN) injection 4 mg, 4 mg, Intravenous, Q6H, Samantha Gaviria PA-C, 4 mg at 11/11/23 0657    pantoprazole (PROTONIX) injection 40 mg, 40 mg, Intravenous, Daily, Nick Goldberg PA-C, 40 mg at 11/11/23 0826    polyethylene glycol (MIRALAX) packet 17 g, 17 g, Oral, Daily, Nick Goldberg PA-C, 17 g at 11/11/23 3321    senna (SENOKOT) tablet 8.6 mg, 1 tablet, Oral, Daily, Nick Goldberg PA-C, 8.6 mg at 11/11/23 0826    sucralfate (CARAFATE) tablet 1 g, 1 g, Oral, 4x Daily (AC & HS), Nick Goldberg PA-C, 1 g at 11/11/23 1047    tamsulosin (FLOMAX) capsule 0.4 mg, 0.4 mg, Oral, Daily With Shahana Elkins MD    Medications Prior to Admission   Medication    albuterol (PROVENTIL HFA,VENTOLIN HFA) 90 mcg/act inhaler    amitriptyline (ELAVIL) 50 mg tablet    amLODIPine (NORVASC) 10 mg tablet    cetirizine (ZyrTEC) 10 MG chewable tablet    cholecalciferol (VITAMIN D3) 1,000 units tablet    clotrimazole-betamethasone (LOTRISONE) 1-0.05 % cream    dexlansoprazole (DEXILANT) 60 MG capsule    dicyclomine (BENTYL) 20 mg tablet    famotidine (PEPCID) 40 MG tablet    multivitamin (THERAGRAN) TABS    Omega-3 Fatty Acids (OMEGA 3 PO)    ondansetron (ZOFRAN) 4 mg tablet    Tavaborole 5 % SOLN    tobramycin-dexamethasone (TOBRADEX) ophthalmic suspension       Allergies   Allergen Reactions    Latex Hives    Augmentin [Amoxicillin-Pot Clavulanate] GI Intolerance    Bactrim [Sulfamethoxazole-Trimethoprim] GI Intolerance    Clarithromycin GI Intolerance    Doxycycline Hives    Neomycin GI Intolerance    Polymyxin B GI Intolerance    Zyvox [Linezolid] GI Bleeding    Cephalosporins GI Intolerance    Nsaids GI Intolerance    Penicillins GI Intolerance     Patient can only take levaquin and cipro    Prednisone GI Intolerance    Sulphasomidine GI Intolerance         Physical Exam:     BP (!) 132/47 (BP Location: Right arm)   Pulse 96   Temp 98.3 °F (36.8 °C) (Oral)   Resp (!) 24   Ht 5' (1.524 m)   Wt 61.3 kg (135 lb 2.3 oz)   SpO2 97%   BMI 26.39 kg/m²     Physical Exam  Vitals reviewed. Constitutional:       General: She is in acute distress (mild distress 2/2 abd pain). Appearance: She is not ill-appearing, toxic-appearing or diaphoretic. HENT:      Head: Normocephalic and atraumatic. Mouth/Throat:      Mouth: Mucous membranes are moist.      Pharynx: No oropharyngeal exudate or posterior oropharyngeal erythema. Eyes:      General: No scleral icterus. Extraocular Movements: Extraocular movements intact. Conjunctiva/sclera: Conjunctivae normal.   Cardiovascular:      Rate and Rhythm: Normal rate and regular rhythm. Heart sounds: No murmur heard. No gallop. Pulmonary:      Effort: No respiratory distress. Breath sounds: Normal breath sounds. No wheezing, rhonchi or rales. Abdominal:      General: There is no distension. Palpations: Abdomen is soft. There is no mass. Tenderness: There is abdominal tenderness (diffuse). Comments: Abdominal surgical incisions noted, sites appear intact/healing   Musculoskeletal:         General: No swelling, tenderness or deformity. Cervical back: Normal range of motion. No rigidity. Right lower leg: No edema. Left lower leg: No edema. Lymphadenopathy:      Cervical: No cervical adenopathy. Skin:     Findings: Bruising (small area of bruising noted in right flank area) present. No erythema, lesion or rash.    Neurological:      General: No focal deficit present. Mental Status: She is alert and oriented to person, place, and time.    Psychiatric:         Mood and Affect: Mood normal.         Judgment: Judgment normal.       Recent Results (from the past 48 hour(s))   Basic metabolic panel    Collection Time: 11/10/23  4:46 AM   Result Value Ref Range    Sodium 136 135 - 147 mmol/L    Potassium 3.8 3.5 - 5.3 mmol/L    Chloride 102 96 - 108 mmol/L    CO2 25 21 - 32 mmol/L    ANION GAP 9 mmol/L    BUN 22 5 - 25 mg/dL    Creatinine 2.62 (H) 0.60 - 1.30 mg/dL    Glucose 124 65 - 140 mg/dL    Calcium 8.2 (L) 8.4 - 10.2 mg/dL    eGFR 17 ml/min/1.73sq m   Magnesium    Collection Time: 11/10/23  4:46 AM   Result Value Ref Range    Magnesium 2.0 1.9 - 2.7 mg/dL   Phosphorus    Collection Time: 11/10/23  4:46 AM   Result Value Ref Range    Phosphorus 3.7 2.3 - 4.1 mg/dL   CBC and differential    Collection Time: 11/10/23 12:34 PM   Result Value Ref Range    WBC 7.44 4.31 - 10.16 Thousand/uL    RBC 2.73 (L) 3.81 - 5.12 Million/uL    Hemoglobin 8.9 (L) 11.5 - 15.4 g/dL    Hematocrit 26.2 (L) 34.8 - 46.1 %    MCV 96 82 - 98 fL    MCH 32.6 26.8 - 34.3 pg    MCHC 34.0 31.4 - 37.4 g/dL    RDW 13.6 11.6 - 15.1 %    MPV 9.5 8.9 - 12.7 fL    Platelets 38 (LL) 234 - 390 Thousands/uL    nRBC 0 /100 WBCs    Neutrophils Relative 81 (H) 43 - 75 %    Immat GRANS % 0 0 - 2 %    Lymphocytes Relative 13 (L) 14 - 44 %    Monocytes Relative 5 4 - 12 %    Eosinophils Relative 1 0 - 6 %    Basophils Relative 0 0 - 1 %    Neutrophils Absolute 6.02 1.85 - 7.62 Thousands/µL    Immature Grans Absolute 0.03 0.00 - 0.20 Thousand/uL    Lymphocytes Absolute 0.93 0.60 - 4.47 Thousands/µL    Monocytes Absolute 0.38 0.17 - 1.22 Thousand/µL    Eosinophils Absolute 0.08 0.00 - 0.61 Thousand/µL    Basophils Absolute 0.00 0.00 - 0.10 Thousands/µL   Lactic acid, plasma (w/reflex if result > 2.0)    Collection Time: 11/10/23 12:34 PM   Result Value Ref Range    LACTIC ACID 1.2 0.5 - 2.0 mmol/L   Basic metabolic panel    Collection Time: 11/10/23 12:34 PM   Result Value Ref Range    Sodium 135 135 - 147 mmol/L    Potassium 4.0 3.5 - 5.3 mmol/L    Chloride 102 96 - 108 mmol/L    CO2 24 21 - 32 mmol/L    ANION GAP 9 mmol/L    BUN 25 5 - 25 mg/dL    Creatinine 2.76 (H) 0.60 - 1.30 mg/dL    Glucose 126 65 - 140 mg/dL    Calcium 8.3 (L) 8.4 - 10.2 mg/dL    eGFR 15 ml/min/1.73sq m   Magnesium    Collection Time: 11/10/23 12:34 PM   Result Value Ref Range    Magnesium 1.9 1.9 - 2.7 mg/dL   Hepatic function panel    Collection Time: 11/10/23 12:34 PM   Result Value Ref Range    Total Bilirubin 3.14 (H) 0.20 - 1.00 mg/dL    Bilirubin, Direct 2.05 (H) 0.00 - 0.20 mg/dL    Alkaline Phosphatase 121 (H) 34 - 104 U/L    AST 1,781 (H) 13 - 39 U/L    ALT 1,051 (H) 7 - 52 U/L    Total Protein 6.4 6.4 - 8.4 g/dL    Albumin 4.0 3.5 - 5.0 g/dL   LD,Blood    Collection Time: 11/10/23 12:34 PM   Result Value Ref Range    LD 1,128 (H) 140 - 271 U/L   Reticulocytes    Collection Time: 11/10/23 12:34 PM   Result Value Ref Range    Retic Ct Abs 50,100 14,097 - 95,744    Retic Ct Pct 1.82 0.37 - 1.87 %   Vitamin B12    Collection Time: 11/10/23 12:34 PM   Result Value Ref Range    Vitamin B-12 1,702 (H) 180 - 914 pg/mL   Folate    Collection Time: 11/10/23 12:34 PM   Result Value Ref Range    Folate >22.3 >5.9 ng/mL   TIBC Panel (incl.  Iron, TIBC, % Iron Saturation)    Collection Time: 11/10/23 12:34 PM   Result Value Ref Range    Iron Saturation 18 15 - 50 %    TIBC 249 (L) 250 - 450 ug/dL    Iron 44 (L) 50 - 212 ug/dL    UIBC 205 155 - 355 ug/dL   Ferritin    Collection Time: 11/10/23 12:34 PM   Result Value Ref Range    Ferritin 384 (H) 11 - 307 ng/mL   Hemolysis Smear    Collection Time: 11/10/23  3:03 PM   Result Value Ref Range    Hemolysis Smear Helmet Cells noted    Haptoglobin    Collection Time: 11/10/23  3:03 PM   Result Value Ref Range    Haptoglobin 102 42 - 346 mg/dL   Fibrinogen    Collection Time: 11/10/23  3:03 PM   Result Value Ref Range    Fibrinogen 331 207 - 520 mg/dL   Protime-INR    Collection Time: 11/10/23  3:03 PM   Result Value Ref Range    Protime 17.7 (H) 11.6 - 14.5 seconds    INR 1.48 (H) 0.84 - 1.19   APTT    Collection Time: 11/10/23  3:03 PM   Result Value Ref Range    PTT 38 (H) 23 - 37 seconds   TEG 6 Global Hemostasis    Collection Time: 11/10/23  3:03 PM   Result Value Ref Range    CKR(REACTION TIME) 10.1 (H) 4.6 - 9.1 Min    CKK(CLOT KINETICS) 3.1 (H) 0.8 - 2.1 Min    CKA(ANGLE) 55.8 (L) 63 - 78 Deg    CKMA(MAX AMPLITUDE) 46.6 (L) 52 - 69 mm    CKHR(HEPARINASE REACTION TIME) 7.9 4.3 - 8.3 Min    CRTMA(RAPIDTEG MAX AMPLITUDE) 44.8 (L) 52 - 70 mm    CFFMA (FUNCTIONAL FIBRINOGEN MAX AMPLITUDE) 18.1 15 - 32 mm    CFFFLEV 330.3 278 - 581 mg/dl   Hepatic function panel    Collection Time: 11/11/23  4:45 AM   Result Value Ref Range    Total Bilirubin 4.30 (H) 0.20 - 1.00 mg/dL    Bilirubin, Direct 3.40 (H) 0.00 - 0.20 mg/dL    Alkaline Phosphatase 142 (H) 34 - 104 U/L     (H) 13 - 39 U/L     (H) 7 - 52 U/L    Total Protein 5.6 (L) 6.4 - 8.4 g/dL    Albumin 3.4 (L) 3.5 - 5.0 g/dL   Basic metabolic panel    Collection Time: 11/11/23  4:45 AM   Result Value Ref Range    Sodium 138 135 - 147 mmol/L    Potassium 3.8 3.5 - 5.3 mmol/L    Chloride 105 96 - 108 mmol/L    CO2 23 21 - 32 mmol/L    ANION GAP 10 mmol/L    BUN 22 5 - 25 mg/dL    Creatinine 2.23 (H) 0.60 - 1.30 mg/dL    Glucose 113 65 - 140 mg/dL    Calcium 8.6 8.4 - 10.2 mg/dL    eGFR 20 ml/min/1.73sq m   CBC and differential    Collection Time: 11/11/23  4:45 AM   Result Value Ref Range    WBC 6.99 4.31 - 10.16 Thousand/uL    RBC 2.69 (L) 3.81 - 5.12 Million/uL    Hemoglobin 8.8 (L) 11.5 - 15.4 g/dL    Hematocrit 25.6 (L) 34.8 - 46.1 %    MCV 95 82 - 98 fL    MCH 32.7 26.8 - 34.3 pg    MCHC 34.4 31.4 - 37.4 g/dL    RDW 13.5 11.6 - 15.1 %    MPV 10.0 8.9 - 12.7 fL    Platelets 40 (LL) 655 - 390 Thousands/uL    nRBC 0 /100 WBCs    Neutrophils Relative 85 (H) 43 - 75 %    Immat GRANS % 0 0 - 2 %    Lymphocytes Relative 9 (L) 14 - 44 %    Monocytes Relative 5 4 - 12 %    Eosinophils Relative 1 0 - 6 %    Basophils Relative 0 0 - 1 %    Neutrophils Absolute 5.87 1.85 - 7.62 Thousands/µL    Immature Grans Absolute 0.03 0.00 - 0.20 Thousand/uL    Lymphocytes Absolute 0.66 0.60 - 4.47 Thousands/µL    Monocytes Absolute 0.34 0.17 - 1.22 Thousand/µL    Eosinophils Absolute 0.08 0.00 - 0.61 Thousand/µL    Basophils Absolute 0.01 0.00 - 0.10 Thousands/µL   Magnesium    Collection Time: 11/11/23  4:45 AM   Result Value Ref Range    Magnesium 1.9 1.9 - 2.7 mg/dL   Phosphorus    Collection Time: 11/11/23  4:45 AM   Result Value Ref Range    Phosphorus 2.3 2.3 - 4.1 mg/dL   Fingerstick Glucose (POCT)    Collection Time: 11/11/23  5:59 AM   Result Value Ref Range    POC Glucose 104 65 - 140 mg/dl   Hepatic function panel    Collection Time: 11/11/23  8:39 AM   Result Value Ref Range    Total Bilirubin 4.24 (H) 0.20 - 1.00 mg/dL    Bilirubin, Direct 3.35 (H) 0.00 - 0.20 mg/dL    Alkaline Phosphatase 163 (H) 34 - 104 U/L     (H) 13 - 39 U/L     (H) 7 - 52 U/L    Total Protein 5.8 (L) 6.4 - 8.4 g/dL    Albumin 3.3 (L) 3.5 - 5.0 g/dL       XR chest 1 view    Result Date: 11/10/2023  Narrative: CHEST INDICATION:   Hypoxia. COMPARISON: CXR 4/17/2022, chest CT 3/27/2019. EXAM PERFORMED/VIEWS:  XR CHEST 1 VIEW. FINDINGS: Cardiomediastinal silhouette normal. Low lung volumes with bibasilar atelectasis. No effusion or pneumothorax. Upper abdomen normal. Hernia mesh along the left hemidiaphragm. Bones normal for age. Impression: Low lung volumes with bibasilar atelectasis. Pneumonia not excluded in the appropriate clinical setting. Workstation performed: [id number]     FL esophagram complete    Result Date: 11/9/2023  Narrative: BARIUM SWALLOW-ESOPHAGRAM INDICATION:   s/p paraesopahgeal hernia repair/ pyloroplasty. COMPARISON: 7/5/2023.  IMAGES: 18 FLUOROSCOPY TIME:   1 minute 14 seconds TECHNIQUE: The patient was given water-soluble contrast by mouth and images of the esophagus were obtained. FINDINGS: There is mild esophageal distention noted with focal narrowed segment at the GE junction likely related to recent hernia repair. Delayed esophageal emptying noted with periods of tertiary contraction suggestive of esophageal dysmotility. No contrast extravasation seen. The patient could not drink anymore contrast at the time of the study and therefore gastric emptying could not be observed. Initial images demonstrate a mesh overlying the region of the GE junction as well as cholecystectomy clips. Impression: Mild esophageal distention with a focal narrowed segment at the GE junction likely related to recent hernia repair. Esophageal dysmotility noted with delayed emptying and periods of tertiary contraction without nathalia obstruction or contrast extravasation. The patient could not drink anymore contrast at the time of the study and therefore gastric emptying cannot be observed during the study. Workstation performed: [id number]       Labs and pertinent reports reviewed.

## 2023-11-12 LAB
ALBUMIN SERPL BCP-MCNC: 3.1 G/DL (ref 3.5–5)
ALP SERPL-CCNC: 233 U/L (ref 34–104)
ALT SERPL W P-5'-P-CCNC: 386 U/L (ref 7–52)
ANION GAP SERPL CALCULATED.3IONS-SCNC: 5 MMOL/L
AST SERPL W P-5'-P-CCNC: 259 U/L (ref 13–39)
BILIRUB DIRECT SERPL-MCNC: 2.95 MG/DL (ref 0–0.2)
BILIRUB SERPL-MCNC: 4.02 MG/DL (ref 0.2–1)
BUN SERPL-MCNC: 21 MG/DL (ref 5–25)
CALCIUM SERPL-MCNC: 8.6 MG/DL (ref 8.4–10.2)
CHLORIDE SERPL-SCNC: 107 MMOL/L (ref 96–108)
CO2 SERPL-SCNC: 27 MMOL/L (ref 21–32)
CREAT SERPL-MCNC: 1.71 MG/DL (ref 0.6–1.3)
ERYTHROCYTE [DISTWIDTH] IN BLOOD BY AUTOMATED COUNT: 14.3 % (ref 11.6–15.1)
GFR SERPL CREATININE-BSD FRML MDRD: 28 ML/MIN/1.73SQ M
GLUCOSE SERPL-MCNC: 112 MG/DL (ref 65–140)
GLUCOSE SERPL-MCNC: 114 MG/DL (ref 65–140)
GLUCOSE SERPL-MCNC: 161 MG/DL (ref 65–140)
HCT VFR BLD AUTO: 25.3 % (ref 34.8–46.1)
HGB BLD-MCNC: 8.4 G/DL (ref 11.5–15.4)
MAGNESIUM SERPL-MCNC: 2 MG/DL (ref 1.9–2.7)
MCH RBC QN AUTO: 32.8 PG (ref 26.8–34.3)
MCHC RBC AUTO-ENTMCNC: 33.2 G/DL (ref 31.4–37.4)
MCV RBC AUTO: 99 FL (ref 82–98)
PHOSPHATE SERPL-MCNC: 2.5 MG/DL (ref 2.3–4.1)
PLATELET # BLD AUTO: 87 THOUSANDS/UL (ref 149–390)
PMV BLD AUTO: 9.9 FL (ref 8.9–12.7)
POTASSIUM SERPL-SCNC: 3.7 MMOL/L (ref 3.5–5.3)
PROT SERPL-MCNC: 5.7 G/DL (ref 6.4–8.4)
RBC # BLD AUTO: 2.56 MILLION/UL (ref 3.81–5.12)
SODIUM SERPL-SCNC: 139 MMOL/L (ref 135–147)
WBC # BLD AUTO: 7.02 THOUSAND/UL (ref 4.31–10.16)

## 2023-11-12 PROCEDURE — 85027 COMPLETE CBC AUTOMATED: CPT | Performed by: STUDENT IN AN ORGANIZED HEALTH CARE EDUCATION/TRAINING PROGRAM

## 2023-11-12 PROCEDURE — 80076 HEPATIC FUNCTION PANEL: CPT

## 2023-11-12 PROCEDURE — 99024 POSTOP FOLLOW-UP VISIT: CPT | Performed by: STUDENT IN AN ORGANIZED HEALTH CARE EDUCATION/TRAINING PROGRAM

## 2023-11-12 PROCEDURE — 80048 BASIC METABOLIC PNL TOTAL CA: CPT | Performed by: STUDENT IN AN ORGANIZED HEALTH CARE EDUCATION/TRAINING PROGRAM

## 2023-11-12 PROCEDURE — 82948 REAGENT STRIP/BLOOD GLUCOSE: CPT

## 2023-11-12 PROCEDURE — 83735 ASSAY OF MAGNESIUM: CPT

## 2023-11-12 PROCEDURE — 84100 ASSAY OF PHOSPHORUS: CPT

## 2023-11-12 PROCEDURE — 94640 AIRWAY INHALATION TREATMENT: CPT

## 2023-11-12 PROCEDURE — 99232 SBSQ HOSP IP/OBS MODERATE 35: CPT | Performed by: INTERNAL MEDICINE

## 2023-11-12 PROCEDURE — C9113 INJ PANTOPRAZOLE SODIUM, VIA: HCPCS | Performed by: PHYSICIAN ASSISTANT

## 2023-11-12 PROCEDURE — NC001 PR NO CHARGE: Performed by: INTERNAL MEDICINE

## 2023-11-12 PROCEDURE — 94760 N-INVAS EAR/PLS OXIMETRY 1: CPT

## 2023-11-12 PROCEDURE — 99024 POSTOP FOLLOW-UP VISIT: CPT | Performed by: THORACIC SURGERY (CARDIOTHORACIC VASCULAR SURGERY)

## 2023-11-12 RX ORDER — ALBUTEROL SULFATE 2.5 MG/3ML
2.5 SOLUTION RESPIRATORY (INHALATION) EVERY 6 HOURS PRN
Status: DISCONTINUED | OUTPATIENT
Start: 2023-11-12 | End: 2023-11-14 | Stop reason: HOSPADM

## 2023-11-12 RX ORDER — MIDODRINE HYDROCHLORIDE 2.5 MG/1
2.5 TABLET ORAL
Status: DISCONTINUED | OUTPATIENT
Start: 2023-11-12 | End: 2023-11-13

## 2023-11-12 RX ORDER — SODIUM CHLORIDE, SODIUM GLUCONATE, SODIUM ACETATE, POTASSIUM CHLORIDE, MAGNESIUM CHLORIDE, SODIUM PHOSPHATE, DIBASIC, AND POTASSIUM PHOSPHATE .53; .5; .37; .037; .03; .012; .00082 G/100ML; G/100ML; G/100ML; G/100ML; G/100ML; G/100ML; G/100ML
50 INJECTION, SOLUTION INTRAVENOUS CONTINUOUS
Status: DISPENSED | OUTPATIENT
Start: 2023-11-12 | End: 2023-11-13

## 2023-11-12 RX ORDER — ACETYLCYSTEINE 200 MG/ML
3 SOLUTION ORAL; RESPIRATORY (INHALATION)
Status: DISCONTINUED | OUTPATIENT
Start: 2023-11-12 | End: 2023-11-12

## 2023-11-12 RX ORDER — POTASSIUM CHLORIDE 20 MEQ/1
40 TABLET, EXTENDED RELEASE ORAL ONCE
Status: COMPLETED | OUTPATIENT
Start: 2023-11-12 | End: 2023-11-12

## 2023-11-12 RX ADMIN — MIDODRINE HYDROCHLORIDE 5 MG: 5 TABLET ORAL at 04:39

## 2023-11-12 RX ADMIN — SUCRALFATE 1 G: 1 TABLET ORAL at 04:39

## 2023-11-12 RX ADMIN — ACETAMINOPHEN 650 MG: 650 SUSPENSION ORAL at 00:15

## 2023-11-12 RX ADMIN — TAMSULOSIN HYDROCHLORIDE 0.4 MG: 0.4 CAPSULE ORAL at 16:28

## 2023-11-12 RX ADMIN — DOCUSATE SODIUM 100 MG: 100 CAPSULE, LIQUID FILLED ORAL at 09:08

## 2023-11-12 RX ADMIN — POLYETHYLENE GLYCOL 3350 17 G: 17 POWDER, FOR SOLUTION ORAL at 09:09

## 2023-11-12 RX ADMIN — ALBUTEROL SULFATE 2 PUFF: 90 AEROSOL, METERED RESPIRATORY (INHALATION) at 21:48

## 2023-11-12 RX ADMIN — ACETYLCYSTEINE 600 MG: 200 SOLUTION ORAL; RESPIRATORY (INHALATION) at 16:35

## 2023-11-12 RX ADMIN — POTASSIUM CHLORIDE 40 MEQ: 1500 TABLET, EXTENDED RELEASE ORAL at 15:05

## 2023-11-12 RX ADMIN — ONDANSETRON 4 MG: 2 INJECTION INTRAMUSCULAR; INTRAVENOUS at 00:15

## 2023-11-12 RX ADMIN — ALBUTEROL SULFATE 2.5 MG: 2.5 SOLUTION RESPIRATORY (INHALATION) at 16:35

## 2023-11-12 RX ADMIN — SUCRALFATE 1 G: 1 TABLET ORAL at 12:04

## 2023-11-12 RX ADMIN — SUCRALFATE 1 G: 1 TABLET ORAL at 15:05

## 2023-11-12 RX ADMIN — ENOXAPARIN SODIUM 30 MG: 30 INJECTION SUBCUTANEOUS at 09:08

## 2023-11-12 RX ADMIN — SODIUM CHLORIDE, SODIUM GLUCONATE, SODIUM ACETATE, POTASSIUM CHLORIDE, MAGNESIUM CHLORIDE, SODIUM PHOSPHATE, DIBASIC, AND POTASSIUM PHOSPHATE 50 ML/HR: .53; .5; .37; .037; .03; .012; .00082 INJECTION, SOLUTION INTRAVENOUS at 09:21

## 2023-11-12 RX ADMIN — HYDROMORPHONE HYDROCHLORIDE 0.2 MG: 0.2 INJECTION, SOLUTION INTRAMUSCULAR; INTRAVENOUS; SUBCUTANEOUS at 04:39

## 2023-11-12 RX ADMIN — SENNOSIDES 8.6 MG: 8.6 TABLET, FILM COATED ORAL at 09:08

## 2023-11-12 RX ADMIN — ALBUTEROL SULFATE 2 PUFF: 90 AEROSOL, METERED RESPIRATORY (INHALATION) at 09:09

## 2023-11-12 RX ADMIN — PANTOPRAZOLE SODIUM 40 MG: 40 INJECTION, POWDER, FOR SOLUTION INTRAVENOUS at 09:09

## 2023-11-12 RX ADMIN — AMITRIPTYLINE HYDROCHLORIDE 50 MG: 50 TABLET, FILM COATED ORAL at 21:48

## 2023-11-12 NOTE — PROGRESS NOTES
Progress Note - Thoracic Surgery   Shellben House 69 y.o. female MRN: [id number]  Unit/Bed#: [facility] 431-01 Encounter: [id number]    Assessment:  26-year-old female with dysphagia and gastroparesis s/p prior Nissen fundoplication in 3589, now s/p robotic Nissen takedown, hiatal hernia repair and partial fundoplication with mesh along with excisional liver biopsy. 11/9 UGI, Mild esophageal distention with a focal narrowed segment at the GE junction likely related to recent hernia repair. Esophageal dysmotility was noted and delayed emptying. Pt unable to complete study to evaluate for gastric emptying. No evidence of leak. Afebrile, VSS, satting 97% on 4L NC  UOP 1 L (400)  WBC 7.02 from 6.9  Hgb 8.4 from 8.8  Platelets 87 from 40  Creatinine 1.71 from 2.23  Hepatic function panel pending  Plan:  -FLD; slowly advance   - Geriatric med: fall precautions, PT, delirium precautions  -  trend LFTs  - Nephro: avoid ACE-I, check labs, stable from renal standpoint for d/c  - Heme/onc-continue -hemolysis smear haptoglobin, reticulocyte count, low clinical suspicion for HIT  - DVT prophylaxis   - Incentive spirometry  - Encourage ambulation  -dispo planning   - Home meds      Subjective/Objective     Subjective:   Patient seen and examined. Patient reports continued pain localized to her right lower quadrant. Denies bowel function. Objective:     Blood pressure 124/74, pulse 99, temperature 98.4 °F (36.9 °C), temperature source Oral, resp. rate 16, height 5' (1.524 m), weight 61.2 kg (134 lb 14.4 oz), SpO2 97 %, not currently breastfeeding. ,Body mass index is 26.35 kg/m².       Intake/Output Summary (Last 24 hours) at 11/12/2023 0656  Last data filed at 11/12/2023 0502  Gross per 24 hour   Intake 750 ml   Output 1050 ml   Net -300 ml         Invasive Devices       Peripheral Intravenous Line  Duration             Peripheral IV 11/12/23 Left Antecubital <1 day              Drain  Duration             Urethral Catheter Non-latex 16 Fr. <1 day                    Physical Exam:   Gen: NAD, Comfortable  Neuro: A&O  Head: Normal Cephalic, Atraumatic  Eye: EOMI, PERRLA, No scleral icterus  Neck: Supple, No JVD  CV: RRR  Pulm: Normal work of breathing, no respiratory distress  Abd: Soft, Non-Distended, appropriately, incisions c/d/i  Ext: No edema, Non-tender  Skin: warm, dry, intact

## 2023-11-12 NOTE — PROGRESS NOTES
Progress Note - General Surgery  : GETACHEWB Red Surgery Resident on [facility]    Samir Coelho 69 y.o. female MRN: [id number]  Unit/Bed#: [facility] 431-01 Encounter: [id number]         Assessment:  67-year-old female with dysphagia and gastroparesis s/p prior Nissen fundoplication in 6619, now s/p robotic Nissen takedown, hiatal hernia repair and partial fundoplication with mesh along with excisional liver biopsy. Remains admitted with JOSE, LFT abnormalities, and thrombocytopenia of unclear etiology (though all improving)           Plan:  FLD (will likely go home on this)  Horton and Flomax for urinary retention  Appreciate nephro recs  F/u AM Cr (BL 1.4 . 1-7)  F/u PT/OT recs  F/u AM platelets, have been improving  DVT ppx        Subjective: Feels well, tolerating small amount of FLD PO, albeit with some difficulty. Endorses pain and difficulty with swallowing        Objective:      Physical Exam:  GEN: NAD   Ab: Soft, appropriately mildly tender /ND, CDI  Lung: Normal effort on 2L NC  CV: RRR   Extrem: No CCE   Neuro: A+Ox3         I/O           11/10 0701  11/11 0700 11/11 0701  11/12 0700 11/12 0701 11/13 0700     P. O. 120 358       I.V. (mL/kg) 46.2 (0.8) 510 (8.3)       IV Piggyback 500         Total Intake(mL/kg) 666.2 (10.9) 868 (14.2)       Urine (mL/kg/hr) 875 (0.6) 1050 (0.7) 350 (0.7)     Stool   0       Total Output 875 1050 350     Net -208.8 -182 -350                 Unmeasured Urine Occurrence 1 x         Unmeasured Stool Occurrence   0 x                  Lab, Imaging and other studies: I have personally reviewed pertinent reports.   , CBC with diff:         Lab Results   Component Value Date     WBC 7.02 11/12/2023     HGB 8.4 (L) 11/12/2023     HCT 25.3 (L) 11/12/2023     MCV 99 (H) 11/12/2023     PLT 87 (L) 11/12/2023     RBC 2.56 (L) 11/12/2023     MCH 32.8 11/12/2023     MCHC 33.2 11/12/2023     RDW 14.3 11/12/2023     MPV 9.9 11/12/2023   , BMP/CMP:         Lab Results   Component Value Date     SODIUM 139 11/12/2023     K 3.7 11/12/2023      11/12/2023     CO2 27 11/12/2023     BUN 21 11/12/2023     CREATININE 1.71 (H) 11/12/2023     CALCIUM 8.6 11/12/2023      (H) 11/12/2023      (H) 11/12/2023     ALKPHOS 233 (H) 11/12/2023     EGFR 28 11/12/2023            VTE Pharmacologic Prophylaxis: Enoxaparin (Lovenox)           Lopez Rojas MD  11/12/2023 2:41 PM

## 2023-11-12 NOTE — PROGRESS NOTES
Progress Note - Thoracic Surgery  : SIDDHARTH Thoracic Surgery Resident on [address] [facility] C Chad 69 y.o. female MRN: [id number]  Unit/Bed#: [facility] 431-01 Encounter: [id number]      Assessment:  81-year-old female with dysphagia and gastroparesis s/p prior Nissen fundoplication in 9964, now s/p robotic Nissen takedown, hiatal hernia repair and partial fundoplication with mesh along with excisional liver biopsy. Remains admitted with JOSE, LFT abnormalities, and thrombocytopenia of unclear etiology (though all improving)        Plan:  FLD (will likely go home on this)  Horton and Flomax for urinary retention  Appreciate nephro recs  F/u AM Cr (BL 1.4 . 1-7)  F/u PT/OT recs  F/u AM platelets, have been improving  DVT ppx      Subjective: Feels well, tolerating small amount of FLD PO, albeit with some difficulty. Endorses pain and difficulty with swallowing      Objective:     Physical Exam:  GEN: NAD   Ab: Soft, appropriately mildly tender /ND, CDI  Lung: Normal effort on 2L NC  CV: RRR   Extrem: No CCE   Neuro: A+Ox3       I/O         11/10 0701  11/11 0700 11/11 0701  11/12 0700 11/12 0701 11/13 0700    P. O. 120 358     I.V. (mL/kg) 46.2 (0.8) 510 (8.3)     IV Piggyback 500      Total Intake(mL/kg) 666.2 (10.9) 868 (14.2)     Urine (mL/kg/hr) 875 (0.6) 1050 (0.7) 350 (0.7)    Stool  0     Total Output 875 1050 350    Net -208.8 -182 -350           Unmeasured Urine Occurrence 1 x      Unmeasured Stool Occurrence  0 x             Lab, Imaging and other studies: I have personally reviewed pertinent reports.   , CBC with diff:   Lab Results   Component Value Date    WBC 7.02 11/12/2023    HGB 8.4 (L) 11/12/2023    HCT 25.3 (L) 11/12/2023    MCV 99 (H) 11/12/2023    PLT 87 (L) 11/12/2023    RBC 2.56 (L) 11/12/2023    MCH 32.8 11/12/2023    MCHC 33.2 11/12/2023    RDW 14.3 11/12/2023    MPV 9.9 11/12/2023   , BMP/CMP:   Lab Results   Component Value Date    SODIUM 139 11/12/2023    K 3.7 11/12/2023    CL 107 11/12/2023    CO2 27 11/12/2023    BUN 21 11/12/2023    CREATININE 1.71 (H) 11/12/2023    CALCIUM 8.6 11/12/2023     (H) 11/12/2023     (H) 11/12/2023    ALKPHOS 233 (H) 11/12/2023    EGFR 28 11/12/2023         VTE Pharmacologic Prophylaxis: Enoxaparin (Lovenox)        Michell Torres MD  11/12/2023 2:41 PM

## 2023-11-12 NOTE — PROGRESS NOTES
Progress Note - General Surgery   Cortez Morris 69 y.o. female MRN: [id number]  Unit/Bed#: [facility] 431-01 Encounter: [id number]    Assessment:  60-year-old female with dysphagia and gastroparesis s/p prior Nissen fundoplication in 6342, now s/p robotic Nissen takedown, hiatal hernia repair and partial fundoplication with mesh along with excisional liver biopsy. 11/9 UGI, Mild esophageal distention with a focal narrowed segment at the GE junction likely related to recent hernia repair. Esophageal dysmotility was noted and delayed emptying. Pt unable to complete study to evaluate for gastric emptying. No evidence of leak. Afebrile, VSS, satting 97% on 4L NC  UOP 1 L (400)  WBC 7.02 from 6.9  Hgb 8.4 from 8.8  Platelets 87 from 40  Creatinine 1.71 from 2.23  Hepatic function panel pending  Plan:  -FLD; slowly advance   - Geriatric med: fall precautions, PT, delirium precautions  - Nephro: avoid ACE-I, check labs, stable from renal standpoint for d/c  - Heme/onc-continue -hemolysis smear haptoglobin, reticulocyte count, low clinical suspicion for HIT  - DVT prophylaxis   - Incentive spirometry  - Encourage ambulation  -dispo planning   - Home meds      Subjective/Objective     Subjective:   Patient seen and examined. Patient reports continued pain localized to her right lower quadrant. Denies bowel function. Objective:     Blood pressure 126/74, pulse 103, temperature 99.4 °F (37.4 °C), temperature source Oral, resp. rate (!) 24, height 5' (1.524 m), weight 61.3 kg (135 lb 2.3 oz), SpO2 96 %, not currently breastfeeding. ,Body mass index is 26.39 kg/m². Intake/Output Summary (Last 24 hours) at 11/12/2023 0010  Last data filed at 11/11/2023 2230  Gross per 24 hour   Intake 870 ml   Output 1375 ml   Net -505 ml         Invasive Devices       Peripheral Intravenous Line  Duration             Peripheral IV 11/08/23 Dorsal (posterior); Left Forearm 3 days                    Physical Exam:   Gen: NAD, Comfortable  Neuro: A&O  Head: Normal Cephalic, Atraumatic  Eye: EOMI, PERRLA, No scleral icterus  Neck: Supple, No JVD  CV: RRR  Pulm: Normal work of breathing, no respiratory distress  Abd: Soft, Non-Distended, appropriately, incisions c/d/i  Ext: No edema, Non-tender  Skin: warm, dry, intact

## 2023-11-13 LAB
ALBUMIN SERPL BCP-MCNC: 3.1 G/DL (ref 3.5–5)
ALP SERPL-CCNC: 193 U/L (ref 34–104)
ALT SERPL W P-5'-P-CCNC: 269 U/L (ref 7–52)
ANION GAP SERPL CALCULATED.3IONS-SCNC: 5 MMOL/L
AST SERPL W P-5'-P-CCNC: 114 U/L (ref 13–39)
BILIRUB SERPL-MCNC: 1.39 MG/DL (ref 0.2–1)
BUN SERPL-MCNC: 18 MG/DL (ref 5–25)
CALCIUM ALBUM COR SERPL-MCNC: 9.2 MG/DL (ref 8.3–10.1)
CALCIUM SERPL-MCNC: 8.5 MG/DL (ref 8.4–10.2)
CHLORIDE SERPL-SCNC: 109 MMOL/L (ref 96–108)
CO2 SERPL-SCNC: 28 MMOL/L (ref 21–32)
CREAT SERPL-MCNC: 1.41 MG/DL (ref 0.6–1.3)
ERYTHROCYTE [DISTWIDTH] IN BLOOD BY AUTOMATED COUNT: 14.9 % (ref 11.6–15.1)
GFR SERPL CREATININE-BSD FRML MDRD: 35 ML/MIN/1.73SQ M
GLUCOSE SERPL-MCNC: 105 MG/DL (ref 65–140)
GLUCOSE SERPL-MCNC: 110 MG/DL (ref 65–140)
GLUCOSE SERPL-MCNC: 157 MG/DL (ref 65–140)
GLUCOSE SERPL-MCNC: 95 MG/DL (ref 65–140)
HCT VFR BLD AUTO: 25.9 % (ref 34.8–46.1)
HGB BLD-MCNC: 8.7 G/DL (ref 11.5–15.4)
MAGNESIUM SERPL-MCNC: 2 MG/DL (ref 1.9–2.7)
MCH RBC QN AUTO: 32.5 PG (ref 26.8–34.3)
MCHC RBC AUTO-ENTMCNC: 33.6 G/DL (ref 31.4–37.4)
MCV RBC AUTO: 97 FL (ref 82–98)
PF4 HEPARIN CMPLX AB SER-ACNC: 0.08 OD (ref 0–0.4)
PHOSPHATE SERPL-MCNC: 2.3 MG/DL (ref 2.3–4.1)
PLATELET # BLD AUTO: 127 THOUSANDS/UL (ref 149–390)
PMV BLD AUTO: 9.4 FL (ref 8.9–12.7)
POTASSIUM SERPL-SCNC: 4.1 MMOL/L (ref 3.5–5.3)
PROT SERPL-MCNC: 6.1 G/DL (ref 6.4–8.4)
RBC # BLD AUTO: 2.68 MILLION/UL (ref 3.81–5.12)
SODIUM SERPL-SCNC: 142 MMOL/L (ref 135–147)
WBC # BLD AUTO: 6.27 THOUSAND/UL (ref 4.31–10.16)

## 2023-11-13 PROCEDURE — 82948 REAGENT STRIP/BLOOD GLUCOSE: CPT

## 2023-11-13 PROCEDURE — 97163 PT EVAL HIGH COMPLEX 45 MIN: CPT

## 2023-11-13 PROCEDURE — 97167 OT EVAL HIGH COMPLEX 60 MIN: CPT

## 2023-11-13 PROCEDURE — 84100 ASSAY OF PHOSPHORUS: CPT

## 2023-11-13 PROCEDURE — 85027 COMPLETE CBC AUTOMATED: CPT | Performed by: STUDENT IN AN ORGANIZED HEALTH CARE EDUCATION/TRAINING PROGRAM

## 2023-11-13 PROCEDURE — 99024 POSTOP FOLLOW-UP VISIT: CPT | Performed by: THORACIC SURGERY (CARDIOTHORACIC VASCULAR SURGERY)

## 2023-11-13 PROCEDURE — 94760 N-INVAS EAR/PLS OXIMETRY 1: CPT | Performed by: SOCIAL WORKER

## 2023-11-13 PROCEDURE — 80053 COMPREHEN METABOLIC PANEL: CPT

## 2023-11-13 PROCEDURE — 83735 ASSAY OF MAGNESIUM: CPT

## 2023-11-13 PROCEDURE — 99024 POSTOP FOLLOW-UP VISIT: CPT | Performed by: SURGERY

## 2023-11-13 PROCEDURE — 94664 DEMO&/EVAL PT USE INHALER: CPT | Performed by: SOCIAL WORKER

## 2023-11-13 PROCEDURE — C9113 INJ PANTOPRAZOLE SODIUM, VIA: HCPCS | Performed by: PHYSICIAN ASSISTANT

## 2023-11-13 PROCEDURE — 99232 SBSQ HOSP IP/OBS MODERATE 35: CPT | Performed by: INTERNAL MEDICINE

## 2023-11-13 RX ORDER — TORSEMIDE 20 MG/1
20 TABLET ORAL ONCE
Status: COMPLETED | OUTPATIENT
Start: 2023-11-13 | End: 2023-11-13

## 2023-11-13 RX ADMIN — TAMSULOSIN HYDROCHLORIDE 0.4 MG: 0.4 CAPSULE ORAL at 17:01

## 2023-11-13 RX ADMIN — ACETAMINOPHEN 649.6 MG: 650 SUSPENSION ORAL at 12:37

## 2023-11-13 RX ADMIN — PANTOPRAZOLE SODIUM 40 MG: 40 INJECTION, POWDER, FOR SOLUTION INTRAVENOUS at 08:09

## 2023-11-13 RX ADMIN — TORSEMIDE 20 MG: 20 TABLET ORAL at 14:02

## 2023-11-13 RX ADMIN — ENOXAPARIN SODIUM 30 MG: 30 INJECTION SUBCUTANEOUS at 08:09

## 2023-11-13 RX ADMIN — CARBIDOPA AND LEVODOPA 2.5 MG: 50; 200 TABLET, EXTENDED RELEASE ORAL at 06:37

## 2023-11-13 RX ADMIN — AMITRIPTYLINE HYDROCHLORIDE 50 MG: 50 TABLET, FILM COATED ORAL at 21:09

## 2023-11-13 RX ADMIN — ALBUTEROL SULFATE 2 PUFF: 90 AEROSOL, METERED RESPIRATORY (INHALATION) at 21:09

## 2023-11-13 RX ADMIN — ACETAMINOPHEN 649.6 MG: 650 SUSPENSION ORAL at 18:32

## 2023-11-13 RX ADMIN — ALBUTEROL SULFATE 2 PUFF: 90 AEROSOL, METERED RESPIRATORY (INHALATION) at 08:15

## 2023-11-13 NOTE — RESTORATIVE TECHNICIAN NOTE
Restorative Technician Note      Patient Name: Tyshawn Roberts     Restorative Tech Visit Date: 11/13/23  Note Type: Mobility  Patient Position Upon Consult: Bedside chair  Activity Performed: Ambulated  Assistive Device: Roller walker  Patient Position at End of Consult: All needs within reach;  Bedside chair

## 2023-11-13 NOTE — RESPIRATORY THERAPY NOTE
Resp care   11/13/23 0831   Respiratory Assessment   Assessment Type Assess only   General Appearance Awake; Alert   Respiratory Pattern Normal   Chest Assessment Chest expansion symmetrical   Bilateral Breath Sounds Diminished;Crackles   Resp Comments Pt offers no resp c/o. states she was told not to use IS. Pt using home albuterol mdi bid. PRN udn not needed at this time. Cont to monitor pts resp status.    Additional Assessments   SpO2 91 %

## 2023-11-13 NOTE — QUICK NOTE
Acute Respiratory Failure with Hypoxia, in setting of surgery, evidenced by Hypoxia with Sats 84%>88%. 89% 87%>85% on room air, Tachypnea, RR 25>28 and Tachycardia, HR 90's to 100's, requiring Oxygen at 6L NC> 4L NC,  Incentive Spirometry, Albuterol and Acapella treatments. Findings: Patient reaching IS of 500ml and goal is 1000ml. O2 Sats with oxygen  92%> 93%> 94%. Patient has required Oxygen 6L NC>4L NC postoperatively. Pt has hx of Asthma/COPD, on Albuterol.

## 2023-11-13 NOTE — PLAN OF CARE
Problem: MOBILITY - ADULT  Goal: Maintain or return to baseline ADL function  Description: INTERVENTIONS:  -  Assess patient's ability to carry out ADLs; assess patient's baseline for ADL function and identify physical deficits which impact ability to perform ADLs (bathing, care of mouth/teeth, toileting, grooming, dressing, etc.)  - Assess/evaluate cause of self-care deficits   - Assess range of motion  - Assess patient's mobility; develop plan if impaired  - Assess patient's need for assistive devices and provide as appropriate  - Encourage maximum independence but intervene and supervise when necessary  - Involve family in performance of ADLs  - Assess for home care needs following discharge   - Consider OT consult to assist with ADL evaluation and planning for discharge  - Provide patient education as appropriate  Outcome: Progressing     Problem: PAIN - ADULT  Goal: Verbalizes/displays adequate comfort level or baseline comfort level  Description: Interventions:  - Encourage patient to monitor pain and request assistance  - Assess pain using appropriate pain scale  - Administer analgesics based on type and severity of pain and evaluate response  - Implement non-pharmacological measures as appropriate and evaluate response  - Consider cultural and social influences on pain and pain management  - Notify physician/advanced practitioner if interventions unsuccessful or patient reports new pain  Outcome: Progressing     Problem: Knowledge Deficit  Goal: Patient/family/caregiver demonstrates understanding of disease process, treatment plan, medications, and discharge instructions  Description: Complete learning assessment and assess knowledge base.   Interventions:  - Provide teaching at level of understanding  - Provide teaching via preferred learning methods  Outcome: Progressing

## 2023-11-13 NOTE — OCCUPATIONAL THERAPY NOTE
Occupational Therapy Evaluation     Patient Name: Sajan Toscano  MHNBT'K Date: 2023  Problem List  Active Problems: There are no active Hospital Problems. Past Medical History  Past Medical History:   Diagnosis Date    Anxiety     Arthritis     Asthma     Cancer ([address])     Candida infection, esophageal ([address])     Chronic kidney disease     Chronic pain     COPD (chronic obstructive pulmonary disease) (HCC)     Depression     Depression     Gastroparesis     Gastroparesis     GERD (gastroesophageal reflux disease)     Hypertension     Irritable bowel syndrome (IBS)     Lactose intolerance Not sure    Migraines     MRSA (methicillin resistant Staphylococcus aureus)     Multiple thyroid nodules     Osteoporosis     Pneumonia     Psychiatric disorder      Past Surgical History  Past Surgical History:   Procedure Laterality Date    BACK SURGERY       SECTION      CHOLECYSTECTOMY      COLONOSCOPY  10/09/2013    ESOPHAGEAL DILATION       [address] [place] [address] [place]    ESOPHAGOGASTRODUODENOSCOPY N/A 2023    Procedure: ESOPHAGOGASTRODUODENOSCOPY (EGD); Surgeon: Kinza Yoder MD;  [facility];  Service: General    HIATAL HERNIA REPAIR      NISSEN FUNDOPLICATION      MO BIOPSY LIVER NEEDLE PERCUTANEOUS N/A 2023    Procedure: EXCISION BIOPSY LIVER;  Surgeon: Kinza Yoder MD;  [facility];  Service: General    MO ESOPHAGOGASTRODUODENOSCOPY TRANSORAL DIAGNOSTIC N/A 2019    Procedure: ESOPHAGOGASTRODUODENOSCOPY (EGD); Surgeon: Kailash Ordaz MD;  Location: [facility];  Service: Gastroenterology    MO ESOPHAGOGASTRODUODENOSCOPY TRANSORAL DIAGNOSTIC N/A 2023    Procedure: ESOPHAGOGASTRODUODENOSCOPY (EGD);   Surgeon: Horald Apgar, MD;  [facility];  Service: Thoracic    MO ESOPHAGOSCOPY FLEX BALLOON DILAT <30 MM DIAM N/A 2023    Procedure: CRE WIRE GUIDED BALLOON DILATATION ESOPHAGEAL;  Surgeon: Horald Apgar, MD;  [facility];  Service: Thoracic HI LAPS RPR PARAESPHGL HRNA INCL FUNDPLSTY W/O MESH N/A 11/8/2023    Procedure: robotic takedown of nissen fundoplication, redo hiatal hernia, partial fundoplication, with mesh;  Surgeon: Dariana Pa MD;  [facility];  Service: Thoracic    HI PYLOROPLASTY N/A 11/8/2023    Procedure: PYLOROPLASTY LAPAROSCOPIC WITH ROBOT;  Surgeon: Kannan Arango MD;  [facility];  Service: General    ROTATOR CUFF REPAIR      SHOULDER SURGERY      SPINAL FUSION      TUBAL LIGATION  May 1975    Last child born    UPPER GASTROINTESTINAL ENDOSCOPY      US GUIDED THYROID BIOPSY      WISDOM TOOTH EXTRACTION               11/13/23 1131   OT Last Visit   OT Visit Date 11/13/23   Note Type   Note type Evaluation   Pain Assessment   Pain Assessment Tool 0-10   Pain Score 3   Pain Location/Orientation Location: Abdomen   Patient's Stated Pain Goal No pain   Hospital Pain Intervention(s) Repositioned; Ambulation/increased activity   Restrictions/Precautions   Weight Bearing Precautions Per Order No   Other Precautions Pain; Fall Risk;Multiple lines;Telemetry  (on room air throughout session)   Home Living   Type of [address] [place] [address]  Two level  (bi-level home with 1 CHRISSY to main living area, 4 steps up to kitchen)   Bathroom Shower/Tub Walk-in shower   Bathroom Toilet Standard   Bathroom Equipment Shower chair;Grab bars in [address]   Additional Comments Pt lives in a bi-level home with 1 CHRISSY to main living area, 4 steps to kitchen, uses a walk-in shower with seat and GB, standard toilet   Prior Function   Level of [place] Independent with ADLs; Independent with functional mobility; Independent with IADLS   Lives With Family  (multiple family members: son, son's fiance/her dtr, 1 grandson and 2 granddtr)   Receives Help From Family  (although reports family is not much help)   IADLs Independent with driving; Independent with meal prep; Independent with medication management   Falls in the last 6 months 0   Vocational Retired   Lifestyle   Autonomy Pta pt I with ADL, IADL and fxnal mobility with cane, (+)    Reciprocal Relationships family   Service to Others retired from [address] enjoys family time   Subjective   Subjective "My son works from home, other than that no one can really help me much"   ADL   Where Assessed Chair   Eating Assistance 6  Modified independent   [address] 5  Supervision/Setup    [address] 5  Supervision/Setup   [address] [facility] 5  Supervision/Setup   LB Dressing Assistance 5  [address] [facility]  5  [address] [facility] 5  [address] in chair throughout session, left in chair with all needs within reach   Transfers   Sit to Stand 5  Supervision   Additional items Verbal cues; Increased time required   Stand to Sit 5  Supervision   Additional items Verbal cues; Increased time required   Additional Comments with RW   Functional Mobility   Functional Mobility 5  Supervision   Additional Comments Pt performs short household distance mobility with S with RW, desatted to about 85% on room air although recovers with standing rest   Additional items Rolling walker   Balance   Static Sitting Good   Dynamic Sitting Fair +   Static Standing Fair   Dynamic Standing Fair -   Ambulatory Fair -   Activity Tolerance   Activity Tolerance Patient limited by fatigue;Patient limited by pain;Treatment limited secondary to medical complications (Comment)  (Pt desatted to 85 % x 2 during fxnal mobility although recovers to 90-92% with standing/seated rest on room air, RN aware)   Medical Staff Made Aware Co-eval with DPT due to high medical complexity   Nurse Made Aware RN cleared for therapy   RUE Assessment   RUE Assessment WFL   LUE Assessment   LUE Assessment [facility] Hand Function   Gross Motor Coordination Functional   Fine Motor Coordination Functional   Sensation   Light Touch No apparent deficits   Psychosocial   Psychosocial (WDL) WDL   Cognition   Overall Cognitive Status WFL   Arousal/Participation Cooperative   Attention Within functional limits   Orientation Level Oriented to person;Oriented to place;Oriented to situation   Memory Within functional limits   Following Commands Follows one step commands without difficulty   Comments Pt cooperative to therapy although with some decreased insight to current deficits   Assessment   Limitation Decreased ADL status; Decreased Safe judgement during ADL;Decreased endurance;Decreased self-care trans;Decreased high-level ADLs   Prognosis Good   Assessment Pt is a 69 y.o. female who was admitted to [address] [place] [address] on 11/8/2023 with s/p robotic Nissen takedown, hiatal hernia repair and partial fundoplication with mesh along with excisional liver biopsy. Pt seen for an OT evaluation per active OT orders. Pt  has a past medical history of Anxiety, Arthritis, Asthma, Cancer ([address]), Candida infection, esophageal ([address]), Chronic kidney disease, Chronic pain, COPD (chronic obstructive pulmonary disease) ([address]), Depression, Depression, Gastroparesis, Gastroparesis, GERD (gastroesophageal reflux disease), Hypertension, Irritable bowel syndrome (IBS), Lactose intolerance, Migraines, MRSA (methicillin resistant Staphylococcus aureus), Multiple thyroid nodules, Osteoporosis, Pneumonia, and Psychiatric disorder. Pt lives with family in a bi-level home with 1 CHRISSY to bed/bath, 4 steps to kitchen, uses walk-in shower and standard toilet. Pta, pt was independent w/ ADL/IADL and functional mobility, was (+) driving and was using a cane at baseline. Currently, pt is Supervision for UB ADL, Supervision for LB ADL, and completed transfers/FM w Supervision.  Pt currently presents with impairments in the following categories -steps to enter environment, limited home support, and difficulty performing ADLS activity tolerance and endurance. These impairments, as well as pt's fatigue, SOB, VALADEZ, and pain  limit pt's ability to safely engage in all baseline areas of occupation, includinggrooming, bathing, dressing, toileting, and functional mobility/transfers. The patient's raw score on the AM-PAC Daily Activity Inpatient Short Form is 21. A raw score of greater than or equal to 19 suggests the patient may benefit from discharge to home. Please refer to the recommendation of the Occupational Therapist for safe discharge planning. Pt would benefit from continued acute OT services throughout hospital course and following D/C. Plan for OT interventions 2-3x per week. From OT standpoint, recommend home with home OT services upon D/C. Pt was left seated in bedside chair with all needs within reach. Goals   Patient Goals to go home   Plan   Treatment Interventions ADL retraining;Functional transfer training; Endurance training;Patient/family training;Continued evaluation; Energy conservation; Activityengagement   Goal Expiration Date 11/27/23   OT Frequency 2-3x/wk   Discharge Recommendation   Rehab Resource Intensity Level, OT III (Minimum Resource Intensity)   AM-PAC Daily Activity Inpatient   Lower Body Dressing 3   Bathing 3   Toileting 3   Upper Body Dressing 4   Grooming 4   Eating 4   Daily Activity Raw Score 21   Daily Activity Standardized Score (Calc for Raw Score >=11) 44.27   AM-PAC Applied Cognition Inpatient   Following a Speech/Presentation 4   Understanding Ordinary Conversation 4   Taking Medications 4   Remembering Where Things Are Placed or Put Away 4   Remembering List of 4-5 Errands 3   Taking Care of Complicated Tasks 3   Applied Cognition Raw Score 22   Applied Cognition Standardized Score 47.83   End of Consult   Education Provided Yes   Patient Position at End of Consult Bedside chair; All needs within reach   Nurse Communication Nurse aware of consult     OT Goals    - Pt will be Mod I with LB ADL by end of hospital course. - Pt will be Mod I with UB ADL by end of hospital course. - Pt will be Mod I with all functional transfers required for patient safety by end of hospital course. - Pt will be Mod I with functional mobility to/from bathroom for increased independence with toileting tasks. - Pt will independently recall and implement safety precautions during OT sessions.     - Pt activity tolerance will increase to 30 minutes in order to safely engage in ADL and transfers.     - Pt standing tolerance will increase to 10 minutes  in order to promote sink side ADLs and IADL activities.         Mary Kate Figueroa, JUNITO, OTR/L

## 2023-11-13 NOTE — RESPIRATORY THERAPY NOTE
RT Protocol Note  Brando Jimenez 69 y.o. female MRN: [id number]  Unit/Bed#: [facility] 431-01 Encounter: [id number]    Assessment    Active Problems: There are no active Hospital Problems.       Home Pulmonary Medications: none  Home Devices/Therapy:  (PRN Albuterol)    Past Medical History:   Diagnosis Date    Anxiety     Arthritis     Asthma     Cancer ([address])     Candida infection, esophageal (HCC)     Chronic kidney disease     Chronic pain     COPD (chronic obstructive pulmonary disease) (HCC)     Depression     Depression     Gastroparesis     Gastroparesis     GERD (gastroesophageal reflux disease)     Hypertension     Irritable bowel syndrome (IBS)     Lactose intolerance Not sure    Migraines     MRSA (methicillin resistant Staphylococcus aureus)     Multiple thyroid nodules     Osteoporosis     Pneumonia     Psychiatric disorder      Social History     Socioeconomic History    Marital status:      Spouse name: None    Number of children: None    Years of education: None    Highest education level: None   Occupational History    None   Tobacco Use    Smoking status: Former     Packs/day: 1.00     Years: 15.00     Total pack years: 15.00     Types: Cigarettes     Start date: 10/5/1998     Quit date: 10/1/2003     Years since quittin.1    Smokeless tobacco: Former   Vaping Use    Vaping Use: Never used   Substance and Sexual Activity    Alcohol use: Not Currently    Drug use: Never    Sexual activity: Not Currently     Partners: Male     Birth control/protection: None   Other Topics Concern    None   Social History Narrative    None     Social Determinants of Health     Financial Resource Strain: Not on file   Food Insecurity: No Food Insecurity (2023)    Hunger Vital Sign     Worried About Running Out of Food in the Last Year: Never true     Ran Out of Food in the Last Year: Never true   Transportation Needs: No Transportation Needs (2023)    PRAPARE - Transportation     Lack of Transportation (Medical): No     Lack of Transportation (Non-Medical): No   Physical Activity: Not on file   Stress: Not on file   Social Connections: Not on file   Intimate Partner Violence: Not on file   Housing Stability: Low Risk  (11/9/2023)    Housing Stability Vital Sign     Unable to Pay for Housing in the Last Year: No     Number of Places Lived in the Last Year: 1     Unstable Housing in the Last Year: No       Subjective         Objective    Physical Exam:   Assessment Type: Assess only  General Appearance: Sleeping  Respiratory Pattern: Normal  Chest Assessment: Chest expansion symmetrical  O2 Device: NC    Vitals:  Blood pressure 140/78, pulse 99, temperature 98.4 °F (36.9 °C), temperature source Oral, resp. rate 20, height 5' (1.524 m), weight 61.2 kg (134 lb 14.4 oz), SpO2 97 %, not currently breastfeeding. Imaging and other studies: I have personally reviewed pertinent reports. O2 Device: NC     Plan    Respiratory Plan: Home Bronchodilator Patient pathway        Resp Comments: Respiratory Protocol initiated. Pt admitted on 11/8 now s/p robotic Nissen takedown, hiatal hernia repair and partial fundoplication with mesh along with excisional liver biopsy. Pt is requiring oxygen at 4L at this time, will wean as able, in no respiratory distress. Pt has a hx of asthma/COPD for which she is already ordered her home medications.  IS at bedside, will re-evaluate IS/Acapella in the AM.

## 2023-11-13 NOTE — PROGRESS NOTES
Progress Note - Nephrology   Carin Mcfarlane 69 y.o. female MRN: [id number]  Unit/Bed#: [facility] 431-01 Encounter: [id number]      Assessment / Plan:    JOSE on top of CKD stage IIIb  Baseline creatinine of 1.4-1.7  Follows with nephrology in [facility]  JOSE due to ischemic injury and operative hemodynamic perturbations with prerenal azotemia as well as urinary retention and elevated LFTs  Creatinine has improved and currently at baseline  CT of abdomen without hydronephrosis  Hypoxia  We will give 1 dose of diuretic to diuresis and monitor response  Hypertension  Blood pressure is currently controlled  We discontinue midodrine and trend blood pressure  Anemia/thrombocytopenia  Per primary team/heme-onc  Other issues:  Status post robotic takedown of prior Nissen fundoplication on   Elevated LFTs  Urinary retention - currently on Flomax  Plan: We will give 1 dose of diuretic due to peripheral edema and hypoxia  Discontinue midodrine  If acceptable to primary service, will change Protonix to Pepcid to avoid risk of acute interstitial nephritis as well as limit Carafate usage which can lead to aluminum toxicity in patients with CKD  Recheck BMP in a.m., monitor renal function closely  Avoid relative hypotension and nephrotoxic medication exposure  Trend LFTs      Subjective: The patient was seen and examined earlier this morning. Overall, she felt well. She denied any shortness of breath, chest pain or pressure, abdominal pain, vomiting, diarrhea, sweats, chills. Objective:     Vitals: Blood pressure 118/71, pulse 93, temperature 98.3 °F (36.8 °C), resp. rate 15, height 5' (1.524 m), weight 60.2 kg (132 lb 11.5 oz), SpO2 91 %, not currently breastfeeding. ,Body mass index is 25.92 kg/m². Temp (24hrs), Av.4 °F (36.9 °C), Min:98.1 °F (36.7 °C), Max:98.7 °F (37.1 °C)      Weight (last 2 days)       Date/Time Weight    23 0600 60.2 (132.72)    23 0458 61.2 (134.9)    23 0600 61.3 (135.14) Intake/Output Summary (Last 24 hours) at 11/13/2023 1315  Last data filed at 11/13/2023 1216  Gross per 24 hour   Intake 551.33 ml   Output 1025 ml   Net -473.67 ml     I/O last 24 hours: In: 551.3 [P.O.:318;  I.V.:233.3]  Out: 1025 [Urine:1025]        Physical Exam    /71   Pulse 93   Temp 98.3 °F (36.8 °C)   Resp 15   Ht 5' (1.524 m)   Wt 60.2 kg (132 lb 11.5 oz)   SpO2 91%   BMI 25.92 kg/m²   Vital signs were reviewed  Constitutional: The patient was awake, alert, pleasant, cooperative and in no apparent distress  Cardiovascular: No overt jugular venous tension was noted, S1-S2, no pericardial friction rub S3 appreciated, trace peripheral edema bilaterally   Pulmonary: adequate inspiratory effort, decreased breath sounds, crackles at the left base                Invasive Devices       Peripheral Intravenous Line  Duration             Peripheral IV 11/12/23 Left Antecubital 1 day                    Medications:    Scheduled Meds:  Current Facility-Administered Medications   Medication Dose Route Frequency Provider Last Rate    acetaminophen  650 mg Oral Q6H Saddie Mustache, PA-C      albuterol  2 puff Inhalation BID Saddie Mustache, PA-C      albuterol  2.5 mg Nebulization Q6H PRN Karla Rhodes MD      amitriptyline  50 mg Oral HS Saddie Mustache, PA-C      docusate sodium  100 mg Oral BID Saddie Mustache, PA-C      enoxaparin  30 mg Subcutaneous Q24H [address] Saddie Musttristian, PA-C      HYDROmorphone  0.2 mg Intravenous Q3H PRN Tuesday RUSS Kirkpatrick      hydrOXYzine HCL  10 mg Oral Q6H PRN Willy Jeffers MD      midodrine  2.5 mg Oral TID AC Nancy Parish Pineda MD      ondansetron  4 mg Intravenous Q6H Saddie Mustache, PA-C      pantoprazole  40 mg Intravenous Daily Saddie Mustache, PA-C      polyethylene glycol  17 g Oral Daily Saddie Mustache, PA-C      senna  1 tablet Oral Daily Saddie Mustache, PA-C      sucralfate  1 g Oral 4x Daily (AC & HS) Saddie Mustache, PA-C tamsulosin  0.4 mg Oral Daily With Shahana Elkins MD         PRN Meds:.  albuterol    HYDROmorphone    hydrOXYzine HCL    Continuous Infusions:         LAB RESULTS:      Results from last 7 days   Lab Units 11/13/23  0534 11/12/23  0738 11/12/23  0440 11/11/23  1852 11/11/23  0839 11/11/23  0445 11/10/23  1234 11/10/23  0446 11/09/23  0504   WBC Thousand/uL 6.27  --  7.02  --   --  6.99 7.44  --  7.52   HEMOGLOBIN g/dL 8.7*  --  8.4*  --   --  8.8* 8.9*  --  10.0*   HEMATOCRIT % 25.9*  --  25.3*  --   --  25.6* 26.2*  --  30.2*   PLATELETS Thousands/uL 127*  --  87*  --   --  40* 38*  --  63*   NEUTROS PCT %  --   --   --   --   --  85* 81*  --   --    LYMPHS PCT %  --   --   --   --   --  9* 13*  --   --    MONOS PCT %  --   --   --   --   --  5 5  --   --    EOS PCT %  --   --   --   --   --  1 1  --   --    POTASSIUM mmol/L 4.1  --  3.7  --   --  3.8 4.0 3.8 4.2   CHLORIDE mmol/L 109*  --  107  --   --  105 102 102 108   CO2 mmol/L 28  --  27  --   --  23 24 25 25   BUN mg/dL 18  --  21  --   --  22 25 22 19   CREATININE mg/dL 1.41*  --  1.71*  --   --  2.23* 2.76* 2.62* 1.36*   CALCIUM mg/dL 8.5  --  8.6  --   --  8.6 8.3* 8.2* 8.4   ALK PHOS U/L 193* 233*  --  186* 163* 142* 121*  --   --    ALT U/L 269* 386*  --  522* 623* 700* 1,051*  --   --    AST U/L 114* 259*  --  466* 688* 906* 1,781*  --   --    MAGNESIUM mg/dL 2.0  --  2.0  --   --  1.9 1.9 2.0 1.6*   PHOSPHORUS mg/dL 2.3  --  2.5  --   --  2.3  --  3.7  --        CUTURES:  Lab Results   Component Value Date    BLOODCX No Growth After 5 Days. 07/09/2017    BLOODCX No Growth After 5 Days. 07/09/2017    URINECX >100,000 cfu/ml Mixed Contaminants X3 02/16/2016                 PLEASE NOTE:  This encounter was completed utilizing the [employer] Direct Speech Voice Recognition Software.  Grammatical errors, random word insertions, pronoun errors and incomplete sentences are occasional consequences of the system due to software limitations, ambient noise and hardware issues. These may be missed by proof reading prior to affixing electronic signature. Any questions or concerns about the content, text or information contained within the body of this dictation should be directly addressed to the physician for clarification. Please do not hesitate to call me directly if you have any any questions or concerns.

## 2023-11-13 NOTE — PLAN OF CARE
Problem: PHYSICAL THERAPY ADULT  Goal: Performs mobility at highest level of function for planned discharge setting. See evaluation for individualized goals. Description: Treatment/Interventions: ADL retraining, Functional transfer training, LE strengthening/ROM, Elevations, Therapeutic exercise, Endurance training, Patient/family training, Equipment eval/education, Bed mobility, Gait training, Compensatory technique education, Spoke to nursing, Spoke to case management, Spoke to advanced practitioner, OT  Equipment Recommended: Nina Contreras       See flowsheet documentation for full assessment, interventions and recommendations. Note: Prognosis: Fair  Problem List: Decreased strength, Decreased endurance, Impaired balance, Decreased mobility, Decreased safety awareness, Pain     Barriers to Discharge: [facility] home environment, Decreased caregiver support       Pt is 69 y.o. female seen for PT evaluation s/p admit to [facility] on 11/8/2023. Pt presenting for  robotic Nissen takedown, hiatal hernia repair and partial fundoplication with mesh along with excisional liver biopsy (completed 11/8/23)- pt post op course complicated by hypoxia and need for O2. Pt  has a past medical history of Anxiety, Arthritis, Asthma, Cancer ([address]), Candida infection, esophageal ([address]), Chronic kidney disease, Chronic pain, COPD (chronic obstructive pulmonary disease) ([address]), Depression, Depression, Gastroparesis, Gastroparesis, GERD (gastroesophageal reflux disease), Hypertension, Irritable bowel syndrome (IBS), Lactose intolerance (Not sure), Migraines, MRSA (methicillin resistant Staphylococcus aureus), Multiple thyroid nodules, Osteoporosis, Pneumonia, and Psychiatric disorder. PT consulted for post op mobility and d/c planning recommendations. At baseline pt being fully indep w/ occasional use of SPC.  Lives w/ son + extended family/ grand kids in a bilevel home- pt stays on FF w/ 1 CHRISSY + additional 4-5 steps to access kitches. Pt denies falls and reports being indep w/ ADL's and IADL's  Due to acute medical issues, ongoing medical workup for primary dx; pain, fall risk, increased reliance on more restrictive AD compared to baseline;  decreased activity tolerance compared to baseline, increased assistance needed from caregiver at current time, continuous telemetry monitoring, multiple lines, decline in overall functional mobility status; health management issues; note unstable clinical picture (high complexity)   Pr Currently pt presents OOB on 2Lo2 in recliner- O2 removed and pt Spo2 90-91%; transfers w/ Sarah x1; gait w/ CGA Ax1 30+40+40' w/ CGA and standing breaks for drop in Spo2 to 85% (?able read) . Pt presents functioning below baseline and currently w/ overall mobility deficits 2* to: decreased LE strength/AROM; limited flexibility;  generalized weakness/ deconditioning; decreased coordination; impaired balance; gait deviations;SOB/VALADEZ; fatigue; i limited insight into current deficits; bed/ chair alarms; multiple lines; Pt currently at risk for falls. (Please find additional objective findings from PT assessment regarding body systems outlined above.) Pt will continue to benefit from skilled PT interventions to address stated impairments; to maximize functional potential; for ongoing pt/ family training; and DME needs. PT is currently recommending rehab intensity level II on d/c - increased family support + HHC and RW for home d/c- would recommend pt ambulate + complete stairs w/ less assist prior to d/c home- Formal O2 eval recommended. Rehab Resource Intensity Level, PT: II (Moderate Resource Intensity)    See flowsheet documentation for full assessment.

## 2023-11-13 NOTE — PHYSICAL THERAPY NOTE
PHYSICAL THERAPY EVALUATION  NAME:  Tyshawn Roberts  DATE: 23    AGE:   69 y.o. Mrn:   [id number]  ADMIT DX:  History of Nissen fundoplication [D46.394]  Esophageal dysphagia [R13.19]  Gastroparesis [K31.84]    Past Medical History:   Diagnosis Date    Anxiety     Arthritis     Asthma     Cancer ([address])     Candida infection, esophageal ([address])     Chronic kidney disease     Chronic pain     COPD (chronic obstructive pulmonary disease) (HCC)     Depression     Depression     Gastroparesis     Gastroparesis     GERD (gastroesophageal reflux disease)     Hypertension     Irritable bowel syndrome (IBS)     Lactose intolerance Not sure    Migraines     MRSA (methicillin resistant Staphylococcus aureus)     Multiple thyroid nodules     Osteoporosis     Pneumonia     Psychiatric disorder      Past Surgical History:   Procedure Laterality Date    BACK SURGERY       SECTION      CHOLECYSTECTOMY      COLONOSCOPY  10/09/2013    ESOPHAGEAL DILATION       [address] [place] [address] [facility]    ESOPHAGOGASTRODUODENOSCOPY N/A 2023    Procedure: ESOPHAGOGASTRODUODENOSCOPY (EGD); Surgeon: King Ad MD;  [facility];  Service: General    HIATAL HERNIA REPAIR      NISSEN FUNDOPLICATION      OR BIOPSY LIVER NEEDLE PERCUTANEOUS N/A 2023    Procedure: EXCISION BIOPSY LIVER;  Surgeon: King dA MD;  [facility];  Service: General    OR ESOPHAGOGASTRODUODENOSCOPY TRANSORAL DIAGNOSTIC N/A 2019    Procedure: ESOPHAGOGASTRODUODENOSCOPY (EGD); Surgeon: Bolivar Osman MD;  [facility];  Service: Gastroenterology    OR ESOPHAGOGASTRODUODENOSCOPY TRANSORAL DIAGNOSTIC N/A 2023    Procedure: ESOPHAGOGASTRODUODENOSCOPY (EGD);   Surgeon: Shana Hurt MD;  [facility];  Service: Thoracic    OR ESOPHAGOSCOPY FLEX BALLOON DILAT <30 MM DIAM N/A 2023    Procedure: CRE WIRE GUIDED BALLOON DILATATION ESOPHAGEAL;  Surgeon: Shana Hurt MD;  [facility];  Service: Thoracic    CT LAPS RPR PARAESPHGL HRNA INCL FUNDPLSTY W/O MESH N/A 11/8/2023    Procedure: robotic takedown of nissen fundoplication, redo hiatal hernia, partial fundoplication, with mesh;  Surgeon: Elen Marsh MD;  [facility];  Service: Thoracic    CT PYLOROPLASTY N/A 11/8/2023    Procedure: PYLOROPLASTY LAPAROSCOPIC WITH ROBOT;  Surgeon: Flores Corrales MD;  [facility];  Service: General    ROTATOR CUFF REPAIR      SHOULDER SURGERY      SPINAL FUSION      TUBAL LIGATION  May 1975    Last child born    UPPER GASTROINTESTINAL ENDOSCOPY      US GUIDED THYROID BIOPSY      WISDOM TOOTH EXTRACTION         Length Of Stay: 5  PHYSICAL THERAPY EVALUATION :    11/13/23 1129   PT Last Visit   PT Visit Date 11/13/23   Note Type   Note type Evaluation   Pain Assessment   Pain Assessment Tool 0-10   Pain Score 4   Pain Location/Orientation Location: Abdomen   Pain Radiating Towards non   Restrictions/Precautions   Weight Bearing Precautions Per Order No   Braces or Orthoses   (none)   Other Precautions Multiple lines;Telemetry;O2;Fall Risk;Pain   Home Living   Type of Home House   Home Layout Two level   Bathroom Shower/Tub Walk-in shower   Bathroom Toilet Standard   Bathroom Equipment Shower chair;Grab bars in shower   [address]   Additional Comments At baseline pt being fully indep w/ occasional use of SPC. Lives w/ son + extended family/ grand kids in a bilevel home- pt stays on FF w/ 1 CHRISSY + additional 4-5 steps to access kitches. Pt denies falls and reports being indep w/ ADL's and IADL's   Prior Function   Level of [place] Independent with ADLs; Independent with functional mobility; Independent with IADLS   Lives With Family  (multiple family members: son, son's fiance/her dtr, 1 grandson and 2 granddtr)   Receives Help From Family  (although reports family is not much help)   IADLs Independent with driving; Independent with meal prep;Independent with medication management   Falls in the last 6 months 0   Vocational Retired   Cognition   Overall Cognitive Status WFL   Attention Within functional limits   Orientation Level Oriented to person;Oriented to place;Oriented to situation   Memory Within functional limits   Following Commands Follows one step commands without difficulty   RUE Assessment   RUE Assessment WFL   LUE Assessment   LUE Assessment WFL   RLE Assessment   RLE Assessment WFL   LLE Assessment   LLE Assessment WFL   Coordination   Movements are Fluid and Coordinated 1   Light Touch   RLE Light Touch Grossly intact   LLE Light Touch Grossly intact   Sharp/Dull   RLE Sharp/Dull Grossly intact   LLE Sharp/Dull Grossly intact   Proprioception   RLE Proprioception Grossly intact   LLE Proprioception Grossly Intact   Transfers   Sit to Stand 4  Minimal assistance   Additional items Verbal cues; Increased time required   Stand to Sit 5  Supervision   Additional items Increased time required;Verbal cues   Ambulation/Elevation   Gait pattern Excessively slow; Step to;Shuffling;Decreased foot clearance; Forward Flexion   Gait Assistance   (CGA)   Assistive Device Rolling walker   Distance 82+64+77' w/ CGA and standing breaks for drop in Spo2 to 85% (?able read) . Stair Management Assistance Not tested  (2* LE + generalized fatigue/ SOB w/  ambualtion)   Balance   Static Sitting Good   Dynamic Sitting Fair +   Static Standing Fair -   Dynamic Standing Poor +   Ambulatory Poor +  (rw)   Endurance Deficit   Endurance Deficit Yes   Activity Tolerance   Activity Tolerance Patient limited by fatigue;Patient limited by pain;Treatment limited secondary to medical complications (Comment)  (Pt desatted to 85 % x 2 during fxnal mobility although recovers to 90-92% with standing/seated rest on room air, RN aware)   Medical Staff Made Aware OT/ CM for d/c planning recommendations   Nurse Made Aware yes- cleared for session   Assessment  Pt is 69 y.o. female seen for PT evaluation s/p admit to [address] [place] [address] on 11/8/2023. Pt presenting for  robotic Nissen takedown, hiatal hernia repair and partial fundoplication with mesh along with excisional liver biopsy (completed 11/8/23)- pt post op course complicated by hypoxia and need for O2. Pt  has a past medical history of Anxiety, Arthritis, Asthma, Cancer ([address]), Candida infection, esophageal ([address]), Chronic kidney disease, Chronic pain, COPD (chronic obstructive pulmonary disease) ([address]), Depression, Depression, Gastroparesis, Gastroparesis, GERD (gastroesophageal reflux disease), Hypertension, Irritable bowel syndrome (IBS), Lactose intolerance (Not sure), Migraines, MRSA (methicillin resistant Staphylococcus aureus), Multiple thyroid nodules, Osteoporosis, Pneumonia, and Psychiatric disorder. PT consulted for post op mobility and d/c planning recommendations. At baseline pt being fully indep w/ occasional use of SPC. Lives w/ son + extended family/ grand kids in a bilevel home- pt stays on FF w/ 1 CHRISSY + additional 4-5 steps to access kitches. Pt denies falls and reports being indep w/ ADL's and IADL's  Due to acute medical issues, ongoing medical workup for primary dx; pain, fall risk, increased reliance on more restrictive AD compared to baseline;  decreased activity tolerance compared to baseline, increased assistance needed from caregiver at current time, continuous telemetry monitoring, multiple lines, decline in overall functional mobility status; health management issues; note unstable clinical picture (high complexity)   Pr Currently pt presents OOB on 2Lo2 in recliner- O2 removed and pt Spo2 90-91%; transfers w/ Sarah x1; gait w/ CGA Ax1 30+40+40' w/ CGA and standing breaks for drop in Spo2 to 85% (?able read) .    Pt presents functioning below baseline and currently w/ overall mobility deficits 2* to: decreased LE strength/AROM; limited flexibility;  generalized weakness/ deconditioning; decreased coordination; impaired balance; gait deviations;SOB/VALADEZ; fatigue; i limited insight into current deficits; bed/ chair alarms; multiple lines; Pt currently at risk for falls. (Please find additional objective findings from PT assessment regarding body systems outlined above.) Pt will continue to benefit from skilled PT interventions to address stated impairments; to maximize functional potential; for ongoing pt/ family training; and DME needs. PT is currently recommending rehab intensity level II on d/c - increased family support + HHC and RW for home d/c- would recommend pt ambulate + complete stairs w/ less assist prior to d/c home- Formal O2 eval recommended. Prognosis Fair   Problem List Decreased strength;Decreased endurance; Impaired balance;Decreased mobility; Decreased safety awareness;Pain   Barriers to Discharge Inaccessible home environment;Decreased caregiver support   Goals   Patient Goals to feel better and go home   STG Expiration Date 11/27/23   Short Term Goal #1 In 14 days pt will complete: 1) Bed mobility skills with MI to facilitate safe return to previous living environment and decrease burden on caregivers. 2) Functional transfers with MI  to facilitate safe return to previous living environment  3) Ambulation with least restrictive AD MI> 250'x2' without LOB and stable vitals for safe ambulation in home/ community environment. 4) Stair training up/ down 5 step/s with S rail/s  for safe access to previous living environment and to increase community access. 5) Improve balance by 1 grade in order to decrease fall risk. 6) Improve LE strength grades by 1 to increase independence w/ all functional mobility, transfers and gait. 7) PT for ongoing pt and family education; DME needs and D/C planning to promote highest level of function in least restrictive environment.    PT Treatment Day 0   Plan   Treatment/Interventions ADL retraining;Functional transfer training;LE strengthening/ROM; Elevations; Therapeutic exercise; Endurance training;Patient/family training;Equipment eval/education; Bed mobility;Gait training; Compensatory technique education;Spoke to nursing;Spoke to case management;Spoke to advanced practitioner;OT   PT Frequency 3-5x/wk   Discharge Recommendation   Rehab Resource Intensity Level, PT II (Moderate Resource Intensity)   Equipment Recommended Walker   AM-PAC Basic Mobility Inpatient   Turning in Flat Bed Without Bedrails 3   Lying on Back to Sitting on Edge of Flat Bed Without Bedrails 3   Moving Bed to Chair 3   Standing Up From Chair Using Arms 3   Walk in Room 3   Climb 3-5 Stairs With Railing 3   Basic Mobility Inpatient Raw Score 18   Basic Mobility Standardized Score 41.05   Highest Level Of Mobility   JH-HLM Goal 6: Walk 10 steps or more   JH-HLM Achieved 7: Walk 25 feet or more   End of Consult   Patient Position at End of Consult Bedside chair;Bed/Chair alarm activated; All needs within reach       The patient's AM-PAC Basic Mobility Inpatient Short Form Raw Score is 18. A Raw score of greater than 16 suggests the patient may benefit from discharge to home. Please also refer to the recommendation of the Physical Therapist for safe discharge planning.

## 2023-11-13 NOTE — PLAN OF CARE
Problem: OCCUPATIONAL THERAPY ADULT  Goal: Performs self-care activities at highest level of function for planned discharge setting. See evaluation for individualized goals. Description: Treatment Interventions: ADL retraining, Functional transfer training, Endurance training, Patient/family training, Continued evaluation, Energy conservation, Activityengagement          See flowsheet documentation for full assessment, interventions and recommendations. Outcome: Progressing  Note: Limitation: Decreased ADL status, Decreased Safe judgement during ADL, Decreased endurance, Decreased self-care trans, Decreased high-level ADLs  Prognosis: Good  Assessment: Pt is a 69 y.o. female who was admitted to [address] [place] [address] on 11/8/2023 with s/p robotic Nissen takedown, hiatal hernia repair and partial fundoplication with mesh along with excisional liver biopsy. Pt seen for an OT evaluation per active OT orders. Pt  has a past medical history of Anxiety, Arthritis, Asthma, Cancer ([address]), Candida infection, esophageal ([address]), Chronic kidney disease, Chronic pain, COPD (chronic obstructive pulmonary disease) ([address]), Depression, Depression, Gastroparesis, Gastroparesis, GERD (gastroesophageal reflux disease), Hypertension, Irritable bowel syndrome (IBS), Lactose intolerance, Migraines, MRSA (methicillin resistant Staphylococcus aureus), Multiple thyroid nodules, Osteoporosis, Pneumonia, and Psychiatric disorder. Pt lives with family in a bi-level home with 1 CHRISSY to bed/bath, 4 steps to kitchen, uses walk-in shower and standard toilet. Pta, pt was independent w/ ADL/IADL and functional mobility, was (+) driving and was using a cane at baseline. Currently, pt is Supervision for UB ADL, Supervision for LB ADL, and completed transfers/FM w Supervision.  Pt currently presents with impairments in the following categories -steps to enter environment, limited home support, and difficulty performing ADLS activity tolerance and endurance. These impairments, as well as pt's fatigue, SOB, VALADEZ, and pain  limit pt's ability to safely engage in all baseline areas of occupation, includinggrooming, bathing, dressing, toileting, and functional mobility/transfers. The patient's raw score on the -PAC Daily Activity Inpatient Short Form is 21. A raw score of greater than or equal to 19 suggests the patient may benefit from discharge to home. Please refer to the recommendation of the Occupational Therapist for safe discharge planning. Pt would benefit from continued acute OT services throughout hospital course and following D/C. Plan for OT interventions 2-3x per week. From OT standpoint, recommend home with home OT services upon D/C. Pt was left seated in bedside chair with all needs within reach.      Rehab Resource Intensity Level, OT: III (Minimum Resource Intensity)

## 2023-11-14 VITALS
DIASTOLIC BLOOD PRESSURE: 82 MMHG | BODY MASS INDEX: 25.58 KG/M2 | HEIGHT: 60 IN | OXYGEN SATURATION: 93 % | SYSTOLIC BLOOD PRESSURE: 139 MMHG | TEMPERATURE: 98.3 F | HEART RATE: 88 BPM | WEIGHT: 130.29 LBS | RESPIRATION RATE: 16 BRPM

## 2023-11-14 LAB
ALBUMIN SERPL BCP-MCNC: 3.1 G/DL (ref 3.5–5)
ALP SERPL-CCNC: 143 U/L (ref 34–104)
ALT SERPL W P-5'-P-CCNC: 195 U/L (ref 7–52)
ANION GAP SERPL CALCULATED.3IONS-SCNC: 9 MMOL/L
AST SERPL W P-5'-P-CCNC: 57 U/L (ref 13–39)
BILIRUB SERPL-MCNC: 1 MG/DL (ref 0.2–1)
BUN SERPL-MCNC: 17 MG/DL (ref 5–25)
CALCIUM ALBUM COR SERPL-MCNC: 9.5 MG/DL (ref 8.3–10.1)
CALCIUM SERPL-MCNC: 8.8 MG/DL (ref 8.4–10.2)
CHLORIDE SERPL-SCNC: 104 MMOL/L (ref 96–108)
CO2 SERPL-SCNC: 28 MMOL/L (ref 21–32)
CREAT SERPL-MCNC: 1.43 MG/DL (ref 0.6–1.3)
DME PARACHUTE DELIVERY DATE ACTUAL: NORMAL
DME PARACHUTE DELIVERY DATE EXPECTED: NORMAL
DME PARACHUTE DELIVERY DATE REQUESTED: NORMAL
DME PARACHUTE ITEM DESCRIPTION: NORMAL
DME PARACHUTE ORDER STATUS: NORMAL
DME PARACHUTE SUPPLIER NAME: NORMAL
DME PARACHUTE SUPPLIER PHONE: NORMAL
ERYTHROCYTE [DISTWIDTH] IN BLOOD BY AUTOMATED COUNT: 14.9 % (ref 11.6–15.1)
GFR SERPL CREATININE-BSD FRML MDRD: 35 ML/MIN/1.73SQ M
GLUCOSE SERPL-MCNC: 113 MG/DL (ref 65–140)
HCT VFR BLD AUTO: 23.9 % (ref 34.8–46.1)
HGB BLD-MCNC: 8 G/DL (ref 11.5–15.4)
MAGNESIUM SERPL-MCNC: 1.6 MG/DL (ref 1.9–2.7)
MCH RBC QN AUTO: 32.8 PG (ref 26.8–34.3)
MCHC RBC AUTO-ENTMCNC: 33.5 G/DL (ref 31.4–37.4)
MCV RBC AUTO: 98 FL (ref 82–98)
PHOSPHATE SERPL-MCNC: 2.5 MG/DL (ref 2.3–4.1)
PLATELET # BLD AUTO: 144 THOUSANDS/UL (ref 149–390)
PMV BLD AUTO: 9.1 FL (ref 8.9–12.7)
POTASSIUM SERPL-SCNC: 3.5 MMOL/L (ref 3.5–5.3)
PROT SERPL-MCNC: 5.9 G/DL (ref 6.4–8.4)
RBC # BLD AUTO: 2.44 MILLION/UL (ref 3.81–5.12)
SODIUM SERPL-SCNC: 141 MMOL/L (ref 135–147)
SRA .2 IU/ML UFH SER-ACNC: <1 % (ref 0–20)
SRA 100IU/ML UFH SER-ACNC: <1 % (ref 0–20)
SRA UFH SER-IMP: NORMAL
WBC # BLD AUTO: 6.1 THOUSAND/UL (ref 4.31–10.16)

## 2023-11-14 PROCEDURE — 99232 SBSQ HOSP IP/OBS MODERATE 35: CPT | Performed by: INTERNAL MEDICINE

## 2023-11-14 PROCEDURE — 99024 POSTOP FOLLOW-UP VISIT: CPT | Performed by: SURGERY

## 2023-11-14 PROCEDURE — 97530 THERAPEUTIC ACTIVITIES: CPT

## 2023-11-14 PROCEDURE — 97116 GAIT TRAINING THERAPY: CPT

## 2023-11-14 PROCEDURE — NC001 PR NO CHARGE: Performed by: THORACIC SURGERY (CARDIOTHORACIC VASCULAR SURGERY)

## 2023-11-14 PROCEDURE — 80053 COMPREHEN METABOLIC PANEL: CPT | Performed by: INTERNAL MEDICINE

## 2023-11-14 PROCEDURE — 85027 COMPLETE CBC AUTOMATED: CPT | Performed by: STUDENT IN AN ORGANIZED HEALTH CARE EDUCATION/TRAINING PROGRAM

## 2023-11-14 PROCEDURE — 84100 ASSAY OF PHOSPHORUS: CPT | Performed by: INTERNAL MEDICINE

## 2023-11-14 PROCEDURE — 99024 POSTOP FOLLOW-UP VISIT: CPT | Performed by: THORACIC SURGERY (CARDIOTHORACIC VASCULAR SURGERY)

## 2023-11-14 PROCEDURE — 83735 ASSAY OF MAGNESIUM: CPT | Performed by: INTERNAL MEDICINE

## 2023-11-14 PROCEDURE — C9113 INJ PANTOPRAZOLE SODIUM, VIA: HCPCS | Performed by: PHYSICIAN ASSISTANT

## 2023-11-14 RX ORDER — POLYETHYLENE GLYCOL 3350 17 G/17G
17 POWDER, FOR SOLUTION ORAL DAILY PRN
Qty: 10 EACH | Refills: 0 | Status: SHIPPED | OUTPATIENT
Start: 2023-11-14

## 2023-11-14 RX ORDER — SUCRALFATE 1 G/1
1 TABLET ORAL
Qty: 56 TABLET | Refills: 0 | Status: SHIPPED | OUTPATIENT
Start: 2023-11-14 | End: 2023-11-28

## 2023-11-14 RX ORDER — FAMOTIDINE 20 MG/1
20 TABLET, FILM COATED ORAL DAILY
Status: DISCONTINUED | OUTPATIENT
Start: 2023-11-14 | End: 2023-11-14 | Stop reason: HOSPADM

## 2023-11-14 RX ORDER — TORSEMIDE 10 MG/1
10 TABLET ORAL DAILY
Status: DISCONTINUED | OUTPATIENT
Start: 2023-11-14 | End: 2023-11-14 | Stop reason: HOSPADM

## 2023-11-14 RX ORDER — ACETAMINOPHEN 160 MG/5ML
650 SUSPENSION ORAL EVERY 6 HOURS PRN
Qty: 355 ML | Refills: 0 | Status: SHIPPED | OUTPATIENT
Start: 2023-11-14

## 2023-11-14 RX ORDER — POTASSIUM CHLORIDE 20 MEQ/1
40 TABLET, EXTENDED RELEASE ORAL ONCE
Status: COMPLETED | OUTPATIENT
Start: 2023-11-14 | End: 2023-11-14

## 2023-11-14 RX ADMIN — POTASSIUM CHLORIDE 40 MEQ: 1500 TABLET, EXTENDED RELEASE ORAL at 12:14

## 2023-11-14 RX ADMIN — TORSEMIDE 10 MG: 10 TABLET ORAL at 12:14

## 2023-11-14 RX ADMIN — ENOXAPARIN SODIUM 30 MG: 30 INJECTION SUBCUTANEOUS at 09:20

## 2023-11-14 RX ADMIN — ACETAMINOPHEN 650 MG: 650 SUSPENSION ORAL at 12:13

## 2023-11-14 RX ADMIN — PANTOPRAZOLE SODIUM 40 MG: 40 INJECTION, POWDER, FOR SOLUTION INTRAVENOUS at 09:20

## 2023-11-14 RX ADMIN — TAMSULOSIN HYDROCHLORIDE 0.4 MG: 0.4 CAPSULE ORAL at 16:58

## 2023-11-14 RX ADMIN — ALBUTEROL SULFATE 2 PUFF: 90 AEROSOL, METERED RESPIRATORY (INHALATION) at 09:21

## 2023-11-14 RX ADMIN — FAMOTIDINE 20 MG: 20 TABLET, FILM COATED ORAL at 12:14

## 2023-11-14 RX ADMIN — SUCRALFATE 1 G: 1 TABLET ORAL at 12:16

## 2023-11-14 NOTE — DISCHARGE INSTR - AVS FIRST PAGE
Discharge Instructions    Diet:  Maintain a full liquid diet for 3 days following discharge. Then, advance to soft foods as tolerated until your follow up appointment in 2 weeks. No alcohol or carbonated drinks for the first 4 weeks  Eat small, frequent meals. We recommend 6 meals a day (every 2-3 hours). Take small bites, chew food well before swallowing. Avoid foods that cause stomach gas and distention: corn, beans, broccoli, peas, onions, cabbage, cauliflower, and lentils. Avoid breads, tough meats, tomato and citrus based foods, fried, spicy, nuts, seeds, and raw vegetables. Sit upright during meals and for 4 hours following every meal.   Continue to take your anti-reflux medications as prescribed. Wound Care: You may shower, but do not vigorously scrub incisions. Gently wash incisions with soap and water, then pat dry with a clean towel. Do not soak incisions. Do not apply any creams, lotions, or ointments. Nephrology:   You should schedule a follow-up appointment with your nephrologist approximately one week after discharge  Obtain a BMP prior to seeing your nephrologist

## 2023-11-14 NOTE — RESPIRATORY THERAPY NOTE
Home Oxygen Qualifying Test     Patient name: Tre Guallpa        : 1945   Date of Test:  2023  Diagnosis:    Home Oxygen Test:    **Medicare Guidelines require item(s) 1-5 on all ambulatory patients or 1 and 2 on non-ambulatory patients. 1. Baseline SPO2 on Room Air at rest 91 %   If <= 88% on Room Air add O2 via NC to obtain SpO2 >=88%. If LPM needed, document LPM  needed to reach =>88%    SPO2 during exertion on Room Air 86  %  During exertion monitor SPO2. If SPO2 increases >=89%, do not add supplemental oxygen    SPO2 on Oxygen at Rest 94 at  LPM    SPO2 during exertion on Oxygen 90% at 2 LPM    Test performed during exertion activity. [x]  Supplemental Home Oxygen is indicated. []  Client does not qualify for home oxygen. Respiratory Additional Notes- Home oxygen eval performed. Oxygen on room air 91%. During exertion SPO2 decreased to 86%, Pt.  Placed on 2L NC, SPO2 increased to 90%    AYE, RT

## 2023-11-14 NOTE — PROGRESS NOTES
Progress Note - General Surgery  : [facility] Surgery Resident on [facility]    Tyshawn Roberts 69 y.o. female MRN: [id number]  Unit/Bed#: [facility] 431-01 Encounter: [id number]      Assessment:  67-year-old female with dysphagia and gastroparesis s/p prior Nissen fundoplication in 0961, now s/p robotic Nissen takedown, hiatal hernia repair and partial fundoplication with mesh along with excisional liver biopsy. Remains admitted with JOSE, LFT abnormalities, and thrombocytopenia of unclear etiology (though all improving)       Plan:  FLD x3 more days then surg soft  [place] stays out  No recorded desats overnight (home O2 eval)  Home today with DME / [address] per PT/OT  DVT ppx while admitted        Subjective: No acute events overnight, tolerating FLD with some difficulty (feels "tight"), some diarrhea. Objective:     Physical Exam:  GEN: NAD   Ab: Soft, mildly tender/ND, CDI  Lung: Normal effort   CV: RRR   Extrem: No CCE   Neuro: A+Ox3       I/O         11/12 0701 11/13 0700 11/13 0701 11/14 0700    P. O. 118 680    I.V. (mL/kg) 233.3 (3.9)     Total Intake(mL/kg) 351.3 (5.8) 680 (11.5)    Urine (mL/kg/hr) 1025 (0.7) 1050 (0.7)    Stool 0 0    Total Output 1025 1050    Net -673.7 -370          Unmeasured Urine Occurrence  2 x    Unmeasured Stool Occurrence 2 x 4 x            Lab, Imaging and other studies: I have personally reviewed pertinent reports.   , CBC with diff:   Lab Results   Component Value Date    WBC 6.10 11/14/2023    HGB 8.0 (L) 11/14/2023    HCT 23.9 (L) 11/14/2023    MCV 98 11/14/2023     (L) 11/14/2023    RBC 2.44 (L) 11/14/2023    MCH 32.8 11/14/2023    MCHC 33.5 11/14/2023    RDW 14.9 11/14/2023    MPV 9.1 11/14/2023   , BMP/CMP:   Lab Results   Component Value Date    SODIUM 141 11/14/2023    K 3.5 11/14/2023     11/14/2023    CO2 28 11/14/2023    BUN 17 11/14/2023    CREATININE 1.43 (H) 11/14/2023    CALCIUM 8.8 11/14/2023    AST 57 (H) 11/14/2023     (H) 11/14/2023    ALKPHOS 143 (H) 11/14/2023    EGFR 35 11/14/2023         VTE Pharmacologic Prophylaxis: Enoxaparin (Lovenox)        Memo Zaldivar MD  11/14/2023 5:55 AM

## 2023-11-14 NOTE — CASE MANAGEMENT
Case Management Discharge Planning Note    Patient name Snow Dillard  Location [facility] 431/[facility] 287-83 MRN [id number]  : 1945 Date 2023       Current Admission Date: 2023  Current Admission Diagnosis:History of Nissen fundoplication, Esophageal dysphagia, Gastroparesis   Patient Active Problem List    Diagnosis Date Noted    Encounter for geriatric assessment 10/10/2023    Asthma     COPD (chronic obstructive pulmonary disease) ([address])     Hypertension     Migraines     Depression     Anxiety     Stage 3 chronic kidney disease ([address])     Nausea 06/10/2021    Allergic contact urticaria 2020    Chronic GERD 2019    Dysphagia 2019    Esophageal dysphagia 2019    History of Nissen fundoplication     Left upper quadrant pain 2019    Gastroparesis 2019    Colon cancer screening 2019    Gastroesophageal reflux disease without esophagitis 2019      LOS (days): 6  Geometric Mean LOS (GMLOS) (days): 9.40  Days to GMLOS:3.6     OBJECTIVE:  Risk of Unplanned Readmission Score: 12.62         Current admission status: Inpatient   Preferred Pharmacy:   [id number], [address] [place] [address]  Phone: [phone] Fax: [phone]    Primary Care Provider: Sebastian Duron DO    Primary Insurance: [facility] REP  Secondary Insurance:     DISCHARGE DETAILS:    Discharge planning discussed with[de-identified] pt at bedside  [place] of Choice: Yes  Comments - [place] of Choice: CM discussed dicharge recommendation with pt at bedside. Pt is agreeable. Pt recommendation is Level II. CM submitted blanket referrals via aidin. [facility] referral reserved for PT/OT.   CM contacted family/caregiver?: No- see comments  Were Treatment Team discharge recommendations reviewed with patient/caregiver?: Yes  Did patient/caregiver verbalize understanding of patient care needs?: Yes  Were patient/caregiver advised of the risks associated with not following Treatment Team discharge recommendations?: Yes    Contacts  Patient Contacts: Stacy Egan  Relationship to Patient[de-identified] Family  Contact Method: Phone  Phone Number: [phone]  Reason/Outcome: Continuity of Care, Emergency Contact, Discharge [address]         Is the patient interested in [facility] at discharge?: Yes  [facility] requested[de-identified] Occupational Therapy, Physical [address] Name[de-identified] [facility] External Referral Reason (only applicable if external HHA name selected): Services not provided in network or near patient location  [facility] Provider[de-identified] PCP  Home Health Services Needed[de-identified] Strengthening/Theraputic Exercises to Improve Function, Gait/ADL Training, Evaluate Functional Status and Safety, COPD Management  Homebound Criteria Met[de-identified] Requires the Assistance of Another Person for Safe Ambulation or to Leave the Home, Uses an Assist Device (i.e. cane, walker, etc)  Supporting Clincal Findings[de-identified] Limited Endurance, Fatigues Easliy in [employer]    DME Referral Provided  Referral made for DME?: Yes  DME referral completed for the following items[de-identified] Home Oxygen concentrator, Darroll Marking  DME Supplier Name[de-identified] [employer]    Other Referral/Resources/Interventions Provided:  Interventions: HHC, DME    Would you like to participate in our [address] service program?  : No - Declined    Treatment Team Recommendation: Home with [address]  Discharge Destination Plan[de-identified] Home with [address] [facility] at Discharge : Family                                      Additional Comments: CM discussed dicharge recommendation with pt at bedside. Pt is agreeable. Pt recommendation is Level II. CM submitted blanket referrals via aidin. [facility] referral reserved for PT/OT. CM ordered home o2 and walker, and will be delievered to pt room. CM will continue to follow as needed.

## 2023-11-14 NOTE — PROGRESS NOTES
Progress Note - Nephrology   Tyshawn Roberts 69 y.o. female MRN: [id number]  Unit/Bed#: [facility] 431-01 Encounter: [id number]      Assessment / Plan:    JOSE on top of CKD stage IIIb  Baseline creatinine of 1.4-1.7  Follows with nephrology in [facility]  JOSE due to ischemic injury and operative hemodynamic perturbations with prerenal azotemia as well as urinary retention and elevated LFTs  Overall, creatinine has improved and has remained stable off the last 24 hours. She is currently at her baseline  CT of abdomen without hydronephrosis  Hypoxia  Patient asymptomatic.  + Some episodes of O2 sat less than 90%. Will give another dose of torsemide today  Hypertension  Blood pressure has improved overall and remained stable after midodrine discontinued yesterday  Anemia/thrombocytopenia  Per primary team/heme-onc  Other issues:  Status post robotic takedown of prior Nissen fundoplication on 46/1  Elevated LFTs  Urinary retention - currently on Flomax  Plan: Will give another dose of torsemide today  Continue off of midodrine  If acceptable to primary service, will change Protonix to Pepcid to avoid risk of acute interstitial nephritis as well as limit Carafate usage which can lead to aluminum toxicity in patients with CKD  Recheck BMP in a.m., monitor renal function closely  Avoid relative hypotension and nephrotoxic medication exposure  Trend LFTs  Replete potassium  Check PVR/institute urinary retention protocol            Subjective: The patient was seen and examined earlier this morning. Overall, she felt well. She denied any shortness of breath, chest pain or pressure, abdominal pain, vomiting, diarrhea, sweats, chills, paroxysmal nocturnal dyspnea orthopnea. Objective:     Vitals: Blood pressure 136/84, pulse 85, temperature 98.3 °F (36.8 °C), resp. rate 16, height 5' (1.524 m), weight 59.1 kg (130 lb 4.7 oz), SpO2 (!) 88 %, not currently breastfeeding. ,Body mass index is 25.45 kg/m². Temp (24hrs), Av.6 °F (37 °C), Min:98.3 °F (36.8 °C), Max:98.9 °F (37.2 °C)      Weight (last 2 days)       Date/Time Weight    23 0551 59.1 (130.29)    23 0600 60.2 (132.72)    23 0458 61.2 (134.9)                   Intake/Output Summary (Last 24 hours) at 2023 0946  Last data filed at 2023 0818  Gross per 24 hour   Intake 798 ml   Output 1050 ml   Net -252 ml     I/O last 24 hours:   In: 798 [P.O.:798]  Out: 1050 [Urine:1050]        Physical Exam    /84   Pulse 85   Temp 98.3 °F (36.8 °C)   Resp 16   Ht 5' (1.524 m)   Wt 59.1 kg (130 lb 4.7 oz)   SpO2 (!) 88%   BMI 25.45 kg/m²   Vital signs were reviewed  Constitutional: The patient was awake, alert, pleasant, cooperative and in no apparent distress  Cardiovascular: No overt jugular venous tension was noted, S1-S2, no pericardial friction rub S3 were appreciated, trace peripheral edema was present  Pulmonary: Adequate inspiratory effort, lungs were clear with decreased breath sounds                Invasive Devices       Peripheral Intravenous Line  Duration             Peripheral IV 23 Left Antecubital 2 days                    Medications:    Scheduled Meds:  Current Facility-Administered Medications   Medication Dose Route Frequency Provider Last Rate    acetaminophen  650 mg Oral Q6H Carlie Grullon, PA-C      albuterol  2 puff Inhalation BID Carlie Grullon, PA-C      albuterol  2.5 mg Nebulization Q6H PRN Dudley Prajapati MD      amitriptyline  50 mg Oral HS Carlie Grullon, PA-C      docusate sodium  100 mg Oral BID Carlie Grullon, PA-C      enoxaparin  30 mg Subcutaneous Q24H [address] Carlie Grullon, PA-C      HYDROmorphone  0.2 mg Intravenous Q3H PRN Tuesday RUSS Kirkpatrick      hydrOXYzine HCL  10 mg Oral Q6H PRN Celestino Jensen MD      ondansetron  4 mg Intravenous Q6H Carlie Grullon, PA-C      pantoprazole  40 mg Intravenous Daily Carlie Grullon, PA-C      polyethylene glycol  17 g Oral Daily Carlie Grullon, OSCAR      senna  1 tablet Oral Daily Amol Gambino PA-C      sucralfate  1 g Oral 4x Daily (AC & HS) Amol Gambino PA-C      tamsulosin  0.4 mg Oral Daily With Byron Chicas MD         PRN Meds:.  albuterol    HYDROmorphone    hydrOXYzine HCL    Continuous Infusions:         LAB RESULTS:      Results from last 7 days   Lab Units 11/14/23  0444 11/13/23  0534 11/12/23  0738 11/12/23  0440 11/11/23  1852 11/11/23  0839 11/11/23  0445 11/10/23  1234 11/10/23  0446 11/09/23  0504   WBC Thousand/uL 6.10 6.27  --  7.02  --   --  6.99 7.44  --  7.52   HEMOGLOBIN g/dL 8.0* 8.7*  --  8.4*  --   --  8.8* 8.9*  --  10.0*   HEMATOCRIT % 23.9* 25.9*  --  25.3*  --   --  25.6* 26.2*  --  30.2*   PLATELETS Thousands/uL 144* 127*  --  87*  --   --  40* 38*  --  63*   NEUTROS PCT %  --   --   --   --   --   --  85* 81*  --   --    LYMPHS PCT %  --   --   --   --   --   --  9* 13*  --   --    MONOS PCT %  --   --   --   --   --   --  5 5  --   --    EOS PCT %  --   --   --   --   --   --  1 1  --   --    POTASSIUM mmol/L 3.5 4.1  --  3.7  --   --  3.8 4.0 3.8 4.2   CHLORIDE mmol/L 104 109*  --  107  --   --  105 102 102 108   CO2 mmol/L 28 28  --  27  --   --  23 24 25 25   BUN mg/dL 17 18  --  21  --   --  22 25 22 19   CREATININE mg/dL 1.43* 1.41*  --  1.71*  --   --  2.23* 2.76* 2.62* 1.36*   CALCIUM mg/dL 8.8 8.5  --  8.6  --   --  8.6 8.3* 8.2* 8.4   ALK PHOS U/L 143* 193* 233*  --  186* 163* 142* 121*  --   --    ALT U/L 195* 269* 386*  --  522* 623* 700* 1,051*  --   --    AST U/L 57* 114* 259*  --  466* 688* 906* 1,781*  --   --    MAGNESIUM mg/dL 1.6* 2.0  --  2.0  --   --  1.9 1.9 2.0 1.6*   PHOSPHORUS mg/dL 2.5 2.3  --  2.5  --   --  2.3  --  3.7  --        CUTURES:  Lab Results   Component Value Date    BLOODCX No Growth After 5 Days. 07/09/2017    BLOODCX No Growth After 5 Days.  07/09/2017    URINECX >100,000 cfu/ml Mixed Contaminants X3 02/16/2016                 PLEASE NOTE:  This encounter was completed utilizing the [employer] Direct Speech Voice Recognition Software. Grammatical errors, random word insertions, pronoun errors and incomplete sentences are occasional consequences of the system due to software limitations, ambient noise and hardware issues. These may be missed by proof reading prior to affixing electronic signature. Any questions or concerns about the content, text or information contained within the body of this dictation should be directly addressed to the physician for clarification. Please do not hesitate to call me directly if you have any any questions or concerns.

## 2023-11-14 NOTE — PLAN OF CARE
Problem: PHYSICAL THERAPY ADULT  Goal: Performs mobility at highest level of function for planned discharge setting. See evaluation for individualized goals. Description: Treatment/Interventions: ADL retraining, Functional transfer training, LE strengthening/ROM, Elevations, Therapeutic exercise, Endurance training, Patient/family training, Equipment eval/education, Bed mobility, Gait training, Compensatory technique education, Spoke to nursing, Spoke to case management, Spoke to advanced practitioner, OT  Equipment Recommended: Silviano Medina       See flowsheet documentation for full assessment, interventions and recommendations. Outcome: Adequate for Discharge  Note: Prognosis: Good  Problem List: Decreased endurance  Assessment: Pt demonstrated overall improvement in balance, endurance and all aspects of observed mobility progressing to mod (I) level on level surfaces (incl amb w/ rw) and (S) on the steps; no overt uncorrected LOB, gross knee buckling, or swaying observed during the session; no increased discomfort or excessive fatigue expressed; pt appeared to be comfortable at the end of session; overall, cont to anticipate pt will return home w/ available family support upon D/C from PT/mobility stand point and when medically cleared; home PT follow up is recommended and rw for amb (spoke to CM); will cont to follow until then to max endurance and to progress to LRAD. Barriers to Discharge: None     Rehab Resource Intensity Level, PT: III (Minimum Resource Intensity)    See flowsheet documentation for full assessment.

## 2023-11-14 NOTE — PHYSICAL THERAPY NOTE
PHYSICAL THERAPY NOTE          Patient Name: Alexandra Aguilar  CFLXR'H Date: 11/14/2023 11/14/23 1130   PT Last Visit   PT Visit Date 11/14/23   Note Type   Note Type Treatment   Pain Assessment   Pain Assessment Tool 0-10   Pain Score No Pain   Restrictions/Precautions   Other Precautions Telemetry   General   Chart Reviewed Yes   Additional Pertinent History cleared for Tx session by nsg   Response to Previous Treatment Patient with no complaints from previous session. Cognition   Overall Cognitive Status WFL   Arousal/Participation Alert; Cooperative   Attention Within functional limits   Orientation Level Oriented to person;Oriented to place;Oriented to situation   Memory Within functional limits   Following Commands Follows all commands and directions without difficulty   Subjective   Subjective Alert; in the chair; agreeable to amb, incl try steps   Transfers   Sit to Stand 6  Modified independent   Stand to Sit 6  Modified independent   Ambulation/Elevation   Gait pattern Excessively slow; Short stride; Inconsistent billie  (no overt LOB, knee buckling or excessive swaying)   Gait Assistance 6  Modified independent  (after initial (S))   Additional items Verbal cues   Assistive Device Rolling walker   Distance 50 ft + 2 x 40 ft w/ seated rest periods in between and steps negotiation after initial 10 ft of amb   Stair Management Assistance 5  Supervision   Additional items Assist x 1;Verbal cues   Stair Management Technique Step to pattern; Foreward;Backward;Nonreciprocal;With walker   Number of Stairs 4  (1 step x 4 trials)   Balance   Static Sitting Good   Static Standing Fair +   Ambulatory Fair   Activity Tolerance   Activity Tolerance Patient tolerated treatment well  (initial decline in O2 sat to 80s % noted post amb and steps; recovered 91 % upon return to room/chair and resting; O2 sat at 93 % during subsequent bouts of amb on RA (spot checks); RN informed)   Medical Staff Made Aware communicated w/ thoracic sx resident via TT   Nurse Made Aware spoke to Melani Celis   Assessment   Prognosis Good   Problem List Decreased endurance   Assessment Pt demonstrated overall improvement in balance, endurance and all aspects of observed mobility progressing to mod (I) level on level surfaces (incl amb w/ rw) and (S) on the steps; no overt uncorrected LOB, gross knee buckling, or swaying observed during the session; no increased discomfort or excessive fatigue expressed; pt appeared to be comfortable at the end of session; overall, cont to anticipate pt will return home w/ available family support upon D/C from PT/mobility stand point and when medically cleared; home PT follow up is recommended and rw for amb (spoke to [facility]); will cont to follow until then to max endurance and to progress to LRAD. Barriers to Discharge None   Goals   Patient Goals to go home   STG Expiration Date 11/27/23   PT Treatment Day 1   Plan   Treatment/Interventions LE strengthening/ROM; Elevations; Therapeutic exercise; Endurance training;Bed mobility;Gait training;Equipment eval/education;Spoke to nursing;Spoke to case management;Spoke to MD   Progress Improving as expected   PT Frequency 3-5x/wk   Discharge Recommendation   Rehab Resource Intensity Level, PT III (Minimum Resource Intensity)   Equipment Recommended Jody Parkinson Package Recommended Wheeled walker   Change/add to [place]?  No   AM-PAC Basic Mobility Inpatient   Turning in Flat Bed Without Bedrails 4   Lying on Back to Sitting on Edge of Flat Bed Without Bedrails 3   Moving Bed to Chair 4   Standing Up From Chair Using Arms 4   Walk in Room 4   Climb 3-5 Stairs With Railing 3   Basic Mobility Inpatient Raw Score 22   Basic Mobility Standardized Score 47.4   Highest Level Of Mobility   JH-HLM Goal 7: Walk 25 feet or more   JH-HLM Achieved 7: Walk 25 feet or more   Education   Education Provided Mobility training;Assistive device   Patient Demonstrates verbal understanding   End of Consult   Patient Position at End of Consult Bedside chair; All needs within [facility]

## 2023-11-14 NOTE — PROGRESS NOTES
Patient:    MRN:  [id number]    Toni Request ID:  [id number]    Level of care reserved:  [address]    Partner Reserved:  [address]uf[address] Heaton, [address] [facility] [phone]    Clinical needs requested:    Geography searched:  [phone]    Start of Service:    Request sent:  10:08am EST on 11/14/2023 by Rivera Ramirez    Partner reserved:  12:03pm EST on 11/14/2023 by Rivera Ramirez    Choice list shared:  12:03pm EST on 11/14/2023 by Rivera Ramirez

## 2023-11-14 NOTE — PROGRESS NOTES
Progress Note - Thoracic Surgery  : SIDDHARTH Thoracic Surgery Resident on [address] [facility] C Chad 69 y.o. female MRN: [id number]  Unit/Bed#: [facility] 431-01 Encounter: [id number]      Assessment:  61-year-old female with dysphagia and gastroparesis s/p prior Nissen fundoplication in 2013, now s/p robotic Nissen takedown, hiatal hernia repair and partial fundoplication with mesh along with excisional liver biopsy. Remains admitted with JOSE, LFT abnormalities, and thrombocytopenia of unclear etiology (though all improving)       Plan:  FLD x3 more days then surg soft  [place] stays out  No recorded desats overnight (home O2 eval)  Home today with [facility] per PT/OT  DVT ppx while admitted        Subjective: No acute events overnight, tolerating FLD with some difficulty (feels "tight"), some diarrhea. Objective:     Physical Exam:  GEN: NAD   Ab: Soft, mildly tender/ND, CDI  Lung: Normal effort   CV: RRR   Extrem: No CCE   Neuro: A+Ox3       I/O         11/12 0701 11/13 0700 11/13 0701 11/14 0700    P. O. 118 680    I.V. (mL/kg) 233.3 (3.9)     Total Intake(mL/kg) 351.3 (5.8) 680 (11.5)    Urine (mL/kg/hr) 1025 (0.7) 1050 (0.7)    Stool 0 0    Total Output 1025 1050    Net -673.7 -370          Unmeasured Urine Occurrence  2 x    Unmeasured Stool Occurrence 2 x 4 x            Lab, Imaging and other studies: I have personally reviewed pertinent reports.   , CBC with diff:   Lab Results   Component Value Date    WBC 6.10 11/14/2023    HGB 8.0 (L) 11/14/2023    HCT 23.9 (L) 11/14/2023    MCV 98 11/14/2023     (L) 11/14/2023    RBC 2.44 (L) 11/14/2023    MCH 32.8 11/14/2023    MCHC 33.5 11/14/2023    RDW 14.9 11/14/2023    MPV 9.1 11/14/2023   , BMP/CMP:   Lab Results   Component Value Date    SODIUM 141 11/14/2023    K 3.5 11/14/2023     11/14/2023    CO2 28 11/14/2023    BUN 17 11/14/2023    CREATININE 1.43 (H) 11/14/2023    CALCIUM 8.8 11/14/2023    AST 57 (H) 11/14/2023     (H) 11/14/2023    ALKPHOS 143 (H) 11/14/2023    EGFR 35 11/14/2023         VTE Pharmacologic Prophylaxis: Enoxaparin (Lovenox)        Sherita Richards MD  11/14/2023 5:55 AM

## 2023-11-15 NOTE — DISCHARGE SUMMARY
Discharge Summary - Muna Tsang 69 y.o. female MRN: [id number]    Unit/Bed#: [facility] 431-01 Encounter: [id number]    Admission Date:   Admission Orders (From admission, onward)       Ordered        11/08/23 1748  Inpatient Admission  Once                            Admitting Diagnosis: History of Nissen fundoplication [M32.834]  Esophageal dysphagia [R13.19]  Gastroparesis [K31.84]    HPI:  Muna Tsang is a 69 y.o. female who presents with dysphagia and gastroparesis. Has a history of Nissen fundoplication in the past with symptoms of recurrent hernia as well as dysphagia, as well as newly diagnosed gastroparesis. Preoperative work-up confirmed delayed gastric emptying on gastric emptying study. Additionally, some elevated liver function tests were noted on lab work. Procedures Performed:   11/8/2023: robotic takedown of nissen fundoplication. redo hiatal hernia. partial toupet fundoplication. with mesh. PYLOROPLASTY LAPAROSCOPIC WITH ROBOT. EXCISION BIOPSY LIVER    Summary of Hospital Course: Patient was admitted for above listed procedure. She tolerated the surgery well. See operative notes for more details. Her hospital course was complicated by JOSE on CKD for which nephrology was consulted. She also had acute liver failure 2/2 ischemic hepatitis for which hematology was consulted. Her liver function improved with conservative measures. Her JOSE improved with fluid management. She also suffered from urinary retention requiring winchester placement which was subsequently improved. She continued to progress tolerating a full liquid diet. PT/OT evaluated her and recommended HHPT/OT. She was examined on the morning of 11/14 and deemed stable for discharge home with outpatient follow up.     Significant Findings, Care, Treatment and Services Provided: US right upper quadrant with liver dopplers    Result Date: 11/10/2023  Impression: Patent portal veins with normal direction of flow in the main and right portal veins, but bidirectional flow in the left portal vein. Normal hepatic artery and veins. Hepatic steatosis. Workstation performed: [id number]     CT chest abdomen pelvis wo contrast    Result Date: 11/10/2023  Impression: 1) Large, wedge-shaped area of decreased attenuation involving the majority of the left hepatic lobe, not seen on the prior CT from 2022. Evaluation is limited without IV contrast, however this is suspicious for a hepatic infarct, especially in light of markedly elevated LFTs. Other considerations include geographic hepatic steatosis. 2) Re-do fundoplication not well visualized without IV contrast. Hyperdense mesh from a prior repair extends from left hepatic lobe along the diaphragmatic crura. 3) Straight, tubular-shaped hypodense structure measuring approximately 1 cm in diameter and approximately 9.5 -10 cm in length interposed between the diaphragm and the proximal stomach, of unclear etiology. This likely represents a mesh, possibly curled  up, as the straight edges suggest a manmade structure. 4) Small amount of hyperdense material likely representing residual oral contrast from the 11/9/2023 esophagram seen in the distal esophagus. The rest of the oral contrast is seen throughout the colon. No extravasated oral contrast seen. 5) No significant gastric distention. No bowel obstruction or convincing inflammation. 6) No abdominal or pelvic fluid collections. 7) Somewhat distended urinary bladder. Recommend correlation for urinary retention. 8) Trace bilateral pleural effusions. Subsegmental atelectasis dependently. Trace groundglass opacity at left apex, possibly infectious. 9) Additional findings as above. I personally discussed this study with Dr. Thomas Brown on 11/10/2023 at 7:22 PM. Workstation performed: [id number]     XR chest 1 view    Result Date: 11/10/2023  Impression: Low lung volumes with bibasilar atelectasis. Pneumonia not excluded in the appropriate clinical setting.  Workstation performed: [id number]     FL esophagram complete    Result Date: 11/9/2023  Impression: Mild esophageal distention with a focal narrowed segment at the GE junction likely related to recent hernia repair. Esophageal dysmotility noted with delayed emptying and periods of tertiary contraction without nathalia obstruction or contrast extravasation. The patient could not drink anymore contrast at the time of the study and therefore gastric emptying cannot be observed during the study. Workstation performed: [id number]        Complications: JOSE, ischemic hepatitis, urinary retention    Discharge Diagnosis: History of Nissen fundoplication [F91.605]  Esophageal dysphagia [R13.19]  Gastroparesis [K31.84]    Medical Problems       Resolved Problems  Date Reviewed: 9/21/2023   None         Condition at Discharge: stable         Discharge instructions/Information to patient and family:   See after visit summary for information provided to patient and family. Provisions for Follow-Up Care:  See after visit summary for information related to follow-up care and any pertinent home health orders. PCP: Leopold Rattan, DO    Disposition: See After Visit Summary for discharge disposition information. Planned Readmission: No      Discharge Statement   I spent 15 minutes discharging the patient. This time was spent on the day of discharge. I had direct contact with the patient on the day of discharge. Additional documentation is required if more than 30 minutes were spent on discharge. Discharge Medications:  See after visit summary for reconciled discharge medications provided to patient and family.

## 2023-11-16 ENCOUNTER — TELEPHONE (OUTPATIENT)
Dept: HEMATOLOGY ONCOLOGY | Facility: CLINIC | Age: 78
End: 2023-11-16

## 2023-11-16 ENCOUNTER — TELEPHONE (OUTPATIENT)
Dept: CARDIAC SURGERY | Facility: CLINIC | Age: 78
End: 2023-11-16

## 2023-11-16 DIAGNOSIS — G89.18 POST-OP PAIN: Primary | ICD-10-CM

## 2023-11-16 RX ORDER — HYDROMORPHONE HYDROCHLORIDE 2 MG/1
2 TABLET ORAL EVERY 4 HOURS PRN
Qty: 20 TABLET | Refills: 0 | Status: SHIPPED | OUTPATIENT
Start: 2023-11-16

## 2023-11-16 NOTE — TELEPHONE ENCOUNTER
Discussed with patient. Had allergic reaction to Oxycodone with significant rash while hospitalized. Cannot have Tramadol due to drug interactions. Will send PO dilaudid 2mg tablets x 20.

## 2023-11-16 NOTE — TELEPHONE ENCOUNTER
Surgeon/Procedure/Date: Dr. Barry Granados 11/8/2023  Post op appt: 11/30/23          1. Diet: (full liquid diet for 4-5 days following discharge/transition to soft foods on day 5)           (continue to take PPI until follow up visit. Sit upright for 4 hours after eating, prior to going to bed)           (nausea, vomiting, difficulty or pain with swallowing?)  Patient taking liquids. Denies nausea or vomiting. She has some discomfort with swallowing. 2. Constipation: (Yes: colace 100 bid, senokot 2 tabs qhs or senokot S 2 tabs qhs. No BM: Dulcolax/Miralax/suppository)    Moving bowels    3. Pain Management: (Tylenol 650 mg q6 hrs, Oxycodone 5-10 mg q4-6 hrs prn, +/- Advil 600 mg TID prn)  Not taking Tylenol she feels it doesn't help. Unable to give Tramadol bc of interaction with tricyclics. Patient declined Oxycodone. 4. Fever/Chills/Cough/Shortness of breath: (Call for temps over 100.4)  Denies fever, chills and cough. SOB with exertion. Uses 1L oxygen as needed. 5. Incisions: (Redness/warmth/drainage? Shower okay, no baths)    Clean and dry. [facility] nurse making home visits. 6. Activity: (Walking/stationary biking. No lifting over 10-15 lbs)   Ambulates with walker. Reviewed post-op appointment date and time.

## 2023-11-16 NOTE — TELEPHONE ENCOUNTER
Patient Call    Who are you speaking with? Patient    If it is not the patient, are they listed on an active communication consent form? N/A   What is the reason for this call? Patient returning call to [facility]. She was unable to answer phone when she call     Does this require a call back? yes   If a call back is required, please list best call back number [phone]   If a call back is required, advise that a message will be forwarded to their care team and someone will return their call as soon as possible. Did you relay this information to the patient?  yes

## 2023-11-16 NOTE — TELEPHONE ENCOUNTER
Patient calling to speak with Peyton Bragg. She left her a message to return her call. Patient states she spoke with Layo Washington already regarding post op  [phone].

## 2023-11-24 ENCOUNTER — OFFICE VISIT (OUTPATIENT)
Dept: SURGERY | Facility: CLINIC | Age: 78
End: 2023-11-24

## 2023-11-24 VITALS — BODY MASS INDEX: 23.84 KG/M2 | TEMPERATURE: 97.6 F | HEIGHT: 60 IN | WEIGHT: 121.4 LBS

## 2023-11-24 DIAGNOSIS — K31.84 GASTROPARESIS: Primary | ICD-10-CM

## 2023-11-24 DIAGNOSIS — K21.9 GASTROESOPHAGEAL REFLUX DISEASE WITHOUT ESOPHAGITIS: ICD-10-CM

## 2023-11-24 PROCEDURE — 99024 POSTOP FOLLOW-UP VISIT: CPT | Performed by: SURGERY

## 2023-11-24 NOTE — PROGRESS NOTES
Office Visit - General Surgery  Sandy Chu MRN: [id number]  Encounter: [id number]    Assessment and Plan  Problem List Items Addressed This Visit          Digestive    Gastroparesis - Primary     69year-old woman to me status post redo hiatal hernia repair with partial fundoplication and mesh, as well as pyloroplasty on 11/23/2022, here for follow-up. Plan:  We discussed healthy eating habits including 6 small meals per day, chewing her food thoroughly, and taking small bites. She can slowly start to advance her diet back to all consistencies. This should start with easily chewable foods, and soft consistencies, with the last 3 things reintroduced being roughage, dry meat, and dry crusty bread. He needs to work on her exercise tolerance in order to help get over her fatigue. She should be walking at a minimum 5 times per day 10 to 15 minutes each time. She needs follow-up with her nephrologist, and will eventually need to follow-up with her gastroenterologist Dr. Tasia Dotson to discuss her liver biopsy results. To follow-up with me in 3 to 4 weeks. Gastroesophageal reflux disease without esophagitis       Chief Complaint:  Sandy Chu is a 69 y.o. female who presents for Post-op (P/o pyloroplasty.)    Subjective  71-year-old female status post robotic takedown of Nissen fundoplication, redo hiatal hernia repair with partial fundoplication and mesh by Dr. Vasu Walton of thoracic surgery, as well as pyloroplasty and EGD with me on 11/8/2023, here for follow-up. Her postoperative course was notable for JOSE, as well as acute shock liver which subsequently resolved. She was able to be discharged home once her LFTs normalized, and her JOSE returned to baseline. Currently, she is doing fairly well with some moderate fatigue, and shortness of breath. Additionally she has been tolerating fulls with some soft food. She is having some nausea and reflux episodes, but no significant dysphagia. She denies any fevers, chills, chest pain. She is moving her bowels normally. Not doing a ton of physical activity voiced by her son. Past Medical History:   Diagnosis Date    Anxiety     Arthritis     Asthma     Cancer ([address])     Candida infection, esophageal ([address])     Chronic kidney disease     Chronic pain     COPD (chronic obstructive pulmonary disease) (HCC)     Depression     Depression     Gastroparesis     Gastroparesis     GERD (gastroesophageal reflux disease)     Hypertension     Irritable bowel syndrome (IBS)     Lactose intolerance Not sure    Migraines     MRSA (methicillin resistant Staphylococcus aureus)     Multiple thyroid nodules     Osteoporosis     Pneumonia     Psychiatric disorder        Past Surgical History:   Procedure Laterality Date    BACK SURGERY       SECTION      CHOLECYSTECTOMY      COLONOSCOPY  10/09/2013    ESOPHAGEAL DILATION       [address] [place] [address] [place]    ESOPHAGOGASTRODUODENOSCOPY N/A 2023    Procedure: ESOPHAGOGASTRODUODENOSCOPY (EGD); Surgeon: Howard Stephens MD;  [facility];  Service: General    HIATAL HERNIA REPAIR      NISSEN FUNDOPLICATION      NY BIOPSY LIVER NEEDLE PERCUTANEOUS N/A 2023    Procedure: EXCISION BIOPSY LIVER;  Surgeon: Howard Stephens MD;  [facility];  Service: General    NY ESOPHAGOGASTRODUODENOSCOPY TRANSORAL DIAGNOSTIC N/A 2019    Procedure: ESOPHAGOGASTRODUODENOSCOPY (EGD); Surgeon: Nicanor Estrada MD;  Location[facility];  Service: Gastroenterology    NY ESOPHAGOGASTRODUODENOSCOPY TRANSORAL DIAGNOSTIC N/A 2023    Procedure: ESOPHAGOGASTRODUODENOSCOPY (EGD);   Surgeon: Екатерина Owen MD;  [facility];  Service: Thoracic    NY ESOPHAGOSCOPY FLEX BALLOON DILAT <30 MM DIAM N/A 2023    Procedure: CRE WIRE GUIDED BALLOON DILATATION ESOPHAGEAL;  Surgeon: Екатерина Owen MD;  [facility];  Service: Thoracic    NY LAPS RPR PARAESPHGL HRNA INCL FUNDPLSTY W/O MESH N/A 2023 Procedure: robotic takedown of nissen fundoplication, redo hiatal hernia, partial fundoplication, with mesh;  Surgeon: Marco Brambila MD;  [facility];  Service: Thoracic    HI PYLOROPLASTY N/A 2023    Procedure: PYLOROPLASTY LAPAROSCOPIC WITH ROBOT;  Surgeon: Reza Kee MD;  [facility];  Service: General    ROTATOR CUFF REPAIR      SHOULDER SURGERY      SPINAL FUSION      TUBAL LIGATION  May 1975    Last child born    UPPER GASTROINTESTINAL ENDOSCOPY      US GUIDED THYROID BIOPSY      WISDOM TOOTH EXTRACTION         Family History   Problem Relation Age of Onset    Other Mother         Esophageal disorder    Stomach cancer Father     Cancer Father     Hypertension Maternal Aunt     COPD Maternal Grandmother     Colon cancer Neg Hx     Colon polyps Neg Hx        Social History     Tobacco Use    Smoking status: Former     Packs/day: 1.00     Years: 15.00     Total pack years: 15.00     Types: Cigarettes     Start date: 10/5/1998     Quit date: 10/1/2003     Years since quittin.1    Smokeless tobacco: Former   Vaping Use    Vaping Use: Never used   Substance Use Topics    Alcohol use: Not Currently    Drug use: Never        Medications  Current Outpatient Medications on File Prior to Visit   Medication Sig Dispense Refill    acetaminophen (TYLENOL) 160 mg/5 mL suspension Take 20.3 mL (650 mg total) by mouth every 6 (six) hours as needed for mild pain 355 mL 0    albuterol (PROVENTIL HFA,VENTOLIN HFA) 90 mcg/act inhaler Inhale 2 puffs 2 (two) times a day      amitriptyline (ELAVIL) 50 mg tablet TAKE 1 TABLET BY MOUTH EVERYDAY AT BEDTIME 90 tablet 1    amLODIPine (NORVASC) 10 mg tablet TAKE ONE TABLET BY MOUTH DAILY ORAL ONCE A DAY 90 DAYS      cetirizine (ZyrTEC) 10 MG chewable tablet Chew 10 mg daily.       cholecalciferol (VITAMIN D3) 1,000 units tablet Take 5,000 Units by mouth daily      clotrimazole-betamethasone (LOTRISONE) 1-0.05 % cream if needed      dexlansoprazole (DEXILANT) 60 MG capsule TAKE 1 CAPSULE BY MOUTH EVERY DAY 90 capsule 2    dicyclomine (BENTYL) 20 mg tablet Take 1 tablet (20 mg total) by mouth every 6 (six) hours as needed (abdominal cramping) 20 tablet 0    famotidine (PEPCID) 40 MG tablet TAKE 1 TABLET BY MOUTH EVERYDAY AT BEDTIME 90 tablet 3    HYDROmorphone (DILAUDID) 2 mg tablet Take 1 tablet (2 mg total) by mouth every 4 (four) hours as needed for moderate pain Max Daily Amount: 12 mg 20 tablet 0    multivitamin (THERAGRAN) TABS Take 1 tablet by mouth daily      Omega-3 Fatty Acids (OMEGA 3 PO) Take by mouth in the morning      ondansetron (ZOFRAN) 4 mg tablet Take 1 tablet (4 mg total) by mouth every 8 (eight) hours as needed for nausea or vomiting 15 tablet 0    polyethylene glycol (MIRALAX) 17 g packet Take 17 g by mouth daily as needed (constipation) 10 each 0    sucralfate (CARAFATE) 1 g tablet Take 1 tablet (1 g total) by mouth 4 (four) times a day (before meals and at bedtime) for 14 days 56 tablet 0    Tavaborole 5 % SOLN APPLY TO NAILS DAILY AS DIRECTED      tobramycin-dexamethasone (TOBRADEX) ophthalmic suspension if needed       No current facility-administered medications on file prior to visit. Allergies  Allergies   Allergen Reactions    Latex Hives    Augmentin [Amoxicillin-Pot Clavulanate] GI Intolerance    Bactrim [Sulfamethoxazole-Trimethoprim] GI Intolerance    Clarithromycin GI Intolerance    Doxycycline Hives    Neomycin GI Intolerance    Polymyxin B GI Intolerance    Zyvox [Linezolid] GI Bleeding    Cephalosporins GI Intolerance    Nsaids GI Intolerance    Penicillins GI Intolerance     Patient can only take levaquin and cipro    Prednisone GI Intolerance    Sulphasomidine GI Intolerance       Review of Systems   Constitutional:  Positive for fatigue. Negative for chills and fever. HENT:  Negative for ear pain, facial swelling, sinus pressure and sinus pain. Eyes:  Negative for pain.    Respiratory:  Positive for shortness of breath. Negative for cough and wheezing. Cardiovascular:  Negative for chest pain. Gastrointestinal:  Positive for nausea. Negative for abdominal pain, constipation, diarrhea and vomiting. Endocrine: Negative for cold intolerance and heat intolerance. Genitourinary:  Negative for dysuria and flank pain. Musculoskeletal:  Negative for back pain and neck pain. Skin:  Negative for wound. Neurological:  Negative for syncope, facial asymmetry, light-headedness and numbness. Psychiatric/Behavioral:  Negative for behavioral problems, confusion and suicidal ideas. Objective  Vitals:    11/24/23 1038   Temp: 97.6 °F (36.4 °C)       Physical Exam  Vitals and nursing note reviewed. Constitutional:       General: She is not in acute distress. Appearance: Normal appearance. She is not toxic-appearing. HENT:      Head: Normocephalic and atraumatic. Mouth/Throat:      Mouth: Mucous membranes are moist.   Eyes:      Extraocular Movements: Extraocular movements intact. Pupils: Pupils are equal, round, and reactive to light. Cardiovascular:      Rate and Rhythm: Normal rate and regular rhythm. Pulses: Normal pulses. Pulmonary:      Effort: Pulmonary effort is normal. No respiratory distress. Breath sounds: Normal breath sounds. No wheezing. Abdominal:      General: There is no distension. Palpations: Abdomen is soft. There is no mass. Tenderness: There is no abdominal tenderness. There is no guarding or rebound. Hernia: No hernia is present. Comments: Well-healed laparoscopic trocar sites, no evidence of hernia. Musculoskeletal:         General: No swelling or deformity. Normal range of motion. Cervical back: Normal range of motion and neck supple. Right lower leg: No edema. Left lower leg: No edema. Skin:     General: Skin is warm and dry. Coloration: Skin is not jaundiced. Neurological:      General: No focal deficit present. Mental Status: She is alert and oriented to person, place, and time.    Psychiatric:         Mood and Affect: Mood normal.         Behavior: Behavior normal.

## 2023-11-24 NOTE — ASSESSMENT & PLAN NOTE
69year-old woman to me status post redo hiatal hernia repair with partial fundoplication and mesh, as well as pyloroplasty on 11/23/2022, here for follow-up. Plan:  We discussed healthy eating habits including 6 small meals per day, chewing her food thoroughly, and taking small bites. She can slowly start to advance her diet back to all consistencies. This should start with easily chewable foods, and soft consistencies, with the last 3 things reintroduced being roughage, dry meat, and dry crusty bread. He needs to work on her exercise tolerance in order to help get over her fatigue. She should be walking at a minimum 5 times per day 10 to 15 minutes each time. She needs follow-up with her nephrologist, and will eventually need to follow-up with her gastroenterologist Dr. Milton Kehr to discuss her liver biopsy results. To follow-up with me in 3 to 4 weeks.

## 2023-11-30 ENCOUNTER — OFFICE VISIT (OUTPATIENT)
Dept: CARDIAC SURGERY | Facility: CLINIC | Age: 78
End: 2023-11-30

## 2023-11-30 VITALS
SYSTOLIC BLOOD PRESSURE: 118 MMHG | OXYGEN SATURATION: 94 % | HEART RATE: 82 BPM | BODY MASS INDEX: 23.37 KG/M2 | HEIGHT: 60 IN | RESPIRATION RATE: 15 BRPM | DIASTOLIC BLOOD PRESSURE: 82 MMHG | WEIGHT: 119.05 LBS | TEMPERATURE: 97.9 F

## 2023-11-30 DIAGNOSIS — K21.9 GASTROESOPHAGEAL REFLUX DISEASE WITHOUT ESOPHAGITIS: Primary | ICD-10-CM

## 2023-11-30 PROCEDURE — 99024 POSTOP FOLLOW-UP VISIT: CPT | Performed by: THORACIC SURGERY (CARDIOTHORACIC VASCULAR SURGERY)

## 2023-11-30 NOTE — ASSESSMENT & PLAN NOTE
Juan Pablo Braden is a 70 yo female who underwent a redo robotic assisted take down of previous fundoplication, redo hiatal hernia repair with partial fundoplication with mesh, liver biopsy, and pyloroplasty on 11/8/23. She is here today for her first postoperative visit. She is overall doing okay. I did explain to her that her symptoms are well within the normal limits of where I would expect her to be after such a big surgery. I advised her to continue adding in soft foods and reminded her to take small bites and chew extensively before swallowing. The patient will come back and see me in another 4 weeks to ensure that she is making continued progress.     Marley Albright MD  Thoracic Surgery  (Available on [employer])  Office: [phone]

## 2023-11-30 NOTE — LETTER
November 30, 2023     Mikayla Bazan, [address]  [address] [facility] [address] [address]    Patient: Elizabeth Rosas   YOB: 1945   Date of Visit: 11/30/2023       Dear Dr. Alexandra Feldman: Thank you for referring Lynnette Reynolds to me for evaluation. Below are my notes for this consultation. If you have questions, please do not hesitate to call me. I look forward to following your patient along with you. Sincerely,        Brenton Hernandez MD        CC: DO Brenton Sinclair MD  11/30/2023 12:56 PM  Incomplete  Assessment/Plan:    Gastroesophageal reflux disease without esophagitis  Frandy Britton is a 68 yo female who underwent a redo robotic assisted take down of previous fundoplication, redo hiatal hernia repair with partial fundoplication with mesh, liver biopsy, and pyloroplasty on 11/8/23. She is here today for her first postoperative visit. She is overall doing okay. I did explain to her that her symptoms are well within the normal limits of where I would expect her to be after such a big surgery. I advised her to continue adding in soft foods and reminded her to take small bites and chew extensively before swallowing. The patient will come back and see me in another 4 weeks to ensure that she is making continued progress. Brown Tang MD  Thoracic Surgery  (Available on [employer])  Office: [phone]       Diagnoses and all orders for this visit:    Gastroesophageal reflux disease without esophagitis          Thoracic History  Procedure: Redo robotic hiatal hernia repair, takedown nissen, re-do with toupet  Date: 11/8/23    Cancer Staging   No matching staging information was found for the patient. Oncology History    No history exists. Patient ID: Elizabeth Rosas is a 69 y.o. female.  ECOG 1     HPI    Frandy Britton is a 68 yo female who underwent a redo robotic assisted take down of previous fundoplication, redo hiatal hernia repair with partial fundoplication with mesh, liver biopsy, and pyloroplasty on 11/8/23. She returns today for her first post op visit. On discussion, she is doing okay. She is mostly taking in liquids and soft foods. Some foods have gotten stuck but she feels that she is improving. She is still requiring a small amount of oxygen in the morning after she gets ready but does not require it for the rest of the day. She is working with physical therapy in her home. Her pain is controlled and she is just taking an occasional Tylenol as needed. She reports that she walked on her treadmill for 30 minutes yesterday very slowly but that it felt good to walk. The following portions of the patient's history were reviewed and updated as appropriate: allergies, current medications, past family history, past medical history, past social history, past surgical history and problem list.    Review of Systems   Constitutional:  Positive for fatigue. Negative for chills, fever and unexpected weight change. HENT:  Positive for trouble swallowing. Eyes: Negative. Negative for visual disturbance. Respiratory:  Positive for shortness of breath. Negative for cough and stridor. Cardiovascular:  Negative for chest pain. Gastrointestinal:  Positive for abdominal pain and diarrhea. Negative for vomiting. Endocrine: Negative. Genitourinary: Negative. Musculoskeletal: Negative. Skin: Negative. Neurological:  Negative for dizziness, light-headedness and headaches. Hematological:  Negative for adenopathy. Psychiatric/Behavioral: Negative. Objective:   Physical Exam  Vitals and nursing note reviewed. Constitutional:       General: She is not in acute distress. Appearance: Normal appearance. She is well-developed. She is not diaphoretic. HENT:      Head: Normocephalic and atraumatic. Nose: Nose normal. No congestion or rhinorrhea.       Mouth/Throat:      Mouth: Mucous membranes are moist. Pharynx: Oropharynx is clear. No oropharyngeal exudate. Eyes:      General: No scleral icterus. Pupils: Pupils are equal, round, and reactive to light. Neck:      Trachea: No tracheal deviation. Cardiovascular:      Rate and Rhythm: Normal rate and regular rhythm. Pulses: Normal pulses. Heart sounds: Normal heart sounds. No murmur heard. Pulmonary:      Effort: Pulmonary effort is normal. No respiratory distress. Breath sounds: Normal breath sounds. No stridor. No wheezing or rales. Chest:      Chest wall: No tenderness. Abdominal:      General: Bowel sounds are normal. There is no distension. Palpations: Abdomen is soft. Tenderness: There is no abdominal tenderness. There is no rebound. Musculoskeletal:         General: Normal range of motion. Cervical back: Normal range of motion and neck supple. No muscular tenderness. Lymphadenopathy:      Cervical: No cervical adenopathy. Skin:     General: Skin is warm and dry. Coloration: Skin is not jaundiced or pale. Findings: No erythema or rash. Neurological:      General: No focal deficit present. Mental Status: She is alert and oriented to person, place, and time. Psychiatric:         Mood and Affect: Mood normal.         Behavior: Behavior normal.         Thought Content: Thought content normal.         Judgment: Judgment normal.     /82 (BP Location: Right arm, Patient Position: Sitting, Cuff Size: Standard)   Pulse 82   Temp 97.9 °F (36.6 °C) (Temporal)   Resp 15   Ht 5' (1.524 m)   Wt 54 kg (119 lb 0.8 oz)   SpO2 94%   BMI 23.25 kg/m²       Andrea Sol MD  11/30/2023 11:18 AM  Incomplete  Assessment/Plan:    No problem-specific Assessment & Plan notes found for this encounter. There are no diagnoses linked to this encounter.       Thoracic History   Procedure: Redo robotic hiatal hernia repair, takedown nissen, re-do with toupet  Date: 11/8/23    Cancer Staging   No matching staging information was found for the patient. Oncology History    No history exists. Patient ID: Leela Martins is a 69 y.o. female. ECOG 1     HPI    Mo Farfan is a 68 yo female who underwent a redo robotic assisted take down of previous fundoplication, redo hiatal hernia repair with partial fundoplication with mesh, liver biopsy, and pyloroplasty on 11/8/23. She returns today for her first post op visit. On discussion, she is doing okay. She is mostly taking in liquids and soft foods. Some foods have gotten stuck but she feels that she is improving. She is still requiring a small amount of oxygen in the morning after she gets ready but does not require it for the rest of the day. She is working with physical therapy in her home. Her pain is controlled and she is just taking an occasional Tylenol as needed. She reports that she walked on her treadmill for 30 minutes yesterday very slowly but that it felt good to walk. The following portions of the patient's history were reviewed and updated as appropriate: allergies, current medications, past family history, past medical history, past social history, past surgical history and problem list.    Review of Systems      Objective:   Physical ExamThere were no vitals taken for this visit.

## 2023-11-30 NOTE — PROGRESS NOTES
Assessment/Plan:    Gastroesophageal reflux disease without esophagitis  Radha Shaikh is a 70 yo female who underwent a redo robotic assisted take down of previous fundoplication, redo hiatal hernia repair with partial fundoplication with mesh, liver biopsy, and pyloroplasty on 11/8/23. She is here today for her first postoperative visit. She is overall doing okay. I did explain to her that her symptoms are well within the normal limits of where I would expect her to be after such a big surgery. I advised her to continue adding in soft foods and reminded her to take small bites and chew extensively before swallowing. The patient will come back and see me in another 4 weeks to ensure that she is making continued progress. Eladia Rinaldi MD  Thoracic Surgery  (Available on [employer])  Office: [phone]       Diagnoses and all orders for this visit:    Gastroesophageal reflux disease without esophagitis          Thoracic History   Procedure: Redo robotic hiatal hernia repair, takedown nissen, re-do with toupet  Date: 11/8/23    Cancer Staging   No matching staging information was found for the patient. Oncology History    No history exists. Patient ID: Fede Coates is a 69 y.o. female. ECOG 1     HPI    Radha Shaikh is a 70 yo female who underwent a redo robotic assisted take down of previous fundoplication, redo hiatal hernia repair with partial fundoplication with mesh, liver biopsy, and pyloroplasty on 11/8/23. She returns today for her first post op visit. On discussion, she is doing okay. She is mostly taking in liquids and soft foods. Some foods have gotten stuck but she feels that she is improving. She is still requiring a small amount of oxygen in the morning after she gets ready but does not require it for the rest of the day. She is working with physical therapy in her home. Her pain is controlled and she is just taking an occasional Tylenol as needed.   She reports that she walked on her treadmill for 30 minutes yesterday very slowly but that it felt good to walk. The following portions of the patient's history were reviewed and updated as appropriate: allergies, current medications, past family history, past medical history, past social history, past surgical history and problem list.    Review of Systems   Constitutional:  Positive for fatigue. Negative for chills, fever and unexpected weight change. HENT:  Positive for trouble swallowing. Eyes: Negative. Negative for visual disturbance. Respiratory:  Positive for shortness of breath. Negative for cough and stridor. Cardiovascular:  Negative for chest pain. Gastrointestinal:  Positive for abdominal pain and diarrhea. Negative for vomiting. Endocrine: Negative. Genitourinary: Negative. Musculoskeletal: Negative. Skin: Negative. Neurological:  Negative for dizziness, light-headedness and headaches. Hematological:  Negative for adenopathy. Psychiatric/Behavioral: Negative. Objective:   Physical Exam  Vitals and nursing note reviewed. Constitutional:       General: She is not in acute distress. Appearance: Normal appearance. She is well-developed. She is not diaphoretic. HENT:      Head: Normocephalic and atraumatic. Nose: Nose normal. No congestion or rhinorrhea. Mouth/Throat:      Mouth: Mucous membranes are moist.      Pharynx: Oropharynx is clear. No oropharyngeal exudate. Eyes:      General: No scleral icterus. Pupils: Pupils are equal, round, and reactive to light. Neck:      Trachea: No tracheal deviation. Cardiovascular:      Rate and Rhythm: Normal rate and regular rhythm. Pulses: Normal pulses. Heart sounds: Normal heart sounds. No murmur heard. Pulmonary:      Effort: Pulmonary effort is normal. No respiratory distress. Breath sounds: Normal breath sounds. No stridor. No wheezing or rales. Chest:      Chest wall: No tenderness. Abdominal:      General: Bowel sounds are normal. There is no distension. Palpations: Abdomen is soft. Tenderness: There is no abdominal tenderness. There is no rebound. Musculoskeletal:         General: Normal range of motion. Cervical back: Normal range of motion and neck supple. No muscular tenderness. Lymphadenopathy:      Cervical: No cervical adenopathy. Skin:     General: Skin is warm and dry. Coloration: Skin is not jaundiced or pale. Findings: No erythema or rash. Neurological:      General: No focal deficit present. Mental Status: She is alert and oriented to person, place, and time. Psychiatric:         Mood and Affect: Mood normal.         Behavior: Behavior normal.         Thought Content:  Thought content normal.         Judgment: Judgment normal.     /82 (BP Location: Right arm, Patient Position: Sitting, Cuff Size: Standard)   Pulse 82   Temp 97.9 °F (36.6 °C) (Temporal)   Resp 15   Ht 5' (1.524 m)   Wt 54 kg (119 lb 0.8 oz)   SpO2 94%   BMI 23.25 kg/m²

## 2023-12-07 ENCOUNTER — OFFICE VISIT (OUTPATIENT)
Dept: GASTROENTEROLOGY | Facility: CLINIC | Age: 78
End: 2023-12-07
Payer: COMMERCIAL

## 2023-12-07 VITALS
HEIGHT: 60 IN | DIASTOLIC BLOOD PRESSURE: 89 MMHG | SYSTOLIC BLOOD PRESSURE: 178 MMHG | BODY MASS INDEX: 23.29 KG/M2 | WEIGHT: 118.6 LBS

## 2023-12-07 DIAGNOSIS — Z98.890 HISTORY OF NISSEN FUNDOPLICATION: Primary | ICD-10-CM

## 2023-12-07 DIAGNOSIS — R74.8 ABNORMAL LIVER ENZYMES: ICD-10-CM

## 2023-12-07 PROCEDURE — 99214 OFFICE O/P EST MOD 30 MIN: CPT | Performed by: INTERNAL MEDICINE

## 2023-12-07 NOTE — PROGRESS NOTES
[facility] - Outpatient Follow-up Note  Robin Brooke 69 y.o. female MRN: [id number]  Encounter: [id number]    ASSESSMENT AND PLAN:      1. History of Nissen fundoplication  Status post takedown and partial fundal done by Dr. Danika Hayden as well as a pyloroplasty performed by Dr. Rosamaria Larsen. She has had a slow recovery process and has had difficulty transitioning to solid foods. It seems that dysphagia is not the biggest issue but more epigastric pain, so I presume that this is more likely due to the pyloroplasty recovery and the fundoplication, but I will defer to her surgeons. 2. Abnormal liver enzymes  She does have history of steatohepatitis and had F3 fibrosis on recent biopsy. Fortunately she developed shock liver postop, so I will recheck liver enzymes at this point  - Comprehensive metabolic panel; Future  - Comprehensive metabolic panel      Follow up appointment: 3 months    _______________________      Chief Complaint   Patient presents with    Follow up surgery nissen fundoplication       HPI:   Patient is a 69 y.o. female with a significant PMH of Nissen fundoplication that was recently taken down due to the fundoplication being too tight. She had successful surgery about a month ago. Unfortunately she developed some postop issues such as shock liver and JOSE due to hypotension. She also had a liver biopsy performed at the time of her surgery per the request of Dr. Liya Rooney, who has been working up her liver issues. She has a diagnosis of steatohepatitis with F3 fibrosis. She has yet to repeat any liver tests since discharge. She has been tolerating liquids and soft foods like pudding and Jell-O without issue, however when progressing to more solid foods she has had difficulty. It seems that she is not quite complaining of dysphagia but more epigastric burning and pain with real food. She has been reluctant to eat because of this pain.   She has seen both  Madelyn Han and Dr. Susan Hernández in follow-up and will continue to follow with them. Historical Information   Past Medical History:   Diagnosis Date    Anxiety     Arthritis     Asthma     Cancer ([address])     Candida infection, esophageal ([address])     Chronic kidney disease     Chronic pain     COPD (chronic obstructive pulmonary disease) (HCC)     Depression     Depression     Gastroparesis     Gastroparesis     GERD (gastroesophageal reflux disease)     Hypertension     Irritable bowel syndrome (IBS)     Lactose intolerance Not sure    Migraines     MRSA (methicillin resistant Staphylococcus aureus)     Multiple thyroid nodules     Osteoporosis     Pneumonia     Psychiatric disorder      Past Surgical History:   Procedure Laterality Date    BACK SURGERY       SECTION      CHOLECYSTECTOMY      COLONOSCOPY  10/09/2013    ESOPHAGEAL DILATION       [address] [place] [address]    ESOPHAGOGASTRODUODENOSCOPY N/A 2023    Procedure: ESOPHAGOGASTRODUODENOSCOPY (EGD); Surgeon: Karolyn Rae MD;  Location: [facility];  Service: General    HIATAL HERNIA REPAIR      NISSEN FUNDOPLICATION      WA BIOPSY LIVER NEEDLE PERCUTANEOUS N/A 2023    Procedure: EXCISION BIOPSY LIVER;  Surgeon: Karolyn Rae MD;  Location[facility];  Service: General    WA ESOPHAGOGASTRODUODENOSCOPY TRANSORAL DIAGNOSTIC N/A 2019    Procedure: ESOPHAGOGASTRODUODENOSCOPY (EGD); Surgeon: Raul Jensen MD;  Location: [facility];  Service: Gastroenterology    WA ESOPHAGOGASTRODUODENOSCOPY TRANSORAL DIAGNOSTIC N/A 2023    Procedure: ESOPHAGOGASTRODUODENOSCOPY (EGD);   Surgeon: Edward Rasmussen MD;  [facility];  Service: Thoracic    WA ESOPHAGOSCOPY FLEX BALLOON DILAT <30 MM DIAM N/A 2023    Procedure: CRE WIRE GUIDED BALLOON DILATATION ESOPHAGEAL;  Surgeon: Edward Rasmussen MD;  [facility];  Service: Thoracic    WA LAPS RPR PARAESPHGL HRNA INCL FUNDPLSTY W/O MESH N/A 2023    Procedure: robotic takedown of nissen fundoplication, redo hiatal hernia, partial fundoplication, with mesh;  Surgeon: Vadim Nelson MD;  [facility];  Service: Thoracic    SC PYLOROPLASTY N/A 2023    Procedure: PYLOROPLASTY LAPAROSCOPIC WITH ROBOT;  Surgeon:  Derik Warner MD;  Location[facility];  Service: General    ROTATOR CUFF REPAIR      SHOULDER SURGERY      SPINAL FUSION      TUBAL LIGATION  May 1975    Last child born    UPPER GASTROINTESTINAL ENDOSCOPY      US GUIDED THYROID BIOPSY      WISDOM TOOTH EXTRACTION       Social History     Substance and Sexual Activity   Alcohol Use Not Currently     Social History     Substance and Sexual Activity   Drug Use Never     Social History     Tobacco Use   Smoking Status Former    Packs/day: 1.00    Years: 15.00    Total pack years: 15.00    Types: Cigarettes    Start date: 10/5/1998    Quit date: 10/1/2003    Years since quittin.1   Smokeless Tobacco Former     Family History   Problem Relation Age of Onset    Other Mother         Esophageal disorder    Stomach cancer Father     Cancer Father     Hypertension Maternal Aunt     COPD Maternal Grandmother     Colon cancer Neg Hx     Colon polyps Neg Hx          Current Outpatient Medications:     acetaminophen (TYLENOL) 160 mg/5 mL suspension    albuterol (PROVENTIL HFA,VENTOLIN HFA) 90 mcg/act inhaler    amitriptyline (ELAVIL) 50 mg tablet    amLODIPine (NORVASC) 10 mg tablet    cetirizine (ZyrTEC) 10 MG chewable tablet    cholecalciferol (VITAMIN D3) 1,000 units tablet    clotrimazole-betamethasone (LOTRISONE) 1-0.05 % cream    dexlansoprazole (DEXILANT) 60 MG capsule    dicyclomine (BENTYL) 20 mg tablet    famotidine (PEPCID) 40 MG tablet    multivitamin (THERAGRAN) TABS    Omega-3 Fatty Acids (OMEGA 3 PO)    ondansetron (ZOFRAN) 4 mg tablet    polyethylene glycol (MIRALAX) 17 g packet    Tavaborole 5 % SOLN    tobramycin-dexamethasone (TOBRADEX) ophthalmic suspension    HYDROmorphone (DILAUDID) 2 mg tablet    sucralfate (CARAFATE) 1 g tablet  Allergies   Allergen Reactions    Latex Hives    Augmentin [Amoxicillin-Pot Clavulanate] GI Intolerance    Bactrim [Sulfamethoxazole-Trimethoprim] GI Intolerance    Clarithromycin GI Intolerance    Doxycycline Hives    Neomycin GI Intolerance    Polymyxin B GI Intolerance    Zyvox [Linezolid] GI Bleeding    Cephalosporins GI Intolerance    Nsaids GI Intolerance    Penicillins GI Intolerance     Patient can only take levaquin and cipro    Prednisone GI Intolerance    Sulphasomidine GI Intolerance     Reviewed medications and allergies and updated as indicated    PHYSICAL EXAM:    Blood pressure (!) 178/89, height 5' (1.524 m), weight 53.8 kg (118 lb 9.6 oz), not currently breastfeeding. Body mass index is 23.16 kg/m². General Appearance: NAD, cooperative, alert  Eyes: Anicteric  GI:  Soft, non-tender, non-distended; normal bowel sounds; no masses, no organomegaly   Rectal: Deferred  Musculoskeletal: No edema.   Skin:  No jaundice    Lab Results:   Lab Results   Component Value Date    WBC 6.10 11/14/2023    WBC 6.27 11/13/2023    WBC 7.02 11/12/2023    HGB 8.0 (L) 11/14/2023    HGB 8.7 (L) 11/13/2023    HGB 8.4 (L) 11/12/2023    MCV 98 11/14/2023     (L) 11/14/2023     (L) 11/13/2023    PLT 87 (L) 11/12/2023     Lab Results   Component Value Date     04/09/2015    K 3.5 11/14/2023     11/14/2023    CO2 28 11/14/2023    ANIONGAP 7 04/09/2015    BUN 17 11/14/2023    CREATININE 1.43 (H) 11/14/2023    GLUCOSE 110 04/09/2015    GLUF 74 10/27/2023    CALCIUM 8.8 11/14/2023    CORRECTEDCA 9.5 11/14/2023    AST 57 (H) 11/14/2023     (H) 11/13/2023     (H) 11/12/2023     (H) 11/14/2023     (H) 11/13/2023     (H) 11/12/2023    ALKPHOS 143 (H) 11/14/2023    ALKPHOS 193 (H) 11/13/2023    ALKPHOS 233 (H) 11/12/2023    PROT 7.2 02/05/2015    BILITOT 0.4 02/05/2015    BILITOT 0.4 06/24/2014    EGFR 35 11/14/2023     Lab Results   Component Value Date    IRON 44 (L) 11/10/2023    TIBC 249 (L) 11/10/2023    FERRITIN 384 (H) 11/10/2023     Lab Results   Component Value Date    LIPASE 13 (L) 02/20/2022       Radiology Results:   US right upper quadrant with liver dopplers    Result Date: 11/10/2023  Narrative: RIGHT UPPER QUADRANT ULTRASOUND WITH LIVER DOPPLER INDICATION:     POD#2 s/p robotic PEHR, pyloromyotomy, liver Bx. Significant transaminitis, elevated bilirubin. Eval blood flow to liver. COMPARISON: CT chest/abdomen/pelvis 11/10/2023. TECHNIQUE:   Real-time ultrasound of the right upper quadrant was performed with a curvilinear transducer with both volumetric sweeps and still imaging techniques. FINDINGS: PANCREAS: Completely obscured by overlying bowel gas. AORTA AND IVC:  Visualized portions are normal for patient age. LIVER: Size:  Within normal range. The liver measures 16.4 cm in the midclavicular line. Contour:  Surface contour is smooth. Parenchyma: There is mild diffuse increased echogenicity with smooth echotexture, without significant beam attenuation or loss of periportal echogenicity. Most consistent with mild hepatic steatosis. No liver mass identified. LIVER DOPPLER: The main portal vein and primary branch segments are patent and hepatopetal with normal spectral waveform in the main and right. Bidirectional flow in the left portal vein. Hepatic veins are patent. Spectral waveforms within normal limits. Main hepatic artery appears normal size, patent with normal spectral waveform. BILIARY: Patient has undergone cholecystectomy. No intrahepatic biliary dilatation. CBD measures 3.0 mm. No choledocholithiasis. KIDNEY: Right kidney measures 9.5 x 4.8 x 4.5 cm. Volume 108.2 mL There is a 2.2 cm upper pole simple cyst. ASCITES:   None. Impression: Patent portal veins with normal direction of flow in the main and right portal veins, but bidirectional flow in the left portal vein. Normal hepatic artery and veins. Hepatic steatosis. Workstation performed: [id number]     CT chest abdomen pelvis wo contrast    Result Date: 11/10/2023  Narrative: CT CHEST, ABDOMEN AND PELVIS WITHOUT IV CONTRAST INDICATION:   POD#2 s/p robotic hiatal hernia repair, partial fundoplication, pyloromyotomy, liver biopsy. Now with hypoxia, hypotension, thrombocytopenia, and JOSE. As per review of electronic medical record, patient with prior Nissen fundoplication and gastroparesis. COMPARISON: CT of the abdomen and pelvis from 2/20/2022 and CT of the chest, abdomen and pelvis from 3/29/2019. TECHNIQUE: CT examination of the chest, abdomen and pelvis was performed without intravenous contrast due to elevated creatinine. Multiplanar 2D reformatted images were created from the source data. This examination, like all CT scans performed in the [facility], was performed utilizing techniques to minimize radiation dose exposure, including the use of iterative reconstruction and automated exposure control. Radiation dose length product (DLP) for this visit:  930.83 mGy-cm Enteric contrast was not administered. FINDINGS: Evaluation of visceral structures and vasculature is limited by lack of intravenous contrast. CHEST BRONCHOPULMONARY:  Clear central airways. Airspace consolidations are seen in the lower lobes dependently and in the dependent portions of the upper lobes, likely areas of atelectasis. Trace groundglass opacity at the left apex. PLEURA: Trace bilateral pleural effusions. No pneumothorax. HEART/GREAT VESSELS: The heart is normal in size. No pericardial effusion. Normal caliber thoracic aorta. Main pulmonary artery dilated up to 3.5 cm, suggestive of pulmonary arterial hypertension. MEDIASTINUM/LYMPH NODES:  No axillary or mediastinal lymphadenopathy. Evaluation for hilar lymphadenopathy is limited without IV contrast. For findings in the esophagus the gastrointestinal tract section below. CHEST WALL AND LOWER NECK: Unremarkable.  ABDOMEN LIVER/BILIARY TREE: Large, wedge-shaped area of decreased attenuation involving the left hepatic lobe. This was not seen on the prior CT from 2022. No biliary ductal dilation. GALLBLADDER:  The patient is status post cholecystectomy. SPLEEN: Unremarkable unenhanced appearance. PANCREAS:  Unremarkable unenhanced appearance. No dilation of the main pancreatic duct. ADRENAL GLANDS:  Unremarkable unenhanced appearance. KIDNEYS/URETERS:  No intrarenal or ureteral calculi. No hydronephrosis. Hypodense lesion in the right kidney medially which cannot be accurately assessed without IV contrast, however corresponds to a simple cyst on the prior CT. GASTROINTESTINAL TRACT: Again seen is a hyperdense mesh from a prior hiatal hernia repair, which extends to the left hepatic lobe and along the diaphragmatic crura. The redo fundoplication is not well visualized without IV contrast. There is small amount of hyperdense material likely representing residual oral contrast ingested on the 11/9/2023 esophagram seen in the distal esophagus. The rest of the administered oral contrast is seen throughout the colon. There is no extravasated oral contrast. There is a straight, tubular hypodense structure measuring 1 cm in diameter and approximately 9.5-10 cm in length interposed between the diaphragm and the proximal stomach. It is seen just anterior to the radiopaque prior mesh. Evaluation of the stomach and bowel is somewhat limited by lack of IV contrast. Evaluation of the stomach and small bowel is limited by lack of oral contrast. There is no significant gastric distention. No findings to suggest bowel obstruction or inflammation. APPENDIX: Normal appendix. ABDOMINOPELVIC CAVITY:  No ascites or pneumoperitoneum. No fluid collections are seen in the abdomen or pelvis. LYMPH NODES: No abdominal or pelvis lymphadenopathy. VESSELS: Normal caliber aorta. Scattered atherosclerotic calcifications in the abdominal aorta and its major branches.  PELVIS REPRODUCTIVE ORGANS:  The CT appearance of the uterus is within normal limits. URINARY BLADDER: The urinary bladder is somewhat distended and contains a small amount of air along the nondependent wall, likely related to recent instrumentation. ABDOMINAL WALL/INGUINAL REGIONS: Foci of subcutaneous air and some stranding seen in the right lateral abdominal wall, likely related to the recent surgery. No ventral abdominal wall hernia. MUSCULOSKELETAL:  No focal aggressive osseous lesions. Degenerative changes of the spine. Patient is status post posterior fusion of L4-S1. There is generalized osteopenia. Impression: 1) Large, wedge-shaped area of decreased attenuation involving the majority of the left hepatic lobe, not seen on the prior CT from 2022. Evaluation is limited without IV contrast, however this is suspicious for a hepatic infarct, especially in light of markedly elevated LFTs. Other considerations include geographic hepatic steatosis. 2) Re-do fundoplication not well visualized without IV contrast. Hyperdense mesh from a prior repair extends from left hepatic lobe along the diaphragmatic crura. 3) Straight, tubular-shaped hypodense structure measuring approximately 1 cm in diameter and approximately 9.5 -10 cm in length interposed between the diaphragm and the proximal stomach, of unclear etiology. This likely represents a mesh, possibly curled  up, as the straight edges suggest a manmade structure. 4) Small amount of hyperdense material likely representing residual oral contrast from the 11/9/2023 esophagram seen in the distal esophagus. The rest of the oral contrast is seen throughout the colon. No extravasated oral contrast seen. 5) No significant gastric distention. No bowel obstruction or convincing inflammation. 6) No abdominal or pelvic fluid collections. 7) Somewhat distended urinary bladder. Recommend correlation for urinary retention. 8) Trace bilateral pleural effusions.  Subsegmental atelectasis dependently. Trace groundglass opacity at left apex, possibly infectious. 9) Additional findings as above. I personally discussed this study with Dr. Lisa Jonas on 11/10/2023 at 7:22 PM. Workstation performed: [id number]     XR chest 1 view    Result Date: 11/10/2023  Narrative: CHEST INDICATION:   Hypoxia. COMPARISON: CXR 4/17/2022, chest CT 3/27/2019. EXAM PERFORMED/VIEWS:  XR CHEST 1 VIEW. FINDINGS: Cardiomediastinal silhouette normal. Low lung volumes with bibasilar atelectasis. No effusion or pneumothorax. Upper abdomen normal. Hernia mesh along the left hemidiaphragm. Bones normal for age. Impression: Low lung volumes with bibasilar atelectasis. Pneumonia not excluded in the appropriate clinical setting. Workstation performed: [id number]     FL esophagram complete    Result Date: 11/9/2023  Narrative: BARIUM SWALLOW-ESOPHAGRAM INDICATION:   s/p paraesopahgeal hernia repair/ pyloroplasty. COMPARISON: 7/5/2023. IMAGES: 18 FLUOROSCOPY TIME:   1 minute 14 seconds TECHNIQUE: The patient was given water-soluble contrast by mouth and images of the esophagus were obtained. FINDINGS: There is mild esophageal distention noted with focal narrowed segment at the GE junction likely related to recent hernia repair. Delayed esophageal emptying noted with periods of tertiary contraction suggestive of esophageal dysmotility. No contrast extravasation seen. The patient could not drink anymore contrast at the time of the study and therefore gastric emptying could not be observed. Initial images demonstrate a mesh overlying the region of the GE junction as well as cholecystectomy clips. Impression: Mild esophageal distention with a focal narrowed segment at the GE junction likely related to recent hernia repair. Esophageal dysmotility noted with delayed emptying and periods of tertiary contraction without nathalia obstruction or contrast extravasation.  The patient could not drink anymore contrast at the time of the study and therefore gastric emptying cannot be observed during the study.  Workstation performed: [id number]

## 2023-12-12 ENCOUNTER — TELEPHONE (OUTPATIENT)
Dept: HEMATOLOGY ONCOLOGY | Facility: CLINIC | Age: 78
End: 2023-12-12

## 2023-12-12 DIAGNOSIS — Z98.890 HISTORY OF NISSEN FUNDOPLICATION: ICD-10-CM

## 2023-12-12 LAB
ALBUMIN SERPL-MCNC: 4.4 G/DL (ref 3.8–4.8)
ALBUMIN/GLOB SERPL: 1.5 {RATIO} (ref 1.2–2.2)
ALP SERPL-CCNC: 93 IU/L (ref 44–121)
ALT SERPL-CCNC: 22 IU/L (ref 0–32)
AST SERPL-CCNC: 32 IU/L (ref 0–40)
BILIRUB SERPL-MCNC: 0.3 MG/DL (ref 0–1.2)
BUN SERPL-MCNC: 17 MG/DL (ref 8–27)
BUN/CREAT SERPL: 12 (ref 12–28)
CALCIUM SERPL-MCNC: 9.7 MG/DL (ref 8.7–10.3)
CHLORIDE SERPL-SCNC: 109 MMOL/L (ref 96–106)
CO2 SERPL-SCNC: 20 MMOL/L (ref 20–29)
CREAT SERPL-MCNC: 1.41 MG/DL (ref 0.57–1)
EGFR: 38 ML/MIN/1.73
GLOBULIN SER-MCNC: 2.9 G/DL (ref 1.5–4.5)
GLUCOSE SERPL-MCNC: 70 MG/DL (ref 70–99)
POTASSIUM SERPL-SCNC: 4.1 MMOL/L (ref 3.5–5.2)
PROT SERPL-MCNC: 7.3 G/DL (ref 6–8.5)
SODIUM SERPL-SCNC: 145 MMOL/L (ref 134–144)

## 2023-12-12 RX ORDER — SUCRALFATE 1 G/1
1 TABLET ORAL
Qty: 56 TABLET | Refills: 0 | Status: SHIPPED | OUTPATIENT
Start: 2023-12-12 | End: 2023-12-26

## 2023-12-12 NOTE — TELEPHONE ENCOUNTER
Patient Call    Who are you speaking with? Patient    If it is not the patient, are they listed on an active communication consent form? N/A   What is the reason for this call? Patient states she is experiencing a lot of cramping when she is eating specifically. The patient states this morning she ate eggs and started vomiting. The patient would like to speak to the team to discuss everything further but she states she really is not doing well. Does this require a call back? Yes   If a call back is required, please list best call back number [phone]   If a call back is required, advise that a message will be forwarded to their care team and someone will return their call as soon as possible. Did you relay this information to the patient?  Yes

## 2023-12-18 ENCOUNTER — TELEPHONE (OUTPATIENT)
Dept: CARDIAC SURGERY | Facility: CLINIC | Age: 78
End: 2023-12-18

## 2023-12-18 ENCOUNTER — TELEPHONE (OUTPATIENT)
Dept: HEMATOLOGY ONCOLOGY | Facility: CLINIC | Age: 78
End: 2023-12-18

## 2023-12-18 NOTE — TELEPHONE ENCOUNTER
Patient Call    Who are you speaking with? Patient    If it is not the patient, are they listed on an active communication consent form? N/A   What is the reason for this call? Patient states she no longer needs her oxygen tank and walker and she would like to know how she can go about returning this.     The patient states she is also having pain under her left breast, and it is very painful. The patient states she has been vomiting and is having a hard time getting food down.    Does this require a call back? Yes   If a call back is required, please list [facility] call back number [phone]   If a call back is required, advise that a message will be forwarded to their care team and someone will return their call as soon as possible.   Did you relay this information to the patient? Yes

## 2023-12-18 NOTE — TELEPHONE ENCOUNTER
I called Ira regarding her dysphagia and vomiting. She had one episode of vomitting mid week last week with scrambled eggs and then again over the weekend with Chicken piccata. I educated her that this is normal and is due to post-op swelling. She should continue with soft foods for the time being. She is also having cramping and diarrhea after eating, this could be related to dumping syndrome and provided her with diet education. She also continued with heartburn I had her increased Pepcid to BID. She will see us back on 1/4/24 for continued post op care. I will d/c order for her home O2 and her walker.

## 2023-12-30 ENCOUNTER — NURSE TRIAGE (OUTPATIENT)
Dept: OTHER | Facility: OTHER | Age: 78
End: 2023-12-30

## 2023-12-31 DIAGNOSIS — R13.19 ESOPHAGEAL DYSPHAGIA: Primary | ICD-10-CM

## 2023-12-31 DIAGNOSIS — K31.84 GASTROPARESIS: ICD-10-CM

## 2024-01-02 ENCOUNTER — TELEPHONE (OUTPATIENT)
Dept: HEMATOLOGY ONCOLOGY | Facility: CLINIC | Age: 79
End: 2024-01-02

## 2024-01-02 ENCOUNTER — TELEPHONE (OUTPATIENT)
Dept: CARDIAC SURGERY | Facility: CLINIC | Age: 79
End: 2024-01-02

## 2024-01-02 ENCOUNTER — HOSPITAL ENCOUNTER (OUTPATIENT)
Dept: CT IMAGING | Facility: HOSPITAL | Age: 79
Discharge: HOME | End: 2024-01-02
Attending: THORACIC SURGERY (CARDIOTHORACIC VASCULAR SURGERY)
Payer: COMMERCIAL

## 2024-01-02 DIAGNOSIS — K31.84 GASTROPARESIS: ICD-10-CM

## 2024-01-02 DIAGNOSIS — R13.19 ESOPHAGEAL DYSPHAGIA: ICD-10-CM

## 2024-01-02 PROCEDURE — G1004 CDSM NDSC: HCPCS

## 2024-01-02 PROCEDURE — 74177 CT ABD & PELVIS W/CONTRAST: CPT

## 2024-01-02 RX ADMIN — IOHEXOL 100 ML: 350 INJECTION, SOLUTION INTRAVENOUS at 13:48

## 2024-01-02 RX ADMIN — IOHEXOL 50 ML: 240 INJECTION, SOLUTION INTRATHECAL; INTRAVASCULAR; INTRAVENOUS; ORAL at 13:48

## 2024-01-02 NOTE — TELEPHONE ENCOUNTER
Patient scheduled to have a CT scan of the abdomen pelvis with oral contrast today.  Patient called the office with concerns regarding the effects of oral contrast on her digestive tract. Discussed the reasoning of performing this scan with oral contrast. Patient expressed understanding. Scan scheduled for today at 1330.

## 2024-01-02 NOTE — TELEPHONE ENCOUNTER
Patient Call    Who are you speaking with? [facility]'s radiology     If it is not the patient, are they listed on an active communication consent form? N/A   What is the reason for this call? Significant findings CT   Does this require a call back? N/A   If a call back is required, please list best call back number N/a   If a call back is required, advise that a message will be forwarded to their care team and someone will return their call as soon as possible.   Did you relay this information to the patient? N/A

## 2024-01-02 NOTE — TELEPHONE ENCOUNTER
Patient Call    Who are you speaking with? Patient    If it is not the patient, are they listed on an active communication consent form? Yes   What is the reason for this call? Wants to speak only to Angy Beatty as she called and left message to the patient    Does this require a call back? Yes   If a call back is required, please list best call back number [phone]   If a call back is required, advise that a message will be forwarded to their care team and someone will return their call as soon as possible.   Did you relay this information to the patient? Yes

## 2024-01-02 NOTE — TELEPHONE ENCOUNTER
Called patient to inform her of her Appointment for CT SCAN today at 12 pm arrival at [facility].

## 2024-01-03 ENCOUNTER — TELEPHONE (OUTPATIENT)
Dept: PSYCHIATRY | Facility: CLINIC | Age: 79
End: 2024-01-03

## 2024-01-03 ENCOUNTER — OFFICE VISIT (OUTPATIENT)
Dept: SURGERY | Facility: CLINIC | Age: 79
End: 2024-01-03

## 2024-01-03 VITALS
OXYGEN SATURATION: 99 % | TEMPERATURE: 98.3 F | RESPIRATION RATE: 18 BRPM | WEIGHT: 114.4 LBS | DIASTOLIC BLOOD PRESSURE: 90 MMHG | HEIGHT: 60 IN | HEART RATE: 86 BPM | SYSTOLIC BLOOD PRESSURE: 169 MMHG | BODY MASS INDEX: 22.46 KG/M2

## 2024-01-03 DIAGNOSIS — K31.84 GASTROPARESIS: Primary | ICD-10-CM

## 2024-01-03 PROCEDURE — 99024 POSTOP FOLLOW-UP VISIT: CPT | Performed by: SURGERY

## 2024-01-03 NOTE — ASSESSMENT & PLAN NOTE
78-year-old female well-known to me status post robotic redo hiatal hernia repair with partial fundoplication and mesh with Dr. Price of thoracic surgery, and pyloroplasty with EGD on 11/8/2023, here for follow-up.    Plan:  She is slowly, but steadily making small improvements.  She says she is having episodes of dysphagia to solids approximately once every 10 days or so.  She describes 3-4 episodes in December.  Additionally she is complaining of significant diarrhea, and flatulence.    She is due to see Dr. Price in the coming days, and will be able to discuss her dysphagia at that point.  Her CT scan is unrevealing.  For her diarrhea I have recommended Metamucil once daily for 3 weeks to help see if her increased fiber intake helps with more consistent and less watery bowel movements.  If this were to fail, we can consider medications to help slow down her lower GI tract.  To return to clinic in 3 weeks for a next check.

## 2024-01-03 NOTE — PROGRESS NOTES
Assessment/Plan:    Gastroparesis  78-year-old female well-known to me status post robotic redo hiatal hernia repair with partial fundoplication and mesh with Dr. Price of thoracic surgery, and pyloroplasty with EGD on 11/8/2023, here for follow-up.    Plan:  She is slowly, but steadily making small improvements.  She says she is having episodes of dysphagia to solids approximately once every 10 days or so.  She describes 3-4 episodes in December.  Additionally she is complaining of significant diarrhea, and flatulence.    She is due to see Dr. Price in the coming days, and will be able to discuss her dysphagia at that point.  Her CT scan is unrevealing.  For her diarrhea I have recommended Metamucil once daily for 3 weeks to help see if her increased fiber intake helps with more consistent and less watery bowel movements.  If this were to fail, we can consider medications to help slow down her lower GI tract.  To return to clinic in 3 weeks for a next check.     Diagnoses and all orders for this visit:    Gastroparesis          Subjective:      Patient ID: Ira Bonilla is a 78 y.o. female.    78-year-old female well-known to me status post robotic takedown of Nissen, redo hiatal hernia repair with partial fundoplication and mesh with Dr. Price of thoracic surgery, as well as pyloroplasty and EGD with liver biopsy on 11/8/2023, here for follow-up.  Since her last visit she continues to complain of intermittent dysphagia to solid.  She has returned to all foods and liquids, but even with turning bites and chewing her food thoroughly she has episodes of dysphagia to solids approximately once every 7 to 10 days.  These have mostly been with dry meat, and will last for several minutes, and is only relieved with her vomiting undigested food.  She has some occasional nausea and left upper quadrant pain but is otherwise feeling well.  Denies any fevers, chills, chest pain, shortness of breath.  She is  complaining of significant diarrhea approximately 1 hour after meals.  This has been consistent and unrelenting.          The following portions of the patient's history were reviewed and updated as appropriate: She  has a past medical history of Anxiety, Arthritis, Asthma, Cancer (HCC), Candida infection, esophageal (HCC), Chronic kidney disease, Chronic pain, COPD (chronic obstructive pulmonary disease) (HCC), Depression, Depression, Gastroparesis, Gastroparesis, GERD (gastroesophageal reflux disease), Hypertension, Irritable bowel syndrome (IBS), Lactose intolerance (Not sure), Migraines, MRSA (methicillin resistant Staphylococcus aureus), Multiple thyroid nodules, Osteoporosis, Pneumonia, and Psychiatric disorder.  She   Patient Active Problem List    Diagnosis Date Noted    Encounter for geriatric assessment 10/10/2023    Asthma     COPD (chronic obstructive pulmonary disease) ([facility])     Hypertension     Migraines     Depression     Anxiety     Stage 3 chronic kidney disease ([facility])     Nausea 06/10/2021    Allergic contact urticaria 2020    Chronic GERD 2019    Dysphagia 2019    Esophageal dysphagia 2019    History of Nissen fundoplication 2019    Left upper quadrant pain 2019    Gastroparesis 2019    Colon cancer screening 2019    Gastroesophageal reflux disease without esophagitis 2019     She  has a past surgical history that includes Shoulder surgery; Hiatal hernia repair; Cholecystectomy; Back surgery;  section; [place] tooth extraction; Spinal fusion; Rotator cuff repair; Nissen fundoplication; US guided thyroid biopsy; Colonoscopy (10/09/2013); Upper gastrointestinal endoscopy; Esophageal dilation; pr esophagogastroduodenoscopy transoral diagnostic (N/A, 2019); Tubal ligation (May 1975); pr esophagogastroduodenoscopy transoral diagnostic (N/A, 2023); pr esophagoscopy flex balloon dilat <30 mm diam (N/A, 2023); pr laps rpr  paraesphgl hrna incl fundplsty w/o mesh (N/A, 11/8/2023); pr pyloroplasty (N/A, 11/8/2023); pr biopsy liver needle percutaneous (N/A, 11/8/2023); and Esophagogastroduodenoscopy (N/A, 11/8/2023).  Her family history includes COPD in her maternal grandmother; Cancer in her father; Hypertension in her maternal aunt; Other in her mother; Stomach cancer in her father.  She  reports that she quit smoking about 20 years ago. Her smoking use included cigarettes. She started smoking about 25 years ago. She has a 15.0 pack-year smoking history. She has quit using smokeless tobacco. She reports that she does not currently use alcohol. She reports that she does not use drugs.  Current Outpatient Medications   Medication Sig Dispense Refill    acetaminophen (TYLENOL) 160 mg/5 mL suspension Take 20.3 mL (650 mg total) by mouth every 6 (six) hours as needed for mild pain 355 mL 0    albuterol (PROVENTIL HFA,VENTOLIN HFA) 90 mcg/act inhaler Inhale 2 puffs 2 (two) times a day      amitriptyline (ELAVIL) 50 mg tablet TAKE 1 TABLET BY MOUTH EVERYDAY AT BEDTIME 90 tablet 1    amLODIPine (NORVASC) 10 mg tablet TAKE ONE TABLET BY MOUTH DAILY ORAL ONCE A DAY 90 DAYS      cetirizine (ZyrTEC) 10 MG chewable tablet Chew 10 mg daily.      cholecalciferol (VITAMIN D3) 1,000 units tablet Take 5,000 Units by mouth daily      clotrimazole-betamethasone (LOTRISONE) 1-0.05 % cream if needed      dexlansoprazole (DEXILANT) 60 MG capsule TAKE 1 CAPSULE BY MOUTH EVERY DAY 90 capsule 2    dicyclomine (BENTYL) 20 mg tablet Take 1 tablet (20 mg total) by mouth every 6 (six) hours as needed (abdominal cramping) 20 tablet 0    famotidine (PEPCID) 40 MG tablet TAKE 1 TABLET BY MOUTH EVERYDAY AT BEDTIME 90 tablet 3    multivitamin (THERAGRAN) TABS Take 1 tablet by mouth daily      Omega-3 Fatty Acids (OMEGA 3 PO) Take by mouth in the morning      ondansetron (ZOFRAN) 4 mg tablet Take 1 tablet (4 mg total) by mouth every 8 (eight) hours as needed for nausea  or vomiting 15 tablet 0    polyethylene glycol (MIRALAX) 17 g packet Take 17 g by mouth daily as needed (constipation) 10 each 0    HYDROmorphone (DILAUDID) 2 mg tablet Take 1 tablet (2 mg total) by mouth every 4 (four) hours as needed for moderate pain Max Daily Amount: 12 mg (Patient not taking: Reported on 12/7/2023) 20 tablet 0    sucralfate (CARAFATE) 1 g tablet Take 1 tablet (1 g total) by mouth 4 (four) times a day (before meals and at bedtime) for 14 days 56 tablet 0    Tavaborole 5 % SOLN APPLY TO NAILS DAILY AS DIRECTED (Patient not taking: Reported on 1/3/2024)      tobramycin-dexamethasone (TOBRADEX) ophthalmic suspension if needed (Patient not taking: Reported on 1/3/2024)       No current facility-administered medications for this visit.     Current Outpatient Medications on File Prior to Visit   Medication Sig    acetaminophen (TYLENOL) 160 mg/5 mL suspension Take 20.3 mL (650 mg total) by mouth every 6 (six) hours as needed for mild pain    albuterol (PROVENTIL HFA,VENTOLIN HFA) 90 mcg/act inhaler Inhale 2 puffs 2 (two) times a day    amitriptyline (ELAVIL) 50 mg tablet TAKE 1 TABLET BY MOUTH EVERYDAY AT BEDTIME    amLODIPine (NORVASC) 10 mg tablet TAKE ONE TABLET BY MOUTH DAILY ORAL ONCE A DAY 90 DAYS    cetirizine (ZyrTEC) 10 MG chewable tablet Chew 10 mg daily.    cholecalciferol (VITAMIN D3) 1,000 units tablet Take 5,000 Units by mouth daily    clotrimazole-betamethasone (LOTRISONE) 1-0.05 % cream if needed    dexlansoprazole (DEXILANT) 60 MG capsule TAKE 1 CAPSULE BY MOUTH EVERY DAY    dicyclomine (BENTYL) 20 mg tablet Take 1 tablet (20 mg total) by mouth every 6 (six) hours as needed (abdominal cramping)    famotidine (PEPCID) 40 MG tablet TAKE 1 TABLET BY MOUTH EVERYDAY AT BEDTIME    multivitamin (THERAGRAN) TABS Take 1 tablet by mouth daily    Omega-3 Fatty Acids (OMEGA 3 PO) Take by mouth in the morning    ondansetron (ZOFRAN) 4 mg tablet Take 1 tablet (4 mg total) by mouth every 8 (eight)  hours as needed for nausea or vomiting    polyethylene glycol (MIRALAX) 17 g packet Take 17 g by mouth daily as needed (constipation)    HYDROmorphone (DILAUDID) 2 mg tablet Take 1 tablet (2 mg total) by mouth every 4 (four) hours as needed for moderate pain Max Daily Amount: 12 mg (Patient not taking: Reported on 12/7/2023)    sucralfate (CARAFATE) 1 g tablet Take 1 tablet (1 g total) by mouth 4 (four) times a day (before meals and at bedtime) for 14 days    Tavaborole 5 % SOLN APPLY TO NAILS DAILY AS DIRECTED (Patient not taking: Reported on 1/3/2024)    tobramycin-dexamethasone (TOBRADEX) ophthalmic suspension if needed (Patient not taking: Reported on 1/3/2024)     Current Facility-Administered Medications on File Prior to Visit   Medication    [COMPLETED] iohexol (OMNIPAQUE) 240 MG/ML solution 50 mL    [COMPLETED] iohexol (OMNIPAQUE) 350 MG/ML injection (MULTI-DOSE) 100 mL     She is allergic to latex, augmentin [amoxicillin-pot clavulanate], bactrim [sulfamethoxazole-trimethoprim], clarithromycin, doxycycline, neomycin, polymyxin b, zyvox [linezolid], cephalosporins, nsaids, penicillins, prednisone, and sulphasomidine..    Review of Systems   Constitutional:  Negative for chills, fatigue and fever.   HENT:  Negative for ear pain, facial swelling, sinus pressure and sinus pain.    Eyes:  Negative for pain.   Respiratory:  Negative for cough, shortness of breath and wheezing.    Cardiovascular:  Negative for chest pain.   Gastrointestinal:  Positive for abdominal distention, diarrhea and nausea. Negative for abdominal pain, constipation and vomiting.   Endocrine: Negative for cold intolerance and heat intolerance.   Genitourinary:  Negative for dysuria and flank pain.   Musculoskeletal:  Negative for back pain and neck pain.   Skin:  Negative for wound.   Neurological:  Negative for syncope, facial asymmetry, light-headedness and numbness.   Psychiatric/Behavioral:  Negative for behavioral problems, confusion  and suicidal ideas.          Objective:      /90 (BP Location: Left arm, Patient Position: Sitting, Cuff Size: Standard)   Pulse 86   Temp 98.3 °F (36.8 °C) (Temporal)   Resp 18   Ht 5' (1.524 m)   Wt 51.9 kg (114 lb 6.4 oz)   SpO2 99%   BMI 22.34 kg/m²          Physical Exam  Vitals and nursing note reviewed.   Constitutional:       General: She is not in acute distress.     Appearance: Normal appearance. She is not toxic-appearing.   HENT:      Head: Normocephalic and atraumatic.      Mouth/Throat:      Mouth: Mucous membranes are moist.   Eyes:      Extraocular Movements: Extraocular movements intact.      Pupils: Pupils are equal, round, and reactive to light.   Cardiovascular:      Rate and Rhythm: Normal rate and regular rhythm.      Pulses: Normal pulses.   Pulmonary:      Effort: Pulmonary effort is normal. No respiratory distress.      Breath sounds: Normal breath sounds. No wheezing.   Abdominal:      General: There is no distension.      Palpations: Abdomen is soft. There is no mass.      Tenderness: There is no abdominal tenderness. There is no guarding or rebound.      Hernia: No hernia is present.   Musculoskeletal:         General: No swelling or deformity. Normal range of motion.      Cervical back: Normal range of motion and neck supple.      Right lower leg: No edema.      Left lower leg: No edema.   Skin:     General: Skin is warm and dry.      Coloration: Skin is not jaundiced.   Neurological:      General: No focal deficit present.      Mental Status: She is alert and oriented to person, place, and time.   Psychiatric:         Mood and Affect: Mood normal.         Behavior: Behavior normal.

## 2024-01-03 NOTE — TELEPHONE ENCOUNTER
Writer received email from [facility] requesting to add client to wait list for therapy. Client was added to wait list and will send a message to [facility]  to follow up and offer virtual service.

## 2024-01-03 NOTE — TELEPHONE ENCOUNTER
"Behavioral Health Outpatient Intake Questions    Referred By   : [facility]    Please advise interviewee that they need to answer all questions truthfully to allow for best care, and any misrepresentations of information may affect their ability to be seen at this clinic   => Was this discussed? Yes     If Minor Child (under age 18)    Who is/are the legal guardian(s) of the child?     Is there a custody agreement? No     If \"YES\"- Custody orders must be obtained prior to scheduling the first appointment  In addition, Consent to Treatment must be signed by all legal guardians prior to scheduling the first appointment    If \"NO\"- Consent to Treatment must be signed by all legal guardians prior to scheduling the first appointment    Behavioral Health Outpatient Intake History -     Presenting Problem (in patient's own words): a lot of life problems going on. Daughter passed away 3 years ago, adopted grandson, recently sold house and moved in with son and his family, and had 2 surgeries done in November.     Are there any communication barriers for this patient?     No                                               If yes, please describe barriers:   If there is a unique situation, please refer to Nathan Liu/Soraya Rincon for final determination.    Are you taking any psychiatric medications? Yes     If \"YES\" -What are they AmitriptylineElavil     If \"YES\" -Who prescribes?     Has the Patient previously received outpatient Talk Therapy or Medication Management from [facility]  Yes        If \"YES\"- When, Where and with Whom?  Manjeet Ibarra @ [facility] [place]        If \"NO\" -Has Patient received these services elsewhere?       If \"YES\" -When, Where, and with Whom?    Has the Patient abused alcohol or other substances in the last 6 months ? No  No concerns of substance abuse are reported.     If \"YES\" -What substance, How much, How often?     If illegal substance: Refer to [facility] (for ALINE) or [facility] " "Offices.   If Alcohol in excess of 10 drinks per week:  Refer to [facility] (for ALINE) or [facility] Offices    Legal History-     Is this treatment court ordered? No   If \"yes \"send to :  Talk Therapy : Send to Nathan Liu/Soraya Rincon for final determination   Med Management: Send to Dr Camp for final determination     Has the Patient been convicted of a felony?  No   If \"Yes\" send to -When, What?  Talk Therapy : Send to Nathan Rincon for final determination   Med Management: Send to Dr Camp for final determination     ACCEPTED as a patient Yes  If \"Yes\" Appointment Date: 1/10 @ 12pm with Maria Ines Farfan    Referred Elsewhere? No  If “Yes” - (Where? Ex: [facility], [facility], [facility], [facility], etc.)       Name of Insurance Co: [employer]  Insurance ID# [id number]  Insurance Phone # [phone]  If ins is primary or secondary? primary  If patient is a minor, parents information such as Name, D.O.B of guarantor.  "

## 2024-01-04 ENCOUNTER — TELEPHONE (OUTPATIENT)
Dept: HEMATOLOGY ONCOLOGY | Facility: CLINIC | Age: 79
End: 2024-01-04

## 2024-01-04 ENCOUNTER — TELEPHONE (OUTPATIENT)
Dept: CARDIAC SURGERY | Facility: CLINIC | Age: 79
End: 2024-01-04

## 2024-01-04 NOTE — TELEPHONE ENCOUNTER
"Provider called pt and waited 20 minutes for Virtual visit. Pt informed me that she is having technical issues with her mychart and could not get online for post op virtual visit on 1/4/2024 at 11:30am. I Informed pt that Dr. Price will call her on her phone today or Friday, 1/5/24. Also, informed the team to not \"no show\" patient, due to technical issues. Patient was very understanding and will await on call from the provider.  "

## 2024-01-04 NOTE — TELEPHONE ENCOUNTER
Dr. Price waited 20 minutes for virtual appt to begin but pt never logged on. I called and spoke with pt and she was unaware of how the virtual appts work. She does not have access to [facility]. Provider will be notified and we will reschedule pt accordingly. Provider will determine if she can have an appt over the phone or if she will need to come into the office

## 2024-01-04 NOTE — TELEPHONE ENCOUNTER
Patient Call    Who are you speaking with? Patient    If it is not the patient, are they listed on an active communication consent form? N/A   What is the reason for this call? Pt is waiting for her virtual appt to begin   Does this require a call back? Yes   If a call back is required, please list best call back number [phone]    If a call back is required, advise that a message will be forwarded to their care team and someone will return their call as soon as possible.   Did you relay this information to the patient? Yes

## 2024-01-05 ENCOUNTER — TELEPHONE (OUTPATIENT)
Dept: CARDIAC SURGERY | Facility: CLINIC | Age: 79
End: 2024-01-05

## 2024-01-05 DIAGNOSIS — K52.9 GASTROENTERITIS: ICD-10-CM

## 2024-01-05 RX ORDER — DICYCLOMINE HCL 20 MG
20 TABLET ORAL EVERY 6 HOURS PRN
Qty: 20 TABLET | Refills: 0 | Status: SHIPPED | OUTPATIENT
Start: 2024-01-05

## 2024-01-05 NOTE — TELEPHONE ENCOUNTER
Spoke with the patient regarding her concerns. I recommended continued watchful waiting. We discussed her CT scan results. No concerning findings. She mostly c/o LUQ pain, cramping, occasional dysphagia.I explained that I think this is still par for the course. She has another appointment with Dr. Mcmahon in 3 weeks. She will call to make an appointment with me after that time    Shilpa Price MD  Thoracic Surgery  (Available on [employer])  Office: [phone]

## 2024-01-10 ENCOUNTER — TELEMEDICINE (OUTPATIENT)
Dept: PSYCHIATRY | Facility: CLINIC | Age: 79
End: 2024-01-10
Payer: COMMERCIAL

## 2024-01-10 DIAGNOSIS — F41.9 ANXIETY: ICD-10-CM

## 2024-01-10 DIAGNOSIS — F32.0 CURRENT MILD EPISODE OF MAJOR DEPRESSIVE DISORDER WITHOUT PRIOR EPISODE (HCC): Primary | ICD-10-CM

## 2024-01-10 PROCEDURE — 90791 PSYCH DIAGNOSTIC EVALUATION: CPT

## 2024-01-10 NOTE — PSYCH
Behavioral Health Psychotherapy Assessment    Date of Initial Psychotherapy Assessment: 01/10/24  Referral Source: self  Has a release of information been signed for the referral source? NA    Preferred Name: Ira Bonilla  Preferred Pronouns: She/her  YOB: 1945 Age: 78 y.o.  Sex assigned at birth: female   Gender Identity: female  Race:   Preferred Language: English    Emergency Contact:  Full Name: Bj Bonilla  Relationship to Client: son  Contact information: [phone]    Primary Care Physician:  Selvin Hu DO  [address]  [facility] [address]  [phone]  Has a release of information been signed? No    Physical Health History:  Past surgical procedures: Surgery in 2023 for Gerd and swallowing problems, gall bladder surgery, rotator cuff surgery, 2 c sections  Do you have a history of any of the following: other COPD, Gerd  Do you have any mobility issues? No    Relevant Family History:  Daughter passed away 3 years ago.  She had been addicted to drugs and in and out of drug rehabs, panhandled etc.  She overdosed.  She is taking care of Wandi her grandson.    Presenting Problem (What brings you in?)  Going through a lot of things.  Recently moved in with her son, his family, her grandson and her son's fiance.  Daughter  3 years ago and she is taking care of her grandson.    Mental Health Advance Directive:  Do you currently have a Mental Health Advance Directive?no    Diagnosis:   Diagnosis ICD-10-CM Associated Orders   1. Current mild episode of major depressive disorder without prior episode (HCC)  F32.0       2. Anxiety  F41.9           Initial Assessment:     Current Mental Status:    Appearance: appropriate and casual      Behavior/Manner: cooperative      Affect/Mood:  Euthymic    Speech:  Normal    Sleep:  Normal    Oriented to: oriented to self, oriented to place and oriented to time       Clinical Symptoms    Depression: yes      Depression  Symptoms: depressed mood, serious loss of interest in things, excessive crying, social isolation, fatigue, indecision and hypersomnia      Have you ever been assaultive to others or the environment: No      Have you ever been self-injurious: No      Counseling History:  Previous Counseling or Treatment  (Mental Health or Drug & Alcohol): Yes    Previous Counseling Details:  Saw [facility] counselor, Manjeet Modi for a couple of years. Has had family based therapy for Dario her grandson  Have you previously taken psychiatric medications: No      Suicide Risk Assessment  Have you ever had a suicide attempt: No    Have you had incidents of suicidal ideation: No    Are you currently experiencing suicidal thoughts: No      Substance Abuse/Addiction Assessment:  Alcohol: No    Heroin: No    Fentanyl: No    Opiates: No    Cocaine: No    Amphetamines: No    Hallucinogens: No    Club Drugs: No    Benzodiazepines: No    Other Rx Meds: No    Marijuana: No    Tobacco/Nicotine: No    Have you experienced blackouts as a result of substance use: No    Have you had any periods of abstinence: No    Have you experienced symptoms of withdrawal: No    Have you ever overdosed on any substances?: No    Are you currently using any Medication Assisted Treatment for Substance Use: No      Disordered Eating History:  Do you have a history of disordered eating: No      Social Determinants of Health:    SDOH:  Stress and other    Other SDOH:  Recent death of daughter, recent move in with her son    Trauma and Abuse History:    Have you ever been abused: No      Legal History:    Have you ever been arrested  or had a DUI: No      Have you been incarcerated: No      Are you currently on parole/probation: No      Any current Children and Youth involvement: No      Any pending legal charges: No      Relationship History:    Current marital status:       Natural Supports:  Friends and other    Other natural supports:  Son, daughter in  law    Relationship History:   14 years,  and no relationships since then.    Employment History    Are you currently employed: No      Currently seeking employment: No      Longest period of employment:  26 years [employer]    Sources of income/financial support:  [employer]     History:      Status: no history of  duty  Educational History:     Have you ever been diagnosed with a learning disability: No      Highest level of education:  High school graduate    Have you ever had an IEP or 504-plan: No      Do you need assistance with reading or writing: No      Recommended Treatment:     Psychotherapy:  Individual sessions    Frequency:  2 times    Session frequency:  Monthly      Visit start and stop times:    01/10/24  Start Time: 1200  Stop Time: 1259  Total Visit Time: 59 minutes  Virtual Regular Visit    Verification of patient location:    Patient is located at Home in the following state in which I hold an active license [place]      Assessment/Plan:    Problem List Items Addressed This Visit       Depression - Primary    Anxiety       Goals addressed in session: Goal 1          Reason for visit is No chief complaint on file.       Encounter provider Maria Ines Farfan LCSW    Provider located at [facility] [address]  [place] [address] [phone]      Recent Visits  No visits were found meeting these conditions.  Showing recent visits within past 7 days and meeting all other requirements  Today's Visits  Date Type Provider Dept   01/10/24 Telemedicine Maria Ines Farfan LCSW  [facility]   Showing today's visits and meeting all other requirements  Future Appointments  No visits were found meeting these conditions.  Showing future appointments within next 150 days and meeting all other requirements       The patient was identified by name and date of birth. Ira  EFRA Bonilla was informed that this is a telemedicine visit and that the visit is being conducted throughthe [employer] platform. She agrees to proceed..  My office door was closed. No one else was in the room.  She acknowledged consent and understanding of privacy and security of the video platform. The patient has agreed to participate and understands they can discontinue the visit at any time.    Patient is aware this is a billable service.     Subjective  Ira Bonilla is a 78 y.o. female  .      HPI     Past Medical History:   Diagnosis Date    Anxiety     Arthritis     Asthma     Cancer (HCC)     Candida infection, esophageal (HCC)     Chronic kidney disease     Chronic pain     COPD (chronic obstructive pulmonary disease) (HCC)     Depression     Depression     Gastroparesis     Gastroparesis     GERD (gastroesophageal reflux disease)     Hypertension     Irritable bowel syndrome (IBS)     Lactose intolerance Not sure    Migraines     MRSA (methicillin resistant Staphylococcus aureus)     Multiple thyroid nodules     Osteoporosis     Pneumonia     Psychiatric disorder        Past Surgical History:   Procedure Laterality Date    BACK SURGERY       SECTION      CHOLECYSTECTOMY      COLONOSCOPY  10/09/2013    ESOPHAGEAL DILATION      2017 [facility]    ESOPHAGOGASTRODUODENOSCOPY N/A 2023    Procedure: ESOPHAGOGASTRODUODENOSCOPY (EGD);  Surgeon: Dontrell Mcmahon MD;  [facility];  Service: General    HIATAL HERNIA REPAIR      NISSEN FUNDOPLICATION      RI BIOPSY LIVER NEEDLE PERCUTANEOUS N/A 2023    Procedure: EXCISION BIOPSY LIVER;  Surgeon: Dontrell Mcmahon MD;  [facility];  Service: General    RI ESOPHAGOGASTRODUODENOSCOPY TRANSORAL DIAGNOSTIC N/A 2019    Procedure: ESOPHAGOGASTRODUODENOSCOPY (EGD);  Surgeon: Geovanna Winn MD;  [facility];  Service: Gastroenterology    RI ESOPHAGOGASTRODUODENOSCOPY TRANSORAL DIAGNOSTIC N/A 2023    Procedure:  ESOPHAGOGASTRODUODENOSCOPY (EGD);  Surgeon: Shilpa Price MD;  [facility];  Service: Thoracic    SC ESOPHAGOSCOPY FLEX BALLOON DILAT <30 MM DIAM N/A 09/20/2023    Procedure: CRE WIRE GUIDED BALLOON DILATATION ESOPHAGEAL;  Surgeon: Shilpa Price MD;  [facility];  Service: Thoracic    SC LAPS RPR PARAESPHGL HRNA INCL FUNDPLSTY W/O MESH N/A 11/8/2023    Procedure: robotic takedown of nissen fundoplication, redo hiatal hernia, partial fundoplication, with mesh;  Surgeon: Shilpa Price MD;  [facility];  Service: Thoracic    SC PYLOROPLASTY N/A 11/8/2023    Procedure: PYLOROPLASTY LAPAROSCOPIC WITH ROBOT;  Surgeon: Dontrell Mcmahon MD;  [facility];  Service: General    ROTATOR CUFF REPAIR      SHOULDER SURGERY      SPINAL FUSION      TUBAL LIGATION  May 1975    Last child born    UPPER GASTROINTESTINAL ENDOSCOPY      US GUIDED THYROID BIOPSY      WISDOM TOOTH EXTRACTION         Current Outpatient Medications   Medication Sig Dispense Refill    acetaminophen (TYLENOL) 160 mg/5 mL suspension Take 20.3 mL (650 mg total) by mouth every 6 (six) hours as needed for mild pain 355 mL 0    albuterol (PROVENTIL HFA,VENTOLIN HFA) 90 mcg/act inhaler Inhale 2 puffs 2 (two) times a day      amitriptyline (ELAVIL) 50 mg tablet TAKE 1 TABLET BY MOUTH EVERYDAY AT BEDTIME 90 tablet 1    amLODIPine (NORVASC) 10 mg tablet TAKE ONE TABLET BY MOUTH DAILY ORAL ONCE A DAY 90 DAYS      cetirizine (ZyrTEC) 10 MG chewable tablet Chew 10 mg daily.      cholecalciferol (VITAMIN D3) 1,000 units tablet Take 5,000 Units by mouth daily      clotrimazole-betamethasone (LOTRISONE) 1-0.05 % cream if needed      dexlansoprazole (DEXILANT) 60 MG capsule TAKE 1 CAPSULE BY MOUTH EVERY DAY 90 capsule 2    dicyclomine (BENTYL) 20 mg tablet Take 1 tablet (20 mg total) by mouth every 6 (six) hours as needed (abdominal cramping) 20 tablet 0    famotidine (PEPCID) 40 MG tablet TAKE 1 TABLET BY MOUTH EVERYDAY  AT BEDTIME 90 tablet 3    HYDROmorphone (DILAUDID) 2 mg tablet Take 1 tablet (2 mg total) by mouth every 4 (four) hours as needed for moderate pain Max Daily Amount: 12 mg (Patient not taking: Reported on 12/7/2023) 20 tablet 0    multivitamin (THERAGRAN) TABS Take 1 tablet by mouth daily      Omega-3 Fatty Acids (OMEGA 3 PO) Take by mouth in the morning      ondansetron (ZOFRAN) 4 mg tablet Take 1 tablet (4 mg total) by mouth every 8 (eight) hours as needed for nausea or vomiting 15 tablet 0    polyethylene glycol (MIRALAX) 17 g packet Take 17 g by mouth daily as needed (constipation) 10 each 0    sucralfate (CARAFATE) 1 g tablet Take 1 tablet (1 g total) by mouth 4 (four) times a day (before meals and at bedtime) for 14 days 56 tablet 0    Tavaborole 5 % SOLN APPLY TO NAILS DAILY AS DIRECTED (Patient not taking: Reported on 1/3/2024)      tobramycin-dexamethasone (TOBRADEX) ophthalmic suspension if needed (Patient not taking: Reported on 1/3/2024)       No current facility-administered medications for this visit.        Allergies   Allergen Reactions    Latex Hives    Augmentin [Amoxicillin-Pot Clavulanate] GI Intolerance    Bactrim [Sulfamethoxazole-Trimethoprim] GI Intolerance    Clarithromycin GI Intolerance    Doxycycline Hives    Neomycin GI Intolerance    Polymyxin B GI Intolerance    Zyvox [Linezolid] GI Bleeding    Cephalosporins GI Intolerance    Nsaids GI Intolerance    Penicillins GI Intolerance     Patient can only take levaquin and cipro    Prednisone GI Intolerance    Sulphasomidine GI Intolerance       Review of Systems   Constitutional:  Positive for fatigue.       Video Exam    There were no vitals filed for this visit.    Physical Exam  Psychiatric:         Behavior: Behavior is cooperative.

## 2024-01-15 ENCOUNTER — TELEPHONE (OUTPATIENT)
Dept: HEMATOLOGY ONCOLOGY | Facility: CLINIC | Age: 79
End: 2024-01-15

## 2024-01-15 NOTE — TELEPHONE ENCOUNTER
Voice mail received, message left to call office. [employer] Border Text: The closure involved the vermilion border.

## 2024-01-15 NOTE — TELEPHONE ENCOUNTER
Patient Call    Who are you speaking with? Patient    If it is not the patient, are they listed on an active communication consent form? Yes   What is the reason for this call? Received a letter about her test result and needs a call back   Does this require a call back? Yes   If a call back is required, please list best call back number [phone]   If a call back is required, advise that a message will be forwarded to their care team and someone will return their call as soon as possible.   Did you relay this information to the patient? Yes

## 2024-01-15 NOTE — TELEPHONE ENCOUNTER
Patient Call    Who are you speaking with? Patient    If it is not the patient, are they listed on an active communication consent form? N/A   What is the reason for this call? Patient is returning a call to go over results.   Does this require a call back? Yes   If a call back is required, please list best call back number [phone]   If a call back is required, advise that a message will be forwarded to their care team and someone will return their call as soon as possible.   Did you relay this information to the patient? Yes

## 2024-01-18 DIAGNOSIS — R13.19 ESOPHAGEAL DYSPHAGIA: Primary | ICD-10-CM

## 2024-01-18 NOTE — PROGRESS NOTES
Spoke with patient. UGI ordered. Will have patient come in to see me after the UGI    Shilpa Price MD  Thoracic Surgery  (Available on [employer] Text)  Office: [phone]

## 2024-01-25 ENCOUNTER — TELEMEDICINE (OUTPATIENT)
Dept: PSYCHIATRY | Facility: CLINIC | Age: 79
End: 2024-01-25
Payer: COMMERCIAL

## 2024-01-25 DIAGNOSIS — F32.0 CURRENT MILD EPISODE OF MAJOR DEPRESSIVE DISORDER WITHOUT PRIOR EPISODE (HCC): Primary | ICD-10-CM

## 2024-01-25 PROCEDURE — 90834 PSYTX W PT 45 MINUTES: CPT

## 2024-01-25 NOTE — PSYCH
"Behavioral Health Psychotherapy Progress Note    Psychotherapy Provided: Individual Psychotherapy     1. Current mild episode of major depressive disorder without prior episode (HCC)            Goals addressed in session: Goal 1     DATA: Ira talked about how her family situation is and was able to create a crisis plan and treatment plan during this session.  She was able to identify that she needs a hobby or a part time job in order to improve her mood.  Also that she needs to improve her physical health to feel better and enjoy her life more.  During this session, this clinician used the following therapeutic modalities: Client-centered Therapy and Supportive Psychotherapy    Substance Abuse was not addressed during this session. If the client is diagnosed with a co-occurring substance use disorder, please indicate any changes in the frequency or amount of use: . Stage of change for addressing substance use diagnoses: No substance use/Not applicable    ASSESSMENT:  Ira Bonilla presents with a Euthymic/ normal mood.     her affect is Normal range and intensity, which is congruent, with her mood and the content of the session. The client has made progress on their goals.     Ira Bonilla presents with a none risk of suicide, none risk of self-harm, and none risk of harm to others.    For any risk assessment that surpasses a \"low\" rating, a safety plan must be developed.    A safety plan was indicated: no  If yes, describe in detail     PLAN: Between sessions, Ira Bonilla will utilize mindfulness to improve mood. At the next session, the therapist will use Client-centered Therapy and Supportive Psychotherapy to address depression.    Behavioral Health Treatment Plan and Discharge Planning: Ira Bonilla is aware of and agrees to continue to work on their treatment plan. They have identified and are working toward their discharge goals. yes    Visit start and stop times:    01/25/24  Start " Time: 1300  Stop Time: 1352  Total Visit Time: 52 minutes  Virtual Regular Visit    Verification of patient location:    Patient is located at Home in the following state in which I hold an active license [place]      Assessment/Plan:    Problem List Items Addressed This Visit       Depression - Primary       Goals addressed in session: Goal 1          Reason for visit is No chief complaint on file.       Encounter provider Maria Ines Farfan LCSW    Provider [facility] [address]  [facility] [place] [address] [phone]      Recent Visits  No visits were found meeting these conditions.  Showing recent visits within past 7 days and meeting all other requirements  Today's Visits  Date Type Provider Dept   01/25/24 Telemedicine Maria Ines Farfan LCSW  [facility]   Showing today's visits and meeting all other requirements  Future Appointments  No visits were found meeting these conditions.  Showing future appointments within next 150 days and meeting all other requirements       The patient was identified by name and date of birth. Ira Bonilla was informed that this is a telemedicine visit and that the visit is being conducted throughthe [employer] platform. She agrees to proceed..  My office door was closed. No one else was in the room.  She acknowledged consent and understanding of privacy and security of the video platform. The patient has agreed to participate and understands they can discontinue the visit at any time.    Patient is aware this is a billable service.     Subjective  Ira Bonilla is a 78 y.o. female  .      HPI     Past Medical History:   Diagnosis Date    Anxiety     Arthritis     Asthma     Cancer (HCC)     Candida infection, esophageal (HCC)     Chronic kidney disease     Chronic pain     COPD (chronic obstructive pulmonary disease) (HCC)     Depression     Depression      Gastroparesis     Gastroparesis     GERD (gastroesophageal reflux disease)     Hypertension     Irritable bowel syndrome (IBS)     Lactose intolerance Not sure    Migraines     MRSA (methicillin resistant Staphylococcus aureus)     Multiple thyroid nodules     Osteoporosis     Pneumonia     Psychiatric disorder        Past Surgical History:   Procedure Laterality Date    BACK SURGERY       SECTION      CHOLECYSTECTOMY      COLONOSCOPY  10/09/2013    ESOPHAGEAL DILATION      2017 [facility]    ESOPHAGOGASTRODUODENOSCOPY N/A 2023    Procedure: ESOPHAGOGASTRODUODENOSCOPY (EGD);  Surgeon: Dontrell Mcmahon MD;  [facility];  Service: General    HIATAL HERNIA REPAIR      NISSEN FUNDOPLICATION      OR BIOPSY LIVER NEEDLE PERCUTANEOUS N/A 2023    Procedure: EXCISION BIOPSY LIVER;  Surgeon: Dontrell Mcmahon MD;  [facility];  Service: General    OR ESOPHAGOGASTRODUODENOSCOPY TRANSORAL DIAGNOSTIC N/A 2019    Procedure: ESOPHAGOGASTRODUODENOSCOPY (EGD);  Surgeon: Geovanna Winn MD;  [facility];  Service: Gastroenterology    OR ESOPHAGOGASTRODUODENOSCOPY TRANSORAL DIAGNOSTIC N/A 2023    Procedure: ESOPHAGOGASTRODUODENOSCOPY (EGD);  Surgeon: Shilpa Price MD;  [facility];  Service: Thoracic    OR ESOPHAGOSCOPY FLEX BALLOON DILAT <30 MM DIAM N/A 2023    Procedure: CRE WIRE GUIDED BALLOON DILATATION ESOPHAGEAL;  Surgeon: Shilpa Price MD;  [facility];  Service: Thoracic    OR LAPS RPR PARAESPHGL HRNA INCL FUNDPLSTY W/O MESH N/A 2023    Procedure: robotic takedown of nissen fundoplication, redo hiatal hernia, partial fundoplication, with mesh;  Surgeon: Shilpa Price MD;  [facility];  Service: Thoracic    OR PYLOROPLASTY N/A 2023    Procedure: PYLOROPLASTY LAPAROSCOPIC WITH ROBOT;  Surgeon: Dontrell Mcmahon MD;  [facility];  Service: General    ROTATOR CUFF REPAIR      SHOULDER SURGERY      SPINAL FUSION       TUBAL LIGATION  May 1975    Last child born    UPPER GASTROINTESTINAL ENDOSCOPY      US GUIDED THYROID BIOPSY      WISDOM TOOTH EXTRACTION         Current Outpatient Medications   Medication Sig Dispense Refill    acetaminophen (TYLENOL) 160 mg/5 mL suspension Take 20.3 mL (650 mg total) by mouth every 6 (six) hours as needed for mild pain 355 mL 0    albuterol (PROVENTIL HFA,VENTOLIN HFA) 90 mcg/act inhaler Inhale 2 puffs 2 (two) times a day      amitriptyline (ELAVIL) 50 mg tablet TAKE 1 TABLET BY MOUTH EVERYDAY AT BEDTIME 90 tablet 1    amLODIPine (NORVASC) 10 mg tablet TAKE ONE TABLET BY MOUTH DAILY ORAL ONCE A DAY 90 DAYS      cetirizine (ZyrTEC) 10 MG chewable tablet Chew 10 mg daily.      cholecalciferol (VITAMIN D3) 1,000 units tablet Take 5,000 Units by mouth daily      clotrimazole-betamethasone (LOTRISONE) 1-0.05 % cream if needed      dexlansoprazole (DEXILANT) 60 MG capsule TAKE 1 CAPSULE BY MOUTH EVERY DAY 90 capsule 2    dicyclomine (BENTYL) 20 mg tablet Take 1 tablet (20 mg total) by mouth every 6 (six) hours as needed (abdominal cramping) 20 tablet 0    famotidine (PEPCID) 40 MG tablet TAKE 1 TABLET BY MOUTH EVERYDAY AT BEDTIME 90 tablet 3    HYDROmorphone (DILAUDID) 2 mg tablet Take 1 tablet (2 mg total) by mouth every 4 (four) hours as needed for moderate pain Max Daily Amount: 12 mg (Patient not taking: Reported on 12/7/2023) 20 tablet 0    multivitamin (THERAGRAN) TABS Take 1 tablet by mouth daily      Omega-3 Fatty Acids (OMEGA 3 PO) Take by mouth in the morning      ondansetron (ZOFRAN) 4 mg tablet Take 1 tablet (4 mg total) by mouth every 8 (eight) hours as needed for nausea or vomiting 15 tablet 0    polyethylene glycol (MIRALAX) 17 g packet Take 17 g by mouth daily as needed (constipation) 10 each 0    sucralfate (CARAFATE) 1 g tablet Take 1 tablet (1 g total) by mouth 4 (four) times a day (before meals and at bedtime) for 14 days 56 tablet 0    Tavaborole 5 % SOLN APPLY TO NAILS DAILY AS  DIRECTED (Patient not taking: Reported on 1/3/2024)      tobramycin-dexamethasone (TOBRADEX) ophthalmic suspension if needed (Patient not taking: Reported on 1/3/2024)       No current facility-administered medications for this visit.        Allergies   Allergen Reactions    Latex Hives    Augmentin [Amoxicillin-Pot Clavulanate] GI Intolerance    Bactrim [Sulfamethoxazole-Trimethoprim] GI Intolerance    Clarithromycin GI Intolerance    Doxycycline Hives    Neomycin GI Intolerance    Polymyxin B GI Intolerance    Zyvox [Linezolid] GI Bleeding    Cephalosporins GI Intolerance    Nsaids GI Intolerance    Penicillins GI Intolerance     Patient can only take levaquin and cipro    Prednisone GI Intolerance    Sulphasomidine GI Intolerance       Review of Systems    Video Exam    There were no vitals filed for this visit.    Physical Exam

## 2024-01-25 NOTE — BH TREATMENT PLAN
Outpatient Behavioral Health Psychotherapy Treatment Plan    Ira Bonilla  1945     Date of Initial Psychotherapy Assessment: 1/10/2024   Date of Current Treatment Plan: 01/25/24  Treatment Plan Target Date: 1/25/2025  Treatment Plan Expiration Date: 7/25/2024    Diagnosis:   1. Current mild episode of major depressive disorder without prior episode (HCC)            Area(s) of Need: Depression, lack of interest in things, family issues, grief     Long Term Goal 1 (in the client's own words): I want to feel better about myself    Stage of Change: Contemplation    Target Date for completion: 1/25/2025     Anticipated therapeutic modalities: supportive therapy, grief therapy     People identified to complete this goal: deb Roth      Objective 1: (identify the means of measuring success in meeting the objective): Therapist will engage Ira in supportive and grief therapy to work through her feelings and thoughts and improve her self esteem.  She will report back verbally improved self esteem at least 5 out of 7 days.                          I am currently under the care of a [facility] psychiatric provider: no    My [facility] psychiatric provider is:     I am currently taking psychiatric medications: Yes, as prescribed    I feel that I will be ready for discharge from mental health care when I reach the following (measurable goal/objective): When my self esteem is higher at least 5 out of 7 days    For children and adults who have a legal guardian:   Has there been any change to custody orders and/or guardianship status? NA. If yes, attach updated documentation.    I have created my Crisis Plan and have been offered a copy of this plan    Behavioral Health Treatment Plan [facility]: Diagnosis and Treatment Plan explained to Ira Bonilla acknowledges an understanding of their diagnosis. Ira Bonilla agrees to this treatment plan.    I have been offered a copy of this  Treatment Plan. Yes    Ira Bonilla, 1945, actively participated in the creation of this treatment plan during a virtual session, using the [employer] platform.   Ira Bonilla  provided verbal consent on 1/25/2024 at 1:30 PM. The treatment plan was transcribed into the Electronic Health Record at a later time.

## 2024-01-30 ENCOUNTER — TELEPHONE (OUTPATIENT)
Dept: SURGERY | Facility: CLINIC | Age: 79
End: 2024-01-30

## 2024-01-30 NOTE — TELEPHONE ENCOUNTER
Called pt to reschedule appt due to dr bell being out of the office. Was unable to get ahold of her. Left a voicemail. If calls back, please schedule for 2/7 or later.

## 2024-02-02 ENCOUNTER — HOSPITAL ENCOUNTER (OUTPATIENT)
Dept: RADIOLOGY | Facility: HOSPITAL | Age: 79
End: 2024-02-02
Attending: THORACIC SURGERY (CARDIOTHORACIC VASCULAR SURGERY)
Payer: COMMERCIAL

## 2024-02-02 DIAGNOSIS — R13.19 ESOPHAGEAL DYSPHAGIA: ICD-10-CM

## 2024-02-02 PROCEDURE — 74240 X-RAY XM UPR GI TRC 1CNTRST: CPT

## 2024-02-07 ENCOUNTER — TELEMEDICINE (OUTPATIENT)
Dept: PSYCHIATRY | Facility: CLINIC | Age: 79
End: 2024-02-07
Payer: COMMERCIAL

## 2024-02-07 DIAGNOSIS — F32.0 CURRENT MILD EPISODE OF MAJOR DEPRESSIVE DISORDER WITHOUT PRIOR EPISODE (HCC): Primary | ICD-10-CM

## 2024-02-07 PROCEDURE — 90834 PSYTX W PT 45 MINUTES: CPT

## 2024-02-07 NOTE — PSYCH
Virtual Regular Visit    Verification of patient location:    Patient is located at {Amb Virtual Patient Location:[address]} in the following state in which I hold an active license { amb virtual patient location:[address]}      Assessment/Plan:    Problem List Items Addressed This Visit     Depression - Primary       Goals addressed in session: {GOALS:82499}          Reason for visit is No chief complaint on file.       Encounter provider Maria Ines Farfan LCSW    Provider [facility]  [place] [address] [phone]      Recent Visits  No visits were found meeting these conditions.  Showing recent visits within past 7 days and meeting all other requirements  Future Appointments  No visits were found meeting these conditions.  Showing future appointments within next 150 days and meeting all other requirements       The patient was identified by name and date of birth. Ira Bonilla was informed that this is a telemedicine visit and that the visit is being conducted through{AMB VIRTUAL VISIT MEDIUM:82407}.  {Telemedicine confidentiality :91014} {Telemedicine participants:32487}  She acknowledged consent and understanding of privacy and security of the video platform. The patient has agreed to participate and understands they can discontinue the visit at any time.    Patient is aware this is a billable service.     Subjective  Ira Bonilla is a 78 y.o. female *** .      HPI     Past Medical History:   Diagnosis Date   • Anxiety    • Arthritis    • Asthma    • Cancer (HCC)    • Candida infection, esophageal (HCC)    • Chronic kidney disease    • Chronic pain    • COPD (chronic obstructive pulmonary disease) (HCC)    • Depression    • Depression    • Gastroparesis    • Gastroparesis    • GERD (gastroesophageal reflux disease)    • Hypertension    • Irritable bowel syndrome (IBS)    • Lactose intolerance Not  sure   • Migraines    • MRSA (methicillin resistant Staphylococcus aureus)    • Multiple thyroid nodules    • Osteoporosis    • Pneumonia    • Psychiatric disorder        Past Surgical History:   Procedure Laterality Date   • BACK SURGERY     •  SECTION     • CHOLECYSTECTOMY     • COLONOSCOPY  10/09/2013   • ESOPHAGEAL DILATION      2017 [facility]   • ESOPHAGOGASTRODUODENOSCOPY N/A 2023    Procedure: ESOPHAGOGASTRODUODENOSCOPY (EGD);  Surgeon: Dontrell Mcmahon MD;  [facility];  Service: General   • HIATAL HERNIA REPAIR     • NISSEN FUNDOPLICATION     • FL BIOPSY LIVER NEEDLE PERCUTANEOUS N/A 2023    Procedure: EXCISION BIOPSY LIVER;  Surgeon: Dontrell Mcmahon MD;  [facility];  Service: General   • FL ESOPHAGOGASTRODUODENOSCOPY TRANSORAL DIAGNOSTIC N/A 2019    Procedure: ESOPHAGOGASTRODUODENOSCOPY (EGD);  Surgeon: Geovanna Winn MD;  [facility];  Service: Gastroenterology   • FL ESOPHAGOGASTRODUODENOSCOPY TRANSORAL DIAGNOSTIC N/A 2023    Procedure: ESOPHAGOGASTRODUODENOSCOPY (EGD);  Surgeon: Shilpa Price MD;  [facility];  Service: Thoracic   • FL ESOPHAGOSCOPY FLEX BALLOON DILAT <30 MM DIAM N/A 2023    Procedure: CRE WIRE GUIDED BALLOON DILATATION ESOPHAGEAL;  Surgeon: Shilpa Price MD;  [facility];  Service: Thoracic   • FL LAPS RPR PARAESPHGL HRNA INCL FUNDPLSTY W/O MESH N/A 2023    Procedure: robotic takedown of nissen fundoplication, redo hiatal hernia, partial fundoplication, with mesh;  Surgeon: Shilpa Price MD;  [facility];  Service: Thoracic   • FL PYLOROPLASTY N/A 2023    Procedure: PYLOROPLASTY LAPAROSCOPIC WITH ROBOT;  Surgeon: Dontrell Mcmahon MD;  [facility];  Service: General   • ROTATOR CUFF REPAIR     • SHOULDER SURGERY     • SPINAL FUSION     • TUBAL LIGATION  May 1974    Last child born   • UPPER GASTROINTESTINAL ENDOSCOPY     • US GUIDED THYROID BIOPSY     • WISDOM TOOTH  EXTRACTION         Current Outpatient Medications   Medication Sig Dispense Refill   • acetaminophen (TYLENOL) 160 mg/5 mL suspension Take 20.3 mL (650 mg total) by mouth every 6 (six) hours as needed for mild pain 355 mL 0   • albuterol (PROVENTIL HFA,VENTOLIN HFA) 90 mcg/act inhaler Inhale 2 puffs 2 (two) times a day     • amitriptyline (ELAVIL) 50 mg tablet TAKE 1 TABLET BY MOUTH EVERYDAY AT BEDTIME 90 tablet 1   • amLODIPine (NORVASC) 10 mg tablet TAKE ONE TABLET BY MOUTH DAILY ORAL ONCE A DAY 90 DAYS     • cetirizine (ZyrTEC) 10 MG chewable tablet Chew 10 mg daily.     • cholecalciferol (VITAMIN D3) 1,000 units tablet Take 5,000 Units by mouth daily     • clotrimazole-betamethasone (LOTRISONE) 1-0.05 % cream if needed     • dexlansoprazole (DEXILANT) 60 MG capsule TAKE 1 CAPSULE BY MOUTH EVERY DAY 90 capsule 2   • dicyclomine (BENTYL) 20 mg tablet Take 1 tablet (20 mg total) by mouth every 6 (six) hours as needed (abdominal cramping) 20 tablet 0   • famotidine (PEPCID) 40 MG tablet TAKE 1 TABLET BY MOUTH EVERYDAY AT BEDTIME 90 tablet 3   • HYDROmorphone (DILAUDID) 2 mg tablet Take 1 tablet (2 mg total) by mouth every 4 (four) hours as needed for moderate pain Max Daily Amount: 12 mg (Patient not taking: Reported on 12/7/2023) 20 tablet 0   • multivitamin (THERAGRAN) TABS Take 1 tablet by mouth daily     • Omega-3 Fatty Acids (OMEGA 3 PO) Take by mouth in the morning     • ondansetron (ZOFRAN) 4 mg tablet Take 1 tablet (4 mg total) by mouth every 8 (eight) hours as needed for nausea or vomiting 15 tablet 0   • polyethylene glycol (MIRALAX) 17 g packet Take 17 g by mouth daily as needed (constipation) 10 each 0   • sucralfate (CARAFATE) 1 g tablet Take 1 tablet (1 g total) by mouth 4 (four) times a day (before meals and at bedtime) for 14 days 56 tablet 0   • Tavaborole 5 % SOLN APPLY TO NAILS DAILY AS DIRECTED (Patient not taking: Reported on 1/3/2024)     • tobramycin-dexamethasone (TOBRADEX) ophthalmic  suspension if needed (Patient not taking: Reported on 1/3/2024)       No current facility-administered medications for this visit.        Allergies   Allergen Reactions   • Latex Hives   • Augmentin [Amoxicillin-Pot Clavulanate] GI Intolerance   • Bactrim [Sulfamethoxazole-Trimethoprim] GI Intolerance   • Clarithromycin GI Intolerance   • Doxycycline Hives   • Neomycin GI Intolerance   • Polymyxin B GI Intolerance   • Zyvox [Linezolid] GI Bleeding   • Cephalosporins GI Intolerance   • Nsaids GI Intolerance   • Penicillins GI Intolerance     Patient can only take levaquin and cipro   • Prednisone GI Intolerance   • Sulphasomidine GI Intolerance       Review of Systems    Video Exam    There were no vitals filed for this visit.    Physical Exam     Visit Time    Visit Start Time: ***  Visit Stop Time: ***  Total Visit Duration: {Psych Total Visit Time:81642}

## 2024-02-07 NOTE — PSYCH
"Behavioral Health Psychotherapy Progress Note    Psychotherapy Provided: Individual Psychotherapy     1. Current mild episode of major depressive disorder without prior episode (HCC)            Goals addressed in session: Goal 1     DATA: rIa shared she is \"okay I guess\" and that she still feels frustrated by some criticism that is happening to her from her aunt and her son and daughter in law.  Therapist talked to Wendy about doing some things she will look forward to. She said she goes to her friend Cherie's house and this is fun.  She shared she did have a part time job but had to quit because of a situation going on at home with a neighbor.  She said she might get another part time job when her stomach is upset.  Therapist encouraged her to do behavioral activation for her depression, making a list of things to do that are enjoyable or productive, and engaging in at least one per day.   She said she would try this.  Therapist also sent a list of grief resources including groups to Wendy.  During this session, this clinician used the following therapeutic modalities: Client-centered Therapy and Dialectical Behavior Therapy    Substance Abuse was not addressed during this session. If the client is diagnosed with a co-occurring substance use disorder, please indicate any changes in the frequency or amount of use: . Stage of change for addressing substance use diagnoses: No substance use/Not applicable    ASSESSMENT:  Ira Bonilla presents with a Euthymic/ normal mood.     her affect is Normal range and intensity, which is congruent, with her mood and the content of the session. The client has made progress on their goals.     Ira Bonilla presents with a none risk of suicide, none risk of self-harm, and none risk of harm to others.    For any risk assessment that surpasses a \"low\" rating, a safety plan must be developed.    A safety plan was indicated: no  If yes, describe in detail     PLAN: Between " sessions, Ira Bonilla will utilize behavioral activation. At the next session, the therapist will use Client-centered Therapy and Dialectical Behavior Therapy to address depression.    Behavioral Health Treatment Plan and Discharge Planning: Ira Bonilla is aware of and agrees to continue to work on their treatment plan. They have identified and are working toward their discharge goals. yes    Visit start and stop times:    02/07/24  Start Time: 1200  Stop Time: 1252  Total Visit Time: 52 minutes  Virtual Regular Visit    Verification of patient location:    Patient is located at Home in the following state in which I hold an active license [place]      Assessment/Plan:    Problem List Items Addressed This Visit       Depression - Primary       Goals addressed in session: Goal 1          Reason for visit is No chief complaint on file.       Encounter provider Maria Ines Farfan LCSW    Provider located at [address] [place] [id number] [phone]      Recent Visits  No visits were found meeting these conditions.  Showing recent visits within past 7 days and meeting all other requirements  Today's Visits  Date Type Provider Dept   02/07/24 Telemedicine Maria Ines Farfan LCSW  [facility]   Showing today's visits and meeting all other requirements  Future Appointments  No visits were found meeting these conditions.  Showing future appointments within next 150 days and meeting all other requirements       The patient was identified by name and date of birth. Ira Bonilla was informed that this is a telemedicine visit and that the visit is being conducted throughthe [employer] platform. She agrees to proceed..  My office door was closed. No one else was in the room.  She acknowledged consent and understanding of privacy and security of the video platform. The patient has agreed to  participate and understands they can discontinue the visit at any time.    Patient is aware this is a billable service.     Subjective  Ira Bonilla is a 78 y.o. female  .      HPI     Past Medical History:   Diagnosis Date    Anxiety     Arthritis     Asthma     Cancer (HCC)     Candida infection, esophageal (HCC)     Chronic kidney disease     Chronic pain     COPD (chronic obstructive pulmonary disease) (HCC)     Depression     Depression     Gastroparesis     Gastroparesis     GERD (gastroesophageal reflux disease)     Hypertension     Irritable bowel syndrome (IBS)     Lactose intolerance Not sure    Migraines     MRSA (methicillin resistant Staphylococcus aureus)     Multiple thyroid nodules     Osteoporosis     Pneumonia     Psychiatric disorder        Past Surgical History:   Procedure Laterality Date    BACK SURGERY       SECTION      CHOLECYSTECTOMY      COLONOSCOPY  10/09/2013    ESOPHAGEAL DILATION       [facility]    ESOPHAGOGASTRODUODENOSCOPY N/A 2023    Procedure: ESOPHAGOGASTRODUODENOSCOPY (EGD);  Surgeon: Dontrell Mcmahon MD;  [facility];  Service: General    HIATAL HERNIA REPAIR      NISSEN FUNDOPLICATION      VT BIOPSY LIVER NEEDLE PERCUTANEOUS N/A 2023    Procedure: EXCISION BIOPSY LIVER;  Surgeon: Dontrell Mcmahon MD;  [facility];  Service: General    VT ESOPHAGOGASTRODUODENOSCOPY TRANSORAL DIAGNOSTIC N/A 2019    Procedure: ESOPHAGOGASTRODUODENOSCOPY (EGD);  Surgeon: Geovanna Winn MD;  [facility];  Service: Gastroenterology    VT ESOPHAGOGASTRODUODENOSCOPY TRANSORAL DIAGNOSTIC N/A 2023    Procedure: ESOPHAGOGASTRODUODENOSCOPY (EGD);  Surgeon: Shilpa Price MD;  [facility];  Service: Thoracic    VT ESOPHAGOSCOPY FLEX BALLOON DILAT <30 MM DIAM N/A 2023    Procedure: CRE WIRE GUIDED BALLOON DILATATION ESOPHAGEAL;  Surgeon: Shilpa Price MD;  [facility];  Service: Thoracic    VT LAPS RPR PARAESPHGL  HRNA INCL FUNDPLSTY W/O MESH N/A 11/8/2023    Procedure: robotic takedown of nissen fundoplication, redo hiatal hernia, partial fundoplication, with mesh;  Surgeon: Shilpa Price MD;  [facility];  Service: Thoracic    MA PYLOROPLASTY N/A 11/8/2023    Procedure: PYLOROPLASTY LAPAROSCOPIC WITH ROBOT;  Surgeon: Dontrell Mcmahon MD;  [facility];  Service: General    ROTATOR CUFF REPAIR      SHOULDER SURGERY      SPINAL FUSION      TUBAL LIGATION  May 1975    Last child born    UPPER GASTROINTESTINAL ENDOSCOPY      US GUIDED THYROID BIOPSY      WISDOM TOOTH EXTRACTION         Current Outpatient Medications   Medication Sig Dispense Refill    acetaminophen (TYLENOL) 160 mg/5 mL suspension Take 20.3 mL (650 mg total) by mouth every 6 (six) hours as needed for mild pain 355 mL 0    albuterol (PROVENTIL HFA,VENTOLIN HFA) 90 mcg/act inhaler Inhale 2 puffs 2 (two) times a day      amitriptyline (ELAVIL) 50 mg tablet TAKE 1 TABLET BY MOUTH EVERYDAY AT BEDTIME 90 tablet 1    amLODIPine (NORVASC) 10 mg tablet TAKE ONE TABLET BY MOUTH DAILY ORAL ONCE A DAY 90 DAYS      cetirizine (ZyrTEC) 10 MG chewable tablet Chew 10 mg daily.      cholecalciferol (VITAMIN D3) 1,000 units tablet Take 5,000 Units by mouth daily      clotrimazole-betamethasone (LOTRISONE) 1-0.05 % cream if needed      dexlansoprazole (DEXILANT) 60 MG capsule TAKE 1 CAPSULE BY MOUTH EVERY DAY 90 capsule 2    dicyclomine (BENTYL) 20 mg tablet Take 1 tablet (20 mg total) by mouth every 6 (six) hours as needed (abdominal cramping) 20 tablet 0    famotidine (PEPCID) 40 MG tablet TAKE 1 TABLET BY MOUTH EVERYDAY AT BEDTIME 90 tablet 3    HYDROmorphone (DILAUDID) 2 mg tablet Take 1 tablet (2 mg total) by mouth every 4 (four) hours as needed for moderate pain Max Daily Amount: 12 mg (Patient not taking: Reported on 12/7/2023) 20 tablet 0    multivitamin (THERAGRAN) TABS Take 1 tablet by mouth daily      Omega-3 Fatty Acids (OMEGA 3 PO) Take by  mouth in the morning      ondansetron (ZOFRAN) 4 mg tablet Take 1 tablet (4 mg total) by mouth every 8 (eight) hours as needed for nausea or vomiting 15 tablet 0    polyethylene glycol (MIRALAX) 17 g packet Take 17 g by mouth daily as needed (constipation) 10 each 0    sucralfate (CARAFATE) 1 g tablet Take 1 tablet (1 g total) by mouth 4 (four) times a day (before meals and at bedtime) for 14 days 56 tablet 0    Tavaborole 5 % SOLN APPLY TO NAILS DAILY AS DIRECTED (Patient not taking: Reported on 1/3/2024)      tobramycin-dexamethasone (TOBRADEX) ophthalmic suspension if needed (Patient not taking: Reported on 1/3/2024)       No current facility-administered medications for this visit.        Allergies   Allergen Reactions    Latex Hives    Augmentin [Amoxicillin-Pot Clavulanate] GI Intolerance    Bactrim [Sulfamethoxazole-Trimethoprim] GI Intolerance    Clarithromycin GI Intolerance    Doxycycline Hives    Neomycin GI Intolerance    Polymyxin B GI Intolerance    Zyvox [Linezolid] GI Bleeding    Cephalosporins GI Intolerance    Nsaids GI Intolerance    Penicillins GI Intolerance     Patient can only take levaquin and cipro    Prednisone GI Intolerance    Sulphasomidine GI Intolerance       Review of Systems    Video Exam    There were no vitals filed for this visit.    Physical Exam

## 2024-02-08 ENCOUNTER — OFFICE VISIT (OUTPATIENT)
Dept: CARDIAC SURGERY | Facility: CLINIC | Age: 79
End: 2024-02-08
Payer: COMMERCIAL

## 2024-02-08 VITALS
HEIGHT: 60 IN | DIASTOLIC BLOOD PRESSURE: 78 MMHG | TEMPERATURE: 97.5 F | RESPIRATION RATE: 13 BRPM | WEIGHT: 110.89 LBS | BODY MASS INDEX: 21.77 KG/M2 | SYSTOLIC BLOOD PRESSURE: 126 MMHG

## 2024-02-08 DIAGNOSIS — R13.19 ESOPHAGEAL DYSPHAGIA: Primary | ICD-10-CM

## 2024-02-08 PROCEDURE — 99213 OFFICE O/P EST LOW 20 MIN: CPT | Performed by: THORACIC SURGERY (CARDIOTHORACIC VASCULAR SURGERY)

## 2024-02-08 NOTE — PROGRESS NOTES
Assessment/Plan:    Esophageal dysphagia  Ira is a 78-year-old female who is known to me.  She underwent a redo robotic hiatal hernia repair with takedown of her Nissen and a redo toupee fundoplication on 11/8/2023.  Since that time, she has struggled postoperatively with both diarrhea and emesis and dysphagia.  Today in the office, she reports that her symptoms are exactly the same.  He has been unable to eat.  She has lost 20 pounds since her surgery.    Today in the office, we discussed that I believe that she may be a good candidate for an EGD with dilation.  I believe that this may help her symptoms significantly.  I have consented her today for the above and we will plan to proceed at the earliest date of mutual convenience.  She sees Dr. Mcmahon in the office next week to discuss her diarrhea as I believe that this is likely related to the gastro paresis and pyloroplasty.    Shilpa Price MD  Thoracic Surgery  (Available on [employer])  Office: [phone]       Diagnoses and all orders for this visit:    Esophageal dysphagia  -     Case request operating room: ESOPHAGOGASTRODUODENOSCOPY (EGD), dilation; Standing  -     Case request operating room: ESOPHAGOGASTRODUODENOSCOPY (EGD), dilation    Other orders  -     Diet NPO; Sips with meds; Standing  -     Void on call to OR; Standing  -     Insert peripheral IV; Standing  -     Place sequential compression device; Standing          Thoracic History   Procedure: Redo robotic hiatal hernia repair, takedown nissen, re-do with toupet  Date: 11/8/23      Subjective:    Patient ID: Ira Bonilla is a 78 y.o. female.    HPI  Ira is a 78-year-old female who is known to me.  She underwent a redo robotic hiatal hernia repair with takedown of her Nissen and a redo toupee fundoplication on 11/8/2023.  Since that time, she has struggled postoperatively with both diarrhea and emesis and dysphagia.  Today in the office, she reports that her symptoms are exactly  the same.  He has been unable to eat.  She has lost 20 pounds since her surgery.    I have personally reviewed her upper GI in PACS.  This demonstrates the contrast does go through her fundoplication and this has greatly improved from the upper GI that she had a November 9 but the transit does remain slow.    The following portions of the patient's history were reviewed and updated as appropriate: allergies, current medications, past family history, past medical history, past social history, past surgical history and problem list.    Review of Systems   Constitutional:  Negative for chills, fatigue, fever and unexpected weight change.   HENT:  Positive for trouble swallowing.    Eyes: Negative.  Negative for visual disturbance.   Respiratory:  Negative for cough, shortness of breath and stridor.    Cardiovascular:  Negative for chest pain.   Gastrointestinal:  Positive for diarrhea and vomiting.   Endocrine: Negative.    Genitourinary: Negative.    Musculoskeletal: Negative.    Skin: Negative.    Neurological:  Negative for dizziness, light-headedness and headaches.   Hematological:  Negative for adenopathy.   Psychiatric/Behavioral: Negative.           Objective:   Physical Exam  Vitals and nursing note reviewed.   Constitutional:       General: She is not in acute distress.     Appearance: Normal appearance. She is well-developed and normal weight. She is not diaphoretic.   HENT:      Head: Normocephalic and atraumatic.      Nose: Nose normal. No congestion or rhinorrhea.      Mouth/Throat:      Mouth: Mucous membranes are moist.      Pharynx: Oropharynx is clear. No oropharyngeal exudate.   Eyes:      General: No scleral icterus.     Pupils: Pupils are equal, round, and reactive to light.   Neck:      Trachea: No tracheal deviation.   Cardiovascular:      Rate and Rhythm: Normal rate and regular rhythm.      Pulses: Normal pulses.      Heart sounds: Normal heart sounds. No murmur heard.  Pulmonary:      Effort:  Pulmonary effort is normal. No respiratory distress.      Breath sounds: Normal breath sounds. No stridor. No wheezing or rales.   Chest:      Chest wall: No tenderness.   Abdominal:      General: Bowel sounds are normal. There is no distension.      Palpations: Abdomen is soft.      Tenderness: There is no abdominal tenderness. There is no rebound.   Musculoskeletal:         General: Normal range of motion.      Cervical back: Normal range of motion and neck supple. No muscular tenderness.   Lymphadenopathy:      Cervical: No cervical adenopathy.   Skin:     General: Skin is warm and dry.      Coloration: Skin is not jaundiced or pale.      Findings: No erythema or rash.   Neurological:      General: No focal deficit present.      Mental Status: She is alert and oriented to person, place, and time.   Psychiatric:         Mood and Affect: Mood normal.         Behavior: Behavior normal.         Thought Content: Thought content normal.         Judgment: Judgment normal.     /78 (BP Location: Left arm, Patient Position: Sitting, Cuff Size: Standard)   Temp 97.5 °F (36.4 °C) (Temporal)   Resp 13   Ht 5' (1.524 m)   Wt 50.3 kg (110 lb 14.3 oz)   BMI 21.66 kg/m²     No Chest XR results available for this patient.   No CT Chest results available for this patient.  CT chest abdomen pelvis wo contrast    Result Date: 11/10/2023  Narrative CT CHEST, ABDOMEN AND PELVIS WITHOUT IV CONTRAST INDICATION:   POD#2 s/p robotic hiatal hernia repair, partial fundoplication, pyloromyotomy, liver biopsy. Now with hypoxia, hypotension, thrombocytopenia, and JOSE. As per review of electronic medical record, patient with prior Nissen fundoplication and gastroparesis. COMPARISON: CT of the abdomen and pelvis from 2/20/2022 and CT of the chest, abdomen and pelvis from 3/29/2019. TECHNIQUE: CT examination of the chest, abdomen and pelvis was performed without intravenous contrast due to elevated creatinine. Multiplanar 2D reformatted  images were created from the source data. This examination, like all CT scans performed in the [facility], was performed utilizing techniques to minimize radiation dose exposure, including the use of iterative reconstruction and automated exposure control. Radiation dose length product (DLP) for this visit:  930.83 mGy-cm Enteric contrast was not administered. FINDINGS: Evaluation of visceral structures and vasculature is limited by lack of intravenous contrast. CHEST BRONCHOPULMONARY:  Clear central airways. Airspace consolidations are seen in the lower lobes dependently and in the dependent portions of the upper lobes, likely areas of atelectasis. Trace groundglass opacity at the left apex. PLEURA: Trace bilateral pleural effusions. No pneumothorax. HEART/GREAT VESSELS: The heart is normal in size. No pericardial effusion. Normal caliber thoracic aorta. Main pulmonary artery dilated up to 3.5 cm, suggestive of pulmonary arterial hypertension. MEDIASTINUM/LYMPH NODES:  No axillary or mediastinal lymphadenopathy. Evaluation for hilar lymphadenopathy is limited without IV contrast. For findings in the esophagus the gastrointestinal tract section below. CHEST WALL AND LOWER NECK: Unremarkable. ABDOMEN LIVER/BILIARY TREE: Large, wedge-shaped area of decreased attenuation involving the left hepatic lobe. This was not seen on the prior CT from 2022. No biliary ductal dilation. GALLBLADDER:  The patient is status post cholecystectomy. SPLEEN: Unremarkable unenhanced appearance. PANCREAS:  Unremarkable unenhanced appearance. No dilation of the main pancreatic duct. ADRENAL GLANDS:  Unremarkable unenhanced appearance. KIDNEYS/URETERS:  No intrarenal or ureteral calculi. No hydronephrosis. Hypodense lesion in the right kidney medially which cannot be accurately assessed without IV contrast, however corresponds to a simple cyst on the prior CT. GASTROINTESTINAL TRACT: Again seen is a hyperdense mesh from a  prior hiatal hernia repair, which extends to the left hepatic lobe and along the diaphragmatic crura. The redo fundoplication is not well visualized without IV contrast. There is small amount of hyperdense material likely representing residual oral contrast ingested on the 11/9/2023 esophagram seen in the distal esophagus. The rest of the administered oral contrast is seen throughout the colon. There is no extravasated oral contrast. There is a straight, tubular hypodense structure measuring 1 cm in diameter and approximately 9.5-10 cm in length interposed between the diaphragm and the proximal stomach. It is seen just anterior to the radiopaque prior mesh. Evaluation of the stomach and bowel is somewhat limited by lack of IV contrast. Evaluation of the stomach and small bowel is limited by lack of oral contrast. There is no significant gastric distention. No findings to suggest bowel obstruction or inflammation. APPENDIX: Normal appendix. ABDOMINOPELVIC CAVITY:  No ascites or pneumoperitoneum. No fluid collections are seen in the abdomen or pelvis. LYMPH NODES: No abdominal or pelvis lymphadenopathy. VESSELS: Normal caliber aorta. Scattered atherosclerotic calcifications in the abdominal aorta and its major branches. PELVIS REPRODUCTIVE ORGANS:  The CT appearance of the uterus is within normal limits. URINARY BLADDER: The urinary bladder is somewhat distended and contains a small amount of air along the nondependent wall, likely related to recent instrumentation. ABDOMINAL WALL/INGUINAL REGIONS: Foci of subcutaneous air and some stranding seen in the right lateral abdominal wall, likely related to the recent surgery. No ventral abdominal wall hernia. MUSCULOSKELETAL:  No focal aggressive osseous lesions. Degenerative changes of the spine. Patient is status post posterior fusion of L4-S1. There is generalized osteopenia.     Impression 1) Large, wedge-shaped area of decreased attenuation involving the majority of  the left hepatic lobe, not seen on the prior CT from 2022. Evaluation is limited without IV contrast, however this is suspicious for a hepatic infarct, especially in light of markedly elevated LFTs. Other considerations include geographic hepatic steatosis. 2) Re-do fundoplication not well visualized without IV contrast. Hyperdense mesh from a prior repair extends from left hepatic lobe along the diaphragmatic crura. 3) Straight, tubular-shaped hypodense structure measuring approximately 1 cm in diameter and approximately 9.5 -10 cm in length interposed between the diaphragm and the proximal stomach, of unclear etiology. This likely represents a mesh, possibly curled  up, as the straight edges suggest a manmade structure. 4) Small amount of hyperdense material likely representing residual oral contrast from the 11/9/2023 esophagram seen in the distal esophagus. The rest of the oral contrast is seen throughout the colon. No extravasated oral contrast seen. 5) No significant gastric distention. No bowel obstruction or convincing inflammation. 6) No abdominal or pelvic fluid collections. 7) Somewhat distended urinary bladder. Recommend correlation for urinary retention. 8) Trace bilateral pleural effusions. Subsegmental atelectasis dependently. Trace groundglass opacity at left apex, possibly infectious. 9) Additional findings as above. I personally discussed this study with Dr. Turner on 11/10/2023 at 7:22 PM. Workstation performed: [id number]      No NM PET CT results available for this patient.   FL esophagram complete    Result Date: 11/9/2023  Narrative BARIUM SWALLOW-ESOPHAGRAM INDICATION:   s/p paraesopahgeal hernia repair/ pyloroplasty. COMPARISON: 7/5/2023. IMAGES: 18 FLUOROSCOPY TIME:   1 minute 14 seconds TECHNIQUE: The patient was given water-soluble contrast by mouth and images of the esophagus were obtained. FINDINGS: There is mild esophageal distention noted with focal narrowed segment at the GE junction  likely related to recent hernia repair. Delayed esophageal emptying noted with periods of tertiary contraction suggestive of esophageal dysmotility. No contrast extravasation seen. The patient could not drink anymore contrast at the time of the study and therefore gastric emptying could not be observed. Initial images demonstrate a mesh overlying the region of the GE junction as well as cholecystectomy clips.     Impression Mild esophageal distention with a focal narrowed segment at the GE junction likely related to recent hernia repair. Esophageal dysmotility noted with delayed emptying and periods of tertiary contraction without nathalia obstruction or contrast extravasation. The patient could not drink anymore contrast at the time of the study and therefore gastric emptying cannot be observed during the study. Workstation performed: [id number]     FL barium swallow    Result Date: 7/5/2023  Narrative BARIUM SWALLOW-ESOPHAGRAM INDICATION:   R13.19: Other dysphagia. COMPARISON: Upper GI fluoroscopic study May 26, 2016 IMAGES: 231 FLUOROSCOPY TIME:   2.9 minutes TECHNIQUE: The patient was given and barium pill and barium liquid and images of the esophagus were obtained. FINDINGS: Patient was given a barium pill at the start of the examination. The pill did not pass into the stomach. The patient remained in the upright position for at least 4 minutes and intermittently drank water, though the pill did not pass into the stomach. The patient then drank in the prone position to assess for dysmotility, and the pill did not pass into the stomach throughout this phase of the study. At time of study termination (total study time was 10 minutes), the pill was still within the distal esophagus. Fluid passed beyond the pill when drinking in the prone position. Esophageal dysmotility is evident with a weakened primary stripping wave, though the presence of the pill slightly limits assessment esophageal motility. No esophageal  reflux was detected during the study.     Impression Barium pill remained within the esophagus throughout the 10 minutes of the study indicative of narrowing at the gastroesophageal junction in this patient who has previous undergone a Nissen fundoplication. Workstation performed: [id number]

## 2024-02-08 NOTE — ASSESSMENT & PLAN NOTE
Ira is a 78-year-old female who is known to me.  She underwent a redo robotic hiatal hernia repair with takedown of her Nissen and a redo toupee fundoplication on 11/8/2023.  Since that time, she has struggled postoperatively with both diarrhea and emesis and dysphagia.  Today in the office, she reports that her symptoms are exactly the same.  He has been unable to eat.  She has lost 20 pounds since her surgery.    Today in the office, we discussed that I believe that she may be a good candidate for an EGD with dilation.  I believe that this may help her symptoms significantly.  I have consented her today for the above and we will plan to proceed at the earliest date of mutual convenience.  She sees Dr. Mcmahon in the office next week to discuss her diarrhea as I believe that this is likely related to the gastro paresis and pyloroplasty.    Shilpa Price MD  Thoracic Surgery  (Available on [employer])  Office: [phone]

## 2024-02-11 DIAGNOSIS — K21.9 CHRONIC GERD: ICD-10-CM

## 2024-02-12 RX ORDER — DEXLANSOPRAZOLE 60 MG/1
CAPSULE, DELAYED RELEASE ORAL
Qty: 90 CAPSULE | Refills: 1 | Status: SHIPPED | OUTPATIENT
Start: 2024-02-12

## 2024-02-14 ENCOUNTER — TELEPHONE (OUTPATIENT)
Age: 79
End: 2024-02-14

## 2024-02-14 ENCOUNTER — NURSE TRIAGE (OUTPATIENT)
Age: 79
End: 2024-02-14

## 2024-02-14 ENCOUNTER — OFFICE VISIT (OUTPATIENT)
Dept: SURGERY | Facility: CLINIC | Age: 79
End: 2024-02-14
Payer: COMMERCIAL

## 2024-02-14 VITALS
HEART RATE: 86 BPM | BODY MASS INDEX: 21.68 KG/M2 | HEIGHT: 60 IN | RESPIRATION RATE: 18 BRPM | OXYGEN SATURATION: 98 % | TEMPERATURE: 98.4 F | SYSTOLIC BLOOD PRESSURE: 126 MMHG | DIASTOLIC BLOOD PRESSURE: 78 MMHG | WEIGHT: 110.4 LBS

## 2024-02-14 DIAGNOSIS — R19.7 DIARRHEA: Primary | ICD-10-CM

## 2024-02-14 PROCEDURE — 99213 OFFICE O/P EST LOW 20 MIN: CPT | Performed by: SURGERY

## 2024-02-14 RX ORDER — DIPHENOXYLATE HYDROCHLORIDE AND ATROPINE SULFATE 2.5; .025 MG/1; MG/1
1 TABLET ORAL 4 TIMES DAILY PRN
Qty: 112 TABLET | Refills: 0 | Status: SHIPPED | OUTPATIENT
Start: 2024-02-14 | End: 2024-03-13

## 2024-02-14 NOTE — TELEPHONE ENCOUNTER
Patient called in to confirm appt time for this morning. She has been having some stomach issues and is concerned about making the appointment on time. Placed caller on hold to reach out to front office to see if patient can get some wiggle room for arrival or even possibly to do virtual. Call was disconnected while trying to reach front office.

## 2024-02-14 NOTE — ASSESSMENT & PLAN NOTE
78-year-old female well-known to me status post robotic takedown of Nissen, redo hiatal hernia repair with partial fundoplication and mesh, as well as pyloroplasty on 11/8/2023, here for follow-up.    Plan:  Her biggest issue from a pyloroplasty perspective is for ongoing diarrhea.  Given this I would like to work her up with stool studies and C. difficile test.  I would like her to be taking her Imodium reliably 3 times a day, I have started Lomotil as well.  To follow-up in 2 weeks for next check.

## 2024-02-14 NOTE — PROGRESS NOTES
Assessment/Plan:    Diarrhea  78-year-old female well-known to me status post robotic takedown of Nissen, redo hiatal hernia repair with partial fundoplication and mesh, as well as pyloroplasty on 11/8/2023, here for follow-up.    Plan:  Her biggest issue from a pyloroplasty perspective is for ongoing diarrhea.  Given this I would like to work her up with stool studies and C. difficile test.  I would like her to be taking her Imodium reliably 3 times a day, I have started Lomotil as well.  To follow-up in 2 weeks for next check.     Diagnoses and all orders for this visit:    Diarrhea  -     diphenoxylate-atropine (LOMOTIL) 2.5-0.025 mg per tablet; Take 1 tablet by mouth 4 (four) times a day as needed for diarrhea for up to 28 days  -     Stool culture; Future  -     Clostridium difficile toxin by PCR with EIA; Future  -     Stool culture  -     Clostridium difficile toxin by PCR with EIA          Subjective:      Patient ID: Ira Bonilla is a 78 y.o. female.    78-year-old female well-known to me status post arthroplasty on 11/8/2023 returns to clinic.  She underwent pyloroplasty in conjunction with robotic takedown of Nissen with redo hiatal hernia repair and mesh with Dr. Price of thoracic surgery.  After her initial recovery, she has been complaining of diarrhea for the last several weeks.  This is after most meals, within an hour she is having watery diarrhea.  She also complains of some cramping abdominal pain and occasional nausea.  Additionally she is having occasional dysphagia and is undergoing planned upper endoscopy with Dr. Price in the coming weeks.  Denies any fevers, chills, chest pain, shortness of breath.        The following portions of the patient's history were reviewed and updated as appropriate: She  has a past medical history of Anxiety, Arthritis, Asthma, Cancer (HCC), Candida infection, esophageal (HCC), Chronic kidney disease, Chronic pain, COPD (chronic obstructive pulmonary  disease) (HCC), Depression, Depression, Gastroparesis, Gastroparesis, GERD (gastroesophageal reflux disease), Hypertension, Irritable bowel syndrome (IBS), Lactose intolerance (Not sure), Migraines, MRSA (methicillin resistant Staphylococcus aureus), Multiple thyroid nodules, Osteoporosis, Pneumonia, and Psychiatric disorder.  She   Patient Active Problem List    Diagnosis Date Noted    Diarrhea 2024    Encounter for geriatric assessment 10/10/2023    Asthma     COPD (chronic obstructive pulmonary disease) (HCC)     Hypertension     Migraines     Depression     Anxiety     Stage 3 chronic kidney disease (HCC)     Nausea 06/10/2021    Allergic contact urticaria 2020    Chronic GERD 2019    Dysphagia 2019    Esophageal dysphagia 2019    History of Nissen fundoplication 2019    Left upper quadrant pain 2019    Gastroparesis 2019    Colon cancer screening 2019    Gastroesophageal reflux disease without esophagitis 2019     She  has a past surgical history that includes Shoulder surgery; Hiatal hernia repair; Cholecystectomy; Back surgery;  section; [place] tooth extraction; Spinal fusion; Rotator cuff repair; Nissen fundoplication; US guided thyroid biopsy; Colonoscopy (10/09/2013); Upper gastrointestinal endoscopy; Esophageal dilation; pr esophagogastroduodenoscopy transoral diagnostic (N/A, 2019); Tubal ligation (May 1975); pr esophagogastroduodenoscopy transoral diagnostic (N/A, 2023); pr esophagoscopy flex balloon dilat <30 mm diam (N/A, 2023); pr laps rpr paraesphgl hrna incl fundplsty w/o mesh (N/A, 2023); pr pyloroplasty (N/A, 2023); pr biopsy liver needle percutaneous (N/A, 2023); and Esophagogastroduodenoscopy (N/A, 2023).  Her family history includes COPD in her maternal grandmother; Cancer in her father; Hypertension in her maternal aunt; Other in her mother; Stomach cancer in her father.  She  reports  that she quit smoking about 20 years ago. Her smoking use included cigarettes. She started smoking about 25 years ago. She has a 15 pack-year smoking history. She has quit using smokeless tobacco. She reports that she does not currently use alcohol. She reports that she does not use drugs.  Current Outpatient Medications   Medication Sig Dispense Refill    acetaminophen (TYLENOL) 160 mg/5 mL suspension Take 20.3 mL (650 mg total) by mouth every 6 (six) hours as needed for mild pain 355 mL 0    albuterol (PROVENTIL HFA,VENTOLIN HFA) 90 mcg/act inhaler Inhale 2 puffs 2 (two) times a day      amitriptyline (ELAVIL) 50 mg tablet TAKE 1 TABLET BY MOUTH EVERYDAY AT BEDTIME 90 tablet 1    amLODIPine (NORVASC) 10 mg tablet TAKE ONE TABLET BY MOUTH DAILY ORAL ONCE A DAY 90 DAYS      cetirizine (ZyrTEC) 10 MG chewable tablet Chew 10 mg daily.      cholecalciferol (VITAMIN D3) 1,000 units tablet Take 5,000 Units by mouth daily      clotrimazole-betamethasone (LOTRISONE) 1-0.05 % cream if needed      dexlansoprazole (DEXILANT) 60 MG capsule TAKE 1 CAPSULE BY MOUTH EVERY DAY 90 capsule 1    dicyclomine (BENTYL) 20 mg tablet Take 1 tablet (20 mg total) by mouth every 6 (six) hours as needed (abdominal cramping) 20 tablet 0    diphenoxylate-atropine (LOMOTIL) 2.5-0.025 mg per tablet Take 1 tablet by mouth 4 (four) times a day as needed for diarrhea for up to 28 days 112 tablet 0    famotidine (PEPCID) 40 MG tablet TAKE 1 TABLET BY MOUTH EVERYDAY AT BEDTIME 90 tablet 3    multivitamin (THERAGRAN) TABS Take 1 tablet by mouth daily      Omega-3 Fatty Acids (OMEGA 3 PO) Take by mouth in the morning      ondansetron (ZOFRAN) 4 mg tablet Take 1 tablet (4 mg total) by mouth every 8 (eight) hours as needed for nausea or vomiting 15 tablet 0    polyethylene glycol (MIRALAX) 17 g packet Take 17 g by mouth daily as needed (constipation) 10 each 0    HYDROmorphone (DILAUDID) 2 mg tablet Take 1 tablet (2 mg total) by mouth every 4 (four) hours  as needed for moderate pain Max Daily Amount: 12 mg (Patient not taking: Reported on 12/7/2023) 20 tablet 0    sucralfate (CARAFATE) 1 g tablet Take 1 tablet (1 g total) by mouth 4 (four) times a day (before meals and at bedtime) for 14 days 56 tablet 0    Tavaborole 5 % SOLN APPLY TO NAILS DAILY AS DIRECTED (Patient not taking: Reported on 1/3/2024)      tobramycin-dexamethasone (TOBRADEX) ophthalmic suspension if needed (Patient not taking: Reported on 1/3/2024)       No current facility-administered medications for this visit.     Current Outpatient Medications on File Prior to Visit   Medication Sig    acetaminophen (TYLENOL) 160 mg/5 mL suspension Take 20.3 mL (650 mg total) by mouth every 6 (six) hours as needed for mild pain    albuterol (PROVENTIL HFA,VENTOLIN HFA) 90 mcg/act inhaler Inhale 2 puffs 2 (two) times a day    amitriptyline (ELAVIL) 50 mg tablet TAKE 1 TABLET BY MOUTH EVERYDAY AT BEDTIME    amLODIPine (NORVASC) 10 mg tablet TAKE ONE TABLET BY MOUTH DAILY ORAL ONCE A DAY 90 DAYS    cetirizine (ZyrTEC) 10 MG chewable tablet Chew 10 mg daily.    cholecalciferol (VITAMIN D3) 1,000 units tablet Take 5,000 Units by mouth daily    clotrimazole-betamethasone (LOTRISONE) 1-0.05 % cream if needed    dexlansoprazole (DEXILANT) 60 MG capsule TAKE 1 CAPSULE BY MOUTH EVERY DAY    dicyclomine (BENTYL) 20 mg tablet Take 1 tablet (20 mg total) by mouth every 6 (six) hours as needed (abdominal cramping)    famotidine (PEPCID) 40 MG tablet TAKE 1 TABLET BY MOUTH EVERYDAY AT BEDTIME    multivitamin (THERAGRAN) TABS Take 1 tablet by mouth daily    Omega-3 Fatty Acids (OMEGA 3 PO) Take by mouth in the morning    ondansetron (ZOFRAN) 4 mg tablet Take 1 tablet (4 mg total) by mouth every 8 (eight) hours as needed for nausea or vomiting    polyethylene glycol (MIRALAX) 17 g packet Take 17 g by mouth daily as needed (constipation)    HYDROmorphone (DILAUDID) 2 mg tablet Take 1 tablet (2 mg total) by mouth every 4 (four)  hours as needed for moderate pain Max Daily Amount: 12 mg (Patient not taking: Reported on 12/7/2023)    sucralfate (CARAFATE) 1 g tablet Take 1 tablet (1 g total) by mouth 4 (four) times a day (before meals and at bedtime) for 14 days    Tavaborole 5 % SOLN APPLY TO NAILS DAILY AS DIRECTED (Patient not taking: Reported on 1/3/2024)    tobramycin-dexamethasone (TOBRADEX) ophthalmic suspension if needed (Patient not taking: Reported on 1/3/2024)     No current facility-administered medications on file prior to visit.     She is allergic to latex, augmentin [amoxicillin-pot clavulanate], bactrim [sulfamethoxazole-trimethoprim], clarithromycin, doxycycline, neomycin, polymyxin b, zyvox [linezolid], cephalosporins, nsaids, penicillins, prednisone, and sulphasomidine..    Review of Systems   Constitutional:  Negative for chills, fatigue and fever.   HENT:  Negative for ear pain, facial swelling, sinus pressure and sinus pain.    Eyes:  Negative for pain.   Respiratory:  Negative for cough, shortness of breath and wheezing.    Cardiovascular:  Negative for chest pain.   Gastrointestinal:  Positive for abdominal pain and diarrhea. Negative for constipation, nausea and vomiting.   Endocrine: Negative for cold intolerance and heat intolerance.   Genitourinary:  Negative for dysuria and flank pain.   Musculoskeletal:  Negative for back pain and neck pain.   Skin:  Negative for wound.   Neurological:  Negative for syncope, facial asymmetry, light-headedness and numbness.   Psychiatric/Behavioral:  Negative for behavioral problems, confusion and suicidal ideas.          Objective:      /78 (BP Location: Left arm, Patient Position: Sitting, Cuff Size: Standard)   Pulse 86   Temp 98.4 °F (36.9 °C) (Temporal)   Resp 18   Ht 5' (1.524 m)   Wt 50.1 kg (110 lb 6.4 oz)   SpO2 98%   BMI 21.56 kg/m²          Physical Exam  Vitals and nursing note reviewed.   Constitutional:       General: She is not in acute distress.      Appearance: Normal appearance. She is not toxic-appearing.   HENT:      Head: Normocephalic and atraumatic.      Mouth/Throat:      Mouth: Mucous membranes are moist.   Eyes:      Extraocular Movements: Extraocular movements intact.      Pupils: Pupils are equal, round, and reactive to light.   Cardiovascular:      Rate and Rhythm: Normal rate and regular rhythm.      Pulses: Normal pulses.   Pulmonary:      Effort: Pulmonary effort is normal. No respiratory distress.      Breath sounds: Normal breath sounds. No wheezing.   Abdominal:      General: There is no distension.      Palpations: Abdomen is soft. There is no mass.      Tenderness: There is no abdominal tenderness. There is no guarding or rebound.      Hernia: No hernia is present.      Comments: Well-healed port sites, no evidence of hernia.   Musculoskeletal:         General: No swelling or deformity. Normal range of motion.      Cervical back: Normal range of motion and neck supple.      Right lower leg: No edema.      Left lower leg: No edema.   Skin:     General: Skin is warm and dry.      Coloration: Skin is not jaundiced.   Neurological:      General: No focal deficit present.      Mental Status: She is alert and oriented to person, place, and time.   Psychiatric:         Mood and Affect: Mood normal.         Behavior: Behavior normal.

## 2024-02-20 ENCOUNTER — TELEMEDICINE (OUTPATIENT)
Dept: PSYCHIATRY | Facility: CLINIC | Age: 79
End: 2024-02-20
Payer: COMMERCIAL

## 2024-02-20 DIAGNOSIS — F32.2 CURRENT SEVERE EPISODE OF MAJOR DEPRESSIVE DISORDER WITHOUT PSYCHOTIC FEATURES, UNSPECIFIED WHETHER RECURRENT (HCC): Primary | ICD-10-CM

## 2024-02-20 PROCEDURE — 90834 PSYTX W PT 45 MINUTES: CPT

## 2024-02-20 NOTE — PSYCH
"Behavioral Health Psychotherapy Progress Note    Psychotherapy Provided: Individual Psychotherapy     1. 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  She shared her doctor had done surgery to unwrap and rewrap her esophagus but this has not helped.  Therapist offered her some positive coping statements to help her get through this time.  She talked about the loss of her daughter and expressed her thoughts and feelings about it.    During this session, this clinician used the following therapeutic modalities: Client-centered Therapy, Cognitive Behavioral Therapy, and Supportive Psychotherapy    Substance Abuse was not addressed during this session. If the client is diagnosed with a co-occurring substance use disorder, please indicate any changes in the frequency or amount of use: . Stage of change for addressing substance use diagnoses: No substance use/Not applicable    ASSESSMENT:  Ira Bonilla presents with a Euthymic/ normal mood.     her affect is Normal range and intensity, which is congruent, with her mood and the content of the session. The client has made progress on their goals.     Ira Bonilla presents with a none risk of suicide, none risk of self-harm, and none risk of harm to others.    For any risk assessment that surpasses a \"low\" rating, a safety plan must be developed.    A safety plan was indicated: no  If yes, describe in detail     PLAN: Between sessions, Ira Bonilla will utilize positive statements. At the next session, the therapist will use Client-centered Therapy, Cognitive Behavioral Therapy, and Supportive Psychotherapy to address .    Behavioral Health Treatment Plan and Discharge Planning: Ira Bonilla is aware of and agrees to continue to work on " their treatment plan. They have identified and are working toward their discharge goals. yes    Visit start and stop times:    02/20/24  Start Time: 1100  Stop Time: 1152  Total Visit Time: 52 minutes  Virtual Regular Visit    Verification of patient location:    Patient is located at Home in the following state in which I hold an active license [place]      Assessment/Plan:    Problem List Items Addressed This Visit       Depression - Primary       Goals addressed in session: Goal 1          Reason for visit is No chief complaint on file.       Encounter provider Maria Ines Farfan LCSW    Provider located at [facility] [place] [address] [phone]      Recent Visits  No visits were found meeting these conditions.  Showing recent visits within past 7 days and meeting all other requirements  Today's Visits  Date Type Provider Dept   02/20/24 Telemedicine Maria Ines Farfan LCSW  [facility]   Showing today's visits and meeting all other requirements  Future Appointments  No visits were found meeting these conditions.  Showing future appointments within next 150 days and meeting all other requirements       The patient was identified by name and date of birth. Ira Bonilla was informed that this is a telemedicine visit and that the visit is being conducted throughthe "[employer]" platform. She agrees to proceed..  My office door was closed. No one else was in the room.  She acknowledged consent and understanding of privacy and security of the video platform. The patient has agreed to participate and understands they can discontinue the visit at any time.    Patient is aware this is a billable service.     Subjective  Ira Bonilla is a 78 y.o. female  .      HPI     Past Medical History:   Diagnosis Date    Anxiety     Arthritis     Asthma     Cancer (HCC)     Candida infection, esophageal  (HCC)     Chronic kidney disease     Chronic pain     COPD (chronic obstructive pulmonary disease) (HCC)     Depression     Depression     Gastroparesis     Gastroparesis     GERD (gastroesophageal reflux disease)     Hypertension     Irritable bowel syndrome (IBS)     Lactose intolerance Not sure    Migraines     MRSA (methicillin resistant Staphylococcus aureus)     Multiple thyroid nodules     Osteoporosis     Pneumonia     Psychiatric disorder        Past Surgical History:   Procedure Laterality Date    BACK SURGERY       SECTION      CHOLECYSTECTOMY      COLONOSCOPY  10/09/2013    ESOPHAGEAL DILATION      [facility]    ESOPHAGOGASTRODUODENOSCOPY N/A 2023    Procedure: ESOPHAGOGASTRODUODENOSCOPY (EGD);  Surgeon: Dontrell Mcmahon MD;  Location: [facility];  Service: General    HIATAL HERNIA REPAIR      NISSEN FUNDOPLICATION      CO BIOPSY LIVER NEEDLE PERCUTANEOUS N/A 2023    Procedure: EXCISION BIOPSY LIVER;  Surgeon: Dontrell Mcmahon MD;  [facility];  Service: General    CO ESOPHAGOGASTRODUODENOSCOPY TRANSORAL DIAGNOSTIC N/A 2019    Procedure: ESOPHAGOGASTRODUODENOSCOPY (EGD);  Surgeon: Geovanna Winn MD;  Location: [facility];  Service: Gastroenterology    CO ESOPHAGOGASTRODUODENOSCOPY TRANSORAL DIAGNOSTIC N/A 2023    Procedure: ESOPHAGOGASTRODUODENOSCOPY (EGD);  Surgeon: Shilpa Price MD;  [facility];  Service: Thoracic    CO ESOPHAGOSCOPY FLEX BALLOON DILAT <30 MM DIAM N/A 2023    Procedure: CRE WIRE GUIDED BALLOON DILATATION ESOPHAGEAL;  Surgeon: Shilpa Price MD;  [facility];  Service: Thoracic    CO LAPS RPR PARAESPHGL HRNA INCL FUNDPLSTY W/O MESH N/A 2023    Procedure: robotic takedown of nissen fundoplication, redo hiatal hernia, partial fundoplication, with mesh;  Surgeon: Shilpa Price MD;  [facility];  Service: Thoracic    CO PYLOROPLASTY N/A 2023    Procedure: PYLOROPLASTY LAPAROSCOPIC WITH ROBOT;   Surgeon: Dontrell Mcmahon MD;  [facility];  Service: General    ROTATOR CUFF REPAIR      SHOULDER SURGERY      SPINAL FUSION      TUBAL LIGATION  May 1975    Last child born    UPPER GASTROINTESTINAL ENDOSCOPY      US GUIDED THYROID BIOPSY      WISDOM TOOTH EXTRACTION         Current Outpatient Medications   Medication Sig Dispense Refill    acetaminophen (TYLENOL) 160 mg/5 mL suspension Take 20.3 mL (650 mg total) by mouth every 6 (six) hours as needed for mild pain 355 mL 0    albuterol (PROVENTIL HFA,VENTOLIN HFA) 90 mcg/act inhaler Inhale 2 puffs 2 (two) times a day      amitriptyline (ELAVIL) 50 mg tablet TAKE 1 TABLET BY MOUTH EVERYDAY AT BEDTIME 90 tablet 1    amLODIPine (NORVASC) 10 mg tablet TAKE ONE TABLET BY MOUTH DAILY ORAL ONCE A DAY 90 DAYS      cetirizine (ZyrTEC) 10 MG chewable tablet Chew 10 mg daily.      cholecalciferol (VITAMIN D3) 1,000 units tablet Take 5,000 Units by mouth daily      clotrimazole-betamethasone (LOTRISONE) 1-0.05 % cream if needed      dexlansoprazole (DEXILANT) 60 MG capsule TAKE 1 CAPSULE BY MOUTH EVERY DAY 90 capsule 1    dicyclomine (BENTYL) 20 mg tablet Take 1 tablet (20 mg total) by mouth every 6 (six) hours as needed (abdominal cramping) 20 tablet 0    diphenoxylate-atropine (LOMOTIL) 2.5-0.025 mg per tablet Take 1 tablet by mouth 4 (four) times a day as needed for diarrhea for up to 28 days 112 tablet 0    famotidine (PEPCID) 40 MG tablet TAKE 1 TABLET BY MOUTH EVERYDAY AT BEDTIME 90 tablet 3    HYDROmorphone (DILAUDID) 2 mg tablet Take 1 tablet (2 mg total) by mouth every 4 (four) hours as needed for moderate pain Max Daily Amount: 12 mg (Patient not taking: Reported on 12/7/2023) 20 tablet 0    multivitamin (THERAGRAN) TABS Take 1 tablet by mouth daily      Omega-3 Fatty Acids (OMEGA 3 PO) Take by mouth in the morning      ondansetron (ZOFRAN) 4 mg tablet Take 1 tablet (4 mg total) by mouth every 8 (eight) hours as needed for nausea or vomiting 15 tablet 0     polyethylene glycol (MIRALAX) 17 g packet Take 17 g by mouth daily as needed (constipation) 10 each 0    sucralfate (CARAFATE) 1 g tablet Take 1 tablet (1 g total) by mouth 4 (four) times a day (before meals and at bedtime) for 14 days 56 tablet 0    Tavaborole 5 % SOLN APPLY TO NAILS DAILY AS DIRECTED (Patient not taking: Reported on 1/3/2024)      tobramycin-dexamethasone (TOBRADEX) ophthalmic suspension if needed (Patient not taking: Reported on 1/3/2024)       No current facility-administered medications for this visit.        Allergies   Allergen Reactions    Latex Hives    Augmentin [Amoxicillin-Pot Clavulanate] GI Intolerance    Bactrim [Sulfamethoxazole-Trimethoprim] GI Intolerance    Clarithromycin GI Intolerance    Doxycycline Hives    Neomycin GI Intolerance    Polymyxin B GI Intolerance    Zyvox [Linezolid] GI Bleeding    Cephalosporins GI Intolerance    Nsaids GI Intolerance    Penicillins GI Intolerance     Patient can only take levaquin and cipro    Prednisone GI Intolerance    Sulphasomidine GI Intolerance       Review of Systems    Video Exam    There were no vitals filed for this visit.    Physical Exam

## 2024-02-21 ENCOUNTER — TELEPHONE (OUTPATIENT)
Dept: CARDIAC SURGERY | Facility: CLINIC | Age: 79
End: 2024-02-21

## 2024-02-21 PROBLEM — Z12.11 COLON CANCER SCREENING: Status: RESOLVED | Noted: 2019-03-08 | Resolved: 2024-02-21

## 2024-02-21 NOTE — TELEPHONE ENCOUNTER
Left voicemail for pt on 2/20/2024 following up on our conversation from her last office visit on 2/8 on scheduling her procedure. Awaiting on call back from pt.

## 2024-03-05 LAB
C DIFF TOX GENS STL QL NAA+PROBE: NEGATIVE
CAMPYLOBACTER STL CULT: NORMAL
Lab: NORMAL
Lab: NORMAL
SALM + SHIG STL CULT: NORMAL
SL AMB E COLI SHIGA TOXIN EIA: NEGATIVE

## 2024-03-06 ENCOUNTER — TELEMEDICINE (OUTPATIENT)
Dept: PSYCHIATRY | Facility: CLINIC | Age: 79
End: 2024-03-06
Payer: COMMERCIAL

## 2024-03-06 DIAGNOSIS — F32.0 CURRENT MILD EPISODE OF MAJOR DEPRESSIVE DISORDER, UNSPECIFIED WHETHER RECURRENT (HCC): Primary | ICD-10-CM

## 2024-03-06 DIAGNOSIS — F41.9 ANXIETY: ICD-10-CM

## 2024-03-06 PROCEDURE — 90834 PSYTX W PT 45 MINUTES: CPT

## 2024-03-06 NOTE — PSYCH
"Behavioral Health Psychotherapy Progress Note    Psychotherapy Provided: Individual Psychotherapy     1. Current mild episode of major depressive disorder, unspecified whether recurrent (HCC)        2. Anxiety            Goals addressed in session: Goal 1     DATA:  Ira shared she has been doing \"okay\" and was able to openly communicate thoughts and feelings about her daughter's death.  Therapist talked about tasks of grieving with Ira.   Ira also talked about her health issues, openly communicating her thoughts and feelings about it.    During this session, this clinician used the following therapeutic modalities: Bereavement Therapy    Substance Abuse was not addressed during this session. If the client is diagnosed with a co-occurring substance use disorder, please indicate any changes in the frequency or amount of use: . Stage of change for addressing substance use diagnoses: No substance use/Not applicable    ASSESSMENT:  Ira Bonilla presents with a Euthymic/ normal mood.     her affect is Normal range and intensity and Tearful, which is congruent, with her mood and the content of the session. The client has made progress on their goals.     Ira Bonilla presents with a none risk of suicide, none risk of self-harm, and none risk of harm to others.    For any risk assessment that surpasses a \"low\" rating, a safety plan must be developed.    A safety plan was indicated: no  If yes, describe in detail     PLAN: Between sessions, Ira Bonilla will get out of the house and see friends to lift her spirits. At the next session, the therapist will use Bereavement Therapy to address grief reaction.    Behavioral Health Treatment Plan and Discharge Planning: Ira Bonilla is aware of and agrees to continue to work on their treatment plan. They have identified and are working toward their discharge goals. yes    Visit start and stop times:    03/06/24  Start Time: 1400  Stop Time: 1452  Total " Visit Time: 52 minutes  Virtual Regular Visit    Verification of patient location:    Patient is located at Home in the following state in which I hold an active license [place]      Assessment/Plan:    Problem List Items Addressed This Visit       Depression - Primary    Anxiety       Goals addressed in session: Goal 1          Reason for visit is No chief complaint on file.       Encounter provider Maria Ines Farfan LCSW    [facility] [address]  [facility] [place] [address] [phone]      Recent Visits  No visits were found meeting these conditions.  Showing recent visits within past 7 days and meeting all other requirements  Today's Visits  Date Type Provider Dept   03/06/24 Telemedicine Maria Ines Farfan LCSW  [facility]   Showing today's visits and meeting all other requirements  Future Appointments  No visits were found meeting these conditions.  Showing future appointments within next 150 days and meeting all other requirements       The patient was identified by name and date of birth. Ira Bonilla was informed that this is a telemedicine visit and that the visit is being conducted throughthe [facility] [facility] platform. She agrees to proceed..  My office door was closed. No one else was in the room.  She acknowledged consent and understanding of privacy and security of the video platform. The patient has agreed to participate and understands they can discontinue the visit at any time.    Patient is aware this is a billable service.     Subjective  Ira Bonilla is a 78 y.o. female  .      HPI     Past Medical History:   Diagnosis Date    Anxiety     Arthritis     Asthma     Cancer (HCC)     Candida infection, esophageal (HCC)     Chronic kidney disease     Chronic pain     COPD (chronic obstructive pulmonary disease) (HCC)     Depression     Depression     Gastroparesis     Gastroparesis      GERD (gastroesophageal reflux disease)     Hypertension     Irritable bowel syndrome (IBS)     Lactose intolerance Not sure    Migraines     MRSA (methicillin resistant Staphylococcus aureus)     Multiple thyroid nodules     Osteoporosis     Pneumonia     Psychiatric disorder        Past Surgical History:   Procedure Laterality Date    BACK SURGERY       SECTION      CHOLECYSTECTOMY      COLONOSCOPY  10/09/2013    ESOPHAGEAL DILATION       [facility]    ESOPHAGOGASTRODUODENOSCOPY N/A 2023    Procedure: ESOPHAGOGASTRODUODENOSCOPY (EGD);  Surgeon: Dontrell Mcmahon MD;  [facility];  Service: General    HIATAL HERNIA REPAIR      NISSEN FUNDOPLICATION      OR BIOPSY LIVER NEEDLE PERCUTANEOUS N/A 2023    Procedure: EXCISION BIOPSY LIVER;  Surgeon: Dontrell Mcmahon MD;  [facility];  Service: General    OR ESOPHAGOGASTRODUODENOSCOPY TRANSORAL DIAGNOSTIC N/A 2019    Procedure: ESOPHAGOGASTRODUODENOSCOPY (EGD);  Surgeon: Geovanna Winn MD;  Location: [facility];  Service: Gastroenterology    OR ESOPHAGOGASTRODUODENOSCOPY TRANSORAL DIAGNOSTIC N/A 2023    Procedure: ESOPHAGOGASTRODUODENOSCOPY (EGD);  Surgeon: Shilpa Price MD;  [facility];  Service: Thoracic    OR ESOPHAGOSCOPY FLEX BALLOON DILAT <30 MM DIAM N/A 2023    Procedure: CRE WIRE GUIDED BALLOON DILATATION ESOPHAGEAL;  Surgeon: Shilpa Price MD;  [facility];  Service: Thoracic    OR LAPS RPR PARAESPHGL HRNA INCL FUNDPLSTY W/O MESH N/A 2023    Procedure: robotic takedown of nissen fundoplication, redo hiatal hernia, partial fundoplication, with mesh;  Surgeon: Shilpa Price MD;  [facility];  Service: Thoracic    OR PYLOROPLASTY N/A 2023    Procedure: PYLOROPLASTY LAPAROSCOPIC WITH ROBOT;  Surgeon: Dontrell Mcmahon MD;  [facility];  Service: General    ROTATOR CUFF REPAIR      SHOULDER SURGERY      SPINAL FUSION      TUBAL LIGATION  May 1975    Last  child born    UPPER GASTROINTESTINAL ENDOSCOPY      US GUIDED THYROID BIOPSY      WISDOM TOOTH EXTRACTION         Current Outpatient Medications   Medication Sig Dispense Refill    acetaminophen (TYLENOL) 160 mg/5 mL suspension Take 20.3 mL (650 mg total) by mouth every 6 (six) hours as needed for mild pain 355 mL 0    albuterol (PROVENTIL HFA,VENTOLIN HFA) 90 mcg/act inhaler Inhale 2 puffs 2 (two) times a day      amitriptyline (ELAVIL) 50 mg tablet TAKE 1 TABLET BY MOUTH EVERYDAY AT BEDTIME 90 tablet 1    amLODIPine (NORVASC) 10 mg tablet TAKE ONE TABLET BY MOUTH DAILY ORAL ONCE A DAY 90 DAYS      cetirizine (ZyrTEC) 10 MG chewable tablet Chew 10 mg daily.      cholecalciferol (VITAMIN D3) 1,000 units tablet Take 5,000 Units by mouth daily      clotrimazole-betamethasone (LOTRISONE) 1-0.05 % cream if needed      dexlansoprazole (DEXILANT) 60 MG capsule TAKE 1 CAPSULE BY MOUTH EVERY DAY 90 capsule 1    dicyclomine (BENTYL) 20 mg tablet Take 1 tablet (20 mg total) by mouth every 6 (six) hours as needed (abdominal cramping) 20 tablet 0    diphenoxylate-atropine (LOMOTIL) 2.5-0.025 mg per tablet Take 1 tablet by mouth 4 (four) times a day as needed for diarrhea for up to 28 days 112 tablet 0    famotidine (PEPCID) 40 MG tablet TAKE 1 TABLET BY MOUTH EVERYDAY AT BEDTIME 90 tablet 3    HYDROmorphone (DILAUDID) 2 mg tablet Take 1 tablet (2 mg total) by mouth every 4 (four) hours as needed for moderate pain Max Daily Amount: 12 mg (Patient not taking: Reported on 12/7/2023) 20 tablet 0    multivitamin (THERAGRAN) TABS Take 1 tablet by mouth daily      Omega-3 Fatty Acids (OMEGA 3 PO) Take by mouth in the morning      ondansetron (ZOFRAN) 4 mg tablet Take 1 tablet (4 mg total) by mouth every 8 (eight) hours as needed for nausea or vomiting 15 tablet 0    polyethylene glycol (MIRALAX) 17 g packet Take 17 g by mouth daily as needed (constipation) 10 each 0    sucralfate (CARAFATE) 1 g tablet Take 1 tablet (1 g total) by  mouth 4 (four) times a day (before meals and at bedtime) for 14 days 56 tablet 0    Tavaborole 5 % SOLN APPLY TO NAILS DAILY AS DIRECTED (Patient not taking: Reported on 1/3/2024)      tobramycin-dexamethasone (TOBRADEX) ophthalmic suspension if needed (Patient not taking: Reported on 1/3/2024)       No current facility-administered medications for this visit.        Allergies   Allergen Reactions    Latex Hives    Augmentin [Amoxicillin-Pot Clavulanate] GI Intolerance    Bactrim [Sulfamethoxazole-Trimethoprim] GI Intolerance    Clarithromycin GI Intolerance    Doxycycline Hives    Neomycin GI Intolerance    Polymyxin B GI Intolerance    Zyvox [Linezolid] GI Bleeding    Cephalosporins GI Intolerance    Nsaids GI Intolerance    Penicillins GI Intolerance     Patient can only take levaquin and cipro    Prednisone GI Intolerance    Sulphasomidine GI Intolerance       Review of Systems    Video Exam    There were no vitals filed for this visit.    Physical Exam

## 2024-03-12 ENCOUNTER — ANESTHESIA EVENT (OUTPATIENT)
Dept: PERIOP | Facility: HOSPITAL | Age: 79
End: 2024-03-12
Payer: COMMERCIAL

## 2024-03-12 NOTE — PRE-PROCEDURE INSTRUCTIONS
Pre-Surgery Instructions:   Medication Instructions    acetaminophen (TYLENOL) 160 mg/5 mL suspension Hold day of surgery.    albuterol (PROVENTIL HFA,VENTOLIN HFA) 90 mcg/act inhaler Uses PRN- OK to take day of surgery    amitriptyline (ELAVIL) 50 mg tablet Take night before surgery    amLODIPine (NORVASC) 10 mg tablet Take day of surgery.    cetirizine (ZyrTEC) 10 MG chewable tablet Uses PRN- OK to take day of surgery    cholecalciferol (VITAMIN D3) 1,000 units tablet Stop taking 7 days prior to surgery.    clotrimazole-betamethasone (LOTRISONE) 1-0.05 % cream Hold day of surgery.    dexlansoprazole (DEXILANT) 60 MG capsule Take day of surgery.    dicyclomine (BENTYL) 20 mg tablet Uses PRN- OK to take day of surgery    diphenoxylate-atropine (LOMOTIL) 2.5-0.025 mg per tablet Uses PRN- OK to take day of surgery    famotidine (PEPCID) 40 MG tablet Take night before surgery    multivitamin (THERAGRAN) TABS Stop taking 7 days prior to surgery.    Omega-3 Fatty Acids (OMEGA 3 PO) Stop taking 7 days prior to surgery.    ondansetron (ZOFRAN) 4 mg tablet Uses PRN- OK to take day of surgery    polyethylene glycol (MIRALAX) 17 g packet Hold day of surgery.    Medication instructions for day surgery reviewed. Please use only a sip of water to take your instructed medications. Avoid all over the counter vitamins, supplements and NSAIDS for one week prior to surgery per anesthesia guidelines. Tylenol is ok to take as needed.     You will receive a call one business day prior to surgery with an arrival time and hospital directions. If your surgery is scheduled on a Monday, the hospital will be calling you on the Friday prior to your surgery. If you have not heard from anyone by 8pm, please call the hospital supervisor through the hospital  at [phone]. ([place] [phone] or [place] [phone]).    Do not eat or drink anything after midnight the night before your surgery, including candy, mints, lifesavers, or  chewing gum. Do not drink alcohol 24hrs before your surgery. Try not to smoke at least 24hrs before your surgery.       Follow the pre surgery showering instructions as listed in the “My Surgical Experience Booklet” or otherwise provided by your surgeon's office. Do not use a blade to shave the surgical area 1 week before surgery. It is okay to use a clean electric clippers up to 24 hours before surgery. Do not apply any lotions, creams, including makeup, cologne, deodorant, or perfumes after showering on the day of your surgery. Do not use dry shampoo, hair spray, hair gel, or any type of hair products.     No contact lenses, eye make-up, or artificial eyelashes. Remove nail polish, including gel polish, and any artificial, gel, or acrylic nails if possible. Remove all jewelry including rings and body piercing jewelry.     Wear causal clothing that is easy to take on and off. Consider your type of surgery.    Keep any valuables, jewelry, piercings at home. Please bring any specially ordered equipment (sling, braces) if indicated.    Arrange for a responsible person to drive you to and from the hospital on the day of your surgery. Please confirm the visitor policy for the day of your procedure when you receive your phone call with an arrival time.     Call the surgeon's office with any new illnesses, exposures, or additional questions prior to surgery.    Please reference your “My Surgical Experience Booklet” for additional information to prepare for your upcoming surgery.

## 2024-03-13 ENCOUNTER — OFFICE VISIT (OUTPATIENT)
Dept: SURGERY | Facility: CLINIC | Age: 79
End: 2024-03-13
Payer: COMMERCIAL

## 2024-03-13 DIAGNOSIS — K31.84 GASTROPARESIS: ICD-10-CM

## 2024-03-13 DIAGNOSIS — R13.19 ESOPHAGEAL DYSPHAGIA: Primary | ICD-10-CM

## 2024-03-13 DIAGNOSIS — K59.1 FUNCTIONAL DIARRHEA: ICD-10-CM

## 2024-03-13 PROCEDURE — 99213 OFFICE O/P EST LOW 20 MIN: CPT | Performed by: SURGERY

## 2024-03-13 NOTE — ASSESSMENT & PLAN NOTE
78-year-old female well-known to me status post robotic pyloroplasty with redo hiatal hernia repair and partial fundoplication with Dr. Price of thoracic surgery on 11/8/2023, here for follow-up.    Plan:  She continues to have intermittent, sporadic diarrhea, as well as esophageal dysphagia.  She is due for an upper endoscopy with possible balloon dilation with Dr. Price next week.    Regarding her diarrhea, we have ruled out most infectious causes with laboratory values at her last visit.  I would like her to be seen by Dr. Puentes of gastroenterology for a conversation.  As she seems to have 2 separate motility issues.  She will return to clinic following this appointment and we can discuss neck steps.

## 2024-03-13 NOTE — PROGRESS NOTES
Assessment/Plan:    Gastroparesis  78-year-old female well-known to me status post robotic pyloroplasty with redo hiatal hernia repair and partial fundoplication with Dr. Price of thoracic surgery on 11/8/2023, here for follow-up.    Plan:  She continues to have intermittent, sporadic diarrhea, as well as esophageal dysphagia.  She is due for an upper endoscopy with possible balloon dilation with Dr. Price next week.    Regarding her diarrhea, we have ruled out most infectious causes with laboratory values at her last visit.  I would like her to be seen by Dr. Puentes of gastroenterology for a conversation.  As she seems to have 2 separate motility issues.  She will return to clinic following this appointment and we can discuss neck steps.     Diagnoses and all orders for this visit:    Esophageal dysphagia    Functional diarrhea  -     Ambulatory Referral to Gastroenterology; Future    Gastroparesis          Subjective:      Patient ID: Ira Bonilla is a 78 y.o. female.    78-year-old female well-known to me status post robotic pyloroplasty, and simultaneous redo hiatal hernia repair with takedown of Nissen with Dr. Price of thoracic surgery on 11/8/2023.  Unfortunately she continues to struggle with diarrhea as well as dysphagia.  She is having dysphagia to a variety of different food consistencies and types as well as volume.  Her diarrhea is sporadic and of occurs approximately 5 days out of the week.  She moves her bowels 3-4 times per day, is explosive and watery.  Sometimes this is 1 hour postprandially, but sometimes it is in the middle of the night.  C. difficile and stool cultures were negative.  Antidiarrheals have not improved her symptomatology.        The following portions of the patient's history were reviewed and updated as appropriate: She  has a past medical history of Anxiety, Arthritis, Asthma, Cancer (HCC), Candida infection, esophageal (HCC), Chronic kidney disease, Chronic pain,  COPD (chronic obstructive pulmonary disease) (HCC), Depression, Depression, Gastroparesis, Gastroparesis, GERD (gastroesophageal reflux disease), Hypertension, Irritable bowel syndrome (IBS), Lactose intolerance (Not sure), Migraines, MRSA (methicillin resistant Staphylococcus aureus), Multiple thyroid nodules, Osteoporosis, Pneumonia, and Psychiatric disorder.  She   Patient Active Problem List    Diagnosis Date Noted    Diarrhea 2024    Encounter for geriatric assessment 10/10/2023    Asthma     COPD (chronic obstructive pulmonary disease) (HCC)     Hypertension     Migraines     Depression     Anxiety     Stage 3 chronic kidney disease (HCC)     Nausea 06/10/2021    Allergic contact urticaria 2020    Chronic GERD 2019    Dysphagia 2019    Esophageal dysphagia 2019    History of Nissen fundoplication 2019    Left upper quadrant pain 2019    Gastroparesis 2019    Gastroesophageal reflux disease without esophagitis 2019     She  has a past surgical history that includes Shoulder surgery; Hiatal hernia repair; Cholecystectomy; Back surgery;  section; [place] tooth extraction; Spinal fusion; Rotator cuff repair; Nissen fundoplication; US guided thyroid biopsy; Colonoscopy (10/09/2013); Upper gastrointestinal endoscopy; Esophageal dilation; pr esophagogastroduodenoscopy transoral diagnostic (N/A, 2019); Tubal ligation (May 1975); pr esophagogastroduodenoscopy transoral diagnostic (N/A, 2023); pr esophagoscopy flex balloon dilat <30 mm diam (N/A, 2023); pr laps rpr paraesphgl hrna incl fundplsty w/o mesh (N/A, 2023); pr pyloroplasty (N/A, 2023); pr biopsy liver needle percutaneous (N/A, 2023); and Esophagogastroduodenoscopy (N/A, 2023).  Her family history includes COPD in her maternal grandmother; Cancer in her father; Hypertension in her maternal aunt; Other in her mother; Stomach cancer in her father.  She  reports that  she quit smoking about 20 years ago. Her smoking use included cigarettes. She started smoking about 25 years ago. She has a 15 pack-year smoking history. She has quit using smokeless tobacco. She reports that she does not currently use alcohol. She reports that she does not use drugs.  Current Outpatient Medications   Medication Sig Dispense Refill    acetaminophen (TYLENOL) 160 mg/5 mL suspension Take 20.3 mL (650 mg total) by mouth every 6 (six) hours as needed for mild pain 355 mL 0    albuterol (PROVENTIL HFA,VENTOLIN HFA) 90 mcg/act inhaler Inhale 2 puffs if needed      amitriptyline (ELAVIL) 50 mg tablet TAKE 1 TABLET BY MOUTH EVERYDAY AT BEDTIME 90 tablet 1    amLODIPine (NORVASC) 10 mg tablet TAKE ONE TABLET BY MOUTH DAILY ORAL ONCE A DAY 90 DAYS      cetirizine (ZyrTEC) 10 MG chewable tablet Chew 10 mg daily.      cholecalciferol (VITAMIN D3) 1,000 units tablet Take 5,000 Units by mouth daily      clotrimazole-betamethasone (LOTRISONE) 1-0.05 % cream if needed      dexlansoprazole (DEXILANT) 60 MG capsule TAKE 1 CAPSULE BY MOUTH EVERY DAY 90 capsule 1    dicyclomine (BENTYL) 20 mg tablet Take 1 tablet (20 mg total) by mouth every 6 (six) hours as needed (abdominal cramping) 20 tablet 0    diphenoxylate-atropine (LOMOTIL) 2.5-0.025 mg per tablet Take 1 tablet by mouth 4 (four) times a day as needed for diarrhea for up to 28 days 112 tablet 0    famotidine (PEPCID) 40 MG tablet TAKE 1 TABLET BY MOUTH EVERYDAY AT BEDTIME 90 tablet 3    multivitamin (THERAGRAN) TABS Take 1 tablet by mouth daily      Omega-3 Fatty Acids (OMEGA 3 PO) Take by mouth in the morning      ondansetron (ZOFRAN) 4 mg tablet Take 1 tablet (4 mg total) by mouth every 8 (eight) hours as needed for nausea or vomiting 15 tablet 0    polyethylene glycol (MIRALAX) 17 g packet Take 17 g by mouth daily as needed (constipation) (Patient taking differently: Take 17 g by mouth daily) 10 each 0    sucralfate (CARAFATE) 1 g tablet Take 1 tablet (1 g  total) by mouth 4 (four) times a day (before meals and at bedtime) for 14 days (Patient taking differently: Take 1 g by mouth if needed) 56 tablet 0    Tavaborole 5 % SOLN APPLY TO NAILS DAILY AS DIRECTED (Patient not taking: Reported on 1/3/2024)       No current facility-administered medications for this visit.     Current Outpatient Medications on File Prior to Visit   Medication Sig    acetaminophen (TYLENOL) 160 mg/5 mL suspension Take 20.3 mL (650 mg total) by mouth every 6 (six) hours as needed for mild pain    albuterol (PROVENTIL HFA,VENTOLIN HFA) 90 mcg/act inhaler Inhale 2 puffs if needed    amitriptyline (ELAVIL) 50 mg tablet TAKE 1 TABLET BY MOUTH EVERYDAY AT BEDTIME    amLODIPine (NORVASC) 10 mg tablet TAKE ONE TABLET BY MOUTH DAILY ORAL ONCE A DAY 90 DAYS    cetirizine (ZyrTEC) 10 MG chewable tablet Chew 10 mg daily.    cholecalciferol (VITAMIN D3) 1,000 units tablet Take 5,000 Units by mouth daily    clotrimazole-betamethasone (LOTRISONE) 1-0.05 % cream if needed    dexlansoprazole (DEXILANT) 60 MG capsule TAKE 1 CAPSULE BY MOUTH EVERY DAY    dicyclomine (BENTYL) 20 mg tablet Take 1 tablet (20 mg total) by mouth every 6 (six) hours as needed (abdominal cramping)    diphenoxylate-atropine (LOMOTIL) 2.5-0.025 mg per tablet Take 1 tablet by mouth 4 (four) times a day as needed for diarrhea for up to 28 days    famotidine (PEPCID) 40 MG tablet TAKE 1 TABLET BY MOUTH EVERYDAY AT BEDTIME    multivitamin (THERAGRAN) TABS Take 1 tablet by mouth daily    Omega-3 Fatty Acids (OMEGA 3 PO) Take by mouth in the morning    ondansetron (ZOFRAN) 4 mg tablet Take 1 tablet (4 mg total) by mouth every 8 (eight) hours as needed for nausea or vomiting    polyethylene glycol (MIRALAX) 17 g packet Take 17 g by mouth daily as needed (constipation) (Patient taking differently: Take 17 g by mouth daily)    sucralfate (CARAFATE) 1 g tablet Take 1 tablet (1 g total) by mouth 4 (four) times a day (before meals and at bedtime)  for 14 days (Patient taking differently: Take 1 g by mouth if needed)    Tavaborole 5 % SOLN APPLY TO NAILS DAILY AS DIRECTED (Patient not taking: Reported on 1/3/2024)     No current facility-administered medications on file prior to visit.     She is allergic to latex, augmentin [amoxicillin-pot clavulanate], bactrim [sulfamethoxazole-trimethoprim], clarithromycin, doxycycline, neomycin, polymyxin b, zyvox [linezolid], cephalosporins, nsaids, penicillins, prednisone, and sulphasomidine..    Review of Systems   Constitutional:  Negative for chills, fatigue and fever.   HENT:  Negative for ear pain, facial swelling, sinus pressure and sinus pain.    Eyes:  Negative for pain.   Respiratory:  Negative for cough, shortness of breath and wheezing.    Cardiovascular:  Negative for chest pain.   Gastrointestinal:  Positive for abdominal pain and diarrhea. Negative for constipation, nausea and vomiting.   Endocrine: Negative for cold intolerance and heat intolerance.   Genitourinary:  Negative for dysuria and flank pain.   Musculoskeletal:  Negative for back pain and neck pain.   Skin:  Negative for wound.   Neurological:  Negative for syncope, facial asymmetry, light-headedness and numbness.   Psychiatric/Behavioral:  Negative for behavioral problems, confusion and suicidal ideas.          Objective:      There were no vitals taken for this visit.         Physical Exam  Vitals and nursing note reviewed.   Constitutional:       General: She is not in acute distress.     Appearance: Normal appearance. She is not toxic-appearing.   HENT:      Head: Normocephalic and atraumatic.      Mouth/Throat:      Mouth: Mucous membranes are moist.   Eyes:      Extraocular Movements: Extraocular movements intact.      Pupils: Pupils are equal, round, and reactive to light.   Cardiovascular:      Rate and Rhythm: Normal rate and regular rhythm.      Pulses: Normal pulses.   Pulmonary:      Effort: Pulmonary effort is normal. No  respiratory distress.      Breath sounds: Normal breath sounds. No wheezing.   Abdominal:      General: There is no distension.      Palpations: Abdomen is soft. There is no mass.      Tenderness: There is no abdominal tenderness. There is no guarding or rebound.      Hernia: No hernia is present.   Musculoskeletal:         General: No swelling or deformity. Normal range of motion.      Cervical back: Normal range of motion and neck supple.      Right lower leg: No edema.      Left lower leg: No edema.   Skin:     General: Skin is warm and dry.      Coloration: Skin is not jaundiced.   Neurological:      General: No focal deficit present.      Mental Status: She is alert and oriented to person, place, and time.   Psychiatric:         Mood and Affect: Mood normal.         Behavior: Behavior normal.

## 2024-03-13 NOTE — H&P (VIEW-ONLY)
Assessment/Plan:    Gastroparesis  78-year-old female well-known to me status post robotic pyloroplasty with redo hiatal hernia repair and partial fundoplication with Dr. Price of thoracic surgery on 11/8/2023, here for follow-up.    Plan:  She continues to have intermittent, sporadic diarrhea, as well as esophageal dysphagia.  She is due for an upper endoscopy with possible balloon dilation with Dr. Price next week.    Regarding her diarrhea, we have ruled out most infectious causes with laboratory values at her last visit.  I would like her to be seen by Dr. Puentes of gastroenterology for a conversation.  As she seems to have 2 separate motility issues.  She will return to clinic following this appointment and we can discuss neck steps.     Diagnoses and all orders for this visit:    Esophageal dysphagia    Functional diarrhea  -     Ambulatory Referral to Gastroenterology; Future    Gastroparesis          Subjective:      Patient ID: Ira Bonilla is a 78 y.o. female.    78-year-old female well-known to me status post robotic pyloroplasty, and simultaneous redo hiatal hernia repair with takedown of Nissen with Dr. Price of thoracic surgery on 11/8/2023.  Unfortunately she continues to struggle with diarrhea as well as dysphagia.  She is having dysphagia to a variety of different food consistencies and types as well as volume.  Her diarrhea is sporadic and of occurs approximately 5 days out of the week.  She moves her bowels 3-4 times per day, is explosive and watery.  Sometimes this is 1 hour postprandially, but sometimes it is in the middle of the night.  C. difficile and stool cultures were negative.  Antidiarrheals have not improved her symptomatology.        The following portions of the patient's history were reviewed and updated as appropriate: She  has a past medical history of Anxiety, Arthritis, Asthma, Cancer (HCC), Candida infection, esophageal (HCC), Chronic kidney disease, Chronic pain,  COPD (chronic obstructive pulmonary disease) (HCC), Depression, Depression, Gastroparesis, Gastroparesis, GERD (gastroesophageal reflux disease), Hypertension, Irritable bowel syndrome (IBS), Lactose intolerance (Not sure), Migraines, MRSA (methicillin resistant Staphylococcus aureus), Multiple thyroid nodules, Osteoporosis, Pneumonia, and Psychiatric disorder.  She   Patient Active Problem List    Diagnosis Date Noted    Diarrhea 2024    Encounter for geriatric assessment 10/10/2023    Asthma     COPD (chronic obstructive pulmonary disease) (HCC)     Hypertension     Migraines     Depression     Anxiety     Stage 3 chronic kidney disease (HCC)     Nausea 06/10/2021    Allergic contact urticaria 2020    Chronic GERD 2019    Dysphagia 2019    Esophageal dysphagia 2019    History of Nissen fundoplication 2019    Left upper quadrant pain 2019    Gastroparesis 2019    Gastroesophageal reflux disease without esophagitis 2019     She  has a past surgical history that includes Shoulder surgery; Hiatal hernia repair; Cholecystectomy; Back surgery;  section; [place] tooth extraction; Spinal fusion; Rotator cuff repair; Nissen fundoplication; US guided thyroid biopsy; Colonoscopy (10/09/2013); Upper gastrointestinal endoscopy; Esophageal dilation; pr esophagogastroduodenoscopy transoral diagnostic (N/A, 2019); Tubal ligation (May 1975); pr esophagogastroduodenoscopy transoral diagnostic (N/A, 2023); pr esophagoscopy flex balloon dilat <30 mm diam (N/A, 2023); pr laps rpr paraesphgl hrna incl fundplsty w/o mesh (N/A, 2023); pr pyloroplasty (N/A, 2023); pr biopsy liver needle percutaneous (N/A, 2023); and Esophagogastroduodenoscopy (N/A, 2023).  Her family history includes COPD in her maternal grandmother; Cancer in her father; Hypertension in her maternal aunt; Other in her mother; Stomach cancer in her father.  She  reports that  she quit smoking about 20 years ago. Her smoking use included cigarettes. She started smoking about 25 years ago. She has a 15 pack-year smoking history. She has quit using smokeless tobacco. She reports that she does not currently use alcohol. She reports that she does not use drugs.  Current Outpatient Medications   Medication Sig Dispense Refill    acetaminophen (TYLENOL) 160 mg/5 mL suspension Take 20.3 mL (650 mg total) by mouth every 6 (six) hours as needed for mild pain 355 mL 0    albuterol (PROVENTIL HFA,VENTOLIN HFA) 90 mcg/act inhaler Inhale 2 puffs if needed      amitriptyline (ELAVIL) 50 mg tablet TAKE 1 TABLET BY MOUTH EVERYDAY AT BEDTIME 90 tablet 1    amLODIPine (NORVASC) 10 mg tablet TAKE ONE TABLET BY MOUTH DAILY ORAL ONCE A DAY 90 DAYS      cetirizine (ZyrTEC) 10 MG chewable tablet Chew 10 mg daily.      cholecalciferol (VITAMIN D3) 1,000 units tablet Take 5,000 Units by mouth daily      clotrimazole-betamethasone (LOTRISONE) 1-0.05 % cream if needed      dexlansoprazole (DEXILANT) 60 MG capsule TAKE 1 CAPSULE BY MOUTH EVERY DAY 90 capsule 1    dicyclomine (BENTYL) 20 mg tablet Take 1 tablet (20 mg total) by mouth every 6 (six) hours as needed (abdominal cramping) 20 tablet 0    diphenoxylate-atropine (LOMOTIL) 2.5-0.025 mg per tablet Take 1 tablet by mouth 4 (four) times a day as needed for diarrhea for up to 28 days 112 tablet 0    famotidine (PEPCID) 40 MG tablet TAKE 1 TABLET BY MOUTH EVERYDAY AT BEDTIME 90 tablet 3    multivitamin (THERAGRAN) TABS Take 1 tablet by mouth daily      Omega-3 Fatty Acids (OMEGA 3 PO) Take by mouth in the morning      ondansetron (ZOFRAN) 4 mg tablet Take 1 tablet (4 mg total) by mouth every 8 (eight) hours as needed for nausea or vomiting 15 tablet 0    polyethylene glycol (MIRALAX) 17 g packet Take 17 g by mouth daily as needed (constipation) (Patient taking differently: Take 17 g by mouth daily) 10 each 0    sucralfate (CARAFATE) 1 g tablet Take 1 tablet (1 g  total) by mouth 4 (four) times a day (before meals and at bedtime) for 14 days (Patient taking differently: Take 1 g by mouth if needed) 56 tablet 0    Tavaborole 5 % SOLN APPLY TO NAILS DAILY AS DIRECTED (Patient not taking: Reported on 1/3/2024)       No current facility-administered medications for this visit.     Current Outpatient Medications on File Prior to Visit   Medication Sig    acetaminophen (TYLENOL) 160 mg/5 mL suspension Take 20.3 mL (650 mg total) by mouth every 6 (six) hours as needed for mild pain    albuterol (PROVENTIL HFA,VENTOLIN HFA) 90 mcg/act inhaler Inhale 2 puffs if needed    amitriptyline (ELAVIL) 50 mg tablet TAKE 1 TABLET BY MOUTH EVERYDAY AT BEDTIME    amLODIPine (NORVASC) 10 mg tablet TAKE ONE TABLET BY MOUTH DAILY ORAL ONCE A DAY 90 DAYS    cetirizine (ZyrTEC) 10 MG chewable tablet Chew 10 mg daily.    cholecalciferol (VITAMIN D3) 1,000 units tablet Take 5,000 Units by mouth daily    clotrimazole-betamethasone (LOTRISONE) 1-0.05 % cream if needed    dexlansoprazole (DEXILANT) 60 MG capsule TAKE 1 CAPSULE BY MOUTH EVERY DAY    dicyclomine (BENTYL) 20 mg tablet Take 1 tablet (20 mg total) by mouth every 6 (six) hours as needed (abdominal cramping)    diphenoxylate-atropine (LOMOTIL) 2.5-0.025 mg per tablet Take 1 tablet by mouth 4 (four) times a day as needed for diarrhea for up to 28 days    famotidine (PEPCID) 40 MG tablet TAKE 1 TABLET BY MOUTH EVERYDAY AT BEDTIME    multivitamin (THERAGRAN) TABS Take 1 tablet by mouth daily    Omega-3 Fatty Acids (OMEGA 3 PO) Take by mouth in the morning    ondansetron (ZOFRAN) 4 mg tablet Take 1 tablet (4 mg total) by mouth every 8 (eight) hours as needed for nausea or vomiting    polyethylene glycol (MIRALAX) 17 g packet Take 17 g by mouth daily as needed (constipation) (Patient taking differently: Take 17 g by mouth daily)    sucralfate (CARAFATE) 1 g tablet Take 1 tablet (1 g total) by mouth 4 (four) times a day (before meals and at bedtime)  for 14 days (Patient taking differently: Take 1 g by mouth if needed)    Tavaborole 5 % SOLN APPLY TO NAILS DAILY AS DIRECTED (Patient not taking: Reported on 1/3/2024)     No current facility-administered medications on file prior to visit.     She is allergic to latex, augmentin [amoxicillin-pot clavulanate], bactrim [sulfamethoxazole-trimethoprim], clarithromycin, doxycycline, neomycin, polymyxin b, zyvox [linezolid], cephalosporins, nsaids, penicillins, prednisone, and sulphasomidine..    Review of Systems   Constitutional:  Negative for chills, fatigue and fever.   HENT:  Negative for ear pain, facial swelling, sinus pressure and sinus pain.    Eyes:  Negative for pain.   Respiratory:  Negative for cough, shortness of breath and wheezing.    Cardiovascular:  Negative for chest pain.   Gastrointestinal:  Positive for abdominal pain and diarrhea. Negative for constipation, nausea and vomiting.   Endocrine: Negative for cold intolerance and heat intolerance.   Genitourinary:  Negative for dysuria and flank pain.   Musculoskeletal:  Negative for back pain and neck pain.   Skin:  Negative for wound.   Neurological:  Negative for syncope, facial asymmetry, light-headedness and numbness.   Psychiatric/Behavioral:  Negative for behavioral problems, confusion and suicidal ideas.          Objective:      There were no vitals taken for this visit.         Physical Exam  Vitals and nursing note reviewed.   Constitutional:       General: She is not in acute distress.     Appearance: Normal appearance. She is not toxic-appearing.   HENT:      Head: Normocephalic and atraumatic.      Mouth/Throat:      Mouth: Mucous membranes are moist.   Eyes:      Extraocular Movements: Extraocular movements intact.      Pupils: Pupils are equal, round, and reactive to light.   Cardiovascular:      Rate and Rhythm: Normal rate and regular rhythm.      Pulses: Normal pulses.   Pulmonary:      Effort: Pulmonary effort is normal. No  respiratory distress.      Breath sounds: Normal breath sounds. No wheezing.   Abdominal:      General: There is no distension.      Palpations: Abdomen is soft. There is no mass.      Tenderness: There is no abdominal tenderness. There is no guarding or rebound.      Hernia: No hernia is present.   Musculoskeletal:         General: No swelling or deformity. Normal range of motion.      Cervical back: Normal range of motion and neck supple.      Right lower leg: No edema.      Left lower leg: No edema.   Skin:     General: Skin is warm and dry.      Coloration: Skin is not jaundiced.   Neurological:      General: No focal deficit present.      Mental Status: She is alert and oriented to person, place, and time.   Psychiatric:         Mood and Affect: Mood normal.         Behavior: Behavior normal.

## 2024-03-19 NOTE — ANESTHESIA PREPROCEDURE EVALUATION
Procedure:  ESOPHAGOGASTRODUODENOSCOPY (EGD) (Esophagus)  ESOPHAGOGASTRODUODENOSCOPY WITH DILATION (Esophagus)    Relevant Problems   CARDIO   (+) Hypertension   (+) Migraines      GI/HEPATIC   (+) Chronic GERD   (+) Dysphagia   (+) Esophageal dysphagia   (+) Gastroesophageal reflux disease without esophagitis      /RENAL   (+) Stage 3 chronic kidney disease (HCC)      NEURO/PSYCH   (+) Anxiety   (+) Depression   (+) Migraines      PULMONARY   (+) Asthma   (+) COPD (chronic obstructive pulmonary disease) (HCC)      Surgery/Wound/Pain   (+) History of Nissen fundoplication      FEN/Gastrointestinal   (+) Gastroparesis      Dermatology   (+) Allergic contact urticaria      Other   (+) Nausea       EKG: SR & RBBB       Component  Ref Range & Units 12/11/23 1205 11/14/23 0444 11/13/23 0534 11/12/23 0738 11/12/23 0440 11/11/23 1852 11/11/23 0839   Glucose, Random  70 - 99 mg/dL 70 113 R,  R, CM  114 R, CM     BUN  8 - 27 mg/dL 17 17 R 18 R  21 R     Creatinine  0.57 - 1.00 mg/dL 1.41 High  1.43 High  R, CM 1.41 High  R, CM  1.71 High  R, CM     eGFR  >59 mL/min/1.73 38 Low  35 R 35 R  28 R     SL AMB BUN/CREATININE RATIO  12 - 28 12         Sodium  134 - 144 mmol/L 145 High  141 R 142 R  139 R     Potassium  3.5 - 5.2 mmol/L 4.1 3.5 R 4.1 R  3.7 R     Chloride  96 - 106 mmol/L 109 High  104 R 109 High  R  107 R     CO2  20 - 29 mmol/L 20 28 R 28 R  27 R     CALCIUM  8.7 - 10.3 mg/dL 9.7         Protein, Total  6.0 - 8.5 g/dL 7.3 5.9 Low  R 6.1 Low  R 5.7 Low  R  5.4 Low  R 5.8 Low  R   Albumin  3.8 - 4.8 g/dL 4.4 3.1 Low  R 3.1 Low  R 3.1 Low  R  3.2 Low  R 3.3 Low  R   Globulin, Total  1.5 - 4.5 g/dL 2.9         Albumin/Globulin Ratio  1.2 - 2.2 1.5         TOTAL BILIRUBIN  0.0 - 1.2 mg/dL 0.3 1.00 R, CM 1.39 High  R, CM 4.02 High  R, CM  4.02 High  R, CM 4.24 High  R, CM   Alk Phos Isoenzymes  44 - 121 IU/L 93         AST  0 - 40 IU/L 32 57 High  R 114 High  R 259 High  R  466 High  R 688 High  R   ALT  0 - 32  IU/L 22 195 High  R,  High  R,  High  R, CM  522 High  R,  High  R, CM       Component  Ref Range & Units 11/14/23 0444 11/13/23 0534 11/12/23 0440 11/11/23 0445 11/10/23 1234 11/9/23 0504 10/27/23 1150   WBC  4.31 - 10.16 Thousand/uL 6.10 6.27 7.02 6.99 7.44 7.52 7.72   RBC  3.81 - 5.12 Million/uL 2.44 Low  2.68 Low  2.56 Low  2.69 Low  2.73 Low  3.11 Low  3.71 Low    Hemoglobin  11.5 - 15.4 g/dL 8.0 Low  8.7 Low  8.4 Low  8.8 Low  8.9 Low  10.0 Low  11.8   Hematocrit  34.8 - 46.1 % 23.9 Low  25.9 Low  25.3 Low  25.6 Low  26.2 Low  30.2 Low  35.5   MCV  82 - 98 fL 98 97 99 High  95 96 97 96   MCH  26.8 - 34.3 pg 32.8 32.5 32.8 32.7 32.6 32.2 31.8   MCHC  31.4 - 37.4 g/dL 33.5 33.6 33.2 34.4 34.0 33.1 33.2   RDW  11.6 - 15.1 % 14.9 14.9 14.3 13.5 13.6 13.1 12.8   Platelets  149 - 390 Thousands/uL 144 Low  127 Low  87 Low  CM 40 Low Panic  CM 38 Low Panic  63 Low       Physical Exam    Airway    Mallampati score: II  TM Distance: >3 FB  Neck ROM: full     Dental       Cardiovascular      Pulmonary      Other Findings  post-pubertal.    Anesthesia Plan  ASA Score- 3     Anesthesia Type- general with ASA Monitors.         Additional Monitors:     Airway Plan: ETT.           Plan Factors-    Chart reviewed. EKG reviewed.  Existing labs reviewed. Patient summary reviewed.    Patient is not a current smoker.  Patient did not smoke on day of surgery.            Induction- intravenous.    Postoperative Plan- . Planned trial extubation    Informed Consent- Anesthetic plan and risks discussed with patient.  I personally reviewed this patient with the CRNA. Discussed and agreed on the Anesthesia Plan with the CRNA..

## 2024-03-20 ENCOUNTER — HOSPITAL ENCOUNTER (OUTPATIENT)
Facility: HOSPITAL | Age: 79
Setting detail: OUTPATIENT SURGERY
Discharge: HOME | End: 2024-03-20
Attending: THORACIC SURGERY (CARDIOTHORACIC VASCULAR SURGERY) | Admitting: THORACIC SURGERY (CARDIOTHORACIC VASCULAR SURGERY)
Payer: COMMERCIAL

## 2024-03-20 ENCOUNTER — ANESTHESIA (OUTPATIENT)
Dept: PERIOP | Facility: HOSPITAL | Age: 79
End: 2024-03-20
Payer: COMMERCIAL

## 2024-03-20 ENCOUNTER — HOSPITAL ENCOUNTER (OUTPATIENT)
Dept: RADIOLOGY | Facility: HOSPITAL | Age: 79
Setting detail: OUTPATIENT SURGERY
Discharge: HOME | End: 2024-03-20
Payer: COMMERCIAL

## 2024-03-20 VITALS
RESPIRATION RATE: 16 BRPM | WEIGHT: 110 LBS | HEIGHT: 60 IN | TEMPERATURE: 97.3 F | HEART RATE: 81 BPM | SYSTOLIC BLOOD PRESSURE: 122 MMHG | BODY MASS INDEX: 21.6 KG/M2 | OXYGEN SATURATION: 100 % | DIASTOLIC BLOOD PRESSURE: 77 MMHG

## 2024-03-20 DIAGNOSIS — R13.19 ESOPHAGEAL DYSPHAGIA: ICD-10-CM

## 2024-03-20 PROCEDURE — 43249 ESOPH EGD DILATION <30 MM: CPT | Performed by: THORACIC SURGERY (CARDIOTHORACIC VASCULAR SURGERY)

## 2024-03-20 PROCEDURE — C1726 CATH, BAL DIL, NON-VASCULAR: HCPCS | Performed by: THORACIC SURGERY (CARDIOTHORACIC VASCULAR SURGERY)

## 2024-03-20 RX ORDER — ROCURONIUM BROMIDE 10 MG/ML
INJECTION, SOLUTION INTRAVENOUS AS NEEDED
Status: DISCONTINUED | OUTPATIENT
Start: 2024-03-20 | End: 2024-03-20

## 2024-03-20 RX ORDER — ONDANSETRON 2 MG/ML
INJECTION INTRAMUSCULAR; INTRAVENOUS AS NEEDED
Status: DISCONTINUED | OUTPATIENT
Start: 2024-03-20 | End: 2024-03-20

## 2024-03-20 RX ORDER — FENTANYL CITRATE 50 UG/ML
INJECTION, SOLUTION INTRAMUSCULAR; INTRAVENOUS AS NEEDED
Status: DISCONTINUED | OUTPATIENT
Start: 2024-03-20 | End: 2024-03-20

## 2024-03-20 RX ORDER — SODIUM CHLORIDE, SODIUM LACTATE, POTASSIUM CHLORIDE, CALCIUM CHLORIDE 600; 310; 30; 20 MG/100ML; MG/100ML; MG/100ML; MG/100ML
INJECTION, SOLUTION INTRAVENOUS CONTINUOUS PRN
Status: DISCONTINUED | OUTPATIENT
Start: 2024-03-20 | End: 2024-03-20

## 2024-03-20 RX ORDER — SODIUM CHLORIDE, SODIUM LACTATE, POTASSIUM CHLORIDE, CALCIUM CHLORIDE 600; 310; 30; 20 MG/100ML; MG/100ML; MG/100ML; MG/100ML
20 INJECTION, SOLUTION INTRAVENOUS CONTINUOUS
Status: DISCONTINUED | OUTPATIENT
Start: 2024-03-20 | End: 2024-03-20 | Stop reason: HOSPADM

## 2024-03-20 RX ORDER — PROPOFOL 10 MG/ML
INJECTION, EMULSION INTRAVENOUS AS NEEDED
Status: DISCONTINUED | OUTPATIENT
Start: 2024-03-20 | End: 2024-03-20

## 2024-03-20 RX ORDER — ONDANSETRON 2 MG/ML
4 INJECTION INTRAMUSCULAR; INTRAVENOUS ONCE AS NEEDED
Status: COMPLETED | OUTPATIENT
Start: 2024-03-20 | End: 2024-03-20

## 2024-03-20 RX ORDER — LIDOCAINE HYDROCHLORIDE 10 MG/ML
INJECTION, SOLUTION EPIDURAL; INFILTRATION; INTRACAUDAL; PERINEURAL AS NEEDED
Status: DISCONTINUED | OUTPATIENT
Start: 2024-03-20 | End: 2024-03-20

## 2024-03-20 RX ADMIN — SODIUM CHLORIDE, SODIUM LACTATE, POTASSIUM CHLORIDE, AND CALCIUM CHLORIDE: .6; .31; .03; .02 INJECTION, SOLUTION INTRAVENOUS at 13:21

## 2024-03-20 RX ADMIN — PROPOFOL 150 MG: 10 INJECTION, EMULSION INTRAVENOUS at 13:26

## 2024-03-20 RX ADMIN — ONDANSETRON 4 MG: 2 INJECTION INTRAMUSCULAR; INTRAVENOUS at 14:04

## 2024-03-20 RX ADMIN — LIDOCAINE HYDROCHLORIDE 50 MG: 10 INJECTION, SOLUTION EPIDURAL; INFILTRATION; INTRACAUDAL; PERINEURAL at 13:26

## 2024-03-20 RX ADMIN — SODIUM CHLORIDE, SODIUM LACTATE, POTASSIUM CHLORIDE, AND CALCIUM CHLORIDE 20 ML/HR: .6; .31; .03; .02 INJECTION, SOLUTION INTRAVENOUS at 10:51

## 2024-03-20 RX ADMIN — ONDANSETRON 4 MG: 2 INJECTION INTRAMUSCULAR; INTRAVENOUS at 13:35

## 2024-03-20 RX ADMIN — ROCURONIUM BROMIDE 30 MG: 10 INJECTION, SOLUTION INTRAVENOUS at 13:26

## 2024-03-20 RX ADMIN — FENTANYL CITRATE 100 MCG: 50 INJECTION INTRAMUSCULAR; INTRAVENOUS at 13:26

## 2024-03-20 RX ADMIN — SUGAMMADEX 100 MG: 100 INJECTION, SOLUTION INTRAVENOUS at 13:44

## 2024-03-20 NOTE — ANESTHESIA POSTPROCEDURE EVALUATION
Post-Op Assessment Note    CV Status:  Stable    Pain management: adequate       Mental Status:  Alert and awake   Hydration Status:  Euvolemic   PONV Controlled:  Controlled   Airway Patency:  Patent     Post Op Vitals Reviewed: Yes    No anethesia notable event occurred.    Staff: LA NENA               /78 (03/20/24 1351)    Temp 97.8 °F (36.6 °C) (03/20/24 1351)    Pulse 94 (03/20/24 1351)   Resp 16 (03/20/24 1351)    SpO2 100 % (03/20/24 1351)

## 2024-03-20 NOTE — OP NOTE
OPERATIVE REPORT  PATIENT NAME: Ira Bonilla    :  1945  MRN: [id number]  Pt Location: [facility] OR ROOM 08    SURGERY DATE: 3/20/2024    Surgeons and Role:     * Shilpa Price MD - Primary    Preop Diagnosis:  Esophageal dysphagia [R13.19]    Post-Op Diagnosis Codes:     * Esophageal dysphagia [R13.19]    Procedure(s):  ESOPHAGOGASTRODUODENOSCOPY (EGD)  ESOPHAGOGASTRODUODENOSCOPY WITH BALLOON DILATION    Specimen(s):  * No specimens in log *    Estimated Blood Loss:   Minimal    Drains:  * No LDAs found *    Anesthesia Type:   General    Operative Indications:  Esophageal dysphagia [R13.19]    Operative Findings:  Tight GEJxn. Wrap in place    Complications:   None    Procedure and Technique:  Ira Bonilla was brought to the operating room and placed in the supine position.  After institution of adequate general anesthesia, fiberoptic endoscopy is performed. The scope was passed through the esophagus. The esophagus was noted to be tortuous proximally up to about 25cm. The scope was advanced to the GEJxn. The jxn was tight. The scope was advanced through the junction with some gentle pressure. The scope was advanced into the stomach. Upon retroflexion, the wrap was intact. No hernia was identified. The stomach insufflated without issue. The pylorus was intubated without difficulty. The scope was retracted back to the GEJxn. A 20mm CRE balloon was advanced through the scope and through the GE junction.  This was dilated to 20mm successfully. After the balloon was taken down, there was a small mucosal tear across the GEJxn. The junction was still narrowed but wider. The decision was made not to re-dilate since she had a mucosal tear.  The excess air was suctioned from the GI tract and removed.     The patient was extubated and brought to the recovery in stable condition having tolerated the procedure well.         I was present for the entire procedure.    Patient Disposition:  PACU  and extubated  and stable        SIGNATURE: Shilpa Price MD  DATE: March 20, 2024  TIME: 1:48 PM

## 2024-03-20 NOTE — INTERVAL H&P NOTE
H&P reviewed. After examining the patient I find no changes in the patients condition since the H&P had been written.    Vitals:    03/20/24 1019   BP: (!) 170/113   Pulse: 97   Resp: 18   Temp: (!) 96.8 °F (36 °C)   SpO2: 100%     Safe to proceed. EGD with dilation    Shilpa Price MD  Thoracic Surgery  (Available on [employer])  Office: [phone]

## 2024-03-21 ENCOUNTER — APPOINTMENT (EMERGENCY)
Dept: RADIOLOGY | Facility: HOSPITAL | Age: 79
DRG: 388 | End: 2024-03-21
Payer: COMMERCIAL

## 2024-03-21 ENCOUNTER — TELEPHONE (OUTPATIENT)
Dept: OTHER | Facility: OTHER | Age: 79
End: 2024-03-21

## 2024-03-21 ENCOUNTER — HOSPITAL ENCOUNTER (INPATIENT)
Facility: HOSPITAL | Age: 79
LOS: 4 days | Discharge: HOME | DRG: 388 | End: 2024-03-26
Attending: EMERGENCY MEDICINE | Admitting: INTERNAL MEDICINE
Payer: COMMERCIAL

## 2024-03-21 DIAGNOSIS — R11.0 NAUSEA: ICD-10-CM

## 2024-03-21 DIAGNOSIS — K21.9 CHRONIC GERD: ICD-10-CM

## 2024-03-21 DIAGNOSIS — R10.9 ABDOMINAL PAIN: Primary | ICD-10-CM

## 2024-03-21 DIAGNOSIS — K56.7 ILEUS (HCC): ICD-10-CM

## 2024-03-21 PROCEDURE — 99285 EMERGENCY DEPT VISIT HI MDM: CPT

## 2024-03-21 PROCEDURE — 71045 X-RAY EXAM CHEST 1 VIEW: CPT

## 2024-03-21 PROCEDURE — 99285 EMERGENCY DEPT VISIT HI MDM: CPT | Performed by: EMERGENCY MEDICINE

## 2024-03-21 RX ORDER — PANTOPRAZOLE SODIUM 40 MG/10ML
40 INJECTION, POWDER, LYOPHILIZED, FOR SOLUTION INTRAVENOUS ONCE
Status: COMPLETED | OUTPATIENT
Start: 2024-03-21 | End: 2024-03-22

## 2024-03-21 NOTE — Clinical Note
Case was discussed with AIMEE and the patient's admission status was agreed to be Admission Status: observation status to the service of Dr. Cleaning .

## 2024-03-22 ENCOUNTER — APPOINTMENT (OUTPATIENT)
Dept: RADIOLOGY | Facility: HOSPITAL | Age: 79
DRG: 388 | End: 2024-03-22
Payer: COMMERCIAL

## 2024-03-22 ENCOUNTER — APPOINTMENT (EMERGENCY)
Dept: CT IMAGING | Facility: HOSPITAL | Age: 79
DRG: 388 | End: 2024-03-22
Payer: COMMERCIAL

## 2024-03-22 PROBLEM — D64.9 ANEMIA: Status: ACTIVE | Noted: 2024-03-22

## 2024-03-22 PROBLEM — K56.7 ILEUS (HCC): Status: ACTIVE | Noted: 2024-03-22

## 2024-03-22 LAB
ALBUMIN SERPL BCP-MCNC: 3.5 G/DL (ref 3.5–5)
ALBUMIN SERPL BCP-MCNC: 4 G/DL (ref 3.5–5)
ALP SERPL-CCNC: 108 U/L (ref 34–104)
ALP SERPL-CCNC: 123 U/L (ref 34–104)
ALT SERPL W P-5'-P-CCNC: 29 U/L (ref 7–52)
ALT SERPL W P-5'-P-CCNC: 35 U/L (ref 7–52)
ANION GAP SERPL CALCULATED.3IONS-SCNC: 6 MMOL/L (ref 4–13)
ANION GAP SERPL CALCULATED.3IONS-SCNC: 7 MMOL/L (ref 4–13)
AST SERPL W P-5'-P-CCNC: 29 U/L (ref 13–39)
AST SERPL W P-5'-P-CCNC: 38 U/L (ref 13–39)
BASOPHILS # BLD AUTO: 0.02 THOUSANDS/ÂΜL (ref 0–0.1)
BASOPHILS NFR BLD AUTO: 0 % (ref 0–1)
BILIRUB DIRECT SERPL-MCNC: 0.06 MG/DL (ref 0–0.2)
BILIRUB SERPL-MCNC: 0.21 MG/DL (ref 0.2–1)
BILIRUB SERPL-MCNC: 0.25 MG/DL (ref 0.2–1)
BUN SERPL-MCNC: 21 MG/DL (ref 5–25)
BUN SERPL-MCNC: 22 MG/DL (ref 5–25)
CALCIUM SERPL-MCNC: 8.6 MG/DL (ref 8.4–10.2)
CALCIUM SERPL-MCNC: 9.3 MG/DL (ref 8.4–10.2)
CARDIAC TROPONIN I PNL SERPL HS: 3 NG/L
CHLORIDE SERPL-SCNC: 107 MMOL/L (ref 96–108)
CHLORIDE SERPL-SCNC: 108 MMOL/L (ref 96–108)
CO2 SERPL-SCNC: 25 MMOL/L (ref 21–32)
CO2 SERPL-SCNC: 26 MMOL/L (ref 21–32)
CREAT SERPL-MCNC: 1.37 MG/DL (ref 0.6–1.3)
CREAT SERPL-MCNC: 1.5 MG/DL (ref 0.6–1.3)
EOSINOPHIL # BLD AUTO: 0.14 THOUSAND/ÂΜL (ref 0–0.61)
EOSINOPHIL NFR BLD AUTO: 2 % (ref 0–6)
ERYTHROCYTE [DISTWIDTH] IN BLOOD BY AUTOMATED COUNT: 13.6 % (ref 11.6–15.1)
GFR SERPL CREATININE-BSD FRML MDRD: 33 ML/MIN/1.73SQ M
GFR SERPL CREATININE-BSD FRML MDRD: 37 ML/MIN/1.73SQ M
GLUCOSE SERPL-MCNC: 105 MG/DL (ref 65–140)
GLUCOSE SERPL-MCNC: 119 MG/DL (ref 65–140)
GLUCOSE SERPL-MCNC: 121 MG/DL (ref 65–140)
GLUCOSE SERPL-MCNC: 190 MG/DL (ref 65–140)
HCT VFR BLD AUTO: 32.1 % (ref 34.8–46.1)
HGB BLD-MCNC: 10.3 G/DL (ref 11.5–15.4)
IMM GRANULOCYTES # BLD AUTO: 0.04 THOUSAND/UL (ref 0–0.2)
IMM GRANULOCYTES NFR BLD AUTO: 1 % (ref 0–2)
LIPASE SERPL-CCNC: <6 U/L (ref 11–82)
LYMPHOCYTES # BLD AUTO: 2.99 THOUSANDS/ÂΜL (ref 0.6–4.47)
LYMPHOCYTES NFR BLD AUTO: 39 % (ref 14–44)
MAGNESIUM SERPL-MCNC: 1.8 MG/DL (ref 1.9–2.7)
MCH RBC QN AUTO: 31.3 PG (ref 26.8–34.3)
MCHC RBC AUTO-ENTMCNC: 32.1 G/DL (ref 31.4–37.4)
MCV RBC AUTO: 98 FL (ref 82–98)
MONOCYTES # BLD AUTO: 0.45 THOUSAND/ÂΜL (ref 0.17–1.22)
MONOCYTES NFR BLD AUTO: 6 % (ref 4–12)
NEUTROPHILS # BLD AUTO: 4.09 THOUSANDS/ÂΜL (ref 1.85–7.62)
NEUTS SEG NFR BLD AUTO: 52 % (ref 43–75)
NRBC BLD AUTO-RTO: 0 /100 WBCS
PHOSPHATE SERPL-MCNC: 3.5 MG/DL (ref 2.3–4.1)
PLATELET # BLD AUTO: 231 THOUSANDS/UL (ref 149–390)
PMV BLD AUTO: 9 FL (ref 8.9–12.7)
POTASSIUM SERPL-SCNC: 4.1 MMOL/L (ref 3.5–5.3)
POTASSIUM SERPL-SCNC: 4.5 MMOL/L (ref 3.5–5.3)
PROT SERPL-MCNC: 6.5 G/DL (ref 6.4–8.4)
PROT SERPL-MCNC: 7.6 G/DL (ref 6.4–8.4)
RBC # BLD AUTO: 3.29 MILLION/UL (ref 3.81–5.12)
SODIUM SERPL-SCNC: 139 MMOL/L (ref 135–147)
SODIUM SERPL-SCNC: 140 MMOL/L (ref 135–147)
WBC # BLD AUTO: 7.73 THOUSAND/UL (ref 4.31–10.16)

## 2024-03-22 PROCEDURE — 83735 ASSAY OF MAGNESIUM: CPT | Performed by: PHYSICIAN ASSISTANT

## 2024-03-22 PROCEDURE — 96361 HYDRATE IV INFUSION ADD-ON: CPT

## 2024-03-22 PROCEDURE — 80076 HEPATIC FUNCTION PANEL: CPT | Performed by: PHYSICIAN ASSISTANT

## 2024-03-22 PROCEDURE — 80053 COMPREHEN METABOLIC PANEL: CPT | Performed by: EMERGENCY MEDICINE

## 2024-03-22 PROCEDURE — 99222 1ST HOSP IP/OBS MODERATE 55: CPT | Performed by: INTERNAL MEDICINE

## 2024-03-22 PROCEDURE — 99223 1ST HOSP IP/OBS HIGH 75: CPT | Performed by: INTERNAL MEDICINE

## 2024-03-22 PROCEDURE — 74018 RADEX ABDOMEN 1 VIEW: CPT

## 2024-03-22 PROCEDURE — 74177 CT ABD & PELVIS W/CONTRAST: CPT

## 2024-03-22 PROCEDURE — 82948 REAGENT STRIP/BLOOD GLUCOSE: CPT

## 2024-03-22 PROCEDURE — NC001 PR NO CHARGE: Performed by: SURGERY

## 2024-03-22 PROCEDURE — C9113 INJ PANTOPRAZOLE SODIUM, VIA: HCPCS | Performed by: PHYSICIAN ASSISTANT

## 2024-03-22 PROCEDURE — C9113 INJ PANTOPRAZOLE SODIUM, VIA: HCPCS | Performed by: EMERGENCY MEDICINE

## 2024-03-22 PROCEDURE — 84100 ASSAY OF PHOSPHORUS: CPT | Performed by: PHYSICIAN ASSISTANT

## 2024-03-22 PROCEDURE — 96365 THER/PROPH/DIAG IV INF INIT: CPT

## 2024-03-22 PROCEDURE — 36415 COLL VENOUS BLD VENIPUNCTURE: CPT | Performed by: EMERGENCY MEDICINE

## 2024-03-22 PROCEDURE — 85025 COMPLETE CBC W/AUTO DIFF WBC: CPT | Performed by: EMERGENCY MEDICINE

## 2024-03-22 PROCEDURE — 96375 TX/PRO/DX INJ NEW DRUG ADDON: CPT

## 2024-03-22 PROCEDURE — 84484 ASSAY OF TROPONIN QUANT: CPT | Performed by: EMERGENCY MEDICINE

## 2024-03-22 PROCEDURE — 93005 ELECTROCARDIOGRAM TRACING: CPT

## 2024-03-22 PROCEDURE — 83690 ASSAY OF LIPASE: CPT | Performed by: EMERGENCY MEDICINE

## 2024-03-22 PROCEDURE — 71260 CT THORAX DX C+: CPT

## 2024-03-22 PROCEDURE — 80048 BASIC METABOLIC PNL TOTAL CA: CPT | Performed by: PHYSICIAN ASSISTANT

## 2024-03-22 PROCEDURE — 99222 1ST HOSP IP/OBS MODERATE 55: CPT

## 2024-03-22 RX ORDER — PANTOPRAZOLE SODIUM 40 MG/10ML
40 INJECTION, POWDER, LYOPHILIZED, FOR SOLUTION INTRAVENOUS
Status: DISCONTINUED | OUTPATIENT
Start: 2024-03-22 | End: 2024-03-23

## 2024-03-22 RX ORDER — ENOXAPARIN SODIUM 100 MG/ML
30 INJECTION SUBCUTANEOUS DAILY
Status: DISCONTINUED | OUTPATIENT
Start: 2024-03-22 | End: 2024-03-26 | Stop reason: HOSPADM

## 2024-03-22 RX ORDER — DICYCLOMINE HCL 20 MG
20 TABLET ORAL EVERY 6 HOURS PRN
Status: DISCONTINUED | OUTPATIENT
Start: 2024-03-22 | End: 2024-03-26 | Stop reason: HOSPADM

## 2024-03-22 RX ORDER — SODIUM CHLORIDE, SODIUM GLUCONATE, SODIUM ACETATE, POTASSIUM CHLORIDE, MAGNESIUM CHLORIDE, SODIUM PHOSPHATE, DIBASIC, AND POTASSIUM PHOSPHATE .53; .5; .37; .037; .03; .012; .00082 G/100ML; G/100ML; G/100ML; G/100ML; G/100ML; G/100ML; G/100ML
100 INJECTION, SOLUTION INTRAVENOUS CONTINUOUS
Status: DISPENSED | OUTPATIENT
Start: 2024-03-22 | End: 2024-03-22

## 2024-03-22 RX ORDER — HYDROMORPHONE HCL/PF 1 MG/ML
0.5 SYRINGE (ML) INJECTION
Status: DISCONTINUED | OUTPATIENT
Start: 2024-03-22 | End: 2024-03-23

## 2024-03-22 RX ORDER — AMLODIPINE BESYLATE 5 MG/1
10 TABLET ORAL DAILY
Status: DISCONTINUED | OUTPATIENT
Start: 2024-03-22 | End: 2024-03-26 | Stop reason: HOSPADM

## 2024-03-22 RX ORDER — MAGNESIUM SULFATE 1 G/100ML
1 INJECTION INTRAVENOUS ONCE
Status: COMPLETED | OUTPATIENT
Start: 2024-03-22 | End: 2024-03-22

## 2024-03-22 RX ORDER — HYDROMORPHONE HCL IN WATER/PF 6 MG/30 ML
0.2 PATIENT CONTROLLED ANALGESIA SYRINGE INTRAVENOUS EVERY 4 HOURS PRN
Status: DISCONTINUED | OUTPATIENT
Start: 2024-03-22 | End: 2024-03-23

## 2024-03-22 RX ORDER — AMITRIPTYLINE HYDROCHLORIDE 50 MG/1
50 TABLET, FILM COATED ORAL
Status: DISCONTINUED | OUTPATIENT
Start: 2024-03-22 | End: 2024-03-26 | Stop reason: HOSPADM

## 2024-03-22 RX ORDER — ONDANSETRON 2 MG/ML
4 INJECTION INTRAMUSCULAR; INTRAVENOUS EVERY 6 HOURS PRN
Status: DISCONTINUED | OUTPATIENT
Start: 2024-03-22 | End: 2024-03-26 | Stop reason: HOSPADM

## 2024-03-22 RX ORDER — HYDRALAZINE HYDROCHLORIDE 20 MG/ML
5 INJECTION INTRAMUSCULAR; INTRAVENOUS EVERY 8 HOURS PRN
Status: DISCONTINUED | OUTPATIENT
Start: 2024-03-22 | End: 2024-03-26 | Stop reason: HOSPADM

## 2024-03-22 RX ADMIN — AMLODIPINE BESYLATE 10 MG: 5 TABLET ORAL at 14:44

## 2024-03-22 RX ADMIN — ENOXAPARIN SODIUM 30 MG: 30 INJECTION SUBCUTANEOUS at 07:59

## 2024-03-22 RX ADMIN — IOHEXOL 100 ML: 350 INJECTION, SOLUTION INTRAVENOUS at 01:05

## 2024-03-22 RX ADMIN — AMITRIPTYLINE HYDROCHLORIDE 50 MG: 50 TABLET, FILM COATED ORAL at 21:10

## 2024-03-22 RX ADMIN — HYDROMORPHONE HYDROCHLORIDE 0.5 MG: 1 INJECTION, SOLUTION INTRAMUSCULAR; INTRAVENOUS; SUBCUTANEOUS at 12:06

## 2024-03-22 RX ADMIN — HYDROMORPHONE HYDROCHLORIDE 0.5 MG: 1 INJECTION, SOLUTION INTRAMUSCULAR; INTRAVENOUS; SUBCUTANEOUS at 03:45

## 2024-03-22 RX ADMIN — PANTOPRAZOLE SODIUM 40 MG: 40 INJECTION, POWDER, FOR SOLUTION INTRAVENOUS at 07:59

## 2024-03-22 RX ADMIN — MAGNESIUM SULFATE IN DEXTROSE 1 G: 10 INJECTION, SOLUTION INTRAVENOUS at 09:00

## 2024-03-22 RX ADMIN — SODIUM CHLORIDE, SODIUM GLUCONATE, SODIUM ACETATE, POTASSIUM CHLORIDE, MAGNESIUM CHLORIDE, SODIUM PHOSPHATE, DIBASIC, AND POTASSIUM PHOSPHATE 100 ML/HR: .53; .5; .37; .037; .03; .012; .00082 INJECTION, SOLUTION INTRAVENOUS at 04:05

## 2024-03-22 RX ADMIN — SODIUM CHLORIDE 1000 ML: 0.9 INJECTION, SOLUTION INTRAVENOUS at 00:03

## 2024-03-22 RX ADMIN — DICYCLOMINE HYDROCHLORIDE 20 MG: 20 TABLET ORAL at 22:23

## 2024-03-22 RX ADMIN — Medication 750 MG: at 00:08

## 2024-03-22 RX ADMIN — HYDROMORPHONE HYDROCHLORIDE 0.2 MG: 0.2 INJECTION, SOLUTION INTRAMUSCULAR; INTRAVENOUS; SUBCUTANEOUS at 22:06

## 2024-03-22 RX ADMIN — PANTOPRAZOLE SODIUM 40 MG: 40 INJECTION, POWDER, FOR SOLUTION INTRAVENOUS at 00:02

## 2024-03-22 NOTE — ASSESSMENT & PLAN NOTE
Home regimen: Amlodipine 10 Mg daily  Hold amlodipine in setting of strict n.p.o. placed on hydralazine as needed

## 2024-03-22 NOTE — CASE MANAGEMENT
Case Management Assessment & Discharge Planning Note    Patient name Ira Bonilla  Location /-01 MRN [id number]  : 1945 Date 3/22/2024       Current Admission Date: 3/21/2024  Current Admission Diagnosis:Ileus (HCC)   Patient Active Problem List    Diagnosis Date Noted    Ileus (HCC) 2024    Anemia 2024    Diarrhea 2024    Encounter for geriatric assessment 10/10/2023    Asthma     COPD (chronic obstructive pulmonary disease) (HCC)     Hypertension     Migraines     Depression     Anxiety     Stage 3b chronic kidney disease (HCC)     Nausea 06/10/2021    Allergic contact urticaria 2020    Chronic GERD 2019    Dysphagia 2019    Esophageal dysphagia 2019    History of Nissen fundoplication 2019    Left upper quadrant pain 2019    Gastroparesis 2019    Gastroesophageal reflux disease without esophagitis 2019      LOS (days): 0  Geometric Mean LOS (GMLOS) (days):   Days to GMLOS:     OBJECTIVE:              Current admission status: Observation       Preferred Pharmacy:   [id number] [place] [facility] [address] [address]  Phone: [phone] Fax: [phone]    Primary Care Provider: Selvin Hu DO    Primary Insurance: [employer] RE[employer]  Secondary Insurance:     ASSESSMENT:  Active Health Care Proxies    There are no active Health Care Proxies on file.       Advance Directives  Does patient have a Health Care POA?: Yes  Does patient have Advance Directives?: Yes  Advance Directives: Living will, Power of  for health care  Primary Contact: Art Bonilla, son         Readmission Root Cause  30 Day Readmission: No    Patient Information  Admitted from:: Home  Mental Status: Alert  During Assessment patient was accompanied by: Not accompanied during assessment  Assessment information provided by:: Patient  Primary Caregiver: Self  Support Systems: Son, Family  members, Self  County of Residence: [place]  What city do you live in?: [place]  Home entry access options. Select all that apply.: Stairs  Number of steps to enter home.: 1  Type of Current Residence: [place]  Upon entering residence, is there a bedroom on the main floor (no further steps)?: Yes  Upon entering residence, is there a bathroom on the main floor (no further steps)?: Yes  Living Arrangements: Lives w/ Son    Activities of Daily Living Prior to Admission  Functional Status: Independent  Completes ADLs independently?: Yes  Ambulates independently?: Yes  Does patient use assisted devices?: Yes  Assisted Devices (DME) used: Straight Cane  Does patient currently own DME?: Yes  What DME does the patient currently own?: Straight Cane, Walker  Does patient have a history of Outpatient Therapy (PT/OT)?: Yes  Does the patient have a history of Short-Term Rehab?: No  Does patient have a history of HHC?: Yes  Does patient currently have HHC?: No         Patient Information Continued  Income Source: Pension/residential  Does patient have prescription coverage?: Yes  Does patient receive dialysis treatments?: No  Does patient have a history of substance abuse?: No  Does patient have a history of Mental Health Diagnosis?: Yes (Depression, anxiety)  Is patient receiving treatment for mental health?: Yes  Has patient received inpatient treatment related to mental health in the last 2 years?: No         Means of Transportation  Means of Transport to Appts:: Drives Self      Social Determinants of Health (SDOH)      Flowsheet Row Most Recent Value   Housing Stability    In the last 12 months, was there a time when you were not able to pay the mortgage or rent on time? N   In the last 12 months, how many places have you lived? 1   In the last 12 months, was there a time when you did not have a steady place to sleep or slept in a shelter (including now)? N   Transportation Needs    In the past 12 months, has lack of  transportation kept you from medical appointments or from getting medications? no   In the past 12 months, has lack of transportation kept you from meetings, work, or from getting things needed for daily living? No   Food Insecurity    Within the past 12 months, you worried that your food would run out before you got the money to buy more. Never true   Within the past 12 months, the food you bought just didn't last and you didn't have money to get more. Never true   Utilities    In the past 12 months has the electric, gas, oil, or water company threatened to shut off services in your home? No            DISCHARGE DETAILS:    Discharge planning discussed with:: Patient  Freedom of Choice: Yes     CM contacted family/caregiver?: No- see comments (Pt reports being in contact with son)  Were Treatment Team discharge recommendations reviewed with patient/caregiver?: Yes  Did patient/caregiver verbalize understanding of patient care needs?: Yes  Were patient/caregiver advised of the risks associated with not following Treatment Team discharge recommendations?: Yes         Requested Home Health Care         Is the patient interested in HHC at discharge?: No    DME Referral Provided  Referral made for DME?: No              Treatment Team Recommendation: Home  Discharge Destination Plan:: Home  Transport at Discharge : Family                                      Additional Comments: CM met with pt to discuss role of CM and any needs prior to discharge. Pt lives w/ son in [place] w/ 1 CHRISSY.Indp PTA. Pt owns/uses SPC, RW. Hx OP PT and HH (unable to recall name of agency). No hx STR/DA. Pt has MH hx of depression and anxiety which is treated with medication. No IP psych stays. Pt is retired and drives. Pt prefers to use [facility] pharmacy in [place]. Son will transport at discharge.

## 2024-03-22 NOTE — CONSULTS
Consultation - GI   Ira Bonilla 78 y.o. female MRN: [id number]  Unit/Bed#: -01 Encounter: [id number]      Assessment/Plan     78 y.o. year old female with past medical history of Nissen procedure 2009 [facility], recent esophageal dilation 3/20 SLB, HTN, CKD, hiatal hernia repair 11/2023 with takedown of previous Nissen, and pyloroplasty 11/2023 presents with left upper and left lower quadrant abdominal tenderness.  GI consulted for ileus.    1.  Ileus  CT A/P on admission; findings consistent with a partial low-grade small bowel obstruction without a discrete transition point identified.  Patient states she moved her bowels approximately 2 or 3 times yesterday.  Having soft loose bowel movements.  Denies hematochezia or melena.  On exam, patient does have left upper and left lower quadrant abdominal tenderness as well as epigastric tenderness with palpation.  KUB completed this morning, results pending.    -Clear liquids as tolerated.  -Pain management and antiemetics as necessary  -Continue IVF    Discuss patient with Dr. Holley on rounds.  Pending KUB (contacted reading room to expedite report).    2.  Esophageal dysphagia  3.  History of Nissen fundoplication  Patient with long history of esophageal dysphagia.  Patient's original Nissen fundoplication surgery was in 2009 at [facility].  Hiatal hernia repair with mesh 11/2023, also pyloroplasty was completed at the same time.  Patient states that since recent EGD with balloon dilation on 3/20 she has been having increased epigastric discomfort with increased acid reflux symptoms.  She states her left upper and lower quadrant pain has been consistent for a while.    -Pantoprazole 40 mg IVP daily      Inpatient consult to gastroenterology  Consult performed by: RUSS Pino  Consult ordered by: Leilani Rudd PA-C          Physician Requesting Consult: Sarina Christiansen MD  Reason for Consult / Principal Problem: Ileus    HPI: Ira Bonilla is a  78 y.o. year old female with past medical history of Nissen procedure 2009 [facility], recent esophageal dilation 3/20 SLB, HTN, CKD, hiatal hernia repair 11/2023 with takedown of previous Nissen, and pyloroplasty 11/2023 presents with left upper and left lower quadrant abdominal tenderness.  On exam, patient states that she has constant abdominal pain notes increased sharpness to left side, occasional nausea without vomiting.  States she is having almost constant acid reflux symptoms has been on Pepcid and Dexilant at home.  Patient states that she had been having diarrhea however states over the last few days she has been having mostly soft to loose bowel movements, denies hematochezia or melena.  Former smoker, denies EtOH use.  Patient states that since her surgery in November she has lost approximately 20 pounds somewhat which she believes is due to her difficulty swallowing however states that at this time she is not having any difficulty swallowing.  CT abdomen and pelvis on admission; mildly dilated loops of small bowel throughout the left abdomen with small bowel fecalization and luminal caliber measuring up to 3.1 cm.  Comparatively collapsed loops of bowel throughout the right lower quadrant.  Significant lab work on admission; WBC 7.7, hemoglobin 10.3, platelets 231, alkaline phosphatase 123.  Patient states that Dr. Price from [facility] performed the Nissen repair and Dr. Mcmahon also from [facility] performed the pyloroplasty.  She states that Dr. Mcmahon had referred her to esophageal motility specialist Dr. Puentes at [facility].  Per EMR, patient did have stool studies (C. difficile, stool culture) completed 3/1 and were negative.  Patient states her last colonoscopy was 2013 and believes she was told she did have a polyp however it was 10-year recall.  She states she had Cologuard in July 2023 which was negative.      Review of Systems: Negative for 14 point exam except for  HPI.    Historical Information   Past Medical History:   Diagnosis Date    Anxiety     Arthritis     Asthma     Cancer (HCC)     Candida infection, esophageal (HCC)     Chronic kidney disease     Chronic pain     COPD (chronic obstructive pulmonary disease) (HCC)     Depression     Depression     Gastroparesis     Gastroparesis     GERD (gastroesophageal reflux disease)     Hypertension     Irritable bowel syndrome (IBS)     Lactose intolerance Not sure    Migraines     MRSA (methicillin resistant Staphylococcus aureus)     Multiple thyroid nodules     Osteoporosis     Pneumonia     Psychiatric disorder      Past Surgical History:   Procedure Laterality Date    BACK SURGERY       SECTION      CHOLECYSTECTOMY      COLONOSCOPY  10/09/2013    ESOPHAGEAL DILATION       [facility]    ESOPHAGOGASTRODUODENOSCOPY N/A 2023    Procedure: ESOPHAGOGASTRODUODENOSCOPY (EGD);  Surgeon: Dontrell Mcmahon MD;  [facility];  Service: General    ESOPHAGOGASTRODUODENOSCOPY N/A 3/20/2024    Procedure: ESOPHAGOGASTRODUODENOSCOPY (EGD);  Surgeon: Shilpa Price MD;  [facility]: [facility];  Service: Thoracic    HIATAL HERNIA REPAIR      NISSEN FUNDOPLICATION      IL BIOPSY LIVER NEEDLE PERCUTANEOUS N/A 2023    Procedure: EXCISION BIOPSY LIVER;  Surgeon: Dontrell Mcmahon MD;  [facility];  Service: General    IL ESOPHAGOGASTRODUODENOSCOPY TRANSORAL DIAGNOSTIC N/A 2019    Procedure: ESOPHAGOGASTRODUODENOSCOPY (EGD);  Surgeon: Geovanna Winn MD;  Location: [facility];  Service: Gastroenterology    IL ESOPHAGOGASTRODUODENOSCOPY TRANSORAL DIAGNOSTIC N/A 2023    Procedure: ESOPHAGOGASTRODUODENOSCOPY (EGD);  Surgeon: Shilpa Price MD;  [facility];  Service: Thoracic    IL ESOPHAGOSCOPY FLEX BALLOON DILAT <30 MM DIAM N/A 2023    Procedure: CRE WIRE GUIDED BALLOON DILATATION ESOPHAGEAL;  Surgeon: Shilpa Price MD;  [facility];  Service: Thoracic    IL  ESOPHAGOSCOPY FLEX BALLOON DILAT <30 MM DIAM N/A 3/20/2024    Procedure: ESOPHAGOGASTRODUODENOSCOPY WITH BALLOON DILATION;  Surgeon: Shilpa Price MD;  [facility];  Service: Thoracic    IN LAPS RPR PARAESPHGL HRNA INCL FUNDPLSTY W/O MESH N/A 2023    Procedure: robotic takedown of nissen fundoplication, redo hiatal hernia, partial fundoplication, with mesh;  Surgeon: Shilpa Price MD;  [facility];  Service: Thoracic    IN PYLOROPLASTY N/A 2023    Procedure: PYLOROPLASTY LAPAROSCOPIC WITH ROBOT;  Surgeon: Dontrell Mcmahon MD;  [facility];  Service: General    ROTATOR CUFF REPAIR      SHOULDER SURGERY      SPINAL FUSION      TUBAL LIGATION  May 1975    Last child born    UPPER GASTROINTESTINAL ENDOSCOPY      US GUIDED THYROID BIOPSY      WISDOM TOOTH EXTRACTION       Social History   Social History     Substance and Sexual Activity   Alcohol Use Not Currently     Social History     Substance and Sexual Activity   Drug Use Never     Social History     Tobacco Use   Smoking Status Former    Current packs/day: 0.00    Average packs/day: 1 pack/day for 15.0 years (15.0 ttl pk-yrs)    Types: Cigarettes    Start date: 10/5/1998    Quit date: 10/1/2003    Years since quittin.4   Smokeless Tobacco Former     Family History   Problem Relation Age of Onset    Other Mother         Esophageal disorder    Stomach cancer Father     Cancer Father     Hypertension Maternal Aunt     COPD Maternal Grandmother     Colon cancer Neg Hx     Colon polyps Neg Hx        Meds/Allergies   Current Facility-Administered Medications   Medication Dose Route Frequency    enoxaparin (LOVENOX) subcutaneous injection 30 mg  30 mg Subcutaneous Daily    hydrALAZINE (APRESOLINE) injection 5 mg  5 mg Intravenous Q8H PRN    HYDROmorphone (DILAUDID) injection 0.5 mg  0.5 mg Intravenous Q3H PRN    magnesium sulfate IVPB (premix) SOLN 1 g  1 g Intravenous Once    multi-electrolyte (PLASMALYTE-A/ISOLYTE-S  PH 7.4) IV solution  100 mL/hr Intravenous Continuous    ondansetron (ZOFRAN) injection 4 mg  4 mg Intravenous Q6H PRN    pantoprazole (PROTONIX) injection 40 mg  40 mg Intravenous Q24H [facility]     Medications Prior to Admission   Medication    amitriptyline (ELAVIL) 50 mg tablet    amLODIPine (NORVASC) 10 mg tablet    cetirizine (ZyrTEC) 10 MG chewable tablet    cholecalciferol (VITAMIN D3) 1,000 units tablet    dexlansoprazole (DEXILANT) 60 MG capsule    dicyclomine (BENTYL) 20 mg tablet    famotidine (PEPCID) 40 MG tablet    multivitamin (THERAGRAN) TABS    Omega-3 Fatty Acids (OMEGA 3 PO)    ondansetron (ZOFRAN) 4 mg tablet    polyethylene glycol (MIRALAX) 17 g packet    acetaminophen (TYLENOL) 160 mg/5 mL suspension    albuterol (PROVENTIL HFA,VENTOLIN HFA) 90 mcg/act inhaler    sucralfate (CARAFATE) 1 g tablet    Tavaborole 5 % SOLN       Allergies   Allergen Reactions    Latex Hives    Augmentin [Amoxicillin-Pot Clavulanate] GI Intolerance    Bactrim [Sulfamethoxazole-Trimethoprim] GI Intolerance    Clarithromycin GI Intolerance    Doxycycline Hives    Neomycin GI Intolerance    Polymyxin B GI Intolerance    Zyvox [Linezolid] GI Bleeding    Cephalosporins GI Intolerance    Nsaids GI Intolerance    Penicillins GI Intolerance     Patient can only take levaquin and cipro    Prednisone GI Intolerance    Sulphasomidine GI Intolerance           Physical Exam   Constitutional: Is oriented to person, place, and time. Appears well-developed and well-nourished.   HENT: WNL  Head: Normocephalic and atraumatic.   Eyes: Pupils are equal, round, and reactive to light.   Neck: Normal range of motion. Neck supple.   Cardiovascular: Normal rate and regular rhythm.    Pulmonary/Chest: Effort normal and breath sounds normal.   Abdominal: Soft.  Epigastric and left upper and lower abdominal tenderness with palpation bowel sounds are normal.   Musculoskeletal: Normal range of motion.   Extremities:  No edema  Neurological: Is alert  and oriented to person, place, and time.   Skin: Skin is warm and dry.   Psychiatric: Has a normal mood and affect.       Lab Results:   Admission on 03/21/2024   Component Date Value    WBC 03/22/2024 7.73     RBC 03/22/2024 3.29 (L)     Hemoglobin 03/22/2024 10.3 (L)     Hematocrit 03/22/2024 32.1 (L)     MCV 03/22/2024 98     MCH 03/22/2024 31.3     MCHC 03/22/2024 32.1     RDW 03/22/2024 13.6     MPV 03/22/2024 9.0     Platelets 03/22/2024 231     nRBC 03/22/2024 0     Neutrophils Relative 03/22/2024 52     Immature Grans % 03/22/2024 1     Lymphocytes Relative 03/22/2024 39     Monocytes Relative 03/22/2024 6     Eosinophils Relative 03/22/2024 2     Basophils Relative 03/22/2024 0     Neutrophils Absolute 03/22/2024 4.09     Absolute Immature Grans 03/22/2024 0.04     Absolute Lymphocytes 03/22/2024 2.99     Absolute Monocytes 03/22/2024 0.45     Eosinophils Absolute 03/22/2024 0.14     Basophils Absolute 03/22/2024 0.02     Sodium 03/22/2024 140     Potassium 03/22/2024 4.1     Chloride 03/22/2024 107     CO2 03/22/2024 26     ANION GAP 03/22/2024 7     BUN 03/22/2024 22     Creatinine 03/22/2024 1.50 (H)     Glucose 03/22/2024 190 (H)     Calcium 03/22/2024 9.3     AST 03/22/2024 38     ALT 03/22/2024 35     Alkaline Phosphatase 03/22/2024 123 (H)     Total Protein 03/22/2024 7.6     Albumin 03/22/2024 4.0     Total Bilirubin 03/22/2024 0.25     eGFR 03/22/2024 33     Lipase 03/22/2024 <6 (L)     hs TnI 0hr 03/22/2024 3     Sodium 03/22/2024 139     Potassium 03/22/2024 4.5     Chloride 03/22/2024 108     CO2 03/22/2024 25     ANION GAP 03/22/2024 6     BUN 03/22/2024 21     Creatinine 03/22/2024 1.37 (H)     Glucose 03/22/2024 105     Calcium 03/22/2024 8.6     eGFR 03/22/2024 37     Magnesium 03/22/2024 1.8 (L)     Phosphorus 03/22/2024 3.5     Total Bilirubin 03/22/2024 0.21     Bilirubin, Direct 03/22/2024 0.06     Alkaline Phosphatase 03/22/2024 108 (H)     AST 03/22/2024 29     ALT 03/22/2024 29      Total Protein 03/22/2024 6.5     Albumin 03/22/2024 3.5     POC Glucose 03/22/2024 119      Imaging Studies: I have personally reviewed pertinent reports.      EKG, Pathology, and Other Studies: I have personally reviewed pertinent reports.    Counseling / Coordination of Care  Total floor / unit time spent today 45 minutes.

## 2024-03-22 NOTE — ED PROVIDER NOTES
History  Chief Complaint   Patient presents with    Abdominal Pain     Left sided abdominal pain that started this afternoon.  Denies nausea or vomiting.     78-year-old female with esophageal dysphagia presents for evaluation of left upper abdominal pain radiating to her back started sometime after she had an EGD with esophageal dilatation, no vomiting, no constipation, no shortness of breath.  No melena no hematochezia, no hematemesis.  Has had multiple diarrhea bowel movements which she states is normal for her.  Does have a history of fundoplication previous esophageal dilatations however this pain is worse than she is ever experienced.  Did not take any pain medications prior to arrival        Prior to Admission Medications   Prescriptions Last Dose Informant Patient Reported? Taking?   Omega-3 Fatty Acids (OMEGA 3 PO)  Self Yes No   Sig: Take by mouth in the morning   Tavaborole 5 % SOLN  Self Yes No   Sig: APPLY TO NAILS DAILY AS DIRECTED   Patient not taking: Reported on 1/3/2024   acetaminophen (TYLENOL) 160 mg/5 mL suspension  Self No No   Sig: Take 20.3 mL (650 mg total) by mouth every 6 (six) hours as needed for mild pain   albuterol (PROVENTIL HFA,VENTOLIN HFA) 90 mcg/act inhaler  Self Yes No   Sig: Inhale 2 puffs if needed   amLODIPine (NORVASC) 10 mg tablet  Self Yes No   Sig: TAKE ONE TABLET BY MOUTH DAILY ORAL ONCE A DAY 90 DAYS   amitriptyline (ELAVIL) 50 mg tablet  Self No No   Sig: TAKE 1 TABLET BY MOUTH EVERYDAY AT BEDTIME   cetirizine (ZyrTEC) 10 MG chewable tablet  Self Yes No   Sig: Chew 10 mg daily.   cholecalciferol (VITAMIN D3) 1,000 units tablet  Self Yes No   Sig: Take 5,000 Units by mouth daily   clotrimazole-betamethasone (LOTRISONE) 1-0.05 % cream  Self Yes No   Sig: if needed   dexlansoprazole (DEXILANT) 60 MG capsule   No No   Sig: TAKE 1 CAPSULE BY MOUTH EVERY DAY   dicyclomine (BENTYL) 20 mg tablet   No No   Sig: Take 1 tablet (20 mg total) by mouth every 6 (six) hours as needed  (abdominal cramping)   famotidine (PEPCID) 40 MG tablet  Self No No   Sig: TAKE 1 TABLET BY MOUTH EVERYDAY AT BEDTIME   multivitamin (THERAGRAN) TABS  Self Yes No   Sig: Take 1 tablet by mouth daily   ondansetron (ZOFRAN) 4 mg tablet  Self No No   Sig: Take 1 tablet (4 mg total) by mouth every 8 (eight) hours as needed for nausea or vomiting   polyethylene glycol (MIRALAX) 17 g packet  Self No No   Sig: Take 17 g by mouth daily as needed (constipation)   Patient taking differently: Take 17 g by mouth daily   sucralfate (CARAFATE) 1 g tablet   No No   Sig: Take 1 tablet (1 g total) by mouth 4 (four) times a day (before meals and at bedtime) for 14 days   Patient taking differently: Take 1 g by mouth if needed      Facility-Administered Medications: None       Past Medical History:   Diagnosis Date    Anxiety     Arthritis     Asthma     Cancer (HCC)     Candida infection, esophageal (HCC)     Chronic kidney disease     Chronic pain     COPD (chronic obstructive pulmonary disease) (HCC)     Depression     Depression     Gastroparesis     Gastroparesis     GERD (gastroesophageal reflux disease)     Hypertension     Irritable bowel syndrome (IBS)     Lactose intolerance Not sure    Migraines     MRSA (methicillin resistant Staphylococcus aureus)     Multiple thyroid nodules     Osteoporosis     Pneumonia     Psychiatric disorder        Past Surgical History:   Procedure Laterality Date    BACK SURGERY       SECTION      CHOLECYSTECTOMY      COLONOSCOPY  10/09/2013    ESOPHAGEAL DILATION       [facility]    ESOPHAGOGASTRODUODENOSCOPY N/A 2023    Procedure: ESOPHAGOGASTRODUODENOSCOPY (EGD);  Surgeon: Dontrell Mcmahon MD;  [facility];  Service: General    ESOPHAGOGASTRODUODENOSCOPY N/A 3/20/2024    Procedure: ESOPHAGOGASTRODUODENOSCOPY (EGD);  Surgeon: Shilpa Price MD;  [facility];  Service: Thoracic    HIATAL HERNIA REPAIR      NISSEN FUNDOPLICATION      GA BIOPSY LIVER NEEDLE  PERCUTANEOUS N/A 11/8/2023    Procedure: EXCISION BIOPSY LIVER;  Surgeon: Dontrell Mcmahon MD;  [facility];  Service: General    CT ESOPHAGOGASTRODUODENOSCOPY TRANSORAL DIAGNOSTIC N/A 04/03/2019    Procedure: ESOPHAGOGASTRODUODENOSCOPY (EGD);  Surgeon: Geovanna Winn MD;  Location: [facility];  Service: Gastroenterology    CT ESOPHAGOGASTRODUODENOSCOPY TRANSORAL DIAGNOSTIC N/A 09/20/2023    Procedure: ESOPHAGOGASTRODUODENOSCOPY (EGD);  Surgeon: Shilpa Price MD;  [facility];  Service: Thoracic    CT ESOPHAGOSCOPY FLEX BALLOON DILAT <30 MM DIAM N/A 09/20/2023    Procedure: CRE WIRE GUIDED BALLOON DILATATION ESOPHAGEAL;  Surgeon: Shilpa Price MD;  [facility];  Service: Thoracic    CT ESOPHAGOSCOPY FLEX BALLOON DILAT <30 MM DIAM N/A 3/20/2024    Procedure: ESOPHAGOGASTRODUODENOSCOPY WITH BALLOON DILATION;  Surgeon: Shilpa Price MD;  [facility];  Service: Thoracic    CT LAPS RPR PARAESPHGL HRNA INCL FUNDPLSTY W/O MESH N/A 11/8/2023    Procedure: robotic takedown of nissen fundoplication, redo hiatal hernia, partial fundoplication, with mesh;  Surgeon: Shilpa Price MD;  [facility];  Service: Thoracic    CT PYLOROPLASTY N/A 11/8/2023    Procedure: PYLOROPLASTY LAPAROSCOPIC WITH ROBOT;  Surgeon: Donrtell Mcmahon MD;  [facility];  Service: General    ROTATOR CUFF REPAIR      SHOULDER SURGERY      SPINAL FUSION      TUBAL LIGATION  May 1975    Last child born    UPPER GASTROINTESTINAL ENDOSCOPY      US GUIDED THYROID BIOPSY      WISDOM TOOTH EXTRACTION         Family History   Problem Relation Age of Onset    Other Mother         Esophageal disorder    Stomach cancer Father     Cancer Father     Hypertension Maternal Aunt     COPD Maternal Grandmother     Colon cancer Neg Hx     Colon polyps Neg Hx      I have reviewed and agree with the history as documented.    E-Cigarette/Vaping    E-Cigarette Use Never User      E-Cigarette/Vaping  Substances    Nicotine No     THC No     CBD No     Flavoring No     Other No     Unknown No      Social History     Tobacco Use    Smoking status: Former     Current packs/day: 0.00     Average packs/day: 1 pack/day for 15.0 years (15.0 ttl pk-yrs)     Types: Cigarettes     Start date: 10/5/1998     Quit date: 10/1/2003     Years since quittin.4    Smokeless tobacco: Former   Vaping Use    Vaping status: Never Used   Substance Use Topics    Alcohol use: Not Currently    Drug use: Never       Review of Systems   Constitutional:  Negative for appetite change and fever.   HENT:  Negative for rhinorrhea and sore throat.    Eyes:  Negative for photophobia and visual disturbance.   Respiratory:  Negative for cough, chest tightness and wheezing.    Cardiovascular:  Negative for chest pain, palpitations and leg swelling.   Gastrointestinal:  Positive for abdominal pain. Negative for abdominal distention, blood in stool, constipation and diarrhea.   Genitourinary:  Negative for dysuria, flank pain, frequency, hematuria and urgency.   Musculoskeletal:  Negative for back pain.   Skin:  Negative for rash.   Neurological:  Negative for dizziness, weakness and headaches.   All other systems reviewed and are negative.      Physical Exam  Physical Exam  Vitals and nursing note reviewed.   Constitutional:       Appearance: She is well-developed.   HENT:      Head: Normocephalic and atraumatic.   Eyes:      Pupils: Pupils are equal, round, and reactive to light.   Cardiovascular:      Rate and Rhythm: Normal rate and regular rhythm.      Heart sounds: No murmur heard.     No friction rub. No gallop.   Pulmonary:      Effort: Pulmonary effort is normal.      Breath sounds: No wheezing or rales.   Chest:      Chest wall: No tenderness.   Abdominal:      General: There is no distension.      Palpations: Abdomen is soft. There is no mass.      Tenderness: There is abdominal tenderness in the epigastric area and left upper quadrant.  There is no guarding or rebound.   Musculoskeletal:      Cervical back: Normal range of motion and neck supple.   Skin:     General: Skin is warm and dry.   Neurological:      Mental Status: She is alert and oriented to person, place, and time.         Vital Signs  ED Triage Vitals [03/21/24 2335]   Temperature Pulse Respirations Blood Pressure SpO2   (!) 97 °F (36.1 °C) 92 16 157/87 98 %      Temp Source Heart Rate Source Patient Position - Orthostatic VS BP Location FiO2 (%)   Temporal Monitor Sitting Left arm --      Pain Score       --           Vitals:    03/21/24 2335 03/22/24 0102   BP: 157/87 122/68   Pulse: 92 81   Patient Position - Orthostatic VS: Sitting          Visual Acuity      ED Medications  Medications   pantoprazole (PROTONIX) injection 40 mg (40 mg Intravenous Given 3/22/24 0002)   sodium chloride 0.9 % bolus 1,000 mL (1,000 mL Intravenous New Bag 3/22/24 0003)   acetaminophen (Ofirmev) IVPB 750 mg (0 mg Intravenous Stopped 3/22/24 0040)   iohexol (OMNIPAQUE) 350 MG/ML injection (MULTI-DOSE) 100 mL (100 mL Intravenous Given 3/22/24 0105)       Diagnostic Studies  Results Reviewed       Procedure Component Value Units Date/Time    HS Troponin 0hr (reflex protocol) [[id number]]  (Normal) Collected: 03/22/24 0002    Lab Status: Final result Specimen: Blood from Arm, Right Updated: 03/22/24 0037     hs TnI 0hr 3 ng/L     Comprehensive metabolic panel [[id number]]  (Abnormal) Collected: 03/22/24 0002    Lab Status: Final result Specimen: Blood from Arm, Right Updated: 03/22/24 0032     Sodium 140 mmol/L      Potassium 4.1 mmol/L      Chloride 107 mmol/L      CO2 26 mmol/L      ANION GAP 7 mmol/L      BUN 22 mg/dL      Creatinine 1.50 mg/dL      Glucose 190 mg/dL      Calcium 9.3 mg/dL      AST 38 U/L      ALT 35 U/L      Alkaline Phosphatase 123 U/L      Total Protein 7.6 g/dL      Albumin 4.0 g/dL      Total Bilirubin 0.25 mg/dL      eGFR 33 ml/min/1.73sq m     Narrative:      National Kidney Disease  Foundation guidelines for Chronic Kidney Disease (CKD):     Stage 1 with normal or high GFR (GFR > 90 mL/min/1.73 square meters)    Stage 2 Mild CKD (GFR = 60-89 mL/min/1.73 square meters)    Stage 3A Moderate CKD (GFR = 45-59 mL/min/1.73 square meters)    Stage 3B Moderate CKD (GFR = 30-44 mL/min/1.73 square meters)    Stage 4 Severe CKD (GFR = 15-29 mL/min/1.73 square meters)    Stage 5 End Stage CKD (GFR <15 mL/min/1.73 square meters)  Note: GFR calculation is accurate only with a steady state creatinine    Lipase [[id number]]  (Abnormal) Collected: 03/22/24 0002    Lab Status: Final result Specimen: Blood from Arm, Right Updated: 03/22/24 0032     Lipase <6 u/L     CBC and differential [[id number]]  (Abnormal) Collected: 03/22/24 0002    Lab Status: Final result Specimen: Blood from Arm, Right Updated: 03/22/24 0012     WBC 7.73 Thousand/uL      RBC 3.29 Million/uL      Hemoglobin 10.3 g/dL      Hematocrit 32.1 %      MCV 98 fL      MCH 31.3 pg      MCHC 32.1 g/dL      RDW 13.6 %      MPV 9.0 fL      Platelets 231 Thousands/uL      nRBC 0 /100 WBCs      Neutrophils Relative 52 %      Immature Grans % 1 %      Lymphocytes Relative 39 %      Monocytes Relative 6 %      Eosinophils Relative 2 %      Basophils Relative 0 %      Neutrophils Absolute 4.09 Thousands/µL      Absolute Immature Grans 0.04 Thousand/uL      Absolute Lymphocytes 2.99 Thousands/µL      Absolute Monocytes 0.45 Thousand/µL      Eosinophils Absolute 0.14 Thousand/µL      Basophils Absolute 0.02 Thousands/µL                    CT chest abdomen pelvis w contrast   Final Result by Stephany Hoang MD (03/22 0134)      Findings consistent with a partial low-grade small bowel obstruction without a discrete transition point identified      Findings discussed with Dr. Fleming at 1:30 a.m., 3/22/2024         Workstation performed: [id number]         XR chest portable    (Results Pending)              Procedures  Procedures         ED Course  ED Course as of  03/22/24 0205   Thu Mar 21, 2024   2337 Medical record reviewed, underwent EGD with esophageal dilatation for esophageal dysphagia 3/20/2024 at [facility]   Fri Mar 22, 2024   0008 Procedure Note: EKG  Date/Time: 03/22/24 12:08 AM   Performed by: GRACE FRANKLIN  Authorized by: GRACE FRANKLIN  Indications / Diagnosis: CP  ECG reviewed by me, the ED Provider: yes   The EKG demonstrates:  Rhythm: normal sinus  Intervals: normal intervals  Axis: normal axis  QRS/Blocks: Incomplete RBBB  ST Changes: No acute ST Changes, no STD/CHRISSY.  Similar to previous       0016 Hemoglobin(!): 10.3  baseline   0139 CT scan with low-grade small bowel obstruction with no identified transition point, discussing with surgical PA on-call, will likely be a medical admission for further management                                             Medical Decision Making  78-year-old female with left-sided abdominal pain in setting of recent EGD with esophageal dilatation differential diagnosis includes ACS, arrhythmia, gastritis, pancreatitis, perforation    Amount and/or Complexity of Data Reviewed  Labs: ordered. Decision-making details documented in ED Course.  Radiology: ordered.    Risk  Prescription drug management.  Decision regarding hospitalization.             Disposition  Final diagnoses:   Abdominal pain   Nausea     Time reflects when diagnosis was documented in both MDM as applicable and the Disposition within this note       Time User Action Codes Description Comment    3/22/2024  1:37 AM Grace Franklin Add [R10.9] Abdominal pain     3/22/2024  1:37 AM Grace Franklin Add [R11.0] Nausea     3/22/2024  1:40 AM Grace Franklin Add [K56.600] Partial small bowel obstruction (HCC)     3/22/2024  2:04 AM Grace Franklin Remove [K56.600] Partial small bowel obstruction (HCC)           ED Disposition       ED Disposition   Admit    Condition   Stable    Date/Time   Fri Mar 22, 2024  2:04 AM    Comment   Case was discussed with AIMEE and the patient's  admission status was agreed to be Admission Status: observation status to the service of Dr. Christiansen .               Follow-up Information    None         Patient's Medications   Discharge Prescriptions    No medications on file       No discharge procedures on file.    PDMP Review         Value Time User    PDMP Reviewed  Yes 3/20/2024  2:41 PM Karon Levy PA-C            ED Provider  Electronically Signed by             Aster Fleming DO  03/22/24 0205

## 2024-03-22 NOTE — QUICK NOTE
General surgery follow-up    Ileus  KUB not impressive-Nonspecific minimally distended loops of small bowel in the left upper quadrant  Pain improving, no nausea  Passing flatus  Denisse clears      Plan:  Advance diet as denisse  Follow bowel function, 3 BMs yesterday, passing flatus  Follow and replace electrolytes  Encourage OOB,  ambulation  Contrast study if recurrent symptoms    Darline Platt

## 2024-03-22 NOTE — TELEPHONE ENCOUNTER
Pt had surgery yesterday and tonight is experiencing pain on the left side of her stomach going into her back side.     On call provider paged

## 2024-03-22 NOTE — UTILIZATION REVIEW
Initial Clinical Review    Admission: Date/Time/Statement: 3/22/24 0205 observation and CHANGED 3/22/24 1557 INPATIENT RE: PATIENT NEEDS > 2 MIDNIGHT STAY WITH SUSPECTED ILEUS, ADVANCEMENT OF DIET, CONTINUED IVF  Admission Orders (From admission, onward)       Ordered        03/22/24 1557  Inpatient Admission  Once                          Orders Placed This Encounter   Procedures    Inpatient Admission     Standing Status:   Standing     Number of Occurrences:   1     Order Specific Question:   Level of Care     Answer:   Med Surg [16]     Order Specific Question:   Estimated length of stay     Answer:   More than 2 Midnights     Order Specific Question:   Certification     Answer:   I certify that inpatient services are medically necessary for this patient for a duration of greater than two midnights. See H&P and MD Progress Notes for additional information about the patient's course of treatment.     ED Arrival Information       Expected   3/21/2024     Arrival   3/21/2024 23:27    Acuity   Urgent              Means of arrival   Walk-In    Escorted by   Family Member    Service   Hospitalist    Admission type   Emergency              Arrival complaint   Left side flank pain             Chief Complaint   Patient presents with    Abdominal Pain     Left sided abdominal pain that started this afternoon.  Denies nausea or vomiting.       Initial Presentation: 78 y.o. female  female to ED via walk in from home where care started on 3/21/24.    Admitted to observation  and CONVERTED TO INPATIENT with Dx: Ileus/Esophageal Dysphagia.  Presented to ED with  left upper abdominal pain radiating to back starting the afternoon of arrival.    + BM today and + flatus.   Recent egd 3/20/24 with balloon dilation - only able to dilate some due to small mucosal tear .  PMHx: recent esophageal dilation on 3/20, HTN, CKD . On exam: abdominal tenderness in epigastrica and LUQ .  Bun 22. Creatinine 1.50 with baseline of 1.4 - 1.5.   glucose 190.  H&H 10.3/32.1 with baseline hgb 8 to 10.     Imaging shows partial small grade small bowel obstruction . ED treatment:  Protonix, 1 liter IVF and Ofirmev.    Plan includes:  NPO.  Continue IVF.  Analgesia as needed.  Consult GI and Surgery.  Monitor BP and hold home amlodipine, use IV hydralazine as needed  .     Per Surgery - Suspect Ileus, no signs clinical obstruction.  No surgical intervention.  Montor abdominal exam.   NPO.  Consult GI      Date: 3/22/24   Day 2 CHANGED TO INPATIENT:    on exam:  left upper and left lower quadrant abdominal tenderness as well as epigastric tenderness with palpation .  Bowel sounds slow.    Plan:  start clears.   Continue IVF.  Check KUB.   Analgesia and antiemetics as needed.   Restart home medications. BMP in am.  Start amlodipine with hold parameters.     3/22/24 per GI - Ileus/esophageal dysphagia/history of Nissen fundoplication.  Recommend clears.   Analgesia and antiemetics as needed.  Continue IVF.  Pantoprazole IV.       ED Triage Vitals   Temperature Pulse Respirations Blood Pressure SpO2   03/21/24 2335 03/21/24 2335 03/21/24 2335 03/21/24 2335 03/21/24 2335   (!) 97 °F (36.1 °C) 92 16 157/87 98 %      Temp Source Heart Rate Source Patient Position - Orthostatic VS BP Location FiO2 (%)   03/21/24 2335 03/21/24 2335 03/21/24 2335 03/21/24 2335 --   Temporal Monitor Sitting Left arm       Pain Score       03/22/24 0239       7          Wt Readings from Last 1 Encounters:   03/22/24 50.7 kg (111 lb 12.4 oz)     Additional Vital Signs:   03/22/24 07:50:03 96.8 °F (36 °C) Abnormal  77 18 132/84 100 98 % -- --   03/22/24 0300 97.2 °F (36.2 °C) Abnormal  75 18 179/99 Abnormal  126 100 % None (Room air) --   03/22/24 02:39:26 97.2 °F (36.2 °C) Abnormal  -- 17 179/99 Abnormal  126 64 % Abnormal  -- --   03/22/24 0102 -- 81 20 122/68 89 97 % None (Room air)      Pertinent Labs/Diagnostic Test Results:   XR abdomen 1 view kub   Final Result by Tony Juarez MD  (03/22 1313)      Nonspecific minimally distended loops of small bowel in the left upper quadrant.               Workstation performed: [id number]         CT chest abdomen pelvis w contrast   Final Result by Stephany Hoang MD (03/22 0134)      Findings consistent with a partial low-grade small bowel obstruction without a discrete transition point identified      Findings discussed with Dr. Fleming at 1:30 a.m., 3/22/2024         Workstation performed: [id number]         XR chest portable   Final Result by Montana Dent MD (03/22 0939)      No acute cardiopulmonary disease.            Workstation performed: [id number]           EKG  Date/Time: 03/22/24 12:08 AM   Indications / Diagnosis: CP  ECG reviewed by the ED Provider: yes   The EKG demonstrates:  Rhythm: normal sinus  Intervals: normal intervals  Axis: normal axis  QRS/Blocks: Incomplete RBBB  ST Changes: No acute ST Changes, no STD/CHRISSY.  Similar to previous    Results from last 7 days   Lab Units 03/22/24  0002   WBC Thousand/uL 7.73   HEMOGLOBIN g/dL 10.3*   HEMATOCRIT % 32.1*   PLATELETS Thousands/uL 231   NEUTROS ABS Thousands/µL 4.09     Results from last 7 days   Lab Units 03/22/24  0429 03/22/24  0002   SODIUM mmol/L 139 140   POTASSIUM mmol/L 4.5 4.1   CHLORIDE mmol/L 108 107   CO2 mmol/L 25 26   ANION GAP mmol/L 6 7   BUN mg/dL 21 22   CREATININE mg/dL 1.37* 1.50*   EGFR ml/min/1.73sq m 37 33   CALCIUM mg/dL 8.6 9.3   MAGNESIUM mg/dL 1.8*  --    PHOSPHORUS mg/dL 3.5  --      Results from last 7 days   Lab Units 03/22/24  0429 03/22/24  0002   AST U/L 29 38   ALT U/L 29 35   ALK PHOS U/L 108* 123*   TOTAL PROTEIN g/dL 6.5 7.6   ALBUMIN g/dL 3.5 4.0   TOTAL BILIRUBIN mg/dL 0.21 0.25   BILIRUBIN DIRECT mg/dL 0.06  --      Results from last 7 days   Lab Units 03/22/24  0550   POC GLUCOSE mg/dl 119     Results from last 7 days   Lab Units 03/22/24  0429 03/22/24  0002   GLUCOSE RANDOM mg/dL 105 190*     Results from last 7 days   Lab Units 03/22/24  0002    HS TNI 0HR ng/L 3     Results from last 7 days   Lab Units 03/22/24  0002   LIPASE u/L <6*       ED Treatment:   Medication Administration from 03/21/2024 2327 to 03/22/2024 0234         Date/Time Order Dose Route Action Comments     03/22/2024 0002 EDT pantoprazole (PROTONIX) injection 40 mg 40 mg Intravenous Given --     03/22/2024 0003 EDT sodium chloride 0.9 % bolus 1,000 mL 1,000 mL Intravenous New Bag --     03/22/2024 0008 EDT acetaminophen (Ofirmev) IVPB 750 mg 750 mg Intravenous New Bag --          Past Medical History:   Diagnosis Date    Anxiety     Arthritis     Asthma     Cancer (HCC)     Candida infection, esophageal (HCC)     Chronic kidney disease     Chronic pain     COPD (chronic obstructive pulmonary disease) (HCC)     Depression     Depression     Gastroparesis     Gastroparesis     GERD (gastroesophageal reflux disease)     Hypertension     Irritable bowel syndrome (IBS)     Lactose intolerance Not sure    Migraines     MRSA (methicillin resistant Staphylococcus aureus)     Multiple thyroid nodules     Osteoporosis     Pneumonia     Psychiatric disorder      Present on Admission:   Esophageal dysphagia   COPD (chronic obstructive pulmonary disease) (HCC)   Stage 3b chronic kidney disease (HCC)   Hypertension      Admitting Diagnosis: Nausea [R11.0]  Abdominal pain [R10.9]  Left flank pain [R10.9]  Age/Sex: 78 y.o. female  Admission Orders:  Scheduled Medications:  amitriptyline, 50 mg, Oral, HS  amLODIPine, 10 mg, Oral, Daily  enoxaparin, 30 mg, Subcutaneous, Daily  pantoprazole, 40 mg, Intravenous, Q24H VINCE      Continuous IV Infusions:  multi-electrolyte, 100 mL/hr, Intravenous, Continuous      PRN Meds:  dicyclomine, 20 mg, Oral, Q6H PRN  hydrALAZINE, 5 mg, Intravenous, Q8H PRN  HYDROmorphone, 0.5 mg, Intravenous, Q3H PRN x 2 3/22/24   ondansetron, 4 mg, Intravenous, Q6H PRN    3/22/24 clears.       IP CONSULT TO ACUTE CARE SURGERY  IP CONSULT TO GASTROENTEROLOGY    Network Utilization  Review Department  ATTENTION: Please call with any questions or concerns to [phone] and carefully listen to the prompts so that you are directed to the right person. All voicemails are confidential.   For Discharge needs, contact Care Management DC Support Team at [phone] opt. [phone]  Send all requests for admission clinical reviews, approved or denied determinations and any other requests to dedicated fax number below belonging to the campus where the patient is receiving treatment. List of dedicated fax numbers for the Facilities:  FACILITY NAME UR FAX NUMBER   ADMISSION DENIALS (Administrative/Medical Necessity) [phone]   DISCHARGE SUPPORT TEAM (NETWORK) [phone]   PARENT CHILD HEALTH (Maternity/NICU/Pediatrics) [phone]   [facility] [phone]   [facility] [phone]   [facility] [phone]   [facility] [phone]   [facility] [phone]   [facility] [phone]   [facility] [phone]   [facility] [phone]   [facility] [phone]   [facility] [phone]   General acute hospital [phone]   [facility] [phone]

## 2024-03-22 NOTE — H&P
"[facility]  H&P  Name: Ira Bonilla 78 y.o. female I MRN: [id number]  Unit/Bed#: -01 I Date of Admission: 3/21/2024   Date of Service: 3/22/2024 I Hospital Day: 0      Assessment/Plan   * Ileus (HCC)  Assessment & Plan  Presents LUQ and LLQ AP - no nausea or vomiting, had 3BM today and is having flatus  CTA A/P: \"Findings consistent with a partial low-grade small bowel obstruction without a discrete transition point identified \"  Does not examine like SBO - likely ileus  Strict NPO to start   IVF  Pain control   GI consulted  Surgery following     Esophageal dysphagia  Assessment & Plan  S/p esophageal dilation at [facility] on 3/20 - only able to dilate some due to small mucosal tear   Liquid diet only to start once no longer strict NPO     Stage 3b chronic kidney disease (HCC)  Assessment & Plan  Lab Results   Component Value Date    EGFR 33 03/22/2024    EGFR 38 (L) 12/11/2023    EGFR 35 11/14/2023    CREATININE 1.50 (H) 03/22/2024    CREATININE 1.41 (H) 12/11/2023    CREATININE 1.43 (H) 11/14/2023   Baseline Cr 1.4-1.5  Cr on admit 1.50   Trend BMP daily     Anemia  Assessment & Plan  Hgb 10.3   Baseline 8-10   No signs of active bleeding   Trend CBC    Hypertension  Assessment & Plan  Home regimen: Amlodipine 10 Mg daily  Hold amlodipine in setting of strict n.p.o. placed on hydralazine as needed    COPD (chronic obstructive pulmonary disease) ([facility])  Assessment & Plan  Xopenex PRN  No signs of acute exacerbation on admission          VTE Pharmacologic Prophylaxis: VTE Score: 4 Moderate Risk (Score 3-4) - Pharmacological DVT Prophylaxis Ordered: enoxaparin (Lovenox).  Code Status: Level 3 - DNAR and DNI   Discussion with family: Patient declined call to .     Anticipated Length of Stay: Patient will be admitted on an observation basis with an anticipated length of stay of less than 2 midnights secondary to possible ileus.    Chief Complaint: \" I started having " "left-sided abdominal pain\"    History of Present Illness:  Ira Bonilla is a 78 y.o. female with a PMH of recent esophageal dilation on 3/20, HTN, CKD who presents with LUQ and LLQ abdominal pain that onset Thursday morning and progressively worsened throughout the day.  Patient reports she has nausea and vomiting ever since her fundoplication surgery in November due to dysphagia without acute change since pain onset.  Reports having 3 bowel movements throughout the day on Thursday described as looser stools and ongoing flatulence.  No fevers.  No urinary symptoms.  Reports she ate a chicken quesadilla Thursday evening with worsening of pain.    Review of Systems:  Review of Systems   Constitutional:  Negative for fever.   HENT:  Negative for congestion.    Respiratory:  Negative for cough and shortness of breath.    Cardiovascular:  Negative for chest pain and leg swelling.   Gastrointestinal:  Positive for abdominal pain, diarrhea, nausea (Unchanged from baseline) and vomiting (Unchanged from baseline). Negative for constipation.   Genitourinary:  Negative for difficulty urinating.   Musculoskeletal:  Positive for gait problem (Cane at baseline).   Neurological:  Negative for weakness and numbness.   All other systems reviewed and are negative.      Past Medical and Surgical History:   Past Medical History:   Diagnosis Date    Anxiety     Arthritis     Asthma     Cancer (HCC)     Candida infection, esophageal (HCC)     Chronic kidney disease     Chronic pain     COPD (chronic obstructive pulmonary disease) (HCC)     Depression     Depression     Gastroparesis     Gastroparesis     GERD (gastroesophageal reflux disease)     Hypertension     Irritable bowel syndrome (IBS)     Lactose intolerance Not sure    Migraines     MRSA (methicillin resistant Staphylococcus aureus)     Multiple thyroid nodules     Osteoporosis     Pneumonia     Psychiatric disorder        Past Surgical History:   Procedure Laterality Date "    BACK SURGERY       SECTION      CHOLECYSTECTOMY      COLONOSCOPY  10/09/2013    ESOPHAGEAL DILATION      2017 [facility]    ESOPHAGOGASTRODUODENOSCOPY N/A 2023    Procedure: ESOPHAGOGASTRODUODENOSCOPY (EGD);  Surgeon: Dontrell Mcmahon MD;  [facility];  Service: General    ESOPHAGOGASTRODUODENOSCOPY N/A 3/20/2024    Procedure: ESOPHAGOGASTRODUODENOSCOPY (EGD);  Surgeon: Shilpa Price MD;  Location: [facility];  Service: Thoracic    HIATAL HERNIA REPAIR      NISSEN FUNDOPLICATION      NE BIOPSY LIVER NEEDLE PERCUTANEOUS N/A 2023    Procedure: EXCISION BIOPSY LIVER;  Surgeon: Dontrell Mcmahon MD;  Location: [facility];  Service: General    NE ESOPHAGOGASTRODUODENOSCOPY TRANSORAL DIAGNOSTIC N/A 2019    Procedure: ESOPHAGOGASTRODUODENOSCOPY (EGD);  Surgeon: Geovanna Winn MD;  Location: [facility];  Service: Gastroenterology    NE ESOPHAGOGASTRODUODENOSCOPY TRANSORAL DIAGNOSTIC N/A 2023    Procedure: ESOPHAGOGASTRODUODENOSCOPY (EGD);  Surgeon: Shilpa Price MD;  [facility];  Service: Thoracic    NE ESOPHAGOSCOPY FLEX BALLOON DILAT <30 MM DIAM N/A 2023    Procedure: CRE WIRE GUIDED BALLOON DILATATION ESOPHAGEAL;  Surgeon: Shilpa Price MD;  [facility];  Service: Thoracic    NE ESOPHAGOSCOPY FLEX BALLOON DILAT <30 MM DIAM N/A 3/20/2024    Procedure: ESOPHAGOGASTRODUODENOSCOPY WITH BALLOON DILATION;  Surgeon: Shilpa Price MD;  [facility];  Service: Thoracic    NE LAPS RPR PARAESPHGL HRNA INCL FUNDPLSTY W/O MESH N/A 2023    Procedure: robotic takedown of nissen fundoplication, redo hiatal hernia, partial fundoplication, with mesh;  Surgeon: Shilpa Price MD;  [facility];  Service: Thoracic    NE PYLOROPLASTY N/A 2023    Procedure: PYLOROPLASTY LAPAROSCOPIC WITH ROBOT;  Surgeon: Dontrell Mcmahon MD;  [facility];  Service: General    ROTATOR CUFF REPAIR      SHOULDER SURGERY      SPINAL FUSION       TUBAL LIGATION  May 1975    Last child born    UPPER GASTROINTESTINAL ENDOSCOPY      US GUIDED THYROID BIOPSY      WISDOM TOOTH EXTRACTION         Meds/Allergies:  Prior to Admission medications    Medication Sig Start Date End Date Taking? Authorizing Provider   acetaminophen (TYLENOL) 160 mg/5 mL suspension Take 20.3 mL (650 mg total) by mouth every 6 (six) hours as needed for mild pain 11/14/23   Bhavin Heredia MD   albuterol (PROVENTIL HFA,VENTOLIN HFA) 90 mcg/act inhaler Inhale 2 puffs if needed    Historical Provider, MD   amitriptyline (ELAVIL) 50 mg tablet TAKE 1 TABLET BY MOUTH EVERYDAY AT BEDTIME 11/28/22   Virgil Arndt MD   amLODIPine (NORVASC) 10 mg tablet TAKE ONE TABLET BY MOUTH DAILY ORAL ONCE A DAY 90 DAYS 6/12/23   Historical Provider, MD   cetirizine (ZyrTEC) 10 MG chewable tablet Chew 10 mg daily.    Historical Provider, MD   cholecalciferol (VITAMIN D3) 1,000 units tablet Take 5,000 Units by mouth daily    Historical Provider, MD   clotrimazole-betamethasone (LOTRISONE) 1-0.05 % cream if needed 8/22/23   Historical Provider, MD   dexlansoprazole (DEXILANT) 60 MG capsule TAKE 1 CAPSULE BY MOUTH EVERY DAY 2/12/24   Virgil Arndt MD   dicyclomine (BENTYL) 20 mg tablet Take 1 tablet (20 mg total) by mouth every 6 (six) hours as needed (abdominal cramping) 1/5/24   Shilpa Price MD   famotidine (PEPCID) 40 MG tablet TAKE 1 TABLET BY MOUTH EVERYDAY AT BEDTIME 11/28/22   Virgil Arndt MD   multivitamin (THERAGRAN) TABS Take 1 tablet by mouth daily    Historical Provider, MD   Omega-3 Fatty Acids (OMEGA 3 PO) Take by mouth in the morning    Historical Provider, MD   ondansetron (ZOFRAN) 4 mg tablet Take 1 tablet (4 mg total) by mouth every 8 (eight) hours as needed for nausea or vomiting 2/20/22   Leonela Eubanks MD   polyethylene glycol (MIRALAX) 17 g packet Take 17 g by mouth daily as needed (constipation)  Patient taking differently: Take 17 g by mouth daily  23   Bhavin Heredia MD   sucralfate (CARAFATE) 1 g tablet Take 1 tablet (1 g total) by mouth 4 (four) times a day (before meals and at bedtime) for 14 days  Patient taking differently: Take 1 g by mouth if needed 23  Dontrell Mcmahon MD   Tavaborole 5 % SOLN APPLY TO NAILS DAILY AS DIRECTED  Patient not taking: Reported on 1/3/2024 8/23/23   Historical Provider, MD     I have reviewed home medications with patient personally.    Allergies:   Allergies   Allergen Reactions    Latex Hives    Augmentin [Amoxicillin-Pot Clavulanate] GI Intolerance    Bactrim [Sulfamethoxazole-Trimethoprim] GI Intolerance    Clarithromycin GI Intolerance    Doxycycline Hives    Neomycin GI Intolerance    Polymyxin B GI Intolerance    Zyvox [Linezolid] GI Bleeding    Cephalosporins GI Intolerance    Nsaids GI Intolerance    Penicillins GI Intolerance     Patient can only take levaquin and cipro    Prednisone GI Intolerance    Sulphasomidine GI Intolerance       Social History:  Marital Status:    Occupation: Retired  Patient Pre-hospital Living Situation: Home, With other family member: Son and family  Patient Pre-hospital Level of Mobility: walks with cane  Patient Pre-hospital Diet Restrictions: Was following a liquid diet prior to esophageal dilation but then was eating a normal diet after  Substance Use History:   Social History     Substance and Sexual Activity   Alcohol Use Not Currently     Social History     Tobacco Use   Smoking Status Former    Current packs/day: 0.00    Average packs/day: 1 pack/day for 15.0 years (15.0 ttl pk-yrs)    Types: Cigarettes    Start date: 10/5/1998    Quit date: 10/1/2003    Years since quittin.4   Smokeless Tobacco Former     Social History     Substance and Sexual Activity   Drug Use Never       Family History:  Family History   Problem Relation Age of Onset    Other Mother         Esophageal disorder    Stomach cancer Father     Cancer Father     Hypertension  Maternal Aunt     COPD Maternal Grandmother     Colon cancer Neg Hx     Colon polyps Neg Hx        Physical Exam:     Vitals:   Blood Pressure: (!) 179/99 (03/22/24 0300)  Pulse: 75 (03/22/24 0300)  Temperature: (!) 97.2 °F (36.2 °C) (03/22/24 0300)  Temp Source: Temporal (03/22/24 0300)  Respirations: 18 (03/22/24 0300)  Height: 5' (152.4 cm) (03/22/24 0300)  Weight - Scale: 50.7 kg (111 lb 12.4 oz) (03/22/24 0300)  SpO2: 100 % (03/22/24 0300)    Physical Exam  Vitals and nursing note reviewed.   Constitutional:       Comments: Appears uncomfortable but is conversational   HENT:      Head: Normocephalic.      Nose: Nose normal.      Mouth/Throat:      Mouth: Mucous membranes are moist.   Eyes:      Conjunctiva/sclera: Conjunctivae normal.   Cardiovascular:      Rate and Rhythm: Normal rate and regular rhythm.      Pulses: Normal pulses.   Pulmonary:      Effort: Pulmonary effort is normal.      Breath sounds: Normal breath sounds.   Abdominal:      Comments: Abdomen is soft and nondistended.  Bowel sounds are hyperactive in RUQ and LUQ and normoactive in LLQ and RLQ.. tenderness in LUQ and LLQ with mild palpation.  No rebound or guarding.   Musculoskeletal:         General: Normal range of motion.      Cervical back: Normal range of motion.      Right lower leg: No edema.      Left lower leg: No edema.   Skin:     General: Skin is warm and dry.      Coloration: Skin is not pale.   Neurological:      General: No focal deficit present.      Mental Status: She is alert and oriented to person, place, and time.   Psychiatric:         Mood and Affect: Mood normal.         Thought Content: Thought content normal.          Additional Data:     Lab Results:  Results from last 7 days   Lab Units 03/22/24  0002   WBC Thousand/uL 7.73   HEMOGLOBIN g/dL 10.3*   HEMATOCRIT % 32.1*   PLATELETS Thousands/uL 231   NEUTROS PCT % 52   LYMPHS PCT % 39   MONOS PCT % 6   EOS PCT % 2     Results from last 7 days   Lab Units  03/22/24  0002   SODIUM mmol/L 140   POTASSIUM mmol/L 4.1   CHLORIDE mmol/L 107   CO2 mmol/L 26   BUN mg/dL 22   CREATININE mg/dL 1.50*   ANION GAP mmol/L 7   CALCIUM mg/dL 9.3   ALBUMIN g/dL 4.0   TOTAL BILIRUBIN mg/dL 0.25   ALK PHOS U/L 123*   ALT U/L 35   AST U/L 38   GLUCOSE RANDOM mg/dL 190*                       Lines/Drains:  Invasive Devices       Peripheral Intravenous Line  Duration             Peripheral IV 03/21/24 Right Antecubital <1 day                        Imaging: Reviewed radiology reports from this admission including: chest CT scan and abdominal/pelvic CT  CT chest abdomen pelvis w contrast   Final Result by Stephany Hoang MD (03/22 0134)      Findings consistent with a partial low-grade small bowel obstruction without a discrete transition point identified      Findings discussed with Dr. Fleming at 1:30 a.m., 3/22/2024         Workstation performed: [id number]         XR chest portable    (Results Pending)       EKG and Other Studies Reviewed on Admission:   EKG:  Sinus with normal QTc.    ** Please Note: This note has been constructed using a voice recognition system. **

## 2024-03-22 NOTE — ASSESSMENT & PLAN NOTE
Lab Results   Component Value Date    EGFR 33 03/22/2024    EGFR 38 (L) 12/11/2023    EGFR 35 11/14/2023    CREATININE 1.50 (H) 03/22/2024    CREATININE 1.41 (H) 12/11/2023    CREATININE 1.43 (H) 11/14/2023   Baseline Cr 1.4-1.5  Cr on admit 1.50   Trend BMP daily

## 2024-03-22 NOTE — ASSESSMENT & PLAN NOTE
Hgb 10.3   Baseline 8-10   No signs of active bleeding   Trend CBC   Cheek Interpolation Flap Text: A decision was made to reconstruct the defect utilizing an interpolation axial flap and a staged reconstruction.  A telfa template was made of the defect.  This telfa template was then used to outline the Cheek Interpolation flap.  The donor area for the pedicle flap was then injected with anesthesia.  The flap was excised through the skin and subcutaneous tissue down to the layer of the underlying musculature.  The interpolation flap was carefully excised within this deep plane to maintain its blood supply.  The edges of the donor site were undermined.   The donor site was closed in a primary fashion.  The pedicle was then rotated into position and sutured.  Once the tube was sutured into place, adequate blood supply was confirmed with blanching and refill.  The pedicle was then wrapped with xeroform gauze and dressed appropriately with a telfa and gauze bandage to ensure continued blood supply and protect the attached pedicle.

## 2024-03-22 NOTE — ASSESSMENT & PLAN NOTE
S/p esophageal dilation at [facility] on 3/20 - only able to dilate some due to small mucosal tear   Liquid diet only to start once no longer strict NPO

## 2024-03-22 NOTE — PROGRESS NOTES
Progress Note - General Surgery   Ira Bonilla 78 y.o. female MRN: [id number]  Unit/Bed#: -01 Encounter: [id number]    Assessment  Ileus, pSBO  -CT reviewed  Mildly dilated loops of small bowel throughout the left abdomen with small bowel fecalization and luminal caliber measuring up to 3.1 cm. Comparatively collapsed loops of bowel throughout the right lower quadrant. Findings consistent with a partial low-grade small bowel obstruction without a discrete transition point identified.   -Patient seen and evaluated early this morning  -Continues with left lower quadrant abdominal pain, mild nausea  -Admitting passing gas and had 3 bowel movements yesterday, this morning denies flatus  -abdominal exam soft with mild tenderness LLQ, no rebound or guarding, mildly distended, mildly tympanic, active bowel sounds  -remains afebrile with stable VS    Hypomagnesia    Esophageal dysphagia  -s/p PEH repair and revision  -s/p EGD, esophageal balloon dilatation 3/20/24  -h/o GERD, gastroparesis    COPD, depression, HTN, CKD    Plan:  Check KUB this AM  Follow abdominal exam, bowel function  Trend labs, electrolytes, VS  Encourage OOB, ambulation  Consider contrast study to r/o obstruction based on KUB and symptoms  OK for meds with sips, advance diet based on xray, symptoms  Will follow  GI consult s/p balloon dilatation  Medical management        Subjective/Objective   Chief Complaint: pain    Subjective: Continues with left lower quadrant pain, complains of nausea this morning.  Denies passing gas although she was passing gas and having bowel movements yesterday.  Denies any fevers or chills.  Out of bed to bathroom.    Objective:     Blood pressure 132/84, pulse 77, temperature (!) 96.8 °F (36 °C), resp. rate 18, height 5' (1.524 m), weight 50.7 kg (111 lb 12.4 oz), SpO2 98%, not currently breastfeeding.,Body mass index is 21.83 kg/m².    No intake or output data in the 24 hours ending 03/22/24 0826    Invasive  Devices       Peripheral Intravenous Line  Duration             Peripheral IV 03/21/24 Right Antecubital <1 day                    Physical Exam:   General appearance: alert and oriented, in no acute distress  Head: Normocephalic, without obvious abnormality, atraumatic, sclerae anicteric, mucous membranes moist  Neck: no JVD and supple, symmetrical, trachea midline  Lungs: clear to auscultation, no wheezes or rales, shallow BS  Heart:   Regular rate and rhythm, S1-S2 normal, no murmur  Abdomen:   soft, mildly distended, tenderness over left lower quadrant without rebound or guarding, no peritoneal signs, active bowel sounds  Extremities:   No edema, redness or tenderness in the calves or thighs  Skin: Warm, dry;  Nursing notes and vital signs reviewed      Lab, Imaging and other studies:I have personally reviewed pertinent lab results.  , CBC:   Lab Results   Component Value Date    WBC 7.73 03/22/2024    HGB 10.3 (L) 03/22/2024    HCT 32.1 (L) 03/22/2024    MCV 98 03/22/2024     03/22/2024    RBC 3.29 (L) 03/22/2024    MCH 31.3 03/22/2024    MCHC 32.1 03/22/2024    RDW 13.6 03/22/2024    MPV 9.0 03/22/2024    NRBC 0 03/22/2024   , CMP:   Lab Results   Component Value Date    SODIUM 139 03/22/2024    K 4.5 03/22/2024     03/22/2024    CO2 25 03/22/2024    BUN 21 03/22/2024    CREATININE 1.37 (H) 03/22/2024    CALCIUM 8.6 03/22/2024    AST 29 03/22/2024    ALT 29 03/22/2024    ALKPHOS 108 (H) 03/22/2024    EGFR 37 03/22/2024     VTE Pharmacologic Prophylaxis: Enoxaparin (Lovenox)  VTE Mechanical Prophylaxis: sequential compression device    Darline Platt

## 2024-03-22 NOTE — CONSULTS
Consultation - General Surgery   Ira Bonilla 78 y.o. female MRN: [id number]  Unit/Bed#: ED 09 Encounter: [id number]    Assessment/Plan     Assessment / Plan:     Ileus     Recent EGD with balloon dilation performed Wednesday at [facility]   Complaints of LLQ abdominal pain that began this afternoon   Denies fevers, chills, N/V   Patient passing gas and 3 BMs today  Afebrile, VSS   Abdominal exam benign, non tender. Normal bowel sounds.   WBC 7.73   CT A/P   Post paraesophageal hernia repair with mesh. Mildly dilated loops of small bowel throughout the left abdomen with small bowel fecalization and luminal caliber measuring up to 3.1 cm. Comparatively collapsed loops of bowel throughout the right lower quadrant. Findings consistent with a partial low-grade small bowel obstruction without a discrete transition point identified.   No acute surgical intervention indicated at this time   No signs of clinical obstruction with passing gas and BMs  Continue to trend labs, vitals, abdominal exam   NPO  GI consult   Discussed with Dr. Ferrer on call     History of Present Illness     HPI:  Ira Bonilla is a 78 y.o. female with PMHx gastroparesis, GERD without esophagitis, COPD and stage 3b CKD, presenting to ED with concerns of LLQ abdominal pain that began this afternoon. Patient surgical history significant for post robotic pyloroplasty and simultaneous redo hiatal hernia with takedown of Nissen on 11/8/23 and EGD with balloon dilation (03/20/24). Patient admits to having a large dinner this evening which she believes may have precipitated her abdominal pain. Complaints of diarrhea and passing gas as well which is normal for her. Denies fevers, chills, N/V. States she has been having ongoing LLQ pain since her procedure on 11/8.  Denies headache, SOB, chest pain, other abdominal pain, or changes in bladder or bowel habits not mentioned above. Denies any further questions or complaints.     Inpatient consult to  Acute Care Surgery  Consult performed by: Audra Spaulding PA-C  Consult ordered by: Aster Fleming DO          Review of Systems   Constitutional:  Negative for chills, fatigue and fever.   Gastrointestinal:  Positive for abdominal pain (LLQ) and constipation. Negative for diarrhea (Chronic), nausea and vomiting.   Genitourinary:  Negative for difficulty urinating.   All other systems reviewed and are negative.      Historical Information   Past Medical History:   Diagnosis Date    Anxiety     Arthritis     Asthma     Cancer (HCC)     Candida infection, esophageal (HCC)     Chronic kidney disease     Chronic pain     COPD (chronic obstructive pulmonary disease) (HCC)     Depression     Depression     Gastroparesis     Gastroparesis     GERD (gastroesophageal reflux disease)     Hypertension     Irritable bowel syndrome (IBS)     Lactose intolerance Not sure    Migraines     MRSA (methicillin resistant Staphylococcus aureus)     Multiple thyroid nodules     Osteoporosis     Pneumonia     Psychiatric disorder      Past Surgical History:   Procedure Laterality Date    BACK SURGERY       SECTION      CHOLECYSTECTOMY      COLONOSCOPY  10/09/2013    ESOPHAGEAL DILATION      2017 [facility]    ESOPHAGOGASTRODUODENOSCOPY N/A 2023    Procedure: ESOPHAGOGASTRODUODENOSCOPY (EGD);  Surgeon: Dontrell Mcmahon MD;  [facility];  Service: General    ESOPHAGOGASTRODUODENOSCOPY N/A 3/20/2024    Procedure: ESOPHAGOGASTRODUODENOSCOPY (EGD);  Surgeon: Shilpa Price MD;  [facility]: [facility];  Service: Thoracic    HIATAL HERNIA REPAIR      NISSEN FUNDOPLICATION      MN BIOPSY LIVER NEEDLE PERCUTANEOUS N/A 2023    Procedure: EXCISION BIOPSY LIVER;  Surgeon: Dontrell Mcmahon MD;  Location: [facility];  Service: General    MN ESOPHAGOGASTRODUODENOSCOPY TRANSORAL DIAGNOSTIC N/A 2019    Procedure: ESOPHAGOGASTRODUODENOSCOPY (EGD);  Surgeon: Geovanna Winn MD;  Location: [facility];  Service: Gastroenterology     TN ESOPHAGOGASTRODUODENOSCOPY TRANSORAL DIAGNOSTIC N/A 2023    Procedure: ESOPHAGOGASTRODUODENOSCOPY (EGD);  Surgeon: Shilpa Price MD;  [facility];  Service: Thoracic    TN ESOPHAGOSCOPY FLEX BALLOON DILAT <30 MM DIAM N/A 2023    Procedure: CRE WIRE GUIDED BALLOON DILATATION ESOPHAGEAL;  Surgeon: Shilpa Price MD;  [facility];  Service: Thoracic    TN ESOPHAGOSCOPY FLEX BALLOON DILAT <30 MM DIAM N/A 3/20/2024    Procedure: ESOPHAGOGASTRODUODENOSCOPY WITH BALLOON DILATION;  Surgeon: Shilpa Price MD;  [facility];  Service: Thoracic    TN LAPS RPR PARAESPHGL HRNA INCL FUNDPLSTY W/O MESH N/A 2023    Procedure: robotic takedown of nissen fundoplication, redo hiatal hernia, partial fundoplication, with mesh;  Surgeon: Shilpa Price MD;  [facility];  Service: Thoracic    TN PYLOROPLASTY N/A 2023    Procedure: PYLOROPLASTY LAPAROSCOPIC WITH ROBOT;  Surgeon: Dontrell Mcmahon MD;  [facility];  Service: General    ROTATOR CUFF REPAIR      SHOULDER SURGERY      SPINAL FUSION      TUBAL LIGATION  May 1975    Last child born    UPPER GASTROINTESTINAL ENDOSCOPY      US GUIDED THYROID BIOPSY      WISDOM TOOTH EXTRACTION       Social History   Social History     Substance and Sexual Activity   Alcohol Use Not Currently     Social History     Substance and Sexual Activity   Drug Use Never     E-Cigarette/Vaping    E-Cigarette Use Never User      E-Cigarette/Vaping Substances    Nicotine No     THC No     CBD No     Flavoring No     Other No     Unknown No      Social History     Tobacco Use   Smoking Status Former    Current packs/day: 0.00    Average packs/day: 1 pack/day for 15.0 years (15.0 ttl pk-yrs)    Types: Cigarettes    Start date: 10/5/1998    Quit date: 10/1/2003    Years since quittin.4   Smokeless Tobacco Former     Family History: non-contributory    Meds/Allergies   all current active meds have been  reviewed  Allergies   Allergen Reactions    Latex Hives    Augmentin [Amoxicillin-Pot Clavulanate] GI Intolerance    Bactrim [Sulfamethoxazole-Trimethoprim] GI Intolerance    Clarithromycin GI Intolerance    Doxycycline Hives    Neomycin GI Intolerance    Polymyxin B GI Intolerance    Zyvox [Linezolid] GI Bleeding    Cephalosporins GI Intolerance    Nsaids GI Intolerance    Penicillins GI Intolerance     Patient can only take levaquin and cipro    Prednisone GI Intolerance    Sulphasomidine GI Intolerance       Objective   First Vitals:   Blood Pressure: 157/87 (03/21/24 2335)  Pulse: 92 (03/21/24 2335)  Temperature: (!) 97 °F (36.1 °C) (03/21/24 2335)  Temp Source: Temporal (03/21/24 2335)  Respirations: 16 (03/21/24 2335)  SpO2: 98 % (03/21/24 2335)    Current Vitals:   Blood Pressure: 122/68 (03/22/24 0102)  Pulse: 81 (03/22/24 0102)  Temperature: (!) 97 °F (36.1 °C) (03/21/24 2335)  Temp Source: Temporal (03/21/24 2335)  Respirations: 20 (03/22/24 0102)  SpO2: 97 % (03/22/24 0102)    No intake or output data in the 24 hours ending 03/22/24 0202    Invasive Devices       Peripheral Intravenous Line  Duration             Peripheral IV 03/21/24 Right Antecubital <1 day                    Physical Exam  Vitals and nursing note reviewed.   Constitutional:       General: She is not in acute distress.     Appearance: She is normal weight.   HENT:      Head: Normocephalic.      Right Ear: External ear normal.      Left Ear: External ear normal.      Nose: Nose normal.      Mouth/Throat:      Mouth: Mucous membranes are moist.   Eyes:      Pupils: Pupils are equal, round, and reactive to light.   Cardiovascular:      Rate and Rhythm: Normal rate and regular rhythm.      Pulses: Normal pulses.      Heart sounds: Normal heart sounds.   Pulmonary:      Effort: Pulmonary effort is normal. No respiratory distress.      Breath sounds: Normal breath sounds.   Abdominal:      General: Abdomen is flat. Bowel sounds are normal.  There is no distension.      Palpations: Abdomen is soft.      Tenderness: There is no abdominal tenderness. There is no guarding or rebound.   Musculoskeletal:      Cervical back: Normal range of motion.   Skin:     General: Skin is warm.      Coloration: Skin is not jaundiced.   Neurological:      Mental Status: She is alert.   Psychiatric:         Mood and Affect: Mood normal.         Behavior: Behavior normal.         Thought Content: Thought content normal.         Judgment: Judgment normal.         Lab Results: I have personally reviewed pertinent lab results.  , CBC:   Lab Results   Component Value Date    WBC 7.73 03/22/2024    HGB 10.3 (L) 03/22/2024    HCT 32.1 (L) 03/22/2024    MCV 98 03/22/2024     03/22/2024    RBC 3.29 (L) 03/22/2024    MCH 31.3 03/22/2024    MCHC 32.1 03/22/2024    RDW 13.6 03/22/2024    MPV 9.0 03/22/2024    NRBC 0 03/22/2024   , CMP:   Lab Results   Component Value Date    SODIUM 140 03/22/2024    K 4.1 03/22/2024     03/22/2024    CO2 26 03/22/2024    BUN 22 03/22/2024    CREATININE 1.50 (H) 03/22/2024    CALCIUM 9.3 03/22/2024    AST 38 03/22/2024    ALT 35 03/22/2024    ALKPHOS 123 (H) 03/22/2024    EGFR 33 03/22/2024     Imaging: I have personally reviewed pertinent reports.    EKG, Pathology, and Other Studies: I have personally reviewed pertinent reports.      Counseling / Coordination of Care  Total floor / unit time spent today 30 minutes.  Greater than 50% of total time was spent with the patient and / or family counseling and / or coordination of care.  A description of the counseling / coordination of care: 30.

## 2024-03-22 NOTE — ASSESSMENT & PLAN NOTE
"Presents LUQ and LLQ AP - no nausea or vomiting, had 3BM today and is having flatus  CTA A/P: \"Findings consistent with a partial low-grade small bowel obstruction without a discrete transition point identified \"  Does not examine like SBO - likely ileus  Strict NPO to start   IVF  Pain control   GI consulted  Surgery following   "

## 2024-03-22 NOTE — PLAN OF CARE
Problem: PAIN - ADULT  Goal: Verbalizes/displays adequate comfort level or baseline comfort level  Description: Interventions:  - Encourage patient to monitor pain and request assistance  - Assess pain using appropriate pain scale  - Administer analgesics based on type and severity of pain and evaluate response  - Implement non-pharmacological measures as appropriate and evaluate response  - Consider cultural and social influences on pain and pain management  - Notify physician/advanced practitioner if interventions unsuccessful or patient reports new pain  Outcome: Progressing     Problem: INFECTION - ADULT  Goal: Absence or prevention of progression during hospitalization  Description: INTERVENTIONS:  - Assess and monitor for signs and symptoms of infection  - Monitor lab/diagnostic results  - Monitor all insertion sites, i.e. indwelling lines, tubes, and drains  - Monitor endotracheal if appropriate and nasal secretions for changes in amount and color  - [place] appropriate cooling/warming therapies per order  - Administer medications as ordered  - Instruct and encourage patient and family to use good hand hygiene technique  - Identify and instruct in appropriate isolation precautions for identified infection/condition  Outcome: Progressing

## 2024-03-23 LAB
ALBUMIN SERPL BCP-MCNC: 3.3 G/DL (ref 3.5–5)
ALP SERPL-CCNC: 101 U/L (ref 34–104)
ALT SERPL W P-5'-P-CCNC: 33 U/L (ref 7–52)
ANION GAP SERPL CALCULATED.3IONS-SCNC: 6 MMOL/L (ref 4–13)
AST SERPL W P-5'-P-CCNC: 42 U/L (ref 13–39)
ATRIAL RATE: 82 BPM
BASOPHILS # BLD AUTO: 0.02 THOUSANDS/ÂΜL (ref 0–0.1)
BASOPHILS NFR BLD AUTO: 0 % (ref 0–1)
BILIRUB SERPL-MCNC: 0.37 MG/DL (ref 0.2–1)
BUN SERPL-MCNC: 17 MG/DL (ref 5–25)
CALCIUM ALBUM COR SERPL-MCNC: 9.5 MG/DL (ref 8.3–10.1)
CALCIUM SERPL-MCNC: 8.9 MG/DL (ref 8.4–10.2)
CHLORIDE SERPL-SCNC: 106 MMOL/L (ref 96–108)
CO2 SERPL-SCNC: 27 MMOL/L (ref 21–32)
CREAT SERPL-MCNC: 1.27 MG/DL (ref 0.6–1.3)
EOSINOPHIL # BLD AUTO: 0.11 THOUSAND/ÂΜL (ref 0–0.61)
EOSINOPHIL NFR BLD AUTO: 2 % (ref 0–6)
ERYTHROCYTE [DISTWIDTH] IN BLOOD BY AUTOMATED COUNT: 13.6 % (ref 11.6–15.1)
GFR SERPL CREATININE-BSD FRML MDRD: 40 ML/MIN/1.73SQ M
GLUCOSE SERPL-MCNC: 102 MG/DL (ref 65–140)
GLUCOSE SERPL-MCNC: 84 MG/DL (ref 65–140)
GLUCOSE SERPL-MCNC: 95 MG/DL (ref 65–140)
HCT VFR BLD AUTO: 29.4 % (ref 34.8–46.1)
HGB BLD-MCNC: 9.7 G/DL (ref 11.5–15.4)
IMM GRANULOCYTES # BLD AUTO: 0.02 THOUSAND/UL (ref 0–0.2)
IMM GRANULOCYTES NFR BLD AUTO: 0 % (ref 0–2)
LYMPHOCYTES # BLD AUTO: 2.08 THOUSANDS/ÂΜL (ref 0.6–4.47)
LYMPHOCYTES NFR BLD AUTO: 39 % (ref 14–44)
MAGNESIUM SERPL-MCNC: 2.1 MG/DL (ref 1.9–2.7)
MCH RBC QN AUTO: 31.7 PG (ref 26.8–34.3)
MCHC RBC AUTO-ENTMCNC: 33 G/DL (ref 31.4–37.4)
MCV RBC AUTO: 96 FL (ref 82–98)
MONOCYTES # BLD AUTO: 0.31 THOUSAND/ÂΜL (ref 0.17–1.22)
MONOCYTES NFR BLD AUTO: 6 % (ref 4–12)
NEUTROPHILS # BLD AUTO: 2.86 THOUSANDS/ÂΜL (ref 1.85–7.62)
NEUTS SEG NFR BLD AUTO: 53 % (ref 43–75)
NRBC BLD AUTO-RTO: 0 /100 WBCS
P AXIS: 50 DEGREES
PLATELET # BLD AUTO: 169 THOUSANDS/UL (ref 149–390)
PMV BLD AUTO: 8.4 FL (ref 8.9–12.7)
POTASSIUM SERPL-SCNC: 4.2 MMOL/L (ref 3.5–5.3)
PR INTERVAL: 140 MS
PROT SERPL-MCNC: 6.5 G/DL (ref 6.4–8.4)
QRS AXIS: 17 DEGREES
QRSD INTERVAL: 120 MS
QT INTERVAL: 396 MS
QTC INTERVAL: 462 MS
RBC # BLD AUTO: 3.06 MILLION/UL (ref 3.81–5.12)
SODIUM SERPL-SCNC: 139 MMOL/L (ref 135–147)
T WAVE AXIS: 30 DEGREES
VENTRICULAR RATE: 82 BPM
WBC # BLD AUTO: 5.4 THOUSAND/UL (ref 4.31–10.16)

## 2024-03-23 PROCEDURE — 85025 COMPLETE CBC W/AUTO DIFF WBC: CPT | Performed by: INTERNAL MEDICINE

## 2024-03-23 PROCEDURE — 93010 ELECTROCARDIOGRAM REPORT: CPT | Performed by: INTERNAL MEDICINE

## 2024-03-23 PROCEDURE — 83735 ASSAY OF MAGNESIUM: CPT | Performed by: INTERNAL MEDICINE

## 2024-03-23 PROCEDURE — C9113 INJ PANTOPRAZOLE SODIUM, VIA: HCPCS | Performed by: PHYSICIAN ASSISTANT

## 2024-03-23 PROCEDURE — 80053 COMPREHEN METABOLIC PANEL: CPT | Performed by: INTERNAL MEDICINE

## 2024-03-23 PROCEDURE — 99232 SBSQ HOSP IP/OBS MODERATE 35: CPT

## 2024-03-23 PROCEDURE — 99232 SBSQ HOSP IP/OBS MODERATE 35: CPT | Performed by: INTERNAL MEDICINE

## 2024-03-23 PROCEDURE — C9113 INJ PANTOPRAZOLE SODIUM, VIA: HCPCS | Performed by: NURSE PRACTITIONER

## 2024-03-23 PROCEDURE — 82948 REAGENT STRIP/BLOOD GLUCOSE: CPT

## 2024-03-23 RX ORDER — FAMOTIDINE 20 MG/1
10 TABLET, FILM COATED ORAL
Status: DISCONTINUED | OUTPATIENT
Start: 2024-03-23 | End: 2024-03-26 | Stop reason: HOSPADM

## 2024-03-23 RX ORDER — FAMOTIDINE 20 MG/1
40 TABLET, FILM COATED ORAL
Status: DISCONTINUED | OUTPATIENT
Start: 2024-03-23 | End: 2024-03-23

## 2024-03-23 RX ORDER — PANTOPRAZOLE SODIUM 40 MG/10ML
40 INJECTION, POWDER, LYOPHILIZED, FOR SOLUTION INTRAVENOUS EVERY 12 HOURS SCHEDULED
Status: DISCONTINUED | OUTPATIENT
Start: 2024-03-23 | End: 2024-03-26 | Stop reason: HOSPADM

## 2024-03-23 RX ORDER — HYDROMORPHONE HCL IN WATER/PF 6 MG/30 ML
0.2 PATIENT CONTROLLED ANALGESIA SYRINGE INTRAVENOUS EVERY 6 HOURS PRN
Status: DISCONTINUED | OUTPATIENT
Start: 2024-03-23 | End: 2024-03-26 | Stop reason: HOSPADM

## 2024-03-23 RX ADMIN — AMITRIPTYLINE HYDROCHLORIDE 50 MG: 50 TABLET, FILM COATED ORAL at 21:40

## 2024-03-23 RX ADMIN — HYDROMORPHONE HYDROCHLORIDE 0.2 MG: 0.2 INJECTION, SOLUTION INTRAMUSCULAR; INTRAVENOUS; SUBCUTANEOUS at 12:55

## 2024-03-23 RX ADMIN — AMLODIPINE BESYLATE 10 MG: 5 TABLET ORAL at 08:23

## 2024-03-23 RX ADMIN — Medication 2.5 MG: at 10:56

## 2024-03-23 RX ADMIN — ENOXAPARIN SODIUM 30 MG: 30 INJECTION SUBCUTANEOUS at 08:23

## 2024-03-23 RX ADMIN — FAMOTIDINE 10 MG: 20 TABLET, FILM COATED ORAL at 21:40

## 2024-03-23 RX ADMIN — PANTOPRAZOLE SODIUM 40 MG: 40 INJECTION, POWDER, FOR SOLUTION INTRAVENOUS at 20:08

## 2024-03-23 RX ADMIN — DICYCLOMINE HYDROCHLORIDE 20 MG: 20 TABLET ORAL at 17:37

## 2024-03-23 RX ADMIN — PANTOPRAZOLE SODIUM 40 MG: 40 INJECTION, POWDER, FOR SOLUTION INTRAVENOUS at 08:23

## 2024-03-23 NOTE — ASSESSMENT & PLAN NOTE
"Presents LUQ and LLQ AP - no nausea or vomiting, had 3BM today and is having flatus  CTA A/P: \"Findings consistent with a partial low-grade small bowel obstruction without a discrete transition point identified \"  Does not examine like SBO - likely ileus  Pain control   GI consulted  Surgery following   "

## 2024-03-23 NOTE — PROGRESS NOTES
Progress note - [facility]   Ira Bonilla 78 y.o. female MRN: [id number]  Unit/Bed#: -[facility] Encounter: [id number]    ASSESSMENT and PLAN    78 y.o. year old female with past medical history of Nissen procedure 2009 [facility], recent esophageal dilation 3/20 SLB, HTN, CKD, hiatal hernia repair 11/2023 with takedown of previous Nissen, and pyloroplasty 11/2023 presents with left upper and left lower quadrant abdominal tenderness.  GI consulted for ileus.     1.  Ileus  CT A/P on admission; findings consistent with a partial low-grade small bowel obstruction without a discrete transition point identified.  Most likely this is an ileus she seems to be improving albeit sluggishly.  Advance diet and observe.  I would repeat a KUB tomorrow.    2. Esophageal dysphagia  3.  History of Nissen fundoplication  Patient with long history of esophageal dysphagia.  Patient's original Nissen fundoplication surgery was in 2009 at [facility].  Hiatal hernia repair with mesh 11/2023, also pyloroplasty was completed at the same time.  Patient states that since recent EGD with balloon dilation on 3/20 she has been having increased epigastric discomfort with increased acid reflux symptoms.  She states her left upper and lower quadrant pain has been consistent for a while.     -Pantoprazole 40 mg IVP daily          Chief Complaint   Patient presents with    Abdominal Pain     Left sided abdominal pain that started this afternoon.  Denies nausea or vomiting.       SUBJECTIVE/HPI   Patient complains of abdominal gassiness and distention.  No vomiting mild heartburn tolerating liquids no bowel movement no flatus.    /73 (BP Location: Left arm)   Pulse 85   Temp (!) 97.2 °F (36.2 °C) (Oral)   Resp 12   Ht 5' (1.524 m)   Wt 50.7 kg (111 lb 12.4 oz)   SpO2 98%   BMI 21.83 kg/m²     PHYSICALEXAM  General appearance: alert, appears stated age and cooperative  Eyes: PERLLA, EOMI, no icterus   Head: Normocephalic,  "without obvious abnormality, atraumatic  Abdomen: soft, non-tender; bowel sounds normal; no masses,  no organomegaly  Extremities: extremities normal, atraumatic, no cyanosis or edema  Neurologic: Grossly normal    Lab Results   Component Value Date    GLUCOSE 110 04/09/2015    CALCIUM 8.9 03/23/2024     04/09/2015    K 4.2 03/23/2024    CO2 27 03/23/2024     03/23/2024    BUN 17 03/23/2024    CREATININE 1.27 03/23/2024     Lab Results   Component Value Date    WBC 5.40 03/23/2024    HGB 9.7 (L) 03/23/2024    HCT 29.4 (L) 03/23/2024    MCV 96 03/23/2024     03/23/2024     Lab Results   Component Value Date    ALT 33 03/23/2024    AST 42 (H) 03/23/2024    ALKPHOS 101 03/23/2024    BILITOT 0.4 02/05/2015     No results found for: \"AMYLASE\"  Lab Results   Component Value Date    LIPASE <6 (L) 03/22/2024     Lab Results   Component Value Date    IRON 44 (L) 11/10/2023    TIBC 249 (L) 11/10/2023    FERRITIN 384 (H) 11/10/2023     Lab Results   Component Value Date    INR 1.48 (H) 11/10/2023     "

## 2024-03-23 NOTE — PLAN OF CARE
Problem: INFECTION - ADULT  Goal: Absence or prevention of progression during hospitalization  Description: INTERVENTIONS:  - Assess and monitor for signs and symptoms of infection  - Monitor lab/diagnostic results  - Monitor all insertion sites, i.e. indwelling lines, tubes, and drains  - Monitor endotracheal if appropriate and nasal secretions for changes in amount and color  - [place] appropriate cooling/warming therapies per order  - Administer medications as ordered  - Instruct and encourage patient and family to use good hand hygiene technique  - Identify and instruct in appropriate isolation precautions for identified infection/condition  Outcome: Progressing  Goal: Absence of fever/infection during neutropenic period  Description: INTERVENTIONS:  - Monitor WBC    Outcome: Progressing

## 2024-03-23 NOTE — ASSESSMENT & PLAN NOTE
Lab Results   Component Value Date    EGFR 40 03/23/2024    EGFR 37 03/22/2024    EGFR 33 03/22/2024    CREATININE 1.27 03/23/2024    CREATININE 1.37 (H) 03/22/2024    CREATININE 1.50 (H) 03/22/2024   Baseline Cr 1.4-1.5  Cr on admit 1.50   Trend BMP daily

## 2024-03-23 NOTE — PROGRESS NOTES
Progress Note - General Surgery   Ira Bonilla 78 y.o. female MRN: [id number]  Unit/Bed#: -01 Encounter: [id number]    Assessment / Plan:     Ileus, pSBO  CT reviewed   Mildly dilated loops of small bowel throughout the left abdomen with small bowel fecalization and luminal caliber measuring up to 3.1 cm. Comparatively collapsed loops of bowel throughout the right lower quadrant. Findings consistent with a partial low-grade small bowel obstruction without a discrete transition point identified.   Continues with left lower quadrant abdominal pain, admits to decreased appetite   Abdominal exam soft with mild TTP LLQ, no rebound or guarding, no distension. Active bowel sounds.   Had small BM this morning and passing flatus   Tolerating clears, will advance to surgical soft   KUB 03/22   Nonspecific minimally distended loops of small bowel in the left upper quadrant.   Follow abdominal exam, bowel function  Trend labs, electrolytes, VS   Encourage OOB and ambulation  Gen surg will s/o, appreciate other questions or recs as needed.       Hypomagnesia     Esophageal dysphagia  s/p PEH repair and revision  s/p EGD, esophageal balloon dilatation 3/20/24  h/o GERD, gastroparesis     COPD, depression, HTN, CKD     Subjective/Objective     Subjective: Patient seen and examined at bedside. Patient admits to LLQ cramping following a few cups of jello this AM. Passing flatus, BM this morning. Denies fevers, chills, N/V. OOB to chair. Denies further questions or complaints.     Objective:     Blood pressure 129/73, pulse 85, temperature (!) 97.2 °F (36.2 °C), temperature source Oral, resp. rate 12, height 5' (1.524 m), weight 50.7 kg (111 lb 12.4 oz), SpO2 98%, not currently breastfeeding.,Body mass index is 21.83 kg/m².      Intake/Output Summary (Last 24 hours) at 3/23/2024 1044  Last data filed at 3/23/2024 1000  Gross per 24 hour   Intake 360 ml   Output --   Net 360 ml       Invasive Devices       Peripheral  Intravenous Line  Duration             Peripheral IV 03/21/24 Right Antecubital 1 day                    Physical Exam: /73 (BP Location: Left arm)   Pulse 85   Temp (!) 97.2 °F (36.2 °C) (Oral)   Resp 12   Ht 5' (1.524 m)   Wt 50.7 kg (111 lb 12.4 oz)   SpO2 98%   BMI 21.83 kg/m²   General appearance: alert and oriented, in no acute distress  Head: Normocephalic, without obvious abnormality, atraumatic  Lungs: clear to auscultation bilaterally  Heart: regular rate and rhythm  Abdomen:  soft, non distended. TTP in LLQ. Active bowel sounds.   Extremities: extremities normal, warm and well-perfused; no cyanosis, clubbing, or edema  Skin: Skin color, texture, turgor normal. No rashes or lesions  Nursing note and vital signs reviewed     Lab, Imaging and other studies:I have personally reviewed pertinent lab results.  , CBC:   Lab Results   Component Value Date    WBC 5.40 03/23/2024    HGB 9.7 (L) 03/23/2024    HCT 29.4 (L) 03/23/2024    MCV 96 03/23/2024     03/23/2024    RBC 3.06 (L) 03/23/2024    MCH 31.7 03/23/2024    MCHC 33.0 03/23/2024    RDW 13.6 03/23/2024    MPV 8.4 (L) 03/23/2024    NRBC 0 03/23/2024   , CMP:   Lab Results   Component Value Date    SODIUM 139 03/23/2024    K 4.2 03/23/2024     03/23/2024    CO2 27 03/23/2024    BUN 17 03/23/2024    CREATININE 1.27 03/23/2024    CALCIUM 8.9 03/23/2024    AST 42 (H) 03/23/2024    ALT 33 03/23/2024    ALKPHOS 101 03/23/2024    EGFR 40 03/23/2024     VTE Pharmacologic Prophylaxis: Sequential compression device (Venodyne)  and Heparin  VTE Mechanical Prophylaxis: sequential compression device and foot pump applied

## 2024-03-23 NOTE — PROGRESS NOTES
"[facility]  Progress Note  Name: Ira Bonilla I  MRN: [id number]  Unit/Bed#: -01 I Date of Admission: 3/21/2024   Date of Service: 3/23/2024 I Hospital Day: 1    Assessment/Plan   Anemia  Assessment & Plan  Hgb 10.3   Baseline 8-10   No signs of active bleeding   Trend CBC    Stage 3b chronic kidney disease ([facility])  Assessment & Plan  Lab Results   Component Value Date    EGFR 40 03/23/2024    EGFR 37 03/22/2024    EGFR 33 03/22/2024    CREATININE 1.27 03/23/2024    CREATININE 1.37 (H) 03/22/2024    CREATININE 1.50 (H) 03/22/2024   Baseline Cr 1.4-1.5  Cr on admit 1.50   Trend BMP daily     Hypertension  Assessment & Plan  Home regimen: Amlodipine 10 Mg daily  Will continue with Hold parameters    COPD (chronic obstructive pulmonary disease) ([facility])  Assessment & Plan  Xopenex PRN  No signs of acute exacerbation on admission     Esophageal dysphagia  Assessment & Plan  S/p esophageal dilation at [facility] on 3/20 - only able to dilate some due to small mucosal tear   Advance diet as per surgery and GI      * Ileus ([facility])  Assessment & Plan  Presents LUQ and LLQ AP - no nausea or vomiting, had 3BM today and is having flatus  CTA A/P: \"Findings consistent with a partial low-grade small bowel obstruction without a discrete transition point identified \"  Does not examine like SBO - likely ileus  Pain control   GI consulted  Surgery following            VTE Pharmacologic Prophylaxis: VTE Score: 4 Moderate Risk (Score 3-4) - Pharmacological DVT Prophylaxis Ordered: enoxaparin (Lovenox).    Mobility:   Basic Mobility Inpatient Raw Score: 23  JH-HLM Goal: 7: Walk 25 feet or more  JH-HLM Achieved: 7: Walk 25 feet or more  JH-HLM Goal achieved. Continue to encourage appropriate mobility.    Patient Centered Rounds: I performed bedside rounds with nursing staff today.   Discussions with Specialists or Other Care Team Provider: GI    Education and Discussions with Family / Patient: Patient declined " call to .     Total Time Spent on Date of Encounter in care of patient: 40 mins. This time was spent on one or more of the following: performing physical exam; counseling and coordination of care; obtaining or reviewing history; documenting in the medical record; reviewing/ordering tests, medications or procedures; communicating with other healthcare professionals and discussing with patient's family/caregivers.    Current Length of Stay: 1 day(s)  Current Patient Status: Inpatient   Certification Statement: The patient will continue to require additional inpatient hospital stay due to advanced diet.  Medical clearance from GI/surgery  Discharge Plan: Anticipate discharge tomorrow to home.    Code Status: Level 3 - DNAR and DNI    Subjective:   Patient stated she has left-sided abdominal pain, felt nauseous with clear liquid diet was not able to tolerate clear as she expected.    Objective:     Vitals:   Temp (24hrs), Av.1 °F (36.2 °C), Min:97 °F (36.1 °C), Max:97.2 °F (36.2 °C)    Temp:  [97 °F (36.1 °C)-97.2 °F (36.2 °C)] 97.2 °F (36.2 °C)  HR:  [81-85] 85  Resp:  [12-18] 12  BP: (116-141)/(73-83) 129/73  SpO2:  [97 %-98 %] 98 %  Body mass index is 21.83 kg/m².     Input and Output Summary (last 24 hours):     Intake/Output Summary (Last 24 hours) at 3/23/2024 1209  Last data filed at 3/23/2024 1000  Gross per 24 hour   Intake 360 ml   Output --   Net 360 ml       Physical Exam:   Physical Exam     Gen.-Patient comfortable   Neck- Supple. No thyromegaly or lymphadenopathy  Lungs-Clear bilaterally without any wheeze or rales   Heart S1-S2, regular rate and rhythm, no murmurs  Abdomen-soft +tender, no organomegaly. Bowel sounds present  Extremities-no cyanosi,  clubbing or edema  Skin- no rash  Neuro-nonfocal     Additional Data:     Labs:  Results from last 7 days   Lab Units 24  0504   WBC Thousand/uL 5.40   HEMOGLOBIN g/dL 9.7*   HEMATOCRIT % 29.4*   PLATELETS Thousands/uL 169   NEUTROS  PCT % 53   LYMPHS PCT % 39   MONOS PCT % 6   EOS PCT % 2     Results from last 7 days   Lab Units 03/23/24  0504   SODIUM mmol/L 139   POTASSIUM mmol/L 4.2   CHLORIDE mmol/L 106   CO2 mmol/L 27   BUN mg/dL 17   CREATININE mg/dL 1.27   ANION GAP mmol/L 6   CALCIUM mg/dL 8.9   ALBUMIN g/dL 3.3*   TOTAL BILIRUBIN mg/dL 0.37   ALK PHOS U/L 101   ALT U/L 33   AST U/L 42*   GLUCOSE RANDOM mg/dL 95         Results from last 7 days   Lab Units 03/23/24  0636 03/22/24  1805 03/22/24  0550   POC GLUCOSE mg/dl 84 121 119               Lines/Drains:  Invasive Devices       Peripheral Intravenous Line  Duration             Peripheral IV 03/21/24 Right Antecubital 1 day                          Imaging: No pertinent imaging reviewed.    Recent Cultures (last 7 days):         Last 24 Hours Medication List:   Current Facility-Administered Medications   Medication Dose Route Frequency Provider Last Rate    amitriptyline  50 mg Oral HS Sarina Christiansen MD      amLODIPine  10 mg Oral Daily Sarina Christiansen MD      dicyclomine  20 mg Oral Q6H PRN Sarina Christiansen MD      enoxaparin  30 mg Subcutaneous Daily Leilani Rudd PA-C      hydrALAZINE  5 mg Intravenous Q8H PRN Leilani Rudd PA-C      HYDROmorphone  0.2 mg Intravenous Q6H PRN Sarina Christiansen MD      ondansetron  4 mg Intravenous Q6H PRN Leilani Rudd PA-C      oxyCODONE  2.5 mg Oral TID PRN Sarina Christiansen MD      pantoprazole  40 mg Intravenous Q24H [facility] Leilani Rudd PA-C          Today, Patient Was Seen By: Sarina Christiansen MD    **Please Note: This note may have been constructed using a voice recognition system.**

## 2024-03-23 NOTE — UTILIZATION REVIEW
Continued Stay Review    Date: 3/23                          Current Patient Class: INpatient   Current Level of Care: MEd/surg    HPI:78 y.o. female initially admitted on 3/21    Assessment/Plan: Initially admitted with ileus, partial small bowel obstruction. After bowel rest, NGtube, is tolerating small amounts of clear liquids, but Continues with LLQ cramping after eating jello this morning.  Passing  flatus, had small bm this morning.  Gen surgery and GI following.  Continue with IV protonix. Having gassiness and abdominal distension.      Vital Signs: Vital Signs (last 2 days)    Date/Time Temp Pulse Resp BP MAP (mmHg) SpO2 O2 Device Patient Position - Orthostatic VS   03/23/24 0934 -- -- -- -- -- -- None (Room air) --   03/23/24 0700 97.2 °F (36.2 °C) Abnormal  -- 12 129/73 96 -- -- --   03/22/24 18:48:44 97 °F (36.1 °C) Abnormal  85 16 116/78 91 98 % None (Room air) Lying   03/22/24 15:26:05 97 °F (36.1 °C) Abnormal  81 18 141/83 102 97 % None (Room air) Sitting   03/22/24 1236 -- -- -- -- -- -- None (Room air) --   03/22/24 07:50:03 96.8 °F (36 °C) Abnormal  77 18 132/84 100 98 % -- --   03/22/24 0300 97.2 °F (36.2 °C) Abnormal  75 18 179/99 Abnormal  126 100 % None (Room air) --   03/22/24 02:39:26 97.2 °F (36.2 °C) Abnormal  -- 17 179/99 Abnormal  126 64 % Abnormal  -- --   03/22/24 0102 -- 81 20 122/68 89 97 % None (Room air) --   03/21/24 2335 97 °F (36.1 °C) Abnormal  92 16 157/87 114 98 % None (Room air) Sittin     Pertinent Labs/Diagnostic Results:       Results from last 7 days   Lab Units 03/23/24  0504 03/22/24  0002   WBC Thousand/uL 5.40 7.73   HEMOGLOBIN g/dL 9.7* 10.3*   HEMATOCRIT % 29.4* 32.1*   PLATELETS Thousands/uL 169 231   NEUTROS ABS Thousands/µL 2.86 4.09         Results from last 7 days   Lab Units 03/23/24  0504 03/22/24  0429 03/22/24  0002   SODIUM mmol/L 139 139 140   POTASSIUM mmol/L 4.2 4.5 4.1   CHLORIDE mmol/L 106 108 107   CO2 mmol/L 27 25 26   ANION GAP mmol/L 6 6 7   BUN  mg/dL 17 21 22   CREATININE mg/dL 1.27 1.37* 1.50*   EGFR ml/min/1.73sq m 40 37 33   CALCIUM mg/dL 8.9 8.6 9.3   MAGNESIUM mg/dL 2.1 1.8*  --    PHOSPHORUS mg/dL  --  3.5  --      Results from last 7 days   Lab Units 03/23/24  0504 03/22/24  0429 03/22/24  0002   AST U/L 42* 29 38   ALT U/L 33 29 35   ALK PHOS U/L 101 108* 123*   TOTAL PROTEIN g/dL 6.5 6.5 7.6   ALBUMIN g/dL 3.3* 3.5 4.0   TOTAL BILIRUBIN mg/dL 0.37 0.21 0.25   BILIRUBIN DIRECT mg/dL  --  0.06  --      Results from last 7 days   Lab Units 03/23/24  0636 03/22/24  1805 03/22/24  0550   POC GLUCOSE mg/dl 84 121 119     Results from last 7 days   Lab Units 03/23/24  0504 03/22/24  0429 03/22/24  0002   GLUCOSE RANDOM mg/dL 95 105 190*     Results from last 7 days   Lab Units 03/22/24  0002   HS TNI 0HR ng/L 3         Results from last 7 days   Lab Units 03/22/24  0002   LIPASE u/L <6*       Medications:   Scheduled Medications:  amitriptyline, 50 mg, Oral, HS  amLODIPine, 10 mg, Oral, Daily  enoxaparin, 30 mg, Subcutaneous, Daily  famotidine, 40 mg, Oral, HS  pantoprazole, 40 mg, Intravenous, Q12H VINCE      Continuous IV Infusions:     PRN Meds:  dicyclomine, 20 mg, Oral, Q6H PRN  hydrALAZINE, 5 mg, Intravenous, Q8H PRN  HYDROmorphone, 0.2 mg, Intravenous, Q6H PRN  ondansetron, 4 mg, Intravenous, Q6H PRN  oxyCODONE, 2.5 mg, Oral, TID PRN        Discharge Plan: TBD    Network Utilization Review Department  ATTENTION: Please call with any questions or concerns to [phone] and carefully listen to the prompts so that you are directed to the right person. All voicemails are confidential.   For Discharge needs, contact Care Management DC Support Team at [phone] opt. [phone]  Send all requests for admission clinical reviews, approved or denied determinations and any other requests to dedicated fax number below belonging to the campus where the patient is receiving treatment. List of dedicated fax numbers for the Facilities:  FACILITY NAME UR FAX NUMBER    ADMISSION DENIALS (Administrative/Medical Necessity) [phone]   DISCHARGE SUPPORT TEAM (NETWORK) [phone]   PARENT CHILD HEALTH (Maternity/NICU/Pediatrics) [phone]   [facility] [phone]   [facility] [phone]   Critical access hospital [phone]   [facility] [phone]   formerly [facility] [phone]   [facility] [phone]   [facility] [phone]   [facility] [phone]   [facility] [phone]   [facility] [phone]   [facility] [phone]   [facility] [phone]

## 2024-03-23 NOTE — ASSESSMENT & PLAN NOTE
S/p esophageal dilation at [facility] on 3/20 - only able to dilate some due to small mucosal tear   Advance diet as per surgery and GI

## 2024-03-24 ENCOUNTER — APPOINTMENT (INPATIENT)
Dept: RADIOLOGY | Facility: HOSPITAL | Age: 79
DRG: 388 | End: 2024-03-24
Payer: COMMERCIAL

## 2024-03-24 LAB
ANION GAP SERPL CALCULATED.3IONS-SCNC: 4 MMOL/L (ref 4–13)
BASOPHILS # BLD AUTO: 0.01 THOUSANDS/ÂΜL (ref 0–0.1)
BASOPHILS NFR BLD AUTO: 0 % (ref 0–1)
BUN SERPL-MCNC: 18 MG/DL (ref 5–25)
CALCIUM SERPL-MCNC: 9.1 MG/DL (ref 8.4–10.2)
CHLORIDE SERPL-SCNC: 105 MMOL/L (ref 96–108)
CO2 SERPL-SCNC: 30 MMOL/L (ref 21–32)
CREAT SERPL-MCNC: 1.49 MG/DL (ref 0.6–1.3)
EOSINOPHIL # BLD AUTO: 0.08 THOUSAND/ÂΜL (ref 0–0.61)
EOSINOPHIL NFR BLD AUTO: 2 % (ref 0–6)
ERYTHROCYTE [DISTWIDTH] IN BLOOD BY AUTOMATED COUNT: 13.3 % (ref 11.6–15.1)
GFR SERPL CREATININE-BSD FRML MDRD: 33 ML/MIN/1.73SQ M
GLUCOSE SERPL-MCNC: 97 MG/DL (ref 65–140)
GLUCOSE SERPL-MCNC: 97 MG/DL (ref 65–140)
HCT VFR BLD AUTO: 28.4 % (ref 34.8–46.1)
HGB BLD-MCNC: 9.4 G/DL (ref 11.5–15.4)
IMM GRANULOCYTES # BLD AUTO: 0.03 THOUSAND/UL (ref 0–0.2)
IMM GRANULOCYTES NFR BLD AUTO: 1 % (ref 0–2)
LYMPHOCYTES # BLD AUTO: 1.96 THOUSANDS/ÂΜL (ref 0.6–4.47)
LYMPHOCYTES NFR BLD AUTO: 41 % (ref 14–44)
MCH RBC QN AUTO: 31.9 PG (ref 26.8–34.3)
MCHC RBC AUTO-ENTMCNC: 33.1 G/DL (ref 31.4–37.4)
MCV RBC AUTO: 96 FL (ref 82–98)
MONOCYTES # BLD AUTO: 0.27 THOUSAND/ÂΜL (ref 0.17–1.22)
MONOCYTES NFR BLD AUTO: 6 % (ref 4–12)
NEUTROPHILS # BLD AUTO: 2.49 THOUSANDS/ÂΜL (ref 1.85–7.62)
NEUTS SEG NFR BLD AUTO: 50 % (ref 43–75)
NRBC BLD AUTO-RTO: 0 /100 WBCS
PLATELET # BLD AUTO: 177 THOUSANDS/UL (ref 149–390)
PMV BLD AUTO: 8.7 FL (ref 8.9–12.7)
POTASSIUM SERPL-SCNC: 4.4 MMOL/L (ref 3.5–5.3)
RBC # BLD AUTO: 2.95 MILLION/UL (ref 3.81–5.12)
SODIUM SERPL-SCNC: 139 MMOL/L (ref 135–147)
WBC # BLD AUTO: 4.84 THOUSAND/UL (ref 4.31–10.16)

## 2024-03-24 PROCEDURE — 74022 RADEX COMPL AQT ABD SERIES: CPT

## 2024-03-24 PROCEDURE — 99232 SBSQ HOSP IP/OBS MODERATE 35: CPT | Performed by: INTERNAL MEDICINE

## 2024-03-24 PROCEDURE — 99232 SBSQ HOSP IP/OBS MODERATE 35: CPT | Performed by: NURSE PRACTITIONER

## 2024-03-24 PROCEDURE — 82948 REAGENT STRIP/BLOOD GLUCOSE: CPT

## 2024-03-24 PROCEDURE — C9113 INJ PANTOPRAZOLE SODIUM, VIA: HCPCS | Performed by: NURSE PRACTITIONER

## 2024-03-24 PROCEDURE — 80048 BASIC METABOLIC PNL TOTAL CA: CPT | Performed by: INTERNAL MEDICINE

## 2024-03-24 PROCEDURE — 74018 RADEX ABDOMEN 1 VIEW: CPT

## 2024-03-24 PROCEDURE — 85025 COMPLETE CBC W/AUTO DIFF WBC: CPT | Performed by: INTERNAL MEDICINE

## 2024-03-24 RX ADMIN — PANTOPRAZOLE SODIUM 40 MG: 40 INJECTION, POWDER, FOR SOLUTION INTRAVENOUS at 08:10

## 2024-03-24 RX ADMIN — ONDANSETRON 4 MG: 2 INJECTION INTRAMUSCULAR; INTRAVENOUS at 12:38

## 2024-03-24 RX ADMIN — AMITRIPTYLINE HYDROCHLORIDE 50 MG: 50 TABLET, FILM COATED ORAL at 21:00

## 2024-03-24 RX ADMIN — PANTOPRAZOLE SODIUM 40 MG: 40 INJECTION, POWDER, FOR SOLUTION INTRAVENOUS at 21:00

## 2024-03-24 RX ADMIN — ENOXAPARIN SODIUM 30 MG: 30 INJECTION SUBCUTANEOUS at 08:10

## 2024-03-24 RX ADMIN — FAMOTIDINE 10 MG: 20 TABLET, FILM COATED ORAL at 21:00

## 2024-03-24 RX ADMIN — AMLODIPINE BESYLATE 10 MG: 5 TABLET ORAL at 08:10

## 2024-03-24 RX ADMIN — HYDROMORPHONE HYDROCHLORIDE 0.2 MG: 0.2 INJECTION, SOLUTION INTRAMUSCULAR; INTRAVENOUS; SUBCUTANEOUS at 12:38

## 2024-03-24 RX ADMIN — Medication 2.5 MG: at 21:00

## 2024-03-24 NOTE — ASSESSMENT & PLAN NOTE
"Presents LUQ and LLQ AP - no nausea or vomiting, had 3BM today and is having flatus  CTA A/P: \"Findings consistent with a partial low-grade small bowel obstruction without a discrete transition point identified \"  Does not examine like SBO - likely ileus  Pain control   GI consulted  Surgery following    Complaining of abdominal cramps, multiple loose bowel movements this morning.  Not bloody, low suspicion for infectious  Will give Bentyl, Dilaudid  Will order stool workup  Obstruction series from yesterday showed--Mildly distended small bowel in the upper abdomen similar to prior is nonspecific but may represent an ileus.   Diet advanced by GI  GI follow-up  "

## 2024-03-24 NOTE — PROGRESS NOTES
Progress note - Gastroenterology   Ira EFRA Bonilla 78 y.o. female MRN: [id number]  Unit/Bed#: -01 Encounter: [id number]    ASSESSMENT and PLAN    78 y.o. year old female with past medical history of Nissen procedure 2009 [facility], recent esophageal dilation 3/20 [facility], HTN, CKD, hiatal hernia repair 11/2023 with takedown of previous Nissen, and pyloroplasty 11/2023 presents with left upper and left lower quadrant abdominal tenderness.  GI consulted for ileus.     1.  Ileus  CT A/P on admission; findings consistent with a partial low-grade small bowel obstruction without a discrete transition point identified.  Patient states she moved her bowels approximately 2 or 3 times yesterday.  Having soft loose bowel movements.  Denies hematochezia or melena.  On exam, patient does have left upper and left lower quadrant abdominal tenderness as well as epigastric tenderness with palpation.  KUB completed this morning, results pending.     -Tolerating full liquid diet   -Pain management and antiemetics as necessary  -Continue IVF  -KUB results pending    Discuss patient with Dr. Holley on rounds.  Requesting obstruction series.     2.  Esophageal dysphagia  3.  History of Nissen fundoplication  Patient with long history of esophageal dysphagia.  Patient's original Nissen fundoplication surgery was in 2009 at [facility].  Hiatal hernia repair with mesh 11/2023, also pyloroplasty was completed at the same time.  Patient states that since recent EGD with balloon dilation on 3/20 she has been having increased epigastric discomfort with increased acid reflux symptoms.  She states her left upper and lower quadrant pain has been consistent for a while.     -Pantoprazole 40 mg IVP daily      Chief Complaint   Patient presents with    Abdominal Pain     Left sided abdominal pain that started this afternoon.  Denies nausea or vomiting.       SUBJECTIVE/HPI   Patient continues to Have complaint of generalized abdominal tenderness.  Waves of  "nausea.  Denies vomiting.  Patient states she has had 3 bowel movements today to were soft and 1 was loose.  Denies hematochezia or melena.        /67   Pulse 85   Temp (!) 97.2 °F (36.2 °C) (Oral)   Resp 12   Ht 5' (1.524 m)   Wt 50.7 kg (111 lb 12.4 oz)   SpO2 98%   BMI 21.83 kg/m²     PHYSICALEXAM  General appearance: alert, appears stated age and cooperative  Eyes: PERLLA, EOMI, no icterus   Head: Normocephalic, without obvious abnormality, atraumatic  Lungs: clear to auscultation bilaterally  Heart: regular rate and rhythm, S1, S2 normal, no murmur, click, rub or gallop  Abdomen: soft, non-tender; bowel sounds normal; no masses,  no organomegaly  Extremities: extremities normal, atraumatic, no cyanosis or edema  Neurologic: Grossly normal    Lab Results   Component Value Date    GLUCOSE 110 04/09/2015    CALCIUM 9.1 03/24/2024     04/09/2015    K 4.4 03/24/2024    CO2 30 03/24/2024     03/24/2024    BUN 18 03/24/2024    CREATININE 1.49 (H) 03/24/2024     Lab Results   Component Value Date    WBC 4.84 03/24/2024    HGB 9.4 (L) 03/24/2024    HCT 28.4 (L) 03/24/2024    MCV 96 03/24/2024     03/24/2024     Lab Results   Component Value Date    ALT 33 03/23/2024    AST 42 (H) 03/23/2024    ALKPHOS 101 03/23/2024    BILITOT 0.4 02/05/2015     No results found for: \"AMYLASE\"  Lab Results   Component Value Date    LIPASE <6 (L) 03/22/2024     Lab Results   Component Value Date    IRON 44 (L) 11/10/2023    TIBC 249 (L) 11/10/2023    FERRITIN 384 (H) 11/10/2023     Lab Results   Component Value Date    INR 1.48 (H) 11/10/2023     "

## 2024-03-24 NOTE — PROGRESS NOTES
"[facility]  Progress Note  Name: Ira Bonilla I  MRN: [id number]  Unit/Bed#: [facility]-01 I Date of Admission: 3/21/2024   Date of Service: 3/24/2024 I Hospital Day: 2    Assessment/Plan   Anemia  Assessment & Plan  Hgb 10.3   Baseline 8-10   No signs of active bleeding   Trend CBC    Stage 3b chronic kidney disease ([facility])  Assessment & Plan  Lab Results   Component Value Date    EGFR 33 03/24/2024    EGFR 40 03/23/2024    EGFR 37 03/22/2024    CREATININE 1.49 (H) 03/24/2024    CREATININE 1.27 03/23/2024    CREATININE 1.37 (H) 03/22/2024   Baseline Cr 1.4-1.5  Cr on admit 1.50   Trend BMP daily     Hypertension  Assessment & Plan  Home regimen: Amlodipine 10 Mg daily  Will continue with Hold parameters    COPD (chronic obstructive pulmonary disease) ([facility])  Assessment & Plan  Xopenex PRN  No signs of acute exacerbation on admission     Esophageal dysphagia  Assessment & Plan  S/p esophageal dilation at [facility] on 3/20 - only able to dilate some due to small mucosal tear   Advance diet as per surgery and GI      * Ileus ([facility])  Assessment & Plan  Presents LUQ and LLQ AP - no nausea or vomiting, had 3BM today and is having flatus  CTA A/P: \"Findings consistent with a partial low-grade small bowel obstruction without a discrete transition point identified \"  Does not examine like SBO - likely ileus  Pain control   GI consulted  Surgery following   Complaining of abdominal cramps, multiple loose bowel movements this morning.  Not bloody, low suspicion for infectious  Will give Bentyl, Dilaudid  Repeat x-ray KUB from this morning shows improvement           VTE Pharmacologic Prophylaxis: VTE Score: 4 Moderate Risk (Score 3-4) - Pharmacological DVT Prophylaxis Ordered: enoxaparin (Lovenox).    Mobility:   Basic Mobility Inpatient Raw Score: 23  JH-HLM Goal: 7: Walk 25 feet or more  JH-HLM Achieved: 7: Walk 25 feet or more  JH-HLM Goal achieved. Continue to encourage appropriate " mobility.    Patient Centered Rounds: I performed bedside rounds with nursing staff today.   Discussions with Specialists or Other Care Team Provider: GI    Education and Discussions with Family / Patient: Patient declined call to .     Total Time Spent on Date of Encounter in care of patient: 40 mins. This time was spent on one or more of the following: performing physical exam; counseling and coordination of care; obtaining or reviewing history; documenting in the medical record; reviewing/ordering tests, medications or procedures; communicating with other healthcare professionals and discussing with patient's family/caregivers.    Current Length of Stay: 2 day(s)  Current Patient Status: Inpatient   Certification Statement: The patient will continue to require additional inpatient hospital stay due to advanced diet.  Medical clearance from GI/surgery  Discharge Plan: Anticipate discharge tomorrow to home.    Code Status: Level 3 - DNAR and DNI    Subjective:   Patient stated she has diarrhea, nausea, abdominal cramps.     Objective:     Vitals:   No data recorded.    BP: (124)/(67) 124/67  Body mass index is 21.83 kg/m².     Input and Output Summary (last 24 hours):   No intake or output data in the 24 hours ending 03/24/24 1235      Physical Exam:   Physical Exam     Gen.-Patient appears uncomfortable  Neck- Supple. No thyromegaly or lymphadenopathy  Lungs-Clear bilaterally without any wheeze or rales   Heart S1-S2, regular rate and rhythm, no murmurs  Abdomen-soft +taurus, no organomegaly. Bowel sounds present  Extremities-no cyanosi,  clubbing or edema  Skin- no rash  Neuro-nonfocal     Additional Data:     Labs:  Results from last 7 days   Lab Units 03/24/24  0555   WBC Thousand/uL 4.84   HEMOGLOBIN g/dL 9.4*   HEMATOCRIT % 28.4*   PLATELETS Thousands/uL 177   NEUTROS PCT % 50   LYMPHS PCT % 41   MONOS PCT % 6   EOS PCT % 2     Results from last 7 days   Lab Units 03/24/24  0555 03/23/24  0504    SODIUM mmol/L 139 139   POTASSIUM mmol/L 4.4 4.2   CHLORIDE mmol/L 105 106   CO2 mmol/L 30 27   BUN mg/dL 18 17   CREATININE mg/dL 1.49* 1.27   ANION GAP mmol/L 4 6   CALCIUM mg/dL 9.1 8.9   ALBUMIN g/dL  --  3.3*   TOTAL BILIRUBIN mg/dL  --  0.37   ALK PHOS U/L  --  101   ALT U/L  --  33   AST U/L  --  42*   GLUCOSE RANDOM mg/dL 97 95         Results from last 7 days   Lab Units 03/24/24  0555 03/23/24  2143 03/23/24  0636 03/22/24  1805 03/22/24  0550   POC GLUCOSE mg/dl 97 102 84 121 119               Lines/Drains:  Invasive Devices       Peripheral Intravenous Line  Duration             Peripheral IV 03/21/24 Right Antecubital 2 days                          Imaging: No pertinent imaging reviewed.    Recent Cultures (last 7 days):         Last 24 Hours Medication List:   Current Facility-Administered Medications   Medication Dose Route Frequency Provider Last Rate    amitriptyline  50 mg Oral HS Sarina Christiansen MD      amLODIPine  10 mg Oral Daily Sarina Christiansen MD      dicyclomine  20 mg Oral Q6H PRN Sarina Christiansen MD      enoxaparin  30 mg Subcutaneous Daily Leilani Rudd PA-C      famotidine  10 mg Oral HS RUSS Pino      hydrALAZINE  5 mg Intravenous Q8H PRN Leilani Rudd PA-C      HYDROmorphone  0.2 mg Intravenous Q6H PRN Sarina Christiansen MD      ondansetron  4 mg Intravenous Q6H PRN Leilani Rudd PA-C      oxyCODONE  2.5 mg Oral TID PRN Sarina Christiansen MD      pantoprazole  40 mg Intravenous Q12H [facility] RUSS Pino          Today, Patient Was Seen By: Sarina Christiansen MD    **Please Note: This note may have been constructed using a voice recognition system.**

## 2024-03-24 NOTE — MALNUTRITION/BMI
This medical record reflects one or more clinical indicators suggestive of malnutrition and/or morbid obesity.    Malnutrition Findings:   Adult Malnutrition type: Chronic illness  Adult Degree of Malnutrition: Other severe protein calorie malnutrition  Malnutrition Characteristics: Inadequate energy, Weight loss                  360 Statement: Severe calorie-protein malnutrition in context of chronic illness r/t altered GI function, inadequate PO intake as evidance by energy intake less than 75% compared to estimated needs>1 month, 12% wt loss x 5 months ( 57.8kg 11/8/23-> 50.7kg 3/22/24); Treated with PO diet (pt declined oral supplements)    BMI Findings:           Body mass index is 21.83 kg/m².     See Nutrition note dated 3/24/24 for additional details.  Completed nutrition assessment is viewable in the nutrition documentation.

## 2024-03-24 NOTE — NUTRITION
03/24/24 1115   Biochemical Data,Medical Tests, and Procedures   Biochemical Data/Medical Tests/Procedures Lab values reviewed;Meds reviewed   Labs (Comment) Glucose POC:  K 4.4   Meds (Comment) pepcid, protonix   Other Diagnostic/Procedures (Comment) CT A/P on admission; findings consistent with a partial low-grade small bowel obstruction without a discrete transition point identified.   Nutrition-Focused Physical Exam   Nutrition-Focused Physical Exam Findings RN skin assessment reviewed;No skin issues documented;Edema   Nutrition-Focused Physical Exam Findings trace BLE edema, noted with moderate body fat, muscle depletions   Medical-Related Concerns Nissen procedure 2009 [facility], recent esophageal dilation 3/20 [facility], HTN, CKD, hiatal hernia repair 11/2023 with takedown of previous Nissen, and pyloroplasty 11/2023 presents with left upper and left lower quadrant abdominal tenderness.   Adequacy of Intake   Nutrition Modality PO   Current PO Intake   Current Diet Order full liquid   Current Meal Intake 0-25%;Inadequate   Estimated Calorie Intake 0-25%   Estimated Protein Intake  0-25%   Estimated Fluid Intake 25-50%   Estimated calorie intake compared to estimated need not meeting estimated needs   PES Statement   Problem Intake   Oral or Nutritional Support Intake (2) Inadequate oral intake NI-2.1   Related to GI distress/pain   As evidenced by: Weight loss;Intake < estimated needs   Recommendations/Interventions   Malnutrition/BMI Present Yes   Adult Malnutrition type Chronic illness   Adult Degree of Malnutrition Other severe protein calorie malnutrition   Malnutrition Characteristics Inadequate energy;Weight loss   360 Statement Severe calorie-protein malnutrition in context of chronic illness r/t altered GI function, inadequate PO intake as evidance by energy intake less than 75% compared to estimated needs>1 month, 12% wt loss x 5 months ( 57.8kg 11/8/23-> 50.7kg 3/22/24); Treated with PO diet (pt  declined oral supplements)   Summary Pt reports poor PO intake since her GI surgeries 11/2023, eating small amount of food and often having diarrhea or vomiting post meals, pt not able to tolerate many foods, oral supplements, afraid of eating. Further decreased in PO intake over last week, pt with minimal PO intake due to nausea, abdominal pain, diarrhea. Pt not able to have fruits, juices, gravy, oral supplements, oatmeal, ice cream, most foods give her diarrhea or don't go down. Pt likely meeting less than 50% estimated needs>5 days and <75% estimated needs for over 1 month, meeting criteria for malnutrition. Pt currently with minimal intake, declined Ensure Plus or Clear   Interventions/Recommendations Diet to advance   Recommendations to Provider Advace diet to low fiber, diet texture per md; Consider nutrition support if patient not able to advance/tolerate PO diet   Education Assessment   Education Education initiated/ completed   Education Notes small frequant meals, gastroparesis diet, oral supplements   Patient Nutrition Goals   Goal Adequate intake;Tolerate PO diet;Avoid weight loss   Goal Status Initiated   Timeframe to complete goal by next f/u   Nutrition Complexity Risk   Nutrition complexity level High risk   Follow up date 03/28/24

## 2024-03-24 NOTE — ASSESSMENT & PLAN NOTE
Lab Results   Component Value Date    EGFR 33 03/24/2024    EGFR 40 03/23/2024    EGFR 37 03/22/2024    CREATININE 1.49 (H) 03/24/2024    CREATININE 1.27 03/23/2024    CREATININE 1.37 (H) 03/22/2024   Baseline Cr 1.4-1.5  Cr on admit 1.50   Trend BMP daily

## 2024-03-25 VITALS
HEIGHT: 60 IN | HEART RATE: 78 BPM | RESPIRATION RATE: 12 BRPM | BODY MASS INDEX: 21.94 KG/M2 | WEIGHT: 111.77 LBS | OXYGEN SATURATION: 98 % | DIASTOLIC BLOOD PRESSURE: 76 MMHG | SYSTOLIC BLOOD PRESSURE: 129 MMHG | TEMPERATURE: 97.2 F

## 2024-03-25 PROBLEM — E43 SEVERE PROTEIN-CALORIE MALNUTRITION (HCC): Status: ACTIVE | Noted: 2024-03-25

## 2024-03-25 LAB
GLUCOSE SERPL-MCNC: 102 MG/DL (ref 65–140)
GLUCOSE SERPL-MCNC: 104 MG/DL (ref 65–140)
GLUCOSE SERPL-MCNC: 230 MG/DL (ref 65–140)
GLUCOSE SERPL-MCNC: 76 MG/DL (ref 65–140)

## 2024-03-25 PROCEDURE — 82948 REAGENT STRIP/BLOOD GLUCOSE: CPT

## 2024-03-25 PROCEDURE — 87493 C DIFF AMPLIFIED PROBE: CPT | Performed by: INTERNAL MEDICINE

## 2024-03-25 PROCEDURE — 87505 NFCT AGENT DETECTION GI: CPT | Performed by: INTERNAL MEDICINE

## 2024-03-25 PROCEDURE — 99232 SBSQ HOSP IP/OBS MODERATE 35: CPT | Performed by: PHYSICIAN ASSISTANT

## 2024-03-25 PROCEDURE — 99232 SBSQ HOSP IP/OBS MODERATE 35: CPT | Performed by: INTERNAL MEDICINE

## 2024-03-25 PROCEDURE — C9113 INJ PANTOPRAZOLE SODIUM, VIA: HCPCS | Performed by: NURSE PRACTITIONER

## 2024-03-25 RX ORDER — SIMETHICONE 80 MG
80 TABLET,CHEWABLE ORAL EVERY 6 HOURS PRN
Status: DISCONTINUED | OUTPATIENT
Start: 2024-03-25 | End: 2024-03-26 | Stop reason: HOSPADM

## 2024-03-25 RX ADMIN — HYDROMORPHONE HYDROCHLORIDE 0.2 MG: 0.2 INJECTION, SOLUTION INTRAMUSCULAR; INTRAVENOUS; SUBCUTANEOUS at 14:36

## 2024-03-25 RX ADMIN — Medication 2.5 MG: at 21:26

## 2024-03-25 RX ADMIN — PANTOPRAZOLE SODIUM 40 MG: 40 INJECTION, POWDER, FOR SOLUTION INTRAVENOUS at 20:19

## 2024-03-25 RX ADMIN — Medication 2.5 MG: at 17:53

## 2024-03-25 RX ADMIN — AMLODIPINE BESYLATE 10 MG: 5 TABLET ORAL at 08:43

## 2024-03-25 RX ADMIN — SIMETHICONE 80 MG: 80 TABLET, CHEWABLE ORAL at 21:56

## 2024-03-25 RX ADMIN — DICYCLOMINE HYDROCHLORIDE 20 MG: 20 TABLET ORAL at 10:28

## 2024-03-25 RX ADMIN — ENOXAPARIN SODIUM 30 MG: 30 INJECTION SUBCUTANEOUS at 08:43

## 2024-03-25 RX ADMIN — DICYCLOMINE HYDROCHLORIDE 20 MG: 20 TABLET ORAL at 19:21

## 2024-03-25 RX ADMIN — AMITRIPTYLINE HYDROCHLORIDE 50 MG: 50 TABLET, FILM COATED ORAL at 21:27

## 2024-03-25 RX ADMIN — PANTOPRAZOLE SODIUM 40 MG: 40 INJECTION, POWDER, FOR SOLUTION INTRAVENOUS at 08:43

## 2024-03-25 RX ADMIN — FAMOTIDINE 10 MG: 20 TABLET, FILM COATED ORAL at 21:26

## 2024-03-25 RX ADMIN — ONDANSETRON 4 MG: 2 INJECTION INTRAMUSCULAR; INTRAVENOUS at 21:32

## 2024-03-25 RX ADMIN — HYDROMORPHONE HYDROCHLORIDE 0.2 MG: 0.2 INJECTION, SOLUTION INTRAMUSCULAR; INTRAVENOUS; SUBCUTANEOUS at 20:19

## 2024-03-25 NOTE — PLAN OF CARE
Problem: PAIN - ADULT  Goal: Verbalizes/displays adequate comfort level or baseline comfort level  Description: Interventions:  - Encourage patient to monitor pain and request assistance  - Assess pain using appropriate pain scale  - Administer analgesics based on type and severity of pain and evaluate response  - Implement non-pharmacological measures as appropriate and evaluate response  - Consider cultural and social influences on pain and pain management  - Notify physician/advanced practitioner if interventions unsuccessful or patient reports new pain  Outcome: Progressing     Problem: INFECTION - ADULT  Goal: Absence or prevention of progression during hospitalization  Description: INTERVENTIONS:  - Assess and monitor for signs and symptoms of infection  - Monitor lab/diagnostic results  - Monitor all insertion sites, i.e. indwelling lines, tubes, and drains  - Monitor endotracheal if appropriate and nasal secretions for changes in amount and color  - [place] appropriate cooling/warming therapies per order  - Administer medications as ordered  - Instruct and encourage patient and family to use good hand hygiene technique  - Identify and instruct in appropriate isolation precautions for identified infection/condition  Outcome: Progressing  Goal: Absence of fever/infection during neutropenic period  Description: INTERVENTIONS:  - Monitor WBC    Outcome: Progressing     Problem: DISCHARGE PLANNING  Goal: Discharge to home or other facility with appropriate resources  Description: INTERVENTIONS:  - Identify barriers to discharge w/patient and caregiver  - Arrange for needed discharge resources and transportation as appropriate  - Identify discharge learning needs (meds, wound care, etc.)  - Arrange for interpretive services to assist at discharge as needed  - Refer to Case Management Department for coordinating discharge planning if the patient needs post-hospital services based on physician/advanced  practitioner order or complex needs related to functional status, cognitive ability, or social support system  Outcome: Progressing     Problem: Knowledge Deficit  Goal: Patient/family/caregiver demonstrates understanding of disease process, treatment plan, medications, and discharge instructions  Description: Complete learning assessment and assess knowledge base.  Interventions:  - Provide teaching at level of understanding  - Provide teaching via preferred learning methods  Outcome: Progressing     Problem: Nutrition/Hydration-ADULT  Goal: Nutrient/Hydration intake appropriate for improving, restoring or maintaining nutritional needs  Description: Monitor and assess patient's nutrition/hydration status for malnutrition. Collaborate with interdisciplinary team and initiate plan and interventions as ordered.  Monitor patient's weight and dietary intake as ordered or per policy. Utilize nutrition screening tool and intervene as necessary. Determine patient's food preferences and provide high-protein, high-caloric foods as appropriate.     INTERVENTIONS:  - Monitor oral intake, urinary output, labs, and treatment plans  - Assess nutrition and hydration status and recommend course of action  - Evaluate amount of meals eaten  - Assist patient with eating if necessary   - Allow adequate time for meals  - Recommend/ encourage appropriate diets, oral nutritional supplements, and vitamin/mineral supplements  - Order, calculate, and assess calorie counts as needed  - Recommend, monitor, and adjust tube feedings and TPN/PPN based on assessed needs  - Assess need for intravenous fluids  - Provide specific nutrition/hydration education as appropriate  - Include patient/family/caregiver in decisions related to nutrition  Outcome: Progressing     Problem: GASTROINTESTINAL - ADULT  Goal: Minimal or absence of nausea and/or vomiting  Description: INTERVENTIONS:  - Administer IV fluids if ordered to ensure adequate hydration  -  Maintain NPO status until nausea and vomiting are resolved  - Nasogastric tube if ordered  - Administer ordered antiemetic medications as needed  - Provide nonpharmacologic comfort measures as appropriate  - Advance diet as tolerated, if ordered  - Consider nutrition services referral to assist patient with adequate nutrition and appropriate food choices  Outcome: Progressing  Goal: Maintains or returns to baseline bowel function  Description: INTERVENTIONS:  - Assess bowel function  - Encourage oral fluids to ensure adequate hydration  - Administer IV fluids if ordered to ensure adequate hydration  - Administer ordered medications as needed  - Encourage mobilization and activity  - Consider nutritional services referral to assist patient with adequate nutrition and appropriate food choices  Outcome: Progressing  Goal: Maintains adequate nutritional intake  Description: INTERVENTIONS:  - Monitor percentage of each meal consumed  - Identify factors contributing to decreased intake, treat as appropriate  - Assist with meals as needed  - Monitor I&O, weight, and lab values if indicated  - Obtain nutrition services referral as needed  Outcome: Progressing  Goal: Oral mucous membranes remain intact  Description: INTERVENTIONS  - Assess oral mucosa and hygiene practices  - Implement preventative oral hygiene regimen  - Implement oral medicated treatments as ordered  - Initiate Nutrition services referral as needed  Outcome: Progressing     Problem: Potential for Falls  Goal: Patient will remain free of falls  Description: INTERVENTIONS:  - Educate patient/family on patient safety including physical limitations  - Instruct patient to call for assistance with activity   - Consult OT/PT to assist with strengthening/mobility   - Keep Call bell within reach  - Keep bed low and locked with side rails adjusted as appropriate  - Keep care items and personal belongings within reach  - Initiate and maintain comfort rounds  - Make  Fall Risk Sign visible to staff  - Offer Toileting every 2 Hours, in advance of need  - Initiate/Maintain alarm  - Obtain necessary fall risk management equipment:   - Apply yellow socks and bracelet for high fall risk patients  - Consider moving patient to room near nurses station  Outcome: Progressing

## 2024-03-25 NOTE — PROGRESS NOTES
"[facility]  Progress Note  Name: Ira Bonilla I  MRN: [id number]  Unit/Bed#: -01 I Date of Admission: 3/21/2024   Date of Service: 3/25/2024  Hospital Day: 3    Assessment/Plan   Severe protein-calorie malnutrition (HCC)  Assessment & Plan  Malnutrition Findings:   Adult Malnutrition type: Chronic illness  Adult Degree of Malnutrition: Other severe protein calorie malnutrition  Malnutrition Characteristics: Inadequate energy, Weight loss                  360 Statement: Severe calorie-protein malnutrition in context of chronic illness r/t altered GI function, inadequate PO intake as evidance by energy intake less than 75% compared to estimated needs>1 month, 12% wt loss x 5 months ( 57.8kg 11/8/23-> 50.7kg 3/22/24); Treated with PO diet (pt declined oral supplements)    BMI Findings:           Body mass index is 21.83 kg/m².       Anemia  Assessment & Plan  Hgb 10.3   Baseline 8-10   No signs of active bleeding   Trend CBC    Stage 3b chronic kidney disease ([facility])  Assessment & Plan  Lab Results   Component Value Date    EGFR 33 03/24/2024    EGFR 40 03/23/2024    EGFR 37 03/22/2024    CREATININE 1.49 (H) 03/24/2024    CREATININE 1.27 03/23/2024    CREATININE 1.37 (H) 03/22/2024   Baseline Cr 1.4-1.5  Cr on admit 1.50   Trend BMP daily     Hypertension  Assessment & Plan  Home regimen: Amlodipine 10 Mg daily  Will continue with Hold parameters    COPD (chronic obstructive pulmonary disease) ([facility])  Assessment & Plan  Xopenex PRN  No signs of acute exacerbation on admission     Esophageal dysphagia  Assessment & Plan  S/p esophageal dilation at [facility] on 3/20 - only able to dilate some due to small mucosal tear   Advance diet as per surgery and GI      * Ileus ([facility])  Assessment & Plan  Presents LUQ and LLQ AP - no nausea or vomiting, had 3BM today and is having flatus  CTA A/P: \"Findings consistent with a partial low-grade small bowel obstruction without a discrete transition point " "identified \"  Does not examine like SBO - likely ileus  Pain control   GI consulted  Surgery following    Complaining of abdominal cramps, multiple loose bowel movements this morning.  Not bloody, low suspicion for infectious  Will give Bentyl, Dilaudid  Will order stool workup  Obstruction series from yesterday showed--Mildly distended small bowel in the upper abdomen similar to prior is nonspecific but may represent an ileus.   Diet advanced by GI  GI follow-up             Labs & Imaging: I have personally reviewed pertinent reports.      VTE Prophylaxis: in place.    Code Status:   Level 3 - DNAR and DNI    Patient Centered Rounds: I have performed bedside rounds with nursing staff today.    Mobility:   Basic Mobility Inpatient Raw Score: 24  JH-HLM Goal: 8: Walk 250 feet or more  JH-HLM Achieved: 7: Walk 25 feet or more  JH-HLM Goal achieved. Continue to encourage appropriate mobility.    Discussions with Specialists or Other Care Team Provider: GI    Education and Discussions with Family / Patient: Patient stated she will update her son    Total Time Spent on Date of Encounter in care of patient: 35 mins. This time was spent on one or more of the following: performing physical exam; counseling and coordination of care; obtaining or reviewing history; documenting in the medical record; reviewing/ordering tests, medications or procedures; communicating with other healthcare professionals and discussing with patient's family/caregivers.    Current Length of Stay: 3 day(s)    Current Patient Status: Inpatient   Certification Statement: The patient will continue to require additional inpatient hospital stay due to see my assessment and plan.     Subjective:   Patient is seen and examined at bedside.  Complains of abdominal pain and had diarrhea.  No other complaints.  Afebrile  All other ROS are negative.    Objective:    Vitals: Blood pressure 128/76, pulse 77, temperature (!) 96.6 °F (35.9 °C), resp. rate 12, " height 5' (1.524 m), weight 50.7 kg (111 lb 12.4 oz), SpO2 100%, not currently breastfeeding.,Body mass index is 21.83 kg/m².  SPO2 RA Rest      Flowsheet Row ED to Hosp-Admission (Current) from 3/21/2024 in [facility]   SpO2 100 %   SpO2 Activity At Rest   O2 Device None (Room air)   O2 Flow Rate --          I&O:   Intake/Output Summary (Last 24 hours) at 3/25/2024 1248  Last data filed at 3/25/2024 0952  Gross per 24 hour   Intake 120 ml   Output 1 ml   Net 119 ml       Physical Exam:    General- Alert, sitting comfortably in bed. Not in any acute distress.  Neck- Supple, No JVD  CVS- regular, S1 and S2 normal  Chest- Bilateral Air entry, No rhochi, crackles or wheezing present.  Abdomen- soft, tender, not distended, no guarding or rigidity, BS+  Extremities-  No pedal edema, No calf tenderness                         Normal ROM in all extremities.  CNS-   Alert, awake and orientedx3. No focal deficits present.    Invasive Devices       Peripheral Intravenous Line  Duration             Peripheral IV 03/21/24 Right Antecubital 3 days                          Social History  reviewed  Family History   Problem Relation Age of Onset    Other Mother         Esophageal disorder    Stomach cancer Father     Cancer Father     Hypertension Maternal Aunt     COPD Maternal Grandmother     Colon cancer Neg Hx     Colon polyps Neg Hx     reviewed    Meds:  Current Facility-Administered Medications   Medication Dose Route Frequency Provider Last Rate Last Admin    amitriptyline (ELAVIL) tablet 50 mg  50 mg Oral HS Sarina Christiansen MD   50 mg at 03/24/24 2100    amLODIPine (NORVASC) tablet 10 mg  10 mg Oral Daily Sarina Christiansen MD   10 mg at 03/25/24 0843    dicyclomine (BENTYL) tablet 20 mg  20 mg Oral Q6H PRN Sarina Christiansen MD   20 mg at 03/25/24 1028    enoxaparin (LOVENOX) subcutaneous injection 30 mg  30 mg Subcutaneous Daily Leilani Rudd PA-C   30 mg at 03/25/24 0843    famotidine  (PEPCID) tablet 10 mg  10 mg Oral RUSS Meeks   10 mg at 03/24/24 2100    hydrALAZINE (APRESOLINE) injection 5 mg  5 mg Intravenous Q8H PRN Leilani Rudd PA-C        HYDROmorphone HCl (DILAUDID) injection 0.2 mg  0.2 mg Intravenous Q6H PRN Sarina Christiansen MD   0.2 mg at 03/24/24 1238    ondansetron (ZOFRAN) injection 4 mg  4 mg Intravenous Q6H PRN Leilani Rudd PA-C   4 mg at 03/24/24 1238    oxyCODONE (ROXICODONE) split tablet 2.5 mg  2.5 mg Oral TID PRN Sarina Christiansen MD   2.5 mg at 03/24/24 2100    pantoprazole (PROTONIX) injection 40 mg  40 mg Intravenous Q12H [facility] RUSS Pino   40 mg at 03/25/24 0843      Medications Prior to Admission   Medication    amitriptyline (ELAVIL) 50 mg tablet    amLODIPine (NORVASC) 10 mg tablet    cetirizine (ZyrTEC) 10 MG chewable tablet    cholecalciferol (VITAMIN D3) 1,000 units tablet    dexlansoprazole (DEXILANT) 60 MG capsule    dicyclomine (BENTYL) 20 mg tablet    famotidine (PEPCID) 40 MG tablet    multivitamin (THERAGRAN) TABS    Omega-3 Fatty Acids (OMEGA 3 PO)    ondansetron (ZOFRAN) 4 mg tablet    polyethylene glycol (MIRALAX) 17 g packet    acetaminophen (TYLENOL) 160 mg/5 mL suspension    albuterol (PROVENTIL HFA,VENTOLIN HFA) 90 mcg/act inhaler    sucralfate (CARAFATE) 1 g tablet    Tavaborole 5 % SOLN       Labs:  Results from last 7 days   Lab Units 03/24/24  0555 03/23/24  0504 03/22/24  0002   WBC Thousand/uL 4.84 5.40 7.73   HEMOGLOBIN g/dL 9.4* 9.7* 10.3*   HEMATOCRIT % 28.4* 29.4* 32.1*   PLATELETS Thousands/uL 177 169 231   NEUTROS PCT % 50 53 52   LYMPHS PCT % 41 39 39   MONOS PCT % 6 6 6   EOS PCT % 2 2 2     Results from last 7 days   Lab Units 03/24/24  0555 03/23/24  0504 03/22/24  0429 03/22/24  0002   POTASSIUM mmol/L 4.4 4.2 4.5 4.1   CHLORIDE mmol/L 105 106 108 107   CO2 mmol/L 30 27 25 26   BUN mg/dL 18 17 21 22   CREATININE mg/dL 1.49* 1.27 1.37* 1.50*   CALCIUM mg/dL 9.1 8.9 8.6 9.3   ALK PHOS U/L  --  101 108*  123*   ALT U/L  --  33 29 35   AST U/L  --  42* 29 38     Lab Results   Component Value Date    TROPONINI <0.02 03/27/2019         Lab Results   Component Value Date    BLOODCX No Growth After 5 Days. 07/09/2017    BLOODCX No Growth After 5 Days. 07/09/2017    URINECX >100,000 cfu/ml Mixed Contaminants X3 02/16/2016         Imaging:  Results for orders placed during the hospital encounter of 03/21/24    XR chest portable    Narrative  XR CHEST PORTABLE    INDICATION: Acute Resp Failure.    COMPARISON: 11/10/2023    FINDINGS:    Clear lungs. No pneumothorax or pleural effusion.    Normal cardiomediastinal silhouette.    Bones are unremarkable for age.    Upper abdominal mesh.    Impression  No acute cardiopulmonary disease.        Workstation performed: [id number]    Results for orders placed during the hospital encounter of 03/27/19    XR chest 2 views    Narrative  CHEST    INDICATION:   Chest Pain.    COMPARISON:  1/23/2018    EXAM PERFORMED/VIEWS:  XR CHEST PA & LATERAL  The frontal view was performed utilizing dual energy radiographic technique.      FINDINGS:    Cardiomediastinal silhouette appears unremarkable.    The lungs are clear.  No pneumothorax or pleural effusion.    Osseous structures appear within normal limits for patient age.    Impression  No acute cardiopulmonary disease.        Workstation performed: [id number]      Last 24 Hours Medication List:   Current Facility-Administered Medications   Medication Dose Route Frequency Provider Last Rate    amitriptyline  50 mg Oral HS Sarina Christiansen MD      amLODIPine  10 mg Oral Daily Sarina Christiansen MD      dicyclomine  20 mg Oral Q6H PRN Sarina Christiansen MD      enoxaparin  30 mg Subcutaneous Daily Leilani Rudd PA-C      famotidine  10 mg Oral HS RUSS Pino      hydrALAZINE  5 mg Intravenous Q8H PRN Leilani Rudd PA-C      HYDROmorphone  0.2 mg Intravenous Q6H PRN Sarina Christiansen MD      ondansetron  4 mg Intravenous Q6H PRN  Leilani Rudd PA-C      oxyCODONE  2.5 mg Oral TID PRN Sarina Christiansen MD      pantoprazole  40 mg Intravenous Q12H [facility] RUSS Pino          Today, Patient Was Seen By: Dennis Cleaning MD    ** Please Note: Dictation voice to text software may have been used in the creation of this document. **

## 2024-03-25 NOTE — UTILIZATION REVIEW
Initial Clinical Review    Admission: Date/Time/Statement: 3/22/24 0205 observation and CHANGED 3/22/24 1557 INPATIENT RE: PATIENT NEEDS > 2 MIDNIGHT STAY WITH SUSPECTED ILEUS, ADVANCEMENT OF DIET, CONTINUED IVF  Admission Orders (From admission, onward)       Ordered        03/22/24 1557  Inpatient Admission  Once                          Orders Placed This Encounter   Procedures    Inpatient Admission     Standing Status:   Standing     Number of Occurrences:   1     Order Specific Question:   Level of Care     Answer:   Med Surg [16]     Order Specific Question:   Estimated length of stay     Answer:   More than 2 Midnights     Order Specific Question:   Certification     Answer:   I certify that inpatient services are medically necessary for this patient for a duration of greater than two midnights. See H&P and MD Progress Notes for additional information about the patient's course of treatment.     ED Arrival Information       Expected   3/21/2024     Arrival   3/21/2024 23:27    Acuity   Urgent              Means of arrival   Walk-In    Escorted by   Family Member    Service   Hospitalist    Admission type   Emergency              Arrival complaint   Left side flank pain             Chief Complaint   Patient presents with    Abdominal Pain     Left sided abdominal pain that started this afternoon.  Denies nausea or vomiting.       Initial Presentation: 78 y.o. female  female to ED via walk in from home where care started on 3/21/24.    Admitted to observation  and CONVERTED TO INPATIENT with Dx: Ileus/Esophageal Dysphagia.  Presented to ED with  left upper abdominal pain radiating to back starting the afternoon of arrival.    + BM today and + flatus.   Recent egd 3/20/24 with balloon dilation - only able to dilate some due to small mucosal tear .  PMHx: recent esophageal dilation on 3/20, HTN, CKD . On exam: abdominal tenderness in epigastrica and LUQ .  Bun 22. Creatinine 1.50 with baseline of 1.4 - 1.5.   glucose 190.  H&H 10.3/32.1 with baseline hgb 8 to 10.     Imaging shows partial small grade small bowel obstruction . ED treatment:  Protonix, 1 liter IVF and Ofirmev.    Plan includes:  NPO.  Continue IVF.  Analgesia as needed.  Consult GI and Surgery.  Monitor BP and hold home amlodipine, use IV hydralazine as needed  .     Per Surgery - Suspect Ileus, no signs clinical obstruction.  No surgical intervention.  Montor abdominal exam.   NPO.  Consult GI      Date: 3/22/24   Day 2 CHANGED TO INPATIENT:    on exam:  left upper and left lower quadrant abdominal tenderness as well as epigastric tenderness with palpation .  Bowel sounds slow.    Plan:  start clears.   Continue IVF.  Check KUB.   Analgesia and antiemetics as needed.   Restart home medications. BMP in am.  Start amlodipine with hold parameters.     3/22/24 per GI - Ileus/esophageal dysphagia/history of Nissen fundoplication.  Recommend clears.   Analgesia and antiemetics as needed.  Continue IVF.  Pantoprazole IV.       ED Triage Vitals   Temperature Pulse Respirations Blood Pressure SpO2   03/21/24 2335 03/21/24 2335 03/21/24 2335 03/21/24 2335 03/21/24 2335   (!) 97 °F (36.1 °C) 92 16 157/87 98 %      Temp Source Heart Rate Source Patient Position - Orthostatic VS BP Location FiO2 (%)   03/21/24 2335 03/21/24 2335 03/21/24 2335 03/21/24 2335 --   Temporal Monitor Sitting Left arm       Pain Score       03/22/24 0239       7          Wt Readings from Last 1 Encounters:   03/22/24 50.7 kg (111 lb 12.4 oz)     Additional Vital Signs:   03/22/24 07:50:03 96.8 °F (36 °C) Abnormal  77 18 132/84 100 98 % -- --   03/22/24 0300 97.2 °F (36.2 °C) Abnormal  75 18 179/99 Abnormal  126 100 % None (Room air) --   03/22/24 02:39:26 97.2 °F (36.2 °C) Abnormal  -- 17 179/99 Abnormal  126 64 % Abnormal  -- --   03/22/24 0102 -- 81 20 122/68 89 97 % None (Room air)      Pertinent Labs/Diagnostic Test Results:   XR abdomen obstruction series   Final Result by Rae  MD Toni (03/25 0861)      Mildly distended small bowel in the upper abdomen similar to prior is nonspecific but may represent an ileus.         Workstation performed: [id number]         XR abdomen 1 view kub   Final Result by Mckay De La Garza MD (03/25 0858)      Nonobstructive bowel gas pattern.               Workstation performed: [id number]         XR abdomen 1 view kub   Final Result by Tony Juarez MD (03/22 1313)      Nonspecific minimally distended loops of small bowel in the left upper quadrant.               Workstation performed: [id number]         CT chest abdomen pelvis w contrast   Final Result by Stephany Hoang MD (03/22 0134)      Findings consistent with a partial low-grade small bowel obstruction without a discrete transition point identified      Findings discussed with Dr. Fleming at 1:30 a.m., 3/22/2024         Workstation performed: [id number]         XR chest portable   Final Result by Montana Dent MD (03/22 0939)      No acute cardiopulmonary disease.            Workstation performed: [id number]           EKG  Date/Time: 03/22/24 12:08 AM   Indications / Diagnosis: CP  ECG reviewed by the ED Provider: yes   The EKG demonstrates:  Rhythm: normal sinus  Intervals: normal intervals  Axis: normal axis  QRS/Blocks: Incomplete RBBB  ST Changes: No acute ST Changes, no STD/CHRISSY.  Similar to previous    Results from last 7 days   Lab Units 03/24/24  0555 03/23/24  0504 03/22/24  0002   WBC Thousand/uL 4.84 5.40 7.73   HEMOGLOBIN g/dL 9.4* 9.7* 10.3*   HEMATOCRIT % 28.4* 29.4* 32.1*   PLATELETS Thousands/uL 177 169 231   NEUTROS ABS Thousands/µL 2.49 2.86 4.09     Results from last 7 days   Lab Units 03/24/24  0555 03/23/24  0504 03/22/24  0429 03/22/24  0002   SODIUM mmol/L 139 139 139 140   POTASSIUM mmol/L 4.4 4.2 4.5 4.1   CHLORIDE mmol/L 105 106 108 107   CO2 mmol/L 30 27 25 26   ANION GAP mmol/L 4 6 6 7   BUN mg/dL 18 17 21 22   CREATININE mg/dL 1.49* 1.27 1.37* 1.50*   EGFR  ml/min/1.73sq m 33 40 37 33   CALCIUM mg/dL 9.1 8.9 8.6 9.3   MAGNESIUM mg/dL  --  2.1 1.8*  --    PHOSPHORUS mg/dL  --   --  3.5  --      Results from last 7 days   Lab Units 03/23/24  0504 03/22/24  0429 03/22/24  0002   AST U/L 42* 29 38   ALT U/L 33 29 35   ALK PHOS U/L 101 108* 123*   TOTAL PROTEIN g/dL 6.5 6.5 7.6   ALBUMIN g/dL 3.3* 3.5 4.0   TOTAL BILIRUBIN mg/dL 0.37 0.21 0.25   BILIRUBIN DIRECT mg/dL  --  0.06  --      Results from last 7 days   Lab Units 03/25/24  1113 03/25/24  0629 03/25/24  0037 03/24/24  0555 03/23/24  2143 03/23/24  0636 03/22/24  1805 03/22/24  0550   POC GLUCOSE mg/dl 76 102 104 97 102 84 121 119     Results from last 7 days   Lab Units 03/24/24  0555 03/23/24  0504 03/22/24  0429 03/22/24  0002   GLUCOSE RANDOM mg/dL 97 95 105 190*     Results from last 7 days   Lab Units 03/22/24  0002   HS TNI 0HR ng/L 3     Results from last 7 days   Lab Units 03/22/24  0002   LIPASE u/L <6*       ED Treatment:   Medication Administration from 03/21/2024 2327 to 03/22/2024 0234         Date/Time Order Dose Route Action Comments     03/22/2024 0002 EDT pantoprazole (PROTONIX) injection 40 mg 40 mg Intravenous Given --     03/22/2024 0003 EDT sodium chloride 0.9 % bolus 1,000 mL 1,000 mL Intravenous New Bag --     03/22/2024 0008 EDT acetaminophen (Ofirmev) IVPB 750 mg 750 mg Intravenous New Bag --          Past Medical History:   Diagnosis Date    Anxiety     Arthritis     Asthma     Cancer (HCC)     Candida infection, esophageal (HCC)     Chronic kidney disease     Chronic pain     COPD (chronic obstructive pulmonary disease) (HCC)     Depression     Depression     Gastroparesis     Gastroparesis     GERD (gastroesophageal reflux disease)     Hypertension     Irritable bowel syndrome (IBS)     Lactose intolerance Not sure    Migraines     MRSA (methicillin resistant Staphylococcus aureus)     Multiple thyroid nodules     Osteoporosis     Pneumonia     Psychiatric disorder      Present on  Admission:   Esophageal dysphagia   COPD (chronic obstructive pulmonary disease) (HCC)   Stage 3b chronic kidney disease (HCC)   Hypertension      Admitting Diagnosis: Nausea [R11.0]  Abdominal pain [R10.9]  Left flank pain [R10.9]  Age/Sex: 78 y.o. female  Admission Orders:  Scheduled Medications:  amitriptyline, 50 mg, Oral, HS  amLODIPine, 10 mg, Oral, Daily  enoxaparin, 30 mg, Subcutaneous, Daily  pantoprazole, 40 mg, Intravenous, Q24H VINCE      Continuous IV Infusions:  multi-electrolyte, 100 mL/hr, Intravenous, Continuous      PRN Meds:  dicyclomine, 20 mg, Oral, Q6H PRN  hydrALAZINE, 5 mg, Intravenous, Q8H PRN  HYDROmorphone, 0.5 mg, Intravenous, Q3H PRN x 2 3/22/24   ondansetron, 4 mg, Intravenous, Q6H PRN    3/22/24 clears.       IP CONSULT TO ACUTE CARE SURGERY  IP CONSULT TO GASTROENTEROLOGY    Network Utilization Review Department  ATTENTION: Please call with any questions or concerns to [phone] and carefully listen to the prompts so that you are directed to the right person. All voicemails are confidential.   For Discharge needs, contact Care Management DC Support Team at [phone] opt. 2  Send all requests for admission clinical reviews, approved or denied determinations and any other requests to dedicated fax number below belonging to the campus where the patient is receiving treatment. List of dedicated fax numbers for the Facilities:  FACILITY NAME UR FAX NUMBER   ADMISSION DENIALS (Administrative/Medical Necessity) [phone]   DISCHARGE SUPPORT TEAM (NETWORK) [phone]   PARENT CHILD HEALTH (Maternity/NICU/Pediatrics) [phone]   [facility] [phone]   [facility] [phone]   [facility] [phone]   [facility] [phone]   [facility] [phone]   [facility] [phone]   [facility]  [phone]   [facility] [phone]   [facility] [phone]   [facility] hospital [phone]   [facility] [phone]   [facility] [phone]

## 2024-03-25 NOTE — UTILIZATION REVIEW
NOTIFICATION OF INPATIENT ADMISSION   AUTHORIZATION REQUEST   SERVICING FACILITY:   [place] [address]  Tax ID: [id number]  NPI: [id number] ATTENDING PROVIDER:  Attending Name and NPI#: Dennis Cleaning Md [[id number]]  Address: [address] [place] [address]  Phone: [phone]   ADMISSION INFORMATION:  Place of Service: Inpatient [facility]  Place of Service Code: 21  Inpatient Admission Date/Time: 3/22/24  3:57 PM  Discharge Date/Time: No discharge date for patient encounter.  Admitting Diagnosis Code/Description:  Nausea [R11.0]  Abdominal pain [R10.9]  Left flank pain [R10.9]     UTILIZATION REVIEW CONTACT:  Anika Eastman Utilization   Network Utilization Review Department  Phone: [phone]  Fax [phone]  Email: [email or web address]  Contact for approvals/pending authorizations, clinical reviews, and discharge.     PHYSICIAN ADVISORY SERVICES:  Medical Necessity Denial & Nrll-ck-Pxjw Review  Phone: [phone]  Fax: [phone]  Email: [email or web address]     DISCHARGE SUPPORT TEAM:  For Patients Discharge Needs & Updates  Phone: [phone] opt. 2 Fax: [phone]  Email: [email or web address]

## 2024-03-25 NOTE — ASSESSMENT & PLAN NOTE
Malnutrition Findings:   Adult Malnutrition type: Chronic illness  Adult Degree of Malnutrition: Other severe protein calorie malnutrition  Malnutrition Characteristics: Inadequate energy, Weight loss                  360 Statement: Severe calorie-protein malnutrition in context of chronic illness r/t altered GI function, inadequate PO intake as evidance by energy intake less than 75% compared to estimated needs>1 month, 12% wt loss x 5 months ( 57.8kg 11/8/23-> 50.7kg 3/22/24); Treated with PO diet (pt declined oral supplements)    BMI Findings:           Body mass index is 21.83 kg/m².

## 2024-03-25 NOTE — PROGRESS NOTES
Progress note - Gastroenterology   Ira EFRA Bonilla 78 y.o. female MRN: [id number]  Unit/Bed#: -01 Encounter: [id number]    ASSESSMENT and PLAN    78 y.o. year old female with past medical history of Nissen procedure 2009 [facility], recent esophageal dilation 3/20 SLB, HTN, CKD, hiatal hernia repair 11/2023 with takedown of previous Nissen, and pyloroplasty 11/2023 presents with left upper and left lower quadrant abdominal tenderness. GI consulted for ileus. KUB 3/24/2024 shows nonobstructive bowel gas pattern, OBS series 3/24/86182 shows mildly distended small bowel in the upper abdomen similar to prior is nonspecific but may represent an ileus, clinically improved     1.  Ileus  CT A/P on admission; findings consistent with a partial low-grade small bowel obstruction without a discrete transition point identified.  Pt still notes abd pain but bowel habits back to baseline having loose stools postprandially 2-3 times a day without bleeding.     -advance to surgical soft low fiber diet, pain control prn  -serial abd exams  -OOB as tolerated  -monitor and correct electrolytes  -stool studies ordered but diarrhea chronic, if neg rec outpt GI work up       2.  Esophageal dysphagia  3.  History of Nissen fundoplication  Patient with long history of esophageal dysphagia.  Patient's original Nissen fundoplication surgery was in 2009 at [facility].  Hiatal hernia repair with mesh 11/2023, also pyloroplasty was completed at the same time.  Patient states that since recent EGD with balloon dilation on 3/20 she has been having increased epigastric discomfort with increased acid reflux symptoms.  She states her left upper and lower quadrant pain has been consistent for a while.     -Pantoprazole 40 mg IVP daily      Chief Complaint   Patient presents with    Abdominal Pain     Left sided abdominal pain that started this afternoon.  Denies nausea or vomiting.       SUBJECTIVE/HPI   Patient seen and examined.  No complaint of  "abdominal pain.  Has postprandial loose stools without bleeding this is her baseline.  No nausea or vomiting.  Tolerating full liquid diet.      /76   Pulse 77   Temp (!) 96.6 °F (35.9 °C)   Resp 12   Ht 5' (1.524 m)   Wt 50.7 kg (111 lb 12.4 oz)   SpO2 100%   BMI 21.83 kg/m²     PHYSICALEXAM  General appearance: alert, appears stated age and cooperative  Eyes: PERLLA, EOMI, no icterus   Head: Normocephalic, without obvious abnormality, atraumatic  Lungs: clear to auscultation bilaterally  Heart: regular rate and rhythm, S1, S2 normal, no murmur, click, rub or gallop  Abdomen: soft, + mild generalized tenderness; bowel sounds normal; no masses,  no organomegaly  Extremities: extremities normal, atraumatic, no cyanosis or edema  Neurologic: Grossly normal    Lab Results   Component Value Date    GLUCOSE 110 04/09/2015    CALCIUM 9.1 03/24/2024     04/09/2015    K 4.4 03/24/2024    CO2 30 03/24/2024     03/24/2024    BUN 18 03/24/2024    CREATININE 1.49 (H) 03/24/2024     Lab Results   Component Value Date    WBC 4.84 03/24/2024    HGB 9.4 (L) 03/24/2024    HCT 28.4 (L) 03/24/2024    MCV 96 03/24/2024     03/24/2024     Lab Results   Component Value Date    ALT 33 03/23/2024    AST 42 (H) 03/23/2024    ALKPHOS 101 03/23/2024    BILITOT 0.4 02/05/2015     No results found for: \"AMYLASE\"  Lab Results   Component Value Date    LIPASE <6 (L) 03/22/2024     Lab Results   Component Value Date    IRON 44 (L) 11/10/2023    TIBC 249 (L) 11/10/2023    FERRITIN 384 (H) 11/10/2023     Lab Results   Component Value Date    INR 1.48 (H) 11/10/2023     "

## 2024-03-25 NOTE — UTILIZATION REVIEW
Continued Stay Review    Date: 3/23                          Current Patient Class: INpatient   Current Level of Care: MEd/surg    HPI:78 y.o. female initially admitted on 3/21    Assessment/Plan: Initially admitted with ileus, partial small bowel obstruction. After bowel rest, NGtube, is tolerating small amounts of clear liquids, but Continues with LLQ cramping after eating jello this morning.  Passing  flatus, had small bm this morning.  Gen surgery and GI following.  Continue with IV protonix. Having gassiness and abdominal distension.      Vital Signs: Vital Signs (last 2 days)    Date/Time Temp Pulse Resp BP MAP (mmHg) SpO2 O2 Device Patient Position - Orthostatic VS   03/23/24 0934 -- -- -- -- -- -- None (Room air) --   03/23/24 0700 97.2 °F (36.2 °C) Abnormal  -- 12 129/73 96 -- -- --   03/22/24 18:48:44 97 °F (36.1 °C) Abnormal  85 16 116/78 91 98 % None (Room air) Lying   03/22/24 15:26:05 97 °F (36.1 °C) Abnormal  81 18 141/83 102 97 % None (Room air) Sitting   03/22/24 1236 -- -- -- -- -- -- None (Room air) --   03/22/24 07:50:03 96.8 °F (36 °C) Abnormal  77 18 132/84 100 98 % -- --   03/22/24 0300 97.2 °F (36.2 °C) Abnormal  75 18 179/99 Abnormal  126 100 % None (Room air) --   03/22/24 02:39:26 97.2 °F (36.2 °C) Abnormal  -- 17 179/99 Abnormal  126 64 % Abnormal  -- --   03/22/24 0102 -- 81 20 122/68 89 97 % None (Room air) --   03/21/24 2335 97 °F (36.1 °C) Abnormal  92 16 157/87 114 98 % None (Room air) Sittin     Pertinent Labs/Diagnostic Results:       Results from last 7 days   Lab Units 03/24/24  0555 03/23/24  0504 03/22/24  0002   WBC Thousand/uL 4.84 5.40 7.73   HEMOGLOBIN g/dL 9.4* 9.7* 10.3*   HEMATOCRIT % 28.4* 29.4* 32.1*   PLATELETS Thousands/uL 177 169 231   NEUTROS ABS Thousands/µL 2.49 2.86 4.09         Results from last 7 days   Lab Units 03/24/24  0555 03/23/24  0504 03/22/24  0429 03/22/24  0002   SODIUM mmol/L 139 139 139 140   POTASSIUM mmol/L 4.4 4.2 4.5 4.1   CHLORIDE mmol/L 105  106 108 107   CO2 mmol/L 30 27 25 26   ANION GAP mmol/L 4 6 6 7   BUN mg/dL 18 17 21 22   CREATININE mg/dL 1.49* 1.27 1.37* 1.50*   EGFR ml/min/1.73sq m 33 40 37 33   CALCIUM mg/dL 9.1 8.9 8.6 9.3   MAGNESIUM mg/dL  --  2.1 1.8*  --    PHOSPHORUS mg/dL  --   --  3.5  --      Results from last 7 days   Lab Units 03/23/24  0504 03/22/24  0429 03/22/24  0002   AST U/L 42* 29 38   ALT U/L 33 29 35   ALK PHOS U/L 101 108* 123*   TOTAL PROTEIN g/dL 6.5 6.5 7.6   ALBUMIN g/dL 3.3* 3.5 4.0   TOTAL BILIRUBIN mg/dL 0.37 0.21 0.25   BILIRUBIN DIRECT mg/dL  --  0.06  --      Results from last 7 days   Lab Units 03/25/24  1113 03/25/24  0629 03/25/24  0037 03/24/24  0555 03/23/24  2143 03/23/24  0636 03/22/24  1805 03/22/24  0550   POC GLUCOSE mg/dl 76 102 104 97 102 84 121 119     Results from last 7 days   Lab Units 03/24/24  0555 03/23/24  0504 03/22/24  0429 03/22/24  0002   GLUCOSE RANDOM mg/dL 97 95 105 190*     Results from last 7 days   Lab Units 03/22/24  0002   HS TNI 0HR ng/L 3         Results from last 7 days   Lab Units 03/22/24  0002   LIPASE u/L <6*       Medications:   Scheduled Medications:  amitriptyline, 50 mg, Oral, HS  amLODIPine, 10 mg, Oral, Daily  enoxaparin, 30 mg, Subcutaneous, Daily  famotidine, 40 mg, Oral, HS  pantoprazole, 40 mg, Intravenous, Q12H VINCE      Continuous IV Infusions:     PRN Meds:  dicyclomine, 20 mg, Oral, Q6H PRN  hydrALAZINE, 5 mg, Intravenous, Q8H PRN  HYDROmorphone, 0.2 mg, Intravenous, Q6H PRN  ondansetron, 4 mg, Intravenous, Q6H PRN  oxyCODONE, 2.5 mg, Oral, TID PRN        Discharge Plan: TBD    Network Utilization Review Department  ATTENTION: Please call with any questions or concerns to [phone] and carefully listen to the prompts so that you are directed to the right person. All voicemails are confidential.   For Discharge needs, contact Care Management DC Support Team at [phone] opt. 2  Send all requests for admission clinical reviews, approved or denied  determinations and any other requests to dedicated fax number below belonging to the campus where the patient is receiving treatment. List of dedicated fax numbers for the Facilities:  FACILITY NAME UR FAX NUMBER   ADMISSION DENIALS (Administrative/Medical Necessity) [phone]   DISCHARGE SUPPORT TEAM (NETWORK) [phone]   PARENT CHILD HEALTH (Maternity/NICU/Pediatrics) [phone]   [facility] [phone]   [facility] [phone]   Count includes the [facility] [phone]   [facility] [phone]   Critical access hospital [phone]   [facility] [phone]   [facility] [phone]   [facility] [phone]   [facility] [phone]   [facility] [phone]   [facility] [phone]   [facility] [phone]

## 2024-03-26 LAB
ALBUMIN SERPL BCP-MCNC: 3.2 G/DL (ref 3.5–5)
ALP SERPL-CCNC: 86 U/L (ref 34–104)
ALT SERPL W P-5'-P-CCNC: 22 U/L (ref 7–52)
ANION GAP SERPL CALCULATED.3IONS-SCNC: 5 MMOL/L (ref 4–13)
AST SERPL W P-5'-P-CCNC: 28 U/L (ref 13–39)
BASOPHILS # BLD AUTO: 0.01 THOUSANDS/ÂΜL (ref 0–0.1)
BASOPHILS NFR BLD AUTO: 0 % (ref 0–1)
BILIRUB SERPL-MCNC: 0.28 MG/DL (ref 0.2–1)
BUN SERPL-MCNC: 14 MG/DL (ref 5–25)
C COLI+JEJUNI TUF STL QL NAA+PROBE: NEGATIVE
C DIFF TOX GENS STL QL NAA+PROBE: NEGATIVE
CALCIUM ALBUM COR SERPL-MCNC: 9.4 MG/DL (ref 8.3–10.1)
CALCIUM SERPL-MCNC: 8.8 MG/DL (ref 8.4–10.2)
CHLORIDE SERPL-SCNC: 106 MMOL/L (ref 96–108)
CO2 SERPL-SCNC: 27 MMOL/L (ref 21–32)
CREAT SERPL-MCNC: 1.71 MG/DL (ref 0.6–1.3)
EC STX1+STX2 GENES STL QL NAA+PROBE: NEGATIVE
EOSINOPHIL # BLD AUTO: 0.12 THOUSAND/ÂΜL (ref 0–0.61)
EOSINOPHIL NFR BLD AUTO: 3 % (ref 0–6)
ERYTHROCYTE [DISTWIDTH] IN BLOOD BY AUTOMATED COUNT: 13.4 % (ref 11.6–15.1)
GFR SERPL CREATININE-BSD FRML MDRD: 28 ML/MIN/1.73SQ M
GLUCOSE SERPL-MCNC: 95 MG/DL (ref 65–140)
HCT VFR BLD AUTO: 26.9 % (ref 34.8–46.1)
HGB BLD-MCNC: 8.8 G/DL (ref 11.5–15.4)
IMM GRANULOCYTES # BLD AUTO: 0.02 THOUSAND/UL (ref 0–0.2)
IMM GRANULOCYTES NFR BLD AUTO: 0 % (ref 0–2)
LYMPHOCYTES # BLD AUTO: 1.99 THOUSANDS/ÂΜL (ref 0.6–4.47)
LYMPHOCYTES NFR BLD AUTO: 43 % (ref 14–44)
MAGNESIUM SERPL-MCNC: 1.9 MG/DL (ref 1.9–2.7)
MCH RBC QN AUTO: 31.4 PG (ref 26.8–34.3)
MCHC RBC AUTO-ENTMCNC: 32.7 G/DL (ref 31.4–37.4)
MCV RBC AUTO: 96 FL (ref 82–98)
MONOCYTES # BLD AUTO: 0.28 THOUSAND/ÂΜL (ref 0.17–1.22)
MONOCYTES NFR BLD AUTO: 6 % (ref 4–12)
NEUTROPHILS # BLD AUTO: 2.24 THOUSANDS/ÂΜL (ref 1.85–7.62)
NEUTS SEG NFR BLD AUTO: 48 % (ref 43–75)
NRBC BLD AUTO-RTO: 0 /100 WBCS
PLATELET # BLD AUTO: 150 THOUSANDS/UL (ref 149–390)
PMV BLD AUTO: 8.5 FL (ref 8.9–12.7)
POTASSIUM SERPL-SCNC: 4.1 MMOL/L (ref 3.5–5.3)
PROT SERPL-MCNC: 6.2 G/DL (ref 6.4–8.4)
RBC # BLD AUTO: 2.8 MILLION/UL (ref 3.81–5.12)
SALMONELLA SP SPAO STL QL NAA+PROBE: NEGATIVE
SHIGELLA SP+EIEC IPAH STL QL NAA+PROBE: NEGATIVE
SODIUM SERPL-SCNC: 138 MMOL/L (ref 135–147)
WBC # BLD AUTO: 4.66 THOUSAND/UL (ref 4.31–10.16)

## 2024-03-26 PROCEDURE — 80053 COMPREHEN METABOLIC PANEL: CPT | Performed by: INTERNAL MEDICINE

## 2024-03-26 PROCEDURE — 85025 COMPLETE CBC W/AUTO DIFF WBC: CPT | Performed by: INTERNAL MEDICINE

## 2024-03-26 PROCEDURE — C9113 INJ PANTOPRAZOLE SODIUM, VIA: HCPCS | Performed by: NURSE PRACTITIONER

## 2024-03-26 PROCEDURE — 83735 ASSAY OF MAGNESIUM: CPT | Performed by: INTERNAL MEDICINE

## 2024-03-26 PROCEDURE — 99232 SBSQ HOSP IP/OBS MODERATE 35: CPT | Performed by: INTERNAL MEDICINE

## 2024-03-26 PROCEDURE — 99239 HOSP IP/OBS DSCHRG MGMT >30: CPT | Performed by: INTERNAL MEDICINE

## 2024-03-26 RX ORDER — SUCRALFATE 1 G/1
1 TABLET ORAL
Status: DISCONTINUED | OUTPATIENT
Start: 2024-03-26 | End: 2024-03-26 | Stop reason: HOSPADM

## 2024-03-26 RX ORDER — PANTOPRAZOLE SODIUM 40 MG/1
40 TABLET, DELAYED RELEASE ORAL 2 TIMES DAILY
Qty: 60 TABLET | Refills: 0 | Status: SHIPPED | OUTPATIENT
Start: 2024-03-26 | End: 2024-04-25

## 2024-03-26 RX ORDER — SUCRALFATE 1 G/1
1 TABLET ORAL
Qty: 120 TABLET | Refills: 0 | Status: SHIPPED | OUTPATIENT
Start: 2024-03-26 | End: 2024-04-25

## 2024-03-26 RX ADMIN — SUCRALFATE 1 G: 1 TABLET ORAL at 12:25

## 2024-03-26 RX ADMIN — PANTOPRAZOLE SODIUM 40 MG: 40 INJECTION, POWDER, FOR SOLUTION INTRAVENOUS at 08:44

## 2024-03-26 RX ADMIN — ENOXAPARIN SODIUM 30 MG: 30 INJECTION SUBCUTANEOUS at 08:44

## 2024-03-26 NOTE — PLAN OF CARE
Problem: PAIN - ADULT  Goal: Verbalizes/displays adequate comfort level or baseline comfort level  Description: Interventions:  - Encourage patient to monitor pain and request assistance  - Assess pain using appropriate pain scale  - Administer analgesics based on type and severity of pain and evaluate response  - Implement non-pharmacological measures as appropriate and evaluate response  - Consider cultural and social influences on pain and pain management  - Notify physician/advanced practitioner if interventions unsuccessful or patient reports new pain  Outcome: Progressing     Problem: INFECTION - ADULT  Goal: Absence or prevention of progression during hospitalization  Description: INTERVENTIONS:  - Assess and monitor for signs and symptoms of infection  - Monitor lab/diagnostic results  - Monitor all insertion sites, i.e. indwelling lines, tubes, and drains  - Monitor endotracheal if appropriate and nasal secretions for changes in amount and color  - [place] appropriate cooling/warming therapies per order  - Administer medications as ordered  - Instruct and encourage patient and family to use good hand hygiene technique  - Identify and instruct in appropriate isolation precautions for identified infection/condition  Outcome: Progressing  Goal: Absence of fever/infection during neutropenic period  Description: INTERVENTIONS:  - Monitor WBC    Outcome: Progressing     Problem: DISCHARGE PLANNING  Goal: Discharge to home or other facility with appropriate resources  Description: INTERVENTIONS:  - Identify barriers to discharge w/patient and caregiver  - Arrange for needed discharge resources and transportation as appropriate  - Identify discharge learning needs (meds, wound care, etc.)  - Arrange for interpretive services to assist at discharge as needed  - Refer to Case Management Department for coordinating discharge planning if the patient needs post-hospital services based on physician/advanced  practitioner order or complex needs related to functional status, cognitive ability, or social support system  Outcome: Progressing     Problem: Knowledge Deficit  Goal: Patient/family/caregiver demonstrates understanding of disease process, treatment plan, medications, and discharge instructions  Description: Complete learning assessment and assess knowledge base.  Interventions:  - Provide teaching at level of understanding  - Provide teaching via preferred learning methods  Outcome: Progressing     Problem: Nutrition/Hydration-ADULT  Goal: Nutrient/Hydration intake appropriate for improving, restoring or maintaining nutritional needs  Description: Monitor and assess patient's nutrition/hydration status for malnutrition. Collaborate with interdisciplinary team and initiate plan and interventions as ordered.  Monitor patient's weight and dietary intake as ordered or per policy. Utilize nutrition screening tool and intervene as necessary. Determine patient's food preferences and provide high-protein, high-caloric foods as appropriate.     INTERVENTIONS:  - Monitor oral intake, urinary output, labs, and treatment plans  - Assess nutrition and hydration status and recommend course of action  - Evaluate amount of meals eaten  - Assist patient with eating if necessary   - Allow adequate time for meals  - Recommend/ encourage appropriate diets, oral nutritional supplements, and vitamin/mineral supplements  - Order, calculate, and assess calorie counts as needed  - Recommend, monitor, and adjust tube feedings and TPN/PPN based on assessed needs  - Assess need for intravenous fluids  - Provide specific nutrition/hydration education as appropriate  - Include patient/family/caregiver in decisions related to nutrition  Outcome: Progressing     Problem: GASTROINTESTINAL - ADULT  Goal: Minimal or absence of nausea and/or vomiting  Description: INTERVENTIONS:  - Administer IV fluids if ordered to ensure adequate hydration  -  Maintain NPO status until nausea and vomiting are resolved  - Nasogastric tube if ordered  - Administer ordered antiemetic medications as needed  - Provide nonpharmacologic comfort measures as appropriate  - Advance diet as tolerated, if ordered  - Consider nutrition services referral to assist patient with adequate nutrition and appropriate food choices  Outcome: Progressing  Goal: Maintains or returns to baseline bowel function  Description: INTERVENTIONS:  - Assess bowel function  - Encourage oral fluids to ensure adequate hydration  - Administer IV fluids if ordered to ensure adequate hydration  - Administer ordered medications as needed  - Encourage mobilization and activity  - Consider nutritional services referral to assist patient with adequate nutrition and appropriate food choices  Outcome: Progressing  Goal: Maintains adequate nutritional intake  Description: INTERVENTIONS:  - Monitor percentage of each meal consumed  - Identify factors contributing to decreased intake, treat as appropriate  - Assist with meals as needed  - Monitor I&O, weight, and lab values if indicated  - Obtain nutrition services referral as needed  Outcome: Progressing  Goal: Oral mucous membranes remain intact  Description: INTERVENTIONS  - Assess oral mucosa and hygiene practices  - Implement preventative oral hygiene regimen  - Implement oral medicated treatments as ordered  - Initiate Nutrition services referral as needed  Outcome: Progressing     Problem: Potential for Falls  Goal: Patient will remain free of falls  Description: INTERVENTIONS:  - Educate patient/family on patient safety including physical limitations  - Instruct patient to call for assistance with activity   - Consult OT/PT to assist with strengthening/mobility   - Keep Call bell within reach  - Keep bed low and locked with side rails adjusted as appropriate  - Keep care items and personal belongings within reach  - Initiate and maintain comfort rounds  - Make  Fall Risk Sign visible to staff  - Offer Toileting every 2 Hours, in advance of need  - Initiate/Maintain bed alarm  - Obtain necessary fall risk management equipment: non skid foot wear   - Apply yellow socks and bracelet for high fall risk patients  - Consider moving patient to room near nurses station  Outcome: Progressing

## 2024-03-26 NOTE — DISCHARGE SUMMARY
"Critical access hospital  Discharge- Ira Bonilla 1945, 78 y.o. female MRN: [id number]  Unit/Bed#: [facility]-01 Encounter: [id number]  Primary Care Provider: Selvin Hu DO   Date and time admitted to hospital: 3/21/2024 11:30 PM    Severe protein-calorie malnutrition (HCC)  Assessment & Plan  Malnutrition Findings:   Adult Malnutrition type: Chronic illness  Adult Degree of Malnutrition: Other severe protein calorie malnutrition  Malnutrition Characteristics: Inadequate energy, Weight loss                  360 Statement: Severe calorie-protein malnutrition in context of chronic illness r/t altered GI function, inadequate PO intake as evidance by energy intake less than 75% compared to estimated needs>1 month, 12% wt loss x 5 months ( 57.8kg 11/8/23-> 50.7kg 3/22/24); Treated with PO diet (pt declined oral supplements)    BMI Findings:           Body mass index is 21.83 kg/m².       Anemia  Assessment & Plan  Hgb 10.3   Baseline 8-10   No signs of active bleeding   Trend CBC    Stage 3b chronic kidney disease ([facility])  Assessment & Plan  Lab Results   Component Value Date    EGFR 28 03/26/2024    EGFR 33 03/24/2024    EGFR 40 03/23/2024    CREATININE 1.71 (H) 03/26/2024    CREATININE 1.49 (H) 03/24/2024    CREATININE 1.27 03/23/2024   Baseline Cr 1.4-1.5  Cr on admit 1.50   Trend BMP daily     Hypertension  Assessment & Plan  Home regimen: Amlodipine 10 Mg daily      COPD (chronic obstructive pulmonary disease) ([facility])  Assessment & Plan  Xopenex PRN  No signs of acute exacerbation on admission     Esophageal dysphagia  Assessment & Plan  S/p esophageal dilation at [facility] on 3/20 - only able to dilate some due to small mucosal tear   Advance diet as per surgery and GI      * Ileus ([facility])  Assessment & Plan  Presents LUQ and LLQ AP - no nausea or vomiting, had 3BM today and is having flatus  CTA A/P: \"Findings consistent with a partial low-grade small bowel obstruction without a discrete " "transition point identified \"  Does not examine like SBO - likely ileus  Pain control   GI consulted  Surgery following    Complaining of abdominal cramps, multiple loose bowel movements this morning.  Not bloody, low suspicion for infectious  Will give Bentyl, Dilaudid  Stool workup was negative  Obstruction series from yesterday showed--Mildly distended small bowel in the upper abdomen similar to prior is nonspecific but may represent an ileus.   Diet advanced by GI  GI follow-up  As per GI patient can be discharged on Protonix 40 mg twice daily, Pepcid and sucralfate 1 g with meals and outpatient follow-up with them.  Patient does not need any home care services at discharge      Hospital Course:     Ira Bonilla is a 78 y.o. female patient who originally presented to the hospital on   Admission Orders (From admission, onward)       Ordered        03/22/24 1557  Inpatient Admission  Once            03/22/24 0205  Place in Observation  Once                         due to left-sided abdominal pain.  Patient was admitted and was found to have ileus.  Patient has esophageal dysphagia and underwent dilatation at [facility] on 3/20.  Patient was seen by GI and surgery.  Patient had CT which showed partial low-grade small bowel obstruction.  Repeat obstruction series and x-ray abdomen were obtained.  Patient was started on diet and it was advanced.  As per GI patient is cleared for discharge on Protonix, Pepcid and Carafate.  Outpatient follow-up with GI  On Exam-  Chest-bilateral air entry, clear to auscultation  Abdomen-soft, nontender  Heart-S1 S2 regular  Extremities-no pedal edema or calf tenderness  Neuro-alert awake oriented x3.  No focal deficits    Please see above list of diagnoses and related plan for additional information.   Follow-up with PCP and GI as outpatient    Condition at Discharge:  good      Discharge instructions/Information to patient and family:   See after visit summary for information provided " to patient and family.      Provisions for Follow-Up Care:  See after visit summary for information related to follow-up care and any pertinent home health orders.      Disposition:     Home       Discharge Statement:  I spent 40 minutes discharging the patient. This time was spent on the day of discharge. I had direct contact with the patient on the day of discharge. Greater than 50% of the total time was spent examining patient, answering all patient questions, arranging and discussing plan of care with patient as well as directly providing post-discharge instructions.  Additional time then spent on discharge activities.    Discharge Medications:  See after visit summary for reconciled discharge medications provided to patient and family.      ** Please Note: This note has been constructed using a voice recognition system **

## 2024-03-26 NOTE — ASSESSMENT & PLAN NOTE
Lab Results   Component Value Date    EGFR 28 03/26/2024    EGFR 33 03/24/2024    EGFR 40 03/23/2024    CREATININE 1.71 (H) 03/26/2024    CREATININE 1.49 (H) 03/24/2024    CREATININE 1.27 03/23/2024   Baseline Cr 1.4-1.5  Cr on admit 1.50   Trend BMP daily

## 2024-03-26 NOTE — PROGRESS NOTES
Progress note - [facility]   Ira Bonilla 78 y.o. female MRN: [id number]  Unit/Bed#: -[facility] Encounter: [id number]    ASSESSMENT and PLAN    78-year-old female past medical history of Nissen procedure, recent esophageal dilatation on 3/20, hiatal hernia repair in November 2023 with takedown of previous Nissen and pyloroplasty who presents with abdominal pain found to have ileus.  Now clinically improved    1.  Ileus.  Presented on CT imaging with partial low-grade small bowel obstruction.  Now tolerating meals and having bowel movements.      Continue out of bed as tolerated, monitor and correct electrolytes.    2.  Esophageal dysphagia  3.  History of Nissen fundoplication.  Long history of esophageal dysphagia.  Status post recent esophageal dilation on 3/20.  Increase acid reflux symptoms.  Likely in the setting of increased acid reflux after dilation.  Continue to maximize therapy with pantoprazole 40 mg twice daily, Pepcid 20 mg at night, sucralfate 1 g before meals.  Continue this regimen outpatient.  Will need close follow-up with GI.    If patient tolerates meals can be discharged per GI.        Chief Complaint   Patient presents with    Abdominal Pain     Left sided abdominal pain that started this afternoon.  Denies nausea or vomiting.       SUBJECTIVE  Patient with epigastric discomfort after eating.  Also with nausea.  No episodes of vomiting.  Had episode of diarrhea yesterday but none today.  No GI bleeding.    Temp:  [97.2 °F (36.2 °C)-97.3 °F (36.3 °C)] 97.2 °F (36.2 °C)  HR:  [78-86] 78  BP: (127-129)/(76-82) 129/76     PHYSICAL EXAM  General Appearance: NAD, cooperative, alert  Eyes: Anicteric  GI:  Soft, mild tenderness of the abdomen., non-distended; normal bowel sounds; no masses, no organomegaly   Rectal: Deferred  Musculoskeletal: No edema.  Skin:  No jaundice    LABS & STUDIES  Lab Results   Component Value Date    GLUCOSE 110 04/09/2015    CALCIUM 8.8  "03/26/2024     04/09/2015    K 4.1 03/26/2024    CO2 27 03/26/2024     03/26/2024    BUN 14 03/26/2024    CREATININE 1.71 (H) 03/26/2024    CREATININE 1.49 (H) 03/24/2024    CREATININE 1.27 03/23/2024     Lab Results   Component Value Date    WBC 4.66 03/26/2024    WBC 4.84 03/24/2024    WBC 5.40 03/23/2024    HGB 8.8 (L) 03/26/2024    HGB 9.4 (L) 03/24/2024    HGB 9.7 (L) 03/23/2024    MCV 96 03/26/2024     03/26/2024     03/24/2024     03/23/2024     Lab Results   Component Value Date    ALT 22 03/26/2024    ALT 33 03/23/2024    ALT 29 03/22/2024    AST 28 03/26/2024    AST 42 (H) 03/23/2024    AST 29 03/22/2024    ALKPHOS 86 03/26/2024    ALKPHOS 101 03/23/2024    ALKPHOS 108 (H) 03/22/2024    TBILI 0.28 03/26/2024    TBILI 0.37 03/23/2024    TBILI 0.21 03/22/2024     No results found for: \"AMYLASE\"  Lab Results   Component Value Date    LIPASE <6 (L) 03/22/2024     Lab Results   Component Value Date    IRON 44 (L) 11/10/2023    TIBC 249 (L) 11/10/2023    FERRITIN 384 (H) 11/10/2023     Lab Results   Component Value Date    INR 1.48 (H) 11/10/2023    INR 0.97 10/27/2023    INR 1.03 07/09/2017       XR abdomen 1 view kub    Result Date: 3/25/2024  Narrative: XR ABDOMEN 1 VIEW KUB INDICATION: r/o obs. COMPARISON: Abdominal radiograph March 22, 2024 FINDINGS: No abnormally dilated loops of air-filled small bowel or colon. Mesh material projects over the upper abdomen. Cholecystectomy clips. Lumbosacral spinal fixation hardware.     Impression: Nonobstructive bowel gas pattern. Workstation performed: [id number]     XR abdomen obstruction series    Result Date: 3/25/2024  Narrative: XR ABDOMEN OBSTRUCTION SERIES INDICATION: Ileus. COMPARISON: Abdominal radiograph performed earlier the same day. FINDINGS: Mildly distended small bowel in the upper abdomen similar to prior. No pneumoperitoneum. No pathologic calcification or soft tissue mass. Cholecystectomy clips. Bones are " unremarkable for patient's age. Examination of the chest reveals clear lungs and a normal cardiomediastinal silhouette.     Impression: Mildly distended small bowel in the upper abdomen similar to prior is nonspecific but may represent an ileus. Workstation performed: [id number]     XR abdomen 1 view kub    Result Date: 3/22/2024  Narrative: XR ABDOMEN 1 VIEW KUB INDICATION: SBO. COMPARISON: Abdominal radiograph 2/16/2016 FINDINGS: Cholecystectomy clips in the right upper quadrant. Hernia repair anchors in the upper abdomen. Spinal fusion hardware is present. Minimally distended loops of small bowel present in the left upper quadrant. No evidence of pneumoperitoneum on this supine study. Upright or left lateral decubitus imaging is more sensitive to detect subtle free air in the appropriate setting. No pathologic calcification or soft tissue mass. Bladder is distended with excreted contrast. Clear lung bases. Bones are unremarkable for the patient's age.     Impression: Nonspecific minimally distended loops of small bowel in the left upper quadrant. Workstation performed: [id number]     XR chest portable    Result Date: 3/22/2024  Narrative: XR CHEST PORTABLE INDICATION: Acute Resp Failure. COMPARISON: 11/10/2023 FINDINGS: Clear lungs. No pneumothorax or pleural effusion. Normal cardiomediastinal silhouette. Bones are unremarkable for age. Upper abdominal mesh.     Impression: No acute cardiopulmonary disease. Workstation performed: [id number]     CT chest abdomen pelvis w contrast    Result Date: 3/22/2024  Narrative: CT CHEST, ABDOMEN AND PELVIS WITH IV CONTRAST INDICATION: CP, LUQ  abdominal pain, s/p esophageal dilitation/EGD. COMPARISON: 1/2/2024. TECHNIQUE: CT examination of the chest, abdomen and pelvis was performed. Multiplanar 2D reformatted images were created from the source data. This examination, like all CT scans performed in the [facility], was performed utilizing techniques to  minimize radiation dose exposure, including the use of iterative reconstruction and automated exposure control. Radiation dose length product (DLP) for this visit: 580.51 mGy-cm IV Contrast: 100 mL of iohexol (OMNIPAQUE) Enteric Contrast: Not administered. FINDINGS: CHEST LUNGS: Lungs are clear. No tracheal or endobronchial lesion. PLEURA: Unremarkable. HEART/GREAT VESSELS: Heart is unremarkable for patient's age. No thoracic aortic aneurysm. MEDIASTINUM AND NABOR: Unremarkable. CHEST WALL AND LOWER NECK: Unremarkable. ABDOMEN LIVER/BILIARY TREE: Unremarkable. GALLBLADDER: No calcified gallstones. No pericholecystic inflammatory change. SPLEEN: Unremarkable. PANCREAS: Diffuse fatty replacement. ADRENAL GLANDS: Unremarkable. KIDNEYS/URETERS: Simple renal cyst(s). Otherwise unremarkable kidneys. No hydronephrosis. STOMACH AND BOWEL: Post paraesophageal hernia repair with mesh. Mildly dilated loops of small bowel throughout the left abdomen with small bowel fecalization and luminal caliber measuring up to 3.1 cm. Comparatively collapsed loops of bowel throughout the right lower quadrant.. APPENDIX: No findings to suggest appendicitis. ABDOMINOPELVIC CAVITY: No ascites. No pneumoperitoneum. No lymphadenopathy. VESSELS: Unremarkable for patient's age. PELVIS REPRODUCTIVE ORGANS: Unremarkable for patient's age. URINARY BLADDER: Unremarkable. ABDOMINAL WALL/INGUINAL REGIONS: Unremarkable. BONES: No acute fracture or suspicious osseous lesion. Lumbosacral spinal fixation hardware     Impression: Findings consistent with a partial low-grade small bowel obstruction without a discrete transition point identified Findings discussed with Dr. Fleming at 1:30 a.m., 3/22/2024 Workstation performed: [id number]

## 2024-03-26 NOTE — PLAN OF CARE
Problem: PAIN - ADULT  Goal: Verbalizes/displays adequate comfort level or baseline comfort level  Description: Interventions:  - Encourage patient to monitor pain and request assistance  - Assess pain using appropriate pain scale  - Administer analgesics based on type and severity of pain and evaluate response  - Implement non-pharmacological measures as appropriate and evaluate response  - Consider cultural and social influences on pain and pain management  - Notify physician/advanced practitioner if interventions unsuccessful or patient reports new pain  Outcome: Progressing     Problem: INFECTION - ADULT  Goal: Absence or prevention of progression during hospitalization  Description: INTERVENTIONS:  - Assess and monitor for signs and symptoms of infection  - Monitor lab/diagnostic results  - Monitor all insertion sites, i.e. indwelling lines, tubes, and drains  - Monitor endotracheal if appropriate and nasal secretions for changes in amount and color  - [place] appropriate cooling/warming therapies per order  - Administer medications as ordered  - Instruct and encourage patient and family to use good hand hygiene technique  - Identify and instruct in appropriate isolation precautions for identified infection/condition  Outcome: Progressing  Goal: Absence of fever/infection during neutropenic period  Description: INTERVENTIONS:  - Monitor WBC    Outcome: Progressing     Problem: DISCHARGE PLANNING  Goal: Discharge to home or other facility with appropriate resources  Description: INTERVENTIONS:  - Identify barriers to discharge w/patient and caregiver  - Arrange for needed discharge resources and transportation as appropriate  - Identify discharge learning needs (meds, wound care, etc.)  - Arrange for interpretive services to assist at discharge as needed  - Refer to Case Management Department for coordinating discharge planning if the patient needs post-hospital services based on physician/advanced  practitioner order or complex needs related to functional status, cognitive ability, or social support system  Outcome: Progressing     Problem: Knowledge Deficit  Goal: Patient/family/caregiver demonstrates understanding of disease process, treatment plan, medications, and discharge instructions  Description: Complete learning assessment and assess knowledge base.  Interventions:  - Provide teaching at level of understanding  - Provide teaching via preferred learning methods  Outcome: Progressing     Problem: Nutrition/Hydration-ADULT  Goal: Nutrient/Hydration intake appropriate for improving, restoring or maintaining nutritional needs  Description: Monitor and assess patient's nutrition/hydration status for malnutrition. Collaborate with interdisciplinary team and initiate plan and interventions as ordered.  Monitor patient's weight and dietary intake as ordered or per policy. Utilize nutrition screening tool and intervene as necessary. Determine patient's food preferences and provide high-protein, high-caloric foods as appropriate.     INTERVENTIONS:  - Monitor oral intake, urinary output, labs, and treatment plans  - Assess nutrition and hydration status and recommend course of action  - Evaluate amount of meals eaten  - Assist patient with eating if necessary   - Allow adequate time for meals  - Recommend/ encourage appropriate diets, oral nutritional supplements, and vitamin/mineral supplements  - Order, calculate, and assess calorie counts as needed  - Recommend, monitor, and adjust tube feedings and TPN/PPN based on assessed needs  - Assess need for intravenous fluids  - Provide specific nutrition/hydration education as appropriate  - Include patient/family/caregiver in decisions related to nutrition  Outcome: Progressing     Problem: GASTROINTESTINAL - ADULT  Goal: Minimal or absence of nausea and/or vomiting  Description: INTERVENTIONS:  - Administer IV fluids if ordered to ensure adequate hydration  -  Maintain NPO status until nausea and vomiting are resolved  - Nasogastric tube if ordered  - Administer ordered antiemetic medications as needed  - Provide nonpharmacologic comfort measures as appropriate  - Advance diet as tolerated, if ordered  - Consider nutrition services referral to assist patient with adequate nutrition and appropriate food choices  Outcome: Progressing  Goal: Maintains or returns to baseline bowel function  Description: INTERVENTIONS:  - Assess bowel function  - Encourage oral fluids to ensure adequate hydration  - Administer IV fluids if ordered to ensure adequate hydration  - Administer ordered medications as needed  - Encourage mobilization and activity  - Consider nutritional services referral to assist patient with adequate nutrition and appropriate food choices  Outcome: Progressing  Goal: Maintains adequate nutritional intake  Description: INTERVENTIONS:  - Monitor percentage of each meal consumed  - Identify factors contributing to decreased intake, treat as appropriate  - Assist with meals as needed  - Monitor I&O, weight, and lab values if indicated  - Obtain nutrition services referral as needed  Outcome: Progressing  Goal: Oral mucous membranes remain intact  Description: INTERVENTIONS  - Assess oral mucosa and hygiene practices  - Implement preventative oral hygiene regimen  - Implement oral medicated treatments as ordered  - Initiate Nutrition services referral as needed  Outcome: Progressing     Problem: Potential for Falls  Goal: Patient will remain free of falls  Description: INTERVENTIONS:  - Educate patient/family on patient safety including physical limitations  - Instruct patient to call for assistance with activity   - Consult OT/PT to assist with strengthening/mobility   - Keep Call bell within reach  - Keep bed low and locked with side rails adjusted as appropriate  - Keep care items and personal belongings within reach  - Initiate and maintain comfort rounds  - Make  Fall Risk Sign visible to staff  - Offer Toileting every xxxxx Hours, in advance of need  - Initiate/Maintain xxalarm  - Obtain necessary fall risk management equipment: x  - Apply yellow socks and bracelet for high fall risk patients  - Consider moving patient to room near nurses station  Outcome: Progressing

## 2024-03-26 NOTE — CASE MANAGEMENT
Case Management Discharge Planning Note    Patient name Ira Bonilla  Location /-01 MRN [id number]  : 1945 Date 3/26/2024       Current Admission Date: 3/21/2024  Current Admission Diagnosis:Ileus (HCC)   Patient Active Problem List    Diagnosis Date Noted    Severe protein-calorie malnutrition (HCC) 2024    Ileus (HCC) 2024    Anemia 2024    Diarrhea 2024    Encounter for geriatric assessment 10/10/2023    Asthma     COPD (chronic obstructive pulmonary disease) (HCC)     Hypertension     Migraines     Depression     Anxiety     Stage 3b chronic kidney disease (HCC)     Nausea 06/10/2021    Allergic contact urticaria 2020    Chronic GERD 2019    Dysphagia 2019    Esophageal dysphagia 2019    History of Nissen fundoplication 2019    Left upper quadrant pain 2019    Gastroparesis 2019    Gastroesophageal reflux disease without esophagitis 2019      LOS (days): 4  Geometric Mean LOS (GMLOS) (days): 4.6  Days to GMLOS:0.6     OBJECTIVE:  Risk of Unplanned Readmission Score: 13.51         Current admission status: Inpatient   Preferred Pharmacy:   [id number] - [place] - [address]  [address] [place] [address]  Phone: [phone] Fax: [phone]    Primary Care Provider: Selvin Hu DO    Primary Insurance: [employer]  Secondary Insurance:     DISCHARGE DETAILS:     IMM reviewed with patient, patient agrees with discharge determination.    IMM Given (Date):: 24 (242p)  IMM Given to:: Patient

## 2024-03-26 NOTE — ASSESSMENT & PLAN NOTE
"Presents LUQ and LLQ AP - no nausea or vomiting, had 3BM today and is having flatus  CTA A/P: \"Findings consistent with a partial low-grade small bowel obstruction without a discrete transition point identified \"  Does not examine like SBO - likely ileus  Pain control   GI consulted  Surgery following    Complaining of abdominal cramps, multiple loose bowel movements this morning.  Not bloody, low suspicion for infectious  Will give Bentyl, Dilaudid  Stool workup was negative  Obstruction series from yesterday showed--Mildly distended small bowel in the upper abdomen similar to prior is nonspecific but may represent an ileus.   Diet advanced by GI  GI follow-up  As per GI patient can be discharged on Protonix 40 mg twice daily, Pepcid and sucralfate 1 g with meals and outpatient follow-up with them.  Patient does not need any home care services at discharge  "

## 2024-03-27 ENCOUNTER — TELEMEDICINE (OUTPATIENT)
Dept: PSYCHIATRY | Facility: CLINIC | Age: 79
End: 2024-03-27
Payer: COMMERCIAL

## 2024-03-27 DIAGNOSIS — F32.0 CURRENT MILD EPISODE OF MAJOR DEPRESSIVE DISORDER, UNSPECIFIED WHETHER RECURRENT (HCC): Primary | ICD-10-CM

## 2024-03-27 DIAGNOSIS — F41.9 ANXIETY: ICD-10-CM

## 2024-03-27 PROCEDURE — 90834 PSYTX W PT 45 MINUTES: CPT

## 2024-03-27 NOTE — PSYCH
"Behavioral Health Psychotherapy Progress Note    Psychotherapy Provided: Individual Psychotherapy     1. Current mild episode of major depressive disorder, unspecified whether recurrent (HCC)        2. Anxiety            Goals addressed in session: Goal 1     DATA: Ira shared she was in the hospital for a block in her bowel or \"ileus\" that is causing her stomach pain.  She stayed 4 days in the hospital and openly communicated her thoughts and feelings about the experience. She shared she might start keeping a food diary to keep track of what might be the best for her to eat.  Therapist encouraged her to establish a regular sleep wake schedule and to challenge any overly negative thoughts.  She shared that there was a \"blowup\" between Ira and Art because Art yelled at [facility] for asking Ira about something when Art said he doesn't want her to be responsible and take the \"burden\" off of her for making decisions about where he goes and what he does.  Therapist encouraged her to assertively talk with her son about how she feels about wanting to have a say in decisions about [place].  During this session, this clinician used the following therapeutic modalities: Client-centered Therapy and Supportive Psychotherapy    Substance Abuse was not addressed during this session. If the client is diagnosed with a co-occurring substance use disorder, please indicate any changes in the frequency or amount of use: . Stage of change for addressing substance use diagnoses: No substance use/Not applicable    ASSESSMENT:  Ira Bonilla presents with a Euthymic/ normal mood.     her affect is Normal range and intensity, which is congruent, with her mood and the content of the session. The client has made progress on their goals.     Ira Bonilla presents with a none risk of suicide, none risk of self-harm, and none risk of harm to others.    For any risk assessment that surpasses a \"low\" rating, a safety plan must be " developed.    A safety plan was indicated: no  If yes, describe in detail     PLAN: Between sessions, Ira Bonilla will utilize assertiveness in talking to her son, keep regular sleep wake schedule . At the next session, the therapist will use Client-centered Therapy and Supportive Psychotherapy to address depression, interpersonal conflict.    Behavioral Health Treatment Plan and Discharge Planning: Ira Bonilla is aware of and agrees to continue to work on their treatment plan. They have identified and are working toward their discharge goals. yes    Visit start and stop times:    03/27/24  Start Time: 1000  Stop Time: 1052  Total Visit Time: 52 minutes  Virtual Regular Visit    Verification of patient location:    Patient is located at Home in the following state in which I hold an active license [place]      Assessment/Plan:    Problem List Items Addressed This Visit       Depression - Primary    Anxiety       Goals addressed in session: Goal 1          Reason for visit is No chief complaint on file.       Encounter provider Maria Ines Farfan LCSW    Provider [facility] [address]  [facility] [place] [address] [phone]      Recent Visits  No visits were found meeting these conditions.  Showing recent visits within past 7 days and meeting all other requirements  Today's Visits  Date Type Provider Dept   03/27/24 Telemedicine Maria Ines Farfan LCSW  Psychiatric [facility]   Showing today's visits and meeting all other requirements  Future Appointments  No visits were found meeting these conditions.  Showing future appointments within next 150 days and meeting all other requirements       The patient was identified by name and date of birth. Ira Bonilla was informed that this is a telemedicine visit and that the visit is being conducted throughthe [employer] platform. She agrees to proceed..  My  office door was closed. No one else was in the room.  She acknowledged consent and understanding of privacy and security of the video platform. The patient has agreed to participate and understands they can discontinue the visit at any time.    Patient is aware this is a billable service.     Subjective  Ira Bonilla is a 78 y.o. female  .      HPI     Past Medical History:   Diagnosis Date    Anxiety     Arthritis     Asthma     Cancer (HCC)     Candida infection, esophageal (HCC)     Chronic kidney disease     Chronic pain     COPD (chronic obstructive pulmonary disease) (HCC)     Depression     Depression     Gastroparesis     Gastroparesis     GERD (gastroesophageal reflux disease)     Hypertension     Irritable bowel syndrome (IBS)     Lactose intolerance Not sure    Migraines     MRSA (methicillin resistant Staphylococcus aureus)     Multiple thyroid nodules     Osteoporosis     Pneumonia     Psychiatric disorder        Past Surgical History:   Procedure Laterality Date    BACK SURGERY       SECTION      CHOLECYSTECTOMY      COLONOSCOPY  10/09/2013    ESOPHAGEAL DILATION       [facility]    ESOPHAGOGASTRODUODENOSCOPY N/A 2023    Procedure: ESOPHAGOGASTRODUODENOSCOPY (EGD);  Surgeon: Dontrell Mcmahon MD;  [facility];  Service: General    ESOPHAGOGASTRODUODENOSCOPY N/A 3/20/2024    Procedure: ESOPHAGOGASTRODUODENOSCOPY (EGD);  Surgeon: Shilpa Price MD;  [facility];  Service: Thoracic    HIATAL HERNIA REPAIR      NISSEN FUNDOPLICATION      CT BIOPSY LIVER NEEDLE PERCUTANEOUS N/A 2023    Procedure: EXCISION BIOPSY LIVER;  Surgeon: Dontrell Mcmahon MD;  [facility];  Service: General    CT ESOPHAGOGASTRODUODENOSCOPY TRANSORAL DIAGNOSTIC N/A 2019    Procedure: ESOPHAGOGASTRODUODENOSCOPY (EGD);  Surgeon: Geovanna Winn MD;  [facility];  Service: Gastroenterology    CT ESOPHAGOGASTRODUODENOSCOPY TRANSORAL DIAGNOSTIC N/A 2023    Procedure:  ESOPHAGOGASTRODUODENOSCOPY (EGD);  Surgeon: Shilpa Price MD;  [facility];  Service: Thoracic    NE ESOPHAGOSCOPY FLEX BALLOON DILAT <30 MM DIAM N/A 09/20/2023    Procedure: CRE WIRE GUIDED BALLOON DILATATION ESOPHAGEAL;  Surgeon: Shilpa Price MD;  [facility];  Service: Thoracic    NE ESOPHAGOSCOPY FLEX BALLOON DILAT <30 MM DIAM N/A 3/20/2024    Procedure: ESOPHAGOGASTRODUODENOSCOPY WITH BALLOON DILATION;  Surgeon: Shilpa Price MD;  [facility];  Service: Thoracic    NE LAPS RPR PARAESPHGL HRNA INCL FUNDPLSTY W/O MESH N/A 11/8/2023    Procedure: robotic takedown of nissen fundoplication, redo hiatal hernia, partial fundoplication, with mesh;  Surgeon: Shilpa Price MD;  [facility];  Service: Thoracic    NE PYLOROPLASTY N/A 11/8/2023    Procedure: PYLOROPLASTY LAPAROSCOPIC WITH ROBOT;  Surgeon: Dontrell Mcmahon MD;  [facility];  Service: General    ROTATOR CUFF REPAIR      SHOULDER SURGERY      SPINAL FUSION      TUBAL LIGATION  May 1975    Last child born    UPPER GASTROINTESTINAL ENDOSCOPY      US GUIDED THYROID BIOPSY      WISDOM TOOTH EXTRACTION         Current Outpatient Medications   Medication Sig Dispense Refill    acetaminophen (TYLENOL) 160 mg/5 mL suspension Take 20.3 mL (650 mg total) by mouth every 6 (six) hours as needed for mild pain 355 mL 0    albuterol (PROVENTIL HFA,VENTOLIN HFA) 90 mcg/act inhaler Inhale 2 puffs if needed      amitriptyline (ELAVIL) 50 mg tablet TAKE 1 TABLET BY MOUTH EVERYDAY AT BEDTIME 90 tablet 1    amLODIPine (NORVASC) 10 mg tablet TAKE ONE TABLET BY MOUTH DAILY ORAL ONCE A DAY 90 DAYS      cetirizine (ZyrTEC) 10 MG chewable tablet Chew 10 mg daily.      cholecalciferol (VITAMIN D3) 1,000 units tablet Take 5,000 Units by mouth daily      dicyclomine (BENTYL) 20 mg tablet Take 1 tablet (20 mg total) by mouth every 6 (six) hours as needed (abdominal cramping) 20 tablet 0    famotidine (PEPCID) 40 MG  tablet TAKE 1 TABLET BY MOUTH EVERYDAY AT BEDTIME 90 tablet 3    multivitamin (THERAGRAN) TABS Take 1 tablet by mouth daily      Omega-3 Fatty Acids (OMEGA 3 PO) Take by mouth in the morning      ondansetron (ZOFRAN) 4 mg tablet Take 1 tablet (4 mg total) by mouth every 8 (eight) hours as needed for nausea or vomiting 15 tablet 0    pantoprazole (PROTONIX) 40 mg tablet Take 1 tablet (40 mg total) by mouth 2 (two) times a day 60 tablet 0    polyethylene glycol (MIRALAX) 17 g packet Take 17 g by mouth daily as needed (constipation) (Patient taking differently: Take 17 g by mouth daily) 10 each 0    sucralfate (CARAFATE) 1 g tablet Take 1 tablet (1 g total) by mouth 4 (four) times a day (before meals and at bedtime) 120 tablet 0     No current facility-administered medications for this visit.        Allergies   Allergen Reactions    Latex Hives    Augmentin [Amoxicillin-Pot Clavulanate] GI Intolerance    Bactrim [Sulfamethoxazole-Trimethoprim] GI Intolerance    Clarithromycin GI Intolerance    Doxycycline Hives    Neomycin GI Intolerance    Polymyxin B GI Intolerance    Zyvox [Linezolid] GI Bleeding    Cephalosporins GI Intolerance    Nsaids GI Intolerance    Penicillins GI Intolerance     Patient can only take levaquin and cipro    Prednisone GI Intolerance    Sulphasomidine GI Intolerance       Review of Systems    Video Exam    There were no vitals filed for this visit.    Physical Exam

## 2024-04-09 ENCOUNTER — TELEPHONE (OUTPATIENT)
Dept: HEMATOLOGY ONCOLOGY | Facility: CLINIC | Age: 79
End: 2024-04-09

## 2024-04-09 ENCOUNTER — TELEPHONE (OUTPATIENT)
Dept: CARDIAC SURGERY | Facility: CLINIC | Age: 79
End: 2024-04-09

## 2024-04-09 NOTE — TELEPHONE ENCOUNTER
Patient Call    Who are you speaking with? Patient    If it is not the patient, are they listed on an active communication consent form? N/A   What is the reason for this call? Patient calling to speak with Leonela from Dr Price's office.  Patient states she received a voicemail from Leonela in regards to scheduling.  Patient is not sure what the call was in regards to. Patient would like a call back to discuss if there is a procedure patient needs to schedule.    Does this require a call back? Yes   If a call back is required, please list best call back number [phone]   If a call back is required, advise that a message will be forwarded to their care team and someone will return their call as soon as possible.   Did you relay this information to the patient? Yes

## 2024-04-09 NOTE — TELEPHONE ENCOUNTER
Sent message to provider- Spoke with patient and the EGD with Dilation procedure she had back on 3/20/24 has not changed anything. She wanted me to let you know that she is getting a 2nd opinion with the gastroenterologist Dr. Pedersen on 5/8/24. She states she feels worse now than she did before her surgery.

## 2024-04-17 ENCOUNTER — TELEMEDICINE (OUTPATIENT)
Dept: PSYCHIATRY | Facility: CLINIC | Age: 79
End: 2024-04-17
Payer: COMMERCIAL

## 2024-04-17 DIAGNOSIS — F41.9 ANXIETY: ICD-10-CM

## 2024-04-17 DIAGNOSIS — F32.0 CURRENT MILD EPISODE OF MAJOR DEPRESSIVE DISORDER WITHOUT PRIOR EPISODE (HCC): Primary | ICD-10-CM

## 2024-04-17 PROCEDURE — 90834 PSYTX W PT 45 MINUTES: CPT

## 2024-04-17 NOTE — PSYCH
"Behavioral Health Psychotherapy Progress Note    Psychotherapy Provided: Individual Psychotherapy     1. Current mild episode of major depressive disorder without prior episode (HCC)        2. Anxiety            Goals addressed in session: Goal 1     DATA: Wendy shared her stomach is hurting her and she is not sure why this is so.  She has started to keep a food journal to notice what foods may be triggering her.  She said she talks out loud to her daughter who  4 years ago this month.  She was able to process some thoughts and feelings about losing her daughter. She shared her feelings about her son trying to take some responsibilities for Dario from Wendy and this is difficult for her.  She complains that her son Art says that Dario never does anything right.  She shared some feelings Art had about not wanting Liz around his daughters.  She shared that she had a memorial service for her daughter which helped her have closure but that there is a lot of anger around her death from her ex  and her son.  Therapist encouraged open communication of thoughts and feelings about her daughter's death.    During this session, this clinician used the following therapeutic modalities: Bereavement Therapy and Client-centered Therapy    Substance Abuse was not addressed during this session. If the client is diagnosed with a co-occurring substance use disorder, please indicate any changes in the frequency or amount of use: . Stage of change for addressing substance use diagnoses: No substance use/Not applicable    ASSESSMENT:  Ira Bonilla presents with a Euthymic/ normal mood.     her affect is Normal range and intensity, which is congruent, with her mood and the content of the session. The client has made progress on their goals.     Ira Bonilla presents with a none risk of suicide, none risk of self-harm, and none risk of harm to others.    For any risk assessment that surpasses a \"low\" rating, a safety " plan must be developed.    A safety plan was indicated: no  If yes, describe in detail     PLAN: Between sessions, Ira Bonilla will engage in a meaningful ritual on anniversary of her daughter's death on 4/27. At the next session, the therapist will use Bereavement Therapy and Client-centered Therapy to address grief reaction.    Behavioral Health Treatment Plan and Discharge Planning: Ira Bonilla is aware of and agrees to continue to work on their treatment plan. They have identified and are working toward their discharge goals. yes    Visit start and stop times:    04/17/24  Start Time: 1300  Stop Time: 1352  Total Visit Time: 52 minutes  Virtual Regular Visit    Verification of patient location:    Patient is located at Home in the following state in which I hold an active license [place]      Assessment/Plan:    Problem List Items Addressed This Visit       Depression - Primary    Anxiety       Goals addressed in session: Goal 1          Reason for visit is No chief complaint on file.       Encounter provider Maria Ines Farfan LCSW    Provider located at [address]  [place] [address] [phone]      Recent Visits  No visits were found meeting these conditions.  Showing recent visits within past 7 days and meeting all other requirements  Today's Visits  Date Type Provider Dept   04/17/24 Telemedicine Maria Ines Farfan LCSW  [facility]   Showing today's visits and meeting all other requirements  Future Appointments  No visits were found meeting these conditions.  Showing future appointments within next 150 days and meeting all other requirements       The patient was identified by name and date of birth. Ira Bonilla was informed that this is a telemedicine visit and that the visit is being conducted throughthe [employer] platform. She agrees to proceed..  My office door was  closed. No one else was in the room.  She acknowledged consent and understanding of privacy and security of the video platform. The patient has agreed to participate and understands they can discontinue the visit at any time.    Patient is aware this is a billable service.     Subjective  Ira Bonilla is a 78 y.o. female  .      HPI     Past Medical History:   Diagnosis Date    Anxiety     Arthritis     Asthma     Cancer (HCC)     Candida infection, esophageal (HCC)     Chronic kidney disease     Chronic pain     COPD (chronic obstructive pulmonary disease) (HCC)     Depression     Depression     Gastroparesis     Gastroparesis     GERD (gastroesophageal reflux disease)     Hypertension     Irritable bowel syndrome (IBS)     Lactose intolerance Not sure    Migraines     MRSA (methicillin resistant Staphylococcus aureus)     Multiple thyroid nodules     Osteoporosis     Pneumonia     Psychiatric disorder        Past Surgical History:   Procedure Laterality Date    BACK SURGERY       SECTION      CHOLECYSTECTOMY      COLONOSCOPY  10/09/2013    ESOPHAGEAL DILATION       [facility]    ESOPHAGOGASTRODUODENOSCOPY N/A 2023    Procedure: ESOPHAGOGASTRODUODENOSCOPY (EGD);  Surgeon: Dontrell Mcmahon MD;  [facility];  Service: General    ESOPHAGOGASTRODUODENOSCOPY N/A 3/20/2024    Procedure: ESOPHAGOGASTRODUODENOSCOPY (EGD);  Surgeon: Shilpa Price MD;  [facility];  Service: Thoracic    HIATAL HERNIA REPAIR      NISSEN FUNDOPLICATION      [place] BIOPSY LIVER NEEDLE PERCUTANEOUS N/A 2023    Procedure: EXCISION BIOPSY LIVER;  Surgeon: Dontrell Mcmahon MD;  [facility]: [facility];  Service: General    [place] ESOPHAGOGASTRODUODENOSCOPY TRANSORAL DIAGNOSTIC N/A 2019    Procedure: ESOPHAGOGASTRODUODENOSCOPY (EGD);  Surgeon: Geovanna Winn MD;  [facility];  Service: Gastroenterology    [place] ESOPHAGOGASTRODUODENOSCOPY TRANSORAL DIAGNOSTIC N/A 2023    Procedure:  ESOPHAGOGASTRODUODENOSCOPY (EGD);  Surgeon: Shilpa Price MD;  [facility];  Service: Thoracic    [place] ESOPHAGOSCOPY FLEX BALLOON DILAT <30 MM DIAM N/A 09/20/2023    Procedure: CRE WIRE GUIDED BALLOON DILATATION ESOPHAGEAL;  Surgeon: Shilpa Price MD;  [facility];  Service: Thoracic    PA ESOPHAGOSCOPY FLEX BALLOON DILAT <30 MM DIAM N/A 3/20/2024    Procedure: ESOPHAGOGASTRODUODENOSCOPY WITH BALLOON DILATION;  Surgeon: Shilpa Price MD;  [facility];  Service: Thoracic    PA LAPS RPR PARAESPHGL HRNA INCL FUNDPLSTY W/O MESH N/A 11/8/2023    Procedure: robotic takedown of nissen fundoplication, redo hiatal hernia, partial fundoplication, with mesh;  Surgeon: Shilpa Price MD;  [facility];  Service: Thoracic    PA PYLOROPLASTY N/A 11/8/2023    Procedure: PYLOROPLASTY LAPAROSCOPIC WITH ROBOT;  Surgeon: Dontrell Mcmahon MD;  [facility];  Service: General    ROTATOR CUFF REPAIR      SHOULDER SURGERY      SPINAL FUSION      TUBAL LIGATION  May 1974    Last child born    UPPER GASTROINTESTINAL ENDOSCOPY      US GUIDED THYROID BIOPSY      WISDOM TOOTH EXTRACTION         Current Outpatient Medications   Medication Sig Dispense Refill    acetaminophen (TYLENOL) 160 mg/5 mL suspension Take 20.3 mL (650 mg total) by mouth every 6 (six) hours as needed for mild pain 355 mL 0    albuterol (PROVENTIL HFA,VENTOLIN HFA) 90 mcg/act inhaler Inhale 2 puffs if needed      amitriptyline (ELAVIL) 50 mg tablet TAKE 1 TABLET BY MOUTH EVERYDAY AT BEDTIME 90 tablet 1    amLODIPine (NORVASC) 10 mg tablet TAKE ONE TABLET BY MOUTH DAILY ORAL ONCE A DAY 90 DAYS      cetirizine (ZyrTEC) 10 MG chewable tablet Chew 10 mg daily.      cholecalciferol (VITAMIN D3) 1,000 units tablet Take 5,000 Units by mouth daily      dicyclomine (BENTYL) 20 mg tablet Take 1 tablet (20 mg total) by mouth every 6 (six) hours as needed (abdominal cramping) 20 tablet 0    famotidine (PEPCID) 40 MG  tablet TAKE 1 TABLET BY MOUTH EVERYDAY AT BEDTIME 90 tablet 3    multivitamin (THERAGRAN) TABS Take 1 tablet by mouth daily      Omega-3 Fatty Acids (OMEGA 3 PO) Take by mouth in the morning      ondansetron (ZOFRAN) 4 mg tablet Take 1 tablet (4 mg total) by mouth every 8 (eight) hours as needed for nausea or vomiting 15 tablet 0    pantoprazole (PROTONIX) 40 mg tablet Take 1 tablet (40 mg total) by mouth 2 (two) times a day 60 tablet 0    polyethylene glycol (MIRALAX) 17 g packet Take 17 g by mouth daily as needed (constipation) (Patient taking differently: Take 17 g by mouth daily) 10 each 0    sucralfate (CARAFATE) 1 g tablet Take 1 tablet (1 g total) by mouth 4 (four) times a day (before meals and at bedtime) 120 tablet 0     No current facility-administered medications for this visit.        Allergies   Allergen Reactions    Latex Hives    Augmentin [Amoxicillin-Pot Clavulanate] GI Intolerance    Bactrim [Sulfamethoxazole-Trimethoprim] GI Intolerance    Clarithromycin GI Intolerance    Doxycycline Hives    Neomycin GI Intolerance    Polymyxin B GI Intolerance    Zyvox [Linezolid] GI Bleeding    Cephalosporins GI Intolerance    Nsaids GI Intolerance    Penicillins GI Intolerance     Patient can only take levaquin and cipro    Prednisone GI Intolerance    Sulphasomidine GI Intolerance       Review of Systems    Video Exam    There were no vitals filed for this visit.    Physical Exam

## 2024-04-22 DIAGNOSIS — R10.9 ABDOMINAL PAIN: ICD-10-CM

## 2024-04-22 NOTE — TELEPHONE ENCOUNTER
Patients GI provider:  Dr. Pedersen    Number to return call: [phone]    Reason for call: Pt called in to to refill pantoprazole and carafate. Patient also stated she does not have enough of pantoprazole until pt's appt and if pt should continue taking dexilant when pantoprazole runs outs. Please reach out to patient ,thank you.    Scheduled procedure/appointment date if applicable: Apt/procedure 05/08/2024

## 2024-04-23 NOTE — TELEPHONE ENCOUNTER
Patient called to check the status of phone call from yesterday.  Patient is currently out of Pantoprazole 40mg. Patient is unsure if she still should be taking this medications.  Asked that if she is still to be taking to please send the prescriptions to [facility] [place].

## 2024-04-24 RX ORDER — PANTOPRAZOLE SODIUM 40 MG/1
40 TABLET, DELAYED RELEASE ORAL 2 TIMES DAILY
Qty: 60 TABLET | Refills: 0 | OUTPATIENT
Start: 2024-04-24 | End: 2024-05-24

## 2024-04-24 RX ORDER — SUCRALFATE 1 G/1
1 TABLET ORAL
Qty: 120 TABLET | Refills: 0 | OUTPATIENT
Start: 2024-04-24 | End: 2024-05-24

## 2024-04-24 NOTE — TELEPHONE ENCOUNTER
Called pt and pt was prescribed Pantoprazole in hospital. Pt is unsure if she she should go back on or take Dexilant. I informed her she needs to check with surgery per Dr. Pedersen and Hanane since they prescribed it. Pt said it is GI meds and who would she call since it was prescribed when she was in hospital. Told pt I will look into this and call her back

## 2024-04-24 NOTE — TELEPHONE ENCOUNTER
Reviewed patient's chart.  Will forward message to cardiothoracic to see if they can assist in PPI for patient.

## 2024-04-25 DIAGNOSIS — R10.9 ABDOMINAL PAIN: ICD-10-CM

## 2024-04-25 RX ORDER — PANTOPRAZOLE SODIUM 40 MG/1
40 TABLET, DELAYED RELEASE ORAL DAILY
Qty: 60 TABLET | Refills: 3 | Status: SHIPPED | OUTPATIENT
Start: 2024-04-25 | End: 2024-12-21

## 2024-04-25 NOTE — TELEPHONE ENCOUNTER
Dr. Price refilled script, called pt to inform her that script was called in for 3 refills. Pt understood

## 2024-04-29 DIAGNOSIS — K52.9 GASTROENTERITIS: ICD-10-CM

## 2024-04-29 DIAGNOSIS — R19.7 DIARRHEA: ICD-10-CM

## 2024-04-29 RX ORDER — DICYCLOMINE HCL 20 MG
20 TABLET ORAL EVERY 6 HOURS PRN
Qty: 20 TABLET | Refills: 0 | Status: SHIPPED | OUTPATIENT
Start: 2024-04-29

## 2024-04-29 RX ORDER — DIPHENOXYLATE HYDROCHLORIDE AND ATROPINE SULFATE 2.5; .025 MG/1; MG/1
1 TABLET ORAL 4 TIMES DAILY PRN
Qty: 112 TABLET | Refills: 0 | Status: SHIPPED | OUTPATIENT
Start: 2024-04-29 | End: 2024-05-27

## 2024-05-03 DIAGNOSIS — K21.9 GASTROESOPHAGEAL REFLUX DISEASE WITHOUT ESOPHAGITIS: ICD-10-CM

## 2024-05-03 RX ORDER — FAMOTIDINE 40 MG/1
TABLET, FILM COATED ORAL
Qty: 90 TABLET | Refills: 1 | Status: SHIPPED | OUTPATIENT
Start: 2024-05-03

## 2024-05-07 ENCOUNTER — TELEMEDICINE (OUTPATIENT)
Dept: PSYCHIATRY | Facility: CLINIC | Age: 79
End: 2024-05-07
Payer: COMMERCIAL

## 2024-05-07 DIAGNOSIS — F41.9 ANXIETY: ICD-10-CM

## 2024-05-07 DIAGNOSIS — F32.0 CURRENT MILD EPISODE OF MAJOR DEPRESSIVE DISORDER WITHOUT PRIOR EPISODE (HCC): Primary | ICD-10-CM

## 2024-05-07 PROCEDURE — 90834 PSYTX W PT 45 MINUTES: CPT

## 2024-05-07 NOTE — PSYCH
"Behavioral Health Psychotherapy Progress Note    Psychotherapy Provided: Individual Psychotherapy     1. Current mild episode of major depressive disorder without prior episode (HCC)        2. Anxiety            Goals addressed in session: Goal 1     DATA: Wendy shared that there are some issues with Wandi in school due to Art her son who is hard on him along with other family members.  Her son has been either telling Wendy she shouldn't have any say over Wandi to having all the say over him. Therapist reviewed assertiveness skills with her to use with Art.  Therapist encouraged open communication of her thoughts and feelings about her her daughter's passing because of the anniversary of her death.    During this session, this clinician used the following therapeutic modalities: Client-centered Therapy    Substance Abuse was not addressed during this session. If the client is diagnosed with a co-occurring substance use disorder, please indicate any changes in the frequency or amount of use: . Stage of change for addressing substance use diagnoses: No substance use/Not applicable    ASSESSMENT:  Ira Bonilla presents with a Euthymic/ normal mood.     her affect is Normal range and intensity, which is congruent, with her mood and the content of the session. The client has made progress on their goals.     Ira Bonilla presents with a none risk of suicide, none risk of self-harm, and none risk of harm to others.    For any risk assessment that surpasses a \"low\" rating, a safety plan must be developed.    A safety plan was indicated: no  If yes, describe in detail     PLAN: Between sessions, Ira Bonilla will utilize assertiveness with her son. At the next session, the therapist will use Client-centered Therapy and Dialectical Behavior Therapy to address conflict resolution.    Behavioral Health Treatment Plan and Discharge Planning: Ira Bonilla is aware of and agrees to continue to work on their " treatment plan. They have identified and are working toward their discharge goals. yes    Visit start and stop times:    05/07/24  Start Time: 1000  Stop Time: 1052  Total Visit Time: 52 minutes  Virtual Regular Visit    Verification of patient location:    Patient is located at Home in the following state in which I hold an active license [place]      Assessment/Plan:    Problem List Items Addressed This Visit       Depression - Primary    Anxiety       Goals addressed in session: Goal 1          Reason for visit is No chief complaint on file.       Encounter provider Maria Ines Farfan LCSW      Recent Visits  No visits were found meeting these conditions.  Showing recent visits within past 7 days and meeting all other requirements  Today's Visits  Date Type Provider Dept   05/07/24 Telemedicine Maria Ines Farfan LCSW [facility]   Showing today's visits and meeting all other requirements  Future Appointments  No visits were found meeting these conditions.  Showing future appointments within next 150 days and meeting all other requirements       The patient was identified by name and date of birth. Ira Bonilla was informed that this is a telemedicine visit and that the visit is being conducted throughthe [employer] platform. She agrees to proceed..  My office door was closed. No one else was in the room.  She acknowledged consent and understanding of privacy and security of the video platform. The patient has agreed to participate and understands they can discontinue the visit at any time.    Patient is aware this is a billable service.     Subjective  Ira Bonilla is a 78 y.o. female  .      HPI     Past Medical History:   Diagnosis Date    Anxiety     Arthritis     Asthma     Cancer (HCC)     Candida infection, esophageal (HCC)     Chronic kidney disease     Chronic pain     COPD (chronic obstructive pulmonary disease) (HCC)     Depression     Depression     Gastroparesis      Gastroparesis     GERD (gastroesophageal reflux disease)     Hypertension     Irritable bowel syndrome (IBS)     Lactose intolerance Not sure    Migraines     MRSA (methicillin resistant Staphylococcus aureus)     Multiple thyroid nodules     Osteoporosis     Pneumonia     Psychiatric disorder        Past Surgical History:   Procedure Laterality Date    BACK SURGERY       SECTION      CHOLECYSTECTOMY      COLONOSCOPY  10/09/2013    ESOPHAGEAL DILATION       [facility]    ESOPHAGOGASTRODUODENOSCOPY N/A 2023    Procedure: ESOPHAGOGASTRODUODENOSCOPY (EGD);  Surgeon: Dontrell Mcamhon MD;  [facility];  Service: General    ESOPHAGOGASTRODUODENOSCOPY N/A 3/20/2024    Procedure: ESOPHAGOGASTRODUODENOSCOPY (EGD);  Surgeon: Shilpa Price MD;  [facility];  Service: Thoracic    HIATAL HERNIA REPAIR      NISSEN FUNDOPLICATION      [place] BIOPSY LIVER NEEDLE PERCUTANEOUS N/A 2023    Procedure: EXCISION BIOPSY LIVER;  Surgeon: Dontrell Mcmahon MD;  [facility];  Service: General    [place] ESOPHAGOGASTRODUODENOSCOPY TRANSORAL DIAGNOSTIC N/A 2019    Procedure: ESOPHAGOGASTRODUODENOSCOPY (EGD);  Surgeon: Geovanna Winn MD;  [facility];  Service: Gastroenterology    [place] ESOPHAGOGASTRODUODENOSCOPY TRANSORAL DIAGNOSTIC N/A 2023    Procedure: ESOPHAGOGASTRODUODENOSCOPY (EGD);  Surgeon: Shilpa Price MD;  [facility];  Service: Thoracic    [place] ESOPHAGOSCOPY FLEX BALLOON DILAT <30 MM DIAM N/A 2023    Procedure: CRE WIRE GUIDED BALLOON DILATATION ESOPHAGEAL;  Surgeon: Shilpa Price MD;  [facility];  Service: Thoracic    [place] ESOPHAGOSCOPY FLEX BALLOON DILAT <30 MM DIAM N/A 3/20/2024    Procedure: ESOPHAGOGASTRODUODENOSCOPY WITH BALLOON DILATION;  Surgeon: Shilpa Price MD;  [facility];  Service: Thoracic    [place] LAPS RPR PARAESPHGL HRNA INCL FUNDPLSTY W/O MESH N/A 2023    Procedure: robotic takedown of nissen fundoplication,  redo hiatal hernia, partial fundoplication, with mesh;  Surgeon: Shilpa Price MD;  [facility];  Service: Thoracic    [place] PYLOROPLASTY N/A 11/8/2023    Procedure: PYLOROPLASTY LAPAROSCOPIC WITH ROBOT;  Surgeon: Dontrell Mcmahon MD;  [facility]: [facility];  Service: General    ROTATOR CUFF REPAIR      SHOULDER SURGERY      SPINAL FUSION      TUBAL LIGATION  May 1975    Last child born    UPPER GASTROINTESTINAL ENDOSCOPY      US GUIDED THYROID BIOPSY      WISDOM TOOTH EXTRACTION         Current Outpatient Medications   Medication Sig Dispense Refill    acetaminophen (TYLENOL) 160 mg/5 mL suspension Take 20.3 mL (650 mg total) by mouth every 6 (six) hours as needed for mild pain 355 mL 0    albuterol (PROVENTIL HFA,VENTOLIN HFA) 90 mcg/act inhaler Inhale 2 puffs if needed      amitriptyline (ELAVIL) 50 mg tablet TAKE 1 TABLET BY MOUTH EVERYDAY AT BEDTIME 90 tablet 1    amLODIPine (NORVASC) 10 mg tablet TAKE ONE TABLET BY MOUTH DAILY ORAL ONCE A DAY 90 DAYS      cetirizine (ZyrTEC) 10 MG chewable tablet Chew 10 mg daily.      cholecalciferol (VITAMIN D3) 1,000 units tablet Take 5,000 Units by mouth daily      dicyclomine (BENTYL) 20 mg tablet TAKE 1 TABLET (20 MG TOTAL) BY MOUTH EVERY 6 (SIX) HOURS AS NEEDED (ABDOMINAL CRAMPING) 20 tablet 0    diphenoxylate-atropine (LOMOTIL) 2.5-0.025 mg per tablet TAKE 1 TABLET BY MOUTH 4 (FOUR) TIMES A DAY AS NEEDED FOR DIARRHEA FOR UP TO 28 DAYS 112 tablet 0    famotidine (PEPCID) 40 MG tablet TAKE 1 TABLET BY MOUTH EVERYDAY AT BEDTIME 90 tablet 1    multivitamin (THERAGRAN) TABS Take 1 tablet by mouth daily      Omega-3 Fatty Acids (OMEGA 3 PO) Take by mouth in the morning      ondansetron (ZOFRAN) 4 mg tablet Take 1 tablet (4 mg total) by mouth every 8 (eight) hours as needed for nausea or vomiting 15 tablet 0    pantoprazole (PROTONIX) 40 mg tablet Take 1 tablet (40 mg total) by mouth daily 60 tablet 3    polyethylene glycol (MIRALAX) 17 g packet Take 17 g by mouth  daily as needed (constipation) (Patient taking differently: Take 17 g by mouth daily) 10 each 0    sucralfate (CARAFATE) 1 g tablet Take 1 tablet (1 g total) by mouth 4 (four) times a day (before meals and at bedtime) 120 tablet 0     No current facility-administered medications for this visit.        Allergies   Allergen Reactions    Latex Hives    Augmentin [Amoxicillin-Pot Clavulanate] GI Intolerance    Bactrim [Sulfamethoxazole-Trimethoprim] GI Intolerance    Clarithromycin GI Intolerance    Doxycycline Hives    Neomycin GI Intolerance    Polymyxin B GI Intolerance    Zyvox [Linezolid] GI Bleeding    Cephalosporins GI Intolerance    Nsaids GI Intolerance    Penicillins GI Intolerance     Patient can only take levaquin and cipro    Prednisone GI Intolerance    Sulphasomidine GI Intolerance       Review of Systems    Video Exam    There were no vitals filed for this visit.    Physical Exam

## 2024-05-09 VITALS — SYSTOLIC BLOOD PRESSURE: 189 MMHG | RESPIRATION RATE: 20 BRPM | DIASTOLIC BLOOD PRESSURE: 107 MMHG

## 2024-05-09 VITALS — DIASTOLIC BLOOD PRESSURE: 102 MMHG | SYSTOLIC BLOOD PRESSURE: 187 MMHG

## 2024-05-09 VITALS — RESPIRATION RATE: 20 BRPM

## 2024-05-09 LAB
ALBUMIN SERPL-MCNC: 4 G/DL (ref 3.5–5)
ALP SERPL-CCNC: 156 U/L (ref 38–126)
ALT SERPL-CCNC: 37 U/L (ref 0–35)
AST SERPL-CCNC: 64 U/L (ref 14–36)
BUN SERPL-MCNC: 18 MG/DL (ref 7–17)
CALCIUM SERPL-MCNC: 9.5 MG/DL (ref 8.4–10.2)
CHLORIDE SERPL-SCNC: 109 MMOL/L (ref 98–107)
CO2 SERPL-SCNC: 20 MMOL/L (ref 22–30)
EGFR: 42.35
ERYTHROCYTE [DISTWIDTH] IN BLOOD BY AUTOMATED COUNT: 13.4 % (ref 11.5–14.5)
ESTIMATED CREATININE CLEARANCE: 26 ML/MIN
GLUCOSE SERPL-MCNC: 107 MG/DL (ref 70–99)
HCT VFR BLD AUTO: 29.5 % (ref 37–47)
HGB BLD-MCNC: 10.3 G/DL (ref 12–16)
LIPASE SERPL-CCNC: < 10 U/L (ref 23–300)
MCHC RBC AUTO-ENTMCNC: 34.9 G/DL (ref 33–37)
MCV RBC AUTO: 92.5 FL (ref 81–99)
NRBC BLD AUTO-RTO: 0 %
PLATELET # BLD AUTO: 224 10^3/UL (ref 130–400)
POTASSIUM SERPL-SCNC: 3.9 MMOL/L (ref 3.5–5.1)
PROT SERPL-MCNC: 7.2 G/DL (ref 6.3–8.2)
SODIUM SERPL-SCNC: 139 MMOL/L (ref 135–145)
TROPONIN I SERPL-MCNC: < 0.012 NG/ML

## 2024-05-10 ENCOUNTER — HOSPITAL ENCOUNTER (INPATIENT)
Dept: HOSPITAL 99 - 2 NORTH | Age: 79
LOS: 2 days | Discharge: HOME | DRG: 305 | End: 2024-05-12
Payer: COMMERCIAL

## 2024-05-10 VITALS — RESPIRATION RATE: 16 BRPM

## 2024-05-10 VITALS — RESPIRATION RATE: 22 BRPM | SYSTOLIC BLOOD PRESSURE: 135 MMHG | DIASTOLIC BLOOD PRESSURE: 79 MMHG

## 2024-05-10 VITALS — DIASTOLIC BLOOD PRESSURE: 112 MMHG | RESPIRATION RATE: 22 BRPM | SYSTOLIC BLOOD PRESSURE: 176 MMHG

## 2024-05-10 VITALS — SYSTOLIC BLOOD PRESSURE: 148 MMHG | DIASTOLIC BLOOD PRESSURE: 71 MMHG | RESPIRATION RATE: 14 BRPM

## 2024-05-10 VITALS — DIASTOLIC BLOOD PRESSURE: 90 MMHG | RESPIRATION RATE: 14 BRPM | SYSTOLIC BLOOD PRESSURE: 154 MMHG

## 2024-05-10 VITALS — RESPIRATION RATE: 17 BRPM

## 2024-05-10 VITALS — SYSTOLIC BLOOD PRESSURE: 169 MMHG | DIASTOLIC BLOOD PRESSURE: 103 MMHG | RESPIRATION RATE: 21 BRPM

## 2024-05-10 VITALS — RESPIRATION RATE: 23 BRPM

## 2024-05-10 VITALS — RESPIRATION RATE: 18 BRPM | SYSTOLIC BLOOD PRESSURE: 135 MMHG | DIASTOLIC BLOOD PRESSURE: 89 MMHG

## 2024-05-10 VITALS — RESPIRATION RATE: 20 BRPM

## 2024-05-10 VITALS — RESPIRATION RATE: 18 BRPM

## 2024-05-10 VITALS — RESPIRATION RATE: 15 BRPM

## 2024-05-10 VITALS — RESPIRATION RATE: 26 BRPM

## 2024-05-10 VITALS — SYSTOLIC BLOOD PRESSURE: 144 MMHG | RESPIRATION RATE: 18 BRPM | DIASTOLIC BLOOD PRESSURE: 95 MMHG

## 2024-05-10 VITALS — SYSTOLIC BLOOD PRESSURE: 197 MMHG | DIASTOLIC BLOOD PRESSURE: 137 MMHG | RESPIRATION RATE: 23 BRPM

## 2024-05-10 VITALS — DIASTOLIC BLOOD PRESSURE: 103 MMHG | SYSTOLIC BLOOD PRESSURE: 162 MMHG | RESPIRATION RATE: 16 BRPM

## 2024-05-10 VITALS — BODY MASS INDEX: 21.5 KG/M2 | BODY MASS INDEX: 22 KG/M2

## 2024-05-10 VITALS — DIASTOLIC BLOOD PRESSURE: 83 MMHG | RESPIRATION RATE: 16 BRPM | SYSTOLIC BLOOD PRESSURE: 144 MMHG

## 2024-05-10 VITALS — DIASTOLIC BLOOD PRESSURE: 96 MMHG | SYSTOLIC BLOOD PRESSURE: 148 MMHG | RESPIRATION RATE: 12 BRPM

## 2024-05-10 VITALS — DIASTOLIC BLOOD PRESSURE: 84 MMHG | RESPIRATION RATE: 17 BRPM | SYSTOLIC BLOOD PRESSURE: 163 MMHG

## 2024-05-10 VITALS — SYSTOLIC BLOOD PRESSURE: 166 MMHG | DIASTOLIC BLOOD PRESSURE: 92 MMHG | RESPIRATION RATE: 20 BRPM

## 2024-05-10 VITALS — RESPIRATION RATE: 19 BRPM

## 2024-05-10 VITALS — DIASTOLIC BLOOD PRESSURE: 88 MMHG | SYSTOLIC BLOOD PRESSURE: 138 MMHG | RESPIRATION RATE: 16 BRPM

## 2024-05-10 VITALS — DIASTOLIC BLOOD PRESSURE: 90 MMHG | RESPIRATION RATE: 13 BRPM | SYSTOLIC BLOOD PRESSURE: 159 MMHG

## 2024-05-10 VITALS — RESPIRATION RATE: 21 BRPM

## 2024-05-10 VITALS — DIASTOLIC BLOOD PRESSURE: 94 MMHG | SYSTOLIC BLOOD PRESSURE: 153 MMHG | RESPIRATION RATE: 19 BRPM

## 2024-05-10 VITALS — SYSTOLIC BLOOD PRESSURE: 153 MMHG | RESPIRATION RATE: 19 BRPM | DIASTOLIC BLOOD PRESSURE: 94 MMHG

## 2024-05-10 DIAGNOSIS — I11.9: ICD-10-CM

## 2024-05-10 DIAGNOSIS — J44.89: ICD-10-CM

## 2024-05-10 DIAGNOSIS — I16.0: Primary | ICD-10-CM

## 2024-05-10 DIAGNOSIS — G43.909: ICD-10-CM

## 2024-05-10 DIAGNOSIS — J45.901: ICD-10-CM

## 2024-05-10 DIAGNOSIS — K21.9: ICD-10-CM

## 2024-05-10 DIAGNOSIS — Z87.891: ICD-10-CM

## 2024-05-10 LAB
BUN SERPL-MCNC: 17 MG/DL (ref 7–17)
CALCIUM SERPL-MCNC: 9.2 MG/DL (ref 8.4–10.2)
CHLORIDE SERPL-SCNC: 110 MMOL/L (ref 98–107)
CO2 SERPL-SCNC: 23 MMOL/L (ref 22–30)
CORTIS SERPL-MCNC: 17.8 UG/DL
EGFR: 58.02
ERYTHROCYTE [DISTWIDTH] IN BLOOD BY AUTOMATED COUNT: 13.2 % (ref 11.5–14.5)
ESTIMATED CREATININE CLEARANCE: 34 ML/MIN
GLUCOSE SERPL-MCNC: 108 MG/DL (ref 70–99)
HCT VFR BLD AUTO: 30.7 % (ref 37–47)
HDLC SERPL-MCNC: 63 MG/DL
HGB BLD-MCNC: 10.4 G/DL (ref 12–16)
LDLC SERPL CALC-MCNC: 54 MG/DL
MAGNESIUM SERPL-MCNC: 1.6 MG/DL (ref 1.6–2.3)
MAGNESIUM SERPL-MCNC: 1.7 MG/DL (ref 1.6–2.3)
MCHC RBC AUTO-ENTMCNC: 33.9 G/DL (ref 33–37)
MCV RBC AUTO: 93.9 FL (ref 81–99)
NRBC BLD AUTO-RTO: 0 %
PLATELET # BLD AUTO: 225 10^3/UL (ref 130–400)
POTASSIUM SERPL-SCNC: 4.2 MMOL/L (ref 3.5–5.1)
SODIUM SERPL-SCNC: 138 MMOL/L (ref 135–145)
TROPONIN I SERPL-MCNC: < 0.012 NG/ML
TSH SERPL DL<=0.05 MIU/L-ACNC: 2.75 UIU/ML (ref 0.47–4.68)
VLDLC SERPL CALC-MCNC: 14 MG/DL (ref 0–30)

## 2024-05-10 PROCEDURE — A9585 GADOBUTROL INJECTION: HCPCS

## 2024-05-10 PROCEDURE — G0009 ADMIN PNEUMOCOCCAL VACCINE: HCPCS

## 2024-05-10 RX ADMIN — SODIUM CHLORIDE 1000: 900 INJECTION, SOLUTION INTRAVENOUS at 00:03

## 2024-05-10 RX ADMIN — LORAZEPAM 1 MG: 2 INJECTION INTRAMUSCULAR; INTRAVENOUS at 01:24

## 2024-05-10 RX ADMIN — PANTOPRAZOLE SODIUM 40 MG: 40 TABLET, DELAYED RELEASE ORAL at 08:39

## 2024-05-10 RX ADMIN — ONDANSETRON HYDROCHLORIDE 4 MG: 2 SOLUTION INTRAMUSCULAR; INTRAVENOUS at 00:04

## 2024-05-10 RX ADMIN — FAMOTIDINE 40 MG: 20 TABLET, FILM COATED ORAL at 22:43

## 2024-05-10 RX ADMIN — HEPARIN SODIUM 5000 UNITS: 5000 INJECTION, SOLUTION INTRAVENOUS; SUBCUTANEOUS at 20:03

## 2024-05-10 RX ADMIN — HYDRALAZINE HYDROCHLORIDE 5 MG: 20 INJECTION INTRAMUSCULAR; INTRAVENOUS at 00:04

## 2024-05-10 RX ADMIN — ACETAMINOPHEN 650 MG: 325 TABLET ORAL at 00:48

## 2024-05-11 VITALS — DIASTOLIC BLOOD PRESSURE: 94 MMHG | RESPIRATION RATE: 16 BRPM | SYSTOLIC BLOOD PRESSURE: 151 MMHG

## 2024-05-11 VITALS — RESPIRATION RATE: 14 BRPM | SYSTOLIC BLOOD PRESSURE: 139 MMHG | DIASTOLIC BLOOD PRESSURE: 81 MMHG

## 2024-05-11 VITALS — SYSTOLIC BLOOD PRESSURE: 112 MMHG | DIASTOLIC BLOOD PRESSURE: 83 MMHG | RESPIRATION RATE: 16 BRPM

## 2024-05-11 VITALS — DIASTOLIC BLOOD PRESSURE: 96 MMHG | HEART RATE: 85 BPM | SYSTOLIC BLOOD PRESSURE: 165 MMHG

## 2024-05-11 VITALS — SYSTOLIC BLOOD PRESSURE: 160 MMHG | RESPIRATION RATE: 16 BRPM | DIASTOLIC BLOOD PRESSURE: 85 MMHG

## 2024-05-11 VITALS — SYSTOLIC BLOOD PRESSURE: 131 MMHG | RESPIRATION RATE: 20 BRPM | DIASTOLIC BLOOD PRESSURE: 96 MMHG

## 2024-05-11 VITALS — SYSTOLIC BLOOD PRESSURE: 153 MMHG | DIASTOLIC BLOOD PRESSURE: 96 MMHG | HEART RATE: 85 BPM

## 2024-05-11 VITALS — DIASTOLIC BLOOD PRESSURE: 97 MMHG | RESPIRATION RATE: 18 BRPM | SYSTOLIC BLOOD PRESSURE: 164 MMHG

## 2024-05-11 LAB
ALBUMIN SERPL-MCNC: 3.2 G/DL (ref 3.5–5)
ALP SERPL-CCNC: 112 U/L (ref 38–126)
ALT SERPL-CCNC: 34 U/L (ref 0–35)
AST SERPL-CCNC: 37 U/L (ref 14–36)
BUN SERPL-MCNC: 21 MG/DL (ref 7–17)
CALCIUM SERPL-MCNC: 8.9 MG/DL (ref 8.4–10.2)
CHLORIDE SERPL-SCNC: 107 MMOL/L (ref 98–107)
CO2 SERPL-SCNC: 25 MMOL/L (ref 22–30)
EGFR: 38.75
ERYTHROCYTE [DISTWIDTH] IN BLOOD BY AUTOMATED COUNT: 13.2 % (ref 11.5–14.5)
ESTIMATED CREATININE CLEARANCE: 24 ML/MIN
GLUCOSE SERPL-MCNC: 86 MG/DL (ref 70–99)
HBA1C MFR BLD: 5.3 % (ref 4–5.6)
HCT VFR BLD AUTO: 28 % (ref 37–47)
HGB BLD-MCNC: 9.7 G/DL (ref 12–16)
MCHC RBC AUTO-ENTMCNC: 34.6 G/DL (ref 33–37)
MCV RBC AUTO: 93.6 FL (ref 81–99)
PLATELET # BLD AUTO: 203 10^3/UL (ref 130–400)
POTASSIUM SERPL-SCNC: 4.2 MMOL/L (ref 3.5–5.1)
PROT SERPL-MCNC: 6.2 G/DL (ref 6.3–8.2)
SODIUM SERPL-SCNC: 138 MMOL/L (ref 135–145)
TROPONIN I SERPL-MCNC: < 0.012 NG/ML

## 2024-05-11 RX ADMIN — FAMOTIDINE 40 MG: 20 TABLET, FILM COATED ORAL at 21:24

## 2024-05-11 RX ADMIN — ACETAMINOPHEN 650 MG: 325 TABLET ORAL at 20:28

## 2024-05-11 RX ADMIN — HEPARIN SODIUM 5000 UNITS: 5000 INJECTION, SOLUTION INTRAVENOUS; SUBCUTANEOUS at 09:01

## 2024-05-11 RX ADMIN — SODIUM CHLORIDE 0.12 ML: 9 INJECTION, SOLUTION INTRAMUSCULAR; INTRAVENOUS; SUBCUTANEOUS at 23:03

## 2024-05-11 RX ADMIN — HEPARIN SODIUM 5000 UNITS: 5000 INJECTION, SOLUTION INTRAVENOUS; SUBCUTANEOUS at 20:29

## 2024-05-11 RX ADMIN — PANTOPRAZOLE SODIUM 40 MG: 40 TABLET, DELAYED RELEASE ORAL at 09:00

## 2024-05-11 RX ADMIN — AMLODIPINE BESYLATE 10 MG: 10 TABLET ORAL at 09:00

## 2024-05-11 RX ADMIN — LORAZEPAM 0.25 MG: 2 INJECTION INTRAMUSCULAR; INTRAVENOUS at 23:02

## 2024-05-11 RX ADMIN — CHOLECALCIFEROL TAB 125 MCG (5000 UNIT) 125 MCG: 125 TAB at 09:02

## 2024-05-12 VITALS — RESPIRATION RATE: 16 BRPM | DIASTOLIC BLOOD PRESSURE: 84 MMHG | SYSTOLIC BLOOD PRESSURE: 170 MMHG

## 2024-05-12 VITALS — DIASTOLIC BLOOD PRESSURE: 78 MMHG | SYSTOLIC BLOOD PRESSURE: 118 MMHG | RESPIRATION RATE: 18 BRPM

## 2024-05-12 VITALS — RESPIRATION RATE: 16 BRPM | SYSTOLIC BLOOD PRESSURE: 129 MMHG | DIASTOLIC BLOOD PRESSURE: 87 MMHG

## 2024-05-12 VITALS — SYSTOLIC BLOOD PRESSURE: 128 MMHG | RESPIRATION RATE: 16 BRPM | DIASTOLIC BLOOD PRESSURE: 87 MMHG

## 2024-05-12 LAB
ALBUMIN SERPL-MCNC: 3.5 G/DL (ref 3.5–5)
ALDOST/RENIN PLAS-RTO: 120 RATIO (ref ?–25)
ALP SERPL-CCNC: 124 U/L (ref 38–126)
ALT SERPL-CCNC: 42 U/L (ref 0–35)
AST SERPL-CCNC: 58 U/L (ref 14–36)
BUN SERPL-MCNC: 28 MG/DL (ref 7–17)
CALCIUM SERPL-MCNC: 9.3 MG/DL (ref 8.4–10.2)
CHLORIDE SERPL-SCNC: 105 MMOL/L (ref 98–107)
CO2 SERPL-SCNC: 25 MMOL/L (ref 22–30)
EGFR: 38.75
ERYTHROCYTE [DISTWIDTH] IN BLOOD BY AUTOMATED COUNT: 13.1 % (ref 11.5–14.5)
ESTIMATED CREATININE CLEARANCE: 24 ML/MIN
GLUCOSE SERPL-MCNC: 157 MG/DL (ref 70–99)
HCT VFR BLD AUTO: 31.7 % (ref 37–47)
HGB BLD-MCNC: 10.6 G/DL (ref 12–16)
MCHC RBC AUTO-ENTMCNC: 33.4 G/DL (ref 33–37)
MCV RBC AUTO: 95.5 FL (ref 81–99)
PLATELET # BLD AUTO: 207 10^3/UL (ref 130–400)
POTASSIUM SERPL-SCNC: 4.2 MMOL/L (ref 3.5–5.1)
PROT SERPL-MCNC: 6.7 G/DL (ref 6.3–8.2)
RENIN PLAS-CCNC: 0.1 NG/ML/HR
SODIUM SERPL-SCNC: 136 MMOL/L (ref 135–145)

## 2024-05-12 RX ADMIN — HEPARIN SODIUM 5000 UNITS: 5000 INJECTION, SOLUTION INTRAVENOUS; SUBCUTANEOUS at 08:00

## 2024-05-12 RX ADMIN — PANTOPRAZOLE SODIUM 40 MG: 40 TABLET, DELAYED RELEASE ORAL at 08:00

## 2024-05-12 RX ADMIN — CHOLECALCIFEROL TAB 125 MCG (5000 UNIT) 125 MCG: 125 TAB at 08:00

## 2024-05-12 RX ADMIN — PNEUMOCOCCAL 20-VALENT CONJUGATE VACCINE 0.5 ML
2.2; 2.2; 2.2; 2.2; 2.2; 2.2; 2.2; 2.2; 2.2; 2.2; 2.2; 2.2; 2.2; 2.2; 2.2; 2.2; 4.4; 2.2; 2.2; 2.2 INJECTION, SUSPENSION INTRAMUSCULAR at 14:33

## 2024-05-12 RX ADMIN — LOSARTAN POTASSIUM 25 MG: 25 TABLET, FILM COATED ORAL at 11:03

## 2024-05-12 RX ADMIN — RIZATRIPTAN BENZOATE 10 MG: 10 TABLET, ORALLY DISINTEGRATING ORAL at 11:03

## 2024-05-12 RX ADMIN — AMLODIPINE BESYLATE 10 MG: 10 TABLET ORAL at 08:00

## 2024-05-13 ENCOUNTER — TELEPHONE (OUTPATIENT)
Dept: GASTROENTEROLOGY | Facility: CLINIC | Age: 79
End: 2024-05-13

## 2024-05-13 ENCOUNTER — TELEMEDICINE (OUTPATIENT)
Dept: GASTROENTEROLOGY | Facility: CLINIC | Age: 79
End: 2024-05-13
Payer: COMMERCIAL

## 2024-05-13 VITALS — BODY MASS INDEX: 21.6 KG/M2 | WEIGHT: 110 LBS | HEIGHT: 60 IN

## 2024-05-13 DIAGNOSIS — R13.19 ESOPHAGEAL DYSPHAGIA: Primary | ICD-10-CM

## 2024-05-13 DIAGNOSIS — K59.09 CHRONIC CONSTIPATION WITH OVERFLOW: ICD-10-CM

## 2024-05-13 DIAGNOSIS — Z98.890 HISTORY OF NISSEN FUNDOPLICATION: ICD-10-CM

## 2024-05-13 PROCEDURE — 99214 OFFICE O/P EST MOD 30 MIN: CPT | Performed by: INTERNAL MEDICINE

## 2024-05-13 PROCEDURE — G2211 COMPLEX E/M VISIT ADD ON: HCPCS | Performed by: INTERNAL MEDICINE

## 2024-05-13 RX ORDER — AMLODIPINE BESYLATE 10 MG/1
TABLET ORAL
COMMUNITY
Start: 2024-05-10

## 2024-05-13 RX ORDER — RIZATRIPTAN BENZOATE 10 MG/1
TABLET, ORALLY DISINTEGRATING ORAL
COMMUNITY
Start: 2024-05-12

## 2024-05-13 RX ORDER — SODIUM, POTASSIUM,MAG SULFATES 17.5-3.13G
1 SOLUTION, RECONSTITUTED, ORAL ORAL ONCE
Qty: 354 ML | Refills: 0 | Status: SHIPPED | OUTPATIENT
Start: 2024-05-13 | End: 2024-05-13

## 2024-05-13 RX ORDER — LOSARTAN POTASSIUM 25 MG/1
TABLET ORAL
COMMUNITY
Start: 2024-05-12

## 2024-05-13 NOTE — PROGRESS NOTES
Telemedicine consent    Patient: Ira Bonilla  Provider: Andre Pedersen DO  Provider located at [facility] [place]  [address]  [facility] [address]  [facility], [place] [address]  [phone]    The patient was identified by name and date of birth. Ira Bonilla was informed that this is a telemedicine visit and that the visit is being conducted through the Epic Embedded platform. She agrees to proceed..  My office door was closed. No one else was in the room.  She acknowledged consent and understanding of privacy and security of the video platform. The patient has agreed to participate and understands they can discontinue the visit at any time.    Patient is aware this is a billable service.     I spent 25 minutes with the patient during this visit.                             [facility] - Outpatient Follow-up Note  Ira Bonilla 78 y.o. female MRN: [id number]  Encounter: [id number]    ASSESSMENT AND PLAN:      1. Esophageal dysphagia  She does have narrowing seen on esophagram postop, however it is improved since her immediate postop barium.  I will proceed with upper endoscopy and dilation with savory rather than balloon hoping that she will respond better to the shear force dilation.  I also take a look at the pylorus and may dilate that as well    2. History of Nissen fundoplication  As above    3. Constipation with Overflow: Everyone keeps treating her as diarrhea, however her CAT scan that was performed even while she was admitted shows an enormous amount of solid stool throughout the entire colon and actual fecalization in the small intestine as well.  I have told her to stop all antidiarrheal medicines and have given her a colonoscopy prep to get cleaned out      Follow up appointment: For upper endoscopy and dilation    _______________________      Chief Complaint   Patient presents  with    Difficulty Swallowing     Still has narrowing of esophagus, sometimes has pain, when pt eats too much it gets stuck and vomits, diarrhea from what she eats as well, goes to bathroom half hour after she eats       HPI:   Patient is a 78 y.o. female with a significant PMH of Nissen fundoplication redo with partial wrap and gastroparesis presenting for follow up regarding continued issues with dysphagia and vomiting undigested food as well as diarrhea with urgency.  She states that she has no issues with liquids, but solid foods such as meatballs and chicken can get stuck in her mid chest.  She also occasionally vomits undigested food.  For example, at her granddaughter's birthday she felt nauseous and vomited undigested food from the day before.  She is also having lots of issues with diarrhea and urgency.  That unpredictability of her bowel movements causes fear of leaving the house.    Historical Information   Past Medical History:   Diagnosis Date    Anxiety     Arthritis     Asthma     Cancer (HCC)     Candida infection, esophageal (HCC)     Chronic kidney disease     Chronic pain     COPD (chronic obstructive pulmonary disease) ([facility])     Depression     Depression     Gastroparesis     Gastroparesis     GERD (gastroesophageal reflux disease)     Hypertension     Irritable bowel syndrome (IBS)     Lactose intolerance Not sure    Migraines     MRSA (methicillin resistant Staphylococcus aureus)     Multiple thyroid nodules     Osteoporosis     Pneumonia     Psychiatric disorder      Past Surgical History:   Procedure Laterality Date    BACK SURGERY       SECTION      CHOLECYSTECTOMY      COLONOSCOPY  10/09/2013    ESOPHAGEAL DILATION       [facility]    ESOPHAGOGASTRODUODENOSCOPY N/A 2023    Procedure: ESOPHAGOGASTRODUODENOSCOPY (EGD);  Surgeon: Dontrell Mcmahon MD;  [facility];  Service: General    ESOPHAGOGASTRODUODENOSCOPY N/A 2024    Procedure: ESOPHAGOGASTRODUODENOSCOPY (EGD);   Surgeon: Shilpa Price MD;  [facility];  Service: Thoracic    HERNIA REPAIR  06/14/2013    HIATAL HERNIA REPAIR      NISSEN FUNDOPLICATION      NJ BIOPSY LIVER NEEDLE PERCUTANEOUS N/A 11/08/2023    Procedure: EXCISION BIOPSY LIVER;  Surgeon: Dontrell Mcmahon MD;  [facility];  Service: General    NJ ESOPHAGOGASTRODUODENOSCOPY TRANSORAL DIAGNOSTIC N/A 04/03/2019    Procedure: ESOPHAGOGASTRODUODENOSCOPY (EGD);  Surgeon: Geovanna Winn MD;  [facility];  Service: Gastroenterology    NJ ESOPHAGOGASTRODUODENOSCOPY TRANSORAL DIAGNOSTIC N/A 09/20/2023    Procedure: ESOPHAGOGASTRODUODENOSCOPY (EGD);  Surgeon: Shilpa Price MD;  [facility];  Service: Thoracic    NJ ESOPHAGOSCOPY FLEX BALLOON DILAT <30 MM DIAM N/A 09/20/2023    Procedure: CRE WIRE GUIDED BALLOON DILATATION ESOPHAGEAL;  Surgeon: Shilpa Price MD;  [facility];  Service: Thoracic    NJ ESOPHAGOSCOPY FLEX BALLOON DILAT <30 MM DIAM N/A 03/20/2024    Procedure: ESOPHAGOGASTRODUODENOSCOPY WITH BALLOON DILATION;  Surgeon: Shilpa Price MD;  [facility];  Service: Thoracic    NJ LAPS RPR PARAESPHGL HRNA INCL FUNDPLSTY W/O MESH N/A 11/08/2023    Procedure: robotic takedown of nissen fundoplication, redo hiatal hernia, partial fundoplication, with mesh;  Surgeon: Shilpa Price MD;  [facility];  Service: Thoracic    NJ PYLOROPLASTY N/A 11/08/2023    Procedure: PYLOROPLASTY LAPAROSCOPIC WITH ROBOT;  Surgeon: Dontrell Mcmahon MD;  [facility];  Service: General    ROTATOR CUFF REPAIR      SHOULDER SURGERY      SPINAL FUSION      TUBAL LIGATION  May 1975    Last child born    UPPER GASTROINTESTINAL ENDOSCOPY      US GUIDED THYROID BIOPSY      WISDOM TOOTH EXTRACTION       Social History     Substance and Sexual Activity   Alcohol Use Not Currently     Social History     Substance and Sexual Activity   Drug Use No     Social History     Tobacco Use   Smoking Status  Former    Current packs/day: 0.00    Average packs/day: 1 pack/day for 15.0 years (15.0 ttl pk-yrs)    Types: Cigarettes    Start date: 10/5/1998    Quit date: 10/1/2003    Years since quittin.6   Smokeless Tobacco Former     Family History   Problem Relation Age of Onset    Other Mother         Esophageal disorder    Stomach cancer Father     Cancer Father     Hypertension Maternal Aunt     COPD Maternal Grandmother     Colon cancer Neg Hx     Colon polyps Neg Hx          Current Outpatient Medications:     acetaminophen (TYLENOL) 160 mg/5 mL suspension    albuterol (PROVENTIL HFA,VENTOLIN HFA) 90 mcg/act inhaler    amitriptyline (ELAVIL) 50 mg tablet    amLODIPine (NORVASC) 10 mg tablet    amLODIPine (NORVASC) 10 mg tablet    cetirizine (ZyrTEC) 10 MG chewable tablet    cholecalciferol (VITAMIN D3) 1,000 units tablet    dicyclomine (BENTYL) 20 mg tablet    diphenoxylate-atropine (LOMOTIL) 2.5-0.025 mg per tablet    famotidine (PEPCID) 40 MG tablet    losartan (COZAAR) 25 mg tablet    multivitamin (THERAGRAN) TABS    Omega-3 Fatty Acids (OMEGA 3 PO)    ondansetron (ZOFRAN) 4 mg tablet    pantoprazole (PROTONIX) 40 mg tablet    polyethylene glycol (MIRALAX) 17 g packet    rizatriptan (MAXALT-MLT) 10 mg disintegrating tablet    sucralfate (CARAFATE) 1 g tablet  Allergies   Allergen Reactions    Latex Hives    Augmentin [Amoxicillin-Pot Clavulanate] GI Intolerance    Bactrim [Sulfamethoxazole-Trimethoprim] GI Intolerance    Clarithromycin GI Intolerance    Doxycycline Hives    Neomycin GI Intolerance    Polymyxin B GI Intolerance    Zyvox [Linezolid] GI Bleeding    Cephalosporins GI Intolerance    Nsaids GI Intolerance    Penicillins GI Intolerance     Patient can only take levaquin and cipro    Prednisone GI Intolerance    Sulphasomidine GI Intolerance     Reviewed medications and allergies and updated as indicated    PHYSICAL EXAM:    Height 5' (1.524 m), weight 49.9 kg (110 lb), not currently breastfeeding.  Body mass index is 21.48 kg/m².  General Appearance: NAD, cooperative, alert  Eyes: Anicteric  GI:  Soft, non-tender, non-distended; normal bowel sounds; no masses, no organomegaly   Rectal: Deferred  Musculoskeletal: No edema.  Skin:  No jaundice    Lab Results:   Lab Results   Component Value Date    WBC 4.66 03/26/2024    WBC 4.84 03/24/2024    WBC 5.40 03/23/2024    HGB 8.8 (L) 03/26/2024    HGB 9.4 (L) 03/24/2024    HGB 9.7 (L) 03/23/2024    MCV 96 03/26/2024     03/26/2024     03/24/2024     03/23/2024     Lab Results   Component Value Date     04/09/2015    K 4.1 03/26/2024     03/26/2024    CO2 27 03/26/2024    ANIONGAP 7 04/09/2015    BUN 14 03/26/2024    CREATININE 1.71 (H) 03/26/2024    GLUCOSE 110 04/09/2015    GLUF 74 10/27/2023    CALCIUM 8.8 03/26/2024    CORRECTEDCA 9.4 03/26/2024    AST 28 03/26/2024    AST 42 (H) 03/23/2024    AST 29 03/22/2024    ALT 22 03/26/2024    ALT 33 03/23/2024    ALT 29 03/22/2024    ALKPHOS 86 03/26/2024    ALKPHOS 101 03/23/2024    ALKPHOS 108 (H) 03/22/2024    PROT 7.2 02/05/2015    BILITOT 0.4 02/05/2015    BILITOT 0.4 06/24/2014    EGFR 28 03/26/2024     Lab Results   Component Value Date    IRON 44 (L) 11/10/2023    TIBC 249 (L) 11/10/2023    FERRITIN 384 (H) 11/10/2023     Lab Results   Component Value Date    LIPASE <6 (L) 03/22/2024       Radiology Results:   No results found.

## 2024-05-13 NOTE — TELEPHONE ENCOUNTER
Scheduled date of EGD(as of today):  6-27-24  Physician performing EGD:  Slava  Location of EGD: [facility]  Instructions reviewed with patient by: clement  Clearances: no

## 2024-05-21 ENCOUNTER — TELEPHONE (OUTPATIENT)
Dept: PSYCHIATRY | Facility: CLINIC | Age: 79
End: 2024-05-21

## 2024-05-21 ENCOUNTER — TELEMEDICINE (OUTPATIENT)
Dept: PSYCHIATRY | Facility: CLINIC | Age: 79
End: 2024-05-21
Payer: COMMERCIAL

## 2024-05-21 DIAGNOSIS — F32.0 CURRENT MILD EPISODE OF MAJOR DEPRESSIVE DISORDER WITHOUT PRIOR EPISODE (HCC): Primary | ICD-10-CM

## 2024-05-21 DIAGNOSIS — F41.9 ANXIETY: ICD-10-CM

## 2024-05-21 PROCEDURE — 90834 PSYTX W PT 45 MINUTES: CPT

## 2024-05-21 NOTE — PSYCH
Virtual Regular Visit    Verification of patient location:    Patient is located at Home in the following state in which I hold an active license [place]      Assessment/Plan:    Problem List Items Addressed This Visit       Depression - Primary    Anxiety       Goals addressed in session: Goal 1          Reason for visit is No chief complaint on file.       Encounter provider Maria Ines Farfan LCSW      Recent Visits  No visits were found meeting these conditions.  Showing recent visits within past 7 days and meeting all other requirements  Today's Visits  Date Type Provider Dept   05/21/24 Telemedicine Maria Ines Farfan LCSW [facility]   Showing today's visits and meeting all other requirements  Future Appointments  No visits were found meeting these conditions.  Showing future appointments within next 150 days and meeting all other requirements       The patient was identified by name and date of birth. Ira Bonilla was informed that this is a telemedicine visit and that the visit is being conducted throughthe [email or web address] platform. She agrees to proceed..  My office door was closed. No one else was in the room.  She acknowledged consent and understanding of privacy and security of the video platform. The patient has agreed to participate and understands they can discontinue the visit at any time.    Patient is aware this is a billable service.     Subjective  Ira Bonilla is a 78 y.o. female Behavioral Health Psychotherapy Progress Note    Psychotherapy Provided: Individual Psychotherapy     1. Current mild episode of major depressive disorder without prior episode (HCC)        2. Anxiety            Goals addressed in session: Goal 1     DATA: Wendy shared she had gone to the hospital with blood pressure fluctuations and migraines. She shared she was recommended to take medication for anxiety and talked about what is making her anxious including how Wandi has been feeling unwanted  "and unloved. She talked about her grandson Dario being threatened by a kid at school and she is advising him to report this person to authorities at school.  Therapist encouraged Wendy to take care of herself to improve her mood including getting out, getting her nails done and going shopping for new clothes.  She said she would continue to do so.  Therapist made a referral for medication management appt for Niharika with her permission.  During this session, this clinician used the following therapeutic modalities: Client-centered Therapy    Substance Abuse was not addressed during this session. If the client is diagnosed with a co-occurring substance use disorder, please indicate any changes in the frequency or amount of use: . Stage of change for addressing substance use diagnoses: No substance use/Not applicable    ASSESSMENT:  Ira Bonilla presents with a Euthymic/ normal mood.     her affect is Normal range and intensity, which is congruent, with her mood and the content of the session. The client has made progress on their goals.     Ira Bonilla presents with a none risk of suicide, none risk of self-harm, and none risk of harm to others.    For any risk assessment that surpasses a \"low\" rating, a safety plan must be developed.    A safety plan was indicated: no  If yes, describe in detail     PLAN: Between sessions, Iar Bonilla will engage in self care to improve anxiety and mood. At the next session, the therapist will use Client-centered Therapy to address anxiety, depression.    Behavioral Health Treatment Plan and Discharge Planning: Ira Bonilla is aware of and agrees to continue to work on their treatment plan. They have identified and are working toward their discharge goals. yes    Visit start and stop times:    05/21/24  Start Time: 1300  Stop Time: 1352  Total Visit Time: 52 minutes .      HPI     Past Medical History:   Diagnosis Date    Anxiety     Arthritis     Asthma     " Cancer (HCC)     Candida infection, esophageal (HCC)     Chronic kidney disease     Chronic pain     COPD (chronic obstructive pulmonary disease) (HCC)     Depression     Depression     Gastroparesis     Gastroparesis     GERD (gastroesophageal reflux disease)     Hypertension     Irritable bowel syndrome (IBS)     Lactose intolerance Not sure    Migraines     MRSA (methicillin resistant Staphylococcus aureus)     Multiple thyroid nodules     Osteoporosis     Pneumonia     Psychiatric disorder        Past Surgical History:   Procedure Laterality Date    BACK SURGERY       SECTION      CHOLECYSTECTOMY      COLONOSCOPY  10/09/2013    ESOPHAGEAL DILATION       [facility]    ESOPHAGOGASTRODUODENOSCOPY N/A 2023    Procedure: ESOPHAGOGASTRODUODENOSCOPY (EGD);  Surgeon: Dontrell Mcmahon MD;  [facility];  Service: General    ESOPHAGOGASTRODUODENOSCOPY N/A 2024    Procedure: ESOPHAGOGASTRODUODENOSCOPY (EGD);  Surgeon: Shilpa Price MD;  [facility];  Service: Thoracic    HERNIA REPAIR  2013    HIATAL HERNIA REPAIR      NISSEN FUNDOPLICATION      [place] BIOPSY LIVER NEEDLE PERCUTANEOUS N/A 2023    Procedure: EXCISION BIOPSY LIVER;  Surgeon: Dontrell Mcmahon MD;  [facility];  Service: General    AZ ESOPHAGOGASTRODUODENOSCOPY TRANSORAL DIAGNOSTIC N/A 2019    Procedure: ESOPHAGOGASTRODUODENOSCOPY (EGD);  Surgeon: Geovanna Winn MD;  [facility];  Service: Gastroenterology    AZ ESOPHAGOGASTRODUODENOSCOPY TRANSORAL DIAGNOSTIC N/A 2023    Procedure: ESOPHAGOGASTRODUODENOSCOPY (EGD);  Surgeon: Shilpa Price MD;  [facility];  Service: Thoracic    AZ ESOPHAGOSCOPY FLEX BALLOON DILAT <30 MM DIAM N/A 2023    Procedure: CRE WIRE GUIDED BALLOON DILATATION ESOPHAGEAL;  Surgeon: Shilpa Price MD;  [facility];  Service: Thoracic    AZ ESOPHAGOSCOPY FLEX BALLOON DILAT <30 MM DIAM N/A 2024    Procedure:  ESOPHAGOGASTRODUODENOSCOPY WITH BALLOON DILATION;  Surgeon: Shilpa Price MD;  [facility];  Service: Thoracic    OR LAPS RPR PARAESPHGL HRNA INCL FUNDPLSTY W/O MESH N/A 11/08/2023    Procedure: robotic takedown of nissen fundoplication, redo hiatal hernia, partial fundoplication, with mesh;  Surgeon: Shilpa Price MD;  [facility];  Service: Thoracic    OR PYLOROPLASTY N/A 11/08/2023    Procedure: PYLOROPLASTY LAPAROSCOPIC WITH ROBOT;  Surgeon: Dontrell Mcmahon MD;  [facility];  Service: General    ROTATOR CUFF REPAIR      SHOULDER SURGERY      SPINAL FUSION      TUBAL LIGATION  May 1975    Last child born    UPPER GASTROINTESTINAL ENDOSCOPY      US GUIDED THYROID BIOPSY      WISDOM TOOTH EXTRACTION         Current Outpatient Medications   Medication Sig Dispense Refill    acetaminophen (TYLENOL) 160 mg/5 mL suspension Take 20.3 mL (650 mg total) by mouth every 6 (six) hours as needed for mild pain 355 mL 0    albuterol (PROVENTIL HFA,VENTOLIN HFA) 90 mcg/act inhaler Inhale 2 puffs if needed      amitriptyline (ELAVIL) 50 mg tablet TAKE 1 TABLET BY MOUTH EVERYDAY AT BEDTIME 90 tablet 1    amLODIPine (NORVASC) 10 mg tablet TAKE ONE TABLET BY MOUTH DAILY ORAL ONCE A DAY 90 DAYS      amLODIPine (NORVASC) 10 mg tablet       cetirizine (ZyrTEC) 10 MG chewable tablet Chew 10 mg daily.      cholecalciferol (VITAMIN D3) 1,000 units tablet Take 5,000 Units by mouth daily      losartan (COZAAR) 25 mg tablet       multivitamin (THERAGRAN) TABS Take 1 tablet by mouth daily      Na Sulfate-K Sulfate-Mg Sulf (Suprep Bowel Prep Kit) 17.5-3.13-1.6 GM/177ML SOLN Take 1 each by mouth 1 (one) time for 1 dose 354 mL 0    Omega-3 Fatty Acids (OMEGA 3 PO) Take by mouth in the morning      ondansetron (ZOFRAN) 4 mg tablet Take 1 tablet (4 mg total) by mouth every 8 (eight) hours as needed for nausea or vomiting 15 tablet 0    polyethylene glycol (MIRALAX) 17 g packet Take 17 g by mouth daily as  needed (constipation) (Patient taking differently: Take 17 g by mouth daily) 10 each 0    rizatriptan (MAXALT-MLT) 10 mg disintegrating tablet as needed for migraine headache       No current facility-administered medications for this visit.        Allergies   Allergen Reactions    Latex Hives    Augmentin [Amoxicillin-Pot Clavulanate] GI Intolerance    Bactrim [Sulfamethoxazole-Trimethoprim] GI Intolerance    Clarithromycin GI Intolerance    Doxycycline Hives    Neomycin GI Intolerance    Polymyxin B GI Intolerance    Zyvox [Linezolid] GI Bleeding    Cephalosporins GI Intolerance    Nsaids GI Intolerance    Penicillins GI Intolerance     Patient can only take levaquin and cipro    Prednisone GI Intolerance    Sulphasomidine GI Intolerance       Review of Systems    Video Exam    There were no vitals filed for this visit.    Physical Exam

## 2024-05-21 NOTE — TELEPHONE ENCOUNTER
Received [employer] from [facility] provider referring pt to [facility], pt was placed on wait-list.

## 2024-05-21 NOTE — TELEPHONE ENCOUNTER
----- Message from Maria Ines URBINA sent at 5/21/2024  1:19 PM EDT -----  Regarding: Referral for medication management  Good afternoon    I'm referring my patient Ira Bonilla for medication management. She has been experiencing increased anxiety recently due to life stressors and health issues.    Thank you    Maria Ines Farfan LCSW

## 2024-06-04 ENCOUNTER — TELEMEDICINE (OUTPATIENT)
Dept: PSYCHIATRY | Facility: CLINIC | Age: 79
End: 2024-06-04
Payer: COMMERCIAL

## 2024-06-04 DIAGNOSIS — F41.9 ANXIETY: ICD-10-CM

## 2024-06-04 DIAGNOSIS — F32.1 CURRENT MODERATE EPISODE OF MAJOR DEPRESSIVE DISORDER WITHOUT PRIOR EPISODE (HCC): Primary | ICD-10-CM

## 2024-06-04 PROCEDURE — 90834 PSYTX W PT 45 MINUTES: CPT

## 2024-06-04 NOTE — PSYCH
Virtual Regular Visit    Verification of patient location:    Patient is located at Home in the following state in which I hold an active license [place]      Assessment/Plan:    Problem List Items Addressed This Visit       Depression - Primary    Anxiety       Goals addressed in session: Goal 1          Reason for visit is No chief complaint on file.       Encounter provider Maria Ines Farfan LCSW      Recent Visits  No visits were found meeting these conditions.  Showing recent visits within past 7 days and meeting all other requirements  Today's Visits  Date Type Provider Dept   06/04/24 Telemedicine Maria Ines Farfan LCSW [facility]   Showing today's visits and meeting all other requirements  Future Appointments  No visits were found meeting these conditions.  Showing future appointments within next 150 days and meeting all other requirements       The patient was identified by name and date of birth. Ira Bonilla was informed that this is a telemedicine visit and that the visit is being conducted throughthe Chat[employer] platform. She agrees to proceed..  My office door was closed. No one else was in the room.  She acknowledged consent and understanding of privacy and security of the video platform. The patient has agreed to participate and understands they can discontinue the visit at any time.    Patient is aware this is a billable service.     Subjective  Ira Bonilla is a 78 y.o. female  .      HPI     Past Medical History:   Diagnosis Date    Anxiety     Arthritis     Asthma     Cancer (HCC)     Candida infection, esophageal (HCC)     Chronic kidney disease     Chronic pain     COPD (chronic obstructive pulmonary disease) (HCC)     Depression     Depression     Gastroparesis     Gastroparesis     GERD (gastroesophageal reflux disease)     Hypertension     Irritable bowel syndrome (IBS)     Lactose intolerance Not sure    Migraines     MRSA (methicillin resistant  Staphylococcus aureus)     Multiple thyroid nodules     Osteoporosis     Pneumonia     Psychiatric disorder        Past Surgical History:   Procedure Laterality Date    BACK SURGERY       SECTION      CHOLECYSTECTOMY      COLONOSCOPY  10/09/2013    ESOPHAGEAL DILATION      2017 [facility]    ESOPHAGOGASTRODUODENOSCOPY N/A 2023    Procedure: ESOPHAGOGASTRODUODENOSCOPY (EGD);  Surgeon: Dontrell Mcmahon MD;  [facility];  Service: General    ESOPHAGOGASTRODUODENOSCOPY N/A 2024    Procedure: ESOPHAGOGASTRODUODENOSCOPY (EGD);  Surgeon: Shilpa Price MD;  [facility];  Service: Thoracic    HERNIA REPAIR  2013    HIATAL HERNIA REPAIR      NISSEN FUNDOPLICATION      UT BIOPSY LIVER NEEDLE PERCUTANEOUS N/A 2023    Procedure: EXCISION BIOPSY LIVER;  Surgeon: Dontrell Mcmahon MD;  [facility];  Service: General    UT ESOPHAGOGASTRODUODENOSCOPY TRANSORAL DIAGNOSTIC N/A 2019    Procedure: ESOPHAGOGASTRODUODENOSCOPY (EGD);  Surgeon: Geovanna iWnn MD;  [facility];  Service: Gastroenterology    UT ESOPHAGOGASTRODUODENOSCOPY TRANSORAL DIAGNOSTIC N/A 2023    Procedure: ESOPHAGOGASTRODUODENOSCOPY (EGD);  Surgeon: Shilpa Price MD;  [facility];  Service: Thoracic    UT ESOPHAGOSCOPY FLEX BALLOON DILAT <30 MM DIAM N/A 2023    Procedure: CRE WIRE GUIDED BALLOON DILATATION ESOPHAGEAL;  Surgeon: Shilpa Price MD;  [facility];  Service: Thoracic    UT ESOPHAGOSCOPY FLEX BALLOON DILAT <30 MM DIAM N/A 2024    Procedure: ESOPHAGOGASTRODUODENOSCOPY WITH BALLOON DILATION;  Surgeon: Shilpa Price MD;  [facility];  Service: Thoracic    UT LAPS RPR PARAESPHGL HRNA INCL FUNDPLSTY W/O MESH N/A 2023    Procedure: robotic takedown of nissen fundoplication, redo hiatal hernia, partial fundoplication, with mesh;  Surgeon: Shilpa Price MD;  [facility];  Service: Thoracic    UT PYLOROPLASTY  N/A 11/08/2023    Procedure: PYLOROPLASTY LAPAROSCOPIC WITH ROBOT;  Surgeon: Dontrell Mcmahon MD;  [facility];  Service: General    ROTATOR CUFF REPAIR      SHOULDER SURGERY      SPINAL FUSION      TUBAL LIGATION  May 1975    Last child born    UPPER GASTROINTESTINAL ENDOSCOPY      US GUIDED THYROID BIOPSY      WISDOM TOOTH EXTRACTION         Current Outpatient Medications   Medication Sig Dispense Refill    acetaminophen (TYLENOL) 160 mg/5 mL suspension Take 20.3 mL (650 mg total) by mouth every 6 (six) hours as needed for mild pain 355 mL 0    albuterol (PROVENTIL HFA,VENTOLIN HFA) 90 mcg/act inhaler Inhale 2 puffs if needed      amitriptyline (ELAVIL) 50 mg tablet TAKE 1 TABLET BY MOUTH EVERYDAY AT BEDTIME 90 tablet 1    amLODIPine (NORVASC) 10 mg tablet TAKE ONE TABLET BY MOUTH DAILY ORAL ONCE A DAY 90 DAYS      amLODIPine (NORVASC) 10 mg tablet       cetirizine (ZyrTEC) 10 MG chewable tablet Chew 10 mg daily.      cholecalciferol (VITAMIN D3) 1,000 units tablet Take 5,000 Units by mouth daily      losartan (COZAAR) 25 mg tablet       multivitamin (THERAGRAN) TABS Take 1 tablet by mouth daily      Na Sulfate-K Sulfate-Mg Sulf (Suprep Bowel Prep Kit) 17.5-3.13-1.6 GM/177ML SOLN Take 1 each by mouth 1 (one) time for 1 dose 354 mL 0    Omega-3 Fatty Acids (OMEGA 3 PO) Take by mouth in the morning      ondansetron (ZOFRAN) 4 mg tablet Take 1 tablet (4 mg total) by mouth every 8 (eight) hours as needed for nausea or vomiting 15 tablet 0    polyethylene glycol (MIRALAX) 17 g packet Take 17 g by mouth daily as needed (constipation) (Patient taking differently: Take 17 g by mouth daily) 10 each 0    rizatriptan (MAXALT-MLT) 10 mg disintegrating tablet as needed for migraine headache       No current facility-administered medications for this visit.        Allergies   Allergen Reactions    Latex Hives    Augmentin [Amoxicillin-Pot Clavulanate] GI Intolerance    Bactrim [Sulfamethoxazole-Trimethoprim] GI  Intolerance    Clarithromycin GI Intolerance    Doxycycline Hives    Neomycin GI Intolerance    Polymyxin B GI Intolerance    Zyvox [Linezolid] GI Bleeding    Cephalosporins GI Intolerance    Nsaids GI Intolerance    Penicillins GI Intolerance     Patient can only take levaquin and cipro    Prednisone GI Intolerance    Sulphasomidine GI Intolerance       Review of Systems    Video Exam    There were no vitals filed for this visit.    Physical Exam     Behavioral Health Psychotherapy Progress Note    Psychotherapy Provided: Individual Psychotherapy     1. Current moderate episode of major depressive disorder without prior episode (HCC)        2. Anxiety            Goals addressed in session: Goal 1     DATA: Wendy shared she is still having trouble with her insect bite and is taking medication for this which is making her stomach upset.  She expressed some feelings about her health and also that she has been feeling anxious regarding the situation with her son and grandson arguing at home.  She openly communicated her thoughts and feelings about her relationship with Dario and her son and living with her family.  Therapist recommended that she get out of the house when she can to improve her mood.   During this session, this clinician used the following therapeutic modalities: Client-centered Therapy    Substance Abuse was not addressed during this session. If the client is diagnosed with a co-occurring substance use disorder, please indicate any changes in the frequency or amount of use: . Stage of change for addressing substance use diagnoses: No substance use/Not applicable    ASSESSMENT:  Ira Bonilla presents with a Euthymic/ normal mood.     her affect is Normal range and intensity, which is congruent, with her mood and the content of the session. The client has made progress on their goals.     Ira Bonilla presents with a none risk of suicide, none risk of self-harm, and none risk of harm to  "others.    For any risk assessment that surpasses a \"low\" rating, a safety plan must be developed.    A safety plan was indicated: no  If yes, describe in detail     PLAN: Between sessions, Ira Bonilla will get out of the house to improve her mood. At the next session, the therapist will use Client-centered Therapy to address depression/anxiety.    Behavioral Health Treatment Plan and Discharge Planning: Ira Bonilla is aware of and agrees to continue to work on their treatment plan. They have identified and are working toward their discharge goals. yes    Visit start and stop times:    06/04/24  Start Time: 1300  Stop Time: 1352  Total Visit Time: 52 minutes        "

## 2024-06-05 ENCOUNTER — TELEPHONE (OUTPATIENT)
Dept: PSYCHIATRY | Facility: CLINIC | Age: 79
End: 2024-06-05

## 2024-06-05 NOTE — TELEPHONE ENCOUNTER
Contacted patient off of Medication Management  to verify needs of services in attempts to offer patient an appointment at [facility] . spoke with patient whom stated they are interested in services at this time but stated given the circumstances they are unsure if virtual psychiatry will be beneficial for them due to them  having to already whisper and monitor what they say during their virtual therapy. Patient stated they would prefer to be seen through [facility] if possible. Writer made it known there is no time frame on when they will be scheduled through that facility. Patient stated they were okay with waiting. Writer suggested patient speak with PCP to manage medication for the time being until they are able to be scheduled with a psychiatrist. Patient appreciated information and stated they will call pcp     Patient preferences updated to [facility]

## 2024-06-18 ENCOUNTER — TELEMEDICINE (OUTPATIENT)
Dept: PSYCHIATRY | Facility: CLINIC | Age: 79
End: 2024-06-18
Payer: COMMERCIAL

## 2024-06-18 DIAGNOSIS — F32.1 CURRENT MODERATE EPISODE OF MAJOR DEPRESSIVE DISORDER WITHOUT PRIOR EPISODE (HCC): Primary | ICD-10-CM

## 2024-06-18 DIAGNOSIS — F41.9 ANXIETY: ICD-10-CM

## 2024-06-18 PROCEDURE — 90834 PSYTX W PT 45 MINUTES: CPT

## 2024-06-18 NOTE — PSYCH
Virtual Regular Visit    Verification of patient location:    Patient is located at Home in the following state in which I hold an active license [place]      Assessment/Plan:    Problem List Items Addressed This Visit       Depression - Primary    Anxiety       Goals addressed in session: Goal 1          Reason for visit is No chief complaint on file.       Encounter provider Maria Ines Farfan LCSW      Recent Visits  No visits were found meeting these conditions.  Showing recent visits within past 7 days and meeting all other requirements  Today's Visits  Date Type Provider Dept   06/18/24 Telemedicine Maria Ines Farfan LCSW [facility]   Showing today's visits and meeting all other requirements  Future Appointments  No visits were found meeting these conditions.  Showing future appointments within next 150 days and meeting all other requirements       The patient was identified by name and date of birth. Ira Bonilla was informed that this is a telemedicine visit and that the visit is being conducted throughthe [employer] platform. She agrees to proceed..  My office door was closed. No one else was in the room.  She acknowledged consent and understanding of privacy and security of the video platform. The patient has agreed to participate and understands they can discontinue the visit at any time.    Patient is aware this is a billable service.     Subjective  Ira Bonilla is a 78 y.o. female  .      HPI     Past Medical History:   Diagnosis Date    Anxiety     Arthritis     Asthma     Cancer (HCC)     Candida infection, esophageal (HCC)     Chronic kidney disease     Chronic pain     COPD (chronic obstructive pulmonary disease) (HCC)     Depression     Depression     Gastroparesis     Gastroparesis     GERD (gastroesophageal reflux disease)     Hypertension     Irritable bowel syndrome (IBS)     Lactose intolerance Not sure    Migraines     MRSA (methicillin resistant  Staphylococcus aureus)     Multiple thyroid nodules     Osteoporosis     Pneumonia     Psychiatric disorder        Past Surgical History:   Procedure Laterality Date    BACK SURGERY       SECTION      CHOLECYSTECTOMY      COLONOSCOPY  10/09/2013    ESOPHAGEAL DILATION      2017 [facility]    ESOPHAGOGASTRODUODENOSCOPY N/A 2023    Procedure: ESOPHAGOGASTRODUODENOSCOPY (EGD);  Surgeon: Dontrell Mcmahon MD;  [facility];  Service: General    ESOPHAGOGASTRODUODENOSCOPY N/A 2024    Procedure: ESOPHAGOGASTRODUODENOSCOPY (EGD);  Surgeon: Shilpa Price MD;  [facility];  Service: Thoracic    HERNIA REPAIR  2013    HIATAL HERNIA REPAIR      NISSEN FUNDOPLICATION      IA BIOPSY LIVER NEEDLE PERCUTANEOUS N/A 2023    Procedure: EXCISION BIOPSY LIVER;  Surgeon: Dontrell Mcmahon MD;  [facility];  Service: General    IA ESOPHAGOGASTRODUODENOSCOPY TRANSORAL DIAGNOSTIC N/A 2019    Procedure: ESOPHAGOGASTRODUODENOSCOPY (EGD);  Surgeon: Geovanna Winn MD;  [facility];  Service: Gastroenterology    IA ESOPHAGOGASTRODUODENOSCOPY TRANSORAL DIAGNOSTIC N/A 2023    Procedure: ESOPHAGOGASTRODUODENOSCOPY (EGD);  Surgeon: Shilpa Price MD;  [facility];  Service: Thoracic    IA ESOPHAGOSCOPY FLEX BALLOON DILAT <30 MM DIAM N/A 2023    Procedure: CRE WIRE GUIDED BALLOON DILATATION ESOPHAGEAL;  Surgeon: Shilpa Price MD;  [facility];  Service: Thoracic    IA ESOPHAGOSCOPY FLEX BALLOON DILAT <30 MM DIAM N/A 2024    Procedure: ESOPHAGOGASTRODUODENOSCOPY WITH BALLOON DILATION;  Surgeon: Shilpa Price MD;  [facility];  Service: Thoracic    IA LAPS RPR PARAESPHGL HRNA INCL FUNDPLSTY W/O MESH N/A 2023    Procedure: robotic takedown of nissen fundoplication, redo hiatal hernia, partial fundoplication, with mesh;  Surgeon: Shilpa Price MD;  [facility];  Service: Thoracic    IA PYLOROPLASTY  N/A 11/08/2023    Procedure: PYLOROPLASTY LAPAROSCOPIC WITH ROBOT;  Surgeon: Dontrell Mcmahon MD;  [facility];  Service: General    ROTATOR CUFF REPAIR      SHOULDER SURGERY      SPINAL FUSION      TUBAL LIGATION  May 1975    Last child born    UPPER GASTROINTESTINAL ENDOSCOPY      US GUIDED THYROID BIOPSY      WISDOM TOOTH EXTRACTION         Current Outpatient Medications   Medication Sig Dispense Refill    acetaminophen (TYLENOL) 160 mg/5 mL suspension Take 20.3 mL (650 mg total) by mouth every 6 (six) hours as needed for mild pain 355 mL 0    albuterol (PROVENTIL HFA,VENTOLIN HFA) 90 mcg/act inhaler Inhale 2 puffs if needed      amitriptyline (ELAVIL) 50 mg tablet TAKE 1 TABLET BY MOUTH EVERYDAY AT BEDTIME 90 tablet 1    amLODIPine (NORVASC) 10 mg tablet TAKE ONE TABLET BY MOUTH DAILY ORAL ONCE A DAY 90 DAYS      amLODIPine (NORVASC) 10 mg tablet       cetirizine (ZyrTEC) 10 MG chewable tablet Chew 10 mg daily.      cholecalciferol (VITAMIN D3) 1,000 units tablet Take 5,000 Units by mouth daily      losartan (COZAAR) 25 mg tablet       multivitamin (THERAGRAN) TABS Take 1 tablet by mouth daily      Na Sulfate-K Sulfate-Mg Sulf (Suprep Bowel Prep Kit) 17.5-3.13-1.6 GM/177ML SOLN Take 1 each by mouth 1 (one) time for 1 dose 354 mL 0    Omega-3 Fatty Acids (OMEGA 3 PO) Take by mouth in the morning      ondansetron (ZOFRAN) 4 mg tablet Take 1 tablet (4 mg total) by mouth every 8 (eight) hours as needed for nausea or vomiting 15 tablet 0    polyethylene glycol (MIRALAX) 17 g packet Take 17 g by mouth daily as needed (constipation) (Patient taking differently: Take 17 g by mouth daily) 10 each 0    rizatriptan (MAXALT-MLT) 10 mg disintegrating tablet as needed for migraine headache       No current facility-administered medications for this visit.        Allergies   Allergen Reactions    Latex Hives    Augmentin [Amoxicillin-Pot Clavulanate] GI Intolerance    Bactrim [Sulfamethoxazole-Trimethoprim] GI  "Intolerance    Clarithromycin GI Intolerance    Doxycycline Hives    Neomycin GI Intolerance    Polymyxin B GI Intolerance    Zyvox [Linezolid] GI Bleeding    Cephalosporins GI Intolerance    Nsaids GI Intolerance    Penicillins GI Intolerance     Patient can only take levaquin and cipro    Prednisone GI Intolerance    Sulphasomidine GI Intolerance       Review of Systems    Video Exam    There were no vitals filed for this visit.    Physical Exam     Behavioral Health Psychotherapy Progress Note    Psychotherapy Provided: Individual Psychotherapy     1. Current moderate episode of major depressive disorder without prior episode (HCC)        2. Anxiety            Goals addressed in session: Goal 1     DATA: Wendy shared she was not feeling well today, is feeling dizzy and that she had a \"bad week\" and had some run ins with her daughter in law's daughter Yanira.  [employer] asked her to talk to them about her feelings and she did and feels they came to a better understanding of things but still does not feel heard. Therapist encouraged her to call her pcp for a different medication for her depression since she has not been feeling herself.  She expressed some frustration over procedures she has had with no improvement.  Therapist validated her feelings and encouraged her to continue to try to improve her health.  Therapist encouraged her to balance out her negative thinking as well.  During this session, this clinician used the following therapeutic modalities: Client-centered Therapy and Cognitive Behavioral Therapy    Substance Abuse was not addressed during this session. If the client is diagnosed with a co-occurring substance use disorder, please indicate any changes in the frequency or amount of use: . Stage of change for addressing substance use diagnoses: No substance use/Not applicable    ASSESSMENT:  Ira Bonilla presents with a Euthymic/ normal mood.     her affect is Normal range and intensity, " "which is congruent, with her mood and the content of the session. The client has made progress on their goals.     Ira Bonilla presents with a none risk of suicide, none risk of self-harm, and none risk of harm to others.    For any risk assessment that surpasses a \"low\" rating, a safety plan must be developed.    A safety plan was indicated: no  If yes, describe in detail     PLAN: Between sessions, Ira Bonilla will balance negative thinking. At the next session, the therapist will use Client-centered Therapy and Cognitive Behavioral Therapy to address depression.    Behavioral Health Treatment Plan and Discharge Planning: Ira Bonilla is aware of and agrees to continue to work on their treatment plan. They have identified and are working toward their discharge goals. yes    Visit start and stop times:    06/18/24  Start Time: 1300  Stop Time: 1352  Total Visit Time: 52 minutes        "

## 2024-06-21 ENCOUNTER — NURSE TRIAGE (OUTPATIENT)
Age: 79
End: 2024-06-21

## 2024-06-21 NOTE — TELEPHONE ENCOUNTER
"Patient states that she is doing a colonoscopy prep to \" clean me out\"  Patient states that all she has is liquid coming out and nothing out. States that she did not have any solid stool but she did have 2 regular BMs this morning.   Took one bottle at 9am and then one in the afternoon.      States that it's just liquid coming out and she doesn't know if it worked.  Also states that she's never doing this again.  Patient is aware that she should wait out the weekend and see how she does.  She can call us on Monday with an update.   Reason for Disposition   Information only question and nurse able to answer    Answer Assessment - Initial Assessment Questions  1. REASON FOR CALL or QUESTION:  Patient states that she is doing a colonoscopy prep to \" clean me out\"  Patient states that all she has is liquid coming out and nothing out. States that she did not have any solid stool but she did have a BM this morning.    Protocols used: Information Only Call - No Triage-ADULT-[place]    "

## 2024-06-27 ENCOUNTER — ANESTHESIA (OUTPATIENT)
Dept: GASTROENTEROLOGY | Facility: HOSPITAL | Age: 79
End: 2024-06-27

## 2024-06-27 ENCOUNTER — ANESTHESIA EVENT (OUTPATIENT)
Dept: GASTROENTEROLOGY | Facility: HOSPITAL | Age: 79
End: 2024-06-27

## 2024-06-27 ENCOUNTER — HOSPITAL ENCOUNTER (OUTPATIENT)
Dept: GASTROENTEROLOGY | Facility: HOSPITAL | Age: 79
Setting detail: OUTPATIENT SURGERY
End: 2024-06-27
Attending: INTERNAL MEDICINE
Payer: COMMERCIAL

## 2024-06-27 VITALS
WEIGHT: 110 LBS | HEART RATE: 80 BPM | SYSTOLIC BLOOD PRESSURE: 131 MMHG | OXYGEN SATURATION: 96 % | RESPIRATION RATE: 12 BRPM | BODY MASS INDEX: 21.6 KG/M2 | HEIGHT: 60 IN | DIASTOLIC BLOOD PRESSURE: 70 MMHG | TEMPERATURE: 98 F

## 2024-06-27 DIAGNOSIS — R13.19 ESOPHAGEAL DYSPHAGIA: ICD-10-CM

## 2024-06-27 DIAGNOSIS — Z98.890 HISTORY OF NISSEN FUNDOPLICATION: ICD-10-CM

## 2024-06-27 DIAGNOSIS — K31.84 GASTROPARESIS: Primary | ICD-10-CM

## 2024-06-27 PROCEDURE — 43235 EGD DIAGNOSTIC BRUSH WASH: CPT | Performed by: INTERNAL MEDICINE

## 2024-06-27 RX ORDER — METOCLOPRAMIDE 10 MG/1
10 TABLET ORAL 4 TIMES DAILY
Qty: 120 TABLET | Refills: 1 | Status: SHIPPED | OUTPATIENT
Start: 2024-06-27

## 2024-06-27 RX ORDER — LIDOCAINE HYDROCHLORIDE 10 MG/ML
INJECTION, SOLUTION EPIDURAL; INFILTRATION; INTRACAUDAL; PERINEURAL AS NEEDED
Status: DISCONTINUED | OUTPATIENT
Start: 2024-06-27 | End: 2024-06-27

## 2024-06-27 RX ORDER — SODIUM CHLORIDE 9 MG/ML
INJECTION, SOLUTION INTRAVENOUS CONTINUOUS PRN
Status: DISCONTINUED | OUTPATIENT
Start: 2024-06-27 | End: 2024-06-27

## 2024-06-27 RX ORDER — PROPOFOL 10 MG/ML
INJECTION, EMULSION INTRAVENOUS AS NEEDED
Status: DISCONTINUED | OUTPATIENT
Start: 2024-06-27 | End: 2024-06-27

## 2024-06-27 RX ADMIN — PROPOFOL 100 MG: 10 INJECTION, EMULSION INTRAVENOUS at 10:10

## 2024-06-27 RX ADMIN — LIDOCAINE HYDROCHLORIDE 50 MG: 10 INJECTION, SOLUTION EPIDURAL; INFILTRATION; INTRACAUDAL; PERINEURAL at 10:10

## 2024-06-27 RX ADMIN — PROPOFOL 20 MG: 10 INJECTION, EMULSION INTRAVENOUS at 10:12

## 2024-06-27 RX ADMIN — SODIUM CHLORIDE: 0.9 INJECTION, SOLUTION INTRAVENOUS at 10:03

## 2024-06-27 NOTE — H&P
History and Physical - [facility]  Ira Bonilla 78 y.o. female MRN: [id number]    HPI: Ira Bonilla is a 78 y.o. female who presents for upper endoscopy and dilation due to recent hiatal hernia repair with toupet fundoplication and resultant dysphagia    REVIEW OF SYSTEMS: Per the HPI, and otherwise unremarkable.    Historical Information   Past Medical History:   Diagnosis Date    Anxiety     Arthritis     Asthma     Cancer (HCC)     Candida infection, esophageal (HCC)     Chronic kidney disease     Chronic pain     COPD (chronic obstructive pulmonary disease) (HCC)     Depression     Depression     Gastroparesis     Gastroparesis     GERD (gastroesophageal reflux disease)     Hypertension     Irritable bowel syndrome (IBS)     Lactose intolerance Not sure    Migraines     MRSA (methicillin resistant Staphylococcus aureus)     Multiple thyroid nodules     Osteoporosis     Pneumonia     Psychiatric disorder      Past Surgical History:   Procedure Laterality Date    BACK SURGERY       SECTION      CHOLECYSTECTOMY      COLONOSCOPY  10/09/2013    ESOPHAGEAL DILATION       [facility]    ESOPHAGOGASTRODUODENOSCOPY N/A 2023    Procedure: ESOPHAGOGASTRODUODENOSCOPY (EGD);  Surgeon: Dontrell Mcmahon MD;  [facility];  Service: General    ESOPHAGOGASTRODUODENOSCOPY N/A 2024    Procedure: ESOPHAGOGASTRODUODENOSCOPY (EGD);  Surgeon: Shilpa Price MD;  [facility];  Service: Thoracic    HERNIA REPAIR  2013    HIATAL HERNIA REPAIR      NISSEN FUNDOPLICATION      [place] BIOPSY LIVER NEEDLE PERCUTANEOUS N/A 2023    Procedure: EXCISION BIOPSY LIVER;  Surgeon: Dontrell Mcmahon MD;  [facility];  Service: General    WV ESOPHAGOGASTRODUODENOSCOPY TRANSORAL DIAGNOSTIC N/A 2019    Procedure: ESOPHAGOGASTRODUODENOSCOPY (EGD);  Surgeon: Geovanna Winn MD;  [facility];  Service: Gastroenterology    WV ESOPHAGOGASTRODUODENOSCOPY TRANSORAL  DIAGNOSTIC N/A 2023    Procedure: ESOPHAGOGASTRODUODENOSCOPY (EGD);  Surgeon: Shilpa Price MD;  [facility];  Service: Thoracic    OK ESOPHAGOSCOPY FLEX BALLOON DILAT <30 MM DIAM N/A 2023    Procedure: CRE WIRE GUIDED BALLOON DILATATION ESOPHAGEAL;  Surgeon: Shilpa Price MD;  [facility];  Service: Thoracic    OK ESOPHAGOSCOPY FLEX BALLOON DILAT <30 MM DIAM N/A 2024    Procedure: ESOPHAGOGASTRODUODENOSCOPY WITH BALLOON DILATION;  Surgeon: Shilpa Price MD;  [facility];  Service: Thoracic    OK LAPS RPR PARAESPHGL HRNA INCL FUNDPLSTY W/O MESH N/A 2023    Procedure: robotic takedown of nissen fundoplication, redo hiatal hernia, partial fundoplication, with mesh;  Surgeon: Shilpa Price MD;  [facility];  Service: Thoracic    OK PYLOROPLASTY N/A 2023    Procedure: PYLOROPLASTY LAPAROSCOPIC WITH ROBOT;  Surgeon: Dontrell Mcmahon MD;  [facility];  Service: General    ROTATOR CUFF REPAIR      SHOULDER SURGERY      SPINAL FUSION      TUBAL LIGATION  May 1975    Last child born    UPPER GASTROINTESTINAL ENDOSCOPY      US GUIDED THYROID BIOPSY      WISDOM TOOTH EXTRACTION       Social History   Social History     Substance and Sexual Activity   Alcohol Use Not Currently     Social History     Substance and Sexual Activity   Drug Use No     Social History     Tobacco Use   Smoking Status Former    Current packs/day: 0.00    Average packs/day: 1 pack/day for 15.0 years (15.0 ttl pk-yrs)    Types: Cigarettes    Start date: 10/5/1998    Quit date: 10/1/2003    Years since quittin.7   Smokeless Tobacco Former     Family History   Problem Relation Age of Onset    Other Mother         Esophageal disorder    Stomach cancer Father     Cancer Father     Hypertension Maternal Aunt     COPD Maternal Grandmother     Colon cancer Neg Hx     Colon polyps Neg Hx        Meds/Allergies       Current Outpatient Medications:      amitriptyline (ELAVIL) 50 mg tablet    amLODIPine (NORVASC) 10 mg tablet    cetirizine (ZyrTEC) 10 MG chewable tablet    cholecalciferol (VITAMIN D3) 1,000 units tablet    losartan (COZAAR) 25 mg tablet    multivitamin (THERAGRAN) TABS    Omega-3 Fatty Acids (OMEGA 3 PO)    ondansetron (ZOFRAN) 4 mg tablet    sertraline (ZOLOFT) 50 mg tablet    acetaminophen (TYLENOL) 160 mg/5 mL suspension    albuterol (PROVENTIL HFA,VENTOLIN HFA) 90 mcg/act inhaler    rizatriptan (MAXALT-MLT) 10 mg disintegrating tablet    Allergies   Allergen Reactions    Latex Hives    Augmentin [Amoxicillin-Pot Clavulanate] GI Intolerance    Bactrim [Sulfamethoxazole-Trimethoprim] GI Intolerance    Clarithromycin GI Intolerance    Doxycycline Hives    Neomycin GI Intolerance    Polymyxin B GI Intolerance    Zyvox [Linezolid] GI Bleeding    Cephalosporins GI Intolerance    Nsaids GI Intolerance    Penicillins GI Intolerance     Patient can only take levaquin and cipro    Prednisone GI Intolerance    Sulphasomidine GI Intolerance       Objective     /83   Pulse 93   Temp 97.8 °F (36.6 °C) (Temporal)   Resp 19   Ht 5' (1.524 m)   Wt 49.9 kg (110 lb)   SpO2 98%   BMI 21.48 kg/m²     PHYSICAL EXAM    General Appearance: NAD, cooperative, alert  Eyes: Anicteric  GI:  Soft, non-tender, non-distended; normal bowel sounds; no masses, no organomegaly   Rectal: Deferred until procedure  Musculoskeletal: No edema.  Skin:  No jaundice    ASSESSMENT/PLAN:  This is a 78 y.o. year old female here for upper endoscopy and dilation, and she is stable and optimized for her procedure.

## 2024-06-27 NOTE — ANESTHESIA POSTPROCEDURE EVALUATION
Post-Op Assessment Note    CV Status:  Stable  Pain Score: 0    Pain management: adequate       Mental Status:  Alert and awake   Hydration Status:  Stable   PONV Controlled:  None   Airway Patency:  Patent     Post Op Vitals Reviewed: Yes    No anethesia notable event occurred.    Staff: Anesthesiologist, CRNA               BP      Temp      Pulse     Resp      SpO2       Please review nursing notes for most recent set of vitals - stable

## 2024-06-27 NOTE — ANESTHESIA PREPROCEDURE EVALUATION
Procedure:  EGD    Relevant Problems   CARDIO   (+) Hypertension   (+) Migraines      GI/HEPATIC   (+) Chronic GERD   (+) Dysphagia   (+) Esophageal dysphagia   (+) Gastroesophageal reflux disease without esophagitis   (+) Ileus (HCC)      /RENAL   (+) Stage 3b chronic kidney disease (HCC)      HEMATOLOGY   (+) Anemia      NEURO/PSYCH   (+) Anxiety   (+) Depression   (+) Migraines      PULMONARY   (+) Asthma   (+) COPD (chronic obstructive pulmonary disease) (HCC)      FEN/Gastrointestinal   (+) Gastroparesis      Gastroparesis - patient states that it has been about a month since she had a flare of emesis after eating. Normally has dinner between 6:30-6:45pm and states that the next morning she rarely has any issues with emesis or regurgitation. Feels well this morning.    Physical Exam    Airway  Comment: Small mouth  Mallampati score: II  TM Distance: <3 FB  Neck ROM: full     Dental   Comment: None loose     Cardiovascular      Pulmonary      Other Findings  post-pubertal.      Anesthesia Plan  ASA Score- 2     Anesthesia Type- IV sedation with anesthesia with ASA Monitors.         Additional Monitors:     Airway Plan:     Comment: Last solid last evening at 19:30  Last liquid last evening at 23:00    Patient educated on the possibility for awareness under sedation and of the possibility of airway intervention in the event of an airway or procedural emergency  .       Plan Factors-Exercise tolerance (METS): >4 METS.    Chart reviewed.    Patient summary reviewed.    Patient is not a current smoker.              Induction- intravenous.    Postoperative Plan-         Informed Consent- Anesthetic plan and risks discussed with patient.  I personally reviewed this patient with the CRNA. Discussed and agreed on the Anesthesia Plan with the CRNA..

## 2024-07-03 ENCOUNTER — TELEMEDICINE (OUTPATIENT)
Dept: PSYCHIATRY | Facility: CLINIC | Age: 79
End: 2024-07-03
Payer: COMMERCIAL

## 2024-07-03 DIAGNOSIS — F32.1 CURRENT MODERATE EPISODE OF MAJOR DEPRESSIVE DISORDER WITHOUT PRIOR EPISODE (HCC): Primary | ICD-10-CM

## 2024-07-03 DIAGNOSIS — F41.9 ANXIETY: ICD-10-CM

## 2024-07-03 PROCEDURE — 90834 PSYTX W PT 45 MINUTES: CPT

## 2024-07-03 NOTE — BH TREATMENT PLAN
Outpatient Behavioral Health Psychotherapy Treatment Plan    Ira Bonilla  1945     Date of Initial Psychotherapy Assessment: 1/10/2024   Date of Current Treatment Plan: 07/03/24  Treatment Plan Target Date: 7/3/2025  Treatment Plan Expiration Date: 1/3/2025    Diagnosis:   1. Current moderate episode of major depressive disorder without prior episode (HCC)        2. Anxiety            Area(s) of Need: Depression, lack of interest in things, family issues, grief     Long Term Goal 1 (in the client's own words): I want to feel better about myself    Stage of Change: Contemplation    Target Date for completion: 1/25/2025     Anticipated therapeutic modalities: supportive therapy, grief therapy     People identified to complete this goal: deb Roth      Objective 1: (identify the means of measuring success in meeting the objective): Therapist will engage Ira in supportive and grief therapy to work through her feelings and thoughts and improve her self esteem.  She will report back verbally improved self esteem and mood at least 5 out of 7 days.    Update 7/3/2024:  Bryan is able to openly communicate her thoughts and feelings about her family situation, grief reaction, and her depression.  She shares it helps her to talk about her feelings, to have talks about encouraging her in getting out more and socializing more, and to openly communicate her thoughts and feelings with her family about her role in the family.                                I am currently under the care of a [facility] psychiatric provider: no    My [facility] psychiatric provider is:     I am currently taking psychiatric medications: Yes, as prescribed    I feel that I will be ready for discharge from mental health care when I reach the following (measurable goal/objective): When my self esteem is higher and my moods are improved at least 5 out of 7 days    For children and adults who have a legal guardian:   Has there been  any change to custody orders and/or guardianship status? NA. If yes, attach updated documentation.    I have created my Crisis Plan and have been offered a copy of this plan    Behavioral Health Treatment Plan [facility]: Diagnosis and Treatment Plan explained to Ira Bonilla acknowledges an understanding of their diagnosis. Ira Bonilla agrees to this treatment plan.    I have been offered a copy of this Treatment Plan. Yes    Ira Bonilla, 1945, actively participated in the creation of this treatment plan during a virtual session, using the [employer] platform.   Ira Bonilla  provided verbal consent on 7/3/2024 at 1:30 PM. The treatment plan was transcribed into the Electronic Health Record at a later time.

## 2024-07-17 ENCOUNTER — TELEMEDICINE (OUTPATIENT)
Dept: PSYCHIATRY | Facility: CLINIC | Age: 79
End: 2024-07-17
Payer: COMMERCIAL

## 2024-07-17 DIAGNOSIS — F32.0 CURRENT MILD EPISODE OF MAJOR DEPRESSIVE DISORDER WITHOUT PRIOR EPISODE (HCC): Primary | ICD-10-CM

## 2024-07-17 PROCEDURE — 90834 PSYTX W PT 45 MINUTES: CPT

## 2024-07-17 NOTE — PSYCH
Virtual Regular Visit    Verification of patient location:    Patient is located at Home in the following state in which I hold an active license [place]      Assessment/Plan:    Problem List Items Addressed This Visit    None      Goals addressed in session: Goal 1          Reason for visit is No chief complaint on file.       Encounter provider Maria Ines Farfan LCSW      Recent Visits  No visits were found meeting these conditions.  Showing recent visits within past 7 days and meeting all other requirements  Today's Visits  Date Type Provider Dept   07/17/24 Telemedicine Maria Ines Farfan LCSW [facility]   Showing today's visits and meeting all other requirements  Future Appointments  No visits were found meeting these conditions.  Showing future appointments within next 150 days and meeting all other requirements       The patient was identified by name and date of birth. Ira Bonilla was informed that this is a telemedicine visit and that the visit is being conducted throughthe [employer] platform. She agrees to proceed..  My office door was closed. No one else was in the room.  She acknowledged consent and understanding of privacy and security of the video platform. The patient has agreed to participate and understands they can discontinue the visit at any time.    Patient is aware this is a billable service.     Subjective  Ira Bonilla is a 78 y.o. female  .      HPI     Past Medical History:   Diagnosis Date    Anxiety     Arthritis     Asthma     Cancer (HCC)     Candida infection, esophageal (HCC)     Chronic kidney disease     Chronic pain     COPD (chronic obstructive pulmonary disease) (HCC)     Depression     Depression     Gastroparesis     Gastroparesis     GERD (gastroesophageal reflux disease)     Hypertension     Irritable bowel syndrome (IBS)     Lactose intolerance Not sure    Migraines     MRSA (methicillin resistant Staphylococcus aureus)     Multiple  thyroid nodules     Osteoporosis     Pneumonia     Psychiatric disorder        Past Surgical History:   Procedure Laterality Date    BACK SURGERY       SECTION      CHOLECYSTECTOMY      COLONOSCOPY  10/09/2013    ESOPHAGEAL DILATION       [facility]    ESOPHAGOGASTRODUODENOSCOPY N/A 2023    Procedure: ESOPHAGOGASTRODUODENOSCOPY (EGD);  Surgeon: Dontrell Mcmahon MD;  [facility];  Service: General    ESOPHAGOGASTRODUODENOSCOPY N/A 2024    Procedure: ESOPHAGOGASTRODUODENOSCOPY (EGD);  Surgeon: Shilpa Price MD;  [facility];  Service: Thoracic    HERNIA REPAIR  2013    HIATAL HERNIA REPAIR      NISSEN FUNDOPLICATION      [place] BIOPSY LIVER NEEDLE PERCUTANEOUS N/A 2023    Procedure: EXCISION BIOPSY LIVER;  Surgeon: Dontrell Mcmahon MD;  [facility];  Service: General    GA ESOPHAGOGASTRODUODENOSCOPY TRANSORAL DIAGNOSTIC N/A 2019    Procedure: ESOPHAGOGASTRODUODENOSCOPY (EGD);  Surgeon: Geovanna Winn MD;  [facility];  Service: Gastroenterology    GA ESOPHAGOGASTRODUODENOSCOPY TRANSORAL DIAGNOSTIC N/A 2023    Procedure: ESOPHAGOGASTRODUODENOSCOPY (EGD);  Surgeon: Shilpa Price MD;  [facility];  Service: Thoracic    GA ESOPHAGOSCOPY FLEX BALLOON DILAT <30 MM DIAM N/A 2023    Procedure: CRE WIRE GUIDED BALLOON DILATATION ESOPHAGEAL;  Surgeon: Shilpa Price MD;  [facility];  Service: Thoracic    GA ESOPHAGOSCOPY FLEX BALLOON DILAT <30 MM DIAM N/A 2024    Procedure: ESOPHAGOGASTRODUODENOSCOPY WITH BALLOON DILATION;  Surgeon: Shilpa Price MD;  [facility];  Service: Thoracic    GA LAPS RPR PARAESPHGL HRNA INCL FUNDPLSTY W/O MESH N/A 2023    Procedure: robotic takedown of nissen fundoplication, redo hiatal hernia, partial fundoplication, with mesh;  Surgeon: Shilpa Price MD;  [facility];  Service: Thoracic    GA PYLOROPLASTY N/A 2023    Procedure:  PYLOROPLASTY LAPAROSCOPIC WITH ROBOT;  Surgeon: Dontrell Mcmahon MD;  [facility];  Service: General    ROTATOR CUFF REPAIR      SHOULDER SURGERY      SPINAL FUSION      TUBAL LIGATION  May 1975    Last child born    UPPER GASTROINTESTINAL ENDOSCOPY      US GUIDED THYROID BIOPSY      WISDOM TOOTH EXTRACTION         Current Outpatient Medications   Medication Sig Dispense Refill    acetaminophen (TYLENOL) 160 mg/5 mL suspension Take 20.3 mL (650 mg total) by mouth every 6 (six) hours as needed for mild pain 355 mL 0    albuterol (PROVENTIL HFA,VENTOLIN HFA) 90 mcg/act inhaler Inhale 2 puffs if needed      amitriptyline (ELAVIL) 50 mg tablet TAKE 1 TABLET BY MOUTH EVERYDAY AT BEDTIME (Patient taking differently: Take 25 mg by mouth daily at bedtime) 90 tablet 1    amLODIPine (NORVASC) 10 mg tablet       cetirizine (ZyrTEC) 10 MG chewable tablet Chew 10 mg daily.      cholecalciferol (VITAMIN D3) 1,000 units tablet Take 5,000 Units by mouth daily      losartan (COZAAR) 25 mg tablet       metoclopramide (Reglan) 10 mg tablet Take 1 tablet (10 mg total) by mouth 4 (four) times a day 120 tablet 1    multivitamin (THERAGRAN) TABS Take 1 tablet by mouth daily      Omega-3 Fatty Acids (OMEGA 3 PO) Take by mouth in the morning      ondansetron (ZOFRAN) 4 mg tablet Take 1 tablet (4 mg total) by mouth every 8 (eight) hours as needed for nausea or vomiting 15 tablet 0    rizatriptan (MAXALT-MLT) 10 mg disintegrating tablet as needed for migraine headache      sertraline (ZOLOFT) 50 mg tablet Take 25 mg by mouth daily Weening up to 50mg.  Weening of Evalil       No current facility-administered medications for this visit.        Allergies   Allergen Reactions    Latex Hives    Augmentin [Amoxicillin-Pot Clavulanate] GI Intolerance    Bactrim [Sulfamethoxazole-Trimethoprim] GI Intolerance    Clarithromycin GI Intolerance    Doxycycline Hives    Neomycin GI Intolerance    Polymyxin B GI Intolerance    Zyvox [Linezolid] GI  "Bleeding    Cephalosporins GI Intolerance    Nsaids GI Intolerance    Penicillins GI Intolerance     Patient can only take levaquin and cipro    Prednisone GI Intolerance    Sulphasomidine GI Intolerance       Review of Systems    Video Exam    There were no vitals filed for this visit.    Physical Exam     Behavioral Health Psychotherapy Progress Note    Psychotherapy Provided: Individual Psychotherapy     No diagnosis found.    Goals addressed in session: Goal 1     DATA: Ira shared her blood pressure has been high and she has been getting headaches recently.  She has been having some \"ups and downs\" triggered by not having a say in what's going on in the house, the pool that is going in next week, working on the bathroom by taking out the bathtub and putting in a washer and dryer.  She feels upset over not being asked for an opinion of what happens in the house.  Therapist coached her in assertive communication and radical acceptance of the situation at home.  She talked about her grandson having an eye infection and getting that taken care of.  She expressed some concerns over her grandson's social security payments possibly ending when Wandi turns 18. Therapist encouraged her to find out that answer so she can plan ahead.  Therapist and Ira ended session with listing gratitude.  During this session, this clinician used the following therapeutic modalities: Client-centered Therapy and Mindfulness-based Strategies    Substance Abuse was not addressed during this session. If the client is diagnosed with a co-occurring substance use disorder, please indicate any changes in the frequency or amount of use: . Stage of change for addressing substance use diagnoses: No substance use/Not applicable    ASSESSMENT:  Ira Bonilla presents with a Euthymic/ normal mood.     her affect is Normal range and intensity, which is congruent, with her mood and the content of the session. The client has made progress on " "their goals.     Ira Bonilla presents with a none risk of suicide, none risk of self-harm, and none risk of harm to others.    For any risk assessment that surpasses a \"low\" rating, a safety plan must be developed.    A safety plan was indicated: no  If yes, describe in detail     PLAN: Between sessions, Ira Bonilla will find out answers to social security payments, utilize gratitude to improve mood. At the next session, the therapist will use Client-centered Therapy and Mindfulness-based Strategies to address depression.    Behavioral Health Treatment Plan and Discharge Planning: Ira Bonilla is aware of and agrees to continue to work on their treatment plan. They have identified and are working toward their discharge goals. yes    Visit start and stop times:    07/17/24  Start Time: 1300  Stop Time: 1352  Total Visit Time: 52 minutes        "

## 2024-07-18 ENCOUNTER — TELEPHONE (OUTPATIENT)
Age: 79
End: 2024-07-18

## 2024-07-18 NOTE — TELEPHONE ENCOUNTER
The writer contacted the patient off of the [facility] wait list to offer a potential appt. The patient is now scheduled for 8/6/24 at 10:30 am.

## 2024-07-25 ENCOUNTER — TELEPHONE (OUTPATIENT)
Dept: PSYCHIATRY | Facility: CLINIC | Age: 79
End: 2024-07-25

## 2024-07-25 NOTE — TELEPHONE ENCOUNTER
One week follow up call for New Patient appointment with Edward Hinkle [[id number]] on 8/6  was made on 7/15/24. Writer informed patient of New Patient paperwork needing to be completed 5 days prior to the appointment. Writer confirmed paperwork has been sent via My Chart.

## 2024-07-31 ENCOUNTER — TELEMEDICINE (OUTPATIENT)
Dept: PSYCHIATRY | Facility: CLINIC | Age: 79
End: 2024-07-31
Payer: COMMERCIAL

## 2024-07-31 DIAGNOSIS — F41.9 ANXIETY: ICD-10-CM

## 2024-07-31 DIAGNOSIS — F32.1 CURRENT MODERATE EPISODE OF MAJOR DEPRESSIVE DISORDER, UNSPECIFIED WHETHER RECURRENT (HCC): Primary | ICD-10-CM

## 2024-07-31 PROCEDURE — 90837 PSYTX W PT 60 MINUTES: CPT

## 2024-07-31 NOTE — PSYCH
Virtual Regular Visit    Verification of patient location:    Patient is located at Home in the following state in which I hold an active license [place]      Assessment/Plan:    Problem List Items Addressed This Visit       Depression - Primary    Anxiety       Goals addressed in session: Goal 1          Reason for visit is No chief complaint on file.       Encounter provider Maria Ines Farfan LCSW      Recent Visits  No visits were found meeting these conditions.  Showing recent visits within past 7 days and meeting all other requirements  Future Appointments  No visits were found meeting these conditions.  Showing future appointments within next 150 days and meeting all other requirements       The patient was identified by name and date of birth. Ira Bonilla was informed that this is a telemedicine visit and that the visit is being conducted throughthe [employer] platform. She agrees to proceed..  My office door was closed. No one else was in the room.  She acknowledged consent and understanding of privacy and security of the video platform. The patient has agreed to participate and understands they can discontinue the visit at any time.    Patient is aware this is a billable service.     Subjective  Ira Bonilla is a 78 y.o. female  .      HPI     Past Medical History:   Diagnosis Date    Anxiety     Arthritis     Asthma     Cancer (HCC)     Candida infection, esophageal (HCC)     Chronic kidney disease     Chronic pain     COPD (chronic obstructive pulmonary disease) (HCC)     Depression     Depression     Gastroparesis     Gastroparesis     GERD (gastroesophageal reflux disease)     Hypertension     Irritable bowel syndrome (IBS)     Lactose intolerance Not sure    Migraines     MRSA (methicillin resistant Staphylococcus aureus)     Multiple thyroid nodules     Osteoporosis     Pneumonia     Psychiatric disorder        Past Surgical History:   Procedure Laterality Date    BACK SURGERY        SECTION      CHOLECYSTECTOMY      COLONOSCOPY  10/09/2013    ESOPHAGEAL DILATION      2017 [facility]    ESOPHAGOGASTRODUODENOSCOPY N/A 11/08/2023    Procedure: ESOPHAGOGASTRODUODENOSCOPY (EGD);  Surgeon: Dontrell Mcmahon MD;  [facility];  Service: General    ESOPHAGOGASTRODUODENOSCOPY N/A 03/20/2024    Procedure: ESOPHAGOGASTRODUODENOSCOPY (EGD);  Surgeon: Shilpa Price MD;  [facility]: [facility];  Service: Thoracic    HERNIA REPAIR  06/14/2013    HIATAL HERNIA REPAIR      NISSEN FUNDOPLICATION      VT BIOPSY LIVER NEEDLE PERCUTANEOUS N/A 11/08/2023    Procedure: EXCISION BIOPSY LIVER;  Surgeon: Dontrell Mcmahon MD;  [facility];  Service: General    VT ESOPHAGOGASTRODUODENOSCOPY TRANSORAL DIAGNOSTIC N/A 04/03/2019    Procedure: ESOPHAGOGASTRODUODENOSCOPY (EGD);  Surgeon: Geovanna Winn MD;  Location: [facility];  Service: Gastroenterology    VT ESOPHAGOGASTRODUODENOSCOPY TRANSORAL DIAGNOSTIC N/A 09/20/2023    Procedure: ESOPHAGOGASTRODUODENOSCOPY (EGD);  Surgeon: Shilpa Price MD;  [facility];  Service: Thoracic    VT ESOPHAGOSCOPY FLEX BALLOON DILAT <30 MM DIAM N/A 09/20/2023    Procedure: CRE WIRE GUIDED BALLOON DILATATION ESOPHAGEAL;  Surgeon: Shilpa Price MD;  [facility];  Service: Thoracic    VT ESOPHAGOSCOPY FLEX BALLOON DILAT <30 MM DIAM N/A 03/20/2024    Procedure: ESOPHAGOGASTRODUODENOSCOPY WITH BALLOON DILATION;  Surgeon: Shilpa Price MD;  [facility];  Service: Thoracic    VT LAPS RPR PARAESPHGL HRNA INCL FUNDPLSTY W/O MESH N/A 11/08/2023    Procedure: robotic takedown of nissen fundoplication, redo hiatal hernia, partial fundoplication, with mesh;  Surgeon: Shilpa Price MD;  [facility];  Service: Thoracic    VT PYLOROPLASTY N/A 11/08/2023    Procedure: PYLOROPLASTY LAPAROSCOPIC WITH ROBOT;  Surgeon: Dontrell Mcmahon MD;  [facility];  Service: General    ROTATOR CUFF REPAIR      SHOULDER SURGERY      SPINAL  FUSION      TUBAL LIGATION  May 1975    Last child born    UPPER GASTROINTESTINAL ENDOSCOPY      US GUIDED THYROID BIOPSY      WISDOM TOOTH EXTRACTION         Current Outpatient Medications   Medication Sig Dispense Refill    acetaminophen (TYLENOL) 160 mg/5 mL suspension Take 20.3 mL (650 mg total) by mouth every 6 (six) hours as needed for mild pain 355 mL 0    albuterol (PROVENTIL HFA,VENTOLIN HFA) 90 mcg/act inhaler Inhale 2 puffs if needed      amitriptyline (ELAVIL) 50 mg tablet TAKE 1 TABLET BY MOUTH EVERYDAY AT BEDTIME (Patient taking differently: Take 25 mg by mouth daily at bedtime) 90 tablet 1    amLODIPine (NORVASC) 10 mg tablet       cetirizine (ZyrTEC) 10 MG chewable tablet Chew 10 mg daily.      cholecalciferol (VITAMIN D3) 1,000 units tablet Take 5,000 Units by mouth daily      losartan (COZAAR) 25 mg tablet       metoclopramide (Reglan) 10 mg tablet Take 1 tablet (10 mg total) by mouth 4 (four) times a day 120 tablet 1    multivitamin (THERAGRAN) TABS Take 1 tablet by mouth daily      Omega-3 Fatty Acids (OMEGA 3 PO) Take by mouth in the morning      ondansetron (ZOFRAN) 4 mg tablet Take 1 tablet (4 mg total) by mouth every 8 (eight) hours as needed for nausea or vomiting 15 tablet 0    rizatriptan (MAXALT-MLT) 10 mg disintegrating tablet as needed for migraine headache      sertraline (ZOLOFT) 50 mg tablet Take 25 mg by mouth daily Weening up to 50mg.  Weening of Evalil       No current facility-administered medications for this visit.        Allergies   Allergen Reactions    Latex Hives    Augmentin [Amoxicillin-Pot Clavulanate] GI Intolerance    Bactrim [Sulfamethoxazole-Trimethoprim] GI Intolerance    Clarithromycin GI Intolerance    Doxycycline Hives    Neomycin GI Intolerance    Polymyxin B GI Intolerance    Zyvox [Linezolid] GI Bleeding    Cephalosporins GI Intolerance    Nsaids GI Intolerance    Penicillins GI Intolerance     Patient can only take levaquin and cipro    Prednisone GI  "Intolerance    Sulphasomidine GI Intolerance       Review of Systems    Video Exam    There were no vitals filed for this visit.    Physical Exam     Behavioral Health Psychotherapy Progress Note    Psychotherapy Provided: Individual Psychotherapy     1. Current moderate episode of major depressive disorder, unspecified whether recurrent (HCC)        2. Anxiety            Goals addressed in session: Goal 1     DATA: Ira shared she has had a headache for the past 2 weeks.  She also shared that she was going to the psychiatrist soon and was hopeful that she can start feeling better with the help of the psychiatrist and possibly a neurologist.  Ira shared that she had to leave early from session because of needing to drive her son for an endoscopy.  Therapist and Ira talked about rescheduling and therapist will call with any cancellations between now and the next time that she has a scheduled appointment, which is 2 weeks from now.  During this session, this clinician used the following therapeutic modalities: Client-centered Therapy    Substance Abuse was not addressed during this session. If the client is diagnosed with a co-occurring substance use disorder, please indicate any changes in the frequency or amount of use: . Stage of change for addressing substance use diagnoses: No substance use/Not applicable    ASSESSMENT:  Ira Bonilla presents with a Euthymic/ normal mood.     her affect is Normal range and intensity, which is congruent, with her mood and the content of the session. The client has made progress on their goals.     Ira Bonilla presents with a none risk of suicide, none risk of self-harm, and none risk of harm to others.    For any risk assessment that surpasses a \"low\" rating, a safety plan must be developed.    A safety plan was indicated: no  If yes, describe in detail     PLAN: Between sessions, Ira Bonilla will utilize assertiveness skills as needed with her familiy. At " the next session, the therapist will use Client-centered Therapy and Dialectical Behavior Therapy to address depression, interpersonal interactions.    Behavioral Health Treatment Plan and Discharge Planning: Ira Bonilla is aware of and agrees to continue to work on their treatment plan. They have identified and are working toward their discharge goals. yes    Visit start and stop times:    Start time:  1:00pm  End time:  1:08pm  Total visit time:  8 minutes    07/31/24

## 2024-08-06 ENCOUNTER — OFFICE VISIT (OUTPATIENT)
Dept: PSYCHIATRY | Facility: CLINIC | Age: 79
End: 2024-08-06
Payer: COMMERCIAL

## 2024-08-06 DIAGNOSIS — F32.1 CURRENT MODERATE EPISODE OF MAJOR DEPRESSIVE DISORDER, UNSPECIFIED WHETHER RECURRENT (HCC): Primary | ICD-10-CM

## 2024-08-06 DIAGNOSIS — F41.1 GENERALIZED ANXIETY DISORDER: ICD-10-CM

## 2024-08-06 PROCEDURE — 90792 PSYCH DIAG EVAL W/MED SRVCS: CPT | Performed by: STUDENT IN AN ORGANIZED HEALTH CARE EDUCATION/TRAINING PROGRAM

## 2024-08-06 RX ORDER — SERTRALINE HYDROCHLORIDE 100 MG/1
100 TABLET, FILM COATED ORAL DAILY
Qty: 30 TABLET | Refills: 1 | Status: SHIPPED | OUTPATIENT
Start: 2024-08-06 | End: 2024-10-05

## 2024-08-06 NOTE — H&P
PSYCHIATRIC EVALUATION     [facility]    Name and Date of Birth:  Ira Bonilla 78 y.o. 1945 MRN: [id number]    Date of Visit: August 7, 2024    Reason for visit: Full psychiatric intake assessment for medication management     HPI     Ira Bonilla is a 78 y.o. female with a past psychiatric history significant for MDD, WINDY who presents to the [facility] outpatient clinic for intake assessment. Ira presents as a new patient for this physician.    The patient presented with a history of emotional distress and physical health issues. She reported the loss of her daughter due to addiction four years ago, which has been a significant source of emotional pain for her. She has been raising her daughter's son since his birth and officially adopted him when he was five years old.     Ira also mentioned a significant life change involving a move with her grandson and son due to her health issues. They combined their families into one household, which has been a challenging adjustment for her. Her grandson has been struggling with this transition, and she reported not dealing well with these changes herself.     She described experiencing frequent crying spells and high levels of anxiety. She was hospitalized in May due to high blood pressure and migraines, during which she experienced an episode of severe shaking that was attributed to anxiety.      Ira also reported ongoing physical health issues, including stomach problems that have led to significant weight loss and difficulty digesting food. She underwent two surgeries in November and continues to struggle with these issues.    She expressed concern about the dynamic between her son and grandson, as her son is often hard on the grandson about finding employment. This situation has been causing her additional stress as she feels caught in the middle.    Ira reported  feeling isolated despite living with family members. She spends most of her time at home, occasionally visiting friends or going to [employer]. She does not currently work or have many hobbies.    She denied any thoughts of suicide or harming others but admitted to sometimes feeling like she might be better off not being around. Her family, particularly her grandson, is what keeps her going.      In terms of medication history, Ira is currently on Sertraline (Zoloft) 50 mg, prescribed by her family doctor due to her emotional distress. She was previously on Amitriptyline but has since stopped taking it. At one point in the past, she was also on Xanax but could not recall the reason for this prescription.    She has a history of seeking mental health support dating back to when she was younger but could not recall the specific diagnosis or treatment details. Currently, she is seeing a therapist virtually through [facility].    Ira reported no history of self-harm or hospitalization for mental health problems. She denies any history of HI, AVH, delusions, jose.    Despite having a supportive family, Ira expressed feelings of loneliness and isolation. She mentioned that she does not like going places alone and prefers to be with people. However, she has not taken up suggestions from her therapist to join support groups or social gatherings due to this preference for companionship.    After discussion of risks, benefits, potential side effects, alternatives, we will increase Zoloft to 100 mg daily.  Patient denies acute mental complaints or concerns at this time.        Current Rating Scores:     Current PHQ-9   PHQ-2/9 Depression Screening    Little interest or pleasure in doing things: 2 - more than half the days  Feeling down, depressed, or hopeless: 3 - nearly every day  Trouble falling or staying asleep, or sleeping too much: 2 - more than half the days  Feeling tired or having little energy: 3 - nearly  every day  Poor appetite or overeatin - not at all  Feeling bad about yourself - or that you are a failure or have let yourself or your family down: 1 - several days  Trouble concentrating on things, such as reading the newspaper or watching television: 3 - nearly every day  Moving or speaking so slowly that other people could have noticed. Or the opposite - being so fidgety or restless that you have been moving around a lot more than usual: 0 - not at all  Thoughts that you would be better off dead, or of hurting yourself in some way: 0 - not at all  PHQ-9 Score: 14  PHQ-9 Interpretation: Moderate depression       Current WINDY-7 is   WINDY-7 Flowsheet Screening      Flowsheet Row Most Recent Value   Over the last two weeks, how often have you been bothered by the following problems?     Feeling nervous, anxious, or on edge 3   Not being able to stop or control worrying 3   Worrying too much about different things 3   Trouble relaxing  3   Being so restless that it's hard to sit still 3   Becoming easily annoyed or irritable  3   Feeling afraid as if something awful might happen 2   How difficult have these problems made it for you to do your work, take care of things at home, or get along with other people?  Somewhat difficult   WINDY Score  20        .    Psychiatric Review Of Systems:    Sleep changes: decreased  Appetite changes: no  Weight changes: no  Energy/anergy: decreased  Interest/pleasure/anhedonia: decreased  Somatic symptoms: no  Anxiety/panic: worrying  Hanh: no  Guilty/hopeless: yes  Self injurious behavior/risky behavior: no  Suicidal ideation:  None currently however has intermittent death wishes without plan or intent previously  Homicidal ideation: no  Auditory hallucinations: no  Visual hallucinations: no  Other hallucinations: no  Delusional thinking: no  Eating disorder history: unknown  Obsessive/compulsive symptoms: unknown    Review Of Systems:    Constitutional negative   ENT negative    Cardiovascular negative   Respiratory negative   Gastrointestinal negative   Genitourinary negative   Musculoskeletal negative   Integumentary negative   Neurological negative   Endocrine negative   Other Symptoms none, all other systems are negative       Family Psychiatric History:     Family History   Problem Relation Age of Onset    Other Mother         Esophageal disorder    Stomach cancer Father     Cancer Father     Hypertension Maternal Aunt     COPD Maternal Grandmother     Colon cancer Neg Hx     Colon polyps Neg Hx    Mood disorder and substance use in daughter      Past Psychiatric History:     Inpatient psychiatric admissions: Denies  Prior outpatient psychiatric linkage: None reported  Past/current psychotherapy: [facility] therapist  History of suicidal attempts/gestures: Denies  History of violence/aggressive behaviors: Denies  Psychotropic medication trials: Amitriptyline, Xanax  Substance abuse inpatient/outpatient rehabilitation: Denies    Substance Abuse History:    No history of ETOH, illict substance, or tobacco abuse. No past legal actions or arrests secondary to substance intoxication. The patient denies prior DWIs/DUIs. No history of outpatient/inpatient rehabilitation programs. Ira does not exhibit objective evidence of substance withdrawal during today's examination nor does Ira appear under the influence of any psychoactive substance.        Social History:    Developmental: Denies a history of milestone/developmental delay. Denies a history of in-utero exposure to toxins/illicit substances. There is no documented history of IEP or need for special education.  Education: high school diploma/GED  Marital history:   Living arrangement, social support: extended family  Occupational History: retired  Access to firearms: Denies direct access to weapons/firearms. Ira Bonilla has no history of arrests or violence with a deadly weapon.     Traumatic History:     Abuse:none  is reported  Other Traumatic Events:  Death of daughter after overdose    Past Medical History:    Past Medical History:   Diagnosis Date    Anxiety     Arthritis     Asthma     Cancer (HCC)     Candida infection, esophageal (HCC)     Chronic kidney disease     Chronic pain     COPD (chronic obstructive pulmonary disease) (HCC)     Depression     Depression     Gastroparesis     Gastroparesis     GERD (gastroesophageal reflux disease)     Hypertension     Irritable bowel syndrome (IBS)     Lactose intolerance Not sure    Migraines     MRSA (methicillin resistant Staphylococcus aureus)     Multiple thyroid nodules     Osteoporosis     Pneumonia     Psychiatric disorder         Past Surgical History:   Procedure Laterality Date    BACK SURGERY       SECTION      CHOLECYSTECTOMY      COLONOSCOPY  10/09/2013    ESOPHAGEAL DILATION       [facility]    ESOPHAGOGASTRODUODENOSCOPY N/A 2023    Procedure: ESOPHAGOGASTRODUODENOSCOPY (EGD);  Surgeon: Dontrell Mcmahon MD;  Location: [facility];  Service: General    ESOPHAGOGASTRODUODENOSCOPY N/A 2024    Procedure: ESOPHAGOGASTRODUODENOSCOPY (EGD);  Surgeon: Shilpa Price MD;  Location: [facility];  Service: Thoracic    HERNIA REPAIR  2013    HIATAL HERNIA REPAIR      NISSEN FUNDOPLICATION      MO BIOPSY LIVER NEEDLE PERCUTANEOUS N/A 2023    Procedure: EXCISION BIOPSY LIVER;  Surgeon: Dontrell Mcmahon MD;  [facility];  Service: General    MO ESOPHAGOGASTRODUODENOSCOPY TRANSORAL DIAGNOSTIC N/A 2019    Procedure: ESOPHAGOGASTRODUODENOSCOPY (EGD);  Surgeon: Geovanna Winn MD;  Location: [facility];  Service: Gastroenterology    MO ESOPHAGOGASTRODUODENOSCOPY TRANSORAL DIAGNOSTIC N/A 2023    Procedure: ESOPHAGOGASTRODUODENOSCOPY (EGD);  Surgeon: Shilpa Price MD;  [facility];  Service: Thoracic    MO ESOPHAGOSCOPY FLEX BALLOON DILAT <30 MM DIAM N/A 2023    Procedure: CRE WIRE GUIDED BALLOON DILATATION  ESOPHAGEAL;  Surgeon: Shilpa Price MD;  [facility];  Service: Thoracic    IL ESOPHAGOSCOPY FLEX BALLOON DILAT <30 MM DIAM N/A 03/20/2024    Procedure: ESOPHAGOGASTRODUODENOSCOPY WITH BALLOON DILATION;  Surgeon: Shilpa Price MD;  [facility];  Service: Thoracic    IL LAPS RPR PARAESPHGL HRNA INCL FUNDPLSTY W/O MESH N/A 11/08/2023    Procedure: robotic takedown of nissen fundoplication, redo hiatal hernia, partial fundoplication, with mesh;  Surgeon: Shilpa Price MD;  [facility];  Service: Thoracic    IL PYLOROPLASTY N/A 11/08/2023    Procedure: PYLOROPLASTY LAPAROSCOPIC WITH ROBOT;  Surgeon: Dontrell Mcmahon MD;  [facility];  Service: General    ROTATOR CUFF REPAIR      SHOULDER SURGERY      SPINAL FUSION      TUBAL LIGATION  May 1975    Last child born    UPPER GASTROINTESTINAL ENDOSCOPY      US GUIDED THYROID BIOPSY      WISDOM TOOTH EXTRACTION       Allergies   Allergen Reactions    Latex Hives    Augmentin [Amoxicillin-Pot Clavulanate] GI Intolerance    Bactrim [Sulfamethoxazole-Trimethoprim] GI Intolerance    Clarithromycin GI Intolerance    Doxycycline Hives    Neomycin GI Intolerance    Polymyxin B GI Intolerance    Zyvox [Linezolid] GI Bleeding    Cephalosporins GI Intolerance    Nsaids GI Intolerance    Penicillins GI Intolerance     Patient can only take levaquin and cipro    Prednisone GI Intolerance    Sulphasomidine GI Intolerance       History Review:    The following portions of the patient's history were reviewed and updated as appropriate: allergies, current medications, past family history, past medical history, past social history, past surgical history, and problem list.    OBJECTIVE:    Vital signs in last 24 hours:    There were no vitals filed for this visit.    Mental Status Evaluation:    Appearance age appropriate, casually dressed   Behavior cooperative, mildly anxious   Speech normal rate, normal volume, normal pitch   Mood  dysphoric   Affect constricted   Thought Processes organized, goal directed   Associations intact associations   Thought Content no overt delusions   Perceptual Disturbances: no auditory hallucinations, no visual hallucinations   Abnormal Thoughts  Risk Potential Suicidal ideation - None at present  Homicidal ideation - None at present  Potential for aggression - Not at present   Orientation oriented to person, place, time/date, and situation   Memory recent and remote memory grossly intact   Consciousness alert and awake   Attention Span Concentration Span attention span and concentration are age appropriate   Intellect appears to be of average intelligence   Insight intact   Judgement intact   Muscle Strength and  Gait normal muscle strength and normal muscle tone, normal gait and normal balance   Motor Activity no abnormal movements   Language no difficulty naming common objects, no difficulty repeating a phrase   Fund of Knowledge adequate knowledge of current events  adequate fund of knowledge regarding past history  adequate fund of knowledge regarding vocabulary    Pain none   Pain Scale 0       Laboratory Results: I have personally reviewed all pertinent laboratory/tests results    Recent Labs (last 2 months):   No visits with results within 2 Month(s) from this visit.   Latest known visit with results is:   Admission on 03/21/2024, Discharged on 03/26/2024   Component Date Value    WBC 03/22/2024 7.73     RBC 03/22/2024 3.29 (L)     Hemoglobin 03/22/2024 10.3 (L)     Hematocrit 03/22/2024 32.1 (L)     MCV 03/22/2024 98     MCH 03/22/2024 31.3     MCHC 03/22/2024 32.1     RDW 03/22/2024 13.6     MPV 03/22/2024 9.0     Platelets 03/22/2024 231     nRBC 03/22/2024 0     Segmented % 03/22/2024 52     Immature Grans % 03/22/2024 1     Lymphocytes % 03/22/2024 39     Monocytes % 03/22/2024 6     Eosinophils Relative 03/22/2024 2     Basophils Relative 03/22/2024 0     Absolute Neutrophils 03/22/2024 4.09      Absolute Immature Grans 03/22/2024 0.04     Absolute Lymphocytes 03/22/2024 2.99     Absolute Monocytes 03/22/2024 0.45     Eosinophils Absolute 03/22/2024 0.14     Basophils Absolute 03/22/2024 0.02     Sodium 03/22/2024 140     Potassium 03/22/2024 4.1     Chloride 03/22/2024 107     CO2 03/22/2024 26     ANION GAP 03/22/2024 7     BUN 03/22/2024 22     Creatinine 03/22/2024 1.50 (H)     Glucose 03/22/2024 190 (H)     Calcium 03/22/2024 9.3     AST 03/22/2024 38     ALT 03/22/2024 35     Alkaline Phosphatase 03/22/2024 123 (H)     Total Protein 03/22/2024 7.6     Albumin 03/22/2024 4.0     Total Bilirubin 03/22/2024 0.25     eGFR 03/22/2024 33     Lipase 03/22/2024 <6 (L)     hs TnI 0hr 03/22/2024 3     Sodium 03/22/2024 139     Potassium 03/22/2024 4.5     Chloride 03/22/2024 108     CO2 03/22/2024 25     ANION GAP 03/22/2024 6     BUN 03/22/2024 21     Creatinine 03/22/2024 1.37 (H)     Glucose 03/22/2024 105     Calcium 03/22/2024 8.6     eGFR 03/22/2024 37     Magnesium 03/22/2024 1.8 (L)     Phosphorus 03/22/2024 3.5     Total Bilirubin 03/22/2024 0.21     Bilirubin, Direct 03/22/2024 0.06     Alkaline Phosphatase 03/22/2024 108 (H)     AST 03/22/2024 29     ALT 03/22/2024 29     Total Protein 03/22/2024 6.5     Albumin 03/22/2024 3.5     POC Glucose 03/22/2024 119     POC Glucose 03/22/2024 121     WBC 03/23/2024 5.40     RBC 03/23/2024 3.06 (L)     Hemoglobin 03/23/2024 9.7 (L)     Hematocrit 03/23/2024 29.4 (L)     MCV 03/23/2024 96     MCH 03/23/2024 31.7     MCHC 03/23/2024 33.0     RDW 03/23/2024 13.6     MPV 03/23/2024 8.4 (L)     Platelets 03/23/2024 169     nRBC 03/23/2024 0     Segmented % 03/23/2024 53     Immature Grans % 03/23/2024 0     Lymphocytes % 03/23/2024 39     Monocytes % 03/23/2024 6     Eosinophils Relative 03/23/2024 2     Basophils Relative 03/23/2024 0     Absolute Neutrophils 03/23/2024 2.86     Absolute Immature Grans 03/23/2024 0.02     Absolute Lymphocytes 03/23/2024 2.08      Absolute Monocytes 03/23/2024 0.31     Eosinophils Absolute 03/23/2024 0.11     Basophils Absolute 03/23/2024 0.02     Sodium 03/23/2024 139     Potassium 03/23/2024 4.2     Chloride 03/23/2024 106     CO2 03/23/2024 27     ANION GAP 03/23/2024 6     BUN 03/23/2024 17     Creatinine 03/23/2024 1.27     Glucose 03/23/2024 95     Calcium 03/23/2024 8.9     Corrected Calcium 03/23/2024 9.5     AST 03/23/2024 42 (H)     ALT 03/23/2024 33     Alkaline Phosphatase 03/23/2024 101     Total Protein 03/23/2024 6.5     Albumin 03/23/2024 3.3 (L)     Total Bilirubin 03/23/2024 0.37     eGFR 03/23/2024 40     Magnesium 03/23/2024 2.1     POC Glucose 03/23/2024 84     Ventricular Rate 03/22/2024 82     Atrial Rate 03/22/2024 82     [place] Interval 03/22/2024 140     QRSD Interval 03/22/2024 120     QT Interval 03/22/2024 396     QTC Interval 03/22/2024 462     P Axis 03/22/2024 50     QRS Axis 03/22/2024 17     T Wave [place] 03/22/2024 30     POC Glucose 03/23/2024 102     WBC 03/24/2024 4.84     RBC 03/24/2024 2.95 (L)     Hemoglobin 03/24/2024 9.4 (L)     Hematocrit 03/24/2024 28.4 (L)     MCV 03/24/2024 96     MCH 03/24/2024 31.9     MCHC 03/24/2024 33.1     RDW 03/24/2024 13.3     MPV 03/24/2024 8.7 (L)     Platelets 03/24/2024 177     nRBC 03/24/2024 0     Segmented % 03/24/2024 50     Immature Grans % 03/24/2024 1     Lymphocytes % 03/24/2024 41     Monocytes % 03/24/2024 6     Eosinophils Relative 03/24/2024 2     Basophils Relative 03/24/2024 0     Absolute Neutrophils 03/24/2024 2.49     Absolute Immature Grans 03/24/2024 0.03     Absolute Lymphocytes 03/24/2024 1.96     Absolute Monocytes 03/24/2024 0.27     Eosinophils Absolute 03/24/2024 0.08     Basophils Absolute 03/24/2024 0.01     Sodium 03/24/2024 139     Potassium 03/24/2024 4.4     Chloride 03/24/2024 105     CO2 03/24/2024 30     ANION GAP 03/24/2024 4     BUN 03/24/2024 18     Creatinine 03/24/2024 1.49 (H)     Glucose 03/24/2024 97     Calcium 03/24/2024 9.1      eGFR 03/24/2024 33     POC Glucose 03/24/2024 97     POC Glucose 03/25/2024 104     POC Glucose 03/25/2024 102     Salmonella sp PCR 03/25/2024 Negative     Shigella sp/Enteroinvasi* 03/25/2024 Negative     Campylobacter sp (jejuni* 03/25/2024 Negative     Shiga toxin 1/Shiga toxi* 03/25/2024 Negative     C.difficile toxin by PCR 03/25/2024 Negative     POC Glucose 03/25/2024 76     POC Glucose 03/25/2024 230 (H)     WBC 03/26/2024 4.66     RBC 03/26/2024 2.80 (L)     Hemoglobin 03/26/2024 8.8 (L)     Hematocrit 03/26/2024 26.9 (L)     MCV 03/26/2024 96     MCH 03/26/2024 31.4     MCHC 03/26/2024 32.7     RDW 03/26/2024 13.4     MPV 03/26/2024 8.5 (L)     Platelets 03/26/2024 150     nRBC 03/26/2024 0     Segmented % 03/26/2024 48     Immature Grans % 03/26/2024 0     Lymphocytes % 03/26/2024 43     Monocytes % 03/26/2024 6     Eosinophils Relative 03/26/2024 3     Basophils Relative 03/26/2024 0     Absolute Neutrophils 03/26/2024 2.24     Absolute Immature Grans 03/26/2024 0.02     Absolute Lymphocytes 03/26/2024 1.99     Absolute Monocytes 03/26/2024 0.28     Eosinophils Absolute 03/26/2024 0.12     Basophils Absolute 03/26/2024 0.01     Sodium 03/26/2024 138     Potassium 03/26/2024 4.1     Chloride 03/26/2024 106     CO2 03/26/2024 27     ANION GAP 03/26/2024 5     BUN 03/26/2024 14     Creatinine 03/26/2024 1.71 (H)     Glucose 03/26/2024 95     Calcium 03/26/2024 8.8     Corrected Calcium 03/26/2024 9.4     AST 03/26/2024 28     ALT 03/26/2024 22     Alkaline Phosphatase 03/26/2024 86     Total Protein 03/26/2024 6.2 (L)     Albumin 03/26/2024 3.2 (L)     Total Bilirubin 03/26/2024 0.28     eGFR 03/26/2024 28     Magnesium 03/26/2024 1.9        Suicide/Homicide Risk Assessment:    Risk of Harm to Self:  The following ratings are based on assessment at the time of the interview  Historical Risk Factors include: chronic psychiatric problems  Protective Factors: no current suicidal ideation, access to mental  health treatment, being a parent, compliant with medications, compliant with mental health treatment, connection to own children, having a desire to be alive, responsibilities and duties to others, stable living environment    Risk of Harm to Others:  The following ratings are based on assessment at the time of the interview  Historical Risk Factors include: none.  Protective Factors: no current homicidal ideation    The following interventions are recommended: contracts for safety at present - agrees to go to ED if feeling unsafe, contracts for safety at present - agrees to call Crisis Intervention Service if feeling unsafe. Although patient's acute lethality risk is LOW, long-term/chronic lethality risk is mildly elevated given historical mental health diagoses. However, at the current moment, Ira is future-oriented, forward-thinking, and demonstrates ability to act in a self-preserving manner as evidenced by volitionally presenting to the appointment today, seeking treatment. Additionally, Ira's responses suggest a will and desire to live. At this juncture, inpatient hospitalization is not currently warranted. To mitigate future risk, patient should adhere to treatment recommendations, avoid alcohol/illicit substance use, utilize community-based resources and familiar support, and prioritize mental health treatment.     DSM-V Diagnoses:     1.) MDD, recurrent, moderate  2.) WINDY  3.)     Assessment/Plan:     After discussion of risks, benefits, potential side effects, alternatives, we will increase sertraline to 100 mg daily.  Patient denies acute mental complaints or concerns at this time.      Today's Plan/Medical Decision Making:    Psychopharmacologically, I spoke at length with Ira about the bio-psycho-social approach to treatment and avenues for intervention. I stressed the importance of making better dietary choices, expanding exercise regimen, and reestablishing a sense of purpose and connectivity  in life. I also emphasized the importance of establishing care with the primary care physician along with specialists relevant to their medical diagnoses to which the patient voiced understanding and agreement.        Treatment Recommendations/Precautions:      Aware of 24 hour and weekend coverage for urgent situations accessed by calling [facility] main practice number    Patient voiced understanding and agreement to call 911 or head to the nearest emergency room should they experience any physical decompensation whatsoever including but not limited to the red flag signs and symptoms of fevers, chills, chest pains, nausea, vomiting, dizziness, changes in vision, trouble breathing.  Patient was also offered the contact information of their local [facility] crisis hotline and voiced understanding and agreement to call it or [phone] or head to nearest emergency department immediately should they experience any mental health decompensation whatsoever including but not limited to SI, HI, increasing AVH, jose.     Medications Risks/Benefits:      Risks, Benefits And Possible Side Effects Of Medications:    Risks, benefits, and possible side effects of medications explained to Ira and she verbalizes understanding and agreement for treatment.    Controlled Medication Discussion:     Not applicable - controlled prescriptions are not prescribed by this practice    Treatment Plan:    Completed and signed during the session: Not applicable - Treatment Plan to be completed by [facility] therapist      Visit Time    Visit Start Time: 10:30 AM  Visit Stop Time: 11:15 AM  Total Visit Duration:  45 minutes     The total visit duration detailed above includes: patient engagement, medication management, psychotherapy/counseling, discussion regarding treatment goals, documentation, review of past medical records, and coordination of care.      Note Share Disclaimer:     This note was not  shared with the patient due to reasonable likelihood of causing patient harm    Edward Hinkle DO  08/07/24

## 2024-08-06 NOTE — PSYCH
"Client Name: Ira Bonilla       Client YOB: 1945  : 1945    Treatment Team:     Psychiatrist: Edward Hinkle DO    Psychotherapist:  [facility] therapist      Healthcare Provider  Selvin Hu DO  [address]  [facility] [address]        Plan Type: adolescent/adult (14 and over) Initial    My Personal Strengths are (in the client's own words):  \"caring\"    The stressors and triggers that may put me at risk are:  Passing of daughter    Coping skills I can use to keep myself calm and safe:  Go for a drive, coloring, word puzzle,     Coping skills/supports I can use to maintain abstinence from substance use:   Not Applicable    The people that provide me with help and support: (Include name, contact, and how they can help)    Support Persons #1:   *Extended Emergency Contact Information  Primary Emergency Contact: Art Bonilla  Home Phone: [phone]  Relation: Child   *How they can help me? \"Take me to hospital\"        If it is an emergency and you need immediate help, call     If there is a possibility of danger to yourself or others, call the following crisis hotline resources:     Adult Crisis Numbers  Suicide Prevention Hotline - Dial   [facility]: [phone]  [facility]: [phone]  [facility]: [phone]  [facility]: [phone]  [facility]: [phone]  [facility]: [phone]  [facility]: [phone]  [facility]: [phone] (daytime).       [phone] (after hours, weekends, holidays)     Child/Adolescent Crisis Numbers   [facility]: [phone]   [facility]: [phone]   [facility]: [phone]   [facility]: [phone]    Please note: Some [facility] do not have a separate number for Child/Adolescent specific crisis. If your county is not listed under Child/Adolescent, please call the adult number for your county     National Talk to Text " Line   All Ages - [phone]    In the event your feelings become unmanageable, and you cannot reach your support system, you will call 911 immediately or go to the nearest hospital emergency room. If you have any physical or medical emergency or feel as if you are in physical/medical distress, call 911 immediately or go to the nearest hospital emergency room.

## 2024-08-14 ENCOUNTER — TELEMEDICINE (OUTPATIENT)
Dept: PSYCHIATRY | Facility: CLINIC | Age: 79
End: 2024-08-14
Payer: COMMERCIAL

## 2024-08-14 DIAGNOSIS — F32.1 CURRENT MODERATE EPISODE OF MAJOR DEPRESSIVE DISORDER, UNSPECIFIED WHETHER RECURRENT (HCC): Primary | ICD-10-CM

## 2024-08-14 DIAGNOSIS — F41.9 ANXIETY: ICD-10-CM

## 2024-08-14 PROCEDURE — 90834 PSYTX W PT 45 MINUTES: CPT

## 2024-08-14 NOTE — PSYCH
Virtual Regular Visit    Verification of patient location:    Patient is located at Home in the following state in which I hold an active license [place]      Assessment/Plan:    Problem List Items Addressed This Visit       Depression - Primary    Anxiety       Goals addressed in session: Goal 1          Reason for visit is No chief complaint on file.       Encounter provider Maria Ines Farfan LCSW      Recent Visits  No visits were found meeting these conditions.  Showing recent visits within past 7 days and meeting all other requirements  Today's Visits  Date Type Provider Dept   08/14/24 Telemedicine Maria Ines Farfan LCSW [facility]   Showing today's visits and meeting all other requirements  Future Appointments  No visits were found meeting these conditions.  Showing future appointments within next 150 days and meeting all other requirements       The patient was identified by name and date of birth. Ira Bonilla was informed that this is a telemedicine visit and that the visit is being conducted throughthe [employer] platform. She agrees to proceed..  My office door was closed. No one else was in the room.  She acknowledged consent and understanding of privacy and security of the video platform. The patient has agreed to participate and understands they can discontinue the visit at any time.    Patient is aware this is a billable service.     Subjective  Ira Bonilla is a 78 y.o. female  .      HPI     Past Medical History:   Diagnosis Date    Anxiety     Arthritis     Asthma     Cancer (HCC)     Candida infection, esophageal (HCC)     Chronic kidney disease     Chronic pain     COPD (chronic obstructive pulmonary disease) (HCC)     Depression     Depression     Gastroparesis     Gastroparesis     GERD (gastroesophageal reflux disease)     Hypertension     Irritable bowel syndrome (IBS)     Lactose intolerance Not sure    Migraines     MRSA (methicillin resistant  Staphylococcus aureus)     Multiple thyroid nodules     Osteoporosis     Pneumonia     Psychiatric disorder        Past Surgical History:   Procedure Laterality Date    BACK SURGERY       SECTION      CHOLECYSTECTOMY      COLONOSCOPY  10/09/2013    ESOPHAGEAL DILATION      2017 [facility]    ESOPHAGOGASTRODUODENOSCOPY N/A 2023    Procedure: ESOPHAGOGASTRODUODENOSCOPY (EGD);  Surgeon: Dontrell Mcmahon MD;  [facility];  Service: General    ESOPHAGOGASTRODUODENOSCOPY N/A 2024    Procedure: ESOPHAGOGASTRODUODENOSCOPY (EGD);  Surgeon: Shilpa Price MD;  [facility];  Service: Thoracic    HERNIA REPAIR  2013    HIATAL HERNIA REPAIR      NISSEN FUNDOPLICATION      [place] BIOPSY LIVER NEEDLE PERCUTANEOUS N/A 2023    Procedure: EXCISION BIOPSY LIVER;  Surgeon: Dontrell Mcmahon MD;  [facility];  Service: General    AZ ESOPHAGOGASTRODUODENOSCOPY TRANSORAL DIAGNOSTIC N/A 2019    Procedure: ESOPHAGOGASTRODUODENOSCOPY (EGD);  Surgeon: Geovanna Winn MD;  [facility];  Service: Gastroenterology    AZ ESOPHAGOGASTRODUODENOSCOPY TRANSORAL DIAGNOSTIC N/A 2023    Procedure: ESOPHAGOGASTRODUODENOSCOPY (EGD);  Surgeon: Shilpa Price MD;  [facility];  Service: Thoracic    AZ ESOPHAGOSCOPY FLEX BALLOON DILAT <30 MM DIAM N/A 2023    Procedure: CRE WIRE GUIDED BALLOON DILATATION ESOPHAGEAL;  Surgeon: Shilpa Price MD;  [facility];  Service: Thoracic    AZ ESOPHAGOSCOPY FLEX BALLOON DILAT <30 MM DIAM N/A 2024    Procedure: ESOPHAGOGASTRODUODENOSCOPY WITH BALLOON DILATION;  Surgeon: Shilpa Price MD;  [facility];  Service: Thoracic    AZ LAPS RPR PARAESPHGL HRNA INCL FUNDPLSTY W/O MESH N/A 2023    Procedure: robotic takedown of nissen fundoplication, redo hiatal hernia, partial fundoplication, with mesh;  Surgeon: Shilpa Price MD;  [facility];  Service: Thoracic    AZ PYLOROPLASTY  N/A 11/08/2023    Procedure: PYLOROPLASTY LAPAROSCOPIC WITH ROBOT;  Surgeon: Dontrell Mcmahon MD;  [facility];  Service: General    ROTATOR CUFF REPAIR      SHOULDER SURGERY      SPINAL FUSION      TUBAL LIGATION  May 1975    Last child born    UPPER GASTROINTESTINAL ENDOSCOPY      US GUIDED THYROID BIOPSY      WISDOM TOOTH EXTRACTION         Current Outpatient Medications   Medication Sig Dispense Refill    acetaminophen (TYLENOL) 160 mg/5 mL suspension Take 20.3 mL (650 mg total) by mouth every 6 (six) hours as needed for mild pain 355 mL 0    albuterol (PROVENTIL HFA,VENTOLIN HFA) 90 mcg/act inhaler Inhale 2 puffs if needed      amitriptyline (ELAVIL) 50 mg tablet TAKE 1 TABLET BY MOUTH EVERYDAY AT BEDTIME (Patient taking differently: Take 25 mg by mouth daily at bedtime) 90 tablet 1    amLODIPine (NORVASC) 10 mg tablet       cetirizine (ZyrTEC) 10 MG chewable tablet Chew 10 mg daily.      cholecalciferol (VITAMIN D3) 1,000 units tablet Take 5,000 Units by mouth daily      losartan (COZAAR) 25 mg tablet       metoclopramide (Reglan) 10 mg tablet Take 1 tablet (10 mg total) by mouth 4 (four) times a day 120 tablet 1    multivitamin (THERAGRAN) TABS Take 1 tablet by mouth daily      Omega-3 Fatty Acids (OMEGA 3 PO) Take by mouth in the morning      ondansetron (ZOFRAN) 4 mg tablet Take 1 tablet (4 mg total) by mouth every 8 (eight) hours as needed for nausea or vomiting 15 tablet 0    rizatriptan (MAXALT-MLT) 10 mg disintegrating tablet as needed for migraine headache      sertraline (ZOLOFT) 100 mg tablet Take 1 tablet (100 mg total) by mouth daily 30 tablet 1     No current facility-administered medications for this visit.        Allergies   Allergen Reactions    Latex Hives    Augmentin [Amoxicillin-Pot Clavulanate] GI Intolerance    Bactrim [Sulfamethoxazole-Trimethoprim] GI Intolerance    Clarithromycin GI Intolerance    Doxycycline Hives    Neomycin GI Intolerance    Polymyxin B GI Intolerance     Zyvox [Linezolid] GI Bleeding    Cephalosporins GI Intolerance    Nsaids GI Intolerance    Penicillins GI Intolerance     Patient can only take levaquin and cipro    Prednisone GI Intolerance    Sulphasomidine GI Intolerance       Review of Systems    Video Exam    There were no vitals filed for this visit.    Physical Exam     Behavioral Health Psychotherapy Progress Note    Psychotherapy Provided: Individual Psychotherapy     1. Current moderate episode of major depressive disorder, unspecified whether recurrent (HCC)        2. Anxiety            Goals addressed in session: Goal 1     DATA: Ira shared she feels tired a lot of the time.  She has a hard time describing the feelings she has physically.  She described it as a feeling of internal restlessness.  Therapist encouraged her to talk to her prescriber about this feeling. Therapist led rIa in body scan meditation to help her get more in touch with her body sensations.  She said she felt some tingling sensations but nothing much.  She shared some news about her family including that her grandson Dario has a new job at a [employer] place.  She talked about her struggle with her family and that she would like to get a part time job but her stomach is still upset and her back hurts so she feels she cannot get a job yet. Therapist encouraged her to get out and do some social activities to improve her mood.  She said she might want to go to grief groups.  Therapist encouraged this.  During this session, this clinician used the following therapeutic modalities: Bereavement Therapy and Client-centered Therapy    Substance Abuse was not addressed during this session. If the client is diagnosed with a co-occurring substance use disorder, please indicate any changes in the frequency or amount of use: . Stage of change for addressing substance use diagnoses: No substance use/Not applicable    ASSESSMENT:  Wendy Bonilla presents with a Euthymic/ normal mood.      "her affect is Normal range and intensity, which is congruent, with her mood and the content of the session. The client has made progress on their goals.     Wendy Bonilla presents with a none risk of suicide, none risk of self-harm, and none risk of harm to others.    For any risk assessment that surpasses a \"low\" rating, a safety plan must be developed.    A safety plan was indicated: no  If yes, describe in detail     PLAN: Between sessions, Wendy Bonilla will possibly attend grief groups online. At the next session, the therapist will use Bereavement Therapy and Client-centered Therapy to address grief reaction, depression.    Behavioral Health Treatment Plan and Discharge Planning: Wendy Bonilla is aware of and agrees to continue to work on their treatment plan. They have identified and are working toward their discharge goals. yes    Visit start and stop times:    08/14/24  Start Time: 1300  Stop Time: 1352  Total Visit Time: 52 minutes        "

## 2024-08-15 ENCOUNTER — NURSE TRIAGE (OUTPATIENT)
Age: 79
End: 2024-08-15

## 2024-08-15 NOTE — TELEPHONE ENCOUNTER
"  Last OV: 5/13/24   Last Colonoscopy:n/a  Last EGD: 6/27/24 incomplete         Next OV:n/a           Pt. Had incomplete EGD 6/27/24 due to food being in stomach, last 2 days she has been having diarrhea triggered by food, hyperactive bowels last evening and abdominal cramping, pt. Is taking Reglan  BID , pt. Has feeling of something stuck in chest when eating, pt. Is unsure what she should be doing and if EGD needs to be re-peated, please advise       Reason for Disposition   MILD-MODERATE diarrhea (e.g., 1-6 times / day more than normal)    Answer Assessment - Initial Assessment Questions  1. DIARRHEA SEVERITY: \"How bad is the diarrhea?\" \"How many extra stools have you had in the past 24 hours than normal?\"     - NO DIARRHEA (SCALE 0)    - MILD (SCALE 1-3): Few loose or mushy BMs; increase of 1-3 stools over normal daily number of stools; mild increase in ostomy output.    -  MODERATE (SCALE 4-7): Increase of 4-6 stools daily over normal; moderate increase in ostomy output.  * SEVERE (SCALE 8-10; OR 'WORST POSSIBLE'): Increase of 7 or more stools daily over normal; moderate increase in ostomy output; incontinence.      Moderate   2. ONSET: \"When did the diarrhea begin?\"       2 days   3. BM CONSISTENCY: \"How loose or watery is the diarrhea?\"       Loose watery   4. VOMITING: \"Are you also vomiting?\" If Yes, ask: \"How many times in the past 24 hours?\"       Denies   5. ABDOMINAL PAIN: \"Are you having any abdominal pain?\" If Yes, ask: \"What does it feel like?\" (e.g., crampy, dull, intermittent, constant)       Cramping mid abdomen   6. ABDOMINAL PAIN SEVERITY: If present, ask: \"How bad is the pain?\"  (e.g., Scale 1-10; mild, moderate, or severe)    - MILD (1-3): doesn't interfere with normal activities, abdomen soft and not tender to touch     - MODERATE (4-7): interferes with normal activities or awakens from sleep, tender to touch     - SEVERE (8-10): excruciating pain, doubled over, unable to do any normal " "activities        6/10  7. ORAL INTAKE: If vomiting, \"Have you been able to drink liquids?\" \"How much fluids have you had in the past 24 hours?\"      Denies, staying hydrated   8. HYDRATION: \"Any signs of dehydration?\" (e.g., dry mouth [not just dry lips], too weak to stand, dizziness, new weight loss) \"When did you last urinate?\"      Denies   9. EXPOSURE: \"Have you traveled to a foreign country recently?\" \"Have you been exposed to anyone with diarrhea?\" \"Could you have eaten any food that was spoiled?\"      Denies   10. ANTIBIOTIC USE: \"Are you taking antibiotics now or have you taken antibiotics in the past 2 months?\"        Denies   11. OTHER SYMPTOMS: \"Do you have any other symptoms?\" (e.g., fever, blood in stool)        Denies   12. PREGNANCY: \"Is there any chance you are pregnant?\" \"When was your last menstrual period?\"        Denies    Protocols used: Diarrhea-ADULT-[place]    "

## 2024-09-04 ENCOUNTER — TELEMEDICINE (OUTPATIENT)
Dept: PSYCHIATRY | Facility: CLINIC | Age: 79
End: 2024-09-04
Payer: COMMERCIAL

## 2024-09-04 DIAGNOSIS — F43.29 GRIEF REACTION WITH PROLONGED BEREAVEMENT: ICD-10-CM

## 2024-09-04 DIAGNOSIS — F41.9 ANXIETY: ICD-10-CM

## 2024-09-04 DIAGNOSIS — F32.0 CURRENT MILD EPISODE OF MAJOR DEPRESSIVE DISORDER, UNSPECIFIED WHETHER RECURRENT (HCC): Primary | ICD-10-CM

## 2024-09-04 PROCEDURE — 90834 PSYTX W PT 45 MINUTES: CPT

## 2024-09-04 NOTE — PSYCH
Virtual Regular Visit    Verification of patient location:    Patient is located at Home in the following state in which I hold an active license [place]      Assessment/Plan:    Problem List Items Addressed This Visit       Depression - Primary    Anxiety    Grief reaction with prolonged bereavement       Goals addressed in session: Goal 1          Reason for visit is No chief complaint on file.       Encounter provider Maria Ines Farfan LCSW      Recent Visits  No visits were found meeting these conditions.  Showing recent visits within past 7 days and meeting all other requirements  Today's Visits  Date Type Provider Dept   09/04/24 Telemedicine Maria Ines Farfan LCSW [facility]   Showing today's visits and meeting all other requirements  Future Appointments  No visits were found meeting these conditions.  Showing future appointments within next 150 days and meeting all other requirements       The patient was identified by name and date of birth. Ira Bonilla was informed that this is a telemedicine visit and that the visit is being conducted throughthe [employer] platform. She agrees to proceed..  My office door was closed. No one else was in the room.  She acknowledged consent and understanding of privacy and security of the video platform. The patient has agreed to participate and understands they can discontinue the visit at any time.    Patient is aware this is a billable service.     Subjective  Ira Bonilla is a 78 y.o. female  .      HPI     Past Medical History:   Diagnosis Date    Anxiety     Arthritis     Asthma     Cancer (HCC)     Candida infection, esophageal (HCC)     Chronic kidney disease     Chronic pain     COPD (chronic obstructive pulmonary disease) (HCC)     Depression     Depression     Gastroparesis     Gastroparesis     GERD (gastroesophageal reflux disease)     Hypertension     Irritable bowel syndrome (IBS)     Lactose intolerance Not sure     Migraines     MRSA (methicillin resistant Staphylococcus aureus)     Multiple thyroid nodules     Osteoporosis     Pneumonia     Psychiatric disorder        Past Surgical History:   Procedure Laterality Date    BACK SURGERY       SECTION      CHOLECYSTECTOMY      COLONOSCOPY  10/09/2013    ESOPHAGEAL DILATION      2017 [facility]    ESOPHAGOGASTRODUODENOSCOPY N/A 2023    Procedure: ESOPHAGOGASTRODUODENOSCOPY (EGD);  Surgeon: Dontrell Mcmahon MD;  [facility];  Service: General    ESOPHAGOGASTRODUODENOSCOPY N/A 2024    Procedure: ESOPHAGOGASTRODUODENOSCOPY (EGD);  Surgeon: Shilpa Price MD;  [facility];  Service: Thoracic    HERNIA REPAIR  2013    HIATAL HERNIA REPAIR      NISSEN FUNDOPLICATION      PA BIOPSY LIVER NEEDLE PERCUTANEOUS N/A 2023    Procedure: EXCISION BIOPSY LIVER;  Surgeon: Dontrell Mcmahon MD;  [facility];  Service: General    PA ESOPHAGOGASTRODUODENOSCOPY TRANSORAL DIAGNOSTIC N/A 2019    Procedure: ESOPHAGOGASTRODUODENOSCOPY (EGD);  Surgeon: Geovanna Winn MD;  [facility];  Service: Gastroenterology    PA ESOPHAGOGASTRODUODENOSCOPY TRANSORAL DIAGNOSTIC N/A 2023    Procedure: ESOPHAGOGASTRODUODENOSCOPY (EGD);  Surgeon: Shilpa Price MD;  [facility];  Service: Thoracic    PA ESOPHAGOSCOPY FLEX BALLOON DILAT <30 MM DIAM N/A 2023    Procedure: CRE WIRE GUIDED BALLOON DILATATION ESOPHAGEAL;  Surgeon: Shilpa Price MD;  [facility];  Service: Thoracic    PA ESOPHAGOSCOPY FLEX BALLOON DILAT <30 MM DIAM N/A 2024    Procedure: ESOPHAGOGASTRODUODENOSCOPY WITH BALLOON DILATION;  Surgeon: Shilpa Price MD;  [facility];  Service: Thoracic    PA LAPS RPR PARAESPHGL HRNA INCL FUNDPLSTY W/O MESH N/A 2023    Procedure: robotic takedown of nissen fundoplication, redo hiatal hernia, partial fundoplication, with mesh;  Surgeon: Shilpa Price MD;  [facility];  Service: Thoracic    LA PYLOROPLASTY N/A 11/08/2023    Procedure: PYLOROPLASTY LAPAROSCOPIC WITH ROBOT;  Surgeon: Dontrell Mcmahon MD;  [facility];  Service: General    ROTATOR CUFF REPAIR      SHOULDER SURGERY      SPINAL FUSION      TUBAL LIGATION  May 1975    Last child born    UPPER GASTROINTESTINAL ENDOSCOPY      US GUIDED THYROID BIOPSY      WISDOM TOOTH EXTRACTION         Current Outpatient Medications   Medication Sig Dispense Refill    acetaminophen (TYLENOL) 160 mg/5 mL suspension Take 20.3 mL (650 mg total) by mouth every 6 (six) hours as needed for mild pain 355 mL 0    albuterol (PROVENTIL HFA,VENTOLIN HFA) 90 mcg/act inhaler Inhale 2 puffs if needed      amitriptyline (ELAVIL) 50 mg tablet TAKE 1 TABLET BY MOUTH EVERYDAY AT BEDTIME (Patient taking differently: Take 25 mg by mouth daily at bedtime) 90 tablet 1    amLODIPine (NORVASC) 10 mg tablet       cetirizine (ZyrTEC) 10 MG chewable tablet Chew 10 mg daily.      cholecalciferol (VITAMIN D3) 1,000 units tablet Take 5,000 Units by mouth daily      losartan (COZAAR) 25 mg tablet       metoclopramide (Reglan) 10 mg tablet Take 1 tablet (10 mg total) by mouth 4 (four) times a day 120 tablet 1    multivitamin (THERAGRAN) TABS Take 1 tablet by mouth daily      Omega-3 Fatty Acids (OMEGA 3 PO) Take by mouth in the morning      ondansetron (ZOFRAN) 4 mg tablet Take 1 tablet (4 mg total) by mouth every 8 (eight) hours as needed for nausea or vomiting 15 tablet 0    rizatriptan (MAXALT-MLT) 10 mg disintegrating tablet as needed for migraine headache      sertraline (ZOLOFT) 100 mg tablet Take 1 tablet (100 mg total) by mouth daily 30 tablet 1     No current facility-administered medications for this visit.        Allergies   Allergen Reactions    Latex Hives    Augmentin [Amoxicillin-Pot Clavulanate] GI Intolerance    Bactrim [Sulfamethoxazole-Trimethoprim] GI Intolerance    Clarithromycin GI Intolerance    Doxycycline Hives    Neomycin GI  Intolerance    Polymyxin B GI Intolerance    Zyvox [Linezolid] GI Bleeding    Cephalosporins GI Intolerance    Nsaids GI Intolerance    Penicillins GI Intolerance     Patient can only take levaquin and cipro    Prednisone GI Intolerance    Sulphasomidine GI Intolerance       Review of Systems    Video Exam    There were no vitals filed for this visit.    Physical Exam     Behavioral Health Psychotherapy Progress Note    Psychotherapy Provided: Individual Psychotherapy     1. Current mild episode of major depressive disorder, unspecified whether recurrent (HCC)        2. Anxiety        3. Grief reaction with prolonged bereavement            Goals addressed in session: Goal 1     DATA: Wendy shared her head is feeling pain and also she is upset over her psychiatry appointment had been cancelled without telling her so she drove out for the appointment.  She said Dario does not like school and has a difficult time getting food from school because of the lines that there are there.  She shared she feels frustrated by her medication not working, and that she feels sometimes like she doesn't have much control or anything to do in her home because her daughter in law likes things done a certain way.  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  She openly communicated thoughts and feelings about getting a job and the pros and cons of different kinds of jobs she might get, and talked about not having much to do around the house and feeling useless.  She said she might try going to the [employer] [place] locally to see about socializing to see if she enjoys the experience.  Therapist encouraged her to do so.  During this session, this clinician used the following therapeutic modalities: Client-centered Therapy    Substance Abuse was not addressed during this session. If the client is diagnosed with a co-occurring substance use disorder, please indicate any changes in the  "frequency or amount of use: . Stage of change for addressing substance use diagnoses: No substance use/Not applicable    ASSESSMENT:  Wendy Bonilla presents with a Euthymic/ normal mood.     her affect is Normal range and intensity, which is congruent, with her mood and the content of the session. The client has made progress on their goals.     Wendy Bonilla presents with a none risk of suicide, none risk of self-harm, and none risk of harm to others.    For any risk assessment that surpasses a \"low\" rating, a safety plan must be developed.    A safety plan was indicated: no  If yes, describe in detail     PLAN: Between sessions, Wendy Bonilla will get into the community more to help her mood. At the next session, the therapist will use Client-centered Therapy to address depression.    Behavioral Health Treatment Plan and Discharge Planning: Wendy Bonilla is aware of and agrees to continue to work on their treatment plan. They have identified and are working toward their discharge goals. yes    Visit start and stop times:    09/04/24  Start Time: 1404  Stop Time: 1455  Total Visit Time: 51 minutes        "

## 2024-09-09 ENCOUNTER — NURSE TRIAGE (OUTPATIENT)
Age: 79
End: 2024-09-09

## 2024-09-09 NOTE — TELEPHONE ENCOUNTER
"Pt transferred to nurses line.     Reports she is having feeling of fullness, trouble swallowing solids again- discomfort feeling when food is going down, nausea and belching.     Over the weekend had pains in her stomach \"like my insides were coming out\" and had to have a BM urgently. She feels uncomfortable with pressure at top of stomach. She is eating small meals throughout the day   Taking reglan 4 x a day.     I advised liquid diet for 24 hours, continue reglan and follow up scheduled for October. Other recs?       Reason for Disposition   The patient has upper abdominal fullness >2 days    Answer Questions - Initial Assessment  How long have you had gastroparesis symptoms: chronic     How severe are your symptoms: nausea     Do you have any of the following symptoms: nausea and bloating    What are you eating and drinking; how much and how often:     How is your appetite and weight:     Do you have diabetes or any other medical condition:     What medications are you taking:     Have you changed or stopped any medications recently: no    Have you had any tests or treatment for Gastroparesis: yes    Do you have any signs of the following: none    Are you following a gastroparesis diet: yes    Protocols used: Gastroparesis    "

## 2024-09-12 ENCOUNTER — TELEPHONE (OUTPATIENT)
Dept: PSYCHIATRY | Facility: CLINIC | Age: 79
End: 2024-09-12

## 2024-09-16 ENCOUNTER — OFFICE VISIT (OUTPATIENT)
Dept: PSYCHIATRY | Facility: CLINIC | Age: 79
End: 2024-09-16
Payer: COMMERCIAL

## 2024-09-16 DIAGNOSIS — F32.1 CURRENT MODERATE EPISODE OF MAJOR DEPRESSIVE DISORDER WITHOUT PRIOR EPISODE (HCC): ICD-10-CM

## 2024-09-16 DIAGNOSIS — F41.1 GENERALIZED ANXIETY DISORDER: Primary | ICD-10-CM

## 2024-09-16 PROCEDURE — 99214 OFFICE O/P EST MOD 30 MIN: CPT | Performed by: STUDENT IN AN ORGANIZED HEALTH CARE EDUCATION/TRAINING PROGRAM

## 2024-09-16 RX ORDER — VENLAFAXINE HYDROCHLORIDE 75 MG/1
75 CAPSULE, EXTENDED RELEASE ORAL DAILY
Qty: 14 CAPSULE | Refills: 0 | Status: SHIPPED | OUTPATIENT
Start: 2024-09-16 | End: 2024-09-30

## 2024-09-19 NOTE — PSYCH
MEDICATION MANAGEMENT NOTE        [facility]      Name and Date of Birth:  Ira Bonilla 78 y.o. 1945 MRN: [id number]    Date of Visit: September 19, 2024    Reason for Visit: Follow-up visit for medication management       SUBJECTIVE:    Ira Bonilla is a 78 y.o. female with past psychiatric history significant for MDD, WINDY who was personally seen and evaluated today at the [facility] outpatient clinic for follow-up and medication management. Ira denies SI, HI, AVH, delusions, jose since her last appointment.  But continues to endorse significant feelings of depression and anxiety secondary to interpersonal conflicts with various family members.  After discussion of risks, benefits, potential side effects, alternatives, we will discontinue Zoloft due to lack of efficacy and initiate Effexor 75 mg daily with the intent to titrate upwards as tolerated and effective to better control mood symptoms.  She continues to meet regularly with her therapist        Current Rating Scores:     Current PHQ-9   PHQ-2/9 Depression Screening           Current WINDY-7 is .    Review Of Systems:      Constitutional negative   ENT negative   Cardiovascular negative   Respiratory negative   Gastrointestinal negative   Genitourinary negative   Musculoskeletal negative   Integumentary negative   Neurological negative   Endocrine negative   Other Symptoms none, all other systems are negative       Past Psychiatric History: (unchanged information from previous note copied and italicized) - Information that is bolded has been updated.     See intake    Substance Abuse History: (unchanged information from previous note copied and italicized) - Information that is bolded has been updated.     See intake    Social History: (unchanged information from previous note copied and italicized) - Information that is bolded has been updated.     See  intake    Traumatic History: (unchanged information from previous note copied and italicized) - Information that is bolded has been updated.     See intake      Past Medical History:    Past Medical History:   Diagnosis Date    Anxiety     Arthritis     Asthma     Cancer (HCC)     Candida infection, esophageal (HCC)     Chronic kidney disease     Chronic pain     COPD (chronic obstructive pulmonary disease) (HCC)     Depression     Depression     Gastroparesis     Gastroparesis     GERD (gastroesophageal reflux disease)     Hypertension     Irritable bowel syndrome (IBS)     Lactose intolerance Not sure    Migraines     MRSA (methicillin resistant Staphylococcus aureus)     Multiple thyroid nodules     Osteoporosis     Pneumonia     Psychiatric disorder         Past Surgical History:   Procedure Laterality Date    BACK SURGERY       SECTION      CHOLECYSTECTOMY      COLONOSCOPY  10/09/2013    ESOPHAGEAL DILATION      2017 [facility]    ESOPHAGOGASTRODUODENOSCOPY N/A 2023    Procedure: ESOPHAGOGASTRODUODENOSCOPY (EGD);  Surgeon: Dontrell Mcmahon MD;  [facility];  Service: General    ESOPHAGOGASTRODUODENOSCOPY N/A 2024    Procedure: ESOPHAGOGASTRODUODENOSCOPY (EGD);  Surgeon: Shilpa Price MD;  Location: [facility];  Service: Thoracic    HERNIA REPAIR  2013    HIATAL HERNIA REPAIR      NISSEN FUNDOPLICATION      [place] BIOPSY LIVER NEEDLE PERCUTANEOUS N/A 2023    Procedure: EXCISION BIOPSY LIVER;  Surgeon: Dontrell Mcmahon MD;  [facility];  Service: General    NY ESOPHAGOGASTRODUODENOSCOPY TRANSORAL DIAGNOSTIC N/A 2019    Procedure: ESOPHAGOGASTRODUODENOSCOPY (EGD);  Surgeon: Geovanna Winn MD;  Location: [facility];  Service: Gastroenterology    NY ESOPHAGOGASTRODUODENOSCOPY TRANSORAL DIAGNOSTIC N/A 2023    Procedure: ESOPHAGOGASTRODUODENOSCOPY (EGD);  Surgeon: Shilpa Price MD;  [facility];  Service: Thoracic    NY ESOPHAGOSCOPY FLEX BALLOON  DILAT <30 MM DIAM N/A 09/20/2023    Procedure: CRE WIRE GUIDED BALLOON DILATATION ESOPHAGEAL;  Surgeon: Shilpa Price MD;  [facility];  Service: Thoracic    AL ESOPHAGOSCOPY FLEX BALLOON DILAT <30 MM DIAM N/A 03/20/2024    Procedure: ESOPHAGOGASTRODUODENOSCOPY WITH BALLOON DILATION;  Surgeon: Shilpa Price MD;  [facility];  Service: Thoracic    AL LAPS RPR PARAESPHGL HRNA INCL FUNDPLSTY W/O MESH N/A 11/08/2023    Procedure: robotic takedown of nissen fundoplication, redo hiatal hernia, partial fundoplication, with mesh;  Surgeon: Shilpa Price MD;  [facility];  Service: Thoracic    AL PYLOROPLASTY N/A 11/08/2023    Procedure: PYLOROPLASTY LAPAROSCOPIC WITH ROBOT;  Surgeon: Dontrell Mcmahon MD;  [facility];  Service: General    ROTATOR CUFF REPAIR      SHOULDER SURGERY      SPINAL FUSION      TUBAL LIGATION  May 1975    Last child born    UPPER GASTROINTESTINAL ENDOSCOPY      US GUIDED THYROID BIOPSY      WISDOM TOOTH EXTRACTION       Allergies   Allergen Reactions    Latex Hives    Augmentin [Amoxicillin-Pot Clavulanate] GI Intolerance    Bactrim [Sulfamethoxazole-Trimethoprim] GI Intolerance    Clarithromycin GI Intolerance    Doxycycline Hives    Neomycin GI Intolerance    Polymyxin B GI Intolerance    Zyvox [Linezolid] GI Bleeding    Cephalosporins GI Intolerance    Nsaids GI Intolerance    Penicillins GI Intolerance     Patient can only take levaquin and cipro    Prednisone GI Intolerance    Sulphasomidine GI Intolerance       Substance Abuse History:    Social History     Substance and Sexual Activity   Alcohol Use Not Currently     Social History     Substance and Sexual Activity   Drug Use No       Social History:    Social History     Socioeconomic History    Marital status:      Spouse name: Not on file    Number of children: Not on file    Years of education: Not on file    Highest education level: Not on file   Occupational History    Not  on file   Tobacco Use    Smoking status: Former     Current packs/day: 0.00     Average packs/day: 1 pack/day for 15.0 years (15.0 ttl pk-yrs)     Types: Cigarettes     Start date: 10/5/1998     Quit date: 10/1/2003     Years since quittin.9    Smokeless tobacco: Former   Vaping Use    Vaping status: Never Used   Substance and Sexual Activity    Alcohol use: Not Currently    Drug use: No    Sexual activity: Not Currently     Partners: Male     Birth control/protection: None   Other Topics Concern    Not on file   Social History Narrative    Not on file     Social Determinants of Health     Financial Resource Strain: Not on file   Food Insecurity: No Food Insecurity (3/22/2024)    Hunger Vital Sign     Worried About Running Out of Food in the Last Year: Never true     Ran Out of Food in the Last Year: Never true   Transportation Needs: No Transportation Needs (3/22/2024)    PRAPARE - Transportation     Lack of Transportation (Medical): No     Lack of Transportation (Non-Medical): No   Physical Activity: Not on file   Stress: Not on file   Social Connections: Not on file   Intimate Partner Violence: Not on file   Housing Stability: Low Risk  (3/22/2024)    Housing Stability Vital Sign     Unable to Pay for Housing in the Last Year: No     Number of Times Moved in the Last Year: 1     Homeless in the Last Year: No       Family Psychiatric History:     Family History   Problem Relation Age of Onset    Other Mother         Esophageal disorder    Stomach cancer Father     Cancer Father     Hypertension Maternal Aunt     COPD Maternal Grandmother     Colon cancer Neg Hx     Colon polyps Neg Hx        History Review: The following portions of the patient's history were reviewed and updated as appropriate: allergies, current medications, past family history, past medical history, past social history, past surgical history, and problem list.         OBJECTIVE:     Vital signs in last 24 hours:    There were no vitals  filed for this visit.    Mental Status Evaluation:    Appearance age appropriate, casually dressed   Behavior cooperative, mildly anxious   Speech normal rate, normal volume, normal pitch   Mood dysphoric   Affect constricted   Thought Processes organized, goal directed   Associations intact associations   Thought Content no overt delusions   Perceptual Disturbances: no auditory hallucinations, no visual hallucinations   Abnormal Thoughts  Risk Potential Suicidal ideation - None at present  Homicidal ideation - None at present  Potential for aggression - Not at present   Orientation oriented to person, place, time/date, and situation   Memory recent and remote memory grossly intact   Consciousness alert and awake   Attention Span Concentration Span attention span and concentration are age appropriate   Intellect appears to be of average intelligence   Insight intact   Judgement intact   Muscle Strength and  Gait normal muscle strength and normal muscle tone, normal gait and normal balance   Motor activity no abnormal movements   Language no difficulty naming common objects, no difficulty repeating a phrase   Fund of Knowledge adequate knowledge of current events  adequate fund of knowledge regarding past history  adequate fund of knowledge regarding vocabulary    Pain none   Pain Scale Did not ask patient to formally rate       Laboratory Results: I have personally reviewed all pertinent laboratory/tests results    Recent Labs (last 2 months):   No visits with results within 2 Month(s) from this visit.   Latest known visit with results is:   Admission on 03/21/2024, Discharged on 03/26/2024   Component Date Value    WBC 03/22/2024 7.73     RBC 03/22/2024 3.29 (L)     Hemoglobin 03/22/2024 10.3 (L)     Hematocrit 03/22/2024 32.1 (L)     MCV 03/22/2024 98     MCH 03/22/2024 31.3     MCHC 03/22/2024 32.1     RDW 03/22/2024 13.6     MPV 03/22/2024 9.0     Platelets 03/22/2024 231     nRBC 03/22/2024 0     Segmented %  03/22/2024 52     Immature Grans % 03/22/2024 1     Lymphocytes % 03/22/2024 39     Monocytes % 03/22/2024 6     Eosinophils Relative 03/22/2024 2     Basophils Relative 03/22/2024 0     Absolute Neutrophils 03/22/2024 4.09     Absolute Immature Grans 03/22/2024 0.04     Absolute Lymphocytes 03/22/2024 2.99     Absolute Monocytes 03/22/2024 0.45     Eosinophils Absolute 03/22/2024 0.14     Basophils Absolute 03/22/2024 0.02     Sodium 03/22/2024 140     Potassium 03/22/2024 4.1     Chloride 03/22/2024 107     CO2 03/22/2024 26     ANION GAP 03/22/2024 7     BUN 03/22/2024 22     Creatinine 03/22/2024 1.50 (H)     Glucose 03/22/2024 190 (H)     Calcium 03/22/2024 9.3     AST 03/22/2024 38     ALT 03/22/2024 35     Alkaline Phosphatase 03/22/2024 123 (H)     Total Protein 03/22/2024 7.6     Albumin 03/22/2024 4.0     Total Bilirubin 03/22/2024 0.25     eGFR 03/22/2024 33     Lipase 03/22/2024 <6 (L)     hs TnI 0hr 03/22/2024 3     Sodium 03/22/2024 139     Potassium 03/22/2024 4.5     Chloride 03/22/2024 108     CO2 03/22/2024 25     ANION GAP 03/22/2024 6     BUN 03/22/2024 21     Creatinine 03/22/2024 1.37 (H)     Glucose 03/22/2024 105     Calcium 03/22/2024 8.6     eGFR 03/22/2024 37     Magnesium 03/22/2024 1.8 (L)     Phosphorus 03/22/2024 3.5     Total Bilirubin 03/22/2024 0.21     Bilirubin, Direct 03/22/2024 0.06     Alkaline Phosphatase 03/22/2024 108 (H)     AST 03/22/2024 29     ALT 03/22/2024 29     Total Protein 03/22/2024 6.5     Albumin 03/22/2024 3.5     POC Glucose 03/22/2024 119     POC Glucose 03/22/2024 121     WBC 03/23/2024 5.40     RBC 03/23/2024 3.06 (L)     Hemoglobin 03/23/2024 9.7 (L)     Hematocrit 03/23/2024 29.4 (L)     MCV 03/23/2024 96     MCH 03/23/2024 31.7     MCHC 03/23/2024 33.0     RDW 03/23/2024 13.6     MPV 03/23/2024 8.4 (L)     Platelets 03/23/2024 169     nRBC 03/23/2024 0     Segmented % 03/23/2024 53     Immature Grans % 03/23/2024 0     Lymphocytes % 03/23/2024 39      Monocytes % 03/23/2024 6     Eosinophils Relative 03/23/2024 2     Basophils Relative 03/23/2024 0     Absolute Neutrophils 03/23/2024 2.86     Absolute Immature Grans 03/23/2024 0.02     Absolute Lymphocytes 03/23/2024 2.08     Absolute Monocytes 03/23/2024 0.31     Eosinophils Absolute 03/23/2024 0.11     Basophils Absolute 03/23/2024 0.02     Sodium 03/23/2024 139     Potassium 03/23/2024 4.2     Chloride 03/23/2024 106     CO2 03/23/2024 27     ANION GAP 03/23/2024 6     BUN 03/23/2024 17     Creatinine 03/23/2024 1.27     Glucose 03/23/2024 95     Calcium 03/23/2024 8.9     Corrected Calcium 03/23/2024 9.5     AST 03/23/2024 42 (H)     ALT 03/23/2024 33     Alkaline Phosphatase 03/23/2024 101     Total Protein 03/23/2024 6.5     Albumin 03/23/2024 3.3 (L)     Total Bilirubin 03/23/2024 0.37     eGFR 03/23/2024 40     Magnesium 03/23/2024 2.1     POC Glucose 03/23/2024 84     Ventricular Rate 03/22/2024 82     Atrial Rate 03/22/2024 82     MT Interval 03/22/2024 140     QRSD Interval 03/22/2024 120     QT Interval 03/22/2024 396     QTC Interval 03/22/2024 462     P Axis 03/22/2024 50     QRS Axis 03/22/2024 17     T Wave [place] 03/22/2024 30     POC Glucose 03/23/2024 102     WBC 03/24/2024 4.84     RBC 03/24/2024 2.95 (L)     Hemoglobin 03/24/2024 9.4 (L)     Hematocrit 03/24/2024 28.4 (L)     MCV 03/24/2024 96     MCH 03/24/2024 31.9     MCHC 03/24/2024 33.1     RDW 03/24/2024 13.3     MPV 03/24/2024 8.7 (L)     Platelets 03/24/2024 177     nRBC 03/24/2024 0     Segmented % 03/24/2024 50     Immature Grans % 03/24/2024 1     Lymphocytes % 03/24/2024 41     Monocytes % 03/24/2024 6     Eosinophils Relative 03/24/2024 2     Basophils Relative 03/24/2024 0     Absolute Neutrophils 03/24/2024 2.49     Absolute Immature Grans 03/24/2024 0.03     Absolute Lymphocytes 03/24/2024 1.96     Absolute Monocytes 03/24/2024 0.27     Eosinophils Absolute 03/24/2024 0.08     Basophils Absolute 03/24/2024 0.01     Sodium  03/24/2024 139     Potassium 03/24/2024 4.4     Chloride 03/24/2024 105     CO2 03/24/2024 30     ANION GAP 03/24/2024 4     BUN 03/24/2024 18     Creatinine 03/24/2024 1.49 (H)     Glucose 03/24/2024 97     Calcium 03/24/2024 9.1     eGFR 03/24/2024 33     POC Glucose 03/24/2024 97     POC Glucose 03/25/2024 104     POC Glucose 03/25/2024 102     Salmonella sp PCR 03/25/2024 Negative     Shigella sp/Enteroinvasi* 03/25/2024 Negative     Campylobacter sp (jejuni* 03/25/2024 Negative     Shiga toxin 1/Shiga toxi* 03/25/2024 Negative     C.difficile toxin by PCR 03/25/2024 Negative     POC Glucose 03/25/2024 76     POC Glucose 03/25/2024 230 (H)     WBC 03/26/2024 4.66     RBC 03/26/2024 2.80 (L)     Hemoglobin 03/26/2024 8.8 (L)     Hematocrit 03/26/2024 26.9 (L)     MCV 03/26/2024 96     MCH 03/26/2024 31.4     MCHC 03/26/2024 32.7     RDW 03/26/2024 13.4     MPV 03/26/2024 8.5 (L)     Platelets 03/26/2024 150     nRBC 03/26/2024 0     Segmented % 03/26/2024 48     Immature Grans % 03/26/2024 0     Lymphocytes % 03/26/2024 43     Monocytes % 03/26/2024 6     Eosinophils Relative 03/26/2024 3     Basophils Relative 03/26/2024 0     Absolute Neutrophils 03/26/2024 2.24     Absolute Immature Grans 03/26/2024 0.02     Absolute Lymphocytes 03/26/2024 1.99     Absolute Monocytes 03/26/2024 0.28     Eosinophils Absolute 03/26/2024 0.12     Basophils Absolute 03/26/2024 0.01     Sodium 03/26/2024 138     Potassium 03/26/2024 4.1     Chloride 03/26/2024 106     CO2 03/26/2024 27     ANION GAP 03/26/2024 5     BUN 03/26/2024 14     Creatinine 03/26/2024 1.71 (H)     Glucose 03/26/2024 95     Calcium 03/26/2024 8.8     Corrected Calcium 03/26/2024 9.4     AST 03/26/2024 28     ALT 03/26/2024 22     Alkaline Phosphatase 03/26/2024 86     Total Protein 03/26/2024 6.2 (L)     Albumin 03/26/2024 3.2 (L)     Total Bilirubin 03/26/2024 0.28     eGFR 03/26/2024 28     Magnesium 03/26/2024 1.9        Suicide/Homicide Risk  Assessment:    Risk of Harm to Self:  The following ratings are based on assessment at the time of the interview  Historical Risk Factors include: chronic psychiatric problems  Protective Factors: no current suicidal ideation, access to mental health treatment, being a parent, compliant with medications, compliant with mental health treatment, connection to own children, having a desire to be alive, responsibilities and duties to others, stable living environment, strong relationships    Risk of Harm to Others:  The following ratings are based on assessment at the time of the interview  Historical Risk Factors include: none.  Protective Factors: no current homicidal ideation    The following interventions are recommended: contracts for safety at present - agrees to go to ED if feeling unsafe, contracts for safety at present - agrees to call Crisis Intervention Service if feeling unsafe      Lethality Statement:    Based on today's assessment and clinical criteria, Ira Bonilla contracts for safety and is not an imminent risk of harm to self or others. Outpatient level of care is deemed appropriate at this current time. Ira understands that if they can no longer contract for safety, they need to call the office or report to their nearest Emergency Room for immediate evaluation. They voiced understanding and agreement to call 911 or head to the nearest ED should they have any physical or mental decompensation whatsoever.       Assessment/Plan:     1.) MDD, recurrent, moderate  2.) WINDY  3.)      After discussion of risks, benefits, potential side effects, alternatives, we will discontinue Zoloft and initiate venlafaxine 75 mg daily due to lack of efficacy for Zoloft.  Patient will continue to meet regularly with her therapist.  She denies acute mental complaints or concerns at this time          Aware of 24 hour and weekend coverage for urgent situations accessed by calling [facility]  main practice number    Medications Risks/Benefits      Risks, Benefits And Possible Side Effects Of Medications:    Risks, benefits, and possible side effects of medications explained to Ira and she verbalizes understanding and agreement for treatment.    Controlled Medication Discussion:     Not applicable - controlled prescriptions are not prescribed by this practice    Psychotherapy Provided:     Individual psychotherapy provided: Crisis/safety plan discussed with Ira.     Treatment Plan:    Completed and signed during the session: Not applicable - Treatment Plan to be completed by [facility] therapist      Visit Time    Visit Start Time: 2:30 PM  Visit Stop Time: 2:50 PM  Total Visit Duration:  20 minutes     The total visit duration detailed above includes: patient engagement, medication management, psychotherapy/counseling, discussion regarding treatment goals, documentation, review of past medical records, and coordination of care.      Note Share Disclaimer:     This note was not shared with the patient due to reasonable likelihood of causing patient harm      Edward Hinkle DO  Psychiatry  09/19/24

## 2024-10-02 ENCOUNTER — TELEPHONE (OUTPATIENT)
Dept: PSYCHIATRY | Facility: CLINIC | Age: 79
End: 2024-10-02

## 2024-10-02 ENCOUNTER — TELEMEDICINE (OUTPATIENT)
Dept: PSYCHIATRY | Facility: CLINIC | Age: 79
End: 2024-10-02
Payer: COMMERCIAL

## 2024-10-02 DIAGNOSIS — F41.9 ANXIETY: ICD-10-CM

## 2024-10-02 DIAGNOSIS — F43.29 GRIEF REACTION WITH PROLONGED BEREAVEMENT: Primary | ICD-10-CM

## 2024-10-02 DIAGNOSIS — F32.1 CURRENT MODERATE EPISODE OF MAJOR DEPRESSIVE DISORDER WITHOUT PRIOR EPISODE (HCC): ICD-10-CM

## 2024-10-02 PROCEDURE — 90834 PSYTX W PT 45 MINUTES: CPT

## 2024-10-02 NOTE — PSYCH
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       Encounter provider Maria Ines Farfan LCSW      Recent Visits  No visits were found meeting these conditions.  Showing recent visits within past 7 days and meeting all other requirements  Today's Visits  Date Type Provider Dept   10/02/24 Telephone Maria Ines Farfan LCSW [facility]   10/02/24 Telemedicine Maria Ines Farfan LCSW [facility]   Showing today's visits and meeting all other requirements  Future Appointments  No visits were found meeting these conditions.  Showing future appointments within next 150 days and meeting all other requirements       The patient was identified by name and date of birth. Ira Bonilla was informed that this is a telemedicine visit and that the visit is being conducted throughthe [employer] platform. She agrees to proceed..  My office door was closed. No one else was in the room.  She acknowledged consent and understanding of privacy and security of the video platform. The patient has agreed to participate and understands they can discontinue the visit at any time.    Patient is aware this is a billable service.     Subjective  Ira Bonilla is a 78 y.o. female  .      HPI     Past Medical History:   Diagnosis Date    Anxiety     Arthritis     Asthma     Cancer (HCC)     Candida infection, esophageal (HCC)     Chronic kidney disease     Chronic pain     COPD (chronic obstructive pulmonary disease) (HCC)     Depression     Depression     Gastroparesis     Gastroparesis     GERD (gastroesophageal reflux disease)      Hypertension     Irritable bowel syndrome (IBS)     Lactose intolerance Not sure    Migraines     MRSA (methicillin resistant Staphylococcus aureus)     Multiple thyroid nodules     Osteoporosis     Pneumonia     Psychiatric disorder        Past Surgical History:   Procedure Laterality Date    BACK SURGERY       SECTION      CHOLECYSTECTOMY      COLONOSCOPY  10/09/2013    ESOPHAGEAL DILATION       [facility]    ESOPHAGOGASTRODUODENOSCOPY N/A 2023    Procedure: ESOPHAGOGASTRODUODENOSCOPY (EGD);  Surgeon: Dontrell Mcmahon MD;  [facility];  Service: General    ESOPHAGOGASTRODUODENOSCOPY N/A 2024    Procedure: ESOPHAGOGASTRODUODENOSCOPY (EGD);  Surgeon: Shilpa Price MD;  [facility];  Service: Thoracic    HERNIA REPAIR  2013    HIATAL HERNIA REPAIR      NISSEN FUNDOPLICATION      MO BIOPSY LIVER NEEDLE PERCUTANEOUS N/A 2023    Procedure: EXCISION BIOPSY LIVER;  Surgeon: Dontrell Mcmahon MD;  [facility];  Service: General    MO ESOPHAGOGASTRODUODENOSCOPY TRANSORAL DIAGNOSTIC N/A 2019    Procedure: ESOPHAGOGASTRODUODENOSCOPY (EGD);  Surgeon: Geovanna Winn MD;  [facility];  Service: Gastroenterology    MO ESOPHAGOGASTRODUODENOSCOPY TRANSORAL DIAGNOSTIC N/A 2023    Procedure: ESOPHAGOGASTRODUODENOSCOPY (EGD);  Surgeon: Shilpa Price MD;  [facility];  Service: Thoracic    MO ESOPHAGOSCOPY FLEX BALLOON DILAT <30 MM DIAM N/A 2023    Procedure: CRE WIRE GUIDED BALLOON DILATATION ESOPHAGEAL;  Surgeon: Shilpa Price MD;  [facility];  Service: Thoracic    MO ESOPHAGOSCOPY FLEX BALLOON DILAT <30 MM DIAM N/A 2024    Procedure: ESOPHAGOGASTRODUODENOSCOPY WITH BALLOON DILATION;  Surgeon: Shilpa Price MD;  [facility];  Service: Thoracic    MO LAPS RPR PARAESPHGL HRNA INCL FUNDPLSTY W/O MESH N/A 2023    Procedure: robotic takedown of nissen fundoplication, redo hiatal hernia, partial  fundoplication, with mesh;  Surgeon: Shilpa Price MD;  [facility];  Service: Thoracic    TX PYLOROPLASTY N/A 11/08/2023    Procedure: PYLOROPLASTY LAPAROSCOPIC WITH ROBOT;  Surgeon: Dontrell Mcmahon MD;  [facility];  Service: General    ROTATOR CUFF REPAIR      SHOULDER SURGERY      SPINAL FUSION      TUBAL LIGATION  May 1975    Last child born    UPPER GASTROINTESTINAL ENDOSCOPY      US GUIDED THYROID BIOPSY      WISDOM TOOTH EXTRACTION         Current Outpatient Medications   Medication Sig Dispense Refill    acetaminophen (TYLENOL) 160 mg/5 mL suspension Take 20.3 mL (650 mg total) by mouth every 6 (six) hours as needed for mild pain 355 mL 0    albuterol (PROVENTIL HFA,VENTOLIN HFA) 90 mcg/act inhaler Inhale 2 puffs if needed      amLODIPine (NORVASC) 10 mg tablet       cetirizine (ZyrTEC) 10 MG chewable tablet Chew 10 mg daily.      cholecalciferol (VITAMIN D3) 1,000 units tablet Take 5,000 Units by mouth daily      losartan (COZAAR) 25 mg tablet       metoclopramide (Reglan) 10 mg tablet Take 1 tablet (10 mg total) by mouth 4 (four) times a day 120 tablet 1    multivitamin (THERAGRAN) TABS Take 1 tablet by mouth daily      Omega-3 Fatty Acids (OMEGA 3 PO) Take by mouth in the morning      ondansetron (ZOFRAN) 4 mg tablet Take 1 tablet (4 mg total) by mouth every 8 (eight) hours as needed for nausea or vomiting 15 tablet 0    rizatriptan (MAXALT-MLT) 10 mg disintegrating tablet as needed for migraine headache      venlafaxine (EFFEXOR-XR) 75 mg 24 hr capsule Take 1 capsule (75 mg total) by mouth daily for 14 days 14 capsule 0     No current facility-administered medications for this visit.        Allergies   Allergen Reactions    Latex Hives    Augmentin [Amoxicillin-Pot Clavulanate] GI Intolerance    Bactrim [Sulfamethoxazole-Trimethoprim] GI Intolerance    Clarithromycin GI Intolerance    Doxycycline Hives    Neomycin GI Intolerance    Polymyxin B GI Intolerance    Zyvox [Linezolid]  GI Bleeding    Cephalosporins GI Intolerance    Nsaids GI Intolerance    Penicillins GI Intolerance     Patient can only take levaquin and cipro    Prednisone GI Intolerance    Sulphasomidine GI Intolerance       Review of Systems    Video Exam    There were no vitals filed for this visit.    Physical Exam     Behavioral Health Psychotherapy Progress Note    Psychotherapy Provided: Individual Psychotherapy     1. Grief reaction with prolonged bereavement        2. Current moderate episode of major depressive disorder without prior episode (HCC)        3. Anxiety            Goals addressed in session: Goal 1     DATA:  Wendy and therapist had difficulty with connecting virtually and started session at 3:12pm.  She shared she started a new depression medication and has not noticed a difference yet.  She has said she doesn't want to wake up in the morning because every day is the same.  She is helping a friend make jump ropes from tshirts which she said is fun.  She talked about having stomach pains and other gastric symptoms and does not know what can be done to improve her symptoms.  She also has restless leg syndrome and shaking hands recently.  Therapist encouraged her to communicate these symptoms to her psychiatrist and other doctors to see if it might be a side effect of a medication.  She said she is feeling bored because of things being the same every day.  Therapist encouraged her to schedule activities into her week to create some interest and she said she might go to the [employer] or do some other activity. She expressed feelings of frustration and hurt over her daughter in laws perfectionism.   During this session, this clinician used the following therapeutic modalities: Client-centered Therapy and Supportive Psychotherapy    Substance Abuse was not addressed during this session. If the client is diagnosed with a co-occurring substance use disorder, please indicate any changes in the frequency or amount  "of use: . Stage of change for addressing substance use diagnoses: No substance use/Not applicable    ASSESSMENT:  Wendy Bonilla presents with a Euthymic/ normal mood.     her affect is Normal range and intensity, which is congruent, with her mood and the content of the session. The client has made progress on their goals.     Wendy Bonilla presents with a none risk of suicide, none risk of self-harm, and none risk of harm to others.    For any risk assessment that surpasses a \"low\" rating, a safety plan must be developed.    A safety plan was indicated: no  If yes, describe in detail     PLAN: Between sessions, Wendy Bonilla will get out to do activities in the community, talk to her doctor about her restlessness. At the next session, the therapist will use Client-centered Therapy and Supportive Psychotherapy to address depression.    Behavioral Health Treatment Plan and Discharge Planning: Wendy Bonilla is aware of and agrees to continue to work on their treatment plan. They have identified and are working toward their discharge goals. yes    Visit start and stop times:    10/02/24  Start Time: 1512  Stop Time: 1555  Total Visit Time: 43 minutes        "

## 2024-10-03 ENCOUNTER — TELEPHONE (OUTPATIENT)
Dept: GASTROENTEROLOGY | Facility: CLINIC | Age: 79
End: 2024-10-03

## 2024-10-03 ENCOUNTER — OFFICE VISIT (OUTPATIENT)
Dept: GASTROENTEROLOGY | Facility: CLINIC | Age: 79
End: 2024-10-03
Payer: COMMERCIAL

## 2024-10-03 VITALS
DIASTOLIC BLOOD PRESSURE: 72 MMHG | BODY MASS INDEX: 22.38 KG/M2 | WEIGHT: 114 LBS | SYSTOLIC BLOOD PRESSURE: 120 MMHG | HEIGHT: 60 IN

## 2024-10-03 DIAGNOSIS — K59.09 CHRONIC CONSTIPATION WITH OVERFLOW: ICD-10-CM

## 2024-10-03 DIAGNOSIS — R13.19 ESOPHAGEAL DYSPHAGIA: ICD-10-CM

## 2024-10-03 DIAGNOSIS — K31.84 GASTROPARESIS: Primary | ICD-10-CM

## 2024-10-03 PROCEDURE — 99214 OFFICE O/P EST MOD 30 MIN: CPT | Performed by: INTERNAL MEDICINE

## 2024-10-03 PROCEDURE — G2211 COMPLEX E/M VISIT ADD ON: HCPCS | Performed by: INTERNAL MEDICINE

## 2024-10-03 RX ORDER — SPIRONOLACTONE 25 MG/1
TABLET ORAL
COMMUNITY
Start: 2024-07-17

## 2024-10-03 NOTE — PROGRESS NOTES
[facility] - Outpatient Follow-up Note  Ira Bonilla 78 y.o. female MRN: [id number]  Encounter: [id number]    ASSESSMENT AND PLAN:      1. Gastroparesis  Seemingly improved on Reglan, as she seems a lot better and her chief complaints are now lower.  I will continue Reglan and keep her on a clear liquid diet the day before her upcoming endoscopy  - EGD; Future    2. Chronic constipation with overflow  This is not her main complaint.  She never feels evacuated and alternates from small hard stool to diarrhea with urgency, especially after meals.  Looking back in her chart, she has she has not ever been on anything for constipation.  I will proceed with prescribing Linzess  - linaCLOtide 290 MCG CAPS; Take 1 capsule by mouth in the morning  Dispense: 30 capsule; Refill: 1    3. Esophageal dysphagia  Seemingly improved but still intermittently gets food stuck.  I did not get a chance to dilate her during her last endoscopy because of the food bolus and risk of aspiration.  I will repeat the endoscopy now  - EGD; Future      Follow up appointment: For upper endoscopy    _______________________      Chief Complaint   Patient presents with    Follow-up       HPI:   Patient is a 78 y.o. female with a significant PMH of failed Nissen fundoplication and gastroparesis with recent fundoplication redo and pyloroplasty presenting for follow up regarding continued issues with constipation and overflow diarrhea and esophageal dysphagia.  She states that generally her upper symptoms are improved but does occasionally get things stuck in her epigastrium and has to rest them back up.  She does think the Reglan 4 times a day is helping her, but her main complaint is diarrhea and the feeling of incomplete evacuation.  Even after a Clen Piq colonoscopy prep, nothing but liquid came out.    Historical Information   Past Medical History:   Diagnosis Date    Anxiety     Arthritis     Asthma      Cancer (HCC)     Candida infection, esophageal (HCC)     Chronic kidney disease     Chronic pain     COPD (chronic obstructive pulmonary disease) (HCC)     Depression     Depression     Gastroparesis     Gastroparesis     GERD (gastroesophageal reflux disease)     Hypertension     Irritable bowel syndrome (IBS)     Lactose intolerance Not sure    Migraines     MRSA (methicillin resistant Staphylococcus aureus)     Multiple thyroid nodules     Osteoporosis     Pneumonia     Psychiatric disorder      Past Surgical History:   Procedure Laterality Date    BACK SURGERY       SECTION      CHOLECYSTECTOMY      COLONOSCOPY  10/09/2013    ESOPHAGEAL DILATION       [facility]    ESOPHAGOGASTRODUODENOSCOPY N/A 2023    Procedure: ESOPHAGOGASTRODUODENOSCOPY (EGD);  Surgeon: Dontrell Mcmahon MD;  [facility];  Service: General    ESOPHAGOGASTRODUODENOSCOPY N/A 2024    Procedure: ESOPHAGOGASTRODUODENOSCOPY (EGD);  Surgeon: Shilpa Price MD;  [facility];  Service: Thoracic    HERNIA REPAIR  2013    HIATAL HERNIA REPAIR      NISSEN FUNDOPLICATION      DE BIOPSY LIVER NEEDLE PERCUTANEOUS N/A 2023    Procedure: EXCISION BIOPSY LIVER;  Surgeon: Dontrell Mcmahon MD;  [facility];  Service: General    DE ESOPHAGOGASTRODUODENOSCOPY TRANSORAL DIAGNOSTIC N/A 2019    Procedure: ESOPHAGOGASTRODUODENOSCOPY (EGD);  Surgeon: Geovanna Winn MD;  [facility];  Service: Gastroenterology    DE ESOPHAGOGASTRODUODENOSCOPY TRANSORAL DIAGNOSTIC N/A 2023    Procedure: ESOPHAGOGASTRODUODENOSCOPY (EGD);  Surgeon: Shilpa Price MD;  [facility];  Service: Thoracic    DE ESOPHAGOSCOPY FLEX BALLOON DILAT <30 MM DIAM N/A 2023    Procedure: CRE WIRE GUIDED BALLOON DILATATION ESOPHAGEAL;  Surgeon: Shilpa Price MD;  [facility];  Service: Thoracic    DE ESOPHAGOSCOPY FLEX BALLOON DILAT <30 MM DIAM N/A 2024    Procedure:  ESOPHAGOGASTRODUODENOSCOPY WITH BALLOON DILATION;  Surgeon: Shilpa Price MD;  [facility];  Service: Thoracic    RI LAPS RPR PARAESPHGL HRNA INCL FUNDPLSTY W/O MESH N/A 2023    Procedure: robotic takedown of nissen fundoplication, redo hiatal hernia, partial fundoplication, with mesh;  Surgeon: Shilpa Price MD;  [facility];  Service: Thoracic    RI PYLOROPLASTY N/A 2023    Procedure: PYLOROPLASTY LAPAROSCOPIC WITH ROBOT;  Surgeon: Dontrell Mcmahon MD;  [facility];  Service: General    ROTATOR CUFF REPAIR      SHOULDER SURGERY      SPINAL FUSION      TUBAL LIGATION  May 1975    Last child born    UPPER GASTROINTESTINAL ENDOSCOPY      US GUIDED THYROID BIOPSY      WISDOM TOOTH EXTRACTION       Social History     Substance and Sexual Activity   Alcohol Use Not Currently     Social History     Substance and Sexual Activity   Drug Use No     Social History     Tobacco Use   Smoking Status Former    Current packs/day: 0.00    Average packs/day: 1 pack/day for 15.0 years (15.0 ttl pk-yrs)    Types: Cigarettes    Start date: 10/5/1998    Quit date: 10/1/2003    Years since quittin.0   Smokeless Tobacco Former     Family History   Problem Relation Age of Onset    Other Mother         Esophageal disorder    Stomach cancer Father     Cancer Father     Hypertension Maternal Aunt     COPD Maternal Grandmother     Colon cancer Neg Hx     Colon polyps Neg Hx          Current Outpatient Medications:     acetaminophen (TYLENOL) 160 mg/5 mL suspension    albuterol (PROVENTIL HFA,VENTOLIN HFA) 90 mcg/act inhaler    amLODIPine (NORVASC) 10 mg tablet    cetirizine (ZyrTEC) 10 MG chewable tablet    cholecalciferol (VITAMIN D3) 1,000 units tablet    linaCLOtide 290 MCG CAPS    metoclopramide (Reglan) 10 mg tablet    multivitamin (THERAGRAN) TABS    Omega-3 Fatty Acids (OMEGA 3 PO)    ondansetron (ZOFRAN) 4 mg tablet    rizatriptan (MAXALT-MLT) 10 mg disintegrating tablet     spironolactone (ALDACTONE) 25 mg tablet    venlafaxine (EFFEXOR-XR) 75 mg 24 hr capsule    losartan (COZAAR) 25 mg tablet  Allergies   Allergen Reactions    Latex Hives    Augmentin [Amoxicillin-Pot Clavulanate] GI Intolerance    Bactrim [Sulfamethoxazole-Trimethoprim] GI Intolerance    Clarithromycin GI Intolerance    Doxycycline Hives    Neomycin GI Intolerance    Polymyxin B GI Intolerance    Zyvox [Linezolid] GI Bleeding    Cephalosporins GI Intolerance    Nsaids GI Intolerance    Penicillins GI Intolerance     Patient can only take levaquin and cipro    Prednisone GI Intolerance    Sulphasomidine GI Intolerance     Reviewed medications and allergies and updated as indicated    PHYSICAL EXAM:    Blood pressure 120/72, height 5' (1.524 m), weight 51.7 kg (114 lb), not currently breastfeeding. Body mass index is 22.26 kg/m².  General Appearance: NAD, cooperative, alert  Eyes: Anicteric  GI:  Soft, non-tender, non-distended; normal bowel sounds; no masses, no organomegaly   Rectal: Deferred  Musculoskeletal: No edema.  Skin:  No jaundice    Lab Results:   Lab Results   Component Value Date    WBC 4.66 03/26/2024    WBC 4.84 03/24/2024    WBC 5.40 03/23/2024    HGB 8.8 (L) 03/26/2024    HGB 9.4 (L) 03/24/2024    HGB 9.7 (L) 03/23/2024    MCV 96 03/26/2024     03/26/2024     03/24/2024     03/23/2024     Lab Results   Component Value Date     04/09/2015    K 4.1 03/26/2024     03/26/2024    CO2 27 03/26/2024    ANIONGAP 7 04/09/2015    BUN 14 03/26/2024    CREATININE 1.71 (H) 03/26/2024    GLUCOSE 110 04/09/2015    GLUF 74 10/27/2023    CALCIUM 8.8 03/26/2024    CORRECTEDCA 9.4 03/26/2024    AST 28 03/26/2024    AST 42 (H) 03/23/2024    AST 29 03/22/2024    ALT 22 03/26/2024    ALT 33 03/23/2024    ALT 29 03/22/2024    ALKPHOS 86 03/26/2024    ALKPHOS 101 03/23/2024    ALKPHOS 108 (H) 03/22/2024    PROT 7.2 02/05/2015    BILITOT 0.4 02/05/2015    BILITOT 0.4 06/24/2014    EGFR 28  03/26/2024     Lab Results   Component Value Date    IRON 44 (L) 11/10/2023    TIBC 249 (L) 11/10/2023    FERRITIN 384 (H) 11/10/2023     Lab Results   Component Value Date    LIPASE <6 (L) 03/22/2024       Radiology Results:   No results found.

## 2024-10-03 NOTE — TELEPHONE ENCOUNTER
Scheduled date of EGD (as of today):  11-  Physician performing colonoscopy:  EFRA EWING  Location of colonoscopy:  [facility]  Instructions reviewed with patient by:  ML  Clearances: NA-SHE IS TO CLEAR LIQUIDS THE FULL DAY BEFORE PROCEDURE

## 2024-10-15 ENCOUNTER — OFFICE VISIT (OUTPATIENT)
Dept: PSYCHIATRY | Facility: CLINIC | Age: 79
End: 2024-10-15
Payer: COMMERCIAL

## 2024-10-15 DIAGNOSIS — F32.1 CURRENT MODERATE EPISODE OF MAJOR DEPRESSIVE DISORDER WITHOUT PRIOR EPISODE (HCC): Primary | ICD-10-CM

## 2024-10-15 DIAGNOSIS — F41.1 GENERALIZED ANXIETY DISORDER: ICD-10-CM

## 2024-10-15 PROCEDURE — 99214 OFFICE O/P EST MOD 30 MIN: CPT | Performed by: STUDENT IN AN ORGANIZED HEALTH CARE EDUCATION/TRAINING PROGRAM

## 2024-10-15 RX ORDER — VILAZODONE HYDROCHLORIDE 10 MG/1
10 TABLET ORAL
Qty: 14 TABLET | Refills: 0 | Status: SHIPPED | OUTPATIENT
Start: 2024-10-15 | End: 2024-10-29

## 2024-10-23 NOTE — PSYCH
MEDICATION MANAGEMENT NOTE        [facility]      Name and Date of Birth:  Ira Bonilla 79 y.o. 1945 MRN: [id number]    Date of Visit: 2024    Reason for Visit: Follow-up visit for medication management       SUBJECTIVE:    Ira Bonilla is a 79 y.o. female with past psychiatric history significant for MDD, WINDY who was personally seen and evaluated today at the [facility] outpatient clinic for follow-up and medication management. Ira denies SI, HI, AVH, delusions, jose since her last appointment.  But continues to endorse significant feelings of depression and anxiety secondary to interpersonal conflicts with various family members.  After discussion of risks, benefits, potential side effects, alternatives, we will discontinue Effexor due to lack of efficacy and poor tolerance and initiate Viibryd.  This was chosen due to its greater potential for anxiolytic properties and patient's failure on SSRI and SNRI class of medications.  She denies acute mental complaints or concerns at this time.        Current Rating Scores:     Current PHQ-9   PHQ-2/9 Depression Screening    Little interest or pleasure in doing things: 1 - several days  Feeling down, depressed, or hopeless: 1 - several days  Trouble falling or staying asleep, or sleeping too much: 0 - not at all  Feeling tired or having little energy: 1 - several days  Poor appetite or overeatin - not at all  Feeling bad about yourself - or that you are a failure or have let yourself or your family down: 1 - several days  Trouble concentrating on things, such as reading the newspaper or watching television: 1 - several days  Moving or speaking so slowly that other people could have noticed. Or the opposite - being so fidgety or restless that you have been moving around a lot more than usual: 0 - not at all       Current WINDY-7 is .    Review Of Systems:       Constitutional negative   ENT negative   Cardiovascular negative   Respiratory negative   Gastrointestinal negative   Genitourinary negative   Musculoskeletal negative   Integumentary negative   Neurological negative   Endocrine negative   Other Symptoms none, all other systems are negative       Past Psychiatric History: (unchanged information from previous note copied and italicized) - Information that is bolded has been updated.     See intake    Substance Abuse History: (unchanged information from previous note copied and italicized) - Information that is bolded has been updated.     See intake    Social History: (unchanged information from previous note copied and italicized) - Information that is bolded has been updated.     See intake    Traumatic History: (unchanged information from previous note copied and italicized) - Information that is bolded has been updated.     See intake      Past Medical History:    Past Medical History:   Diagnosis Date    Anxiety     Arthritis     Asthma     Cancer (HCC)     Candida infection, esophageal (HCC)     Chronic kidney disease     Chronic pain     COPD (chronic obstructive pulmonary disease) (HCC)     Depression     Depression     Gastroparesis     Gastroparesis     GERD (gastroesophageal reflux disease)     Hypertension     Irritable bowel syndrome (IBS)     Lactose intolerance Not sure    Migraines     MRSA (methicillin resistant Staphylococcus aureus)     Multiple thyroid nodules     Osteoporosis     Pneumonia     Psychiatric disorder         Past Surgical History:   Procedure Laterality Date    BACK SURGERY       SECTION      CHOLECYSTECTOMY      COLONOSCOPY  10/09/2013    ESOPHAGEAL DILATION       [facility]    ESOPHAGOGASTRODUODENOSCOPY N/A 2023    Procedure: ESOPHAGOGASTRODUODENOSCOPY (EGD);  Surgeon: Dontrell Mcmahon MD;  Location:  MAIN OR;  Service: General    ESOPHAGOGASTRODUODENOSCOPY N/A 2024    Procedure: ESOPHAGOGASTRODUODENOSCOPY  (EGD);  Surgeon: Shilpa Price MD;  Location: [facility];  Service: Thoracic    HERNIA REPAIR  06/14/2013    HIATAL HERNIA REPAIR      NISSEN FUNDOPLICATION      CO BIOPSY LIVER NEEDLE PERCUTANEOUS N/A 11/08/2023    Procedure: EXCISION BIOPSY LIVER;  Surgeon: Dontrell Mcmahon MD;  Location: [facility];  Service: General    CO ESOPHAGOGASTRODUODENOSCOPY TRANSORAL DIAGNOSTIC N/A 04/03/2019    Procedure: ESOPHAGOGASTRODUODENOSCOPY (EGD);  Surgeon: Geovanna Winn MD;  Location: [facility];  Service: Gastroenterology    CO ESOPHAGOGASTRODUODENOSCOPY TRANSORAL DIAGNOSTIC N/A 09/20/2023    Procedure: ESOPHAGOGASTRODUODENOSCOPY (EGD);  Surgeon: Shilpa Price MD;  Location: [facility];  Service: Thoracic    CO ESOPHAGOSCOPY FLEX BALLOON DILAT <30 MM DIAM N/A 09/20/2023    Procedure: CRE WIRE GUIDED BALLOON DILATATION ESOPHAGEAL;  Surgeon: Shilpa Price MD;  Location: [facility];  Service: Thoracic    CO ESOPHAGOSCOPY FLEX BALLOON DILAT <30 MM DIAM N/A 03/20/2024    Procedure: ESOPHAGOGASTRODUODENOSCOPY WITH BALLOON DILATION;  Surgeon: Shilpa Price MD;  Location: [facility];  Service: Thoracic    CO LAPS RPR PARAESPHGL HRNA INCL FUNDPLSTY W/O MESH N/A 11/08/2023    Procedure: robotic takedown of nissen fundoplication, redo hiatal hernia, partial fundoplication, with mesh;  Surgeon: Shilpa Price MD;  [facility]: [facility];  Service: Thoracic    CO PYLOROPLASTY N/A 11/08/2023    Procedure: PYLOROPLASTY LAPAROSCOPIC WITH ROBOT;  Surgeon: Dontrell Mcmahon MD;  [facility]: [facility];  Service: General    ROTATOR CUFF REPAIR      SHOULDER SURGERY      SPINAL FUSION      TUBAL LIGATION  May 1975    Last child born    UPPER GASTROINTESTINAL ENDOSCOPY      US GUIDED THYROID BIOPSY      WISDOM TOOTH EXTRACTION       Allergies   Allergen Reactions    Latex Hives    Augmentin [Amoxicillin-Pot Clavulanate] GI Intolerance    Bactrim [Sulfamethoxazole-Trimethoprim] GI Intolerance    Clarithromycin GI  Intolerance    Doxycycline Hives    Neomycin GI Intolerance    Polymyxin B GI Intolerance    Zyvox [Linezolid] GI Bleeding    Cephalosporins GI Intolerance    Nsaids GI Intolerance    Penicillins GI Intolerance     Patient can only take levaquin and cipro    Prednisone GI Intolerance    Sulphasomidine GI Intolerance       Substance Abuse History:    Social History     Substance and Sexual Activity   Alcohol Use Not Currently     Social History     Substance and Sexual Activity   Drug Use No       Social History:    Social History     Socioeconomic History    Marital status:      Spouse name: Not on file    Number of children: Not on file    Years of education: Not on file    Highest education level: Not on file   Occupational History    Not on file   Tobacco Use    Smoking status: Former     Current packs/day: 0.00     Average packs/day: 1 pack/day for 15.0 years (15.0 ttl pk-yrs)     Types: Cigarettes     Start date: 10/5/1998     Quit date: 10/1/2003     Years since quittin.0    Smokeless tobacco: Former   Vaping Use    Vaping status: Never Used   Substance and Sexual Activity    Alcohol use: Not Currently    Drug use: No    Sexual activity: Not Currently     Partners: Male     Birth control/protection: None   Other Topics Concern    Not on file   Social History Narrative    Not on file     Social Determinants of Health     Financial Resource Strain: Not on file   Food Insecurity: No Food Insecurity (3/22/2024)    Nursing - Inadequate Food Risk Classification     Worried About Running Out of Food in the Last Year: Never true     Ran Out of Food in the Last Year: Never true     Ran Out of Food in the Last Year: Not on file   Transportation Needs: No Transportation Needs (3/22/2024)    PRAPARE - Transportation     Lack of Transportation (Medical): No     Lack of Transportation (Non-Medical): No   Physical Activity: Not on file   Stress: Not on file   Social Connections: Not on file   Intimate Partner  Violence: Not on file   Housing Stability: Low Risk  (3/22/2024)    Housing Stability Vital Sign     Unable to Pay for Housing in the Last Year: No     Number of Times Moved in the Last Year: 1     Homeless in the Last Year: No       Family Psychiatric History:     Family History   Problem Relation Age of Onset    Other Mother         Esophageal disorder    Stomach cancer Father     Cancer Father     Hypertension Maternal Aunt     COPD Maternal Grandmother     Colon cancer Neg Hx     Colon polyps Neg Hx        History Review: The following portions of the patient's history were reviewed and updated as appropriate: allergies, current medications, past family history, past medical history, past social history, past surgical history, and problem list.         OBJECTIVE:     Vital signs in last 24 hours:    There were no vitals filed for this visit.    Mental Status Evaluation:    Appearance age appropriate, casually dressed   Behavior cooperative, mildly anxious   Speech normal rate, normal volume, normal pitch   Mood dysphoric   Affect constricted   Thought Processes organized, goal directed   Associations intact associations   Thought Content no overt delusions   Perceptual Disturbances: no auditory hallucinations, no visual hallucinations   Abnormal Thoughts  Risk Potential Suicidal ideation - None at present  Homicidal ideation - None at present  Potential for aggression - Not at present   Orientation oriented to person, place, time/date, and situation   Memory recent and remote memory grossly intact   Consciousness alert and awake   Attention Span Concentration Span attention span and concentration are age appropriate   Intellect appears to be of average intelligence   Insight intact   Judgement intact   Muscle Strength and  Gait normal muscle strength and normal muscle tone, normal gait and normal balance   Motor activity no abnormal movements   Language no difficulty naming common objects, no difficulty  repeating a phrase   Fund of Knowledge adequate knowledge of current events  adequate fund of knowledge regarding past history  adequate fund of knowledge regarding vocabulary    Pain none   Pain Scale Did not ask patient to formally rate       Laboratory Results: I have personally reviewed all pertinent laboratory/tests results    Recent Labs (last 2 months):   No visits with results within 2 Month(s) from this visit.   Latest known visit with results is:   Admission on 03/21/2024, Discharged on 03/26/2024   Component Date Value    WBC 03/22/2024 7.73     RBC 03/22/2024 3.29 (L)     Hemoglobin 03/22/2024 10.3 (L)     Hematocrit 03/22/2024 32.1 (L)     MCV 03/22/2024 98     MCH 03/22/2024 31.3     MCHC 03/22/2024 32.1     RDW 03/22/2024 13.6     MPV 03/22/2024 9.0     Platelets 03/22/2024 231     nRBC 03/22/2024 0     Segmented % 03/22/2024 52     Immature Grans % 03/22/2024 1     Lymphocytes % 03/22/2024 39     Monocytes % 03/22/2024 6     Eosinophils Relative 03/22/2024 2     Basophils Relative 03/22/2024 0     Absolute Neutrophils 03/22/2024 4.09     Absolute Immature Grans 03/22/2024 0.04     Absolute Lymphocytes 03/22/2024 2.99     Absolute Monocytes 03/22/2024 0.45     Eosinophils Absolute 03/22/2024 0.14     Basophils Absolute 03/22/2024 0.02     Sodium 03/22/2024 140     Potassium 03/22/2024 4.1     Chloride 03/22/2024 107     CO2 03/22/2024 26     ANION GAP 03/22/2024 7     BUN 03/22/2024 22     Creatinine 03/22/2024 1.50 (H)     Glucose 03/22/2024 190 (H)     Calcium 03/22/2024 9.3     AST 03/22/2024 38     ALT 03/22/2024 35     Alkaline Phosphatase 03/22/2024 123 (H)     Total Protein 03/22/2024 7.6     Albumin 03/22/2024 4.0     Total Bilirubin 03/22/2024 0.25     eGFR 03/22/2024 33     Lipase 03/22/2024 <6 (L)     hs TnI 0hr 03/22/2024 3     Sodium 03/22/2024 139     Potassium 03/22/2024 4.5     Chloride 03/22/2024 108     CO2 03/22/2024 25     ANION GAP 03/22/2024 6     BUN 03/22/2024 21     Creatinine  03/22/2024 1.37 (H)     Glucose 03/22/2024 105     Calcium 03/22/2024 8.6     eGFR 03/22/2024 37     Magnesium 03/22/2024 1.8 (L)     Phosphorus 03/22/2024 3.5     Total Bilirubin 03/22/2024 0.21     Bilirubin, Direct 03/22/2024 0.06     Alkaline Phosphatase 03/22/2024 108 (H)     AST 03/22/2024 29     ALT 03/22/2024 29     Total Protein 03/22/2024 6.5     Albumin 03/22/2024 3.5     POC Glucose 03/22/2024 119     POC Glucose 03/22/2024 121     WBC 03/23/2024 5.40     RBC 03/23/2024 3.06 (L)     Hemoglobin 03/23/2024 9.7 (L)     Hematocrit 03/23/2024 29.4 (L)     MCV 03/23/2024 96     MCH 03/23/2024 31.7     MCHC 03/23/2024 33.0     RDW 03/23/2024 13.6     MPV 03/23/2024 8.4 (L)     Platelets 03/23/2024 169     nRBC 03/23/2024 0     Segmented % 03/23/2024 53     Immature Grans % 03/23/2024 0     Lymphocytes % 03/23/2024 39     Monocytes % 03/23/2024 6     Eosinophils Relative 03/23/2024 2     Basophils Relative 03/23/2024 0     Absolute Neutrophils 03/23/2024 2.86     Absolute Immature Grans 03/23/2024 0.02     Absolute Lymphocytes 03/23/2024 2.08     Absolute Monocytes 03/23/2024 0.31     Eosinophils Absolute 03/23/2024 0.11     Basophils Absolute 03/23/2024 0.02     Sodium 03/23/2024 139     Potassium 03/23/2024 4.2     Chloride 03/23/2024 106     CO2 03/23/2024 27     ANION GAP 03/23/2024 6     BUN 03/23/2024 17     Creatinine 03/23/2024 1.27     Glucose 03/23/2024 95     Calcium 03/23/2024 8.9     Corrected Calcium 03/23/2024 9.5     AST 03/23/2024 42 (H)     ALT 03/23/2024 33     Alkaline Phosphatase 03/23/2024 101     Total Protein 03/23/2024 6.5     Albumin 03/23/2024 3.3 (L)     Total Bilirubin 03/23/2024 0.37     eGFR 03/23/2024 40     Magnesium 03/23/2024 2.1     POC Glucose 03/23/2024 84     Ventricular Rate 03/22/2024 82     Atrial Rate 03/22/2024 82     WI Interval 03/22/2024 140     QRSD Interval 03/22/2024 120     QT Interval 03/22/2024 396     QTC Interval 03/22/2024 462     P Axis 03/22/2024 50      QRS Axis 03/22/2024 17     T Wave [place] 03/22/2024 30     POC Glucose 03/23/2024 102     WBC 03/24/2024 4.84     RBC 03/24/2024 2.95 (L)     Hemoglobin 03/24/2024 9.4 (L)     Hematocrit 03/24/2024 28.4 (L)     MCV 03/24/2024 96     MCH 03/24/2024 31.9     MCHC 03/24/2024 33.1     RDW 03/24/2024 13.3     MPV 03/24/2024 8.7 (L)     Platelets 03/24/2024 177     nRBC 03/24/2024 0     Segmented % 03/24/2024 50     Immature Grans % 03/24/2024 1     Lymphocytes % 03/24/2024 41     Monocytes % 03/24/2024 6     Eosinophils Relative 03/24/2024 2     Basophils Relative 03/24/2024 0     Absolute Neutrophils 03/24/2024 2.49     Absolute Immature Grans 03/24/2024 0.03     Absolute Lymphocytes 03/24/2024 1.96     Absolute Monocytes 03/24/2024 0.27     Eosinophils Absolute 03/24/2024 0.08     Basophils Absolute 03/24/2024 0.01     Sodium 03/24/2024 139     Potassium 03/24/2024 4.4     Chloride 03/24/2024 105     CO2 03/24/2024 30     ANION GAP 03/24/2024 4     BUN 03/24/2024 18     Creatinine 03/24/2024 1.49 (H)     Glucose 03/24/2024 97     Calcium 03/24/2024 9.1     eGFR 03/24/2024 33     POC Glucose 03/24/2024 97     POC Glucose 03/25/2024 104     POC Glucose 03/25/2024 102     Salmonella sp PCR 03/25/2024 Negative     Shigella sp/Enteroinvasi* 03/25/2024 Negative     Campylobacter sp (jejuni* 03/25/2024 Negative     Shiga toxin 1/Shiga toxi* 03/25/2024 Negative     C.difficile toxin by PCR 03/25/2024 Negative     POC Glucose 03/25/2024 76     POC Glucose 03/25/2024 230 (H)     WBC 03/26/2024 4.66     RBC 03/26/2024 2.80 (L)     Hemoglobin 03/26/2024 8.8 (L)     Hematocrit 03/26/2024 26.9 (L)     MCV 03/26/2024 96     MCH 03/26/2024 31.4     MCHC 03/26/2024 32.7     RDW 03/26/2024 13.4     MPV 03/26/2024 8.5 (L)     Platelets 03/26/2024 150     nRBC 03/26/2024 0     Segmented % 03/26/2024 48     Immature Grans % 03/26/2024 0     Lymphocytes % 03/26/2024 43     Monocytes % 03/26/2024 6     Eosinophils Relative 03/26/2024 3      Basophils Relative 03/26/2024 0     Absolute Neutrophils 03/26/2024 2.24     Absolute Immature Grans 03/26/2024 0.02     Absolute Lymphocytes 03/26/2024 1.99     Absolute Monocytes 03/26/2024 0.28     Eosinophils Absolute 03/26/2024 0.12     Basophils Absolute 03/26/2024 0.01     Sodium 03/26/2024 138     Potassium 03/26/2024 4.1     Chloride 03/26/2024 106     CO2 03/26/2024 27     ANION GAP 03/26/2024 5     BUN 03/26/2024 14     Creatinine 03/26/2024 1.71 (H)     Glucose 03/26/2024 95     Calcium 03/26/2024 8.8     Corrected Calcium 03/26/2024 9.4     AST 03/26/2024 28     ALT 03/26/2024 22     Alkaline Phosphatase 03/26/2024 86     Total Protein 03/26/2024 6.2 (L)     Albumin 03/26/2024 3.2 (L)     Total Bilirubin 03/26/2024 0.28     eGFR 03/26/2024 28     Magnesium 03/26/2024 1.9        Suicide/Homicide Risk Assessment:    Risk of Harm to Self:  The following ratings are based on assessment at the time of the interview  Historical Risk Factors include: chronic psychiatric problems  Protective Factors: no current suicidal ideation, access to mental health treatment, being a parent, compliant with medications, compliant with mental health treatment, connection to own children, having a desire to be alive, responsibilities and duties to others, stable living environment, strong relationships    Risk of Harm to Others:  The following ratings are based on assessment at the time of the interview  Historical Risk Factors include: none.  Protective Factors: no current homicidal ideation    The following interventions are recommended: contracts for safety at present - agrees to go to ED if feeling unsafe, contracts for safety at present - agrees to call Crisis Intervention Service if feeling unsafe      Lethality Statement:    Based on today's assessment and clinical criteria, Ira Bonilla contracts for safety and is not an imminent risk of harm to self or others. Outpatient level of care is deemed appropriate at  this current time. Ira understands that if they can no longer contract for safety, they need to call the office or report to their nearest Emergency Room for immediate evaluation. They voiced understanding and agreement to call 911 or head to the nearest ED should they have any physical or mental decompensation whatsoever.       Assessment/Plan:     1.) MDD, recurrent, moderate  2.) WINDY  3.)      After discussion of risks, benefits, potential side effects, alternatives, we will discontinue venlafaxine and initiate Viibryd 10 mg daily.  Patient will continue to meet regularly with her therapist.  She denies acute mental complaints or concerns at this time          Aware of 24 hour and weekend coverage for urgent situations accessed by calling [facility] main practice number    Medications Risks/Benefits      Risks, Benefits And Possible Side Effects Of Medications:    Risks, benefits, and possible side effects of medications explained to Ira and she verbalizes understanding and agreement for treatment.    Controlled Medication Discussion:     Not applicable - controlled prescriptions are not prescribed by this practice    Psychotherapy Provided:     Individual psychotherapy provided: Crisis/safety plan discussed with Ira.     Treatment Plan:    Completed and signed during the session: Not applicable - Treatment Plan to be completed by [facility] therapist      Visit Time    Visit Start Time: 1:00 PM  Visit Stop Time: 1:20 PM  Total Visit Duration:  20 minutes     The total visit duration detailed above includes: patient engagement, medication management, psychotherapy/counseling, discussion regarding treatment goals, documentation, review of past medical records, and coordination of care.      Note Share Disclaimer:     This note was not shared with the patient due to reasonable likelihood of causing patient harm      Edward Hinkle DO  Psychiatry  10/23/24

## 2024-10-28 ENCOUNTER — TELEPHONE (OUTPATIENT)
Dept: GASTROENTEROLOGY | Facility: CLINIC | Age: 79
End: 2024-10-28

## 2024-10-28 NOTE — TELEPHONE ENCOUNTER
Procedure confirmed  Endoscopy     Via: Spoke with patient.      Instructions given: Given to Patient at Visit     Prep Given: EGD- Clear liquids only entire day prior    Call the office if there are any questions.

## 2024-10-30 ENCOUNTER — TELEMEDICINE (OUTPATIENT)
Dept: PSYCHIATRY | Facility: CLINIC | Age: 79
End: 2024-10-30
Payer: COMMERCIAL

## 2024-10-30 DIAGNOSIS — F41.1 GENERALIZED ANXIETY DISORDER: Primary | ICD-10-CM

## 2024-10-30 DIAGNOSIS — F32.1 CURRENT MODERATE EPISODE OF MAJOR DEPRESSIVE DISORDER WITHOUT PRIOR EPISODE (HCC): ICD-10-CM

## 2024-10-30 DIAGNOSIS — F43.29 GRIEF REACTION WITH PROLONGED BEREAVEMENT: Primary | ICD-10-CM

## 2024-10-30 DIAGNOSIS — F41.9 ANXIETY: ICD-10-CM

## 2024-10-30 DIAGNOSIS — F32.0 CURRENT MILD EPISODE OF MAJOR DEPRESSIVE DISORDER WITHOUT PRIOR EPISODE (HCC): ICD-10-CM

## 2024-10-30 PROCEDURE — 90834 PSYTX W PT 45 MINUTES: CPT

## 2024-10-30 PROCEDURE — 99214 OFFICE O/P EST MOD 30 MIN: CPT | Performed by: STUDENT IN AN ORGANIZED HEALTH CARE EDUCATION/TRAINING PROGRAM

## 2024-10-30 NOTE — PSYCH
MEDICATION MANAGEMENT NOTE        [facility]      Name and Date of Birth:  Ira Bonilla 79 y.o. 1945 MRN: [id number]    Date of Visit: October 30, 2024    Reason for Visit: Follow-up visit for medication management     Virtual Visit Disclaimer:       TeleMed provider: Edward Hinkle D.O.    Location: [place]     Verification of patient location:     Patient is currently located in the [facility]  Patient is currently located in a state in which I am licensed     After connecting through [email or web address], the patient was identified by name and date of birth.  Ira Bonilla was informed that this is a telemedicine visit that is being conducted through [employer], and the patient was informed that this is a secure, HIPAA-compliant platform. My office door was closed. No one else was in the room. Ira Bonilla acknowledged consent and understanding of privacy and security of the video platform. Ira understands that the online visit is based solely on information provided by the patient, and that, in the absence of a face-to-face physical evaluation by the physician, the diagnosis Ira  receives is both limited and provisional in terms of accuracy and completeness. Ira Bonilla understands that they can discontinue the visit at any time. I informed Ira that I have reviewed their record in EPIC and presented the opportunity for them to ask any questions regarding the visit today. Ira Bonilla voiced understanding and consented to these terms. Ira is aware this is a billable service. Ira is present at her home residence and primary address      SUBJECTIVE:    Ira Bonilla is a 79 y.o. female with past psychiatric history significant for MDD, WINDY who was personally seen and evaluated today at the [facility] outpatient clinic for follow-up and medication management. Ira denies SI, HI, AVH, delusions,  jose since her last appointment.  However, patient continues to experience some feelings of dysphoria and anxiety and states that she has been feeling more lightheaded and occasionally dizzy over the last week or so.  She cannot cite any triggers at this time and is unsure if this is the medication.  After discussion of risks, benefits, potential side effects, alternatives, we will discontinue Viibryd and reinitiate patient's sertraline 100 mg daily and meet in 1 week to see if this issue resolves.  Patient voices understanding agreement to a crisis plan and to evaluate her other regimen and medical conditions to see if these can be potential causes.  She denies acute mental complaints or concerns at this time        Current Rating Scores:     Current PHQ-9   PHQ-2/9 Depression Screening    Little interest or pleasure in doing things: 1 - several days  Feeling down, depressed, or hopeless: 1 - several days  Trouble falling or staying asleep, or sleeping too much: 1 - several days  Feeling tired or having little energy: 1 - several days  Poor appetite or overeatin - several days  Feeling bad about yourself - or that you are a failure or have let yourself or your family down: 1 - several days  Trouble concentrating on things, such as reading the newspaper or watching television: 1 - several days  Moving or speaking so slowly that other people could have noticed. Or the opposite - being so fidgety or restless that you have been moving around a lot more than usual: 0 - not at all       Current WINDY-7 is .    Review Of Systems:      Constitutional negative   ENT negative   Cardiovascular negative   Respiratory negative   Gastrointestinal negative   Genitourinary negative   Musculoskeletal negative   Integumentary negative   Neurological negative   Endocrine negative   Other Symptoms none, all other systems are negative       Past Psychiatric History: (unchanged information from previous note copied and italicized) -  Information that is bolded has been updated.     See intake    Substance Abuse History: (unchanged information from previous note copied and italicized) - Information that is bolded has been updated.     See intake    Social History: (unchanged information from previous note copied and italicized) - Information that is bolded has been updated.     See intake    Traumatic History: (unchanged information from previous note copied and italicized) - Information that is bolded has been updated.     See intake      Past Medical History:    Past Medical History:   Diagnosis Date    Anxiety     Arthritis     Asthma     Cancer (HCC)     Candida infection, esophageal (HCC)     Chronic kidney disease     Chronic pain     COPD (chronic obstructive pulmonary disease) (HCC)     Depression     Depression     Gastroparesis     Gastroparesis     GERD (gastroesophageal reflux disease)     Hypertension     Irritable bowel syndrome (IBS)     Lactose intolerance Not sure    Migraines     MRSA (methicillin resistant Staphylococcus aureus)     Multiple thyroid nodules     Osteoporosis     Pneumonia     Psychiatric disorder         Past Surgical History:   Procedure Laterality Date    BACK SURGERY       SECTION      CHOLECYSTECTOMY      COLONOSCOPY  10/09/2013    ESOPHAGEAL DILATION       [facility]    ESOPHAGOGASTRODUODENOSCOPY N/A 2023    Procedure: ESOPHAGOGASTRODUODENOSCOPY (EGD);  Surgeon: Dontrell Mcmahon MD;  [facility];  Service: General    ESOPHAGOGASTRODUODENOSCOPY N/A 2024    Procedure: ESOPHAGOGASTRODUODENOSCOPY (EGD);  Surgeon: Shilpa Price MD;  Location: [facility];  Service: Thoracic    HERNIA REPAIR  2013    HIATAL HERNIA REPAIR      NISSEN FUNDOPLICATION      IN BIOPSY LIVER NEEDLE PERCUTANEOUS N/A 2023    Procedure: EXCISION BIOPSY LIVER;  Surgeon: Dontrell Mcmahon MD;  [facility];  Service: General    IN ESOPHAGOGASTRODUODENOSCOPY TRANSORAL DIAGNOSTIC N/A 2019     Procedure: ESOPHAGOGASTRODUODENOSCOPY (EGD);  Surgeon: Geovanna Winn MD;  Location: [facility];  Service: Gastroenterology    ND ESOPHAGOGASTRODUODENOSCOPY TRANSORAL DIAGNOSTIC N/A 09/20/2023    Procedure: ESOPHAGOGASTRODUODENOSCOPY (EGD);  Surgeon: Shilpa Price MD;  [facility];  Service: Thoracic    ND ESOPHAGOSCOPY FLEX BALLOON DILAT <30 MM DIAM N/A 09/20/2023    Procedure: CRE WIRE GUIDED BALLOON DILATATION ESOPHAGEAL;  Surgeon: Shilpa Price MD;  [facility];  Service: Thoracic    ND ESOPHAGOSCOPY FLEX BALLOON DILAT <30 MM DIAM N/A 03/20/2024    Procedure: ESOPHAGOGASTRODUODENOSCOPY WITH BALLOON DILATION;  Surgeon: Shilpa Price MD;  [facility];  Service: Thoracic    ND LAPS RPR PARAESPHGL HRNA INCL FUNDPLSTY W/O MESH N/A 11/08/2023    Procedure: robotic takedown of nissen fundoplication, redo hiatal hernia, partial fundoplication, with mesh;  Surgeon: Shilpa Price MD;  [facility];  Service: Thoracic    ND PYLOROPLASTY N/A 11/08/2023    Procedure: PYLOROPLASTY LAPAROSCOPIC WITH ROBOT;  Surgeon: Dontrell Mcmahon MD;  [facility];  Service: General    ROTATOR CUFF REPAIR      SHOULDER SURGERY      SPINAL FUSION      TUBAL LIGATION  May 1975    Last child born    UPPER GASTROINTESTINAL ENDOSCOPY      US GUIDED THYROID BIOPSY      WISDOM TOOTH EXTRACTION       Allergies   Allergen Reactions    Latex Hives    Augmentin [Amoxicillin-Pot Clavulanate] GI Intolerance    Bactrim [Sulfamethoxazole-Trimethoprim] GI Intolerance    Clarithromycin GI Intolerance    Doxycycline Hives    Neomycin GI Intolerance    Polymyxin B GI Intolerance    Zyvox [Linezolid] GI Bleeding    Cephalosporins GI Intolerance    Nsaids GI Intolerance    Penicillins GI Intolerance     Patient can only take levaquin and cipro    Prednisone GI Intolerance    Sulphasomidine GI Intolerance       Substance Abuse History:    Social History     Substance and Sexual Activity    Alcohol Use Not Currently     Social History     Substance and Sexual Activity   Drug Use No       Social History:    Social History     Socioeconomic History    Marital status:      Spouse name: Not on file    Number of children: Not on file    Years of education: Not on file    Highest education level: Not on file   Occupational History    Not on file   Tobacco Use    Smoking status: Former     Current packs/day: 0.00     Average packs/day: 1 pack/day for 15.0 years (15.0 ttl pk-yrs)     Types: Cigarettes     Start date: 10/5/1998     Quit date: 10/1/2003     Years since quittin.0    Smokeless tobacco: Former   Vaping Use    Vaping status: Never Used   Substance and Sexual Activity    Alcohol use: Not Currently    Drug use: No    Sexual activity: Not Currently     Partners: Male     Birth control/protection: None   Other Topics Concern    Not on file   Social History Narrative    Not on file     Social Determinants of Health     Financial Resource Strain: Not on file   Food Insecurity: No Food Insecurity (3/22/2024)    Nursing - Inadequate Food Risk Classification     Worried About Running Out of Food in the Last Year: Never true     Ran Out of Food in the Last Year: Never true     Ran Out of Food in the Last Year: Not on file   Transportation Needs: No Transportation Needs (3/22/2024)    PRAPARE - Transportation     Lack of Transportation (Medical): No     Lack of Transportation (Non-Medical): No   Physical Activity: Not on file   Stress: Not on file   Social Connections: Not on file   Intimate Partner Violence: Not on file   Housing Stability: Low Risk  (3/22/2024)    Housing Stability Vital Sign     Unable to Pay for Housing in the Last Year: No     Number of Times Moved in the Last Year: 1     Homeless in the Last Year: No       Family Psychiatric History:     Family History   Problem Relation Age of Onset    Other Mother         Esophageal disorder    Stomach cancer Father     Cancer Father      Hypertension Maternal Aunt     COPD Maternal Grandmother     Colon cancer Neg Hx     Colon polyps Neg Hx        History Review: The following portions of the patient's history were reviewed and updated as appropriate: allergies, current medications, past family history, past medical history, past social history, past surgical history, and problem list.         OBJECTIVE:     Vital signs in last 24 hours:    There were no vitals filed for this visit.    Mental Status Evaluation:    Appearance age appropriate, casually dressed   Behavior cooperative, mildly anxious   Speech normal rate, normal volume, normal pitch   Mood dysphoric   Affect constricted   Thought Processes organized, goal directed   Associations intact associations   Thought Content no overt delusions   Perceptual Disturbances: no auditory hallucinations, no visual hallucinations   Abnormal Thoughts  Risk Potential Suicidal ideation - None at present  Homicidal ideation - None at present  Potential for aggression - Not at present   Orientation oriented to person, place, time/date, and situation   Memory recent and remote memory grossly intact   Consciousness alert and awake   Attention Span Concentration Span attention span and concentration are age appropriate   Intellect appears to be of average intelligence   Insight intact   Judgement intact   Muscle Strength and  Gait Unable to evaluate secondary to virtual appointment   Motor activity Unable to evaluate secondary to virtual appointment   Language no difficulty naming common objects, no difficulty repeating a phrase   Fund of Knowledge adequate knowledge of current events  adequate fund of knowledge regarding past history  adequate fund of knowledge regarding vocabulary    Pain none   Pain Scale Did not ask patient to formally rate       Laboratory Results: I have personally reviewed all pertinent laboratory/tests results    Recent Labs (last 2 months):   No visits with results within 2 Month(s)  from this visit.   Latest known visit with results is:   Admission on 03/21/2024, Discharged on 03/26/2024   Component Date Value    WBC 03/22/2024 7.73     RBC 03/22/2024 3.29 (L)     Hemoglobin 03/22/2024 10.3 (L)     Hematocrit 03/22/2024 32.1 (L)     MCV 03/22/2024 98     MCH 03/22/2024 31.3     MCHC 03/22/2024 32.1     RDW 03/22/2024 13.6     MPV 03/22/2024 9.0     Platelets 03/22/2024 231     nRBC 03/22/2024 0     Segmented % 03/22/2024 52     Immature Grans % 03/22/2024 1     Lymphocytes % 03/22/2024 39     Monocytes % 03/22/2024 6     Eosinophils Relative 03/22/2024 2     Basophils Relative 03/22/2024 0     Absolute Neutrophils 03/22/2024 4.09     Absolute Immature Grans 03/22/2024 0.04     Absolute Lymphocytes 03/22/2024 2.99     Absolute Monocytes 03/22/2024 0.45     Eosinophils Absolute 03/22/2024 0.14     Basophils Absolute 03/22/2024 0.02     Sodium 03/22/2024 140     Potassium 03/22/2024 4.1     Chloride 03/22/2024 107     CO2 03/22/2024 26     ANION GAP 03/22/2024 7     BUN 03/22/2024 22     Creatinine 03/22/2024 1.50 (H)     Glucose 03/22/2024 190 (H)     Calcium 03/22/2024 9.3     AST 03/22/2024 38     ALT 03/22/2024 35     Alkaline Phosphatase 03/22/2024 123 (H)     Total Protein 03/22/2024 7.6     Albumin 03/22/2024 4.0     Total Bilirubin 03/22/2024 0.25     eGFR 03/22/2024 33     Lipase 03/22/2024 <6 (L)     hs TnI 0hr 03/22/2024 3     Sodium 03/22/2024 139     Potassium 03/22/2024 4.5     Chloride 03/22/2024 108     CO2 03/22/2024 25     ANION GAP 03/22/2024 6     BUN 03/22/2024 21     Creatinine 03/22/2024 1.37 (H)     Glucose 03/22/2024 105     Calcium 03/22/2024 8.6     eGFR 03/22/2024 37     Magnesium 03/22/2024 1.8 (L)     Phosphorus 03/22/2024 3.5     Total Bilirubin 03/22/2024 0.21     Bilirubin, Direct 03/22/2024 0.06     Alkaline Phosphatase 03/22/2024 108 (H)     AST 03/22/2024 29     ALT 03/22/2024 29     Total Protein 03/22/2024 6.5     Albumin 03/22/2024 3.5     POC Glucose  03/22/2024 119     POC Glucose 03/22/2024 121     WBC 03/23/2024 5.40     RBC 03/23/2024 3.06 (L)     Hemoglobin 03/23/2024 9.7 (L)     Hematocrit 03/23/2024 29.4 (L)     MCV 03/23/2024 96     MCH 03/23/2024 31.7     MCHC 03/23/2024 33.0     RDW 03/23/2024 13.6     MPV 03/23/2024 8.4 (L)     Platelets 03/23/2024 169     nRBC 03/23/2024 0     Segmented % 03/23/2024 53     Immature Grans % 03/23/2024 0     Lymphocytes % 03/23/2024 39     Monocytes % 03/23/2024 6     Eosinophils Relative 03/23/2024 2     Basophils Relative 03/23/2024 0     Absolute Neutrophils 03/23/2024 2.86     Absolute Immature Grans 03/23/2024 0.02     Absolute Lymphocytes 03/23/2024 2.08     Absolute Monocytes 03/23/2024 0.31     Eosinophils Absolute 03/23/2024 0.11     Basophils Absolute 03/23/2024 0.02     Sodium 03/23/2024 139     Potassium 03/23/2024 4.2     Chloride 03/23/2024 106     CO2 03/23/2024 27     ANION GAP 03/23/2024 6     BUN 03/23/2024 17     Creatinine 03/23/2024 1.27     Glucose 03/23/2024 95     Calcium 03/23/2024 8.9     Corrected Calcium 03/23/2024 9.5     AST 03/23/2024 42 (H)     ALT 03/23/2024 33     Alkaline Phosphatase 03/23/2024 101     Total Protein 03/23/2024 6.5     Albumin 03/23/2024 3.3 (L)     Total Bilirubin 03/23/2024 0.37     eGFR 03/23/2024 40     Magnesium 03/23/2024 2.1     POC Glucose 03/23/2024 84     Ventricular Rate 03/22/2024 82     Atrial Rate 03/22/2024 82     MD Interval 03/22/2024 140     QRSD Interval 03/22/2024 120     QT Interval 03/22/2024 396     QTC Interval 03/22/2024 462     P Axis 03/22/2024 50     QRS Axis 03/22/2024 17     T Wave [place] 03/22/2024 30     POC Glucose 03/23/2024 102     WBC 03/24/2024 4.84     RBC 03/24/2024 2.95 (L)     Hemoglobin 03/24/2024 9.4 (L)     Hematocrit 03/24/2024 28.4 (L)     MCV 03/24/2024 96     MCH 03/24/2024 31.9     MCHC 03/24/2024 33.1     RDW 03/24/2024 13.3     MPV 03/24/2024 8.7 (L)     Platelets 03/24/2024 177     nRBC 03/24/2024 0     Segmented %  03/24/2024 50     Immature Grans % 03/24/2024 1     Lymphocytes % 03/24/2024 41     Monocytes % 03/24/2024 6     Eosinophils Relative 03/24/2024 2     Basophils Relative 03/24/2024 0     Absolute Neutrophils 03/24/2024 2.49     Absolute Immature Grans 03/24/2024 0.03     Absolute Lymphocytes 03/24/2024 1.96     Absolute Monocytes 03/24/2024 0.27     Eosinophils Absolute 03/24/2024 0.08     Basophils Absolute 03/24/2024 0.01     Sodium 03/24/2024 139     Potassium 03/24/2024 4.4     Chloride 03/24/2024 105     CO2 03/24/2024 30     ANION GAP 03/24/2024 4     BUN 03/24/2024 18     Creatinine 03/24/2024 1.49 (H)     Glucose 03/24/2024 97     Calcium 03/24/2024 9.1     eGFR 03/24/2024 33     POC Glucose 03/24/2024 97     POC Glucose 03/25/2024 104     POC Glucose 03/25/2024 102     Salmonella sp PCR 03/25/2024 Negative     Shigella sp/Enteroinvasi* 03/25/2024 Negative     Campylobacter sp (jejuni* 03/25/2024 Negative     Shiga toxin 1/Shiga toxi* 03/25/2024 Negative     C.difficile toxin by PCR 03/25/2024 Negative     POC Glucose 03/25/2024 76     POC Glucose 03/25/2024 230 (H)     WBC 03/26/2024 4.66     RBC 03/26/2024 2.80 (L)     Hemoglobin 03/26/2024 8.8 (L)     Hematocrit 03/26/2024 26.9 (L)     MCV 03/26/2024 96     MCH 03/26/2024 31.4     MCHC 03/26/2024 32.7     RDW 03/26/2024 13.4     MPV 03/26/2024 8.5 (L)     Platelets 03/26/2024 150     nRBC 03/26/2024 0     Segmented % 03/26/2024 48     Immature Grans % 03/26/2024 0     Lymphocytes % 03/26/2024 43     Monocytes % 03/26/2024 6     Eosinophils Relative 03/26/2024 3     Basophils Relative 03/26/2024 0     Absolute Neutrophils 03/26/2024 2.24     Absolute Immature Grans 03/26/2024 0.02     Absolute Lymphocytes 03/26/2024 1.99     Absolute Monocytes 03/26/2024 0.28     Eosinophils Absolute 03/26/2024 0.12     Basophils Absolute 03/26/2024 0.01     Sodium 03/26/2024 138     Potassium 03/26/2024 4.1     Chloride 03/26/2024 106     CO2 03/26/2024 27     ANION GAP  03/26/2024 5     BUN 03/26/2024 14     Creatinine 03/26/2024 1.71 (H)     Glucose 03/26/2024 95     Calcium 03/26/2024 8.8     Corrected Calcium 03/26/2024 9.4     AST 03/26/2024 28     ALT 03/26/2024 22     Alkaline Phosphatase 03/26/2024 86     Total Protein 03/26/2024 6.2 (L)     Albumin 03/26/2024 3.2 (L)     Total Bilirubin 03/26/2024 0.28     eGFR 03/26/2024 28     Magnesium 03/26/2024 1.9        Suicide/Homicide Risk Assessment:    Risk of Harm to Self:  The following ratings are based on assessment at the time of the interview  Historical Risk Factors include: chronic psychiatric problems  Protective Factors: no current suicidal ideation, access to mental health treatment, being a parent, compliant with medications, compliant with mental health treatment, connection to own children, having a desire to be alive, responsibilities and duties to others, stable living environment, strong relationships    Risk of Harm to Others:  The following ratings are based on assessment at the time of the interview  Historical Risk Factors include: none.  Protective Factors: no current homicidal ideation    The following interventions are recommended: contracts for safety at present - agrees to go to ED if feeling unsafe, contracts for safety at present - agrees to call Crisis Intervention Service if feeling unsafe      Lethality Statement:    Based on today's assessment and clinical criteria, Ira Bonilla contracts for safety and is not an imminent risk of harm to self or others. Outpatient level of care is deemed appropriate at this current time. Ira understands that if they can no longer contract for safety, they need to call the office or report to their nearest Emergency Room for immediate evaluation. They voiced understanding and agreement to call 911 or head to the nearest ED should they have any physical or mental decompensation whatsoever.       Assessment/Plan:     1.) MDD, recurrent, moderate  2.) WINDY  3.)       After discussion of risks, benefits, potential side effects, alternatives, we will discontinue Viibryd and reinitiate sertraline 100 mg daily and will evaluate patient's feelings of dizziness at our next appointment.          Aware of 24 hour and weekend coverage for urgent situations accessed by calling [facility] main practice number    Medications Risks/Benefits      Risks, Benefits And Possible Side Effects Of Medications:    Risks, benefits, and possible side effects of medications explained to Ira and she verbalizes understanding and agreement for treatment.    Controlled Medication Discussion:     Not applicable - controlled prescriptions are not prescribed by this practice    Psychotherapy Provided:     Individual psychotherapy provided: Crisis/safety plan discussed with Ira.     Treatment Plan:    Completed and signed during the session: Not applicable - Treatment Plan to be completed by [facility] therapist      Visit Time    Visit Start Time: 10:30 AM  Visit Stop Time: 10:50 AM  Total Visit Duration:  20 minutes     The total visit duration detailed above includes: patient engagement, medication management, psychotherapy/counseling, discussion regarding treatment goals, documentation, review of past medical records, and coordination of care.      Note Share Disclaimer:     This note was not shared with the patient due to reasonable likelihood of causing patient harm      Edward Hinkle DO  Psychiatry  10/30/24
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
## 2024-11-06 ENCOUNTER — TELEMEDICINE (OUTPATIENT)
Dept: PSYCHIATRY | Facility: CLINIC | Age: 79
End: 2024-11-06
Payer: COMMERCIAL

## 2024-11-06 DIAGNOSIS — F41.1 GENERALIZED ANXIETY DISORDER: ICD-10-CM

## 2024-11-06 DIAGNOSIS — F32.1 CURRENT MODERATE EPISODE OF MAJOR DEPRESSIVE DISORDER, UNSPECIFIED WHETHER RECURRENT (HCC): Primary | ICD-10-CM

## 2024-11-06 PROCEDURE — 99214 OFFICE O/P EST MOD 30 MIN: CPT | Performed by: STUDENT IN AN ORGANIZED HEALTH CARE EDUCATION/TRAINING PROGRAM

## 2024-11-08 NOTE — PSYCH
MEDICATION MANAGEMENT NOTE        [facility]      Name and Date of Birth:  Ira Bonilla 79 y.o. 1945 MRN: [id number]    Date of Visit: November 8, 2024    Reason for Visit: Follow-up visit for medication management     Virtual Visit Disclaimer:       TeleMed provider: Edward Hinkle D.O.    Location: [place]     Verification of patient location:     Patient is currently located in the [facility]  Patient is currently located in a state in which I am licensed     After connecting through [employer], the patient was identified by name and date of birth.  Ira Bonilla was informed that this is a telemedicine visit that is being conducted through [employer], and the patient was informed that this is a secure, HIPAA-compliant platform. My office door was closed. No one else was in the room. Ira Bonilla acknowledged consent and understanding of privacy and security of the video platform. Ira understands that the online visit is based solely on information provided by the patient, and that, in the absence of a face-to-face physical evaluation by the physician, the diagnosis Ira  receives is both limited and provisional in terms of accuracy and completeness. Ira Bonilla understands that they can discontinue the visit at any time. I informed Ira that I have reviewed their record in EPIC and presented the opportunity for them to ask any questions regarding the visit today. Ira Bonilla voiced understanding and consented to these terms. Ira is aware this is a billable service. Ira is present at her home residence and primary address      SUBJECTIVE:    Ira Bonilla is a 79 y.o. female with past psychiatric history significant for MDD, WINDY who was personally seen and evaluated today at the [facility] outpatient clinic for follow-up and medication management. Ira denies SI, HI, AVH, delusions,  jose since her last appointment.  Patient did note that her dizziness did resolve after discontinuation of Viibryd.  After discussion of risks, benefits, potential side effects, alternatives, we will initiate Trintellix 5 mg daily with intent to titrate upwards as tolerated and discontinue sertraline once patient has obtained medication.  She denies acute mental complaints or concerns at this time        Current Rating Scores:     Current PHQ-9   PHQ-2/9 Depression Screening    Little interest or pleasure in doing things: 1 - several days  Feeling down, depressed, or hopeless: 1 - several days  Trouble falling or staying asleep, or sleeping too much: 1 - several days  Feeling tired or having little energy: 1 - several days  Poor appetite or overeatin - several days  Feeling bad about yourself - or that you are a failure or have let yourself or your family down: 1 - several days  Trouble concentrating on things, such as reading the newspaper or watching television: 0 - not at all  Moving or speaking so slowly that other people could have noticed. Or the opposite - being so fidgety or restless that you have been moving around a lot more than usual: 1 - several days       Current WINDY-7 is .    Review Of Systems:      Constitutional negative   ENT negative   Cardiovascular negative   Respiratory negative   Gastrointestinal negative   Genitourinary negative   Musculoskeletal negative   Integumentary negative   Neurological negative   Endocrine negative   Other Symptoms none, all other systems are negative       Past Psychiatric History: (unchanged information from previous note copied and italicized) - Information that is bolded has been updated.     See intake    Substance Abuse History: (unchanged information from previous note copied and italicized) - Information that is bolded has been updated.     See intake    Social History: (unchanged information from previous note copied and italicized) - Information that  is bolded has been updated.     See intake    Traumatic History: (unchanged information from previous note copied and italicized) - Information that is bolded has been updated.     See intake      Past Medical History:    Past Medical History:   Diagnosis Date    Anxiety     Arthritis     Asthma     Cancer (HCC)     Candida infection, esophageal (HCC)     Chronic kidney disease     Chronic pain     COPD (chronic obstructive pulmonary disease) (HCC)     Depression     Depression     Gastroparesis     Gastroparesis     GERD (gastroesophageal reflux disease)     Hypertension     Irritable bowel syndrome (IBS)     Lactose intolerance Not sure    Migraines     MRSA (methicillin resistant Staphylococcus aureus)     Multiple thyroid nodules     Osteoporosis     Pneumonia     Psychiatric disorder         Past Surgical History:   Procedure Laterality Date    BACK SURGERY       SECTION      CHOLECYSTECTOMY      COLONOSCOPY  10/09/2013    ESOPHAGEAL DILATION      2017 [facility]    ESOPHAGOGASTRODUODENOSCOPY N/A 2023    Procedure: ESOPHAGOGASTRODUODENOSCOPY (EGD);  Surgeon: Dontrell Mcmahon MD;  [facility]: [facility];  Service: General    ESOPHAGOGASTRODUODENOSCOPY N/A 2024    Procedure: ESOPHAGOGASTRODUODENOSCOPY (EGD);  Surgeon: Shilpa Price MD;  Location: [facility];  Service: Thoracic    HERNIA REPAIR  2013    HIATAL HERNIA REPAIR      NISSEN FUNDOPLICATION      CO BIOPSY LIVER NEEDLE PERCUTANEOUS N/A 2023    Procedure: EXCISION BIOPSY LIVER;  Surgeon: Dontrell Mcmahon MD;  [facility];  Service: General    CO ESOPHAGOGASTRODUODENOSCOPY TRANSORAL DIAGNOSTIC N/A 2019    Procedure: ESOPHAGOGASTRODUODENOSCOPY (EGD);  Surgeon: Geovanna Winn MD;  Location: [facility];  Service: Gastroenterology    CO ESOPHAGOGASTRODUODENOSCOPY TRANSORAL DIAGNOSTIC N/A 2023    Procedure: ESOPHAGOGASTRODUODENOSCOPY (EGD);  Surgeon: Shilpa Price MD;  [facility];  Service:  Thoracic    OR ESOPHAGOSCOPY FLEX BALLOON DILAT <30 MM DIAM N/A 09/20/2023    Procedure: CRE WIRE GUIDED BALLOON DILATATION ESOPHAGEAL;  Surgeon: Shilpa Price MD;  [facility];  Service: Thoracic    OR ESOPHAGOSCOPY FLEX BALLOON DILAT <30 MM DIAM N/A 03/20/2024    Procedure: ESOPHAGOGASTRODUODENOSCOPY WITH BALLOON DILATION;  Surgeon: Shilpa Price MD;  [facility];  Service: Thoracic    OR LAPS RPR PARAESPHGL HRNA INCL FUNDPLSTY W/O MESH N/A 11/08/2023    Procedure: robotic takedown of nissen fundoplication, redo hiatal hernia, partial fundoplication, with mesh;  Surgeon: Shilpa Price MD;  [facility];  Service: Thoracic    OR PYLOROPLASTY N/A 11/08/2023    Procedure: PYLOROPLASTY LAPAROSCOPIC WITH ROBOT;  Surgeon: Dontrell Mcmahon MD;  [facility];  Service: General    ROTATOR CUFF REPAIR      SHOULDER SURGERY      SPINAL FUSION      TUBAL LIGATION  May 1975    Last child born    UPPER GASTROINTESTINAL ENDOSCOPY      US GUIDED THYROID BIOPSY      WISDOM TOOTH EXTRACTION       Allergies   Allergen Reactions    Latex Hives    Augmentin [Amoxicillin-Pot Clavulanate] GI Intolerance    Bactrim [Sulfamethoxazole-Trimethoprim] GI Intolerance    Clarithromycin GI Intolerance    Doxycycline Hives    Neomycin GI Intolerance    Polymyxin B GI Intolerance    Zyvox [Linezolid] GI Bleeding    Cephalosporins GI Intolerance    Nsaids GI Intolerance    Penicillins GI Intolerance     Patient can only take levaquin and cipro    Prednisone GI Intolerance    Sulphasomidine GI Intolerance       Substance Abuse History:    Social History     Substance and Sexual Activity   Alcohol Use Not Currently     Social History     Substance and Sexual Activity   Drug Use No       Social History:    Social History     Socioeconomic History    Marital status:      Spouse name: Not on file    Number of children: Not on file    Years of education: Not on file    Highest education level:  Not on file   Occupational History    Not on file   Tobacco Use    Smoking status: Former     Current packs/day: 0.00     Average packs/day: 1 pack/day for 15.0 years (15.0 ttl pk-yrs)     Types: Cigarettes     Start date: 10/5/1998     Quit date: 10/1/2003     Years since quittin.1    Smokeless tobacco: Former   Vaping Use    Vaping status: Never Used   Substance and Sexual Activity    Alcohol use: Not Currently    Drug use: No    Sexual activity: Not Currently     Partners: Male     Birth control/protection: None   Other Topics Concern    Not on file   Social History Narrative    Not on file     Social Determinants of Health     Financial Resource Strain: Not on file   Food Insecurity: No Food Insecurity (3/22/2024)    Nursing - Inadequate Food Risk Classification     Worried About Running Out of Food in the Last Year: Never true     Ran Out of Food in the Last Year: Never true     Ran Out of Food in the Last Year: Not on file   Transportation Needs: No Transportation Needs (3/22/2024)    PRAPARE - Transportation     Lack of Transportation (Medical): No     Lack of Transportation (Non-Medical): No   Physical Activity: Not on file   Stress: Not on file   Social Connections: Not on file   Intimate Partner Violence: Not on file   Housing Stability: Low Risk  (3/22/2024)    Housing Stability Vital Sign     Unable to Pay for Housing in the Last Year: No     Number of Times Moved in the Last Year: 1     Homeless in the Last Year: No       Family Psychiatric History:     Family History   Problem Relation Age of Onset    Other Mother         Esophageal disorder    Stomach cancer Father     Cancer Father     Hypertension Maternal Aunt     COPD Maternal Grandmother     Colon cancer Neg Hx     Colon polyps Neg Hx        History Review: The following portions of the patient's history were reviewed and updated as appropriate: allergies, current medications, past family history, past medical history, past social history,  past surgical history, and problem list.         OBJECTIVE:     Vital signs in last 24 hours:    There were no vitals filed for this visit.    Mental Status Evaluation:    Appearance age appropriate, casually dressed   Behavior cooperative, mildly anxious   Speech normal rate, normal volume, normal pitch   Mood dysphoric   Affect constricted   Thought Processes organized, goal directed   Associations intact associations   Thought Content no overt delusions   Perceptual Disturbances: no auditory hallucinations, no visual hallucinations   Abnormal Thoughts  Risk Potential Suicidal ideation - None at present  Homicidal ideation - None at present  Potential for aggression - Not at present   Orientation oriented to person, place, time/date, and situation   Memory recent and remote memory grossly intact   Consciousness alert and awake   Attention Span Concentration Span attention span and concentration are age appropriate   Intellect appears to be of average intelligence   Insight intact   Judgement intact   Muscle Strength and  Gait Unable to evaluate secondary to virtual appointment   Motor activity Unable to evaluate secondary to virtual appointment   Language no difficulty naming common objects, no difficulty repeating a phrase   Fund of Knowledge adequate knowledge of current events  adequate fund of knowledge regarding past history  adequate fund of knowledge regarding vocabulary    Pain none   Pain Scale Did not ask patient to formally rate       Laboratory Results: I have personally reviewed all pertinent laboratory/tests results    Recent Labs (last 2 months):   No visits with results within 2 Month(s) from this visit.   Latest known visit with results is:   Admission on 03/21/2024, Discharged on 03/26/2024   Component Date Value    WBC 03/22/2024 7.73     RBC 03/22/2024 3.29 (L)     Hemoglobin 03/22/2024 10.3 (L)     Hematocrit 03/22/2024 32.1 (L)     MCV 03/22/2024 98     MCH 03/22/2024 31.3     MCHC 03/22/2024  32.1     RDW 03/22/2024 13.6     MPV 03/22/2024 9.0     Platelets 03/22/2024 231     nRBC 03/22/2024 0     Segmented % 03/22/2024 52     Immature Grans % 03/22/2024 1     Lymphocytes % 03/22/2024 39     Monocytes % 03/22/2024 6     Eosinophils Relative 03/22/2024 2     Basophils Relative 03/22/2024 0     Absolute Neutrophils 03/22/2024 4.09     Absolute Immature Grans 03/22/2024 0.04     Absolute Lymphocytes 03/22/2024 2.99     Absolute Monocytes 03/22/2024 0.45     Eosinophils Absolute 03/22/2024 0.14     Basophils Absolute 03/22/2024 0.02     Sodium 03/22/2024 140     Potassium 03/22/2024 4.1     Chloride 03/22/2024 107     CO2 03/22/2024 26     ANION GAP 03/22/2024 7     BUN 03/22/2024 22     Creatinine 03/22/2024 1.50 (H)     Glucose 03/22/2024 190 (H)     Calcium 03/22/2024 9.3     AST 03/22/2024 38     ALT 03/22/2024 35     Alkaline Phosphatase 03/22/2024 123 (H)     Total Protein 03/22/2024 7.6     Albumin 03/22/2024 4.0     Total Bilirubin 03/22/2024 0.25     eGFR 03/22/2024 33     Lipase 03/22/2024 <6 (L)     hs TnI 0hr 03/22/2024 3     Sodium 03/22/2024 139     Potassium 03/22/2024 4.5     Chloride 03/22/2024 108     CO2 03/22/2024 25     ANION GAP 03/22/2024 6     BUN 03/22/2024 21     Creatinine 03/22/2024 1.37 (H)     Glucose 03/22/2024 105     Calcium 03/22/2024 8.6     eGFR 03/22/2024 37     Magnesium 03/22/2024 1.8 (L)     Phosphorus 03/22/2024 3.5     Total Bilirubin 03/22/2024 0.21     Bilirubin, Direct 03/22/2024 0.06     Alkaline Phosphatase 03/22/2024 108 (H)     AST 03/22/2024 29     ALT 03/22/2024 29     Total Protein 03/22/2024 6.5     Albumin 03/22/2024 3.5     POC Glucose 03/22/2024 119     POC Glucose 03/22/2024 121     WBC 03/23/2024 5.40     RBC 03/23/2024 3.06 (L)     Hemoglobin 03/23/2024 9.7 (L)     Hematocrit 03/23/2024 29.4 (L)     MCV 03/23/2024 96     MCH 03/23/2024 31.7     MCHC 03/23/2024 33.0     RDW 03/23/2024 13.6     MPV 03/23/2024 8.4 (L)     Platelets 03/23/2024 169      nRBC 03/23/2024 0     Segmented % 03/23/2024 53     Immature Grans % 03/23/2024 0     Lymphocytes % 03/23/2024 39     Monocytes % 03/23/2024 6     Eosinophils Relative 03/23/2024 2     Basophils Relative 03/23/2024 0     Absolute Neutrophils 03/23/2024 2.86     Absolute Immature Grans 03/23/2024 0.02     Absolute Lymphocytes 03/23/2024 2.08     Absolute Monocytes 03/23/2024 0.31     Eosinophils Absolute 03/23/2024 0.11     Basophils Absolute 03/23/2024 0.02     Sodium 03/23/2024 139     Potassium 03/23/2024 4.2     Chloride 03/23/2024 106     CO2 03/23/2024 27     ANION GAP 03/23/2024 6     BUN 03/23/2024 17     Creatinine 03/23/2024 1.27     Glucose 03/23/2024 95     Calcium 03/23/2024 8.9     Corrected Calcium 03/23/2024 9.5     AST 03/23/2024 42 (H)     ALT 03/23/2024 33     Alkaline Phosphatase 03/23/2024 101     Total Protein 03/23/2024 6.5     Albumin 03/23/2024 3.3 (L)     Total Bilirubin 03/23/2024 0.37     eGFR 03/23/2024 40     Magnesium 03/23/2024 2.1     POC Glucose 03/23/2024 84     Ventricular Rate 03/22/2024 82     Atrial Rate 03/22/2024 82     ID Interval 03/22/2024 140     QRSD Interval 03/22/2024 120     QT Interval 03/22/2024 396     QTC Interval 03/22/2024 462     P Axis 03/22/2024 50     QRS Axis 03/22/2024 17     T Wave [place] 03/22/2024 30     POC Glucose 03/23/2024 102     WBC 03/24/2024 4.84     RBC 03/24/2024 2.95 (L)     Hemoglobin 03/24/2024 9.4 (L)     Hematocrit 03/24/2024 28.4 (L)     MCV 03/24/2024 96     MCH 03/24/2024 31.9     MCHC 03/24/2024 33.1     RDW 03/24/2024 13.3     MPV 03/24/2024 8.7 (L)     Platelets 03/24/2024 177     nRBC 03/24/2024 0     Segmented % 03/24/2024 50     Immature Grans % 03/24/2024 1     Lymphocytes % 03/24/2024 41     Monocytes % 03/24/2024 6     Eosinophils Relative 03/24/2024 2     Basophils Relative 03/24/2024 0     Absolute Neutrophils 03/24/2024 2.49     Absolute Immature Grans 03/24/2024 0.03     Absolute Lymphocytes 03/24/2024 1.96     Absolute  Monocytes 03/24/2024 0.27     Eosinophils Absolute 03/24/2024 0.08     Basophils Absolute 03/24/2024 0.01     Sodium 03/24/2024 139     Potassium 03/24/2024 4.4     Chloride 03/24/2024 105     CO2 03/24/2024 30     ANION GAP 03/24/2024 4     BUN 03/24/2024 18     Creatinine 03/24/2024 1.49 (H)     Glucose 03/24/2024 97     Calcium 03/24/2024 9.1     eGFR 03/24/2024 33     POC Glucose 03/24/2024 97     POC Glucose 03/25/2024 104     POC Glucose 03/25/2024 102     Salmonella sp PCR 03/25/2024 Negative     Shigella sp/Enteroinvasi* 03/25/2024 Negative     Campylobacter sp (jejuni* 03/25/2024 Negative     Shiga toxin 1/Shiga toxi* 03/25/2024 Negative     C.difficile toxin by PCR 03/25/2024 Negative     POC Glucose 03/25/2024 76     POC Glucose 03/25/2024 230 (H)     WBC 03/26/2024 4.66     RBC 03/26/2024 2.80 (L)     Hemoglobin 03/26/2024 8.8 (L)     Hematocrit 03/26/2024 26.9 (L)     MCV 03/26/2024 96     MCH 03/26/2024 31.4     MCHC 03/26/2024 32.7     RDW 03/26/2024 13.4     MPV 03/26/2024 8.5 (L)     Platelets 03/26/2024 150     nRBC 03/26/2024 0     Segmented % 03/26/2024 48     Immature Grans % 03/26/2024 0     Lymphocytes % 03/26/2024 43     Monocytes % 03/26/2024 6     Eosinophils Relative 03/26/2024 3     Basophils Relative 03/26/2024 0     Absolute Neutrophils 03/26/2024 2.24     Absolute Immature Grans 03/26/2024 0.02     Absolute Lymphocytes 03/26/2024 1.99     Absolute Monocytes 03/26/2024 0.28     Eosinophils Absolute 03/26/2024 0.12     Basophils Absolute 03/26/2024 0.01     Sodium 03/26/2024 138     Potassium 03/26/2024 4.1     Chloride 03/26/2024 106     CO2 03/26/2024 27     ANION GAP 03/26/2024 5     BUN 03/26/2024 14     Creatinine 03/26/2024 1.71 (H)     Glucose 03/26/2024 95     Calcium 03/26/2024 8.8     Corrected Calcium 03/26/2024 9.4     AST 03/26/2024 28     ALT 03/26/2024 22     Alkaline Phosphatase 03/26/2024 86     Total Protein 03/26/2024 6.2 (L)     Albumin 03/26/2024 3.2 (L)     Total  Bilirubin 03/26/2024 0.28     eGFR 03/26/2024 28     Magnesium 03/26/2024 1.9        Suicide/Homicide Risk Assessment:    Risk of Harm to Self:  The following ratings are based on assessment at the time of the interview  Historical Risk Factors include: chronic psychiatric problems  Protective Factors: no current suicidal ideation, access to mental health treatment, being a parent, compliant with medications, compliant with mental health treatment, connection to own children, having a desire to be alive, responsibilities and duties to others, stable living environment, strong relationships    Risk of Harm to Others:  The following ratings are based on assessment at the time of the interview  Historical Risk Factors include: none.  Protective Factors: no current homicidal ideation    The following interventions are recommended: contracts for safety at present - agrees to go to ED if feeling unsafe, contracts for safety at present - agrees to call Crisis Intervention Service if feeling unsafe      Lethality Statement:    Based on today's assessment and clinical criteria, Ira Bonilla contracts for safety and is not an imminent risk of harm to self or others. Outpatient level of care is deemed appropriate at this current time. Ira understands that if they can no longer contract for safety, they need to call the office or report to their nearest Emergency Room for immediate evaluation. They voiced understanding and agreement to call 911 or head to the nearest ED should they have any physical or mental decompensation whatsoever.       Assessment/Plan:     1.) MDD, recurrent, moderate  2.) WINDY  3.)      After discussion of risks, benefits, potential side effects, alternatives, we will continue sertraline 100 mg daily and initiate Trintellix 5 mg daily.  Patient will discontinue sertraline once they obtain Trintellix.  Goal is to increase as tolerated and effective.  She denies acute mental health complaints or  concerns at this time          Aware of 24 hour and weekend coverage for urgent situations accessed by calling [facility] main practice number    Medications Risks/Benefits      Risks, Benefits And Possible Side Effects Of Medications:    Risks, benefits, and possible side effects of medications explained to Ira and she verbalizes understanding and agreement for treatment.    Controlled Medication Discussion:     Not applicable - controlled prescriptions are not prescribed by this practice    Psychotherapy Provided:     Individual psychotherapy provided: Crisis/safety plan discussed with Ira.     Treatment Plan:    Completed and signed during the session: Not applicable - Treatment Plan to be completed by [facility] therapist      Visit Time    Visit Start Time: 10:00 AM  Visit Stop Time: 10:20 AM  Total Visit Duration:  20 minutes     The total visit duration detailed above includes: patient engagement, medication management, psychotherapy/counseling, discussion regarding treatment goals, documentation, review of past medical records, and coordination of care.      Note Share Disclaimer:     This note was not shared with the patient due to reasonable likelihood of causing patient harm      Edward Hinkle DO  Psychiatry  11/08/24

## 2024-11-11 ENCOUNTER — HOSPITAL ENCOUNTER (OUTPATIENT)
Dept: GASTROENTEROLOGY | Facility: HOSPITAL | Age: 79
Setting detail: OUTPATIENT SURGERY
Discharge: HOME | End: 2024-11-11
Attending: INTERNAL MEDICINE
Payer: COMMERCIAL

## 2024-11-11 ENCOUNTER — ANESTHESIA (OUTPATIENT)
Dept: GASTROENTEROLOGY | Facility: HOSPITAL | Age: 79
End: 2024-11-11
Payer: COMMERCIAL

## 2024-11-11 ENCOUNTER — ANESTHESIA EVENT (OUTPATIENT)
Dept: GASTROENTEROLOGY | Facility: HOSPITAL | Age: 79
End: 2024-11-11
Payer: COMMERCIAL

## 2024-11-11 VITALS
OXYGEN SATURATION: 93 % | BODY MASS INDEX: 22.78 KG/M2 | WEIGHT: 116 LBS | TEMPERATURE: 96.8 F | RESPIRATION RATE: 16 BRPM | SYSTOLIC BLOOD PRESSURE: 140 MMHG | HEIGHT: 60 IN | HEART RATE: 63 BPM | DIASTOLIC BLOOD PRESSURE: 63 MMHG

## 2024-11-11 DIAGNOSIS — K31.84 GASTROPARESIS: ICD-10-CM

## 2024-11-11 DIAGNOSIS — R13.19 ESOPHAGEAL DYSPHAGIA: ICD-10-CM

## 2024-11-11 PROCEDURE — C1726 CATH, BAL DIL, NON-VASCULAR: HCPCS

## 2024-11-11 PROCEDURE — 43249 ESOPH EGD DILATION <30 MM: CPT | Performed by: INTERNAL MEDICINE

## 2024-11-11 RX ORDER — PROPOFOL 10 MG/ML
INJECTION, EMULSION INTRAVENOUS AS NEEDED
Status: DISCONTINUED | OUTPATIENT
Start: 2024-11-11 | End: 2024-11-11

## 2024-11-11 RX ORDER — SODIUM CHLORIDE 9 MG/ML
INJECTION, SOLUTION INTRAVENOUS CONTINUOUS PRN
Status: DISCONTINUED | OUTPATIENT
Start: 2024-11-11 | End: 2024-11-11

## 2024-11-11 RX ORDER — LIDOCAINE HYDROCHLORIDE 20 MG/ML
INJECTION, SOLUTION EPIDURAL; INFILTRATION; INTRACAUDAL; PERINEURAL AS NEEDED
Status: DISCONTINUED | OUTPATIENT
Start: 2024-11-11 | End: 2024-11-11

## 2024-11-11 RX ADMIN — LIDOCAINE HYDROCHLORIDE 80 MG: 20 INJECTION, SOLUTION EPIDURAL; INFILTRATION; INTRACAUDAL; PERINEURAL at 09:27

## 2024-11-11 RX ADMIN — PROPOFOL 100 MG: 10 INJECTION, EMULSION INTRAVENOUS at 09:27

## 2024-11-11 RX ADMIN — PROPOFOL 20 MG: 10 INJECTION, EMULSION INTRAVENOUS at 09:30

## 2024-11-11 RX ADMIN — SODIUM CHLORIDE: 0.9 INJECTION, SOLUTION INTRAVENOUS at 09:20

## 2024-11-11 RX ADMIN — PROPOFOL 30 MG: 10 INJECTION, EMULSION INTRAVENOUS at 09:33

## 2024-11-11 NOTE — ANESTHESIA POSTPROCEDURE EVALUATION
Post-Op Assessment Note    CV Status:  Stable    Pain management: adequate       Mental Status:  Sleepy   Hydration Status:  Euvolemic   PONV Controlled:  Controlled   Airway Patency:  Patent     Post Op Vitals Reviewed: Yes    No anethesia notable event occurred.    Staff: CRNA       Last Filed PACU Vitals:  Vitals Value Taken Time   Temp     Pulse 61 11/11/24 0938   /53 11/11/24 0938   Resp 20 11/11/24 0938   SpO2 99 % 11/11/24 0938       Modified Hi:  No data recorded

## 2024-11-11 NOTE — H&P
History and Physical - [facility]  Ira Bonilla 79 y.o. female MRN: [id number]    HPI: Ira Bonilla is a 79 y.o. female who presents for upper endoscopy and dilation due to dysphagia after fundoplication and pyloroplasty    REVIEW OF SYSTEMS: Per the HPI, and otherwise unremarkable.    Historical Information   Past Medical History:   Diagnosis Date    Anxiety     Arthritis     Asthma     Cancer (HCC)     Candida infection, esophageal (HCC)     Chronic kidney disease     Chronic pain     COPD (chronic obstructive pulmonary disease) (HCC)     Depression     Depression     Gastroparesis     Gastroparesis     GERD (gastroesophageal reflux disease)     Hypertension     Irritable bowel syndrome (IBS)     Lactose intolerance Not sure    Migraines     MRSA (methicillin resistant Staphylococcus aureus)     Multiple thyroid nodules     Osteoporosis     Pneumonia     Psychiatric disorder      Past Surgical History:   Procedure Laterality Date    BACK SURGERY       SECTION      CHOLECYSTECTOMY      COLONOSCOPY  10/09/2013    ESOPHAGEAL DILATION       [facility]    ESOPHAGOGASTRODUODENOSCOPY N/A 2023    Procedure: ESOPHAGOGASTRODUODENOSCOPY (EGD);  Surgeon: Dontrell Mcmahon MD;  [facility];  Service: General    ESOPHAGOGASTRODUODENOSCOPY N/A 2024    Procedure: ESOPHAGOGASTRODUODENOSCOPY (EGD);  Surgeon: Shilpa Price MD;  [facility];  Service: Thoracic    HERNIA REPAIR  2013    HIATAL HERNIA REPAIR      NISSEN FUNDOPLICATION      WY BIOPSY LIVER NEEDLE PERCUTANEOUS N/A 2023    Procedure: EXCISION BIOPSY LIVER;  Surgeon: Dontrell Mcmahon MD;  [facility];  Service: General    WY ESOPHAGOGASTRODUODENOSCOPY TRANSORAL DIAGNOSTIC N/A 2019    Procedure: ESOPHAGOGASTRODUODENOSCOPY (EGD);  Surgeon: Geovanna Winn MD;  Location: [facility];  Service: Gastroenterology    WY ESOPHAGOGASTRODUODENOSCOPY TRANSORAL DIAGNOSTIC N/A 2023     Procedure: ESOPHAGOGASTRODUODENOSCOPY (EGD);  Surgeon: Shilpa Price MD;  [facility];  Service: Thoracic    OR ESOPHAGOSCOPY FLEX BALLOON DILAT <30 MM DIAM N/A 2023    Procedure: CRE WIRE GUIDED BALLOON DILATATION ESOPHAGEAL;  Surgeon: Shilpa Price MD;  [facility];  Service: Thoracic    OR ESOPHAGOSCOPY FLEX BALLOON DILAT <30 MM DIAM N/A 2024    Procedure: ESOPHAGOGASTRODUODENOSCOPY WITH BALLOON DILATION;  Surgeon: Shilpa Price MD;  [facility];  Service: Thoracic    OR LAPS RPR PARAESPHGL HRNA INCL FUNDPLSTY W/O MESH N/A 2023    Procedure: robotic takedown of nissen fundoplication, redo hiatal hernia, partial fundoplication, with mesh;  Surgeon: Shilpa Price MD;  [facility];  Service: Thoracic    OR PYLOROPLASTY N/A 2023    Procedure: PYLOROPLASTY LAPAROSCOPIC WITH ROBOT;  Surgeon: Dontrell Mcmahon MD;  [facility];  Service: General    ROTATOR CUFF REPAIR      SHOULDER SURGERY      SPINAL FUSION      TUBAL LIGATION  May 1975    Last child born    UPPER GASTROINTESTINAL ENDOSCOPY      US GUIDED THYROID BIOPSY      WISDOM TOOTH EXTRACTION       Social History   Social History     Substance and Sexual Activity   Alcohol Use Not Currently     Social History     Substance and Sexual Activity   Drug Use No     Social History     Tobacco Use   Smoking Status Former    Current packs/day: 0.00    Average packs/day: 1 pack/day for 15.0 years (15.0 ttl pk-yrs)    Types: Cigarettes    Start date: 10/5/1998    Quit date: 10/1/2003    Years since quittin.1   Smokeless Tobacco Former     Family History   Problem Relation Age of Onset    Other Mother         Esophageal disorder    Stomach cancer Father     Cancer Father     Hypertension Maternal Aunt     COPD Maternal Grandmother     Colon cancer Neg Hx     Colon polyps Neg Hx        Meds/Allergies       Current Outpatient Medications:     cetirizine (ZyrTEC) 10 MG chewable  tablet    cholecalciferol (VITAMIN D3) 1,000 units tablet    multivitamin (THERAGRAN) TABS    Omega-3 Fatty Acids (OMEGA 3 PO)    spironolactone (ALDACTONE) 25 mg tablet    Vortioxetine HBr (TRINTELLIX) 5 MG tablet    acetaminophen (TYLENOL) 160 mg/5 mL suspension    albuterol (PROVENTIL HFA,VENTOLIN HFA) 90 mcg/act inhaler    amLODIPine (NORVASC) 10 mg tablet    linaCLOtide 290 MCG CAPS    metoclopramide (Reglan) 10 mg tablet    ondansetron (ZOFRAN) 4 mg tablet    rizatriptan (MAXALT-MLT) 10 mg disintegrating tablet    Allergies   Allergen Reactions    Latex Hives    Augmentin [Amoxicillin-Pot Clavulanate] GI Intolerance    Bactrim [Sulfamethoxazole-Trimethoprim] GI Intolerance    Clarithromycin GI Intolerance    Doxycycline Hives    Neomycin GI Intolerance    Polymyxin B GI Intolerance    Zyvox [Linezolid] GI Bleeding    Cephalosporins GI Intolerance    Nsaids GI Intolerance    Penicillins GI Intolerance     Patient can only take levaquin and cipro    Prednisone GI Intolerance    Sulphasomidine GI Intolerance       Objective     BP (!) 173/86   Pulse 58   Temp (!) 96.8 °F (36 °C) (Temporal)   Resp 18   Ht 5' (1.524 m)   Wt 52.6 kg (116 lb)   SpO2 99%   BMI 22.65 kg/m²     PHYSICAL EXAM    General Appearance: NAD, cooperative, alert  Eyes: Anicteric  GI:  Soft, non-tender, non-distended; normal bowel sounds; no masses, no organomegaly   Rectal: Deferred until procedure  Musculoskeletal: No edema.  Skin:  No jaundice    ASSESSMENT/PLAN:  This is a 79 y.o. female here for upper endoscopy with dilation, and she is stable and optimized for her procedure.

## 2024-11-11 NOTE — ANESTHESIA PREPROCEDURE EVALUATION
Procedure:  EGD    Relevant Problems   CARDIO   (+) Hypertension   (+) Migraines      GI/HEPATIC   (+) Chronic GERD   (+) Dysphagia   (+) Esophageal dysphagia   (+) Gastroesophageal reflux disease without esophagitis   (+) Ileus (HCC)      /RENAL   (+) Stage 3b chronic kidney disease (HCC)      HEMATOLOGY   (+) Anemia      NEURO/PSYCH   (+) Anxiety   (+) Depression   (+) Migraines      PULMONARY   (+) Asthma   (+) COPD (chronic obstructive pulmonary disease) (HCC)        Physical Exam    Airway    Mallampati score: III  TM Distance: <3 FB  Neck ROM: full     Dental   Comment: None loose     Cardiovascular      Pulmonary      Other Findings  post-pubertal.      Anesthesia Plan  ASA Score- 3     Anesthesia Type- IV sedation with anesthesia with ASA Monitors.         Additional Monitors:     Airway Plan:     Comment: NPO after MN  Last solids: 48 hours ago  Last liquids: 18:00 on 11/10    Patient educated on the possibility for awareness under sedation and of the possibility of airway intervention in the event of an airway or procedural emergency  .       Plan Factors-Exercise tolerance (METS): >4 METS.    Chart reviewed.    Patient summary reviewed.    Patient is not a current smoker.              Induction- intravenous.    Postoperative Plan-         Informed Consent- Anesthetic plan and risks discussed with patient.  I personally reviewed this patient with the CRNA. Discussed and agreed on the Anesthesia Plan with the CRNA..

## 2024-11-18 ENCOUNTER — OFFICE VISIT (OUTPATIENT)
Dept: PSYCHIATRY | Facility: CLINIC | Age: 79
End: 2024-11-18
Payer: COMMERCIAL

## 2024-11-18 DIAGNOSIS — F41.1 GENERALIZED ANXIETY DISORDER: ICD-10-CM

## 2024-11-18 DIAGNOSIS — F32.1 CURRENT MODERATE EPISODE OF MAJOR DEPRESSIVE DISORDER, UNSPECIFIED WHETHER RECURRENT (HCC): Primary | ICD-10-CM

## 2024-11-18 PROCEDURE — 99214 OFFICE O/P EST MOD 30 MIN: CPT | Performed by: STUDENT IN AN ORGANIZED HEALTH CARE EDUCATION/TRAINING PROGRAM

## 2024-11-19 ENCOUNTER — TELEMEDICINE (OUTPATIENT)
Dept: PSYCHIATRY | Facility: CLINIC | Age: 79
End: 2024-11-19
Payer: COMMERCIAL

## 2024-11-19 DIAGNOSIS — F43.29 GRIEF REACTION WITH PROLONGED BEREAVEMENT: Primary | ICD-10-CM

## 2024-11-19 DIAGNOSIS — F41.9 ANXIETY: ICD-10-CM

## 2024-11-19 DIAGNOSIS — F32.0 CURRENT MILD EPISODE OF MAJOR DEPRESSIVE DISORDER, UNSPECIFIED WHETHER RECURRENT (HCC): ICD-10-CM

## 2024-11-19 PROCEDURE — 90834 PSYTX W PT 45 MINUTES: CPT

## 2024-11-19 NOTE — PSYCH
MEDICATION MANAGEMENT NOTE        [facility]      Name and Date of Birth:  Ira Bonilla 79 y.o. 1945 MRN: [id number]    Date of Visit: November 19, 2024    Reason for Visit: Follow-up visit for medication management           SUBJECTIVE:    Ira Bonilla is a 79 y.o. female with past psychiatric history significant for MDD, WINDY who was personally seen and evaluated today at the [facility] outpatient clinic for follow-up and medication management. Ira denies SI, HI, AVH, delusions, jose since her last appointment.  She did not note any side effects to Trintellix and after discussion of risks, benefits, potential side effects, alternatives, we will increase to 10 mg daily for 3 weeks followed by 20 mg thereafter to better control patient's feelings of depression.  She denies acute mental complaints concerns at this time        Current Rating Scores:     Current PHQ-9   PHQ-2/9 Depression Screening           Current WINDY-7 is .    Review Of Systems:      Constitutional negative   ENT negative   Cardiovascular negative   Respiratory negative   Gastrointestinal negative   Genitourinary negative   Musculoskeletal negative aside from intermittent muscle aches   Integumentary negative   Neurological negative   Endocrine negative   Other Symptoms none, all other systems are negative       Past Psychiatric History: (unchanged information from previous note copied and italicized) - Information that is bolded has been updated.     See intake    Substance Abuse History: (unchanged information from previous note copied and italicized) - Information that is bolded has been updated.     See intake    Social History: (unchanged information from previous note copied and italicized) - Information that is bolded has been updated.     See intake    Traumatic History: (unchanged information from previous note copied and italicized) - Information  that is bolded has been updated.     See intake      Past Medical History:    Past Medical History:   Diagnosis Date    Anxiety     Arthritis     Asthma     Cancer (HCC)     Candida infection, esophageal (HCC)     Chronic kidney disease     Chronic pain     COPD (chronic obstructive pulmonary disease) (HCC)     Depression     Depression     Gastroparesis     Gastroparesis     GERD (gastroesophageal reflux disease)     Hypertension     Irritable bowel syndrome (IBS)     Lactose intolerance Not sure    Migraines     MRSA (methicillin resistant Staphylococcus aureus)     Multiple thyroid nodules     Osteoporosis     Pneumonia     Psychiatric disorder         Past Surgical History:   Procedure Laterality Date    BACK SURGERY       SECTION      CHOLECYSTECTOMY      COLONOSCOPY  10/09/2013    ESOPHAGEAL DILATION       [facility]    ESOPHAGOGASTRODUODENOSCOPY N/A 2023    Procedure: ESOPHAGOGASTRODUODENOSCOPY (EGD);  Surgeon: Dontrell Mcmahon MD;  [facility];  Service: General    ESOPHAGOGASTRODUODENOSCOPY N/A 2024    Procedure: ESOPHAGOGASTRODUODENOSCOPY (EGD);  Surgeon: Shilpa Price MD;  Location: [facility];  Service: Thoracic    HERNIA REPAIR  2013    HIATAL HERNIA REPAIR      NISSEN FUNDOPLICATION      [place] BIOPSY LIVER NEEDLE PERCUTANEOUS N/A 2023    Procedure: EXCISION BIOPSY LIVER;  Surgeon: Dontrell Mcmahon MD;  Location: [facility];  Service: General    NY ESOPHAGOGASTRODUODENOSCOPY TRANSORAL DIAGNOSTIC N/A 2019    Procedure: ESOPHAGOGASTRODUODENOSCOPY (EGD);  Surgeon: Geovanna Winn MD;  Location: [facility];  Service: Gastroenterology    NY ESOPHAGOGASTRODUODENOSCOPY TRANSORAL DIAGNOSTIC N/A 2023    Procedure: ESOPHAGOGASTRODUODENOSCOPY (EGD);  Surgeon: Shilpa Price MD;  [facility];  Service: Thoracic    NY ESOPHAGOSCOPY FLEX BALLOON DILAT <30 MM DIAM N/A 2023    Procedure: CRE WIRE GUIDED BALLOON DILATATION ESOPHAGEAL;  Surgeon:  Shilpa Price MD;  [facility];  Service: Thoracic    SC ESOPHAGOSCOPY FLEX BALLOON DILAT <30 MM DIAM N/A 03/20/2024    Procedure: ESOPHAGOGASTRODUODENOSCOPY WITH BALLOON DILATION;  Surgeon: Shilpa Price MD;  [facility];  Service: Thoracic    SC LAPS RPR PARAESPHGL HRNA INCL FUNDPLSTY W/O MESH N/A 11/08/2023    Procedure: robotic takedown of nissen fundoplication, redo hiatal hernia, partial fundoplication, with mesh;  Surgeon: Shilpa Price MD;  [facility];  Service: Thoracic    SC PYLOROPLASTY N/A 11/08/2023    Procedure: PYLOROPLASTY LAPAROSCOPIC WITH ROBOT;  Surgeon: Dontrell Mcmahon MD;  [facility];  Service: General    ROTATOR CUFF REPAIR      SHOULDER SURGERY      SPINAL FUSION      TUBAL LIGATION  May 1975    Last child born    UPPER GASTROINTESTINAL ENDOSCOPY      US GUIDED THYROID BIOPSY      WISDOM TOOTH EXTRACTION       Allergies   Allergen Reactions    Latex Hives    Augmentin [Amoxicillin-Pot Clavulanate] GI Intolerance    Bactrim [Sulfamethoxazole-Trimethoprim] GI Intolerance    Clarithromycin GI Intolerance    Doxycycline Hives    Neomycin GI Intolerance    Polymyxin B GI Intolerance    Zyvox [Linezolid] GI Bleeding    Cephalosporins GI Intolerance    Nsaids GI Intolerance    Penicillins GI Intolerance     Patient can only take levaquin and cipro    Prednisone GI Intolerance    Sulphasomidine GI Intolerance       Substance Abuse History:    Social History     Substance and Sexual Activity   Alcohol Use Not Currently     Social History     Substance and Sexual Activity   Drug Use No       Social History:    Social History     Socioeconomic History    Marital status:      Spouse name: Not on file    Number of children: Not on file    Years of education: Not on file    Highest education level: Not on file   Occupational History    Not on file   Tobacco Use    Smoking status: Former     Current packs/day: 0.00     Average packs/day: 1  pack/day for 15.0 years (15.0 ttl pk-yrs)     Types: Cigarettes     Start date: 10/5/1998     Quit date: 10/1/2003     Years since quittin.1    Smokeless tobacco: Former   Vaping Use    Vaping status: Never Used   Substance and Sexual Activity    Alcohol use: Not Currently    Drug use: No    Sexual activity: Not Currently     Partners: Male     Birth control/protection: None   Other Topics Concern    Not on file   Social History Narrative    Not on file     Social Drivers of Health     Financial Resource Strain: Not on file   Food Insecurity: No Food Insecurity (3/22/2024)    Nursing - Inadequate Food Risk Classification     Worried About Running Out of Food in the Last Year: Never true     Ran Out of Food in the Last Year: Never true     Ran Out of Food in the Last Year: Not on file   Transportation Needs: No Transportation Needs (3/22/2024)    PRAPARE - Transportation     Lack of Transportation (Medical): No     Lack of Transportation (Non-Medical): No   Physical Activity: Not on file   Stress: Not on file   Social Connections: Not on file   Intimate Partner Violence: Not on file   Housing Stability: Low Risk  (3/22/2024)    Housing Stability Vital Sign     Unable to Pay for Housing in the Last Year: No     Number of Times Moved in the Last Year: 1     Homeless in the Last Year: No       Family Psychiatric History:     Family History   Problem Relation Age of Onset    Other Mother         Esophageal disorder    Stomach cancer Father     Cancer Father     Hypertension Maternal Aunt     COPD Maternal Grandmother     Colon cancer Neg Hx     Colon polyps Neg Hx        History Review: The following portions of the patient's history were reviewed and updated as appropriate: allergies, current medications, past family history, past medical history, past social history, past surgical history, and problem list.         OBJECTIVE:     Vital signs in last 24 hours:    There were no vitals filed for this visit.    Mental  Status Evaluation:    Appearance age appropriate, casually dressed   Behavior cooperative, mildly anxious   Speech normal rate, normal volume, normal pitch   Mood dysphoric   Affect constricted   Thought Processes organized, goal directed   Associations intact associations   Thought Content no overt delusions   Perceptual Disturbances: no auditory hallucinations, no visual hallucinations   Abnormal Thoughts  Risk Potential Suicidal ideation - None at present  Homicidal ideation - None at present  Potential for aggression - Not at present   Orientation oriented to person, place, time/date, and situation   Memory recent and remote memory grossly intact   Consciousness alert and awake   Attention Span Concentration Span attention span and concentration are age appropriate   Intellect appears to be of average intelligence   Insight intact   Judgement intact   Muscle Strength and  Gait Unable to evaluate secondary to virtual appointment   Motor activity Unable to evaluate secondary to virtual appointment   Language no difficulty naming common objects, no difficulty repeating a phrase   Fund of Knowledge adequate knowledge of current events  adequate fund of knowledge regarding past history  adequate fund of knowledge regarding vocabulary    Pain none   Pain Scale Did not ask patient to formally rate       Laboratory Results: I have personally reviewed all pertinent laboratory/tests results    Recent Labs (last 2 months):   No visits with results within 2 Month(s) from this visit.   Latest known visit with results is:   Admission on 03/21/2024, Discharged on 03/26/2024   Component Date Value    WBC 03/22/2024 7.73     RBC 03/22/2024 3.29 (L)     Hemoglobin 03/22/2024 10.3 (L)     Hematocrit 03/22/2024 32.1 (L)     MCV 03/22/2024 98     MCH 03/22/2024 31.3     MCHC 03/22/2024 32.1     RDW 03/22/2024 13.6     MPV 03/22/2024 9.0     Platelets 03/22/2024 231     nRBC 03/22/2024 0     Segmented % 03/22/2024 52     Immature  Grans % 03/22/2024 1     Lymphocytes % 03/22/2024 39     Monocytes % 03/22/2024 6     Eosinophils Relative 03/22/2024 2     Basophils Relative 03/22/2024 0     Absolute Neutrophils 03/22/2024 4.09     Absolute Immature Grans 03/22/2024 0.04     Absolute Lymphocytes 03/22/2024 2.99     Absolute Monocytes 03/22/2024 0.45     Eosinophils Absolute 03/22/2024 0.14     Basophils Absolute 03/22/2024 0.02     Sodium 03/22/2024 140     Potassium 03/22/2024 4.1     Chloride 03/22/2024 107     CO2 03/22/2024 26     ANION GAP 03/22/2024 7     BUN 03/22/2024 22     Creatinine 03/22/2024 1.50 (H)     Glucose 03/22/2024 190 (H)     Calcium 03/22/2024 9.3     AST 03/22/2024 38     ALT 03/22/2024 35     Alkaline Phosphatase 03/22/2024 123 (H)     Total Protein 03/22/2024 7.6     Albumin 03/22/2024 4.0     Total Bilirubin 03/22/2024 0.25     eGFR 03/22/2024 33     Lipase 03/22/2024 <6 (L)     hs TnI 0hr 03/22/2024 3     Sodium 03/22/2024 139     Potassium 03/22/2024 4.5     Chloride 03/22/2024 108     CO2 03/22/2024 25     ANION GAP 03/22/2024 6     BUN 03/22/2024 21     Creatinine 03/22/2024 1.37 (H)     Glucose 03/22/2024 105     Calcium 03/22/2024 8.6     eGFR 03/22/2024 37     Magnesium 03/22/2024 1.8 (L)     Phosphorus 03/22/2024 3.5     Total Bilirubin 03/22/2024 0.21     Bilirubin, Direct 03/22/2024 0.06     Alkaline Phosphatase 03/22/2024 108 (H)     AST 03/22/2024 29     ALT 03/22/2024 29     Total Protein 03/22/2024 6.5     Albumin 03/22/2024 3.5     POC Glucose 03/22/2024 119     POC Glucose 03/22/2024 121     WBC 03/23/2024 5.40     RBC 03/23/2024 3.06 (L)     Hemoglobin 03/23/2024 9.7 (L)     Hematocrit 03/23/2024 29.4 (L)     MCV 03/23/2024 96     MCH 03/23/2024 31.7     MCHC 03/23/2024 33.0     RDW 03/23/2024 13.6     MPV 03/23/2024 8.4 (L)     Platelets 03/23/2024 169     nRBC 03/23/2024 0     Segmented % 03/23/2024 53     Immature Grans % 03/23/2024 0     Lymphocytes % 03/23/2024 39     Monocytes % 03/23/2024 6      Eosinophils Relative 03/23/2024 2     Basophils Relative 03/23/2024 0     Absolute Neutrophils 03/23/2024 2.86     Absolute Immature Grans 03/23/2024 0.02     Absolute Lymphocytes 03/23/2024 2.08     Absolute Monocytes 03/23/2024 0.31     Eosinophils Absolute 03/23/2024 0.11     Basophils Absolute 03/23/2024 0.02     Sodium 03/23/2024 139     Potassium 03/23/2024 4.2     Chloride 03/23/2024 106     CO2 03/23/2024 27     ANION GAP 03/23/2024 6     BUN 03/23/2024 17     Creatinine 03/23/2024 1.27     Glucose 03/23/2024 95     Calcium 03/23/2024 8.9     Corrected Calcium 03/23/2024 9.5     AST 03/23/2024 42 (H)     ALT 03/23/2024 33     Alkaline Phosphatase 03/23/2024 101     Total Protein 03/23/2024 6.5     Albumin 03/23/2024 3.3 (L)     Total Bilirubin 03/23/2024 0.37     eGFR 03/23/2024 40     Magnesium 03/23/2024 2.1     POC Glucose 03/23/2024 84     Ventricular Rate 03/22/2024 82     Atrial Rate 03/22/2024 82     NJ Interval 03/22/2024 140     QRSD Interval 03/22/2024 120     QT Interval 03/22/2024 396     QTC Interval 03/22/2024 462     P Axis 03/22/2024 50     QRS Axis 03/22/2024 17     T Wave [place] 03/22/2024 30     POC Glucose 03/23/2024 102     WBC 03/24/2024 4.84     RBC 03/24/2024 2.95 (L)     Hemoglobin 03/24/2024 9.4 (L)     Hematocrit 03/24/2024 28.4 (L)     MCV 03/24/2024 96     MCH 03/24/2024 31.9     MCHC 03/24/2024 33.1     RDW 03/24/2024 13.3     MPV 03/24/2024 8.7 (L)     Platelets 03/24/2024 177     nRBC 03/24/2024 0     Segmented % 03/24/2024 50     Immature Grans % 03/24/2024 1     Lymphocytes % 03/24/2024 41     Monocytes % 03/24/2024 6     Eosinophils Relative 03/24/2024 2     Basophils Relative 03/24/2024 0     Absolute Neutrophils 03/24/2024 2.49     Absolute Immature Grans 03/24/2024 0.03     Absolute Lymphocytes 03/24/2024 1.96     Absolute Monocytes 03/24/2024 0.27     Eosinophils Absolute 03/24/2024 0.08     Basophils Absolute 03/24/2024 0.01     Sodium 03/24/2024 139     Potassium  03/24/2024 4.4     Chloride 03/24/2024 105     CO2 03/24/2024 30     ANION GAP 03/24/2024 4     BUN 03/24/2024 18     Creatinine 03/24/2024 1.49 (H)     Glucose 03/24/2024 97     Calcium 03/24/2024 9.1     eGFR 03/24/2024 33     POC Glucose 03/24/2024 97     POC Glucose 03/25/2024 104     POC Glucose 03/25/2024 102     Salmonella sp PCR 03/25/2024 Negative     Shigella sp/Enteroinvasi* 03/25/2024 Negative     Campylobacter sp (jejuni* 03/25/2024 Negative     Shiga toxin 1/Shiga toxi* 03/25/2024 Negative     C.difficile toxin by PCR 03/25/2024 Negative     POC Glucose 03/25/2024 76     POC Glucose 03/25/2024 230 (H)     WBC 03/26/2024 4.66     RBC 03/26/2024 2.80 (L)     Hemoglobin 03/26/2024 8.8 (L)     Hematocrit 03/26/2024 26.9 (L)     MCV 03/26/2024 96     MCH 03/26/2024 31.4     MCHC 03/26/2024 32.7     RDW 03/26/2024 13.4     MPV 03/26/2024 8.5 (L)     Platelets 03/26/2024 150     nRBC 03/26/2024 0     Segmented % 03/26/2024 48     Immature Grans % 03/26/2024 0     Lymphocytes % 03/26/2024 43     Monocytes % 03/26/2024 6     Eosinophils Relative 03/26/2024 3     Basophils Relative 03/26/2024 0     Absolute Neutrophils 03/26/2024 2.24     Absolute Immature Grans 03/26/2024 0.02     Absolute Lymphocytes 03/26/2024 1.99     Absolute Monocytes 03/26/2024 0.28     Eosinophils Absolute 03/26/2024 0.12     Basophils Absolute 03/26/2024 0.01     Sodium 03/26/2024 138     Potassium 03/26/2024 4.1     Chloride 03/26/2024 106     CO2 03/26/2024 27     ANION GAP 03/26/2024 5     BUN 03/26/2024 14     Creatinine 03/26/2024 1.71 (H)     Glucose 03/26/2024 95     Calcium 03/26/2024 8.8     Corrected Calcium 03/26/2024 9.4     AST 03/26/2024 28     ALT 03/26/2024 22     Alkaline Phosphatase 03/26/2024 86     Total Protein 03/26/2024 6.2 (L)     Albumin 03/26/2024 3.2 (L)     Total Bilirubin 03/26/2024 0.28     eGFR 03/26/2024 28     Magnesium 03/26/2024 1.9        Suicide/Homicide Risk Assessment:    Risk of Harm to Self:  The  following ratings are based on assessment at the time of the interview  Historical Risk Factors include: chronic psychiatric problems  Protective Factors: no current suicidal ideation, access to mental health treatment, being a parent, compliant with medications, compliant with mental health treatment, connection to own children, having a desire to be alive, responsibilities and duties to others, stable living environment, strong relationships    Risk of Harm to Others:  The following ratings are based on assessment at the time of the interview  Historical Risk Factors include: none.  Protective Factors: no current homicidal ideation    The following interventions are recommended: contracts for safety at present - agrees to go to ED if feeling unsafe, contracts for safety at present - agrees to call Crisis Intervention Service if feeling unsafe      Lethality Statement:    Based on today's assessment and clinical criteria, Ira Bonilla contracts for safety and is not an imminent risk of harm to self or others. Outpatient level of care is deemed appropriate at this current time. Ira understands that if they can no longer contract for safety, they need to call the office or report to their nearest Emergency Room for immediate evaluation. They voiced understanding and agreement to call 911 or head to the nearest ED should they have any physical or mental decompensation whatsoever.       Assessment/Plan:     1.) MDD, recurrent, moderate  2.) WINDY  3.)      After discussion of risks, benefits, potential side effects, alternatives, we will increase Trintellix to 10 mg daily for 3 weeks followed by 20 mg thereafter assuming tolerability.  Patient will continue to meet regularly with her therapist.  She denies acute mental complaints or concerns at this time          Aware of 24 hour and weekend coverage for urgent situations accessed by calling [facility] main practice number    Medications  Risks/Benefits      Risks, Benefits And Possible Side Effects Of Medications:    Risks, benefits, and possible side effects of medications explained to Ira and she verbalizes understanding and agreement for treatment.    Controlled Medication Discussion:     Not applicable - controlled prescriptions are not prescribed by this practice    Psychotherapy Provided:     Individual psychotherapy provided: Crisis/safety plan discussed with Ira.     Treatment Plan:    Completed and signed during the session: Not applicable - Treatment Plan to be completed by [facility] therapist      Visit Time    Visit Start Time: 11:30 AM  Visit Stop Time: 11:50 AM  Total Visit Duration:  20 minutes     The total visit duration detailed above includes: patient engagement, medication management, psychotherapy/counseling, discussion regarding treatment goals, documentation, review of past medical records, and coordination of care.      Note Share Disclaimer:     This note was not shared with the patient due to reasonable likelihood of causing patient harm      Edward Hinkle DO  Psychiatry  11/19/24

## 2024-11-19 NOTE — PSYCH
Virtual Regular Visit    Verification of patient location:    Patient is located at Home in the following state in which I hold an active license [place]      Assessment/Plan:    Problem List Items Addressed This Visit       Depression    Anxiety    Grief reaction with prolonged bereavement - Primary       Goals addressed in session: Goal 1          Reason for visit is   Chief Complaint   Patient presents with    Virtual Regular Visit          Encounter provider Maria Ines Farfan LCSW      Recent Visits  No visits were found meeting these conditions.  Showing recent visits within past 7 days and meeting all other requirements  Today's Visits  Date Type Provider Dept   11/19/24 Telemedicine Maria Ines Farfan LCSW [facility]   Showing today's visits and meeting all other requirements  Future Appointments  No visits were found meeting these conditions.  Showing future appointments within next 150 days and meeting all other requirements       The patient was identified by name and date of birth. Ira Bonilla was informed that this is a telemedicine visit and that the visit is being conducted throughthe [email or web address] platform. She agrees to proceed..  My office door was closed. No one else was in the room.  She acknowledged consent and understanding of privacy and security of the video platform. The patient has agreed to participate and understands they can discontinue the visit at any time.    Patient is aware this is a billable service.     Subjective  Ira Bonilla is a 79 y.o. female  .      HPI     Past Medical History:   Diagnosis Date    Anxiety     Arthritis     Asthma     Cancer (HCC)     Candida infection, esophageal (HCC)     Chronic kidney disease     Chronic pain     COPD (chronic obstructive pulmonary disease) (HCC)     Depression     Depression     Gastroparesis     Gastroparesis     GERD (gastroesophageal reflux disease)     Hypertension     Irritable bowel syndrome (IBS)      Lactose intolerance Not sure    Migraines     MRSA (methicillin resistant Staphylococcus aureus)     Multiple thyroid nodules     Osteoporosis     Pneumonia     Psychiatric disorder        Past Surgical History:   Procedure Laterality Date    BACK SURGERY       SECTION      CHOLECYSTECTOMY      COLONOSCOPY  10/09/2013    ESOPHAGEAL DILATION       [facility]    ESOPHAGOGASTRODUODENOSCOPY N/A 2023    Procedure: ESOPHAGOGASTRODUODENOSCOPY (EGD);  Surgeon: Dontrell Mcmahon MD;  [facility];  Service: General    ESOPHAGOGASTRODUODENOSCOPY N/A 2024    Procedure: ESOPHAGOGASTRODUODENOSCOPY (EGD);  Surgeon: Shilpa Price MD;  [facility];  Service: Thoracic    HERNIA REPAIR  2013    HIATAL HERNIA REPAIR      NISSEN FUNDOPLICATION      CA BIOPSY LIVER NEEDLE PERCUTANEOUS N/A 2023    Procedure: EXCISION BIOPSY LIVER;  Surgeon: Dontrell Mcmahon MD;  [facility];  Service: General    CA ESOPHAGOGASTRODUODENOSCOPY TRANSORAL DIAGNOSTIC N/A 2019    Procedure: ESOPHAGOGASTRODUODENOSCOPY (EGD);  Surgeon: Geovanna Winn MD;  [facility];  Service: Gastroenterology    CA ESOPHAGOGASTRODUODENOSCOPY TRANSORAL DIAGNOSTIC N/A 2023    Procedure: ESOPHAGOGASTRODUODENOSCOPY (EGD);  Surgeon: Shilpa Price MD;  [facility];  Service: Thoracic    CA ESOPHAGOSCOPY FLEX BALLOON DILAT <30 MM DIAM N/A 2023    Procedure: CRE WIRE GUIDED BALLOON DILATATION ESOPHAGEAL;  Surgeon: Shilpa Price MD;  [facility];  Service: Thoracic    CA ESOPHAGOSCOPY FLEX BALLOON DILAT <30 MM DIAM N/A 2024    Procedure: ESOPHAGOGASTRODUODENOSCOPY WITH BALLOON DILATION;  Surgeon: Shilpa Price MD;  [facility];  Service: Thoracic    CA LAPS RPR PARAESPHGL HRNA INCL FUNDPLSTY W/O MESH N/A 2023    Procedure: robotic takedown of nissen fundoplication, redo hiatal hernia, partial fundoplication, with mesh;  Surgeon: Shilpa CARLSON  MD Albert;  [facility];  Service: Thoracic    W[place] PYLOROPLASTY N/A 11/08/2023    Procedure: PYLOROPLASTY LAPAROSCOPIC WITH ROBOT;  Surgeon: Dontrell Mcmahon MD;  [facility];  Service: General    ROTATOR CUFF REPAIR      SHOULDER SURGERY      SPINAL FUSION      TUBAL LIGATION  May 1975    Last child born    UPPER GASTROINTESTINAL ENDOSCOPY      US GUIDED THYROID BIOPSY      WISDOM TOOTH EXTRACTION         Current Outpatient Medications   Medication Sig Dispense Refill    acetaminophen (TYLENOL) 160 mg/5 mL suspension Take 20.3 mL (650 mg total) by mouth every 6 (six) hours as needed for mild pain 355 mL 0    albuterol (PROVENTIL HFA,VENTOLIN HFA) 90 mcg/act inhaler Inhale 2 puffs if needed      amLODIPine (NORVASC) 10 mg tablet       cetirizine (ZyrTEC) 10 MG chewable tablet Chew 10 mg daily.      cholecalciferol (VITAMIN D3) 1,000 units tablet Take 5,000 Units by mouth daily      linaCLOtide 290 MCG CAPS Take 1 capsule by mouth in the morning 30 capsule 1    metoclopramide (Reglan) 10 mg tablet Take 1 tablet (10 mg total) by mouth 4 (four) times a day 120 tablet 1    multivitamin (THERAGRAN) TABS Take 1 tablet by mouth daily      Omega-3 Fatty Acids (OMEGA 3 PO) Take by mouth in the morning      ondansetron (ZOFRAN) 4 mg tablet Take 1 tablet (4 mg total) by mouth every 8 (eight) hours as needed for nausea or vomiting 15 tablet 0    rizatriptan (MAXALT-MLT) 10 mg disintegrating tablet as needed for migraine headache      spironolactone (ALDACTONE) 25 mg tablet 1/2 tablet Orally Once per day for 30 days      vortioxetine (Trintellix) 10 MG tablet Take 1 tablet (10 mg total) by mouth daily for 21 days 21 tablet 0    [START ON 12/6/2024] Vortioxetine HBr (Trintellix) 20 MG tablet Take 1 tablet (20 mg total) by mouth daily Do not start before December 6, 2024. 30 tablet 0     No current facility-administered medications for this visit.        Allergies   Allergen Reactions    Latex Hives    Augmentin  "[Amoxicillin-Pot Clavulanate] GI Intolerance    Bactrim [Sulfamethoxazole-Trimethoprim] GI Intolerance    Clarithromycin GI Intolerance    Doxycycline Hives    Neomycin GI Intolerance    Polymyxin B GI Intolerance    Zyvox [Linezolid] GI Bleeding    Cephalosporins GI Intolerance    Nsaids GI Intolerance    Penicillins GI Intolerance     Patient can only take levaquin and cipro    Prednisone GI Intolerance    Sulphasomidine GI Intolerance       Review of Systems    Video Exam    There were no vitals filed for this visit.    Physical Exam     Behavioral Health Psychotherapy Progress Note    Psychotherapy Provided: Individual Psychotherapy     1. Grief reaction with prolonged bereavement        2. Anxiety        3. Current mild episode of major depressive disorder, unspecified whether recurrent (HCC)            Goals addressed in session: Goal 1     DATA: Wendy shared she often does not want to get out of bed and feels she has no \"get up and go.\"  Therapist suggested she call the [employer] to talk to them about getting a part time job.  She said she might.  She talked about Dario saying that he \"hates it here and I hate Art.\" Art talked to him about it and he expressed feelings of missing his mother as well.  Wendy expressed some feelings and thoughts about missing her daughter and how she feels about talking with Dario about her.  Therapist encouraged her to continue to talk with Dario about her.  She shared she is worried about Wandi following in his mother's footsteps of drug abuse.  She talked about Wandi expressing that nobody else wanted to take him in and correcting this by telling him that people did want him.  Therapist talked with Wendy about actions she can take to improve her depression and activity level including calling the [employer] for an intake and going to the senior center in her area to be social.  She agreed to do both of these activities in the next few weeks.  She explored some thoughts and feelings about " "getting a part time job.    During this session, this clinician used the following therapeutic modalities: Client-centered Therapy, Solution-Focused Therapy, and Supportive Psychotherapy    Substance Abuse was not addressed during this session. If the client is diagnosed with a co-occurring substance use disorder, please indicate any changes in the frequency or amount of use: . Stage of change for addressing substance use diagnoses: No substance use/Not applicable    ASSESSMENT:  Wendy Bonilla presents with a Euthymic/ normal mood.     her affect is Normal range and intensity, which is congruent, with her mood and the content of the session. The client has made progress on their goals.     Wendy Bonilla presents with a none risk of suicide, none risk of self-harm, and none risk of harm to others.    For any risk assessment that surpasses a \"low\" rating, a safety plan must be developed.    A safety plan was indicated: no  If yes, describe in detail     PLAN: Between sessions, Wendy Bonilla will call the [facility], try out the [facility] center to improve her mood. At the next session, the therapist will use Client-centered Therapy, Dialectical Behavior Therapy, Solution-Focused Therapy, and Supportive Psychotherapy to address depression, grief reaction.    Behavioral Health Treatment Plan and Discharge Planning: Wendy Bonilla is aware of and agrees to continue to work on their treatment plan. They have identified and are working toward their discharge goals. yes    Visit start and stop times:    11/19/24  Start Time: 1100  Stop Time: 1151  Total Visit Time: 51 minutes        "

## 2024-12-17 ENCOUNTER — TELEMEDICINE (OUTPATIENT)
Dept: PSYCHIATRY | Facility: CLINIC | Age: 79
End: 2024-12-17
Payer: COMMERCIAL

## 2024-12-17 DIAGNOSIS — F43.29 GRIEF REACTION WITH PROLONGED BEREAVEMENT: ICD-10-CM

## 2024-12-17 DIAGNOSIS — F32.1 CURRENT MODERATE EPISODE OF MAJOR DEPRESSIVE DISORDER, UNSPECIFIED WHETHER RECURRENT (HCC): Primary | ICD-10-CM

## 2024-12-17 DIAGNOSIS — F41.9 ANXIETY: ICD-10-CM

## 2024-12-17 PROCEDURE — 90834 PSYTX W PT 45 MINUTES: CPT

## 2024-12-17 NOTE — BH TREATMENT PLAN
Outpatient Behavioral Health Psychotherapy Treatment Plan    Wendy Bonilla  1945     Date of Initial Psychotherapy Assessment: 1/10/2024   Date of Current Treatment Plan: 12/17/24  Treatment Plan Target Date: 7/3/2025  Treatment Plan Expiration Date: 1/3/2025    Diagnosis:   1. Current moderate episode of major depressive disorder, unspecified whether recurrent (HCC)        2. Anxiety        3. Grief reaction with prolonged bereavement            Area(s) of Need: Depression, lack of interest in things, family issues, grief     Long Term Goal 1 (in the client's own words): I want to feel better about myself    Stage of Change: Contemplation    Target Date for completion: 1/25/2025     Anticipated therapeutic modalities: supportive therapy, grief therapy     People identified to complete this goal: deb Roth      Objective 1: (identify the means of measuring success in meeting the objective): Therapist will engage Ira in supportive and grief therapy to work through her feelings and thoughts and improve her self esteem.  She will report back verbally improved self esteem and mood at least 5 out of 7 days.    Update 7/3/2024:  Bryan is able to openly communicate her thoughts and feelings about her family situation, grief reaction, and her depression.  She shares it helps her to talk about her feelings, to have talks about encouraging her in getting out more and socializing more, and to openly communicate her thoughts and feelings with her family about her role in the family.      Update 12/17/2024:  Ira has been openly communicating thoughts and feelings about her family conflicts, her loss of her daughter and her health.  She has agreed to work on behavioral activation for improved depression including socializing more, possibly finding a part time job through the [employer], and going to the [employer].                              I am currently under the care of a [facility] psychiatric provider:  no    My [facility]'s psychiatric provider is:     I am currently taking psychiatric medications: Yes, as prescribed    I feel that I will be ready for discharge from mental health care when I reach the following (measurable goal/objective): When my self esteem is higher and my moods are improved at least 5 out of 7 days    For children and adults who have a legal guardian:   Has there been any change to custody orders and/or guardianship status? NA. If yes, attach updated documentation.    I have created my Crisis Plan and have been offered a copy of this plan    Behavioral Health Treatment Plan [facility]: Diagnosis and Treatment Plan explained to Wendy Bonilla acknowledges an understanding of their diagnosis. Wendy Bonilla agrees to this treatment plan.    I have been offered a copy of this Treatment Plan. Yes    Wendy Bonilla, 1945, actively participated in the creation of this treatment plan during a virtual session, using the [employer] platform.   Wendy Bonilla  provided verbal consent on 12/17/2024 at 1:30 PM. The treatment plan was transcribed into the Electronic Health Record at a later time.

## 2024-12-17 NOTE — PSYCH
"Behavioral Health Psychotherapy Progress Note    Psychotherapy Provided: Individual Psychotherapy     1. Current moderate episode of major depressive disorder, unspecified whether recurrent (HCC)        2. Anxiety        3. Grief reaction with prolonged bereavement            Goals addressed in session: Goal 1     DATA: Wendy shared she is feeling sick with her stomach and that she did not yet call the [facility] because she had lost the number for the office.  Therapist reminded her of the number and to ask for an intake to talk about getting a part time job.  She shared she has been thinking about her daughter who passed and this makes her feel sad.  She talked about some other family dynamics including Dario's relationship with his half sisters and bio dad.  She shared she has \"no interest\" things including the holidays.  She used to like it when it was just her and Dario and not now because she doesn't feel like she is part of the household due to her son and his wife doing most things and not including her in terms of asking her opinions and with her son who keeps yelling at Dario.  Dario has been getting into trouble at school for \"being nasty\" with teachers.  Therapist gave suggestion for her to talk with Dario in a non threatening way and to encourage him in terms of what he does right and what is good about him.   During this session, this clinician used the following therapeutic modalities: Client-centered Therapy and Supportive Psychotherapy    Substance Abuse was not addressed during this session. If the client is diagnosed with a co-occurring substance use disorder, please indicate any changes in the frequency or amount of use: . Stage of change for addressing substance use diagnoses: No substance use/Not applicable    ASSESSMENT:  Wendy Bonilla presents with a Euthymic/ normal mood.     her affect is Normal range and intensity, which is congruent, with her mood and the content of the session. The client has " "made progress on their goals.     Wendy Bonilla presents with a none risk of suicide, none risk of self-harm, and none risk of harm to others.    For any risk assessment that surpasses a \"low\" rating, a safety plan must be developed.    A safety plan was indicated: no  If yes, describe in detail     PLAN: Between sessions, Wendy Bonilla will utilize behavioral activation for reduce depression. At the next session, the therapist will use Client-centered Therapy and Dialectical Behavior Therapy to address depression.    Behavioral Health Treatment Plan and Discharge Planning: Wendy Bonilla is aware of and agrees to continue to work on their treatment plan. They have identified and are working toward their discharge goals. yes    Depression Follow-up Plan Completed: Yes    Visit start and stop times:    12/17/24  Start Time: 1100  Stop Time: 1152  Total Visit Time: 52 minutes  Virtual Regular Visit    Verification of patient location:    Patient is located at Home in the following state in which I hold an active license [place]      Assessment/Plan:    Problem List Items Addressed This Visit       Depression - Primary    Anxiety    Grief reaction with prolonged bereavement       Goals addressed in session: Goal 1     Depression Follow-up Plan Completed: Yes    Reason for visit is   Chief Complaint   Patient presents with    Virtual Regular Visit          Encounter provider Maria Ines Farfan LCSW      Recent Visits  No visits were found meeting these conditions.  Showing recent visits within past 7 days and meeting all other requirements  Today's Visits  Date Type Provider Dept   12/17/24 Telemedicine Maria Ines Farfan LCSW [facility]   Showing today's visits and meeting all other requirements  Future Appointments  No visits were found meeting these conditions.  Showing future appointments within next 150 days and meeting all other requirements       The patient was identified by name and date " of birth. Ira Bonilla was informed that this is a telemedicine visit and that the visit is being conducted throughthe Epic Embedded platform. She agrees to proceed..  My office door was closed. No one else was in the room.  She acknowledged consent and understanding of privacy and security of the video platform. The patient has agreed to participate and understands they can discontinue the visit at any time.    Patient is aware this is a billable service.     Subjective  Ira Bonilla is a 79 y.o. female  .      HPI     Past Medical History:   Diagnosis Date    Anxiety     Arthritis     Asthma     Cancer (HCC)     Candida infection, esophageal (HCC)     Chronic kidney disease     Chronic pain     COPD (chronic obstructive pulmonary disease) (HCC)     Depression     Depression     Gastroparesis     Gastroparesis     GERD (gastroesophageal reflux disease)     Hypertension     Irritable bowel syndrome (IBS)     Lactose intolerance Not sure    Migraines     MRSA (methicillin resistant Staphylococcus aureus)     Multiple thyroid nodules     Osteoporosis     Pneumonia     Psychiatric disorder        Past Surgical History:   Procedure Laterality Date    BACK SURGERY       SECTION      CHOLECYSTECTOMY      COLONOSCOPY  10/09/2013    ESOPHAGEAL DILATION       [facility]    ESOPHAGOGASTRODUODENOSCOPY N/A 2023    Procedure: ESOPHAGOGASTRODUODENOSCOPY (EGD);  Surgeon: Dontrell Mcmahon MD;  [facility];  Service: General    ESOPHAGOGASTRODUODENOSCOPY N/A 2024    Procedure: ESOPHAGOGASTRODUODENOSCOPY (EGD);  Surgeon: Shilpa Price MD;  [facility];  Service: Thoracic    HERNIA REPAIR  2013    HIATAL HERNIA REPAIR      NISSEN FUNDOPLICATION      [place] BIOPSY LIVER NEEDLE PERCUTANEOUS N/A 2023    Procedure: EXCISION BIOPSY LIVER;  Surgeon: Dontrell Mcmahon MD;  [facility];  Service: General    IA ESOPHAGOGASTRODUODENOSCOPY TRANSORAL DIAGNOSTIC N/A 2019     Procedure: ESOPHAGOGASTRODUODENOSCOPY (EGD);  Surgeon: Gevoanna Winn MD;  [facility];  Service: Gastroenterology    TN ESOPHAGOGASTRODUODENOSCOPY TRANSORAL DIAGNOSTIC N/A 09/20/2023    Procedure: ESOPHAGOGASTRODUODENOSCOPY (EGD);  Surgeon: Shilpa Price MD;  [facility];  Service: Thoracic    TN ESOPHAGOSCOPY FLEX BALLOON DILAT <30 MM DIAM N/A 09/20/2023    Procedure: CRE WIRE GUIDED BALLOON DILATATION ESOPHAGEAL;  Surgeon: Shilpa Price MD;  [facility];  Service: Thoracic    TN ESOPHAGOSCOPY FLEX BALLOON DILAT <30 MM DIAM N/A 03/20/2024    Procedure: ESOPHAGOGASTRODUODENOSCOPY WITH BALLOON DILATION;  Surgeon: Shilpa Price MD;  [facility];  Service: Thoracic    TN LAPS RPR PARAESPHGL HRNA INCL FUNDPLSTY W/O MESH N/A 11/08/2023    Procedure: robotic takedown of nissen fundoplication, redo hiatal hernia, partial fundoplication, with mesh;  Surgeon: Shilpa Price MD;  [facility];  Service: Thoracic    TN PYLOROPLASTY N/A 11/08/2023    Procedure: PYLOROPLASTY LAPAROSCOPIC WITH ROBOT;  Surgeon: Dontrell Mcmahon MD;  [facility];  Service: General    ROTATOR CUFF REPAIR      SHOULDER SURGERY      SPINAL FUSION      TUBAL LIGATION  May 1975    Last child born    UPPER GASTROINTESTINAL ENDOSCOPY      US GUIDED THYROID BIOPSY      WISDOM TOOTH EXTRACTION         Current Outpatient Medications   Medication Sig Dispense Refill    acetaminophen (TYLENOL) 160 mg/5 mL suspension Take 20.3 mL (650 mg total) by mouth every 6 (six) hours as needed for mild pain 355 mL 0    albuterol (PROVENTIL HFA,VENTOLIN HFA) 90 mcg/act inhaler Inhale 2 puffs if needed      amLODIPine (NORVASC) 10 mg tablet       cetirizine (ZyrTEC) 10 MG chewable tablet Chew 10 mg daily.      cholecalciferol (VITAMIN D3) 1,000 units tablet Take 5,000 Units by mouth daily      linaCLOtide 290 MCG CAPS Take 1 capsule by mouth in the morning 30 capsule 1    metoclopramide (Reglan)  10 mg tablet Take 1 tablet (10 mg total) by mouth 4 (four) times a day 120 tablet 1    multivitamin (THERAGRAN) TABS Take 1 tablet by mouth daily      Omega-3 Fatty Acids (OMEGA 3 PO) Take by mouth in the morning      ondansetron (ZOFRAN) 4 mg tablet Take 1 tablet (4 mg total) by mouth every 8 (eight) hours as needed for nausea or vomiting 15 tablet 0    rizatriptan (MAXALT-MLT) 10 mg disintegrating tablet as needed for migraine headache      spironolactone (ALDACTONE) 25 mg tablet 1/2 tablet Orally Once per day for 30 days      vortioxetine (Trintellix) 10 MG tablet Take 1 tablet (10 mg total) by mouth daily for 21 days 21 tablet 0    Vortioxetine HBr (Trintellix) 20 MG tablet Take 1 tablet (20 mg total) by mouth daily Do not start before December 6, 2024. 30 tablet 0     No current facility-administered medications for this visit.        Allergies   Allergen Reactions    Latex Hives    Augmentin [Amoxicillin-Pot Clavulanate] GI Intolerance    Bactrim [Sulfamethoxazole-Trimethoprim] GI Intolerance    Clarithromycin GI Intolerance    Doxycycline Hives    Neomycin GI Intolerance    Polymyxin B GI Intolerance    Zyvox [Linezolid] GI Bleeding    Cephalosporins GI Intolerance    Nsaids GI Intolerance    Penicillins GI Intolerance     Patient can only take levaquin and cipro    Prednisone GI Intolerance    Sulphasomidine GI Intolerance       Review of Systems    Video Exam    There were no vitals filed for this visit.    Physical Exam

## 2024-12-30 ENCOUNTER — OFFICE VISIT (OUTPATIENT)
Dept: PSYCHIATRY | Facility: CLINIC | Age: 79
End: 2024-12-30
Payer: COMMERCIAL

## 2024-12-30 DIAGNOSIS — F32.1 CURRENT MODERATE EPISODE OF MAJOR DEPRESSIVE DISORDER, UNSPECIFIED WHETHER RECURRENT (HCC): Primary | ICD-10-CM

## 2024-12-30 DIAGNOSIS — F41.1 GENERALIZED ANXIETY DISORDER: ICD-10-CM

## 2024-12-30 PROCEDURE — 99214 OFFICE O/P EST MOD 30 MIN: CPT | Performed by: STUDENT IN AN ORGANIZED HEALTH CARE EDUCATION/TRAINING PROGRAM

## 2024-12-30 RX ORDER — ARIPIPRAZOLE 2 MG/1
2 TABLET ORAL DAILY
Qty: 21 TABLET | Refills: 0 | Status: SHIPPED | OUTPATIENT
Start: 2024-12-30 | End: 2025-01-20

## 2024-12-31 RX ORDER — ARIPIPRAZOLE 5 MG/1
5 TABLET ORAL DAILY
Qty: 30 TABLET | Refills: 0 | Status: SHIPPED | OUTPATIENT
Start: 2025-01-18 | End: 2025-02-17

## 2024-12-31 NOTE — PSYCH
MEDICATION MANAGEMENT NOTE        [facility] - [facility]      Name and Date of Birth:  Ira Bonilla 79 y.o. 1945 MRN: [id number]    Date of Visit: December 31, 2024    Reason for Visit: Follow-up visit for medication management       SUBJECTIVE:    Ira Bonilla is a 79 y.o. female with past psychiatric history significant for MDD, WINDY who was personally seen and evaluated today at the [facility] outpatient clinic for follow-up and medication management. Ira denies SI, HI, AVH, delusions, jose since her last appointment.  Patient states that she is tolerating Trintellix without any associated side effects but continues to experience feelings of depression.  After discussion of risks, benefits, potential side effects, alternatives, we will augment Trintellix with Abilify 2 mg daily.  If patient tolerates 2 mg daily, she will be increased to 5 mg daily.  Patient denies acute mental complaints or concerns at this time        Current Rating Scores:     Current PHQ-9   PHQ-2/9 Depression Screening           Current WINDY-7 is .    Review Of Systems:      Constitutional negative   ENT negative   Cardiovascular negative   Respiratory negative   Gastrointestinal negative   Genitourinary negative   Musculoskeletal negative aside from intermittent muscle aches   Integumentary negative   Neurological negative   Endocrine negative   Other Symptoms none, all other systems are negative       Past Psychiatric History: (unchanged information from previous note copied and italicized) - Information that is bolded has been updated.     See intake    Substance Abuse History: (unchanged information from previous note copied and italicized) - Information that is bolded has been updated.     See intake    Social History: (unchanged information from previous note copied and italicized) - Information that is bolded has been updated.     See intake    Traumatic  History: (unchanged information from previous note copied and italicized) - Information that is bolded has been updated.     See intake      Past Medical History:    Past Medical History:   Diagnosis Date    Anxiety     Arthritis     Asthma     Cancer (HCC)     Candida infection, esophageal (HCC)     Chronic kidney disease     Chronic pain     COPD (chronic obstructive pulmonary disease) (HCC)     Depression     Depression     Gastroparesis     Gastroparesis     GERD (gastroesophageal reflux disease)     Hypertension     Irritable bowel syndrome (IBS)     Lactose intolerance Not sure    Migraines     MRSA (methicillin resistant Staphylococcus aureus)     Multiple thyroid nodules     Osteoporosis     Pneumonia     Psychiatric disorder         Past Surgical History:   Procedure Laterality Date    BACK SURGERY       SECTION      CHOLECYSTECTOMY      COLONOSCOPY  10/09/2013    ESOPHAGEAL DILATION       [facility]    ESOPHAGOGASTRODUODENOSCOPY N/A 2023    Procedure: ESOPHAGOGASTRODUODENOSCOPY (EGD);  Surgeon: Dontrell Mcmahon MD;  [facility];  Service: General    ESOPHAGOGASTRODUODENOSCOPY N/A 2024    Procedure: ESOPHAGOGASTRODUODENOSCOPY (EGD);  Surgeon: Shilpa Price MD;  Location: [facility];  Service: Thoracic    HERNIA REPAIR  2013    HIATAL HERNIA REPAIR      NISSEN FUNDOPLICATION      AK BIOPSY LIVER NEEDLE PERCUTANEOUS N/A 2023    Procedure: EXCISION BIOPSY LIVER;  Surgeon: Dontrell Mcmahon MD;  Location[facility];  Service: General    AK ESOPHAGOGASTRODUODENOSCOPY TRANSORAL DIAGNOSTIC N/A 2019    Procedure: ESOPHAGOGASTRODUODENOSCOPY (EGD);  Surgeon: Geovanna Winn MD;  Location: [facility];  Service: Gastroenterology    AK ESOPHAGOGASTRODUODENOSCOPY TRANSORAL DIAGNOSTIC N/A 2023    Procedure: ESOPHAGOGASTRODUODENOSCOPY (EGD);  Surgeon: Shilpa Price MD;  [facility];  Service: Thoracic    AK ESOPHAGOSCOPY FLEX BALLOON DILAT <30 MM DIAM N/A  09/20/2023    Procedure: CRE WIRE GUIDED BALLOON DILATATION ESOPHAGEAL;  Surgeon: Shilpa Price MD;  [facility];  Service: Thoracic    AL ESOPHAGOSCOPY FLEX BALLOON DILAT <30 MM DIAM N/A 03/20/2024    Procedure: ESOPHAGOGASTRODUODENOSCOPY WITH BALLOON DILATION;  Surgeon: Shilpa Price MD;  [facility];  Service: Thoracic    AL LAPS RPR PARAESPHGL HRNA INCL FUNDPLSTY W/O MESH N/A 11/08/2023    Procedure: robotic takedown of nissen fundoplication, redo hiatal hernia, partial fundoplication, with mesh;  Surgeon: Shilpa Price MD;  [facility];  Service: Thoracic    AL PYLOROPLASTY N/A 11/08/2023    Procedure: PYLOROPLASTY LAPAROSCOPIC WITH ROBOT;  Surgeon: Dontrell Mcmahon MD;  [facility];  Service: General    ROTATOR CUFF REPAIR      SHOULDER SURGERY      SPINAL FUSION      TUBAL LIGATION  May 1975    Last child born    UPPER GASTROINTESTINAL ENDOSCOPY      US GUIDED THYROID BIOPSY      WISDOM TOOTH EXTRACTION       Allergies   Allergen Reactions    Latex Hives    Augmentin [Amoxicillin-Pot Clavulanate] GI Intolerance    Bactrim [Sulfamethoxazole-Trimethoprim] GI Intolerance    Clarithromycin GI Intolerance    Doxycycline Hives    Neomycin GI Intolerance    Polymyxin B GI Intolerance    Zyvox [Linezolid] GI Bleeding    Cephalosporins GI Intolerance    Nsaids GI Intolerance    Penicillins GI Intolerance     Patient can only take levaquin and cipro    Prednisone GI Intolerance    Sulphasomidine GI Intolerance       Substance Abuse History:    Social History     Substance and Sexual Activity   Alcohol Use Not Currently     Social History     Substance and Sexual Activity   Drug Use No       Social History:    Social History     Socioeconomic History    Marital status:      Spouse name: Not on file    Number of children: Not on file    Years of education: Not on file    Highest education level: Not on file   Occupational History    Not on file   Tobacco Use     Smoking status: Former     Current packs/day: 0.00     Average packs/day: 1 pack/day for 15.0 years (15.0 ttl pk-yrs)     Types: Cigarettes     Start date: 10/5/1998     Quit date: 10/1/2003     Years since quittin.2    Smokeless tobacco: Former   Vaping Use    Vaping status: Never Used   Substance and Sexual Activity    Alcohol use: Not Currently    Drug use: No    Sexual activity: Not Currently     Partners: Male     Birth control/protection: None   Other Topics Concern    Not on file   Social History Narrative    Not on file     Social Drivers of Health     Financial Resource Strain: Not on file   Food Insecurity: No Food Insecurity (3/22/2024)    Nursing - Inadequate Food Risk Classification     Worried About Running Out of Food in the Last Year: Never true     Ran Out of Food in the Last Year: Never true     Ran Out of Food in the Last Year: Not on file   Transportation Needs: No Transportation Needs (3/22/2024)    PRAPARE - Transportation     Lack of Transportation (Medical): No     Lack of Transportation (Non-Medical): No   Physical Activity: Not on file   Stress: Not on file   Social Connections: Not on file   Intimate Partner Violence: Not on file   Housing Stability: Low Risk  (3/22/2024)    Housing Stability Vital Sign     Unable to Pay for Housing in the Last Year: No     Number of Times Moved in the Last Year: 1     Homeless in the Last Year: No       Family Psychiatric History:     Family History   Problem Relation Age of Onset    Other Mother         Esophageal disorder    Stomach cancer Father     Cancer Father     Hypertension Maternal Aunt     COPD Maternal Grandmother     Colon cancer Neg Hx     Colon polyps Neg Hx        History Review: The following portions of the patient's history were reviewed and updated as appropriate: allergies, current medications, past family history, past medical history, past social history, past surgical history, and problem list.         OBJECTIVE:     Vital  signs in last 24 hours:    There were no vitals filed for this visit.    Mental Status Evaluation:    Appearance age appropriate, casually dressed   Behavior cooperative, mildly anxious   Speech normal rate, normal volume, normal pitch   Mood dysphoric   Affect constricted   Thought Processes organized, goal directed   Associations intact associations   Thought Content no overt delusions   Perceptual Disturbances: no auditory hallucinations, no visual hallucinations   Abnormal Thoughts  Risk Potential Suicidal ideation - None at present  Homicidal ideation - None at present  Potential for aggression - Not at present   Orientation oriented to person, place, time/date, and situation   Memory recent and remote memory grossly intact   Consciousness alert and awake   Attention Span Concentration Span attention span and concentration are age appropriate   Intellect appears to be of average intelligence   Insight intact   Judgement intact   Muscle Strength and  Gait Unable to evaluate secondary to virtual appointment   Motor activity Unable to evaluate secondary to virtual appointment   Language no difficulty naming common objects, no difficulty repeating a phrase   Fund of Knowledge adequate knowledge of current events  adequate fund of knowledge regarding past history  adequate fund of knowledge regarding vocabulary    Pain none   Pain Scale Did not ask patient to formally rate       Laboratory Results: I have personally reviewed all pertinent laboratory/tests results    Recent Labs (last 2 months):   No visits with results within 2 Month(s) from this visit.   Latest known visit with results is:   Admission on 03/21/2024, Discharged on 03/26/2024   Component Date Value    WBC 03/22/2024 7.73     RBC 03/22/2024 3.29 (L)     Hemoglobin 03/22/2024 10.3 (L)     Hematocrit 03/22/2024 32.1 (L)     MCV 03/22/2024 98     MCH 03/22/2024 31.3     MCHC 03/22/2024 32.1     RDW 03/22/2024 13.6     MPV 03/22/2024 9.0     Platelets  03/22/2024 231     nRBC 03/22/2024 0     Segmented % 03/22/2024 52     Immature Grans % 03/22/2024 1     Lymphocytes % 03/22/2024 39     Monocytes % 03/22/2024 6     Eosinophils Relative 03/22/2024 2     Basophils Relative 03/22/2024 0     Absolute Neutrophils 03/22/2024 4.09     Absolute Immature Grans 03/22/2024 0.04     Absolute Lymphocytes 03/22/2024 2.99     Absolute Monocytes 03/22/2024 0.45     Eosinophils Absolute 03/22/2024 0.14     Basophils Absolute 03/22/2024 0.02     Sodium 03/22/2024 140     Potassium 03/22/2024 4.1     Chloride 03/22/2024 107     CO2 03/22/2024 26     ANION GAP 03/22/2024 7     BUN 03/22/2024 22     Creatinine 03/22/2024 1.50 (H)     Glucose 03/22/2024 190 (H)     Calcium 03/22/2024 9.3     AST 03/22/2024 38     ALT 03/22/2024 35     Alkaline Phosphatase 03/22/2024 123 (H)     Total Protein 03/22/2024 7.6     Albumin 03/22/2024 4.0     Total Bilirubin 03/22/2024 0.25     eGFR 03/22/2024 33     Lipase 03/22/2024 <6 (L)     hs TnI 0hr 03/22/2024 3     Sodium 03/22/2024 139     Potassium 03/22/2024 4.5     Chloride 03/22/2024 108     CO2 03/22/2024 25     ANION GAP 03/22/2024 6     BUN 03/22/2024 21     Creatinine 03/22/2024 1.37 (H)     Glucose 03/22/2024 105     Calcium 03/22/2024 8.6     eGFR 03/22/2024 37     Magnesium 03/22/2024 1.8 (L)     Phosphorus 03/22/2024 3.5     Total Bilirubin 03/22/2024 0.21     Bilirubin, Direct 03/22/2024 0.06     Alkaline Phosphatase 03/22/2024 108 (H)     AST 03/22/2024 29     ALT 03/22/2024 29     Total Protein 03/22/2024 6.5     Albumin 03/22/2024 3.5     POC Glucose 03/22/2024 119     POC Glucose 03/22/2024 121     WBC 03/23/2024 5.40     RBC 03/23/2024 3.06 (L)     Hemoglobin 03/23/2024 9.7 (L)     Hematocrit 03/23/2024 29.4 (L)     MCV 03/23/2024 96     MCH 03/23/2024 31.7     MCHC 03/23/2024 33.0     RDW 03/23/2024 13.6     MPV 03/23/2024 8.4 (L)     Platelets 03/23/2024 169     nRBC 03/23/2024 0     Segmented % 03/23/2024 53     Immature Grans %  03/23/2024 0     Lymphocytes % 03/23/2024 39     Monocytes % 03/23/2024 6     Eosinophils Relative 03/23/2024 2     Basophils Relative 03/23/2024 0     Absolute Neutrophils 03/23/2024 2.86     Absolute Immature Grans 03/23/2024 0.02     Absolute Lymphocytes 03/23/2024 2.08     Absolute Monocytes 03/23/2024 0.31     Eosinophils Absolute 03/23/2024 0.11     Basophils Absolute 03/23/2024 0.02     Sodium 03/23/2024 139     Potassium 03/23/2024 4.2     Chloride 03/23/2024 106     CO2 03/23/2024 27     ANION GAP 03/23/2024 6     BUN 03/23/2024 17     Creatinine 03/23/2024 1.27     Glucose 03/23/2024 95     Calcium 03/23/2024 8.9     Corrected Calcium 03/23/2024 9.5     AST 03/23/2024 42 (H)     ALT 03/23/2024 33     Alkaline Phosphatase 03/23/2024 101     Total Protein 03/23/2024 6.5     Albumin 03/23/2024 3.3 (L)     Total Bilirubin 03/23/2024 0.37     eGFR 03/23/2024 40     Magnesium 03/23/2024 2.1     POC Glucose 03/23/2024 84     Ventricular Rate 03/22/2024 82     Atrial Rate 03/22/2024 82     VA Interval 03/22/2024 140     QRSD Interval 03/22/2024 120     QT Interval 03/22/2024 396     QTC Interval 03/22/2024 462     P Axis 03/22/2024 50     QRS Axis 03/22/2024 17     T Wave [place] 03/22/2024 30     POC Glucose 03/23/2024 102     WBC 03/24/2024 4.84     RBC 03/24/2024 2.95 (L)     Hemoglobin 03/24/2024 9.4 (L)     Hematocrit 03/24/2024 28.4 (L)     MCV 03/24/2024 96     MCH 03/24/2024 31.9     MCHC 03/24/2024 33.1     RDW 03/24/2024 13.3     MPV 03/24/2024 8.7 (L)     Platelets 03/24/2024 177     nRBC 03/24/2024 0     Segmented % 03/24/2024 50     Immature Grans % 03/24/2024 1     Lymphocytes % 03/24/2024 41     Monocytes % 03/24/2024 6     Eosinophils Relative 03/24/2024 2     Basophils Relative 03/24/2024 0     Absolute Neutrophils 03/24/2024 2.49     Absolute Immature Grans 03/24/2024 0.03     Absolute Lymphocytes 03/24/2024 1.96     Absolute Monocytes 03/24/2024 0.27     Eosinophils Absolute 03/24/2024 0.08      Basophils Absolute 03/24/2024 0.01     Sodium 03/24/2024 139     Potassium 03/24/2024 4.4     Chloride 03/24/2024 105     CO2 03/24/2024 30     ANION GAP 03/24/2024 4     BUN 03/24/2024 18     Creatinine 03/24/2024 1.49 (H)     Glucose 03/24/2024 97     Calcium 03/24/2024 9.1     eGFR 03/24/2024 33     POC Glucose 03/24/2024 97     POC Glucose 03/25/2024 104     POC Glucose 03/25/2024 102     Salmonella sp PCR 03/25/2024 Negative     Shigella sp/Enteroinvasi* 03/25/2024 Negative     Campylobacter sp (jejuni* 03/25/2024 Negative     Shiga toxin 1/Shiga toxi* 03/25/2024 Negative     C.difficile toxin by PCR 03/25/2024 Negative     POC Glucose 03/25/2024 76     POC Glucose 03/25/2024 230 (H)     WBC 03/26/2024 4.66     RBC 03/26/2024 2.80 (L)     Hemoglobin 03/26/2024 8.8 (L)     Hematocrit 03/26/2024 26.9 (L)     MCV 03/26/2024 96     MCH 03/26/2024 31.4     MCHC 03/26/2024 32.7     RDW 03/26/2024 13.4     MPV 03/26/2024 8.5 (L)     Platelets 03/26/2024 150     nRBC 03/26/2024 0     Segmented % 03/26/2024 48     Immature Grans % 03/26/2024 0     Lymphocytes % 03/26/2024 43     Monocytes % 03/26/2024 6     Eosinophils Relative 03/26/2024 3     Basophils Relative 03/26/2024 0     Absolute Neutrophils 03/26/2024 2.24     Absolute Immature Grans 03/26/2024 0.02     Absolute Lymphocytes 03/26/2024 1.99     Absolute Monocytes 03/26/2024 0.28     Eosinophils Absolute 03/26/2024 0.12     Basophils Absolute 03/26/2024 0.01     Sodium 03/26/2024 138     Potassium 03/26/2024 4.1     Chloride 03/26/2024 106     CO2 03/26/2024 27     ANION GAP 03/26/2024 5     BUN 03/26/2024 14     Creatinine 03/26/2024 1.71 (H)     Glucose 03/26/2024 95     Calcium 03/26/2024 8.8     Corrected Calcium 03/26/2024 9.4     AST 03/26/2024 28     ALT 03/26/2024 22     Alkaline Phosphatase 03/26/2024 86     Total Protein 03/26/2024 6.2 (L)     Albumin 03/26/2024 3.2 (L)     Total Bilirubin 03/26/2024 0.28     eGFR 03/26/2024 28     Magnesium 03/26/2024  1.9        Suicide/Homicide Risk Assessment:    Risk of Harm to Self:  The following ratings are based on assessment at the time of the interview  Historical Risk Factors include: chronic psychiatric problems  Protective Factors: no current suicidal ideation, access to mental health treatment, being a parent, compliant with medications, compliant with mental health treatment, connection to own children, having a desire to be alive, responsibilities and duties to others, stable living environment, strong relationships    Risk of Harm to Others:  The following ratings are based on assessment at the time of the interview  Historical Risk Factors include: none.  Protective Factors: no current homicidal ideation    The following interventions are recommended: contracts for safety at present - agrees to go to ED if feeling unsafe, contracts for safety at present - agrees to call Crisis Intervention Service if feeling unsafe      Lethality Statement:    Based on today's assessment and clinical criteria, Ira Bonilla contracts for safety and is not an imminent risk of harm to self or others. Outpatient level of care is deemed appropriate at this current time. Ira understands that if they can no longer contract for safety, they need to call the office or report to their nearest Emergency Room for immediate evaluation. They voiced understanding and agreement to call 911 or head to the nearest ED should they have any physical or mental decompensation whatsoever.       Assessment/Plan:     1.) MDD, recurrent, moderate  2.) WINDY  3.)      After discussion of risks, benefits, potential side effects, alternatives, we will continue Trintellix 20 mg daily and initiate Abilify 2 mg daily for 21 days.  If patient tolerates this, we will increase it to 5 mg daily thereafter.  Patient denies acute mental complaints or concerns at this time.  Future directions will include exploring augmentation options for her Trintellix to  alleviate depression        Aware of 24 hour and weekend coverage for urgent situations accessed by calling [facility] main practice number    Medications Risks/Benefits      Risks, Benefits And Possible Side Effects Of Medications:    Risks, benefits, and possible side effects of medications explained to Ira and she verbalizes understanding and agreement for treatment.    Controlled Medication Discussion:     Not applicable - controlled prescriptions are not prescribed by this practice    Psychotherapy Provided:     Individual psychotherapy provided: Crisis/safety plan discussed with Ira.     Treatment Plan:    Completed and signed during the session: Not applicable - Treatment Plan to be completed by [facility] therapist      Visit Time    Visit Start Time: 11:30 AM  Visit Stop Time: 11:50 AM  Total Visit Duration:  20 minutes     The total visit duration detailed above includes: patient engagement, medication management, psychotherapy/counseling, discussion regarding treatment goals, documentation, review of past medical records, and coordination of care.      Note Share Disclaimer:     This note was not shared with the patient due to reasonable likelihood of causing patient harm      Edward Hinkle DO  Psychiatry  12/31/24

## 2025-01-07 ENCOUNTER — TELEMEDICINE (OUTPATIENT)
Dept: PSYCHIATRY | Facility: CLINIC | Age: 80
End: 2025-01-07
Payer: COMMERCIAL

## 2025-01-07 DIAGNOSIS — F41.9 ANXIETY: ICD-10-CM

## 2025-01-07 DIAGNOSIS — F32.1 CURRENT MODERATE EPISODE OF MAJOR DEPRESSIVE DISORDER, UNSPECIFIED WHETHER RECURRENT (HCC): Primary | ICD-10-CM

## 2025-01-07 DIAGNOSIS — F43.29 GRIEF REACTION WITH PROLONGED BEREAVEMENT: ICD-10-CM

## 2025-01-07 PROCEDURE — 90834 PSYTX W PT 45 MINUTES: CPT

## 2025-01-07 NOTE — PSYCH
Virtual Regular Visit    Verification of patient location:    Patient is located at Home in the following state in which I hold an active license [place]      Assessment/Plan:    Problem List Items Addressed This Visit     Depression - Primary    Anxiety    Grief reaction with prolonged bereavement       Goals addressed in session: Goal 1     Depression Follow-up Plan Completed: Not applicable    Reason for visit is   Chief Complaint   Patient presents with   • Virtual Regular Visit          Encounter provider Maria Ines Farfan LCSW      Recent Visits  No visits were found meeting these conditions.  Showing recent visits within past 7 days and meeting all other requirements  Today's Visits  Date Type Provider Dept   01/07/25 Telemedicine Maria Ines Farfan LCSW [facility]   Showing today's visits and meeting all other requirements  Future Appointments  No visits were found meeting these conditions.  Showing future appointments within next 150 days and meeting all other requirements       The patient was identified by name and date of birth. Ira Bonilla was informed that this is a telemedicine visit and that the visit is being conducted throughthe Epic Embedded platform. She agrees to proceed..  My office door was closed. No one else was in the room.  She acknowledged consent and understanding of privacy and security of the video platform. The patient has agreed to participate and understands they can discontinue the visit at any time.    Patient is aware this is a billable service.     Subjective  Ira Bonilla is a 79 y.o. female  .      HPI     Past Medical History:   Diagnosis Date   • Anxiety    • Arthritis    • Asthma    • Cancer (HCC)    • Candida infection, esophageal (HCC)    • Chronic kidney disease    • Chronic pain    • COPD (chronic obstructive pulmonary disease) (HCC)    • Depression    • Depression    • Gastroparesis    • Gastroparesis    • GERD (gastroesophageal  reflux disease)    • Hypertension    • Irritable bowel syndrome (IBS)    • Lactose intolerance Not sure   • Migraines    • MRSA (methicillin resistant Staphylococcus aureus)    • Multiple thyroid nodules    • Osteoporosis    • Pneumonia    • Psychiatric disorder        Past Surgical History:   Procedure Laterality Date   • BACK SURGERY     •  SECTION     • CHOLECYSTECTOMY     • COLONOSCOPY  10/09/2013   • ESOPHAGEAL DILATION      2017 [facility]   • ESOPHAGOGASTRODUODENOSCOPY N/A 2023    Procedure: ESOPHAGOGASTRODUODENOSCOPY (EGD);  Surgeon: Dontrell Mcmahon MD;  [facility];  Service: General   • ESOPHAGOGASTRODUODENOSCOPY N/A 2024    Procedure: ESOPHAGOGASTRODUODENOSCOPY (EGD);  Surgeon: Shilpa Price MD;  [facility];  Service: Thoracic   • HERNIA REPAIR  2013   • HIATAL HERNIA REPAIR     • NISSEN FUNDOPLICATION     • MO BIOPSY LIVER NEEDLE PERCUTANEOUS N/A 2023    Procedure: EXCISION BIOPSY LIVER;  Surgeon: Dontrell Mcmahon MD;  [facility];  Service: General   • MO ESOPHAGOGASTRODUODENOSCOPY TRANSORAL DIAGNOSTIC N/A 2019    Procedure: ESOPHAGOGASTRODUODENOSCOPY (EGD);  Surgeon: Geovanna Winn MD;  [facility];  Service: Gastroenterology   • MO ESOPHAGOGASTRODUODENOSCOPY TRANSORAL DIAGNOSTIC N/A 2023    Procedure: ESOPHAGOGASTRODUODENOSCOPY (EGD);  Surgeon: Shilpa Price MD;  [facility];  Service: Thoracic   • MO ESOPHAGOSCOPY FLEX BALLOON DILAT <30 MM DIAM N/A 2023    Procedure: CRE WIRE GUIDED BALLOON DILATATION ESOPHAGEAL;  Surgeon: Shilpa Price MD;  [facility];  Service: Thoracic   • MO ESOPHAGOSCOPY FLEX BALLOON DILAT <30 MM DIAM N/A 2024    Procedure: ESOPHAGOGASTRODUODENOSCOPY WITH BALLOON DILATION;  Surgeon: Shilpa Price MD;  [facility];  Service: Thoracic   • MO LAPS RPR PARAESPHGL HRNA INCL FUNDPLSTY W/O MESH N/A 2023    Procedure: robotic takedown of  nissen fundoplication, redo hiatal hernia, partial fundoplication, with mesh;  Surgeon: Shilpa Price MD;  [facility];  Service: Thoracic   • DC PYLOROPLASTY N/A 11/08/2023    Procedure: PYLOROPLASTY LAPAROSCOPIC WITH ROBOT;  Surgeon: Dontrell Mcmahon MD;  [facility];  Service: General   • ROTATOR CUFF REPAIR     • SHOULDER SURGERY     • SPINAL FUSION     • TUBAL LIGATION  May 1974    Last child born   • UPPER GASTROINTESTINAL ENDOSCOPY     • US GUIDED THYROID BIOPSY     • WISDOM TOOTH EXTRACTION         Current Outpatient Medications   Medication Sig Dispense Refill   • acetaminophen (TYLENOL) 160 mg/5 mL suspension Take 20.3 mL (650 mg total) by mouth every 6 (six) hours as needed for mild pain 355 mL 0   • albuterol (PROVENTIL HFA,VENTOLIN HFA) 90 mcg/act inhaler Inhale 2 puffs if needed     • amLODIPine (NORVASC) 10 mg tablet      • ARIPiprazole (ABILIFY) 2 mg tablet Take 1 tablet (2 mg total) by mouth daily for 21 days 21 tablet 0   • [START ON 1/18/2025] ARIPiprazole (ABILIFY) 5 mg tablet Take 1 tablet (5 mg total) by mouth daily Do not start before January 18, 2025. 30 tablet 0   • cetirizine (ZyrTEC) 10 MG chewable tablet Chew 10 mg daily.     • cholecalciferol (VITAMIN D3) 1,000 units tablet Take 5,000 Units by mouth daily     • linaCLOtide 290 MCG CAPS Take 1 capsule by mouth in the morning 30 capsule 1   • metoclopramide (Reglan) 10 mg tablet Take 1 tablet (10 mg total) by mouth 4 (four) times a day 120 tablet 1   • multivitamin (THERAGRAN) TABS Take 1 tablet by mouth daily     • Omega-3 Fatty Acids (OMEGA 3 PO) Take by mouth in the morning     • ondansetron (ZOFRAN) 4 mg tablet Take 1 tablet (4 mg total) by mouth every 8 (eight) hours as needed for nausea or vomiting 15 tablet 0   • rizatriptan (MAXALT-MLT) 10 mg disintegrating tablet as needed for migraine headache     • spironolactone (ALDACTONE) 25 mg tablet 1/2 tablet Orally Once per day for 30 days     • Vortioxetine HBr  "(Trintellix) 20 MG tablet Take 1 tablet (20 mg total) by mouth daily 30 tablet 2     No current facility-administered medications for this visit.        Allergies   Allergen Reactions   • Latex Hives   • Augmentin [Amoxicillin-Pot Clavulanate] GI Intolerance   • Bactrim [Sulfamethoxazole-Trimethoprim] GI Intolerance   • Clarithromycin GI Intolerance   • Doxycycline Hives   • Neomycin GI Intolerance   • Polymyxin B GI Intolerance   • Zyvox [Linezolid] GI Bleeding   • Cephalosporins GI Intolerance   • Nsaids GI Intolerance   • Penicillins GI Intolerance     Patient can only take levaquin and cipro   • Prednisone GI Intolerance   • Sulphasomidine GI Intolerance       Review of Systems    Video Exam    There were no vitals filed for this visit.    Physical Exam     Behavioral Health Psychotherapy Progress Note    Psychotherapy Provided: Individual Psychotherapy     1. Current moderate episode of major depressive disorder, unspecified whether recurrent (HCC)        2. Anxiety        3. Grief reaction with prolonged bereavement            Goals addressed in session: Goal 1     DATA: Wendy shared she had a talk with her son Art about her feeling depressed and he was not aware of the extent of it.  She shared she started a new medication Abilify that may help her.  She said some of it is situational with not having her friends around in living with Art and not being around her friends anymore.  Therapist encouraged her to think of some things to do to get out of the house and she said she planned on going out to [employer] as something fun as well as going out with her friend Cherie.  Therapist encouraged her to talk with Art about her feelings regarding how she feels in the house with her daughter in law Hope being \"controlling\" in terms of how the household is run.  Therapist encouraged her to talk assertively with Hope about the way they interact around this issue.  She shares she feels criticized by Art for her " "parenting because he tells her she was not a disciplinarian.  Therapist encouraged open communication of thoughts and feelings.  Therapist encouraged behavioral activation for reducing depression including calling social security to find out about Dario's benefits as well as going to the [facility] for assessment for possible part time employment as well as socializing with friends.    During this session, this clinician used the following therapeutic modalities: Client-centered Therapy and Dialectical Behavior Therapy    Substance Abuse was not addressed during this session. If the client is diagnosed with a co-occurring substance use disorder, please indicate any changes in the frequency or amount of use: . Stage of change for addressing substance use diagnoses: No substance use/Not applicable    ASSESSMENT:  Wendy Bonilla presents with a Euthymic/ normal mood.     her affect is Normal range and intensity, which is congruent, with her mood and the content of the session. The client has made progress on their goals.     Wendy Bonilla presents with a none risk of suicide, none risk of self-harm, and none risk of harm to others.    For any risk assessment that surpasses a \"low\" rating, a safety plan must be developed.    A safety plan was indicated: no  If yes, describe in detail     PLAN: Between sessions, Wendy Bonilla will utilize behavioral activation and assertiveness to improve depression. At the next session, the therapist will use Client-centered Therapy, Mindfulness-based Strategies, and Supportive Psychotherapy to address depression.    Behavioral Health Treatment Plan and Discharge Planning: Wendy Bonilla is aware of and agrees to continue to work on their treatment plan. They have identified and are working toward their discharge goals. yes    Depression Follow-up Plan Completed: Yes    Visit start and stop times:    01/07/25  Start Time: 1100  Stop Time: 1151  Total Visit Time: 51 minutes        "

## 2025-01-17 DIAGNOSIS — F32.1 CURRENT MODERATE EPISODE OF MAJOR DEPRESSIVE DISORDER, UNSPECIFIED WHETHER RECURRENT (HCC): ICD-10-CM

## 2025-01-17 RX ORDER — ARIPIPRAZOLE 2 MG/1
2 TABLET ORAL DAILY
Qty: 21 TABLET | Refills: 0 | OUTPATIENT
Start: 2025-01-17 | End: 2025-02-07

## 2025-01-21 ENCOUNTER — TELEMEDICINE (OUTPATIENT)
Dept: PSYCHIATRY | Facility: CLINIC | Age: 80
End: 2025-01-21
Payer: COMMERCIAL

## 2025-01-21 DIAGNOSIS — F32.1 CURRENT MODERATE EPISODE OF MAJOR DEPRESSIVE DISORDER, UNSPECIFIED WHETHER RECURRENT (HCC): Primary | ICD-10-CM

## 2025-01-21 DIAGNOSIS — F43.29 GRIEF REACTION WITH PROLONGED BEREAVEMENT: ICD-10-CM

## 2025-01-21 DIAGNOSIS — F41.9 ANXIETY: ICD-10-CM

## 2025-01-21 PROCEDURE — 90834 PSYTX W PT 45 MINUTES: CPT

## 2025-01-21 NOTE — PSYCH
Virtual Regular Visit    Verification of patient location:    Patient is located at Home in the following state in which I hold an active license [place]      Assessment/Plan:    Problem List Items Addressed This Visit     Depression - Primary    Anxiety    Grief reaction with prolonged bereavement       Goals addressed in session: Goal 1     Depression Follow-up Plan Completed: Yes    Reason for visit is   Chief Complaint   Patient presents with   • Virtual Regular Visit          Encounter provider Maria Ines Farfan LCSW      Recent Visits  No visits were found meeting these conditions.  Showing recent visits within past 7 days and meeting all other requirements  Today's Visits  Date Type Provider Dept   01/21/25 Telemedicine Maria Ines Farfan LCSW [facility]   Showing today's visits and meeting all other requirements  Future Appointments  No visits were found meeting these conditions.  Showing future appointments within next 150 days and meeting all other requirements       The patient was identified by name and date of birth. Ira Bonilla was informed that this is a telemedicine visit and that the visit is being conducted throughthe Epic Embedded platform. She agrees to proceed..  My office door was closed. No one else was in the room.  She acknowledged consent and understanding of privacy and security of the video platform. The patient has agreed to participate and understands they can discontinue the visit at any time.    Patient is aware this is a billable service.     Subjective  Ira Bonilla is a 79 y.o. female  .      HPI     Past Medical History:   Diagnosis Date   • Anxiety    • Arthritis    • Asthma    • Cancer (HCC)    • Candida infection, esophageal (HCC)    • Chronic kidney disease    • Chronic pain    • COPD (chronic obstructive pulmonary disease) (HCC)    • Depression    • Depression    • Gastroparesis    • Gastroparesis    • GERD (gastroesophageal reflux disease)     • Hypertension    • Irritable bowel syndrome (IBS)    • Lactose intolerance Not sure   • Migraines    • MRSA (methicillin resistant Staphylococcus aureus)    • Multiple thyroid nodules    • Osteoporosis    • Pneumonia    • Psychiatric disorder        Past Surgical History:   Procedure Laterality Date   • BACK SURGERY     •  SECTION     • CHOLECYSTECTOMY     • COLONOSCOPY  10/09/2013   • ESOPHAGEAL DILATION      2017 [facility]   • ESOPHAGOGASTRODUODENOSCOPY N/A 2023    Procedure: ESOPHAGOGASTRODUODENOSCOPY (EGD);  Surgeon: Dontrell Mcmahon MD;  [facility];  Service: General   • ESOPHAGOGASTRODUODENOSCOPY N/A 2024    Procedure: ESOPHAGOGASTRODUODENOSCOPY (EGD);  Surgeon: Shilpa Price MD;  [facility];  Service: Thoracic   • HERNIA REPAIR  2013   • HIATAL HERNIA REPAIR     • NISSEN FUNDOPLICATION     • DE BIOPSY LIVER NEEDLE PERCUTANEOUS N/A 2023    Procedure: EXCISION BIOPSY LIVER;  Surgeon: Dontrell Mcmahon MD;  [facility];  Service: General   • DE ESOPHAGOGASTRODUODENOSCOPY TRANSORAL DIAGNOSTIC N/A 2019    Procedure: ESOPHAGOGASTRODUODENOSCOPY (EGD);  Surgeon: Geovanna Winn MD;  [facility];  Service: Gastroenterology   • DE ESOPHAGOGASTRODUODENOSCOPY TRANSORAL DIAGNOSTIC N/A 2023    Procedure: ESOPHAGOGASTRODUODENOSCOPY (EGD);  Surgeon: Shilpa Price MD;  [facility];  Service: Thoracic   • DE ESOPHAGOSCOPY FLEX BALLOON DILAT <30 MM DIAM N/A 2023    Procedure: CRE WIRE GUIDED BALLOON DILATATION ESOPHAGEAL;  Surgeon: Shilpa Price MD;  [facility];  Service: Thoracic   • DE ESOPHAGOSCOPY FLEX BALLOON DILAT <30 MM DIAM N/A 2024    Procedure: ESOPHAGOGASTRODUODENOSCOPY WITH BALLOON DILATION;  Surgeon: Shilpa Price MD;  [facility];  Service: Thoracic   • DE LAPS RPR PARAESPHGL HRNA INCL FUNDPLSTY W/O MESH N/A 2023    Procedure: robotic takedown of nissen fundoplication,  redo hiatal hernia, partial fundoplication, with mesh;  Surgeon: Shilpa Price MD;  [facility];  Service: Thoracic   • MI PYLOROPLASTY N/A 11/08/2023    Procedure: PYLOROPLASTY LAPAROSCOPIC WITH ROBOT;  Surgeon: Dontrell Mcmahon MD;  [facility];  Service: General   • ROTATOR CUFF REPAIR     • SHOULDER SURGERY     • SPINAL FUSION     • TUBAL LIGATION  May 1974    Last child born   • UPPER GASTROINTESTINAL ENDOSCOPY     • US GUIDED THYROID BIOPSY     • WISDOM TOOTH EXTRACTION         Current Outpatient Medications   Medication Sig Dispense Refill   • acetaminophen (TYLENOL) 160 mg/5 mL suspension Take 20.3 mL (650 mg total) by mouth every 6 (six) hours as needed for mild pain 355 mL 0   • albuterol (PROVENTIL HFA,VENTOLIN HFA) 90 mcg/act inhaler Inhale 2 puffs if needed     • amLODIPine (NORVASC) 10 mg tablet      • ARIPiprazole (ABILIFY) 2 mg tablet Take 1 tablet (2 mg total) by mouth daily for 21 days 21 tablet 0   • ARIPiprazole (ABILIFY) 5 mg tablet Take 1 tablet (5 mg total) by mouth daily Do not start before January 18, 2025. 30 tablet 0   • cetirizine (ZyrTEC) 10 MG chewable tablet Chew 10 mg daily.     • cholecalciferol (VITAMIN D3) 1,000 units tablet Take 5,000 Units by mouth daily     • linaCLOtide 290 MCG CAPS Take 1 capsule by mouth in the morning 30 capsule 1   • metoclopramide (Reglan) 10 mg tablet Take 1 tablet (10 mg total) by mouth 4 (four) times a day 120 tablet 1   • multivitamin (THERAGRAN) TABS Take 1 tablet by mouth daily     • Omega-3 Fatty Acids (OMEGA 3 PO) Take by mouth in the morning     • ondansetron (ZOFRAN) 4 mg tablet Take 1 tablet (4 mg total) by mouth every 8 (eight) hours as needed for nausea or vomiting 15 tablet 0   • rizatriptan (MAXALT-MLT) 10 mg disintegrating tablet as needed for migraine headache     • spironolactone (ALDACTONE) 25 mg tablet 1/2 tablet Orally Once per day for 30 days     • Vortioxetine HBr (Trintellix) 20 MG tablet Take 1 tablet (20 mg  "total) by mouth daily 30 tablet 2     No current facility-administered medications for this visit.        Allergies   Allergen Reactions   • Latex Hives   • Augmentin [Amoxicillin-Pot Clavulanate] GI Intolerance   • Bactrim [Sulfamethoxazole-Trimethoprim] GI Intolerance   • Clarithromycin GI Intolerance   • Doxycycline Hives   • Neomycin GI Intolerance   • Polymyxin B GI Intolerance   • Zyvox [Linezolid] GI Bleeding   • Cephalosporins GI Intolerance   • Nsaids GI Intolerance   • Penicillins GI Intolerance     Patient can only take levaquin and cipro   • Prednisone GI Intolerance   • Sulphasomidine GI Intolerance       Review of Systems    Video Exam    There were no vitals filed for this visit.    Physical Exam     Behavioral Health Psychotherapy Progress Note    Psychotherapy Provided: Individual Psychotherapy     1. Current moderate episode of major depressive disorder, unspecified whether recurrent (HCC)        2. Anxiety        3. Grief reaction with prolonged bereavement            Goals addressed in session: Goal 1     DATA: Wendy said she does not like the cold weather.   She says she cannot get warm in the cold weather.   She talked about Dario getting into a verbal altercation with a customer at his job over a sports team and needing to talk with him about how he talks to customers. She talked about Art's relationship with Dario and that he criticizes Dario for doing a \"shit job\" with stacking wood and criticizing him for not doing jobs \"well enough\" which is discouraging for him.  She was able to be assertive with her son for talking with him in a discouraging way and also differentially treating him with more criticism than her daughter in law's kids behaviors.  She shared she feels better on the Abilify, less depressed or down.  She said she wants to have a wedding reception for Hoep and Art's marriage last August.  Her brother wants to come to it.   Art and Hope have agreed to it.  She has been " "visiting with her friend Cherie recently which has been helpful.  She found out Dario gets his stipend until he graduates from high school and will get death benefit from his mom after that as well.  She has not followed up on [facility] but is wanting to do so soon.  She also talked about getting a new dog, a chocolate lab soon.    During this session, this clinician used the following therapeutic modalities: Client-centered Therapy and Solution-Focused Therapy    Substance Abuse was not addressed during this session. If the client is diagnosed with a co-occurring substance use disorder, please indicate any changes in the frequency or amount of use: . Stage of change for addressing substance use diagnoses: No substance use/Not applicable    ASSESSMENT:  Wendy Bonilla presents with a Euthymic/ normal mood.     her affect is Normal range and intensity, which is congruent, with her mood and the content of the session. The client has made progress on their goals.     Wendy Bonilla presents with a none risk of suicide, none risk of self-harm, and none risk of harm to others.    For any risk assessment that surpasses a \"low\" rating, a safety plan must be developed.    A safety plan was indicated: no  If yes, describe in detail     PLAN: Between sessions, Wendy Bonilla will continue to socialize, and will call [facility] for assessment, and continue with assertiveness with her son. At the next session, the therapist will use Client-centered Therapy, Dialectical Behavior Therapy, and Solution-Focused Therapy to address interpersonal effectiveness, depression.    Behavioral Health Treatment Plan and Discharge Planning: Wendy Bonilla is aware of and agrees to continue to work on their treatment plan. They have identified and are working toward their discharge goals. yes    Depression Follow-up Plan Completed: Not applicable    Visit start and stop times:    01/21/25  Start Time: 1100  Stop Time: 1149  Total Visit Time: 49 " minutes

## 2025-02-04 ENCOUNTER — TELEMEDICINE (OUTPATIENT)
Dept: PSYCHIATRY | Facility: CLINIC | Age: 80
End: 2025-02-04
Payer: COMMERCIAL

## 2025-02-04 DIAGNOSIS — F32.1 CURRENT MODERATE EPISODE OF MAJOR DEPRESSIVE DISORDER, UNSPECIFIED WHETHER RECURRENT (HCC): ICD-10-CM

## 2025-02-04 DIAGNOSIS — F32.0 CURRENT MILD EPISODE OF MAJOR DEPRESSIVE DISORDER, UNSPECIFIED WHETHER RECURRENT (HCC): Primary | ICD-10-CM

## 2025-02-04 DIAGNOSIS — F41.9 ANXIETY: ICD-10-CM

## 2025-02-04 DIAGNOSIS — F43.29 GRIEF REACTION WITH PROLONGED BEREAVEMENT: ICD-10-CM

## 2025-02-04 PROCEDURE — 90834 PSYTX W PT 45 MINUTES: CPT

## 2025-02-04 NOTE — PSYCH
Virtual Regular Visit    Verification of patient location:    Patient is located at Home in the following state in which I hold an active license [place]      Assessment/Plan:    Problem List Items Addressed This Visit     Depression - Primary    Anxiety    Grief reaction with prolonged bereavement       Goals addressed in session: Goal 1     Depression Follow-up Plan Completed: Yes    Reason for visit is   Chief Complaint   Patient presents with   • Virtual Regular Visit          Encounter provider Maria Ines Farfan LCSW      Recent Visits  No visits were found meeting these conditions.  Showing recent visits within past 7 days and meeting all other requirements  Today's Visits  Date Type Provider Dept   02/04/25 Telemedicine Maria Ines Farfan LCSW [facility]   Showing today's visits and meeting all other requirements  Future Appointments  No visits were found meeting these conditions.  Showing future appointments within next 150 days and meeting all other requirements       The patient was identified by name and date of birth. Ira Bonilla was informed that this is a telemedicine visit and that the visit is being conducted throughthe [employer] platform. She agrees to proceed..  My office door was closed. No one else was in the room.  She acknowledged consent and understanding of privacy and security of the video platform. The patient has agreed to participate and understands they can discontinue the visit at any time.    Patient is aware this is a billable service.     Subjective  Ira Bonilla is a 79 y.o. female  .      HPI     Past Medical History:   Diagnosis Date   • Anxiety    • Arthritis    • Asthma    • Cancer (HCC)    • Candida infection, esophageal (HCC)    • Chronic kidney disease    • Chronic pain    • COPD (chronic obstructive pulmonary disease) (HCC)    • Depression    • Depression    • Gastroparesis    • Gastroparesis    • GERD (gastroesophageal reflux disease)     • Hypertension    • Irritable bowel syndrome (IBS)    • Lactose intolerance Not sure   • Migraines    • MRSA (methicillin resistant Staphylococcus aureus)    • Multiple thyroid nodules    • Osteoporosis    • Pneumonia    • Psychiatric disorder        Past Surgical History:   Procedure Laterality Date   • BACK SURGERY     •  SECTION     • CHOLECYSTECTOMY     • COLONOSCOPY  10/09/2013   • ESOPHAGEAL DILATION      2017 [facility]   • ESOPHAGOGASTRODUODENOSCOPY N/A 2023    Procedure: ESOPHAGOGASTRODUODENOSCOPY (EGD);  Surgeon: Dontrell Mcmahon MD;  [facility];  Service: General   • ESOPHAGOGASTRODUODENOSCOPY N/A 2024    Procedure: ESOPHAGOGASTRODUODENOSCOPY (EGD);  Surgeon: Shilpa Price MD;  [facility];  Service: Thoracic   • HERNIA REPAIR  2013   • HIATAL HERNIA REPAIR     • NISSEN FUNDOPLICATION     • NJ BIOPSY LIVER NEEDLE PERCUTANEOUS N/A 2023    Procedure: EXCISION BIOPSY LIVER;  Surgeon: Dontrell Mcmahon MD;  [facility];  Service: General   • NJ ESOPHAGOGASTRODUODENOSCOPY TRANSORAL DIAGNOSTIC N/A 2019    Procedure: ESOPHAGOGASTRODUODENOSCOPY (EGD);  Surgeon: Geovanna Winn MD;  [facility];  Service: Gastroenterology   • NJ ESOPHAGOGASTRODUODENOSCOPY TRANSORAL DIAGNOSTIC N/A 2023    Procedure: ESOPHAGOGASTRODUODENOSCOPY (EGD);  Surgeon: Shilpa Price MD;  [facility];  Service: Thoracic   • NJ ESOPHAGOSCOPY FLEX BALLOON DILAT <30 MM DIAM N/A 2023    Procedure: CRE WIRE GUIDED BALLOON DILATATION ESOPHAGEAL;  Surgeon: Shilpa Price MD;  [facility];  Service: Thoracic   • NJ ESOPHAGOSCOPY FLEX BALLOON DILAT <30 MM DIAM N/A 2024    Procedure: ESOPHAGOGASTRODUODENOSCOPY WITH BALLOON DILATION;  Surgeon: Shilpa Price MD;  [facility];  Service: Thoracic   • NJ LAPS RPR PARAESPHGL HRNA INCL FUNDPLSTY W/O MESH N/A 2023    Procedure: robotic takedown of nissen fundoplication,  redo hiatal hernia, partial fundoplication, with mesh;  Surgeon: Shilpa Price MD;  [facility];  Service: Thoracic   • OK PYLOROPLASTY N/A 11/08/2023    Procedure: PYLOROPLASTY LAPAROSCOPIC WITH ROBOT;  Surgeon: Dontrell Mcmahon MD;  [facility];  Service: General   • ROTATOR CUFF REPAIR     • SHOULDER SURGERY     • SPINAL FUSION     • TUBAL LIGATION  May 1974    Last child born   • UPPER GASTROINTESTINAL ENDOSCOPY     • US GUIDED THYROID BIOPSY     • WISDOM TOOTH EXTRACTION         Current Outpatient Medications   Medication Sig Dispense Refill   • acetaminophen (TYLENOL) 160 mg/5 mL suspension Take 20.3 mL (650 mg total) by mouth every 6 (six) hours as needed for mild pain 355 mL 0   • albuterol (PROVENTIL HFA,VENTOLIN HFA) 90 mcg/act inhaler Inhale 2 puffs if needed     • amLODIPine (NORVASC) 10 mg tablet      • ARIPiprazole (ABILIFY) 2 mg tablet Take 1 tablet (2 mg total) by mouth daily for 21 days 21 tablet 0   • ARIPiprazole (ABILIFY) 5 mg tablet Take 1 tablet (5 mg total) by mouth daily Do not start before January 18, 2025. 30 tablet 0   • cetirizine (ZyrTEC) 10 MG chewable tablet Chew 10 mg daily.     • cholecalciferol (VITAMIN D3) 1,000 units tablet Take 5,000 Units by mouth daily     • linaCLOtide 290 MCG CAPS Take 1 capsule by mouth in the morning 30 capsule 1   • metoclopramide (Reglan) 10 mg tablet Take 1 tablet (10 mg total) by mouth 4 (four) times a day 120 tablet 1   • multivitamin (THERAGRAN) TABS Take 1 tablet by mouth daily     • Omega-3 Fatty Acids (OMEGA 3 PO) Take by mouth in the morning     • ondansetron (ZOFRAN) 4 mg tablet Take 1 tablet (4 mg total) by mouth every 8 (eight) hours as needed for nausea or vomiting 15 tablet 0   • rizatriptan (MAXALT-MLT) 10 mg disintegrating tablet as needed for migraine headache     • spironolactone (ALDACTONE) 25 mg tablet 1/2 tablet Orally Once per day for 30 days     • Vortioxetine HBr (Trintellix) 20 MG tablet Take 1 tablet (20 mg  "total) by mouth daily 30 tablet 2     No current facility-administered medications for this visit.        Allergies   Allergen Reactions   • Latex Hives   • Augmentin [Amoxicillin-Pot Clavulanate] GI Intolerance   • Bactrim [Sulfamethoxazole-Trimethoprim] GI Intolerance   • Clarithromycin GI Intolerance   • Doxycycline Hives   • Neomycin GI Intolerance   • Polymyxin B GI Intolerance   • Zyvox [Linezolid] GI Bleeding   • Cephalosporins GI Intolerance   • Nsaids GI Intolerance   • Penicillins GI Intolerance     Patient can only take levaquin and cipro   • Prednisone GI Intolerance   • Sulphasomidine GI Intolerance       Review of Systems    Video Exam    There were no vitals filed for this visit.    Physical Exam     Behavioral Health Psychotherapy Progress Note    Psychotherapy Provided: Individual Psychotherapy     1. Current mild episode of major depressive disorder, unspecified whether recurrent (HCC)        2. Anxiety        3. Grief reaction with prolonged bereavement            Goals addressed in session: Goal 1     DATA: Wendy shared that she is still having disagreements with her son over how her grandson is treated.  She also has some problems with Vi her granddaughter who complained about her 17th birthday party.  Wendy said Vi called her a liar for telling [employer] about complaining about her 17th birthday party.  Vi said she didn't say this and that Wendy was a \"liar.\"  Wendy is demanding an apology for her calling her a liar.  She has ongoing problems with Wandi being treated as not favored by Art and Vi being treated as a favorite.  Therapist encouraged her to express her feelings openly with Vi.  She shared her legs are going numb and she is not sure why.  She also complained about her stomach not feeling well and not being able to make it to the bathroom sometimes.  She is working on getting a  to figure out Wandi's benefits.  She will follow up and call [employer] to see about job " "opportunities.  She said she may need to look for a job she can do from home because of sometimes losing control of her bowels.  Therapist encouraged her to call the [employer] to ask about a job this week.  She said she would.  During this session, this clinician used the following therapeutic modalities: Client-centered Therapy and Solution-Focused Therapy    Substance Abuse was not addressed during this session. If the client is diagnosed with a co-occurring substance use disorder, please indicate any changes in the frequency or amount of use: . Stage of change for addressing substance use diagnoses: No substance use/Not applicable    ASSESSMENT:  Wendy Bonilla presents with a Euthymic/ normal mood.     her affect is Normal range and intensity, which is congruent, with her mood and the content of the session. The client has made progress on their goals.     Wendy Bonilla presents with a none risk of suicide, none risk of self-harm, and none risk of harm to others.    For any risk assessment that surpasses a \"low\" rating, a safety plan must be developed.    A safety plan was indicated: no  If yes, describe in detail     PLAN: Between sessions, Wendy Bonilla will call [employer], continue with assertiveness with her family members. At the next session, the therapist will use Client-centered Therapy and Solution-Focused Therapy to address depression.    Behavioral Health Treatment Plan and Discharge Planning: Wendy Bonilla is aware of and agrees to continue to work on their treatment plan. They have identified and are working toward their discharge goals. yes    Depression Follow-up Plan Completed: Yes    Visit start and stop times:    02/04/25  Start Time: 1102  Stop Time: 1153  Total Visit Time: 51 minutes        "

## 2025-02-06 RX ORDER — ARIPIPRAZOLE 5 MG/1
5 TABLET ORAL DAILY
Qty: 90 TABLET | Refills: 1 | OUTPATIENT
Start: 2025-02-06 | End: 2025-03-08

## 2025-02-10 ENCOUNTER — OFFICE VISIT (OUTPATIENT)
Dept: PSYCHIATRY | Facility: CLINIC | Age: 80
End: 2025-02-10
Payer: COMMERCIAL

## 2025-02-10 DIAGNOSIS — F32.1 CURRENT MODERATE EPISODE OF MAJOR DEPRESSIVE DISORDER, UNSPECIFIED WHETHER RECURRENT (HCC): Primary | ICD-10-CM

## 2025-02-10 DIAGNOSIS — F41.1 GENERALIZED ANXIETY DISORDER: ICD-10-CM

## 2025-02-10 PROCEDURE — 99214 OFFICE O/P EST MOD 30 MIN: CPT | Performed by: STUDENT IN AN ORGANIZED HEALTH CARE EDUCATION/TRAINING PROGRAM

## 2025-02-10 PROCEDURE — 90833 PSYTX W PT W E/M 30 MIN: CPT | Performed by: STUDENT IN AN ORGANIZED HEALTH CARE EDUCATION/TRAINING PROGRAM

## 2025-02-12 DIAGNOSIS — F32.1 CURRENT MODERATE EPISODE OF MAJOR DEPRESSIVE DISORDER, UNSPECIFIED WHETHER RECURRENT (HCC): ICD-10-CM

## 2025-02-14 RX ORDER — ARIPIPRAZOLE 10 MG/1
10 TABLET ORAL DAILY
Qty: 30 TABLET | Refills: 0 | Status: SHIPPED | OUTPATIENT
Start: 2025-02-14 | End: 2025-03-16

## 2025-02-15 NOTE — PSYCH
MEDICATION MANAGEMENT NOTE        [facility] - [facility]      Name and Date of Birth:  Ira Bonilla 79 y.o. 1945 MRN: [id number]    Date of Visit: 2025    Reason for Visit: Follow-up visit for medication management       SUBJECTIVE:    Ira Bonilla is a 79 y.o. female with past psychiatric history significant for MDD, WINDY who was personally seen and evaluated today at the [facility] outpatient clinic for follow-up and medication management. Ira denies SI, HI, AVH, delusions, jose since her last appointment.  Patient endorsed improvement in mood since initiating Abilify and after discussion of risks, benefits, potential side effects, alternatives, we will increase it to 10 mg every morning.  Rest of regimen remains the same and patient denies acute mental health complaints or concerns at this time        Current Rating Scores:     Current PHQ-9   PHQ-2/9 Depression Screening    Little interest or pleasure in doing things: 1 - several days  Feeling down, depressed, or hopeless: 1 - several days  Trouble falling or staying asleep, or sleeping too much: 1 - several days  Feeling tired or having little energy: 1 - several days  Poor appetite or overeatin - several days  Feeling bad about yourself - or that you are a failure or have let yourself or your family down: 1 - several days  Trouble concentrating on things, such as reading the newspaper or watching television: 1 - several days  Moving or speaking so slowly that other people could have noticed. Or the opposite - being so fidgety or restless that you have been moving around a lot more than usual: 0 - not at all       Current WINDY-7 is .    Review Of Systems:      Constitutional negative   ENT negative   Cardiovascular negative   Respiratory negative   Gastrointestinal negative   Genitourinary negative   Musculoskeletal negative aside from intermittent muscle aches    Integumentary negative   Neurological negative   Endocrine negative   Other Symptoms none, all other systems are negative       Past Psychiatric History: (unchanged information from previous note copied and italicized) - Information that is bolded has been updated.     See intake    Substance Abuse History: (unchanged information from previous note copied and italicized) - Information that is bolded has been updated.     See intake    Social History: (unchanged information from previous note copied and italicized) - Information that is bolded has been updated.     See intake    Traumatic History: (unchanged information from previous note copied and italicized) - Information that is bolded has been updated.     See intake      Past Medical History:    Past Medical History:   Diagnosis Date    Anxiety     Arthritis     Asthma     Cancer (HCC)     Candida infection, esophageal (HCC)     Chronic kidney disease     Chronic pain     COPD (chronic obstructive pulmonary disease) (HCC)     Depression     Depression     Gastroparesis     Gastroparesis     GERD (gastroesophageal reflux disease)     Hypertension     Irritable bowel syndrome (IBS)     Lactose intolerance Not sure    Migraines     MRSA (methicillin resistant Staphylococcus aureus)     Multiple thyroid nodules     Osteoporosis     Pneumonia     Psychiatric disorder         Past Surgical History:   Procedure Laterality Date    BACK SURGERY       SECTION      CHOLECYSTECTOMY      COLONOSCOPY  10/09/2013    ESOPHAGEAL DILATION      2017 [facility]    ESOPHAGOGASTRODUODENOSCOPY N/A 2023    Procedure: ESOPHAGOGASTRODUODENOSCOPY (EGD);  Surgeon: Dontrell Mcmahon MD;  Location: [facility];  Service: General    ESOPHAGOGASTRODUODENOSCOPY N/A 2024    Procedure: ESOPHAGOGASTRODUODENOSCOPY (EGD);  Surgeon: Shilpa Price MD;  Location: [facility];  Service: Thoracic    HERNIA REPAIR  2013    HIATAL HERNIA REPAIR      NISSEN FUNDOPLICATION      UT  BIOPSY LIVER NEEDLE PERCUTANEOUS N/A 11/08/2023    Procedure: EXCISION BIOPSY LIVER;  Surgeon: Dontrell Mcmahon MD;  Location: [facility];  Service: General    ND ESOPHAGOGASTRODUODENOSCOPY TRANSORAL DIAGNOSTIC N/A 04/03/2019    Procedure: ESOPHAGOGASTRODUODENOSCOPY (EGD);  Surgeon: Geovanna Winn MD;  Location: [facility];  Service: Gastroenterology    ND ESOPHAGOGASTRODUODENOSCOPY TRANSORAL DIAGNOSTIC N/A 09/20/2023    Procedure: ESOPHAGOGASTRODUODENOSCOPY (EGD);  Surgeon: Shilpa Price MD;  [facility];  Service: Thoracic    ND ESOPHAGOSCOPY FLEX BALLOON DILAT <30 MM DIAM N/A 09/20/2023    Procedure: CRE WIRE GUIDED BALLOON DILATATION ESOPHAGEAL;  Surgeon: Shilpa Price MD;  [facility];  Service: Thoracic    ND ESOPHAGOSCOPY FLEX BALLOON DILAT <30 MM DIAM N/A 03/20/2024    Procedure: ESOPHAGOGASTRODUODENOSCOPY WITH BALLOON DILATION;  Surgeon: Shilpa Price MD;  [facility];  Service: Thoracic    ND LAPS RPR PARAESPHGL HRNA INCL FUNDPLSTY W/O MESH N/A 11/08/2023    Procedure: robotic takedown of nissen fundoplication, redo hiatal hernia, partial fundoplication, with mesh;  Surgeon: Shilpa Price MD;  [facility];  Service: Thoracic    ND PYLOROPLASTY N/A 11/08/2023    Procedure: PYLOROPLASTY LAPAROSCOPIC WITH ROBOT;  Surgeon: Dontrell Mcmahon MD;  [facility];  Service: General    ROTATOR CUFF REPAIR      SHOULDER SURGERY      SPINAL FUSION      TUBAL LIGATION  May 1975    Last child born    UPPER GASTROINTESTINAL ENDOSCOPY      US GUIDED THYROID BIOPSY      WISDOM TOOTH EXTRACTION       Allergies   Allergen Reactions    Latex Hives    Augmentin [Amoxicillin-Pot Clavulanate] GI Intolerance    Bactrim [Sulfamethoxazole-Trimethoprim] GI Intolerance    Clarithromycin GI Intolerance    Doxycycline Hives    Neomycin GI Intolerance    Polymyxin B GI Intolerance    Zyvox [Linezolid] GI Bleeding    Cephalosporins GI Intolerance    Nsaids GI Intolerance     Penicillins GI Intolerance     Patient can only take levaquin and cipro    Prednisone GI Intolerance    Sulphasomidine GI Intolerance       Substance Abuse History:    Social History     Substance and Sexual Activity   Alcohol Use Not Currently     Social History     Substance and Sexual Activity   Drug Use No       Social History:    Social History     Socioeconomic History    Marital status:      Spouse name: Not on file    Number of children: Not on file    Years of education: Not on file    Highest education level: Not on file   Occupational History    Not on file   Tobacco Use    Smoking status: Former     Current packs/day: 0.00     Average packs/day: 1 pack/day for 15.0 years (15.0 ttl pk-yrs)     Types: Cigarettes     Start date: 10/5/1998     Quit date: 10/1/2003     Years since quittin.3    Smokeless tobacco: Former   Vaping Use    Vaping status: Never Used   Substance and Sexual Activity    Alcohol use: Not Currently    Drug use: No    Sexual activity: Not Currently     Partners: Male     Birth control/protection: None   Other Topics Concern    Not on file   Social History Narrative    Not on file     Social Drivers of Health     Financial Resource Strain: Not on file   Food Insecurity: No Food Insecurity (3/22/2024)    Nursing - Inadequate Food Risk Classification     Worried About Running Out of Food in the Last Year: Never true     Ran Out of Food in the Last Year: Never true     Ran Out of Food in the Last Year: Not on file   Transportation Needs: No Transportation Needs (3/22/2024)    PRAPARE - Transportation     Lack of Transportation (Medical): No     Lack of Transportation (Non-Medical): No   Physical Activity: Not on file   Stress: Not on file   Social Connections: Not on file   Intimate Partner Violence: Not on file   Housing Stability: Low Risk  (3/22/2024)    Housing Stability Vital Sign     Unable to Pay for Housing in the Last Year: No     Number of Times Moved in the Last  Year: 1     Homeless in the Last Year: No       Family Psychiatric History:     Family History   Problem Relation Age of Onset    Other Mother         Esophageal disorder    Stomach cancer Father     Cancer Father     Hypertension Maternal Aunt     COPD Maternal Grandmother     Colon cancer Neg Hx     Colon polyps Neg Hx        History Review: The following portions of the patient's history were reviewed and updated as appropriate: allergies, current medications, past family history, past medical history, past social history, past surgical history, and problem list.         OBJECTIVE:     Vital signs in last 24 hours:    There were no vitals filed for this visit.    Mental Status Evaluation:    Appearance age appropriate, casually dressed   Behavior cooperative, mildly anxious   Speech normal rate, normal volume, normal pitch   Mood dysphoric   Affect constricted   Thought Processes organized, goal directed   Associations intact associations   Thought Content no overt delusions   Perceptual Disturbances: no auditory hallucinations, no visual hallucinations   Abnormal Thoughts  Risk Potential Suicidal ideation - None at present  Homicidal ideation - None at present  Potential for aggression - Not at present   Orientation oriented to person, place, time/date, and situation   Memory recent and remote memory grossly intact   Consciousness alert and awake   Attention Span Concentration Span attention span and concentration are age appropriate   Intellect appears to be of average intelligence   Insight intact   Judgement intact   Muscle Strength and  Gait Unable to evaluate secondary to virtual appointment   Motor activity Unable to evaluate secondary to virtual appointment   Language no difficulty naming common objects, no difficulty repeating a phrase   Fund of Knowledge adequate knowledge of current events  adequate fund of knowledge regarding past history  adequate fund of knowledge regarding vocabulary    Pain none    Pain Scale Did not ask patient to formally rate       Laboratory Results: I have personally reviewed all pertinent laboratory/tests results    Recent Labs (last 2 months):   No visits with results within 2 Month(s) from this visit.   Latest known visit with results is:   Admission on 03/21/2024, Discharged on 03/26/2024   Component Date Value    WBC 03/22/2024 7.73     RBC 03/22/2024 3.29 (L)     Hemoglobin 03/22/2024 10.3 (L)     Hematocrit 03/22/2024 32.1 (L)     MCV 03/22/2024 98     MCH 03/22/2024 31.3     MCHC 03/22/2024 32.1     RDW 03/22/2024 13.6     MPV 03/22/2024 9.0     Platelets 03/22/2024 231     nRBC 03/22/2024 0     Segmented % 03/22/2024 52     Immature Grans % 03/22/2024 1     Lymphocytes % 03/22/2024 39     Monocytes % 03/22/2024 6     Eosinophils Relative 03/22/2024 2     Basophils Relative 03/22/2024 0     Absolute Neutrophils 03/22/2024 4.09     Absolute Immature Grans 03/22/2024 0.04     Absolute Lymphocytes 03/22/2024 2.99     Absolute Monocytes 03/22/2024 0.45     Eosinophils Absolute 03/22/2024 0.14     Basophils Absolute 03/22/2024 0.02     Sodium 03/22/2024 140     Potassium 03/22/2024 4.1     Chloride 03/22/2024 107     CO2 03/22/2024 26     ANION GAP 03/22/2024 7     BUN 03/22/2024 22     Creatinine 03/22/2024 1.50 (H)     Glucose 03/22/2024 190 (H)     Calcium 03/22/2024 9.3     AST 03/22/2024 38     ALT 03/22/2024 35     Alkaline Phosphatase 03/22/2024 123 (H)     Total Protein 03/22/2024 7.6     Albumin 03/22/2024 4.0     Total Bilirubin 03/22/2024 0.25     eGFR 03/22/2024 33     Lipase 03/22/2024 <6 (L)     hs TnI 0hr 03/22/2024 3     Sodium 03/22/2024 139     Potassium 03/22/2024 4.5     Chloride 03/22/2024 108     CO2 03/22/2024 25     ANION GAP 03/22/2024 6     BUN 03/22/2024 21     Creatinine 03/22/2024 1.37 (H)     Glucose 03/22/2024 105     Calcium 03/22/2024 8.6     eGFR 03/22/2024 37     Magnesium 03/22/2024 1.8 (L)     Phosphorus 03/22/2024 3.5     Total Bilirubin  03/22/2024 0.21     Bilirubin, Direct 03/22/2024 0.06     Alkaline Phosphatase 03/22/2024 108 (H)     AST 03/22/2024 29     ALT 03/22/2024 29     Total Protein 03/22/2024 6.5     Albumin 03/22/2024 3.5     POC Glucose 03/22/2024 119     POC Glucose 03/22/2024 121     WBC 03/23/2024 5.40     RBC 03/23/2024 3.06 (L)     Hemoglobin 03/23/2024 9.7 (L)     Hematocrit 03/23/2024 29.4 (L)     MCV 03/23/2024 96     MCH 03/23/2024 31.7     MCHC 03/23/2024 33.0     RDW 03/23/2024 13.6     MPV 03/23/2024 8.4 (L)     Platelets 03/23/2024 169     nRBC 03/23/2024 0     Segmented % 03/23/2024 53     Immature Grans % 03/23/2024 0     Lymphocytes % 03/23/2024 39     Monocytes % 03/23/2024 6     Eosinophils Relative 03/23/2024 2     Basophils Relative 03/23/2024 0     Absolute Neutrophils 03/23/2024 2.86     Absolute Immature Grans 03/23/2024 0.02     Absolute Lymphocytes 03/23/2024 2.08     Absolute Monocytes 03/23/2024 0.31     Eosinophils Absolute 03/23/2024 0.11     Basophils Absolute 03/23/2024 0.02     Sodium 03/23/2024 139     Potassium 03/23/2024 4.2     Chloride 03/23/2024 106     CO2 03/23/2024 27     ANION GAP 03/23/2024 6     BUN 03/23/2024 17     Creatinine 03/23/2024 1.27     Glucose 03/23/2024 95     Calcium 03/23/2024 8.9     Corrected Calcium 03/23/2024 9.5     AST 03/23/2024 42 (H)     ALT 03/23/2024 33     Alkaline Phosphatase 03/23/2024 101     Total Protein 03/23/2024 6.5     Albumin 03/23/2024 3.3 (L)     Total Bilirubin 03/23/2024 0.37     eGFR 03/23/2024 40     Magnesium 03/23/2024 2.1     POC Glucose 03/23/2024 84     Ventricular Rate 03/22/2024 82     Atrial Rate 03/22/2024 82     NM Interval 03/22/2024 140     QRSD Interval 03/22/2024 120     QT Interval 03/22/2024 396     QTC Interval 03/22/2024 462     P Axis 03/22/2024 50     QRS Axis 03/22/2024 17     T Wave Morenci 03/22/2024 30     POC Glucose 03/23/2024 102     WBC 03/24/2024 4.84     RBC 03/24/2024 2.95 (L)     Hemoglobin 03/24/2024 9.4 (L)      Hematocrit 03/24/2024 28.4 (L)     MCV 03/24/2024 96     MCH 03/24/2024 31.9     MCHC 03/24/2024 33.1     RDW 03/24/2024 13.3     MPV 03/24/2024 8.7 (L)     Platelets 03/24/2024 177     nRBC 03/24/2024 0     Segmented % 03/24/2024 50     Immature Grans % 03/24/2024 1     Lymphocytes % 03/24/2024 41     Monocytes % 03/24/2024 6     Eosinophils Relative 03/24/2024 2     Basophils Relative 03/24/2024 0     Absolute Neutrophils 03/24/2024 2.49     Absolute Immature Grans 03/24/2024 0.03     Absolute Lymphocytes 03/24/2024 1.96     Absolute Monocytes 03/24/2024 0.27     Eosinophils Absolute 03/24/2024 0.08     Basophils Absolute 03/24/2024 0.01     Sodium 03/24/2024 139     Potassium 03/24/2024 4.4     Chloride 03/24/2024 105     CO2 03/24/2024 30     ANION GAP 03/24/2024 4     BUN 03/24/2024 18     Creatinine 03/24/2024 1.49 (H)     Glucose 03/24/2024 97     Calcium 03/24/2024 9.1     eGFR 03/24/2024 33     POC Glucose 03/24/2024 97     POC Glucose 03/25/2024 104     POC Glucose 03/25/2024 102     Salmonella sp PCR 03/25/2024 Negative     Shigella sp/Enteroinvasi* 03/25/2024 Negative     Campylobacter sp (jejuni* 03/25/2024 Negative     Shiga toxin 1/Shiga toxi* 03/25/2024 Negative     C.difficile toxin by PCR 03/25/2024 Negative     POC Glucose 03/25/2024 76     POC Glucose 03/25/2024 230 (H)     WBC 03/26/2024 4.66     RBC 03/26/2024 2.80 (L)     Hemoglobin 03/26/2024 8.8 (L)     Hematocrit 03/26/2024 26.9 (L)     MCV 03/26/2024 96     MCH 03/26/2024 31.4     MCHC 03/26/2024 32.7     RDW 03/26/2024 13.4     MPV 03/26/2024 8.5 (L)     Platelets 03/26/2024 150     nRBC 03/26/2024 0     Segmented % 03/26/2024 48     Immature Grans % 03/26/2024 0     Lymphocytes % 03/26/2024 43     Monocytes % 03/26/2024 6     Eosinophils Relative 03/26/2024 3     Basophils Relative 03/26/2024 0     Absolute Neutrophils 03/26/2024 2.24     Absolute Immature Grans 03/26/2024 0.02     Absolute Lymphocytes 03/26/2024 1.99     Absolute  Monocytes 03/26/2024 0.28     Eosinophils Absolute 03/26/2024 0.12     Basophils Absolute 03/26/2024 0.01     Sodium 03/26/2024 138     Potassium 03/26/2024 4.1     Chloride 03/26/2024 106     CO2 03/26/2024 27     ANION GAP 03/26/2024 5     BUN 03/26/2024 14     Creatinine 03/26/2024 1.71 (H)     Glucose 03/26/2024 95     Calcium 03/26/2024 8.8     Corrected Calcium 03/26/2024 9.4     AST 03/26/2024 28     ALT 03/26/2024 22     Alkaline Phosphatase 03/26/2024 86     Total Protein 03/26/2024 6.2 (L)     Albumin 03/26/2024 3.2 (L)     Total Bilirubin 03/26/2024 0.28     eGFR 03/26/2024 28     Magnesium 03/26/2024 1.9        Suicide/Homicide Risk Assessment:    Risk of Harm to Self:  The following ratings are based on assessment at the time of the interview  Historical Risk Factors include: chronic psychiatric problems  Protective Factors: no current suicidal ideation, access to mental health treatment, being a parent, compliant with medications, compliant with mental health treatment, connection to own children, having a desire to be alive, responsibilities and duties to others, stable living environment, strong relationships    Risk of Harm to Others:  The following ratings are based on assessment at the time of the interview  Historical Risk Factors include: none.  Protective Factors: no current homicidal ideation    The following interventions are recommended: contracts for safety at present - agrees to go to ED if feeling unsafe, contracts for safety at present - agrees to call Crisis Intervention Service if feeling unsafe      Lethality Statement:    Based on today's assessment and clinical criteria, Ira Bonilla contracts for safety and is not an imminent risk of harm to self or others. Outpatient level of care is deemed appropriate at this current time. Ira understands that if they can no longer contract for safety, they need to call the office or report to their nearest Emergency Room for immediate  evaluation. They voiced understanding and agreement to call 911 or head to the nearest ED should they have any physical or mental decompensation whatsoever.       Assessment/Plan:     1.) MDD, recurrent, moderate  2.) WINDY  3.)      After discussion of risks, benefits, potential side effects, alternatives, we will continue Trintellix 20 mg daily and increase Abilify to 10 mg every morning to better maximize effect.  Patient denies acute mental health complaints or concerns at this time.  She will continue meeting with her other medical specialists to treat her chronic medical conditions and will continue to meet regularly with her therapist         Aware of 24 hour and weekend coverage for urgent situations accessed by calling [facility] main practice number    Medications Risks/Benefits      Risks, Benefits And Possible Side Effects Of Medications:    Risks, benefits, and possible side effects of medications explained to Ira and she verbalizes understanding and agreement for treatment.    Controlled Medication Discussion:     Not applicable - controlled prescriptions are not prescribed by this practice    Psychotherapy Provided:     Individual psychotherapy provided: Crisis/safety plan discussed with Ira. Provided at least 16 minutes of distinct psychotherapy using a combination of supportive, interpersonal, motivational, and problem solving approaches to address psychological distress and enhance coping strategies.     Treatment Plan:    Completed and signed during the session: Not applicable - Treatment Plan to be completed by [facility] therapist      Visit Time    Visit Start Time: 10:00 AM  Visit Stop Time: 10:25 AM  Total Visit Duration:  25 minutes     The total visit duration detailed above includes: patient engagement, medication management, psychotherapy/counseling, discussion regarding treatment goals, documentation, review of past medical records, and  coordination of care.      Note Share Disclaimer:     This note was not shared with the patient due to reasonable likelihood of causing patient harm      Edward Hinkle DO  Psychiatry  02/14/25

## 2025-02-18 ENCOUNTER — TELEMEDICINE (OUTPATIENT)
Dept: PSYCHIATRY | Facility: CLINIC | Age: 80
End: 2025-02-18
Payer: COMMERCIAL

## 2025-02-18 DIAGNOSIS — F43.29 GRIEF REACTION WITH PROLONGED BEREAVEMENT: ICD-10-CM

## 2025-02-18 DIAGNOSIS — F32.0 CURRENT MILD EPISODE OF MAJOR DEPRESSIVE DISORDER, UNSPECIFIED WHETHER RECURRENT (HCC): Primary | ICD-10-CM

## 2025-02-18 DIAGNOSIS — F41.9 ANXIETY: ICD-10-CM

## 2025-02-18 PROCEDURE — 90834 PSYTX W PT 45 MINUTES: CPT

## 2025-02-18 NOTE — PSYCH
"Behavioral Health Psychotherapy Progress Note    Psychotherapy Provided: Individual Psychotherapy     1. Current mild episode of major depressive disorder, unspecified whether recurrent (HCC)        2. Anxiety        3. Grief reaction with prolonged bereavement            Goals addressed in session: Goal 1     DATA: Wendy shared that she is being told by Art she is \"hovering\" around others or is watching others in the house, so she feels she can't do anything or say anything.   She says she has called [facility] for a job and has also applied for a job at [employer].   She said she is not sure what triggered this but is working on getting out of the house.  She talked about having side effects of weight gain with abilify and therapist encouraged her to contact her prescriber for this issue.  She shares she is feeling \"antsy\" and wants to walk outside but it is too cold.  Therapist encouraged her to also relay this information to her prescriber because it might be also a side effect of medication.  She also shared she is feeling less depressed on the Abilify.  She is having some difficulty with navigating a website for an interview with [facility].  Therapist suggested she ask for assistance from her son Dario.  She expressed thoughts and feelings about losing her daughter Aminata and that she has been talking to a friend about her thoughts and feelings.    During this session, this clinician used the following therapeutic modalities: Client-centered Therapy    Substance Abuse was not addressed during this session. If the client is diagnosed with a co-occurring substance use disorder, please indicate any changes in the frequency or amount of use: . Stage of change for addressing substance use diagnoses: No substance use/Not applicable    ASSESSMENT:  Wendy Bonilla presents with a Euthymic/ normal mood.     her affect is Normal range and intensity, which is congruent, with her mood and the content of the session. The client has " "made progress on their goals.     Wendy Bonilla presents with a none risk of suicide, none risk of self-harm, and none risk of harm to others.    For any risk assessment that surpasses a \"low\" rating, a safety plan must be developed.    A safety plan was indicated: no  If yes, describe in detail     PLAN: Between sessions, Wendy Bonilla will call prescriber about possible Abilify side effects, call the [facility] and [employer] to further her job search to improve her mood. At the next session, the therapist will use Client-centered Therapy and Supportive Psychotherapy to address depression, anxiety.    Behavioral Health Treatment Plan and Discharge Planning: Wendy Bonilla is aware of and agrees to continue to work on their treatment plan. They have identified and are working toward their discharge goals. yes    Depression Follow-up Plan Completed: Not applicable    Visit start and stop times:    25  Start Time: 1100  Stop Time: 1150  Total Visit Time: 50 minutesVirtual Regular VisitName: Ira Bonilla      : 1945      MRN: [id number]  Encounter Provider: Maria Ines Farfan LCSW  Encounter Date: 2025   Encounter department: [facility]  [email or web address]  Assessment & Plan  Current mild episode of major depressive disorder, unspecified whether recurrent (HCC)         Anxiety         Grief reaction with prolonged bereavement         Current mild episode of major depressive disorder, unspecified whether recurrent (HCC)         Anxiety         Grief reaction with prolonged bereavement               Goals addressed in session: Goal 1     Depression Follow-up Plan Completed: Not applicable    Reason for visit is   Chief Complaint   Patient presents with   • Virtual Regular Visit        Recent Visits  No visits were found meeting these conditions.  Showing recent visits within past 7 days and meeting all other requirements  Today's Visits  Date Type Provider " Dept   25 Telemedicine Maria Ines Farfan LCSW [facility]   Showing today's visits and meeting all other requirements  Future Appointments  No visits were found meeting these conditions.  Showing future appointments within next 150 days and meeting all other requirements     [unfilled]  Ira KRAUS Chad is a 79 y.o. female  .  HPI    Past Medical History:   Diagnosis Date   • Anxiety    • Arthritis    • Asthma    • Cancer (HCC)    • Candida infection, esophageal (HCC)    • Chronic kidney disease    • Chronic pain    • COPD (chronic obstructive pulmonary disease) (HCC)    • Depression    • Depression    • Gastroparesis    • Gastroparesis    • GERD (gastroesophageal reflux disease)    • Hypertension    • Irritable bowel syndrome (IBS)    • Lactose intolerance Not sure   • Migraines    • MRSA (methicillin resistant Staphylococcus aureus)    • Multiple thyroid nodules    • Osteoporosis    • Pneumonia    • Psychiatric disorder      Past Surgical History:   Procedure Laterality Date   • BACK SURGERY     •  SECTION     • CHOLECYSTECTOMY     • COLONOSCOPY  10/09/2013   • ESOPHAGEAL DILATION       [facility]   • ESOPHAGOGASTRODUODENOSCOPY N/A 2023    Procedure: ESOPHAGOGASTRODUODENOSCOPY (EGD);  Surgeon: Dontrell Mcmahon MD;  [facility];  Service: General   • ESOPHAGOGASTRODUODENOSCOPY N/A 2024    Procedure: ESOPHAGOGASTRODUODENOSCOPY (EGD);  Surgeon: Shilpa Price MD;  [facility];  Service: Thoracic   • HERNIA REPAIR  2013   • HIATAL HERNIA REPAIR     • NISSEN FUNDOPLICATION     • ME BIOPSY LIVER NEEDLE PERCUTANEOUS N/A 2023    Procedure: EXCISION BIOPSY LIVER;  Surgeon: Dontrell Mcmahon MD;  [facility];  Service: General   • ME ESOPHAGOGASTRODUODENOSCOPY TRANSORAL DIAGNOSTIC N/A 2019    Procedure: ESOPHAGOGASTRODUODENOSCOPY (EGD);  Surgeon: Geovanna Winn MD;  [facility];  Service: Gastroenterology   •  NY ESOPHAGOGASTRODUODENOSCOPY TRANSORAL DIAGNOSTIC N/A 09/20/2023    Procedure: ESOPHAGOGASTRODUODENOSCOPY (EGD);  Surgeon: Shilpa Price MD;  [facility];  Service: Thoracic   • NY ESOPHAGOSCOPY FLEX BALLOON DILAT <30 MM DIAM N/A 09/20/2023    Procedure: CRE WIRE GUIDED BALLOON DILATATION ESOPHAGEAL;  Surgeon: Shilpa Price MD;  [facility];  Service: Thoracic   • NY ESOPHAGOSCOPY FLEX BALLOON DILAT <30 MM DIAM N/A 03/20/2024    Procedure: ESOPHAGOGASTRODUODENOSCOPY WITH BALLOON DILATION;  Surgeon: Shilpa Price MD;  [facility];  Service: Thoracic   • NY LAPS RPR PARAESPHGL HRNA INCL FUNDPLSTY W/O MESH N/A 11/08/2023    Procedure: robotic takedown of nissen fundoplication, redo hiatal hernia, partial fundoplication, with mesh;  Surgeon: Shilpa Price MD;  [facility];  Service: Thoracic   • NY PYLOROPLASTY N/A 11/08/2023    Procedure: PYLOROPLASTY LAPAROSCOPIC WITH ROBOT;  Surgeon: Dontrell Mcmahon MD;  [facility];  Service: General   • ROTATOR CUFF REPAIR     • SHOULDER SURGERY     • SPINAL FUSION     • TUBAL LIGATION  May 1975    Last child born   • UPPER GASTROINTESTINAL ENDOSCOPY     • US GUIDED THYROID BIOPSY     • WISDOM TOOTH EXTRACTION       Current Outpatient Medications   Medication Instructions   • acetaminophen (TYLENOL) 650 mg, Oral, Every 6 hours PRN   • albuterol (PROVENTIL HFA,VENTOLIN HFA) 90 mcg/act inhaler 2 puffs, Inhalation, As needed   • amLODIPine (NORVASC) 10 mg tablet    • ARIPiprazole (ABILIFY) 10 mg, Oral, Daily   • cetirizine (ZYRTEC) 10 mg, Oral, Daily   • cholecalciferol (VITAMIN D3) 5,000 Units, Oral, Daily   • linaCLOtide 290 MCG CAPS 1 capsule, Oral, Daily   • metoclopramide (REGLAN) 10 mg, Oral, 4 times daily   • multivitamin (THERAGRAN) TABS 1 tablet, Oral, Daily   • Omega-3 Fatty Acids (OMEGA 3 PO) Oral, Daily   • ondansetron (ZOFRAN) 4 mg, Oral, Every 8 hours PRN   • rizatriptan (MAXALT-MLT) 10 mg  disintegrating tablet as needed for migraine headache   • spironolactone (ALDACTONE) 25 mg tablet 1/2 tablet Orally Once per day for 30 days   • Vortioxetine HBr (TRINTELLIX) 20 mg, Oral, Daily     Allergies   Allergen Reactions   • Latex Hives   • Augmentin [Amoxicillin-Pot Clavulanate] GI Intolerance   • Bactrim [Sulfamethoxazole-Trimethoprim] GI Intolerance   • Clarithromycin GI Intolerance   • Doxycycline Hives   • Neomycin GI Intolerance   • Polymyxin B GI Intolerance   • Zyvox [Linezolid] GI Bleeding   • Cephalosporins GI Intolerance   • Nsaids GI Intolerance   • Penicillins GI Intolerance     Patient can only take levaquin and cipro   • Prednisone GI Intolerance   • Sulphasomidine GI Intolerance       Review of Systems    Objective   There were no vitals taken for this visit.    Video Exam  Physical Exam     [unfilled]

## 2025-02-19 ENCOUNTER — TELEPHONE (OUTPATIENT)
Dept: PSYCHIATRY | Facility: CLINIC | Age: 80
End: 2025-02-19

## 2025-02-19 NOTE — TELEPHONE ENCOUNTER
Wendy was transferred to this writer from the [facility] (ok to william YUAN). She said her abilify was increased to 10 mg on 2/14. She said she has put on 8 lbs because it makes her so hungry. It also makes her feel very antsy, she has restless legs and can't sit still. She said she eats everything in sight and her daughter in law mentioned it to her. She is happy that it has helped some of her symptoms but she doesn't want to be on something that will make her gain weight. She said she had weight loss surgery last year and lost a bunch of weight, now is starting to gain it back. Forwarding to provider for review. Clinical will follow up as advised.

## 2025-02-19 NOTE — TELEPHONE ENCOUNTER
Patient called in regarding the following medication problem:  ARIPiprazole (ABILIFY) 10 mg tablet     Patient advised their dosage has been increased from 5mg to 10mg, and they were not aware of the increase.    Patient is experiencing the following:  Constantly hungry  Restless   Jumpy    Patient expressed an 8 pound weight gain.    Writer successfully transferred the patient to the nurse line for further assistance.

## 2025-02-21 RX ORDER — ARIPIPRAZOLE 5 MG/1
5 TABLET ORAL DAILY
Qty: 30 TABLET | Refills: 0 | OUTPATIENT
Start: 2025-02-21 | End: 2025-03-23

## 2025-02-21 NOTE — TELEPHONE ENCOUNTER
Hello, Abilify is one of the least likely to reduce this appetite increase.  Not only this, up until now she has not endorsed any of these symptoms with Abilify.  To have an 8 pound weight gain since our last appointment seems extremely unlikely.  If she felt as if she had less side effects on the lower dose of Abilify we can certainly go back to that as restlessness and jumpiness can certainly be an effect of an increased dose of an antipsychotic in its class.  As I recall from our last appointment she endorsed mood benefits on 5 mg daily and she may return to that if that is better tolerated

## 2025-02-24 NOTE — TELEPHONE ENCOUNTER
Patient called in regarding the above notation. The 5mg, is not working for the patient, for this medication. Patient has declined taking 10mg.    Patient expressed the following:  -Constant hunger  -Gained 8 pounds in 1 week  -Jittery    Patient expressed the 10mg, was called in, and the 5 mg is not working as well.    Writer successfully transferred the patent to the nurse line for further assistance regarding the medication:    ARIPiprazole (ABILIFY) 10 mg tablet

## 2025-02-24 NOTE — TELEPHONE ENCOUNTER
Wendy was transferred to this writer from the call center (ok to leave detailed VM). She said all she ever took was 5 mg. She picked up the 10 mg script but never used them. She finished her 5 mg about 4 days ago and hasn't taken anymore of the medication. She said she doesn't want to continue with abilify because it was making her too hungry. Since she has been off it she says her appetite has returned to normal. She wants to discuss alternatives. Next appt was 3/25. I transferred her to clerical staff for assistance in seeing if there was anything sooner.

## 2025-02-24 NOTE — TELEPHONE ENCOUNTER
Called Wendy and left VM informing her that she can go back down to 5 mg to see if it is better tolerated. Requested she call us if she has questions or wants to discuss further.

## 2025-02-25 ENCOUNTER — OFFICE VISIT (OUTPATIENT)
Dept: PSYCHIATRY | Facility: CLINIC | Age: 80
End: 2025-02-25
Payer: COMMERCIAL

## 2025-02-25 DIAGNOSIS — F41.1 GENERALIZED ANXIETY DISORDER: ICD-10-CM

## 2025-02-25 DIAGNOSIS — F32.1 CURRENT MODERATE EPISODE OF MAJOR DEPRESSIVE DISORDER, UNSPECIFIED WHETHER RECURRENT (HCC): Primary | ICD-10-CM

## 2025-02-25 PROCEDURE — 90833 PSYTX W PT W E/M 30 MIN: CPT | Performed by: STUDENT IN AN ORGANIZED HEALTH CARE EDUCATION/TRAINING PROGRAM

## 2025-02-25 PROCEDURE — 99214 OFFICE O/P EST MOD 30 MIN: CPT | Performed by: STUDENT IN AN ORGANIZED HEALTH CARE EDUCATION/TRAINING PROGRAM

## 2025-03-03 NOTE — PSYCH
MEDICATION MANAGEMENT NOTE        [facility]      Name and Date of Birth:  Ira Bonilla 79 y.o. 1945 MRN: [id number]    Date of Visit: March 3, 2025    Reason for Visit: Follow-up visit for medication management       SUBJECTIVE:    Ira Bonilla is a 79 y.o. female with past psychiatric history significant for MDD, WINDY who was personally seen and evaluated today at the [facility] outpatient clinic for follow-up and medication management. Ira denies SI, HI, AVH, delusions, jose since her last appointment.  Patient reports that she did not tolerate Abilify as she felt as if it was increasing her appetite to unsustainable levels.  She states that she self-discontinued Abilify and this has improved her constant feeling of hungriness.  After discussion of risks, benefits, potential side effects, alternatives, she would like to remain on rest of medications as is without any changes currently as she works through some viral illnesses and continues with therapy.  She denies acute mental complaints or concerns at this time        Current Rating Scores:     Current PHQ-9   PHQ-2/9 Depression Screening           Current WINDY-7 is .    Review Of Systems:      Constitutional negative aside from tiredness   ENT negative   Cardiovascular negative   Respiratory negative aside from intermittent cough   Gastrointestinal negative   Genitourinary negative   Musculoskeletal negative aside from intermittent muscle aches   Integumentary negative   Neurological negative   Endocrine negative   Other Symptoms none, all other systems are negative       Past Psychiatric History: (unchanged information from previous note copied and italicized) - Information that is bolded has been updated.     See intake  Medications trials: Abilify somewhat improved mood but induced too much hunger and so was discontinued.    Substance Abuse History: (unchanged  information from previous note copied and italicized) - Information that is bolded has been updated.     See intake    Social History: (unchanged information from previous note copied and italicized) - Information that is bolded has been updated.     See intake    Traumatic History: (unchanged information from previous note copied and italicized) - Information that is bolded has been updated.     See intake      Past Medical History:    Past Medical History:   Diagnosis Date    Anxiety     Arthritis     Asthma     Cancer (HCC)     Candida infection, esophageal (HCC)     Chronic kidney disease     Chronic pain     COPD (chronic obstructive pulmonary disease) (HCC)     Depression     Depression     Gastroparesis     Gastroparesis     GERD (gastroesophageal reflux disease)     Hypertension     Irritable bowel syndrome (IBS)     Lactose intolerance Not sure    Migraines     MRSA (methicillin resistant Staphylococcus aureus)     Multiple thyroid nodules     Osteoporosis     Pneumonia     Psychiatric disorder         Past Surgical History:   Procedure Laterality Date    BACK SURGERY       SECTION      CHOLECYSTECTOMY      COLONOSCOPY  10/09/2013    ESOPHAGEAL DILATION       [facility]    ESOPHAGOGASTRODUODENOSCOPY N/A 2023    Procedure: ESOPHAGOGASTRODUODENOSCOPY (EGD);  Surgeon: Dontrell Mcmahon MD;  Location[facility];  Service: General    ESOPHAGOGASTRODUODENOSCOPY N/A 2024    Procedure: ESOPHAGOGASTRODUODENOSCOPY (EGD);  Surgeon: Shilpa Price MD;  Location: [facility];  Service: Thoracic    HERNIA REPAIR  2013    HIATAL HERNIA REPAIR      NISSEN FUNDOPLICATION      UT BIOPSY LIVER NEEDLE PERCUTANEOUS N/A 2023    Procedure: EXCISION BIOPSY LIVER;  Surgeon: Dontrell Mcmahon MD;  Location[facility];  Service: General    UT ESOPHAGOGASTRODUODENOSCOPY TRANSORAL DIAGNOSTIC N/A 2019    Procedure: ESOPHAGOGASTRODUODENOSCOPY (EGD);  Surgeon: Geovanna Winn MD;  Location: [facility];   Service: Gastroenterology    SC ESOPHAGOGASTRODUODENOSCOPY TRANSORAL DIAGNOSTIC N/A 09/20/2023    Procedure: ESOPHAGOGASTRODUODENOSCOPY (EGD);  Surgeon: Shilpa Price MD;  [facility];  Service: Thoracic    SC ESOPHAGOSCOPY FLEX BALLOON DILAT <30 MM DIAM N/A 09/20/2023    Procedure: CRE WIRE GUIDED BALLOON DILATATION ESOPHAGEAL;  Surgeon: Shilpa Price MD;  [facility];  Service: Thoracic    SC ESOPHAGOSCOPY FLEX BALLOON DILAT <30 MM DIAM N/A 03/20/2024    Procedure: ESOPHAGOGASTRODUODENOSCOPY WITH BALLOON DILATION;  Surgeon: Shilpa Price MD;  [facility];  Service: Thoracic    SC LAPS RPR PARAESPHGL HRNA INCL FUNDPLSTY W/O MESH N/A 11/08/2023    Procedure: robotic takedown of nissen fundoplication, redo hiatal hernia, partial fundoplication, with mesh;  Surgeon: Shilpa Price MD;  [facility];  Service: Thoracic    SC PYLOROPLASTY N/A 11/08/2023    Procedure: PYLOROPLASTY LAPAROSCOPIC WITH ROBOT;  Surgeon: Dontrell Mcmahon MD;  [facility];  Service: General    ROTATOR CUFF REPAIR      SHOULDER SURGERY      SPINAL FUSION      TUBAL LIGATION  May 1975    Last child born    UPPER GASTROINTESTINAL ENDOSCOPY      US GUIDED THYROID BIOPSY      WISDOM TOOTH EXTRACTION       Allergies   Allergen Reactions    Latex Hives    Augmentin [Amoxicillin-Pot Clavulanate] GI Intolerance    Bactrim [Sulfamethoxazole-Trimethoprim] GI Intolerance    Clarithromycin GI Intolerance    Doxycycline Hives    Neomycin GI Intolerance    Polymyxin B GI Intolerance    Zyvox [Linezolid] GI Bleeding    Cephalosporins GI Intolerance    Nsaids GI Intolerance    Penicillins GI Intolerance     Patient can only take levaquin and cipro    Prednisone GI Intolerance    Sulphasomidine GI Intolerance       Substance Abuse History:    Social History     Substance and Sexual Activity   Alcohol Use Not Currently     Social History     Substance and Sexual Activity   Drug Use No        Social History:    Social History     Socioeconomic History    Marital status:      Spouse name: Not on file    Number of children: Not on file    Years of education: Not on file    Highest education level: Not on file   Occupational History    Not on file   Tobacco Use    Smoking status: Former     Current packs/day: 0.00     Average packs/day: 1 pack/day for 15.0 years (15.0 ttl pk-yrs)     Types: Cigarettes     Start date: 10/5/1998     Quit date: 10/1/2003     Years since quittin.4    Smokeless tobacco: Former   Vaping Use    Vaping status: Never Used   Substance and Sexual Activity    Alcohol use: Not Currently    Drug use: No    Sexual activity: Not Currently     Partners: Male     Birth control/protection: None   Other Topics Concern    Not on file   Social History Narrative    Not on file     Social Drivers of Health     Financial Resource Strain: Not on file   Food Insecurity: No Food Insecurity (3/22/2024)    Nursing - Inadequate Food Risk Classification     Worried About Running Out of Food in the Last Year: Never true     Ran Out of Food in the Last Year: Never true     Ran Out of Food in the Last Year: Not on file   Transportation Needs: No Transportation Needs (3/22/2024)    PRAPARE - Transportation     Lack of Transportation (Medical): No     Lack of Transportation (Non-Medical): No   Physical Activity: Not on file   Stress: Not on file   Social Connections: Not on file   Intimate Partner Violence: Not on file   Housing Stability: Low Risk  (3/22/2024)    Housing Stability Vital Sign     Unable to Pay for Housing in the Last Year: No     Number of Times Moved in the Last Year: 1     Homeless in the Last Year: No       Family Psychiatric History:     Family History   Problem Relation Age of Onset    Other Mother         Esophageal disorder    Stomach cancer Father     Cancer Father     Hypertension Maternal Aunt     COPD Maternal Grandmother     Colon cancer Neg Hx     Colon polyps  Neg Hx        History Review: The following portions of the patient's history were reviewed and updated as appropriate: allergies, current medications, past family history, past medical history, past social history, past surgical history, and problem list.         OBJECTIVE:     Vital signs in last 24 hours:    There were no vitals filed for this visit.    Mental Status Evaluation:    Appearance age appropriate, casually dressed   Behavior cooperative, mildly anxious   Speech normal rate, normal volume, normal pitch   Mood dysphoric   Affect constricted   Thought Processes organized, goal directed   Associations intact associations   Thought Content no overt delusions   Perceptual Disturbances: no auditory hallucinations, no visual hallucinations   Abnormal Thoughts  Risk Potential Suicidal ideation - None at present  Homicidal ideation - None at present  Potential for aggression - Not at present   Orientation oriented to person, place, time/date, and situation   Memory recent and remote memory grossly intact   Consciousness alert and awake   Attention Span Concentration Span attention span and concentration are age appropriate   Intellect appears to be of average intelligence   Insight intact   Judgement intact   Muscle Strength and  Gait Unable to evaluate secondary to virtual appointment   Motor activity Unable to evaluate secondary to virtual appointment   Language no difficulty naming common objects, no difficulty repeating a phrase   Fund of Knowledge adequate knowledge of current events  adequate fund of knowledge regarding past history  adequate fund of knowledge regarding vocabulary    Pain none   Pain Scale Did not ask patient to formally rate       Laboratory Results: I have personally reviewed all pertinent laboratory/tests results    Recent Labs (last 2 months):   No visits with results within 2 Month(s) from this visit.   Latest known visit with results is:   Admission on 03/21/2024, Discharged on  03/26/2024   Component Date Value    WBC 03/22/2024 7.73     RBC 03/22/2024 3.29 (L)     Hemoglobin 03/22/2024 10.3 (L)     Hematocrit 03/22/2024 32.1 (L)     MCV 03/22/2024 98     MCH 03/22/2024 31.3     MCHC 03/22/2024 32.1     RDW 03/22/2024 13.6     MPV 03/22/2024 9.0     Platelets 03/22/2024 231     nRBC 03/22/2024 0     Segmented % 03/22/2024 52     Immature Grans % 03/22/2024 1     Lymphocytes % 03/22/2024 39     Monocytes % 03/22/2024 6     Eosinophils Relative 03/22/2024 2     Basophils Relative 03/22/2024 0     Absolute Neutrophils 03/22/2024 4.09     Absolute Immature Grans 03/22/2024 0.04     Absolute Lymphocytes 03/22/2024 2.99     Absolute Monocytes 03/22/2024 0.45     Eosinophils Absolute 03/22/2024 0.14     Basophils Absolute 03/22/2024 0.02     Sodium 03/22/2024 140     Potassium 03/22/2024 4.1     Chloride 03/22/2024 107     CO2 03/22/2024 26     ANION GAP 03/22/2024 7     BUN 03/22/2024 22     Creatinine 03/22/2024 1.50 (H)     Glucose 03/22/2024 190 (H)     Calcium 03/22/2024 9.3     AST 03/22/2024 38     ALT 03/22/2024 35     Alkaline Phosphatase 03/22/2024 123 (H)     Total Protein 03/22/2024 7.6     Albumin 03/22/2024 4.0     Total Bilirubin 03/22/2024 0.25     eGFR 03/22/2024 33     Lipase 03/22/2024 <6 (L)     hs TnI 0hr 03/22/2024 3     Sodium 03/22/2024 139     Potassium 03/22/2024 4.5     Chloride 03/22/2024 108     CO2 03/22/2024 25     ANION GAP 03/22/2024 6     BUN 03/22/2024 21     Creatinine 03/22/2024 1.37 (H)     Glucose 03/22/2024 105     Calcium 03/22/2024 8.6     eGFR 03/22/2024 37     Magnesium 03/22/2024 1.8 (L)     Phosphorus 03/22/2024 3.5     Total Bilirubin 03/22/2024 0.21     Bilirubin, Direct 03/22/2024 0.06     Alkaline Phosphatase 03/22/2024 108 (H)     AST 03/22/2024 29     ALT 03/22/2024 29     Total Protein 03/22/2024 6.5     Albumin 03/22/2024 3.5     POC Glucose 03/22/2024 119     POC Glucose 03/22/2024 121     WBC 03/23/2024 5.40     RBC 03/23/2024 3.06 (L)      Hemoglobin 03/23/2024 9.7 (L)     Hematocrit 03/23/2024 29.4 (L)     MCV 03/23/2024 96     MCH 03/23/2024 31.7     MCHC 03/23/2024 33.0     RDW 03/23/2024 13.6     MPV 03/23/2024 8.4 (L)     Platelets 03/23/2024 169     nRBC 03/23/2024 0     Segmented % 03/23/2024 53     Immature Grans % 03/23/2024 0     Lymphocytes % 03/23/2024 39     Monocytes % 03/23/2024 6     Eosinophils Relative 03/23/2024 2     Basophils Relative 03/23/2024 0     Absolute Neutrophils 03/23/2024 2.86     Absolute Immature Grans 03/23/2024 0.02     Absolute Lymphocytes 03/23/2024 2.08     Absolute Monocytes 03/23/2024 0.31     Eosinophils Absolute 03/23/2024 0.11     Basophils Absolute 03/23/2024 0.02     Sodium 03/23/2024 139     Potassium 03/23/2024 4.2     Chloride 03/23/2024 106     CO2 03/23/2024 27     ANION GAP 03/23/2024 6     BUN 03/23/2024 17     Creatinine 03/23/2024 1.27     Glucose 03/23/2024 95     Calcium 03/23/2024 8.9     Corrected Calcium 03/23/2024 9.5     AST 03/23/2024 42 (H)     ALT 03/23/2024 33     Alkaline Phosphatase 03/23/2024 101     Total Protein 03/23/2024 6.5     Albumin 03/23/2024 3.3 (L)     Total Bilirubin 03/23/2024 0.37     eGFR 03/23/2024 40     Magnesium 03/23/2024 2.1     POC Glucose 03/23/2024 84     Ventricular Rate 03/22/2024 82     Atrial Rate 03/22/2024 82     NC Interval 03/22/2024 140     QRSD Interval 03/22/2024 120     QT Interval 03/22/2024 396     QTC Interval 03/22/2024 462     P Axis 03/22/2024 50     QRS Axis 03/22/2024 17     T Wave Foster 03/22/2024 30     POC Glucose 03/23/2024 102     WBC 03/24/2024 4.84     RBC 03/24/2024 2.95 (L)     Hemoglobin 03/24/2024 9.4 (L)     Hematocrit 03/24/2024 28.4 (L)     MCV 03/24/2024 96     MCH 03/24/2024 31.9     MCHC 03/24/2024 33.1     RDW 03/24/2024 13.3     MPV 03/24/2024 8.7 (L)     Platelets 03/24/2024 177     nRBC 03/24/2024 0     Segmented % 03/24/2024 50     Immature Grans % 03/24/2024 1     Lymphocytes % 03/24/2024 41     Monocytes % 03/24/2024  6     Eosinophils Relative 03/24/2024 2     Basophils Relative 03/24/2024 0     Absolute Neutrophils 03/24/2024 2.49     Absolute Immature Grans 03/24/2024 0.03     Absolute Lymphocytes 03/24/2024 1.96     Absolute Monocytes 03/24/2024 0.27     Eosinophils Absolute 03/24/2024 0.08     Basophils Absolute 03/24/2024 0.01     Sodium 03/24/2024 139     Potassium 03/24/2024 4.4     Chloride 03/24/2024 105     CO2 03/24/2024 30     ANION GAP 03/24/2024 4     BUN 03/24/2024 18     Creatinine 03/24/2024 1.49 (H)     Glucose 03/24/2024 97     Calcium 03/24/2024 9.1     eGFR 03/24/2024 33     POC Glucose 03/24/2024 97     POC Glucose 03/25/2024 104     POC Glucose 03/25/2024 102     Salmonella sp PCR 03/25/2024 Negative     Shigella sp/Enteroinvasi* 03/25/2024 Negative     Campylobacter sp (jejuni* 03/25/2024 Negative     Shiga toxin 1/Shiga toxi* 03/25/2024 Negative     C.difficile toxin by PCR 03/25/2024 Negative     POC Glucose 03/25/2024 76     POC Glucose 03/25/2024 230 (H)     WBC 03/26/2024 4.66     RBC 03/26/2024 2.80 (L)     Hemoglobin 03/26/2024 8.8 (L)     Hematocrit 03/26/2024 26.9 (L)     MCV 03/26/2024 96     MCH 03/26/2024 31.4     MCHC 03/26/2024 32.7     RDW 03/26/2024 13.4     MPV 03/26/2024 8.5 (L)     Platelets 03/26/2024 150     nRBC 03/26/2024 0     Segmented % 03/26/2024 48     Immature Grans % 03/26/2024 0     Lymphocytes % 03/26/2024 43     Monocytes % 03/26/2024 6     Eosinophils Relative 03/26/2024 3     Basophils Relative 03/26/2024 0     Absolute Neutrophils 03/26/2024 2.24     Absolute Immature Grans 03/26/2024 0.02     Absolute Lymphocytes 03/26/2024 1.99     Absolute Monocytes 03/26/2024 0.28     Eosinophils Absolute 03/26/2024 0.12     Basophils Absolute 03/26/2024 0.01     Sodium 03/26/2024 138     Potassium 03/26/2024 4.1     Chloride 03/26/2024 106     CO2 03/26/2024 27     ANION GAP 03/26/2024 5     BUN 03/26/2024 14     Creatinine 03/26/2024 1.71 (H)     Glucose 03/26/2024 95     Calcium  03/26/2024 8.8     Corrected Calcium 03/26/2024 9.4     AST 03/26/2024 28     ALT 03/26/2024 22     Alkaline Phosphatase 03/26/2024 86     Total Protein 03/26/2024 6.2 (L)     Albumin 03/26/2024 3.2 (L)     Total Bilirubin 03/26/2024 0.28     eGFR 03/26/2024 28     Magnesium 03/26/2024 1.9        Suicide/Homicide Risk Assessment:    Risk of Harm to Self:  The following ratings are based on assessment at the time of the interview  Historical Risk Factors include: chronic psychiatric problems  Protective Factors: no current suicidal ideation, access to mental health treatment, being a parent, compliant with medications, compliant with mental health treatment, connection to own children, having a desire to be alive, responsibilities and duties to others, stable living environment, strong relationships    Risk of Harm to Others:  The following ratings are based on assessment at the time of the interview  Historical Risk Factors include: none.  Protective Factors: no current homicidal ideation    The following interventions are recommended: contracts for safety at present - agrees to go to ED if feeling unsafe, contracts for safety at present - agrees to call Crisis Intervention Service if feeling unsafe      Lethality Statement:    Based on today's assessment and clinical criteria, Ira Bonilla contracts for safety and is not an imminent risk of harm to self or others. Outpatient level of care is deemed appropriate at this current time. Ira understands that if they can no longer contract for safety, they need to call the office or report to their nearest Emergency Room for immediate evaluation. They voiced understanding and agreement to call 911 or head to the nearest ED should they have any physical or mental decompensation whatsoever.       Assessment/Plan:     1.) MDD, recurrent, moderate  2.) WINDY  3.)      After discussion of risks, benefits, potential side effects, alternatives, we will continue Trintellix  20 mg daily and keep Abilify discontinued due to side effects for patient.  Patient denies acute mental health complaints or concerns at this time.  She will continue meeting with her other medical specialists to treat her chronic medical conditions and will continue to meet regularly with her therapist         Aware of 24 hour and weekend coverage for urgent situations accessed by calling [facility] main practice number    Medications Risks/Benefits      Risks, Benefits And Possible Side Effects Of Medications:    Risks, benefits, and possible side effects of medications explained to Ira and she verbalizes understanding and agreement for treatment.    Controlled Medication Discussion:     Not applicable - controlled prescriptions are not prescribed by this practice    Psychotherapy Provided:     Individual psychotherapy provided: Crisis/safety plan discussed with Ira. Provided at least 16 minutes of distinct psychotherapy using a combination of supportive, interpersonal, motivational, and problem solving approaches to address psychological distress and enhance coping strategies.     Treatment Plan:    Completed and signed during the session: Not applicable - Treatment Plan to be completed by [facility] therapist      Visit Time    Visit Start Time: 11:30 AM  Visit Stop Time: 11:55 AM  Total Visit Duration:  25 minutes     The total visit duration detailed above includes: patient engagement, medication management, psychotherapy/counseling, discussion regarding treatment goals, documentation, review of past medical records, and coordination of care.      Note Share Disclaimer:     This note was not shared with the patient due to reasonable likelihood of causing patient harm      Edward Hinkle DO  Psychiatry  03/03/25

## 2025-03-04 ENCOUNTER — TELEMEDICINE (OUTPATIENT)
Dept: PSYCHIATRY | Facility: CLINIC | Age: 80
End: 2025-03-04
Payer: COMMERCIAL

## 2025-03-04 DIAGNOSIS — F41.9 ANXIETY: ICD-10-CM

## 2025-03-04 DIAGNOSIS — F43.29 GRIEF REACTION WITH PROLONGED BEREAVEMENT: ICD-10-CM

## 2025-03-04 DIAGNOSIS — F32.1 CURRENT MODERATE EPISODE OF MAJOR DEPRESSIVE DISORDER, UNSPECIFIED WHETHER RECURRENT (HCC): Primary | ICD-10-CM

## 2025-03-04 PROCEDURE — 90834 PSYTX W PT 45 MINUTES: CPT

## 2025-03-04 NOTE — PSYCH
Virtual Regular Visit    Verification of patient location:    Patient is located at Home in the following state in which I hold an active license [place]      Assessment/Plan:    Problem List Items Addressed This Visit     Depression - Primary    Anxiety    Grief reaction with prolonged bereavement       Goals addressed in session: Goal 1     Depression Follow-up Plan Completed: Yes    Reason for visit is No chief complaint on file.       Encounter provider Maria Ines Farfan LCSW      Recent Visits  No visits were found meeting these conditions.  Showing recent visits within past 7 days and meeting all other requirements  Today's Visits  Date Type Provider Dept   03/04/25 Telemedicine Maria Ines Farfan LCSW [facility]   Showing today's visits and meeting all other requirements  Future Appointments  No visits were found meeting these conditions.  Showing future appointments within next 150 days and meeting all other requirements       The patient was identified by name and date of birth. Ira Bonilla was informed that this is a telemedicine visit and that the visit is being conducted throughthe Epic Embedded platform. She agrees to proceed..  My office door was closed. No one else was in the room.  She acknowledged consent and understanding of privacy and security of the video platform. The patient has agreed to participate and understands they can discontinue the visit at any time.    Patient is aware this is a billable service.     Subjective  Ira Bonilla is a 79 y.o. female  .      HPI     Past Medical History:   Diagnosis Date   • Anxiety    • Arthritis    • Asthma    • Cancer (HCC)    • Candida infection, esophageal (HCC)    • Chronic kidney disease    • Chronic pain    • COPD (chronic obstructive pulmonary disease) (HCC)    • Depression    • Depression    • Gastroparesis    • Gastroparesis    • GERD (gastroesophageal reflux disease)    • Hypertension    • Irritable bowel  syndrome (IBS)    • Lactose intolerance Not sure   • Migraines    • MRSA (methicillin resistant Staphylococcus aureus)    • Multiple thyroid nodules    • Osteoporosis    • Pneumonia    • Psychiatric disorder        Past Surgical History:   Procedure Laterality Date   • BACK SURGERY     •  SECTION     • CHOLECYSTECTOMY     • COLONOSCOPY  10/09/2013   • ESOPHAGEAL DILATION      2017 [facility]   • ESOPHAGOGASTRODUODENOSCOPY N/A 2023    Procedure: ESOPHAGOGASTRODUODENOSCOPY (EGD);  Surgeon: Dontrell Mcmahon MD;  [facility];  Service: General   • ESOPHAGOGASTRODUODENOSCOPY N/A 2024    Procedure: ESOPHAGOGASTRODUODENOSCOPY (EGD);  Surgeon: Shilpa Price MD;  [facility];  Service: Thoracic   • HERNIA REPAIR  2013   • HIATAL HERNIA REPAIR     • NISSEN FUNDOPLICATION     • FL BIOPSY LIVER NEEDLE PERCUTANEOUS N/A 2023    Procedure: EXCISION BIOPSY LIVER;  Surgeon: Dontrell Mcmahon MD;  [facility];  Service: General   • FL ESOPHAGOGASTRODUODENOSCOPY TRANSORAL DIAGNOSTIC N/A 2019    Procedure: ESOPHAGOGASTRODUODENOSCOPY (EGD);  Surgeon: Geovanna Winn MD;  [facility];  Service: Gastroenterology   • FL ESOPHAGOGASTRODUODENOSCOPY TRANSORAL DIAGNOSTIC N/A 2023    Procedure: ESOPHAGOGASTRODUODENOSCOPY (EGD);  Surgeon: Shilpa Price MD;  [facility];  Service: Thoracic   • FL ESOPHAGOSCOPY FLEX BALLOON DILAT <30 MM DIAM N/A 2023    Procedure: CRE WIRE GUIDED BALLOON DILATATION ESOPHAGEAL;  Surgeon: Shilpa Price MD;  [facility];  Service: Thoracic   • FL ESOPHAGOSCOPY FLEX BALLOON DILAT <30 MM DIAM N/A 2024    Procedure: ESOPHAGOGASTRODUODENOSCOPY WITH BALLOON DILATION;  Surgeon: Shilpa Price MD;  [facility];  Service: Thoracic   • FL LAPS RPR PARAESPHGL HRNA INCL FUNDPLSTY W/O MESH N/A 2023    Procedure: robotic takedown of nissen fundoplication, redo hiatal hernia, partial  fundoplication, with mesh;  Surgeon: Shilpa Price MD;  [facility];  Service: Thoracic   • MS PYLOROPLASTY N/A 11/08/2023    Procedure: PYLOROPLASTY LAPAROSCOPIC WITH ROBOT;  Surgeon: Dontrell Mcmahon MD;  [facility];  Service: General   • ROTATOR CUFF REPAIR     • SHOULDER SURGERY     • SPINAL FUSION     • TUBAL LIGATION  May 1975    Last child born   • UPPER GASTROINTESTINAL ENDOSCOPY     • US GUIDED THYROID BIOPSY     • WISDOM TOOTH EXTRACTION         Current Outpatient Medications   Medication Sig Dispense Refill   • acetaminophen (TYLENOL) 160 mg/5 mL suspension Take 20.3 mL (650 mg total) by mouth every 6 (six) hours as needed for mild pain 355 mL 0   • albuterol (PROVENTIL HFA,VENTOLIN HFA) 90 mcg/act inhaler Inhale 2 puffs if needed     • amLODIPine (NORVASC) 10 mg tablet      • cetirizine (ZyrTEC) 10 MG chewable tablet Chew 10 mg daily.     • cholecalciferol (VITAMIN D3) 1,000 units tablet Take 5,000 Units by mouth daily     • linaCLOtide 290 MCG CAPS Take 1 capsule by mouth in the morning 30 capsule 1   • metoclopramide (Reglan) 10 mg tablet Take 1 tablet (10 mg total) by mouth 4 (four) times a day 120 tablet 1   • multivitamin (THERAGRAN) TABS Take 1 tablet by mouth daily     • Omega-3 Fatty Acids (OMEGA 3 PO) Take by mouth in the morning     • ondansetron (ZOFRAN) 4 mg tablet Take 1 tablet (4 mg total) by mouth every 8 (eight) hours as needed for nausea or vomiting 15 tablet 0   • rizatriptan (MAXALT-MLT) 10 mg disintegrating tablet as needed for migraine headache     • spironolactone (ALDACTONE) 25 mg tablet 1/2 tablet Orally Once per day for 30 days     • Vortioxetine HBr (Trintellix) 20 MG tablet Take 1 tablet (20 mg total) by mouth daily 30 tablet 2     No current facility-administered medications for this visit.        Allergies   Allergen Reactions   • Latex Hives   • Augmentin [Amoxicillin-Pot Clavulanate] GI Intolerance   • Bactrim [Sulfamethoxazole-Trimethoprim] GI  "Intolerance   • Clarithromycin GI Intolerance   • Doxycycline Hives   • Neomycin GI Intolerance   • Polymyxin B GI Intolerance   • Zyvox [Linezolid] GI Bleeding   • Cephalosporins GI Intolerance   • Nsaids GI Intolerance   • Penicillins GI Intolerance     Patient can only take levaquin and cipro   • Prednisone GI Intolerance   • Sulphasomidine GI Intolerance       Review of Systems    Video Exam    There were no vitals filed for this visit.    Physical Exam     Behavioral Health Psychotherapy Progress Note    Psychotherapy Provided: Individual Psychotherapy     1. Current moderate episode of major depressive disorder, unspecified whether recurrent (HCC)        2. Anxiety        3. Grief reaction with prolonged bereavement            Goals addressed in session: Goal 1     DATA:   Wendy said she is frustrated by some possible side effects of her medications because she feels jumpy and hungry.  She stopped taking her Abilify 2 weeks ago and is not noticing a difference in how she feels in terms of side effects.  She said she got a new puppy Mely who is 7 weeks old and is keeping the family awake at night.  She said her family is not saying she is \"stalking\" them anymore.  She took PHQ and scored a 5, saying she is not feeling comfortable at home, that she feels uncomfortable with feeling she has to censor what she says in the house.  She is still working on finding things to do to keep herself busy.  She has to contact social security to see what happens when Wandi becomes 18.  Therapist gave her information on how long she will have to wait on hold on the phone.  She said she feels upset in her stomach still.  She thinks about her daughter and misses her.  She expressed missing being with her and talked about memories and thoughts about her.  She expressed thoughts about Wandi and that he misses her too.  Therapist suggested she think of doing something to honor her daughter's memory and she said she remembers going " "to [employer] with her, watching tv and going out to eat.  Therapist gave some psychoeducation on ADHD and her son's behaviors.  She was able to listen to this and expressed understanding.    During this session, this clinician used the following therapeutic modalities: Bereavement Therapy, Client-centered Therapy, and Supportive Psychotherapy    Substance Abuse was not addressed during this session. If the client is diagnosed with a co-occurring substance use disorder, please indicate any changes in the frequency or amount of use: . Stage of change for addressing substance use diagnoses: No substance use/Not applicable    ASSESSMENT:  Wendy Bonilla presents with a Euthymic/ normal mood.     her affect is Normal range and intensity, which is congruent, with her mood and the content of the session. The client has made progress on their goals.     Wendy Bonilla presents with a none risk of suicide, none risk of self-harm, and none risk of harm to others.    For any risk assessment that surpasses a \"low\" rating, a safety plan must be developed.    A safety plan was indicated: no  If yes, describe in detail     PLAN: Between sessions, Wendy Bonilla will recognize her daughter's memory, and will call social security about her son. At the next session, the therapist will use Bereavement Therapy and Client-centered Therapy to address grief, depression.    Behavioral Health Treatment Plan and Discharge Planning: Wendy Bonilla is aware of and agrees to continue to work on their treatment plan. They have identified and are working toward their discharge goals. yes    Depression Follow-up Plan Completed: Yes see intervention today, and treatment plan    Visit start and stop times:    03/04/25  Start Time: 1100  Stop Time: 1152  Total Visit Time: 52 minutes        "

## 2025-03-18 ENCOUNTER — TELEMEDICINE (OUTPATIENT)
Dept: PSYCHIATRY | Facility: CLINIC | Age: 80
End: 2025-03-18
Payer: COMMERCIAL

## 2025-03-18 DIAGNOSIS — F41.9 ANXIETY: ICD-10-CM

## 2025-03-18 DIAGNOSIS — F43.29 GRIEF REACTION WITH PROLONGED BEREAVEMENT: ICD-10-CM

## 2025-03-18 DIAGNOSIS — F32.0 CURRENT MILD EPISODE OF MAJOR DEPRESSIVE DISORDER, UNSPECIFIED WHETHER RECURRENT (HCC): Primary | ICD-10-CM

## 2025-03-18 PROCEDURE — 90834 PSYTX W PT 45 MINUTES: CPT

## 2025-03-18 NOTE — PSYCH
"Virtual Regular VisitName: Ira Bonilla      : 1945      MRN: [id number]  Encounter Provider: Maria Ines Farfan LCSW  Encounter Date: 3/18/2025   Encounter department: [facility]  :  Assessment & Plan  Current mild episode of major depressive disorder, unspecified whether recurrent (HCC)         Anxiety         Grief reaction with prolonged bereavement             Goals addressed in session: Goal 1     DATA: Wendy shared that she has been getting outside more because of having a new puppy and giving her walks.  She is not feeling as restless recently after a change to her medication but her \"leg is still going.\" She is still looking into finding a job and still needs to call the [employer] to help her search.  She shared her son Dario was going to go to the [facility] but his date has backed out.  She has been feeling \"allright\" but still jumpy.  She has been trying to find things to do that keeps her busy.  She said she doesn't like going places by herself because she feels shy about it. She doesn't have motivation to do things because of her depressed mood.  Therapist gave psychoeducation on depression and lack of motivation and that behavioral activation can help in terms of doing things without a feeling of motivation.  She said she would call the [employer] today as a task to do.  Therapist talked to her about what kinds of things she would enjoy doing for fun and she shared she likes casinos.  Therapist encouraged her to go to [facility] near her.  She shared she would call her friend who told her about the [facility] center who would also go with her the first time and introduce her to her friends.  During this session, this clinician used the following therapeutic modalities: Client-centered Therapy and Dialectical Behavior Therapy    Substance Abuse was not addressed during this session. If the client is diagnosed with a co-occurring substance use disorder, please indicate " "any changes in the frequency or amount of use: . Stage of change for addressing substance use diagnoses: No substance use/Not applicable    ASSESSMENT:  Ira presents with a Euthymic/ normal mood. Helens affect is Normal range and intensity, which is congruent, with their mood and the content of the session. The client has made progress on their goals as evidenced by engaging in behavioral activation plan for improved mood and reduce depression.    Ira presents with a none risk of suicide, none risk of self-harm, and none risk of harm to others.    For any risk assessment that surpasses a \"low\" rating, a safety plan must be developed.    A safety plan was indicated: no  If yes, describe in detail     PLAN: Between sessions, Ira will utilize behavioral activation plan of calling [employer] and calling her friend. At the next session, the therapist will use Client-centered Therapy and Dialectical Behavior Therapy to address depression.    Behavioral Health Treatment Plan [facility]: Diagnosis and Treatment Plan explained to Ira, Ira relates understanding diagnosis and is agreeable to Treatment Plan. Yes     Depression Follow-up Plan Completed: Yes     Reason for visit is No chief complaint on file.     Recent Visits  No visits were found meeting these conditions.  Showing recent visits within past 7 days and meeting all other requirements  Today's Visits  Date Type Provider Dept   03/18/25 Telemedicine Maria Ines Farfan LCSW [facility]   Showing today's visits and meeting all other requirements  Future Appointments  No visits were found meeting these conditions.  Showing future appointments within next 150 days and meeting all other requirements     History of Present Illness     HPI    Past Medical History   Past Medical History:   Diagnosis Date   • Anxiety    • Arthritis    • Asthma    • Cancer (HCC)    • Candida infection, esophageal (HCC)    • Chronic kidney disease    • Chronic " pain    • COPD (chronic obstructive pulmonary disease) (HCC)    • Depression    • Depression    • Gastroparesis    • Gastroparesis    • GERD (gastroesophageal reflux disease)    • Hypertension    • Irritable bowel syndrome (IBS)    • Lactose intolerance Not sure   • Migraines    • MRSA (methicillin resistant Staphylococcus aureus)    • Multiple thyroid nodules    • Osteoporosis    • Pneumonia    • Psychiatric disorder      Past Surgical History:   Procedure Laterality Date   • BACK SURGERY     •  SECTION     • CHOLECYSTECTOMY     • COLONOSCOPY  10/09/2013   • ESOPHAGEAL DILATION      2017 [facility]   • ESOPHAGOGASTRODUODENOSCOPY N/A 2023    Procedure: ESOPHAGOGASTRODUODENOSCOPY (EGD);  Surgeon: Dontrell Mcmahon MD;  [facility];  Service: General   • ESOPHAGOGASTRODUODENOSCOPY N/A 2024    Procedure: ESOPHAGOGASTRODUODENOSCOPY (EGD);  Surgeon: Shilpa Price MD;  [facility];  Service: Thoracic   • HERNIA REPAIR  2013   • HIATAL HERNIA REPAIR     • NISSEN FUNDOPLICATION     • CO BIOPSY LIVER NEEDLE PERCUTANEOUS N/A 2023    Procedure: EXCISION BIOPSY LIVER;  Surgeon: Dontrell Mcmahon MD;  [facility];  Service: General   • CO ESOPHAGOGASTRODUODENOSCOPY TRANSORAL DIAGNOSTIC N/A 2019    Procedure: ESOPHAGOGASTRODUODENOSCOPY (EGD);  Surgeon: Geovanna Winn MD;  [facility];  Service: Gastroenterology   • CO ESOPHAGOGASTRODUODENOSCOPY TRANSORAL DIAGNOSTIC N/A 2023    Procedure: ESOPHAGOGASTRODUODENOSCOPY (EGD);  Surgeon: Shilpa Price MD;  [facility];  Service: Thoracic   • CO ESOPHAGOSCOPY FLEX BALLOON DILAT <30 MM DIAM N/A 2023    Procedure: CRE WIRE GUIDED BALLOON DILATATION ESOPHAGEAL;  Surgeon: Shilpa Price MD;  [facility];  Service: Thoracic   • CO ESOPHAGOSCOPY FLEX BALLOON DILAT <30 MM DIAM N/A 2024    Procedure: ESOPHAGOGASTRODUODENOSCOPY WITH BALLOON DILATION;  Surgeon: Shilpa Price  MD;  [facility];  Service: Thoracic   • NM LAPS RPR PARAESPHGL HRNA INCL FUNDPLSTY W/O MESH N/A 11/08/2023    Procedure: robotic takedown of nissen fundoplication, redo hiatal hernia, partial fundoplication, with mesh;  Surgeon: Shilpa Price MD;  [facility];  Service: Thoracic   • NM PYLOROPLASTY N/A 11/08/2023    Procedure: PYLOROPLASTY LAPAROSCOPIC WITH ROBOT;  Surgeon: Dontrell Mcmahon MD;  [facility];  Service: General   • ROTATOR CUFF REPAIR     • SHOULDER SURGERY     • SPINAL FUSION     • TUBAL LIGATION  May 1975    Last child born   • UPPER GASTROINTESTINAL ENDOSCOPY     • US GUIDED THYROID BIOPSY     • WISDOM TOOTH EXTRACTION       Current Outpatient Medications   Medication Instructions   • acetaminophen (TYLENOL) 650 mg, Oral, Every 6 hours PRN   • albuterol (PROVENTIL HFA,VENTOLIN HFA) 90 mcg/act inhaler 2 puffs, Inhalation, As needed   • amLODIPine (NORVASC) 10 mg tablet    • cetirizine (ZYRTEC) 10 mg, Oral, Daily   • cholecalciferol (VITAMIN D3) 5,000 Units, Oral, Daily   • linaCLOtide 290 MCG CAPS 1 capsule, Oral, Daily   • metoclopramide (REGLAN) 10 mg, Oral, 4 times daily   • multivitamin (THERAGRAN) TABS 1 tablet, Oral, Daily   • Omega-3 Fatty Acids (OMEGA 3 PO) Oral, Daily   • ondansetron (ZOFRAN) 4 mg, Oral, Every 8 hours PRN   • rizatriptan (MAXALT-MLT) 10 mg disintegrating tablet as needed for migraine headache   • spironolactone (ALDACTONE) 25 mg tablet 1/2 tablet Orally Once per day for 30 days   • Vortioxetine HBr (TRINTELLIX) 20 mg, Oral, Daily     Allergies   Allergen Reactions   • Latex Hives   • Augmentin [Amoxicillin-Pot Clavulanate] GI Intolerance   • Bactrim [Sulfamethoxazole-Trimethoprim] GI Intolerance   • Clarithromycin GI Intolerance   • Doxycycline Hives   • Neomycin GI Intolerance   • Polymyxin B GI Intolerance   • Zyvox [Linezolid] GI Bleeding   • Cephalosporins GI Intolerance   • Nsaids GI Intolerance   • Penicillins GI Intolerance      Patient can only take levaquin and cipro   • Prednisone GI Intolerance   • Sulphasomidine GI Intolerance       Objective   There were no vitals taken for this visit.    Video Exam  Physical Exam     Administrative Statements   Encounter provider Maria Ines Farfan LCSW    The Patient is located at Home and in the following state in which I hold an active license [place].    The patient was identified by name and date of birth. Millsemmanuel Bonilla was informed that this is a telemedicine visit and that the visit is being conducted through the Epic Embedded platform. She agrees to proceed..  My office door was closed. No one else was in the room.  She acknowledged consent and understanding of privacy and security of the video platform. The patient has agreed to participate and understands they can discontinue the visit at any time.        Visit Time  Start Time: 1100  Stop Time: 1152  Total Visit Time: 52 minutes

## 2025-03-24 ENCOUNTER — OFFICE VISIT (OUTPATIENT)
Dept: PSYCHIATRY | Facility: CLINIC | Age: 80
End: 2025-03-24
Payer: COMMERCIAL

## 2025-03-24 DIAGNOSIS — F41.1 GENERALIZED ANXIETY DISORDER: ICD-10-CM

## 2025-03-24 DIAGNOSIS — F32.1 CURRENT MODERATE EPISODE OF MAJOR DEPRESSIVE DISORDER, UNSPECIFIED WHETHER RECURRENT (HCC): ICD-10-CM

## 2025-03-24 PROCEDURE — 90833 PSYTX W PT W E/M 30 MIN: CPT | Performed by: STUDENT IN AN ORGANIZED HEALTH CARE EDUCATION/TRAINING PROGRAM

## 2025-03-24 PROCEDURE — 99214 OFFICE O/P EST MOD 30 MIN: CPT | Performed by: STUDENT IN AN ORGANIZED HEALTH CARE EDUCATION/TRAINING PROGRAM

## 2025-03-24 NOTE — PSYCH
MEDICATION MANAGEMENT NOTE        [facility]      Name and Date of Birth:  Ira Bonilla 79 y.o. 1945 MRN: [id number]    Date of Visit: 2025    Reason for Visit: Follow-up visit for medication management       SUBJECTIVE:    Ira Bonilla is a 79 y.o. female with past psychiatric history significant for MDD, WINDY who was personally seen and evaluated today at the [facility] outpatient clinic for follow-up and medication management. Ira denies SI, HI, AVH, delusions, jose since her last appointment.  After discussion of risks, benefits, potential side effects, alternatives, she would like to remain on current regimen as is without any changes without the Abilify.  She denies acute mental complaints or concerns at this time        Current Rating Scores:     Current PHQ-9   PHQ-2/9 Depression Screening    Little interest or pleasure in doing things: 1 - several days  Feeling down, depressed, or hopeless: 1 - several days  Trouble falling or staying asleep, or sleeping too much: 0 - not at all  Feeling tired or having little energy: 1 - several days  Poor appetite or overeatin - not at all  Feeling bad about yourself - or that you are a failure or have let yourself or your family down: 1 - several days  Trouble concentrating on things, such as reading the newspaper or watching television: 1 - several days  Moving or speaking so slowly that other people could have noticed. Or the opposite - being so fidgety or restless that you have been moving around a lot more than usual: 0 - not at all       Current WINDY-7 is .    Review Of Systems:      Constitutional negative    ENT negative   Cardiovascular negative   Respiratory negative    Gastrointestinal negative   Genitourinary negative   Musculoskeletal negative    Integumentary negative   Neurological negative   Endocrine negative   Other Symptoms none, all other systems  are negative       Past Psychiatric History: (unchanged information from previous note copied and italicized) - Information that is bolded has been updated.     See intake  Medications trials: Abilify somewhat improved mood but induced too much hunger and so was discontinued.    Substance Abuse History: (unchanged information from previous note copied and italicized) - Information that is bolded has been updated.     See intake    Social History: (unchanged information from previous note copied and italicized) - Information that is bolded has been updated.     See intake    Traumatic History: (unchanged information from previous note copied and italicized) - Information that is bolded has been updated.     See intake      Past Medical History:    Past Medical History:   Diagnosis Date    Anxiety     Arthritis     Asthma     Cancer (HCC)     Candida infection, esophageal (HCC)     Chronic kidney disease     Chronic pain     COPD (chronic obstructive pulmonary disease) (HCC)     Depression     Depression     Gastroparesis     Gastroparesis     GERD (gastroesophageal reflux disease)     Hypertension     Irritable bowel syndrome (IBS)     Lactose intolerance Not sure    Migraines     MRSA (methicillin resistant Staphylococcus aureus)     Multiple thyroid nodules     Osteoporosis     Pneumonia     Psychiatric disorder         Past Surgical History:   Procedure Laterality Date    BACK SURGERY       SECTION      CHOLECYSTECTOMY      COLONOSCOPY  10/09/2013    ESOPHAGEAL DILATION      2017 [facility]    ESOPHAGOGASTRODUODENOSCOPY N/A 2023    Procedure: ESOPHAGOGASTRODUODENOSCOPY (EGD);  Surgeon: Dontrell Mcmahon MD;  [facility];  Service: General    ESOPHAGOGASTRODUODENOSCOPY N/A 2024    Procedure: ESOPHAGOGASTRODUODENOSCOPY (EGD);  Surgeon: Shilpa Price MD;  [facility]: [facility];  Service: Thoracic    HERNIA REPAIR  2013    HIATAL HERNIA REPAIR      NISSEN FUNDOPLICATION      AL BIOPSY  LIVER NEEDLE PERCUTANEOUS N/A 11/08/2023    Procedure: EXCISION BIOPSY LIVER;  Surgeon: Dontrell Mcmahon MD;  [facility];  Service: General    KS ESOPHAGOGASTRODUODENOSCOPY TRANSORAL DIAGNOSTIC N/A 04/03/2019    Procedure: ESOPHAGOGASTRODUODENOSCOPY (EGD);  Surgeon: Geovanna Winn MD;  Location: [facility];  Service: Gastroenterology    KS ESOPHAGOGASTRODUODENOSCOPY TRANSORAL DIAGNOSTIC N/A 09/20/2023    Procedure: ESOPHAGOGASTRODUODENOSCOPY (EGD);  Surgeon: Shilpa Price MD;  [facility];  Service: Thoracic    KS ESOPHAGOSCOPY FLEX BALLOON DILAT <30 MM DIAM N/A 09/20/2023    Procedure: CRE WIRE GUIDED BALLOON DILATATION ESOPHAGEAL;  Surgeon: Shilpa Price MD;  [facility];  Service: Thoracic    KS ESOPHAGOSCOPY FLEX BALLOON DILAT <30 MM DIAM N/A 03/20/2024    Procedure: ESOPHAGOGASTRODUODENOSCOPY WITH BALLOON DILATION;  Surgeon: Shilpa Price MD;  [facility];  Service: Thoracic    KS LAPS RPR PARAESPHGL HRNA INCL FUNDPLSTY W/O MESH N/A 11/08/2023    Procedure: robotic takedown of nissen fundoplication, redo hiatal hernia, partial fundoplication, with mesh;  Surgeon: Shilpa Price MD;  [facility];  Service: Thoracic    KS PYLOROPLASTY N/A 11/08/2023    Procedure: PYLOROPLASTY LAPAROSCOPIC WITH ROBOT;  Surgeon: Dontrell Mcmahon MD;  [facility];  Service: General    ROTATOR CUFF REPAIR      SHOULDER SURGERY      SPINAL FUSION      TUBAL LIGATION  May 1975    Last child born    UPPER GASTROINTESTINAL ENDOSCOPY      US GUIDED THYROID BIOPSY      WISDOM TOOTH EXTRACTION       Allergies   Allergen Reactions    Latex Hives    Augmentin [Amoxicillin-Pot Clavulanate] GI Intolerance    Bactrim [Sulfamethoxazole-Trimethoprim] GI Intolerance    Clarithromycin GI Intolerance    Doxycycline Hives    Neomycin GI Intolerance    Polymyxin B GI Intolerance    Zyvox [Linezolid] GI Bleeding    Cephalosporins GI Intolerance    Nsaids GI Intolerance     Penicillins GI Intolerance     Patient can only take levaquin and cipro    Prednisone GI Intolerance    Sulphasomidine GI Intolerance       Substance Abuse History:    Social History     Substance and Sexual Activity   Alcohol Use Not Currently     Social History     Substance and Sexual Activity   Drug Use No       Social History:    Social History     Socioeconomic History    Marital status:      Spouse name: Not on file    Number of children: Not on file    Years of education: Not on file    Highest education level: Not on file   Occupational History    Not on file   Tobacco Use    Smoking status: Former     Current packs/day: 0.00     Average packs/day: 1 pack/day for 15.0 years (15.0 ttl pk-yrs)     Types: Cigarettes     Start date: 10/5/1998     Quit date: 10/1/2003     Years since quittin.4    Smokeless tobacco: Former   Vaping Use    Vaping status: Never Used   Substance and Sexual Activity    Alcohol use: Not Currently    Drug use: No    Sexual activity: Not Currently     Partners: Male     Birth control/protection: None   Other Topics Concern    Not on file   Social History Narrative    Not on file     Social Drivers of Health     Financial Resource Strain: Not on file   Food Insecurity: No Food Insecurity (3/22/2024)    Nursing - Inadequate Food Risk Classification     Worried About Running Out of Food in the Last Year: Never true     Ran Out of Food in the Last Year: Never true     Ran Out of Food in the Last Year: Not on file   Transportation Needs: No Transportation Needs (3/22/2024)    PRAPARE - Transportation     Lack of Transportation (Medical): No     Lack of Transportation (Non-Medical): No   Physical Activity: Not on file   Stress: Not on file   Social Connections: Not on file   Intimate Partner Violence: Not on file   Housing Stability: Low Risk  (3/22/2024)    Housing Stability Vital Sign     Unable to Pay for Housing in the Last Year: No     Number of Times Moved in the Last Year:  1     Homeless in the Last Year: No       Family Psychiatric History:     Family History   Problem Relation Age of Onset    Other Mother         Esophageal disorder    Stomach cancer Father     Cancer Father     Hypertension Maternal Aunt     COPD Maternal Grandmother     Colon cancer Neg Hx     Colon polyps Neg Hx        History Review: The following portions of the patient's history were reviewed and updated as appropriate: allergies, current medications, past family history, past medical history, past social history, past surgical history, and problem list.         OBJECTIVE:     Vital signs in last 24 hours:    There were no vitals filed for this visit.    Mental Status Evaluation:    Appearance age appropriate, casually dressed   Behavior cooperative, mildly anxious   Speech normal rate, normal volume, normal pitch   Mood dysphoric   Affect constricted   Thought Processes organized, goal directed   Associations intact associations   Thought Content no overt delusions   Perceptual Disturbances: no auditory hallucinations, no visual hallucinations   Abnormal Thoughts  Risk Potential Suicidal ideation - None at present  Homicidal ideation - None at present  Potential for aggression - Not at present   Orientation oriented to person, place, time/date, and situation   Memory recent and remote memory grossly intact   Consciousness alert and awake   Attention Span Concentration Span attention span and concentration are age appropriate   Intellect appears to be of average intelligence   Insight intact   Judgement intact   Muscle Strength and  Gait Unable to evaluate secondary to virtual appointment   Motor activity Unable to evaluate secondary to virtual appointment   Language no difficulty naming common objects, no difficulty repeating a phrase   Fund of Knowledge adequate knowledge of current events  adequate fund of knowledge regarding past history  adequate fund of knowledge regarding vocabulary    Pain none   Pain  Scale Did not ask patient to formally rate       Laboratory Results: I have personally reviewed all pertinent laboratory/tests results    Recent Labs (last 2 months):   No visits with results within 2 Month(s) from this visit.   Latest known visit with results is:   Admission on 03/21/2024, Discharged on 03/26/2024   Component Date Value    WBC 03/22/2024 7.73     RBC 03/22/2024 3.29 (L)     Hemoglobin 03/22/2024 10.3 (L)     Hematocrit 03/22/2024 32.1 (L)     MCV 03/22/2024 98     MCH 03/22/2024 31.3     MCHC 03/22/2024 32.1     RDW 03/22/2024 13.6     MPV 03/22/2024 9.0     Platelets 03/22/2024 231     nRBC 03/22/2024 0     Segmented % 03/22/2024 52     Immature Grans % 03/22/2024 1     Lymphocytes % 03/22/2024 39     Monocytes % 03/22/2024 6     Eosinophils Relative 03/22/2024 2     Basophils Relative 03/22/2024 0     Absolute Neutrophils 03/22/2024 4.09     Absolute Immature Grans 03/22/2024 0.04     Absolute Lymphocytes 03/22/2024 2.99     Absolute Monocytes 03/22/2024 0.45     Eosinophils Absolute 03/22/2024 0.14     Basophils Absolute 03/22/2024 0.02     Sodium 03/22/2024 140     Potassium 03/22/2024 4.1     Chloride 03/22/2024 107     CO2 03/22/2024 26     ANION GAP 03/22/2024 7     BUN 03/22/2024 22     Creatinine 03/22/2024 1.50 (H)     Glucose 03/22/2024 190 (H)     Calcium 03/22/2024 9.3     AST 03/22/2024 38     ALT 03/22/2024 35     Alkaline Phosphatase 03/22/2024 123 (H)     Total Protein 03/22/2024 7.6     Albumin 03/22/2024 4.0     Total Bilirubin 03/22/2024 0.25     eGFR 03/22/2024 33     Lipase 03/22/2024 <6 (L)     hs TnI 0hr 03/22/2024 3     Sodium 03/22/2024 139     Potassium 03/22/2024 4.5     Chloride 03/22/2024 108     CO2 03/22/2024 25     ANION GAP 03/22/2024 6     BUN 03/22/2024 21     Creatinine 03/22/2024 1.37 (H)     Glucose 03/22/2024 105     Calcium 03/22/2024 8.6     eGFR 03/22/2024 37     Magnesium 03/22/2024 1.8 (L)     Phosphorus 03/22/2024 3.5     Total Bilirubin 03/22/2024 0.21      Bilirubin, Direct 03/22/2024 0.06     Alkaline Phosphatase 03/22/2024 108 (H)     AST 03/22/2024 29     ALT 03/22/2024 29     Total Protein 03/22/2024 6.5     Albumin 03/22/2024 3.5     POC Glucose 03/22/2024 119     POC Glucose 03/22/2024 121     WBC 03/23/2024 5.40     RBC 03/23/2024 3.06 (L)     Hemoglobin 03/23/2024 9.7 (L)     Hematocrit 03/23/2024 29.4 (L)     MCV 03/23/2024 96     MCH 03/23/2024 31.7     MCHC 03/23/2024 33.0     RDW 03/23/2024 13.6     MPV 03/23/2024 8.4 (L)     Platelets 03/23/2024 169     nRBC 03/23/2024 0     Segmented % 03/23/2024 53     Immature Grans % 03/23/2024 0     Lymphocytes % 03/23/2024 39     Monocytes % 03/23/2024 6     Eosinophils Relative 03/23/2024 2     Basophils Relative 03/23/2024 0     Absolute Neutrophils 03/23/2024 2.86     Absolute Immature Grans 03/23/2024 0.02     Absolute Lymphocytes 03/23/2024 2.08     Absolute Monocytes 03/23/2024 0.31     Eosinophils Absolute 03/23/2024 0.11     Basophils Absolute 03/23/2024 0.02     Sodium 03/23/2024 139     Potassium 03/23/2024 4.2     Chloride 03/23/2024 106     CO2 03/23/2024 27     ANION GAP 03/23/2024 6     BUN 03/23/2024 17     Creatinine 03/23/2024 1.27     Glucose 03/23/2024 95     Calcium 03/23/2024 8.9     Corrected Calcium 03/23/2024 9.5     AST 03/23/2024 42 (H)     ALT 03/23/2024 33     Alkaline Phosphatase 03/23/2024 101     Total Protein 03/23/2024 6.5     Albumin 03/23/2024 3.3 (L)     Total Bilirubin 03/23/2024 0.37     eGFR 03/23/2024 40     Magnesium 03/23/2024 2.1     POC Glucose 03/23/2024 84     Ventricular Rate 03/22/2024 82     Atrial Rate 03/22/2024 82     MO Interval 03/22/2024 140     QRSD Interval 03/22/2024 120     QT Interval 03/22/2024 396     QTC Interval 03/22/2024 462     P Axis 03/22/2024 50     QRS Axis 03/22/2024 17     T Wave [place] 03/22/2024 30     POC Glucose 03/23/2024 102     WBC 03/24/2024 4.84     RBC 03/24/2024 2.95 (L)     Hemoglobin 03/24/2024 9.4 (L)     Hematocrit 03/24/2024  28.4 (L)     MCV 03/24/2024 96     MCH 03/24/2024 31.9     MCHC 03/24/2024 33.1     RDW 03/24/2024 13.3     MPV 03/24/2024 8.7 (L)     Platelets 03/24/2024 177     nRBC 03/24/2024 0     Segmented % 03/24/2024 50     Immature Grans % 03/24/2024 1     Lymphocytes % 03/24/2024 41     Monocytes % 03/24/2024 6     Eosinophils Relative 03/24/2024 2     Basophils Relative 03/24/2024 0     Absolute Neutrophils 03/24/2024 2.49     Absolute Immature Grans 03/24/2024 0.03     Absolute Lymphocytes 03/24/2024 1.96     Absolute Monocytes 03/24/2024 0.27     Eosinophils Absolute 03/24/2024 0.08     Basophils Absolute 03/24/2024 0.01     Sodium 03/24/2024 139     Potassium 03/24/2024 4.4     Chloride 03/24/2024 105     CO2 03/24/2024 30     ANION GAP 03/24/2024 4     BUN 03/24/2024 18     Creatinine 03/24/2024 1.49 (H)     Glucose 03/24/2024 97     Calcium 03/24/2024 9.1     eGFR 03/24/2024 33     POC Glucose 03/24/2024 97     POC Glucose 03/25/2024 104     POC Glucose 03/25/2024 102     Salmonella sp PCR 03/25/2024 Negative     Shigella sp/Enteroinvasi* 03/25/2024 Negative     Campylobacter sp (jejuni* 03/25/2024 Negative     Shiga toxin 1/Shiga toxi* 03/25/2024 Negative     C.difficile toxin by PCR 03/25/2024 Negative     POC Glucose 03/25/2024 76     POC Glucose 03/25/2024 230 (H)     WBC 03/26/2024 4.66     RBC 03/26/2024 2.80 (L)     Hemoglobin 03/26/2024 8.8 (L)     Hematocrit 03/26/2024 26.9 (L)     MCV 03/26/2024 96     MCH 03/26/2024 31.4     MCHC 03/26/2024 32.7     RDW 03/26/2024 13.4     MPV 03/26/2024 8.5 (L)     Platelets 03/26/2024 150     nRBC 03/26/2024 0     Segmented % 03/26/2024 48     Immature Grans % 03/26/2024 0     Lymphocytes % 03/26/2024 43     Monocytes % 03/26/2024 6     Eosinophils Relative 03/26/2024 3     Basophils Relative 03/26/2024 0     Absolute Neutrophils 03/26/2024 2.24     Absolute Immature Grans 03/26/2024 0.02     Absolute Lymphocytes 03/26/2024 1.99     Absolute Monocytes 03/26/2024 0.28      Eosinophils Absolute 03/26/2024 0.12     Basophils Absolute 03/26/2024 0.01     Sodium 03/26/2024 138     Potassium 03/26/2024 4.1     Chloride 03/26/2024 106     CO2 03/26/2024 27     ANION GAP 03/26/2024 5     BUN 03/26/2024 14     Creatinine 03/26/2024 1.71 (H)     Glucose 03/26/2024 95     Calcium 03/26/2024 8.8     Corrected Calcium 03/26/2024 9.4     AST 03/26/2024 28     ALT 03/26/2024 22     Alkaline Phosphatase 03/26/2024 86     Total Protein 03/26/2024 6.2 (L)     Albumin 03/26/2024 3.2 (L)     Total Bilirubin 03/26/2024 0.28     eGFR 03/26/2024 28     Magnesium 03/26/2024 1.9        Suicide/Homicide Risk Assessment:    Risk of Harm to Self:  The following ratings are based on assessment at the time of the interview  Historical Risk Factors include: chronic psychiatric problems  Protective Factors: no current suicidal ideation, access to mental health treatment, being a parent, compliant with medications, compliant with mental health treatment, connection to own children, having a desire to be alive, responsibilities and duties to others, stable living environment, strong relationships    Risk of Harm to Others:  The following ratings are based on assessment at the time of the interview  Historical Risk Factors include: none.  Protective Factors: no current homicidal ideation    The following interventions are recommended: contracts for safety at present - agrees to go to ED if feeling unsafe, contracts for safety at present - agrees to call Crisis Intervention Service if feeling unsafe      Lethality Statement:    Based on today's assessment and clinical criteria, [place] [facility] contracts for safety and is not an imminent risk of harm to self or others. Outpatient level of care is deemed appropriate at this current time. Ira understands that if they can no longer contract for safety, they need to call the office or report to their nearest Emergency Room for immediate evaluation. They voiced  understanding and agreement to call 911 or head to the nearest ED should they have any physical or mental decompensation whatsoever.       Assessment/Plan:     1.) MDD, recurrent, moderate  2.) WINDY  3.)      After discussion of risks, benefits, potential side effects, alternatives, we will continue Trintellix 20 mg daily         Aware of 24 hour and weekend coverage for urgent situations accessed by calling [facility] main practice number    Medications Risks/Benefits      Risks, Benefits And Possible Side Effects Of Medications:    Risks, benefits, and possible side effects of medications explained to Ira and she verbalizes understanding and agreement for treatment.    Controlled Medication Discussion:     Not applicable - controlled prescriptions are not prescribed by this practice    Psychotherapy Provided:     Individual psychotherapy provided: Crisis/safety plan discussed with Ira. Provided at least 16 minutes of distinct psychotherapy using a combination of supportive, interpersonal, motivational, and problem solving approaches to address psychological distress and enhance coping strategies.     Treatment Plan:    Completed and signed during the session: Not applicable - Treatment Plan to be completed by [facility] therapist      Visit Time    Visit Start Time: 11:30 AM  Visit Stop Time: 11:58 AM  Total Visit Duration:  28 minutes     The total visit duration detailed above includes: patient engagement, medication management, psychotherapy/counseling, discussion regarding treatment goals, documentation, review of past medical records, and coordination of care.      Note Share Disclaimer:     This note was not shared with the patient due to reasonable likelihood of causing patient harm      Edward Hinkle DO  Psychiatry  03/24/25

## 2025-04-01 ENCOUNTER — TELEMEDICINE (OUTPATIENT)
Dept: PSYCHIATRY | Facility: CLINIC | Age: 80
End: 2025-04-01
Payer: COMMERCIAL

## 2025-04-01 DIAGNOSIS — F32.0 CURRENT MILD EPISODE OF MAJOR DEPRESSIVE DISORDER, UNSPECIFIED WHETHER RECURRENT (HCC): Primary | ICD-10-CM

## 2025-04-01 DIAGNOSIS — F43.29 GRIEF REACTION WITH PROLONGED BEREAVEMENT: ICD-10-CM

## 2025-04-01 DIAGNOSIS — F41.9 ANXIETY: ICD-10-CM

## 2025-04-01 PROCEDURE — 90834 PSYTX W PT 45 MINUTES: CPT

## 2025-04-01 NOTE — PSYCH
"Virtual Regular VisitName: Ira Bonilla      : 1945      MRN: [id number]  Encounter Provider: Maria Ines Farfan LCSW  Encounter Date: 2025   Encounter department: [facility]  :  Assessment & Plan  Current mild episode of major depressive disorder, unspecified whether recurrent (HCC)         Anxiety         Grief reaction with prolonged bereavement             Goals addressed in session: Goal 1     DATA: Wendy shared she called [facility] and didn't get through and feels frustrated by this.  She shared she is \"suddenly feeling down\" again and some of it might be Aminata's death anniversary coming up.  Dario is having some issues at school with acting out and this bothers her.  He tends to \"shut down\" when she talks about her death.  She thinks about the day she  and that she blames herself and worries for her grandson Dario.  Therapist encouraged open expression of thoughts and feelings and gave psychoeducation of stages of grief.  Therapist encouraged behavioral activation to improve her depression and she agreed to do some more searching for a part time job.    During this session, this clinician used the following therapeutic modalities: Bereavement Therapy and Client-centered Therapy    Substance Abuse was not addressed during this session. If the client is diagnosed with a co-occurring substance use disorder, please indicate any changes in the frequency or amount of use: . Stage of change for addressing substance use diagnoses: No substance use/Not applicable    ASSESSMENT:  Ira presents with a Euthymic/ normal mood. Helens affect is Normal range and intensity, which is congruent, with their mood and the content of the session. The client has made progress on their goals as evidenced by open communication of thoughts and feelings about grief, depression.    Ira presents with a none risk of suicide, none risk of self-harm, and none risk of harm to " "others.    For any risk assessment that surpasses a \"low\" rating, a safety plan must be developed.    A safety plan was indicated: no  If yes, describe in detail     PLAN: Between sessions, Ira will utilize behavioral activation for improved depression. At the next session, the therapist will use Bereavement Therapy, Client-centered Therapy, and Dialectical Behavior Therapy to address depression, grief.    Behavioral Health Treatment Plan [facility]: Diagnosis and Treatment Plan explained to rIa, Ira relates understanding diagnosis and is agreeable to Treatment Plan. Yes     Depression Follow-up Plan Completed: Yes see today's interventions     Reason for visit is   Chief Complaint   Patient presents with   • Virtual Regular Visit        Recent Visits  No visits were found meeting these conditions.  Showing recent visits within past 7 days and meeting all other requirements  Today's Visits  Date Type Provider Dept   04/01/25 Telemedicine Maria Ines Farfan LCSW [facility]   Showing today's visits and meeting all other requirements  Future Appointments  No visits were found meeting these conditions.  Showing future appointments within next 150 days and meeting all other requirements     History of Present Illness     HPI    Past Medical History   Past Medical History:   Diagnosis Date   • Anxiety    • Arthritis    • Asthma    • Cancer (HCC)    • Candida infection, esophageal (HCC)    • Chronic kidney disease    • Chronic pain    • COPD (chronic obstructive pulmonary disease) (HCC)    • Depression    • Depression    • Gastroparesis    • Gastroparesis    • GERD (gastroesophageal reflux disease)    • Hypertension    • Irritable bowel syndrome (IBS)    • Lactose intolerance Not sure   • Migraines    • MRSA (methicillin resistant Staphylococcus aureus)    • Multiple thyroid nodules    • Osteoporosis    • Pneumonia    • Psychiatric disorder      Past Surgical History:   Procedure Laterality " Date   • BACK SURGERY     •  SECTION     • CHOLECYSTECTOMY     • COLONOSCOPY  10/09/2013   • ESOPHAGEAL DILATION      2017 [facility]   • ESOPHAGOGASTRODUODENOSCOPY N/A 2023    Procedure: ESOPHAGOGASTRODUODENOSCOPY (EGD);  Surgeon: Dontrell Mcmahon MD;  [facility];  Service: General   • ESOPHAGOGASTRODUODENOSCOPY N/A 2024    Procedure: ESOPHAGOGASTRODUODENOSCOPY (EGD);  Surgeon: Shilpa Price MD;  [facility];  Service: Thoracic   • HERNIA REPAIR  2013   • HIATAL HERNIA REPAIR     • NISSEN FUNDOPLICATION     • DC BIOPSY LIVER NEEDLE PERCUTANEOUS N/A 2023    Procedure: EXCISION BIOPSY LIVER;  Surgeon: Dontrell Mcmahon MD;  [facility];  Service: General   • DC ESOPHAGOGASTRODUODENOSCOPY TRANSORAL DIAGNOSTIC N/A 2019    Procedure: ESOPHAGOGASTRODUODENOSCOPY (EGD);  Surgeon: Geovanna Winn MD;  [facility];  Service: Gastroenterology   • DC ESOPHAGOGASTRODUODENOSCOPY TRANSORAL DIAGNOSTIC N/A 2023    Procedure: ESOPHAGOGASTRODUODENOSCOPY (EGD);  Surgeon: Shilpa Price MD;  [facility];  Service: Thoracic   • DC ESOPHAGOSCOPY FLEX BALLOON DILAT <30 MM DIAM N/A 2023    Procedure: CRE WIRE GUIDED BALLOON DILATATION ESOPHAGEAL;  Surgeon: Shilpa Price MD;  [facility];  Service: Thoracic   • DC ESOPHAGOSCOPY FLEX BALLOON DILAT <30 MM DIAM N/A 2024    Procedure: ESOPHAGOGASTRODUODENOSCOPY WITH BALLOON DILATION;  Surgeon: Shilpa Price MD;  [facility];  Service: Thoracic   • DC LAPS RPR PARAESPHGL HRNA INCL FUNDPLSTY W/O MESH N/A 2023    Procedure: robotic takedown of nissen fundoplication, redo hiatal hernia, partial fundoplication, with mesh;  Surgeon: Shilpa Price MD;  [facility];  Service: Thoracic   • DC PYLOROPLASTY N/A 2023    Procedure: PYLOROPLASTY LAPAROSCOPIC WITH ROBOT;  Surgeon: Dontrell Mcmahon MD;  [facility];  Service: General   •  ROTATOR CUFF REPAIR     • SHOULDER SURGERY     • SPINAL FUSION     • TUBAL LIGATION  May 1975    Last child born   • UPPER GASTROINTESTINAL ENDOSCOPY     • US GUIDED THYROID BIOPSY     • WISDOM TOOTH EXTRACTION       Current Outpatient Medications   Medication Instructions   • acetaminophen (TYLENOL) 650 mg, Oral, Every 6 hours PRN   • albuterol (PROVENTIL HFA,VENTOLIN HFA) 90 mcg/act inhaler 2 puffs, Inhalation, As needed   • amLODIPine (NORVASC) 10 mg tablet    • cetirizine (ZYRTEC) 10 mg, Oral, Daily   • cholecalciferol (VITAMIN D3) 5,000 Units, Oral, Daily   • linaCLOtide 290 MCG CAPS 1 capsule, Oral, Daily   • metoclopramide (REGLAN) 10 mg, Oral, 4 times daily   • multivitamin (THERAGRAN) TABS 1 tablet, Oral, Daily   • Omega-3 Fatty Acids (OMEGA 3 PO) Oral, Daily   • ondansetron (ZOFRAN) 4 mg, Oral, Every 8 hours PRN   • rizatriptan (MAXALT-MLT) 10 mg disintegrating tablet as needed for migraine headache   • spironolactone (ALDACTONE) 25 mg tablet 1/2 tablet Orally Once per day for 30 days   • Vortioxetine HBr (TRINTELLIX) 20 mg, Oral, Daily     Allergies   Allergen Reactions   • Latex Hives   • Augmentin [Amoxicillin-Pot Clavulanate] GI Intolerance   • Bactrim [Sulfamethoxazole-Trimethoprim] GI Intolerance   • Clarithromycin GI Intolerance   • Doxycycline Hives   • Neomycin GI Intolerance   • Polymyxin B GI Intolerance   • Zyvox [Linezolid] GI Bleeding   • Cephalosporins GI Intolerance   • Nsaids GI Intolerance   • Penicillins GI Intolerance     Patient can only take levaquin and cipro   • Prednisone GI Intolerance   • Sulphasomidine GI Intolerance       Objective   There were no vitals taken for this visit.    Video Exam  Physical Exam     Administrative Statements   Encounter provider Maria Ines Farfan LCSW    The Patient is located at Home and in the following state in which I hold an active license [place].    The patient was identified by name and date of birth. Ira Bonilla was informed that this is a  telemedicine visit and that the visit is being conducted through the [employer] Embedded platform. She agrees to proceed..  My office door was closed. No one else was in the room.  She acknowledged consent and understanding of privacy and security of the video platform. The patient has agreed to participate and understands they can discontinue the visit at any time.      Visit Time  Start Time: 1100  Stop Time: 1152  Total Visit Time: 52 minutes

## 2025-04-15 ENCOUNTER — TELEMEDICINE (OUTPATIENT)
Dept: PSYCHIATRY | Facility: CLINIC | Age: 80
End: 2025-04-15
Payer: COMMERCIAL

## 2025-04-15 DIAGNOSIS — F41.9 ANXIETY: ICD-10-CM

## 2025-04-15 DIAGNOSIS — F32.1 CURRENT MODERATE EPISODE OF MAJOR DEPRESSIVE DISORDER, UNSPECIFIED WHETHER RECURRENT (HCC): Primary | ICD-10-CM

## 2025-04-15 DIAGNOSIS — F43.29 GRIEF REACTION WITH PROLONGED BEREAVEMENT: ICD-10-CM

## 2025-04-15 PROCEDURE — 90834 PSYTX W PT 45 MINUTES: CPT

## 2025-04-15 NOTE — PSYCH
"Virtual Regular VisitName: Ira Bonilla      : 1945      MRN: [id number]  Encounter Provider: Maria Ines Farfan LCSW  Encounter Date: 4/15/2025   Encounter department: [facility]  :  Assessment & Plan  Current moderate episode of major depressive disorder, unspecified whether recurrent (HCC)         Anxiety         Grief reaction with prolonged bereavement             Goals addressed in session: Goal 1     DATA: Wendy said she is feeling \"okay I guess\" and that her stomach hurts again.  She is trying to get in touch with people in social security to talk about Dario's stipend and benefits.  She expressed some concern over Dario that he still wants to play in the sand at the beach with a truck so is showing signs of immaturity.  She worries about him being on his own as an adult.  Therapist gave psychoeducation on ADHD and how it can delay development and that he may need support going forward.  She shares she is still feeling down and says she isn't sure why.  She said she wants to get out of the house more often because being in the house does not help her mood.  She shared she has negative thoughts about Dario in terms of worrying about his future and catastrophizes about this.  Her son Art contributes to this. Therapist encouraged her to balance the negative thinking about Dario with positive things he is accomplishing like having a job and passing his classes.  During this session, this clinician used the following therapeutic modalities: Client-centered Therapy and Cognitive Behavioral Therapy    Substance Abuse was not addressed during this session. If the client is diagnosed with a co-occurring substance use disorder, please indicate any changes in the frequency or amount of use: . Stage of change for addressing substance use diagnoses: No substance use/Not applicable    ASSESSMENT:  Ira presents with a Euthymic/ normal mood. Ira's affect is Normal " "range and intensity, which is congruent, with their mood and the content of the session. The client has made progress on their goals as evidenced by balancing negative thoughts.    Ira presents with a none risk of suicide, none risk of self-harm, and none risk of harm to others.    For any risk assessment that surpasses a \"low\" rating, a safety plan must be developed.    A safety plan was indicated: no  If yes, describe in detail     PLAN: Between sessions, Ira will utilize thought balancing. At the next session, the therapist will use Client-centered Therapy and Cognitive Behavioral Therapy to address depression.    Behavioral Health Treatment Plan [facility]: Diagnosis and Treatment Plan explained to Ira Roth relates understanding diagnosis and is agreeable to Treatment Plan. Yes     Depression Follow-up Plan Completed: Not applicable     Reason for visit is No chief complaint on file.     Recent Visits  No visits were found meeting these conditions.  Showing recent visits within past 7 days and meeting all other requirements  Today's Visits  Date Type Provider Dept   04/15/25 Telemedicine Maria Ines Farfan LCSW [facility]   Showing today's visits and meeting all other requirements  Future Appointments  No visits were found meeting these conditions.  Showing future appointments within next 150 days and meeting all other requirements     History of Present Illness     HPI    Past Medical History   Past Medical History:   Diagnosis Date   • Anxiety    • Arthritis    • Asthma    • Cancer (HCC)    • Candida infection, esophageal (HCC)    • Chronic kidney disease    • Chronic pain    • COPD (chronic obstructive pulmonary disease) (HCC)    • Depression    • Depression    • Gastroparesis    • Gastroparesis    • GERD (gastroesophageal reflux disease)    • Hypertension    • Irritable bowel syndrome (IBS)    • Lactose intolerance Not sure   • Migraines    • MRSA (methicillin resistant " Staphylococcus aureus)    • Multiple thyroid nodules    • Osteoporosis    • Pneumonia    • Psychiatric disorder      Past Surgical History:   Procedure Laterality Date   • BACK SURGERY     •  SECTION     • CHOLECYSTECTOMY     • COLONOSCOPY  10/09/2013   • ESOPHAGEAL DILATION      2017 [facility]   • ESOPHAGOGASTRODUODENOSCOPY N/A 2023    Procedure: ESOPHAGOGASTRODUODENOSCOPY (EGD);  Surgeon: Dontrell Mcmahon MD;  [facility];  Service: General   • ESOPHAGOGASTRODUODENOSCOPY N/A 2024    Procedure: ESOPHAGOGASTRODUODENOSCOPY (EGD);  Surgeon: Shilpa Price MD;  [facility];  Service: Thoracic   • HERNIA REPAIR  2013   • HIATAL HERNIA REPAIR     • NISSEN FUNDOPLICATION     • SD BIOPSY LIVER NEEDLE PERCUTANEOUS N/A 2023    Procedure: EXCISION BIOPSY LIVER;  Surgeon: Dontrell Mcmahon MD;  [facility];  Service: General   • SD ESOPHAGOGASTRODUODENOSCOPY TRANSORAL DIAGNOSTIC N/A 2019    Procedure: ESOPHAGOGASTRODUODENOSCOPY (EGD);  Surgeon: Geovanna Winn MD;  [facility];  Service: Gastroenterology   • SD ESOPHAGOGASTRODUODENOSCOPY TRANSORAL DIAGNOSTIC N/A 2023    Procedure: ESOPHAGOGASTRODUODENOSCOPY (EGD);  Surgeon: Shilpa Price MD;  [facility];  Service: Thoracic   • SD ESOPHAGOSCOPY FLEX BALLOON DILAT <30 MM DIAM N/A 2023    Procedure: CRE WIRE GUIDED BALLOON DILATATION ESOPHAGEAL;  Surgeon: Shilpa Price MD;  [facility];  Service: Thoracic   • SD ESOPHAGOSCOPY FLEX BALLOON DILAT <30 MM DIAM N/A 2024    Procedure: ESOPHAGOGASTRODUODENOSCOPY WITH BALLOON DILATION;  Surgeon: Shilpa Price MD;  [facility];  Service: Thoracic   • SD LAPS RPR PARAESPHGL HRNA INCL FUNDPLSTY W/O MESH N/A 2023    Procedure: robotic takedown of nissen fundoplication, redo hiatal hernia, partial fundoplication, with mesh;  Surgeon: Shilpa Price MD;  [facility];  Service: Thoracic   •  W[place] PYLOROPLASTY N/A 11/08/2023    Procedure: PYLOROPLASTY LAPAROSCOPIC WITH ROBOT;  Surgeon: Dontrell Mcmahon MD;  [facility];  Service: General   • ROTATOR CUFF REPAIR     • SHOULDER SURGERY     • SPINAL FUSION     • TUBAL LIGATION  May 1975    Last child born   • UPPER GASTROINTESTINAL ENDOSCOPY     • US GUIDED THYROID BIOPSY     • WISDOM TOOTH EXTRACTION       Current Outpatient Medications   Medication Instructions   • acetaminophen (TYLENOL) 650 mg, Oral, Every 6 hours PRN   • albuterol (PROVENTIL HFA,VENTOLIN HFA) 90 mcg/act inhaler 2 puffs, Inhalation, As needed   • amLODIPine (NORVASC) 10 mg tablet    • cetirizine (ZYRTEC) 10 mg, Oral, Daily   • cholecalciferol (VITAMIN D3) 5,000 Units, Oral, Daily   • linaCLOtide 290 MCG CAPS 1 capsule, Oral, Daily   • metoclopramide (REGLAN) 10 mg, Oral, 4 times daily   • multivitamin (THERAGRAN) TABS 1 tablet, Oral, Daily   • Omega-3 Fatty Acids (OMEGA 3 PO) Oral, Daily   • ondansetron (ZOFRAN) 4 mg, Oral, Every 8 hours PRN   • rizatriptan (MAXALT-MLT) 10 mg disintegrating tablet as needed for migraine headache   • spironolactone (ALDACTONE) 25 mg tablet 1/2 tablet Orally Once per day for 30 days   • Vortioxetine HBr (TRINTELLIX) 20 mg, Oral, Daily     Allergies   Allergen Reactions   • Latex Hives   • Augmentin [Amoxicillin-Pot Clavulanate] GI Intolerance   • Bactrim [Sulfamethoxazole-Trimethoprim] GI Intolerance   • Clarithromycin GI Intolerance   • Doxycycline Hives   • Neomycin GI Intolerance   • Polymyxin B GI Intolerance   • Zyvox [Linezolid] GI Bleeding   • Cephalosporins GI Intolerance   • Nsaids GI Intolerance   • Penicillins GI Intolerance     Patient can only take levaquin and cipro   • Prednisone GI Intolerance   • Sulphasomidine GI Intolerance       Objective   There were no vitals taken for this visit.    Video Exam  Physical Exam     Administrative Statements   Encounter provider Maria Ines Farfan LCSW    The Patient is located at Home and in the  following state in which I hold an active license [place].    The patient was identified by name and date of birth. Ira Bonilla was informed that this is a telemedicine visit and that the visit is being conducted through the [employer] platform. She agrees to proceed..  My office door was closed. No one else was in the room.  She acknowledged consent and understanding of privacy and security of the video platform. The patient has agreed to participate and understands they can discontinue the visit at any time.      Visit Time  Start Time: 1100  Stop Time: 1152  Total Visit Time: 52 minutes

## 2025-04-28 ENCOUNTER — TELEPHONE (OUTPATIENT)
Dept: PSYCHIATRY | Facility: CLINIC | Age: 80
End: 2025-04-28

## 2025-04-28 NOTE — TELEPHONE ENCOUNTER
Left detailed message for patient regarding needing a signature for her virtual consent form. It has been sent to her MyChart several times. Notified if we can not obtain it, her appt may need to be cx. Left TA number for questions or assistance.

## 2025-04-29 ENCOUNTER — TELEMEDICINE (OUTPATIENT)
Dept: PSYCHIATRY | Facility: CLINIC | Age: 80
End: 2025-04-29
Payer: COMMERCIAL

## 2025-04-29 DIAGNOSIS — F43.29 GRIEF REACTION WITH PROLONGED BEREAVEMENT: Primary | ICD-10-CM

## 2025-04-29 DIAGNOSIS — F41.9 ANXIETY: ICD-10-CM

## 2025-04-29 DIAGNOSIS — F32.0 CURRENT MILD EPISODE OF MAJOR DEPRESSIVE DISORDER, UNSPECIFIED WHETHER RECURRENT (HCC): ICD-10-CM

## 2025-04-29 PROCEDURE — 90834 PSYTX W PT 45 MINUTES: CPT

## 2025-04-29 NOTE — PSYCH
"Virtual Regular VisitName: Ira Bonilla      : 1945      MRN: [id number]  Encounter Provider: Maria Ines Farfan LCSW  Encounter Date: 2025   Encounter department: [facility]  :  Assessment & Plan  Grief reaction with prolonged bereavement         Anxiety         Current mild episode of major depressive disorder, unspecified whether recurrent (HCC)             Goals addressed in session: Goal 1     DATA: Wendy shared she is having difficulty getting into [employer] because of security questions she doesn't remember answering.  She still feels \"down in the dumps\" and isn't doing much recently.  She was not invited to a [employer] game with the family for [place] and said she felt left out.  [place] but she would rather not go because she doesn't like the sand.  She said a lot of her depression is from missing her daughter and questioning herself about possibly doing more to prevent her death.  Therapist encouraged open communication of thoughts and feelings.  She shared she would like to find a job and is going on [employer] to try but this has been difficult for her.  She talked about her grandson Dario being \"immature\" and having difficulty with thinking about him getting a job or adulting in the future.  Therapist encouraged open communication of thoughts and feelings about this.  Therapist encouraged her to assertively communicate her thoughts and feelings with her son being treated unfairly in the household. Therapist encouraged her to do some movement and social activities this week to improve her mood.  She agreed this would be helpful.  During this session, this clinician used the following therapeutic modalities: Client-centered Therapy and Supportive Psychotherapy    Substance Abuse was not addressed during this session. If the client is diagnosed with a co-occurring substance use disorder, please indicate any " "changes in the frequency or amount of use: . Stage of change for addressing substance use diagnoses: No substance use/Not applicable    ASSESSMENT:  Ira presents with a Euthymic/ normal mood. Helens affect is Normal range and intensity, which is congruent, with their mood and the content of the session. The client has made progress on their goals as evidenced by open communication, problem solving.    Ira presents with a none risk of suicide, none risk of self-harm, and none risk of harm to others.    For any risk assessment that surpasses a \"low\" rating, a safety plan must be developed.    A safety plan was indicated: no  If yes, describe in detail     PLAN: Between sessions, Ira will utilize behavioral activation for depression. At the next session, the therapist will use Client-centered Therapy and Dialectical Behavior Therapy to address depression.    Behavioral Health Treatment Plan [facility]: Diagnosis and Treatment Plan explained to Ira Roth relates understanding diagnosis and is agreeable to Treatment Plan. Yes     Depression Follow-up Plan Completed: Not applicable     Reason for visit is   Chief Complaint   Patient presents with   • Virtual Regular Visit        Recent Visits  Date Type Provider Dept   04/28/25 Telephone Maria Ines Farfan LCSW [facility]   Showing recent visits within past 7 days and meeting all other requirements  Today's Visits  Date Type Provider Dept   04/29/25 Telemedicine Maria Ines Farfan LCSW [facility]   Showing today's visits and meeting all other requirements  Future Appointments  No visits were found meeting these conditions.  Showing future appointments within next 150 days and meeting all other requirements     History of Present Illness     HPI    Past Medical History   Past Medical History:   Diagnosis Date   • Anxiety    • Arthritis    • Asthma    • Cancer (HCC)    • Candida infection, esophageal (HCC)  "   • Chronic kidney disease    • Chronic pain    • COPD (chronic obstructive pulmonary disease) (HCC)    • Depression    • Depression    • Gastroparesis    • Gastroparesis    • GERD (gastroesophageal reflux disease)    • Hypertension    • Irritable bowel syndrome (IBS)    • Lactose intolerance Not sure   • Migraines    • MRSA (methicillin resistant Staphylococcus aureus)    • Multiple thyroid nodules    • Osteoporosis    • Pneumonia    • Psychiatric disorder      Past Surgical History:   Procedure Laterality Date   • BACK SURGERY     •  SECTION     • CHOLECYSTECTOMY     • COLONOSCOPY  10/09/2013   • ESOPHAGEAL DILATION      2017 [facility]   • ESOPHAGOGASTRODUODENOSCOPY N/A 2023    Procedure: ESOPHAGOGASTRODUODENOSCOPY (EGD);  Surgeon: Dontrell Mcmahon MD;  [facility];  Service: General   • ESOPHAGOGASTRODUODENOSCOPY N/A 2024    Procedure: ESOPHAGOGASTRODUODENOSCOPY (EGD);  Surgeon: Shilpa Price MD;  [facility];  Service: Thoracic   • HERNIA REPAIR  2013   • HIATAL HERNIA REPAIR     • NISSEN FUNDOPLICATION     • OK BIOPSY LIVER NEEDLE PERCUTANEOUS N/A 2023    Procedure: EXCISION BIOPSY LIVER;  Surgeon: Dontrell Mcmahon MD;  [facility];  Service: General   • OK ESOPHAGOGASTRODUODENOSCOPY TRANSORAL DIAGNOSTIC N/A 2019    Procedure: ESOPHAGOGASTRODUODENOSCOPY (EGD);  Surgeon: Geovanna Winn MD;  [facility];  Service: Gastroenterology   • OK ESOPHAGOGASTRODUODENOSCOPY TRANSORAL DIAGNOSTIC N/A 2023    Procedure: ESOPHAGOGASTRODUODENOSCOPY (EGD);  Surgeon: Shilpa Price MD;  [facility];  Service: Thoracic   • OK ESOPHAGOSCOPY FLEX BALLOON DILAT <30 MM DIAM N/A 2023    Procedure: CRE WIRE GUIDED BALLOON DILATATION ESOPHAGEAL;  Surgeon: Shilpa Price MD;  [facility];  Service: Thoracic   • OK ESOPHAGOSCOPY FLEX BALLOON DILAT <30 MM DIAM N/A 2024    Procedure: ESOPHAGOGASTRODUODENOSCOPY WITH BALLOON  DILATION;  Surgeon: Shilpa Price MD;  [facility];  Service: Thoracic   • KS LAPS RPR PARAESPHGL HRNA INCL FUNDPLSTY W/O MESH N/A 11/08/2023    Procedure: robotic takedown of nissen fundoplication, redo hiatal hernia, partial fundoplication, with mesh;  Surgeon: Shilpa Price MD;  [facility];  Service: Thoracic   • KS PYLOROPLASTY N/A 11/08/2023    Procedure: PYLOROPLASTY LAPAROSCOPIC WITH ROBOT;  Surgeon: Dontrell Mcmahon MD;  [facility];  Service: General   • ROTATOR CUFF REPAIR     • SHOULDER SURGERY     • SPINAL FUSION     • TUBAL LIGATION  May 1975    Last child born   • UPPER GASTROINTESTINAL ENDOSCOPY     • US GUIDED THYROID BIOPSY     • WISDOM TOOTH EXTRACTION       Current Outpatient Medications   Medication Instructions   • acetaminophen (TYLENOL) 650 mg, Oral, Every 6 hours PRN   • albuterol (PROVENTIL HFA,VENTOLIN HFA) 90 mcg/act inhaler 2 puffs, Inhalation, As needed   • amLODIPine (NORVASC) 10 mg tablet    • cetirizine (ZYRTEC) 10 mg, Oral, Daily   • cholecalciferol (VITAMIN D3) 5,000 Units, Oral, Daily   • linaCLOtide 290 MCG CAPS 1 capsule, Oral, Daily   • metoclopramide (REGLAN) 10 mg, Oral, 4 times daily   • multivitamin (THERAGRAN) TABS 1 tablet, Oral, Daily   • Omega-3 Fatty Acids (OMEGA 3 PO) Oral, Daily   • ondansetron (ZOFRAN) 4 mg, Oral, Every 8 hours PRN   • rizatriptan (MAXALT-MLT) 10 mg disintegrating tablet as needed for migraine headache   • spironolactone (ALDACTONE) 25 mg tablet 1/2 tablet Orally Once per day for 30 days   • Vortioxetine HBr (TRINTELLIX) 20 mg, Oral, Daily     Allergies   Allergen Reactions   • Latex Hives   • Augmentin [Amoxicillin-Pot Clavulanate] GI Intolerance   • Bactrim [Sulfamethoxazole-Trimethoprim] GI Intolerance   • Clarithromycin GI Intolerance   • Doxycycline Hives   • Neomycin GI Intolerance   • Polymyxin B GI Intolerance   • Zyvox [Linezolid] GI Bleeding   • Cephalosporins GI Intolerance   • Nsaids GI Intolerance    • Penicillins GI Intolerance     Patient can only take levaquin and cipro   • Prednisone GI Intolerance   • Sulphasomidine GI Intolerance       Objective   There were no vitals taken for this visit.    Video Exam  Physical Exam     Administrative Statements   Encounter provider Maria Ines Farfan LCSW    The Patient is located at Home and in the following state in which I hold an active license [place].    The patient was identified by name and date of birth. Iraemmanuel Bonilla was informed that this is a telemedicine visit and that the visit is being conducted through the Epic Embedded platform. She agrees to proceed..  My office door was closed. No one else was in the room.  She acknowledged consent and understanding of privacy and security of the video platform. The patient has agreed to participate and understands they can discontinue the visit at any time.        Visit Time  Start Time: 1103  Stop Time: 1155  Total Visit Time: 52 minutes

## 2025-04-29 NOTE — TELEPHONE ENCOUNTER
Per therapist.  Patient having trouble logging into her [employer].  She was not able to answer the security questions correctly.  Therapist provided patient with MY Chart help desk line # [phone] Option # 5

## 2025-05-06 ENCOUNTER — OFFICE VISIT (OUTPATIENT)
Dept: PSYCHIATRY | Facility: CLINIC | Age: 80
End: 2025-05-06
Payer: COMMERCIAL

## 2025-05-06 DIAGNOSIS — F41.1 GENERALIZED ANXIETY DISORDER: ICD-10-CM

## 2025-05-06 DIAGNOSIS — F32.1 CURRENT MODERATE EPISODE OF MAJOR DEPRESSIVE DISORDER, UNSPECIFIED WHETHER RECURRENT (HCC): Primary | ICD-10-CM

## 2025-05-06 PROCEDURE — 99214 OFFICE O/P EST MOD 30 MIN: CPT | Performed by: STUDENT IN AN ORGANIZED HEALTH CARE EDUCATION/TRAINING PROGRAM

## 2025-05-06 PROCEDURE — 90833 PSYTX W PT W E/M 30 MIN: CPT | Performed by: STUDENT IN AN ORGANIZED HEALTH CARE EDUCATION/TRAINING PROGRAM

## 2025-05-08 NOTE — PSYCH
MEDICATION MANAGEMENT NOTE        [facility]      Name and Date of Birth:  Ira Bonilla 79 y.o. 1945 MRN: [id number]    Date of Visit: May 8, 2025    Reason for Visit: Follow-up visit for medication management       SUBJECTIVE:    Ira Bonilla is a 79 y.o. female with past psychiatric history significant for MDD, WINDY who was personally seen and evaluated today at the [facility] outpatient clinic for follow-up and medication management. Ira denies SI, HI, AVH, delusions, jose since her last appointment.  After discussion of risks, benefits, potential side effects, alternatives, she would like to remain on current regimen as is without any changes.  She does endorse significant feelings of depression and anxiety however at this time she is unwilling to change psychotropics but she has been offered options such as augmentation with with lithium, Rexulti, Wellbutrin along with options for interventions such as TMS, ECT, ketamine therapy.  She feels undecided at this time and we will revisit this question at our next appointment      Current Rating Scores:     Current PHQ-9   PHQ-2/9 Depression Screening           Current WINDY-7 is .    Review Of Systems:      Constitutional negative    ENT negative   Cardiovascular negative   Respiratory negative    Gastrointestinal negative   Genitourinary negative   Musculoskeletal negative    Integumentary negative   Neurological negative   Endocrine negative   Other Symptoms none, all other systems are negative       Past Psychiatric History: (unchanged information from previous note copied and italicized) - Information that is bolded has been updated.     See intake  Medications trials: Abilify somewhat improved mood but induced too much hunger and so was discontinued.    Substance Abuse History: (unchanged information from previous note copied and italicized) - Information that is bolded  has been updated.     See intake    Social History: (unchanged information from previous note copied and italicized) - Information that is bolded has been updated.     See intake    Traumatic History: (unchanged information from previous note copied and italicized) - Information that is bolded has been updated.     See intake      Past Medical History:    Past Medical History:   Diagnosis Date    Anxiety     Arthritis     Asthma     Cancer (HCC)     Candida infection, esophageal (HCC)     Chronic kidney disease     Chronic pain     COPD (chronic obstructive pulmonary disease) (HCC)     Depression     Depression     Gastroparesis     Gastroparesis     GERD (gastroesophageal reflux disease)     Hypertension     Irritable bowel syndrome (IBS)     Lactose intolerance Not sure    Migraines     MRSA (methicillin resistant Staphylococcus aureus)     Multiple thyroid nodules     Osteoporosis     Pneumonia     Psychiatric disorder         Past Surgical History:   Procedure Laterality Date    BACK SURGERY       SECTION      CHOLECYSTECTOMY      COLONOSCOPY  10/09/2013    ESOPHAGEAL DILATION      [facility]    ESOPHAGOGASTRODUODENOSCOPY N/A 2023    Procedure: ESOPHAGOGASTRODUODENOSCOPY (EGD);  Surgeon: Dontrell Mcmahon MD;  Location[facility];  Service: General    ESOPHAGOGASTRODUODENOSCOPY N/A 2024    Procedure: ESOPHAGOGASTRODUODENOSCOPY (EGD);  Surgeon: Shilpa Price MD;  Location: [facility];  Service: Thoracic    HERNIA REPAIR  2013    HIATAL HERNIA REPAIR      NISSEN FUNDOPLICATION      VA BIOPSY LIVER NEEDLE PERCUTANEOUS N/A 2023    Procedure: EXCISION BIOPSY LIVER;  Surgeon: Dontrell Mcmahon MD;  Location: [facility];  Service: General    VA ESOPHAGOGASTRODUODENOSCOPY TRANSORAL DIAGNOSTIC N/A 2019    Procedure: ESOPHAGOGASTRODUODENOSCOPY (EGD);  Surgeon: Geovanna Winn MD;  Location: [facility];  Service: Gastroenterology    VA ESOPHAGOGASTRODUODENOSCOPY TRANSORAL DIAGNOSTIC  N/A 09/20/2023    Procedure: ESOPHAGOGASTRODUODENOSCOPY (EGD);  Surgeon: Shilpa Price MD;  [facility];  Service: Thoracic    OK ESOPHAGOSCOPY FLEX BALLOON DILAT <30 MM DIAM N/A 09/20/2023    Procedure: CRE WIRE GUIDED BALLOON DILATATION ESOPHAGEAL;  Surgeon: Shilpa Price MD;  [facility];  Service: Thoracic    OK ESOPHAGOSCOPY FLEX BALLOON DILAT <30 MM DIAM N/A 03/20/2024    Procedure: ESOPHAGOGASTRODUODENOSCOPY WITH BALLOON DILATION;  Surgeon: Shilpa Price MD;  [facility];  Service: Thoracic    OK LAPS RPR PARAESPHGL HRNA INCL FUNDPLSTY W/O MESH N/A 11/08/2023    Procedure: robotic takedown of nissen fundoplication, redo hiatal hernia, partial fundoplication, with mesh;  Surgeon: Shilpa Price MD;  [facility];  Service: Thoracic    OK PYLOROPLASTY N/A 11/08/2023    Procedure: PYLOROPLASTY LAPAROSCOPIC WITH ROBOT;  Surgeon: Dontrell Mcmahon MD;  [facility];  Service: General    ROTATOR CUFF REPAIR      SHOULDER SURGERY      SPINAL FUSION      TUBAL LIGATION  May 1975    Last child born    UPPER GASTROINTESTINAL ENDOSCOPY      US GUIDED THYROID BIOPSY      WISDOM TOOTH EXTRACTION       Allergies   Allergen Reactions    Latex Hives    Augmentin [Amoxicillin-Pot Clavulanate] GI Intolerance    Bactrim [Sulfamethoxazole-Trimethoprim] GI Intolerance    Clarithromycin GI Intolerance    Doxycycline Hives    Neomycin GI Intolerance    Polymyxin B GI Intolerance    Zyvox [Linezolid] GI Bleeding    Cephalosporins GI Intolerance    Nsaids GI Intolerance    Penicillins GI Intolerance     Patient can only take levaquin and cipro    Prednisone GI Intolerance    Sulphasomidine GI Intolerance       Substance Abuse History:    Social History     Substance and Sexual Activity   Alcohol Use Not Currently     Social History     Substance and Sexual Activity   Drug Use No       Social History:    Social History     Socioeconomic History    Marital status:       Spouse name: Not on file    Number of children: Not on file    Years of education: Not on file    Highest education level: Not on file   Occupational History    Not on file   Tobacco Use    Smoking status: Former     Current packs/day: 0.00     Average packs/day: 1 pack/day for 15.0 years (15.0 ttl pk-yrs)     Types: Cigarettes     Start date: 10/5/1998     Quit date: 10/1/2003     Years since quittin.6    Smokeless tobacco: Former   Vaping Use    Vaping status: Never Used   Substance and Sexual Activity    Alcohol use: Not Currently    Drug use: No    Sexual activity: Not Currently     Partners: Male     Birth control/protection: None   Other Topics Concern    Not on file   Social History Narrative    Not on file     Social Drivers of Health     Financial Resource Strain: Not on file   Food Insecurity: No Food Insecurity (3/22/2024)    Nursing - Inadequate Food Risk Classification     Worried About Running Out of Food in the Last Year: Never true     Ran Out of Food in the Last Year: Never true     Ran Out of Food in the Last Year: Not on file   Transportation Needs: No Transportation Needs (3/22/2024)    PRAPARE - Transportation     Lack of Transportation (Medical): No     Lack of Transportation (Non-Medical): No   Physical Activity: Not on file   Stress: Not on file   Social Connections: Not on file   Intimate Partner Violence: Not on file   Housing Stability: Low Risk  (3/22/2024)    Housing Stability Vital Sign     Unable to Pay for Housing in the Last Year: No     Number of Times Moved in the Last Year: 1     Homeless in the Last Year: No       Family Psychiatric History:     Family History   Problem Relation Age of Onset    Other Mother         Esophageal disorder    Stomach cancer Father     Cancer Father     Hypertension Maternal Aunt     COPD Maternal Grandmother     Colon cancer Neg Hx     Colon polyps Neg Hx        History Review: The following portions of the patient's history were  reviewed and updated as appropriate: allergies, current medications, past family history, past medical history, past social history, past surgical history, and problem list.         OBJECTIVE:     Vital signs in last 24 hours:    There were no vitals filed for this visit.    Mental Status Evaluation:    Appearance age appropriate, casually dressed   Behavior Cooperative, anxious   Speech normal rate, normal volume, normal pitch   Mood dysphoric   Affect Dysphoric   Thought Processes organized, goal directed   Associations intact associations   Thought Content no overt delusions   Perceptual Disturbances: no auditory hallucinations, no visual hallucinations   Abnormal Thoughts  Risk Potential Suicidal ideation - None at present  Homicidal ideation - None at present  Potential for aggression - Not at present   Orientation oriented to person, place, time/date, and situation   Memory recent and remote memory grossly intact   Consciousness alert and awake   Attention Span Concentration Span attention span and concentration are age appropriate   Intellect appears to be of average intelligence   Insight intact   Judgement intact   Muscle Strength and  Gait Uses a cane to ambulate slowly   Motor activity Within normal limits   Language no difficulty naming common objects, no difficulty repeating a phrase   Fund of Knowledge adequate knowledge of current events  adequate fund of knowledge regarding past history  adequate fund of knowledge regarding vocabulary    Pain none   Pain Scale Did not ask patient to formally rate       Laboratory Results: I have personally reviewed all pertinent laboratory/tests results    Recent Labs (last 2 months):   No visits with results within 2 Month(s) from this visit.   Latest known visit with results is:   Admission on 03/21/2024, Discharged on 03/26/2024   Component Date Value    WBC 03/22/2024 7.73     RBC 03/22/2024 3.29 (L)     Hemoglobin 03/22/2024 10.3 (L)     Hematocrit 03/22/2024  32.1 (L)     MCV 03/22/2024 98     MCH 03/22/2024 31.3     MCHC 03/22/2024 32.1     RDW 03/22/2024 13.6     MPV 03/22/2024 9.0     Platelets 03/22/2024 231     nRBC 03/22/2024 0     Segmented % 03/22/2024 52     Immature Grans % 03/22/2024 1     Lymphocytes % 03/22/2024 39     Monocytes % 03/22/2024 6     Eosinophils Relative 03/22/2024 2     Basophils Relative 03/22/2024 0     Absolute Neutrophils 03/22/2024 4.09     Absolute Immature Grans 03/22/2024 0.04     Absolute Lymphocytes 03/22/2024 2.99     Absolute Monocytes 03/22/2024 0.45     Eosinophils Absolute 03/22/2024 0.14     Basophils Absolute 03/22/2024 0.02     Sodium 03/22/2024 140     Potassium 03/22/2024 4.1     Chloride 03/22/2024 107     CO2 03/22/2024 26     ANION GAP 03/22/2024 7     BUN 03/22/2024 22     Creatinine 03/22/2024 1.50 (H)     Glucose 03/22/2024 190 (H)     Calcium 03/22/2024 9.3     AST 03/22/2024 38     ALT 03/22/2024 35     Alkaline Phosphatase 03/22/2024 123 (H)     Total Protein 03/22/2024 7.6     Albumin 03/22/2024 4.0     Total Bilirubin 03/22/2024 0.25     eGFR 03/22/2024 33     Lipase 03/22/2024 <6 (L)     hs TnI 0hr 03/22/2024 3     Sodium 03/22/2024 139     Potassium 03/22/2024 4.5     Chloride 03/22/2024 108     CO2 03/22/2024 25     ANION GAP 03/22/2024 6     BUN 03/22/2024 21     Creatinine 03/22/2024 1.37 (H)     Glucose 03/22/2024 105     Calcium 03/22/2024 8.6     eGFR 03/22/2024 37     Magnesium 03/22/2024 1.8 (L)     Phosphorus 03/22/2024 3.5     Total Bilirubin 03/22/2024 0.21     Bilirubin, Direct 03/22/2024 0.06     Alkaline Phosphatase 03/22/2024 108 (H)     AST 03/22/2024 29     ALT 03/22/2024 29     Total Protein 03/22/2024 6.5     Albumin 03/22/2024 3.5     POC Glucose 03/22/2024 119     POC Glucose 03/22/2024 121     WBC 03/23/2024 5.40     RBC 03/23/2024 3.06 (L)     Hemoglobin 03/23/2024 9.7 (L)     Hematocrit 03/23/2024 29.4 (L)     MCV 03/23/2024 96     MCH 03/23/2024 31.7     MCHC 03/23/2024 33.0     RDW  03/23/2024 13.6     MPV 03/23/2024 8.4 (L)     Platelets 03/23/2024 169     nRBC 03/23/2024 0     Segmented % 03/23/2024 53     Immature Grans % 03/23/2024 0     Lymphocytes % 03/23/2024 39     Monocytes % 03/23/2024 6     Eosinophils Relative 03/23/2024 2     Basophils Relative 03/23/2024 0     Absolute Neutrophils 03/23/2024 2.86     Absolute Immature Grans 03/23/2024 0.02     Absolute Lymphocytes 03/23/2024 2.08     Absolute Monocytes 03/23/2024 0.31     Eosinophils Absolute 03/23/2024 0.11     Basophils Absolute 03/23/2024 0.02     Sodium 03/23/2024 139     Potassium 03/23/2024 4.2     Chloride 03/23/2024 106     CO2 03/23/2024 27     ANION GAP 03/23/2024 6     BUN 03/23/2024 17     Creatinine 03/23/2024 1.27     Glucose 03/23/2024 95     Calcium 03/23/2024 8.9     Corrected Calcium 03/23/2024 9.5     AST 03/23/2024 42 (H)     ALT 03/23/2024 33     Alkaline Phosphatase 03/23/2024 101     Total Protein 03/23/2024 6.5     Albumin 03/23/2024 3.3 (L)     Total Bilirubin 03/23/2024 0.37     eGFR 03/23/2024 40     Magnesium 03/23/2024 2.1     POC Glucose 03/23/2024 84     Ventricular Rate 03/22/2024 82     Atrial Rate 03/22/2024 82     ID Interval 03/22/2024 140     QRSD Interval 03/22/2024 120     QT Interval 03/22/2024 396     QTC Interval 03/22/2024 462     P Axis 03/22/2024 50     QRS Axis 03/22/2024 17     T Wave [place] 03/22/2024 30     POC Glucose 03/23/2024 102     WBC 03/24/2024 4.84     RBC 03/24/2024 2.95 (L)     Hemoglobin 03/24/2024 9.4 (L)     Hematocrit 03/24/2024 28.4 (L)     MCV 03/24/2024 96     MCH 03/24/2024 31.9     MCHC 03/24/2024 33.1     RDW 03/24/2024 13.3     MPV 03/24/2024 8.7 (L)     Platelets 03/24/2024 177     nRBC 03/24/2024 0     Segmented % 03/24/2024 50     Immature Grans % 03/24/2024 1     Lymphocytes % 03/24/2024 41     Monocytes % 03/24/2024 6     Eosinophils Relative 03/24/2024 2     Basophils Relative 03/24/2024 0     Absolute Neutrophils 03/24/2024 2.49     Absolute Immature Grans  03/24/2024 0.03     Absolute Lymphocytes 03/24/2024 1.96     Absolute Monocytes 03/24/2024 0.27     Eosinophils Absolute 03/24/2024 0.08     Basophils Absolute 03/24/2024 0.01     Sodium 03/24/2024 139     Potassium 03/24/2024 4.4     Chloride 03/24/2024 105     CO2 03/24/2024 30     ANION GAP 03/24/2024 4     BUN 03/24/2024 18     Creatinine 03/24/2024 1.49 (H)     Glucose 03/24/2024 97     Calcium 03/24/2024 9.1     eGFR 03/24/2024 33     POC Glucose 03/24/2024 97     POC Glucose 03/25/2024 104     POC Glucose 03/25/2024 102     Salmonella sp PCR 03/25/2024 Negative     Shigella sp/Enteroinvasi* 03/25/2024 Negative     Campylobacter sp (jejuni* 03/25/2024 Negative     Shiga toxin 1/Shiga toxi* 03/25/2024 Negative     C.difficile toxin by PCR 03/25/2024 Negative     POC Glucose 03/25/2024 76     POC Glucose 03/25/2024 230 (H)     WBC 03/26/2024 4.66     RBC 03/26/2024 2.80 (L)     Hemoglobin 03/26/2024 8.8 (L)     Hematocrit 03/26/2024 26.9 (L)     MCV 03/26/2024 96     MCH 03/26/2024 31.4     MCHC 03/26/2024 32.7     RDW 03/26/2024 13.4     MPV 03/26/2024 8.5 (L)     Platelets 03/26/2024 150     nRBC 03/26/2024 0     Segmented % 03/26/2024 48     Immature Grans % 03/26/2024 0     Lymphocytes % 03/26/2024 43     Monocytes % 03/26/2024 6     Eosinophils Relative 03/26/2024 3     Basophils Relative 03/26/2024 0     Absolute Neutrophils 03/26/2024 2.24     Absolute Immature Grans 03/26/2024 0.02     Absolute Lymphocytes 03/26/2024 1.99     Absolute Monocytes 03/26/2024 0.28     Eosinophils Absolute 03/26/2024 0.12     Basophils Absolute 03/26/2024 0.01     Sodium 03/26/2024 138     Potassium 03/26/2024 4.1     Chloride 03/26/2024 106     CO2 03/26/2024 27     ANION GAP 03/26/2024 5     BUN 03/26/2024 14     Creatinine 03/26/2024 1.71 (H)     Glucose 03/26/2024 95     Calcium 03/26/2024 8.8     Corrected Calcium 03/26/2024 9.4     AST 03/26/2024 28     ALT 03/26/2024 22     Alkaline Phosphatase 03/26/2024 86     Total  Protein 03/26/2024 6.2 (L)     Albumin 03/26/2024 3.2 (L)     Total Bilirubin 03/26/2024 0.28     eGFR 03/26/2024 28     Magnesium 03/26/2024 1.9        Suicide/Homicide Risk Assessment:    Risk of Harm to Self:  The following ratings are based on assessment at the time of the interview  Historical Risk Factors include: chronic psychiatric problems  Protective Factors: no current suicidal ideation, access to mental health treatment, being a parent, compliant with medications, compliant with mental health treatment, connection to own children, having a desire to be alive, responsibilities and duties to others, stable living environment, strong relationships    Risk of Harm to Others:  The following ratings are based on assessment at the time of the interview  Historical Risk Factors include: none.  Protective Factors: no current homicidal ideation    The following interventions are recommended: contracts for safety at present - agrees to go to ED if feeling unsafe, contracts for safety at present - agrees to call Crisis Intervention Service if feeling unsafe      Lethality Statement:    Based on today's assessment and clinical criteria, Ira Bonilla contracts for safety and is not an imminent risk of harm to self or others. Outpatient level of care is deemed appropriate at this current time. Ira understands that if they can no longer contract for safety, they need to call the office or report to their nearest Emergency Room for immediate evaluation. They voiced understanding and agreement to call 911 or head to the nearest ED should they have any physical or mental decompensation whatsoever.       Assessment/Plan:     1.) MDD, recurrent, moderate  2.) WINDY  3.)      After discussion of risks, benefits, potential side effects, alternatives, we will continue Trintellix 20 mg daily.  Patient will consider augmentation therapy such as lithium, Rexulti, Wellbutrin along with ketamine/ECT/TMS opportunities at our  next appointment.  She denies acute mental complaints concerns at this time        Aware of 24 hour and weekend coverage for urgent situations accessed by calling [facility] main practice number    Medications Risks/Benefits      Risks, Benefits And Possible Side Effects Of Medications:    Risks, benefits, and possible side effects of medications explained to Ira and she verbalizes understanding and agreement for treatment.    Controlled Medication Discussion:     Not applicable - controlled prescriptions are not prescribed by this practice    Psychotherapy Provided:     Individual psychotherapy provided: Crisis/safety plan discussed with Ira. Provided at least 16 minutes of distinct psychotherapy using a combination of supportive, interpersonal, motivational, and problem solving approaches to address psychological distress and enhance coping strategies.     Treatment Plan:    Completed and signed during the session: Not applicable - Treatment Plan to be completed by [facility] therapist      Visit Time    Visit Start Time: 1:00 PM  Visit Stop Time: 1:28 PM  Total Visit Duration:  28 minutes     The total visit duration detailed above includes: patient engagement, medication management, psychotherapy/counseling, discussion regarding treatment goals, documentation, review of past medical records, and coordination of care.      Note Share Disclaimer:     This note was not shared with the patient due to reasonable likelihood of causing patient harm      Edward Hinkle DO  Psychiatry  05/08/25

## 2025-05-13 ENCOUNTER — TELEMEDICINE (OUTPATIENT)
Dept: PSYCHIATRY | Facility: CLINIC | Age: 80
End: 2025-05-13
Payer: COMMERCIAL

## 2025-05-13 DIAGNOSIS — F41.9 ANXIETY: ICD-10-CM

## 2025-05-13 DIAGNOSIS — F43.29 GRIEF REACTION WITH PROLONGED BEREAVEMENT: Primary | ICD-10-CM

## 2025-05-13 DIAGNOSIS — F32.1 CURRENT MODERATE EPISODE OF MAJOR DEPRESSIVE DISORDER, UNSPECIFIED WHETHER RECURRENT (HCC): ICD-10-CM

## 2025-05-13 PROCEDURE — 90834 PSYTX W PT 45 MINUTES: CPT

## 2025-05-13 NOTE — PSYCH
Virtual Regular VisitName: Ira Bonilla      : 1945      MRN: [id number]  Encounter Provider: Maria Ines Farfan LCSW  Encounter Date: 2025   Encounter department: [facility] THERAPYANYWHERE  :  Assessment & Plan  Grief reaction with prolonged bereavement         Anxiety         Current moderate episode of major depressive disorder, unspecified whether recurrent (HCC)             Goals addressed in session: Goal 1     DATA: Wendy shared she visited with her friend Cherie and went for [facility] for Mother's Day at her daughter in law's family's house.  Tomorrow is her daughter Aminata's birthday.  She is going to keep herself out of her room so she wouldn't spend the day crying.  Therapist encouraged open communication of thoughts and feelings.  Therapist talked about interventions to improve mood and reduce depression with Wendy.  She said she wants to walk more and get out in the sunshine.  She shares that listening to music is therapeutic for her as well as crafting.  Wendy shared her psychiatrist talked to her about joining a group for socialization.  Therapist sent information to Wendy about this group so she can reach out to ask about joining.     During this session, this clinician used the following therapeutic modalities: Client-centered Therapy and Dialectical Behavior Therapy    Substance Abuse was not addressed during this session. If the client is diagnosed with a co-occurring substance use disorder, please indicate any changes in the frequency or amount of use: . Stage of change for addressing substance use diagnoses: No substance use/Not applicable    ASSESSMENT:  Ira presents with a Euthymic/ normal mood. Ira's affect is Normal range and intensity, which is congruent, with their mood and the content of the session. The client has made progress on their goals as evidenced by making a goal to walk, and to reach out about socialization group.    Ira presents with a  "none risk of suicide, none risk of self-harm, and none risk of harm to others.    For any risk assessment that surpasses a \"low\" rating, a safety plan must be developed.    A safety plan was indicated: no  If yes, describe in detail     PLAN: Between sessions, Ira will do some walking daily; reach out to socialization . At the next session, the therapist will use Client-centered Therapy and Dialectical Behavior Therapy to address depresison.    Behavioral Health Treatment Plan [facility]: Diagnosis and Treatment Plan explained to Ira Roth relates understanding diagnosis and is agreeable to Treatment Plan. Yes     Depression Follow-up Plan Completed: Yes     Reason for visit is   Chief Complaint   Patient presents with   • Virtual Regular Visit        Recent Visits  No visits were found meeting these conditions.  Showing recent visits within past 7 days and meeting all other requirements  Today's Visits  Date Type Provider Dept   05/13/25 Telemedicine Maria Ines Farfan LCSW [facility] Therapy[facility]   Showing today's visits and meeting all other requirements  Future Appointments  No visits were found meeting these conditions.  Showing future appointments within next 150 days and meeting all other requirements     History of Present Illness     HPI    Past Medical History   Past Medical History:   Diagnosis Date   • Anxiety    • Arthritis    • Asthma    • Cancer (HCC)    • Candida infection, esophageal (HCC)    • Chronic kidney disease    • Chronic pain    • COPD (chronic obstructive pulmonary disease) (HCC)    • Depression    • Depression    • Gastroparesis    • Gastroparesis    • GERD (gastroesophageal reflux disease)    • Hypertension    • Irritable bowel syndrome (IBS)    • Lactose intolerance Not sure   • Migraines    • MRSA (methicillin resistant Staphylococcus aureus)    • Multiple thyroid nodules    • Osteoporosis    • Pneumonia    • Psychiatric disorder      Past Surgical " History:   Procedure Laterality Date   • BACK SURGERY     •  SECTION     • CHOLECYSTECTOMY     • COLONOSCOPY  10/09/2013   • ESOPHAGEAL DILATION      2017 [facility]   • ESOPHAGOGASTRODUODENOSCOPY N/A 2023    Procedure: ESOPHAGOGASTRODUODENOSCOPY (EGD);  Surgeon: Dontrell Mcmahon MD;  [facility];  Service: General   • ESOPHAGOGASTRODUODENOSCOPY N/A 2024    Procedure: ESOPHAGOGASTRODUODENOSCOPY (EGD);  Surgeon: Shilpa Price MD;  [facility];  Service: Thoracic   • HERNIA REPAIR  2013   • HIATAL HERNIA REPAIR     • NISSEN FUNDOPLICATION     • MS BIOPSY LIVER NEEDLE PERCUTANEOUS N/A 2023    Procedure: EXCISION BIOPSY LIVER;  Surgeon: Dontrell Mcmahon MD;  [facility];  Service: General   • MS ESOPHAGOGASTRODUODENOSCOPY TRANSORAL DIAGNOSTIC N/A 2019    Procedure: ESOPHAGOGASTRODUODENOSCOPY (EGD);  Surgeon: Geovanna Winn MD;  [facility];  Service: Gastroenterology   • MS ESOPHAGOGASTRODUODENOSCOPY TRANSORAL DIAGNOSTIC N/A 2023    Procedure: ESOPHAGOGASTRODUODENOSCOPY (EGD);  Surgeon: Shilpa Price MD;  [facility];  Service: Thoracic   • MS ESOPHAGOSCOPY FLEX BALLOON DILAT <30 MM DIAM N/A 2023    Procedure: CRE WIRE GUIDED BALLOON DILATATION ESOPHAGEAL;  Surgeon: Shilpa Price MD;  [facility];  Service: Thoracic   • MS ESOPHAGOSCOPY FLEX BALLOON DILAT <30 MM DIAM N/A 2024    Procedure: ESOPHAGOGASTRODUODENOSCOPY WITH BALLOON DILATION;  Surgeon: Shilpa Price MD;  [facility];  Service: Thoracic   • MS LAPS RPR PARAESPHGL HRNA INCL FUNDPLSTY W/O MESH N/A 2023    Procedure: robotic takedown of nissen fundoplication, redo hiatal hernia, partial fundoplication, with mesh;  Surgeon: Shilpa Price MD;  [facility];  Service: Thoracic   • MS PYLOROPLASTY N/A 2023    Procedure: PYLOROPLASTY LAPAROSCOPIC WITH ROBOT;  Surgeon: Dontrell Mcmahon MD;  [facility];  Service: General   • ROTATOR CUFF REPAIR     • SHOULDER SURGERY     • SPINAL FUSION     • TUBAL LIGATION  May 1975    Last child born   • UPPER GASTROINTESTINAL ENDOSCOPY     • US GUIDED THYROID BIOPSY     • WISDOM TOOTH EXTRACTION       Current Outpatient Medications   Medication Instructions   • acetaminophen (TYLENOL) 650 mg, Oral, Every 6 hours PRN   • albuterol (PROVENTIL HFA,VENTOLIN HFA) 90 mcg/act inhaler 2 puffs, Inhalation, As needed   • amLODIPine (NORVASC) 10 mg tablet    • cetirizine (ZYRTEC) 10 mg, Oral, Daily   • cholecalciferol (VITAMIN D3) 5,000 Units, Oral, Daily   • linaCLOtide 290 MCG CAPS 1 capsule, Oral, Daily   • metoclopramide (REGLAN) 10 mg, Oral, 4 times daily   • multivitamin (THERAGRAN) TABS 1 tablet, Oral, Daily   • Omega-3 Fatty Acids (OMEGA 3 PO) Oral, Daily   • ondansetron (ZOFRAN) 4 mg, Oral, Every 8 hours PRN   • rizatriptan (MAXALT-MLT) 10 mg disintegrating tablet as needed for migraine headache   • spironolactone (ALDACTONE) 25 mg tablet 1/2 tablet Orally Once per day for 30 days   • Vortioxetine HBr (TRINTELLIX) 20 mg, Oral, Daily     Allergies   Allergen Reactions   • Latex Hives   • Augmentin [Amoxicillin-Pot Clavulanate] GI Intolerance   • Bactrim [Sulfamethoxazole-Trimethoprim] GI Intolerance   • Clarithromycin GI Intolerance   • Doxycycline Hives   • Neomycin GI Intolerance   • Polymyxin B GI Intolerance   • Zyvox [Linezolid] GI Bleeding   • Cephalosporins GI Intolerance   • Nsaids GI Intolerance   • Penicillins GI Intolerance     Patient can only take levaquin and cipro   • Prednisone GI Intolerance   • Sulphasomidine GI Intolerance       Objective   There were no vitals taken for this visit.    Video Exam  Physical Exam     Administrative Statements   Encounter provider Maria Ines Farfan LCSW    The Patient is located at Home and in the following state in which I hold an active license [place].    The patient was identified by name and date of birth. Ira KRAUS  Chad was informed that this is a telemedicine visit and that the visit is being conducted through the Epic Embedded platform. She agrees to proceed..  My office door was closed. No one else was in the room.  She acknowledged consent and understanding of privacy and security of the video platform. The patient has agreed to participate and understands they can discontinue the visit at any time.      Visit Time  Start Time: 1100  Stop Time: 1152  Total Visit Time: 52 minutes

## 2025-05-27 ENCOUNTER — TELEMEDICINE (OUTPATIENT)
Dept: PSYCHIATRY | Facility: CLINIC | Age: 80
End: 2025-05-27
Payer: COMMERCIAL

## 2025-05-27 DIAGNOSIS — F32.0 CURRENT MILD EPISODE OF MAJOR DEPRESSIVE DISORDER, UNSPECIFIED WHETHER RECURRENT (HCC): Primary | ICD-10-CM

## 2025-05-27 DIAGNOSIS — F41.9 ANXIETY: ICD-10-CM

## 2025-05-27 DIAGNOSIS — F43.29 GRIEF REACTION WITH PROLONGED BEREAVEMENT: ICD-10-CM

## 2025-05-27 PROCEDURE — 90834 PSYTX W PT 45 MINUTES: CPT

## 2025-05-27 NOTE — PSYCH
"Virtual Regular VisitName: Ira Bonilla      : 1945      MRN: [id number]  Encounter Provider: Maria Ines Farfan LCSW  Encounter Date: 2025   Encounter department: [facility] THERAPYANYWHERE  :  Assessment & Plan  Current mild episode of major depressive disorder, unspecified whether recurrent (HCC)         Anxiety         Grief reaction with prolonged bereavement             Goals addressed in session: Goal 1     DATA: Wendy shared her back does not feel great because of lifting a child over the holiday weekend.  She shared she had a good time with friends over the weekend.  She still is feeling down and feeling like she is a failure.  She said others are able to do creative pursuits but that she is not able to do so.  She feels she is \"not creative\" and therapist helped her to challenge this \"negative labeling\" of herself as well as \"shoulding.\"  She talked about some negative thoughts about looking for a job because of getting stuck with the application process.  Therapist suggested talking to her prescriber about her persistent depressed mood to possibly go on another medication to help her feel better.  She explained she is \"tired of trying different medications.\"  Therapist encouraged her to think of possible positive outcomes since she is in a negative thinking loop.    During this session, this clinician used the following therapeutic modalities: Client-centered Therapy and Cognitive Behavioral Therapy    Substance Abuse was not addressed during this session. If the client is diagnosed with a co-occurring substance use disorder, please indicate any changes in the frequency or amount of use: . Stage of change for addressing substance use diagnoses: No substance use/Not applicable    ASSESSMENT:  Ira presents with a Euthymic/ normal and Dysthymic mood. Ira's affect is Normal range and intensity, which is congruent, with their mood and the content of the session. " "The client has made progress on their goals as evidenced by talking about behavioral activation; updating treatment plan; challenging negative thinking.    Ira presents with a none risk of suicide, none risk of self-harm, and none risk of harm to others.    For any risk assessment that surpasses a \"low\" rating, a safety plan must be developed.    A safety plan was indicated: no  If yes, describe in detail     PLAN: Between sessions, Ira will call [facility], challenge negative thinking about herself. At the next session, the therapist will use Client-centered Therapy and Cognitive Behavioral Therapy to address depression.    Behavioral Health Treatment Plan [facility]: Diagnosis and Treatment Plan explained to Ira Roth relates understanding diagnosis and is agreeable to Treatment Plan. Yes     Depression Follow-up Plan Completed: Yes     Reason for visit is   Chief Complaint   Patient presents with   • Virtual Regular Visit      Recent Visits  No visits were found meeting these conditions.  Showing recent visits within past 7 days and meeting all other requirements  Today's Visits  Date Type Provider Dept   05/27/25 Telemedicine Maria Ines Farfan LCSW [facility]   Showing today's visits and meeting all other requirements  Future Appointments  No visits were found meeting these conditions.  Showing future appointments within next 150 days and meeting all other requirements     History of Present Illness     HPI    Past Medical History   Past Medical History[1]  Past Surgical History[2]  Current Outpatient Medications   Medication Instructions   • acetaminophen (TYLENOL) 650 mg, Oral, Every 6 hours PRN   • albuterol (PROVENTIL HFA,VENTOLIN HFA) 90 mcg/act inhaler 2 puffs, Inhalation, As needed   • amLODIPine (NORVASC) 10 mg tablet    • cetirizine (ZYRTEC) 10 mg, Oral, Daily   • cholecalciferol (VITAMIN D3) 5,000 Units, Oral, Daily   • linaCLOtide 290 MCG CAPS 1 capsule, Oral, Daily   • " metoclopramide (REGLAN) 10 mg, Oral, 4 times daily   • multivitamin (THERAGRAN) TABS 1 tablet, Oral, Daily   • Omega-3 Fatty Acids (OMEGA 3 PO) Oral, Daily   • ondansetron (ZOFRAN) 4 mg, Oral, Every 8 hours PRN   • rizatriptan (MAXALT-MLT) 10 mg disintegrating tablet as needed for migraine headache   • spironolactone (ALDACTONE) 25 mg tablet 1/2 tablet Orally Once per day for 30 days   • Vortioxetine HBr (TRINTELLIX) 20 mg, Oral, Daily     Allergies[3]    Objective   There were no vitals taken for this visit.    Video Exam  Physical Exam     Administrative Statements   Encounter provider Maria Ines Farfan LCSW    The Patient is located at Home and in the following state in which I hold an active license [place].    The patient was identified by name and date of birth. Ira Bonilla was informed that this is a telemedicine visit and that the visit is being conducted through the Epic platform. She agrees to proceed..  My office door was closed. No one else was in the room.  She acknowledged consent and understanding of privacy and security of the video platform. The patient has agreed to participate and understands they can discontinue the visit at any time.        Visit Time  Start Time: 1100  Stop Time: 1146  Total Visit Time: 46 minutes         [1]  Past Medical History:  Diagnosis Date   • Anxiety    • Arthritis    • Asthma    • Cancer (HCC)    • Candida infection, esophageal (HCC)    • Chronic kidney disease    • Chronic pain    • COPD (chronic obstructive pulmonary disease) (HCC)    • Depression    • Depression    • Gastroparesis    • Gastroparesis    • GERD (gastroesophageal reflux disease)    • Hypertension    • Irritable bowel syndrome (IBS)    • Lactose intolerance Not sure   • Migraines    • MRSA (methicillin resistant Staphylococcus aureus)    • Multiple thyroid nodules    • Osteoporosis    • Pneumonia    • Psychiatric disorder    [2]  Past Surgical History:  Procedure Laterality Date   • BACK SURGERY      •  SECTION     • CHOLECYSTECTOMY     • COLONOSCOPY  10/09/2013   • ESOPHAGEAL DILATION      2017 [facility]   • ESOPHAGOGASTRODUODENOSCOPY N/A 2023    Procedure: ESOPHAGOGASTRODUODENOSCOPY (EGD);  Surgeon: Dontrell Mcmahon MD;  [facility];  Service: General   • ESOPHAGOGASTRODUODENOSCOPY N/A 2024    Procedure: ESOPHAGOGASTRODUODENOSCOPY (EGD);  Surgeon: Shilpa Price MD;  [facility];  Service: Thoracic   • HERNIA REPAIR  2013   • HIATAL HERNIA REPAIR     • NISSEN FUNDOPLICATION     • TN BIOPSY LIVER NEEDLE PERCUTANEOUS N/A 2023    Procedure: EXCISION BIOPSY LIVER;  Surgeon: Dontrell Mcmahon MD;  [facility];  Service: General   • TN ESOPHAGOGASTRODUODENOSCOPY TRANSORAL DIAGNOSTIC N/A 2019    Procedure: ESOPHAGOGASTRODUODENOSCOPY (EGD);  Surgeon: Geovanna Winn MD;  [facility];  Service: Gastroenterology   • TN ESOPHAGOGASTRODUODENOSCOPY TRANSORAL DIAGNOSTIC N/A 2023    Procedure: ESOPHAGOGASTRODUODENOSCOPY (EGD);  Surgeon: Shilpa Price MD;  [facility];  Service: Thoracic   • TN ESOPHAGOSCOPY FLEX BALLOON DILAT <30 MM DIAM N/A 2023    Procedure: CRE WIRE GUIDED BALLOON DILATATION ESOPHAGEAL;  Surgeon: Shilpa Price MD;  [facility];  Service: Thoracic   • TN ESOPHAGOSCOPY FLEX BALLOON DILAT <30 MM DIAM N/A 2024    Procedure: ESOPHAGOGASTRODUODENOSCOPY WITH BALLOON DILATION;  Surgeon: Shilpa Price MD;  [facility];  Service: Thoracic   • TN LAPS RPR PARAESPHGL HRNA INCL FUNDPLSTY W/O MESH N/A 2023    Procedure: robotic takedown of nissen fundoplication, redo hiatal hernia, partial fundoplication, with mesh;  Surgeon: Shilpa Price MD;  [facility];  Service: Thoracic   • TN PYLOROPLASTY N/A 2023    Procedure: PYLOROPLASTY LAPAROSCOPIC WITH ROBOT;  Surgeon: Dontrell Mcmahon MD;  [facility];  Service: General   • ROTATOR CUFF REPAIR     •  SHOULDER SURGERY     • SPINAL FUSION     • TUBAL LIGATION  May 1975    Last child born   • UPPER GASTROINTESTINAL ENDOSCOPY     • US GUIDED THYROID BIOPSY     • WISDOM TOOTH EXTRACTION     [3]  Allergies  Allergen Reactions   • Latex Hives   • Augmentin [Amoxicillin-Pot Clavulanate] GI Intolerance   • Bactrim [Sulfamethoxazole-Trimethoprim] GI Intolerance   • Clarithromycin GI Intolerance   • Doxycycline Hives   • Neomycin GI Intolerance   • Polymyxin B GI Intolerance   • Zyvox [Linezolid] GI Bleeding   • Cephalosporins GI Intolerance   • Nsaids GI Intolerance   • Penicillins GI Intolerance     Patient can only take levaquin and cipro   • Prednisone GI Intolerance   • Sulphasomidine GI Intolerance

## 2025-05-27 NOTE — BH TREATMENT PLAN
Outpatient Behavioral Health Psychotherapy Treatment Plan    Wendy Bonilla  1945     Date of Initial Psychotherapy Assessment: 1/10/2024   Date of Current Treatment Plan: 05/27/25  Treatment Plan Target Date: 5/27/2026  Treatment Plan Expiration Date: 11/27/2025    Diagnosis:   1. Current mild episode of major depressive disorder, unspecified whether recurrent (HCC)        2. Anxiety        3. Grief reaction with prolonged bereavement            Area(s) of Need: Depression, lack of interest in things, family issues, grief     Long Term Goal 1 (in the client's own words): I want to feel better about myself    Stage of Change: Contemplation    Target Date for completion: 5/27/2026     Anticipated therapeutic modalities: supportive therapy, CBT, grief therapy     People identified to complete this goal: edb Roth      Objective 1: (identify the means of measuring success in meeting the objective): Therapist will engage Ira in supportive and grief therapy to work through her feelings and thoughts and improve her self esteem.  She will report back verbally improved self esteem and mood at least 5 out of 7 days.    Update 7/3/2024:  Bryan is able to openly communicate her thoughts and feelings about her family situation, grief reaction, and her depression.  She shares it helps her to talk about her feelings, to have talks about encouraging her in getting out more and socializing more, and to openly communicate her thoughts and feelings with her family about her role in the family.      Update 12/17/2024:  Ira has been openly communicating thoughts and feelings about her family conflicts, her loss of her daughter and her health.  She has agreed to work on behavioral activation for improved depression including socializing more, possibly finding a part time job through the [facility], and going to the [employer].    Update 5/27//2025:  Ira talks about having difficulty with following through with doing  some behavioral activation because she gets frustrated easily and gives up.  She will work on negative thinking as part of getting more motivated as well as psychoeducation on the impact of depression on mood, irritability and motivation.                                    I am currently under the care of a [facility] psychiatric provider: no    My [facility] psychiatric provider is:     I am currently taking psychiatric medications: Yes, as prescribed    I feel that I will be ready for discharge from mental health care when I reach the following (measurable goal/objective): When my self esteem is higher and my moods are improved at least 5 out of 7 days    For children and adults who have a legal guardian:   Has there been any change to custody orders and/or guardianship status? NA. If yes, attach updated documentation.    I have created my Crisis Plan and have been offered a copy of this plan    Behavioral Health Treatment Plan [facility]: Diagnosis and Treatment Plan explained to Wendy Bonilla acknowledges an understanding of their diagnosis. Wendy Bonilla agrees to this treatment plan.    I have been offered a copy of this Treatment Plan. Yes    Wendy Bonilla, 1945, actively participated in the creation of this treatment plan during a virtual session, using the [employer] platform.   Wendy Bonilla  provided verbal consent on 5/27/2025 at 1:30 PM. The treatment plan was transcribed into the Electronic Health Record at a later time.

## 2025-06-10 ENCOUNTER — TELEMEDICINE (OUTPATIENT)
Dept: PSYCHIATRY | Facility: CLINIC | Age: 80
End: 2025-06-10
Payer: COMMERCIAL

## 2025-06-10 DIAGNOSIS — F41.9 ANXIETY: ICD-10-CM

## 2025-06-10 DIAGNOSIS — F43.29 GRIEF REACTION WITH PROLONGED BEREAVEMENT: Primary | ICD-10-CM

## 2025-06-10 DIAGNOSIS — F33.1 MODERATE EPISODE OF RECURRENT MAJOR DEPRESSIVE DISORDER (HCC): ICD-10-CM

## 2025-06-10 PROCEDURE — 90834 PSYTX W PT 45 MINUTES: CPT

## 2025-06-10 NOTE — PSYCH
"Virtual Regular VisitName: Ira Bonilla      : 1945      MRN: [id number]  Encounter Provider: Maria Ines Farfan LCSW  Encounter Date: 6/10/2025   Encounter department: [facility]  :  Assessment & Plan  Grief reaction with prolonged bereavement         Anxiety         Moderate episode of recurrent major depressive disorder (HCC)             Goals addressed in session: Goal 1     DATA: Wendy shared she did some things she needed to do including calling [employer] and social security and children and youth to follow up on getting a job and [employer] benefits.  She said this helped her to feel accomplished. Therapist validated her on this. She shared her stomach is bothering her which is frustrating her.  Her mood is about the same.  She said she would start walking 2 times a week as a goal.  She talked about her goal of finding a part time job in terms of her thoughts and feelings.  She shared she is slightly nervous because of not having clothing that fits her for work right now.    During this session, this clinician used the following therapeutic modalities: Client-centered Therapy and Dialectical Behavior Therapy    Substance Abuse was not addressed during this session. If the client is diagnosed with a co-occurring substance use disorder, please indicate any changes in the frequency or amount of use: . Stage of change for addressing substance use diagnoses: No substance use/Not applicable    ASSESSMENT:  Ira presents with a Euthymic/ normal mood. Helens affect is Normal range and intensity, which is congruent, with their mood and the content of the session. The client has made progress on their goals as evidenced by behavioral activation.    Ira presents with a none risk of suicide, none risk of self-harm, and none risk of harm to others.    For any risk assessment that surpasses a \"low\" rating, a safety plan must be developed.    A safety plan was indicated: " no  If yes, describe in detail     PLAN: Between sessions, Ira will utilize behavioral activation in walking 2 times per week. At the next session, the therapist will use Client-centered Therapy and Dialectical Behavior Therapy to address depression.    Behavioral Health Treatment Plan [facility]: Diagnosis and Treatment Plan explained to Ira, Ira relates understanding diagnosis and is agreeable to Treatment Plan. Yes     Depression Follow-up Plan Completed: Not applicable     Reason for visit is   Chief Complaint   Patient presents with   • Virtual Regular Visit      Recent Visits  No visits were found meeting these conditions.  Showing recent visits within past 7 days and meeting all other requirements  Today's Visits  Date Type Provider Dept   06/10/25 Telemedicine Maria Ines Farfan LCSW [facility]   Showing today's visits and meeting all other requirements  Future Appointments  No visits were found meeting these conditions.  Showing future appointments within next 150 days and meeting all other requirements     History of Present Illness     HPI    Past Medical History   Past Medical History[1]  Past Surgical History[2]  Current Outpatient Medications   Medication Instructions   • acetaminophen (TYLENOL) 650 mg, Oral, Every 6 hours PRN   • albuterol (PROVENTIL HFA,VENTOLIN HFA) 90 mcg/act inhaler 2 puffs, Inhalation, As needed   • amLODIPine (NORVASC) 10 mg tablet    • cetirizine (ZYRTEC) 10 mg, Oral, Daily   • cholecalciferol (VITAMIN D3) 5,000 Units, Oral, Daily   • linaCLOtide 290 MCG CAPS 1 capsule, Oral, Daily   • metoclopramide (REGLAN) 10 mg, Oral, 4 times daily   • multivitamin (THERAGRAN) TABS 1 tablet, Oral, Daily   • Omega-3 Fatty Acids (OMEGA 3 PO) Oral, Daily   • ondansetron (ZOFRAN) 4 mg, Oral, Every 8 hours PRN   • rizatriptan (MAXALT-MLT) 10 mg disintegrating tablet as needed for migraine headache   • spironolactone (ALDACTONE) 25 mg tablet 1/2 tablet Orally Once  per day for 30 days   • Vortioxetine HBr (TRINTELLIX) 20 mg, Oral, Daily     Allergies[3]    Objective   There were no vitals taken for this visit.    Video Exam  Physical Exam     Administrative Statements   Encounter provider Maria Ines Farfan LCSW    The Patient is located at Home and in the following state in which I hold an active license [place].    The patient was identified by name and date of birth. Ira Bonilla was informed that this is a telemedicine visit and that the visit is being conducted through the [employer] platform. She agrees to proceed..  My office door was closed. No one else was in the room.  She acknowledged consent and understanding of privacy and security of the video platform. The patient has agreed to participate and understands they can discontinue the visit at any time.        Visit Time  Start Time: 1100  Stop Time: 1145  Total Visit Time: 45 minutes         [1]  Past Medical History:  Diagnosis Date   • Anxiety    • Arthritis    • Asthma    • Cancer (HCC)    • Candida infection, esophageal (HCC)    • Chronic kidney disease    • Chronic pain    • COPD (chronic obstructive pulmonary disease) (HCC)    • Depression    • Depression    • Gastroparesis    • Gastroparesis    • GERD (gastroesophageal reflux disease)    • Hypertension    • Irritable bowel syndrome (IBS)    • Lactose intolerance Not sure   • Migraines    • MRSA (methicillin resistant Staphylococcus aureus)    • Multiple thyroid nodules    • Osteoporosis    • Pneumonia    • Psychiatric disorder    [2]  Past Surgical History:  Procedure Laterality Date   • BACK SURGERY     •  SECTION     • CHOLECYSTECTOMY     • COLONOSCOPY  10/09/2013   • ESOPHAGEAL DILATION       [facility]   • ESOPHAGOGASTRODUODENOSCOPY N/A 2023    Procedure: ESOPHAGOGASTRODUODENOSCOPY (EGD);  Surgeon: Dontrell Mcmahon MD;  [facility];  Service: General   • ESOPHAGOGASTRODUODENOSCOPY N/A 2024    Procedure: ESOPHAGOGASTRODUODENOSCOPY  (EGD);  Surgeon: Shilpa Price MD;  [facility];  Service: Thoracic   • HERNIA REPAIR  06/14/2013   • HIATAL HERNIA REPAIR     • NISSEN FUNDOPLICATION     • ME BIOPSY LIVER NEEDLE PERCUTANEOUS N/A 11/08/2023    Procedure: EXCISION BIOPSY LIVER;  Surgeon: Dontrell Mcmahon MD;  [facility];  Service: General   • ME ESOPHAGOGASTRODUODENOSCOPY TRANSORAL DIAGNOSTIC N/A 04/03/2019    Procedure: ESOPHAGOGASTRODUODENOSCOPY (EGD);  Surgeon: Geovanna Winn MD;  [facility];  Service: Gastroenterology   • ME ESOPHAGOGASTRODUODENOSCOPY TRANSORAL DIAGNOSTIC N/A 09/20/2023    Procedure: ESOPHAGOGASTRODUODENOSCOPY (EGD);  Surgeon: Shilpa Price MD;  [facility];  Service: Thoracic   • ME ESOPHAGOSCOPY FLEX BALLOON DILAT <30 MM DIAM N/A 09/20/2023    Procedure: CRE WIRE GUIDED BALLOON DILATATION ESOPHAGEAL;  Surgeon: Shilpa Price MD;  [facility];  Service: Thoracic   • ME ESOPHAGOSCOPY FLEX BALLOON DILAT <30 MM DIAM N/A 03/20/2024    Procedure: ESOPHAGOGASTRODUODENOSCOPY WITH BALLOON DILATION;  Surgeon: Shilpa Price MD;  [facility];  Service: Thoracic   • ME LAPS RPR PARAESPHGL HRNA INCL FUNDPLSTY W/O MESH N/A 11/08/2023    Procedure: robotic takedown of nissen fundoplication, redo hiatal hernia, partial fundoplication, with mesh;  Surgeon: Shilpa Price MD;  [facility];  Service: Thoracic   • ME PYLOROPLASTY N/A 11/08/2023    Procedure: PYLOROPLASTY LAPAROSCOPIC WITH ROBOT;  Surgeon: Dontrell Mcmahon MD;  [facility];  Service: General   • ROTATOR CUFF REPAIR     • SHOULDER SURGERY     • SPINAL FUSION     • TUBAL LIGATION  May 1975    Last child born   • UPPER GASTROINTESTINAL ENDOSCOPY     • US GUIDED THYROID BIOPSY     • WISDOM TOOTH EXTRACTION     [3]  Allergies  Allergen Reactions   • Latex Hives   • Augmentin [Amoxicillin-Pot Clavulanate] GI Intolerance   • Bactrim [Sulfamethoxazole-Trimethoprim] GI Intolerance   •  Clarithromycin GI Intolerance   • Doxycycline Hives   • Neomycin GI Intolerance   • Polymyxin B GI Intolerance   • Zyvox [Linezolid] GI Bleeding   • Cephalosporins GI Intolerance   • Nsaids GI Intolerance   • Penicillins GI Intolerance     Patient can only take levaquin and cipro   • Prednisone GI Intolerance   • Sulphasomidine GI Intolerance

## 2025-06-17 ENCOUNTER — OFFICE VISIT (OUTPATIENT)
Dept: PSYCHIATRY | Facility: CLINIC | Age: 80
End: 2025-06-17
Payer: COMMERCIAL

## 2025-06-17 DIAGNOSIS — F32.1 CURRENT MODERATE EPISODE OF MAJOR DEPRESSIVE DISORDER, UNSPECIFIED WHETHER RECURRENT (HCC): ICD-10-CM

## 2025-06-17 DIAGNOSIS — F33.1 MODERATE EPISODE OF RECURRENT MAJOR DEPRESSIVE DISORDER (HCC): Primary | ICD-10-CM

## 2025-06-17 DIAGNOSIS — F41.1 GENERALIZED ANXIETY DISORDER: ICD-10-CM

## 2025-06-17 PROCEDURE — 90833 PSYTX W PT W E/M 30 MIN: CPT | Performed by: STUDENT IN AN ORGANIZED HEALTH CARE EDUCATION/TRAINING PROGRAM

## 2025-06-17 PROCEDURE — 99214 OFFICE O/P EST MOD 30 MIN: CPT | Performed by: STUDENT IN AN ORGANIZED HEALTH CARE EDUCATION/TRAINING PROGRAM

## 2025-06-17 RX ORDER — BUPROPION HYDROCHLORIDE 150 MG/1
150 TABLET ORAL EVERY MORNING
Qty: 21 TABLET | Refills: 0 | Status: SHIPPED | OUTPATIENT
Start: 2025-06-17 | End: 2025-06-17

## 2025-06-17 RX ORDER — BUPROPION HYDROCHLORIDE 150 MG/1
150 TABLET ORAL EVERY MORNING
Qty: 30 TABLET | Refills: 0 | Status: SHIPPED | OUTPATIENT
Start: 2025-06-17 | End: 2025-07-17

## 2025-06-24 ENCOUNTER — NURSE TRIAGE (OUTPATIENT)
Age: 80
End: 2025-06-24

## 2025-06-24 ENCOUNTER — TELEMEDICINE (OUTPATIENT)
Dept: PSYCHIATRY | Facility: CLINIC | Age: 80
End: 2025-06-24
Payer: COMMERCIAL

## 2025-06-24 DIAGNOSIS — F33.1 MODERATE EPISODE OF RECURRENT MAJOR DEPRESSIVE DISORDER (HCC): Primary | ICD-10-CM

## 2025-06-24 DIAGNOSIS — F43.29 GRIEF REACTION WITH PROLONGED BEREAVEMENT: ICD-10-CM

## 2025-06-24 DIAGNOSIS — F41.9 ANXIETY: ICD-10-CM

## 2025-06-24 PROCEDURE — 90834 PSYTX W PT 45 MINUTES: CPT

## 2025-06-24 NOTE — PSYCH
MEDICATION MANAGEMENT NOTE        [facility]      Name and Date of Birth:  Ira Bonilla 79 y.o. 1945 MRN: [id number]    Date of Visit: June 24, 2025    Reason for Visit: Follow-up visit for medication management       SUBJECTIVE:    Ira Bonilla is a 79 y.o. female with past psychiatric history significant for MDD, WINDY who was personally seen and evaluated today at the [facility] outpatient clinic for follow-up and medication management. Ira denies SI, HI, AVH, delusions, jose since her last appointment.  After discussion of risks, benefits, potential side effects, alternatives, we will trial Wellbutrin 150 mg every morning to help alleviate patient's continued feelings of depression.  She denies acute mental health complaints concerns at this time      Current Rating Scores:     Current PHQ-9   PHQ-2/9 Depression Screening           Current WINDY-7 is .    Review Of Systems:      Constitutional negative    ENT negative   Cardiovascular negative   Respiratory negative    Gastrointestinal negative   Genitourinary negative   Musculoskeletal negative    Integumentary negative   Neurological negative   Endocrine negative   Other Symptoms none, all other systems are negative       Past Psychiatric History: (unchanged information from previous note copied and italicized) - Information that is bolded has been updated.     See intake  Medications trials: Abilify somewhat improved mood but induced too much hunger and so was discontinued.    Substance Abuse History: (unchanged information from previous note copied and italicized) - Information that is bolded has been updated.     See intake    Social History: (unchanged information from previous note copied and italicized) - Information that is bolded has been updated.     See intake    Traumatic History: (unchanged information from previous note copied and italicized) - Information  that is bolded has been updated.     See intake      Past Medical History:    Past Medical History:   Diagnosis Date    Anxiety     Arthritis     Asthma     Cancer (HCC)     Candida infection, esophageal (HCC)     Chronic kidney disease     Chronic pain     COPD (chronic obstructive pulmonary disease) (HCC)     Depression     Depression     Gastroparesis     Gastroparesis     GERD (gastroesophageal reflux disease)     Hypertension     Irritable bowel syndrome (IBS)     Lactose intolerance Not sure    Migraines     MRSA (methicillin resistant Staphylococcus aureus)     Multiple thyroid nodules     Osteoporosis     Pneumonia     Psychiatric disorder         Past Surgical History:   Procedure Laterality Date    BACK SURGERY       SECTION      CHOLECYSTECTOMY      COLONOSCOPY  10/09/2013    ESOPHAGEAL DILATION       [facility]    ESOPHAGOGASTRODUODENOSCOPY N/A 2023    Procedure: ESOPHAGOGASTRODUODENOSCOPY (EGD);  Surgeon: Dontrell Mcmahon MD;  Location[facility];  Service: General    ESOPHAGOGASTRODUODENOSCOPY N/A 2024    Procedure: ESOPHAGOGASTRODUODENOSCOPY (EGD);  Surgeon: Shilpa Price MD;  Location: [facility];  Service: Thoracic    HERNIA REPAIR  2013    HIATAL HERNIA REPAIR      NISSEN FUNDOPLICATION      MS BIOPSY LIVER NEEDLE PERCUTANEOUS N/A 2023    Procedure: EXCISION BIOPSY LIVER;  Surgeon: Dontrell Mcmahon MD;  Location: [facility];  Service: General    MS ESOPHAGOGASTRODUODENOSCOPY TRANSORAL DIAGNOSTIC N/A 2019    Procedure: ESOPHAGOGASTRODUODENOSCOPY (EGD);  Surgeon: Geovanna Winn MD;  Location: [facility];  Service: Gastroenterology    MS ESOPHAGOGASTRODUODENOSCOPY TRANSORAL DIAGNOSTIC N/A 2023    Procedure: ESOPHAGOGASTRODUODENOSCOPY (EGD);  Surgeon: Shilpa Price MD;  [facility];  Service: Thoracic    MS ESOPHAGOSCOPY FLEX BALLOON DILAT <30 MM DIAM N/A 2023    Procedure: CRE WIRE GUIDED BALLOON DILATATION ESOPHAGEAL;  Surgeon:  Shilpa Price MD;  [facility];  Service: Thoracic    MI ESOPHAGOSCOPY FLEX BALLOON DILAT <30 MM DIAM N/A 03/20/2024    Procedure: ESOPHAGOGASTRODUODENOSCOPY WITH BALLOON DILATION;  Surgeon: Shilpa Price MD;  [facility];  Service: Thoracic    MI LAPS RPR PARAESPHGL HRNA INCL FUNDPLSTY W/O MESH N/A 11/08/2023    Procedure: robotic takedown of nissen fundoplication, redo hiatal hernia, partial fundoplication, with mesh;  Surgeon: Shilpa Price MD;  [facility];  Service: Thoracic    MI PYLOROPLASTY N/A 11/08/2023    Procedure: PYLOROPLASTY LAPAROSCOPIC WITH ROBOT;  Surgeon: Dontrell Mcmahon MD;  [facility];  Service: General    ROTATOR CUFF REPAIR      SHOULDER SURGERY      SPINAL FUSION      TUBAL LIGATION  May 1975    Last child born    UPPER GASTROINTESTINAL ENDOSCOPY      US GUIDED THYROID BIOPSY      WISDOM TOOTH EXTRACTION       Allergies   Allergen Reactions    Latex Hives    Augmentin [Amoxicillin-Pot Clavulanate] GI Intolerance    Bactrim [Sulfamethoxazole-Trimethoprim] GI Intolerance    Clarithromycin GI Intolerance    Doxycycline Hives    Neomycin GI Intolerance    Polymyxin B GI Intolerance    Zyvox [Linezolid] GI Bleeding    Cephalosporins GI Intolerance    Nsaids GI Intolerance    Penicillins GI Intolerance     Patient can only take levaquin and cipro    Prednisone GI Intolerance    Sulphasomidine GI Intolerance       Substance Abuse History:    Social History     Substance and Sexual Activity   Alcohol Use Not Currently     Social History     Substance and Sexual Activity   Drug Use No       Social History:    Social History     Socioeconomic History    Marital status:      Spouse name: Not on file    Number of children: Not on file    Years of education: Not on file    Highest education level: Not on file   Occupational History    Not on file   Tobacco Use    Smoking status: Former     Current packs/day: 0.00     Average packs/day: 1  pack/day for 15.0 years (15.0 ttl pk-yrs)     Types: Cigarettes     Start date: 10/5/1998     Quit date: 10/1/2003     Years since quittin.7    Smokeless tobacco: Former   Vaping Use    Vaping status: Never Used   Substance and Sexual Activity    Alcohol use: Not Currently    Drug use: No    Sexual activity: Not Currently     Partners: Male     Birth control/protection: None   Other Topics Concern    Not on file   Social History Narrative    Not on file     Social Drivers of Health     Financial Resource Strain: Not on file   Food Insecurity: No Food Insecurity (3/22/2024)    Nursing - Inadequate Food Risk Classification     Worried About Running Out of Food in the Last Year: Never true     Ran Out of Food in the Last Year: Never true     Ran Out of Food in the Last Year: Not on file   Transportation Needs: No Transportation Needs (3/22/2024)    PRAPARE - Transportation     Lack of Transportation (Medical): No     Lack of Transportation (Non-Medical): No   Physical Activity: Not on file   Stress: Not on file   Social Connections: Not on file   Intimate Partner Violence: Not on file   Housing Stability: Low Risk  (3/22/2024)    Housing Stability Vital Sign     Unable to Pay for Housing in the Last Year: No     Number of Times Moved in the Last Year: 1     Homeless in the Last Year: No       Family Psychiatric History:     Family History   Problem Relation Name Age of Onset    Other Mother          Esophageal disorder    Stomach cancer Father James Lujan     Cancer Father James Lujan     Hypertension Maternal Aunt Silvana Buchanan     COPD Maternal Grandmother Vicki Luque     Colon cancer Neg Hx      Colon polyps Neg Hx         History Review: The following portions of the patient's history were reviewed and updated as appropriate: allergies, current medications, past family history, past medical history, past social history, past surgical history, and problem list.         OBJECTIVE:     Vital signs in  last 24 hours:    There were no vitals filed for this visit.    Mental Status Evaluation:    Appearance age appropriate, casually dressed   Behavior Cooperative, mildly anxious   Speech normal rate, normal volume, normal pitch   Mood dysphoric   Affect Dysphoric   Thought Processes organized, goal directed   Associations intact associations   Thought Content no overt delusions   Perceptual Disturbances: no auditory hallucinations, no visual hallucinations   Abnormal Thoughts  Risk Potential Suicidal ideation - None at present  Homicidal ideation - None at present  Potential for aggression - Not at present   Orientation oriented to person, place, time/date, and situation   Memory recent and remote memory grossly intact   Consciousness alert and awake   Attention Span Concentration Span attention span and concentration are age appropriate   Intellect appears to be of average intelligence   Insight intact   Judgement intact   Muscle Strength and  Gait Uses a cane to ambulate slowly   Motor activity Within normal limits   Language no difficulty naming common objects, no difficulty repeating a phrase   Fund of Knowledge adequate knowledge of current events  adequate fund of knowledge regarding past history  adequate fund of knowledge regarding vocabulary    Pain none   Pain Scale Did not ask patient to formally rate       Laboratory Results: I have personally reviewed all pertinent laboratory/tests results    Recent Labs (last 2 months):   No visits with results within 2 Month(s) from this visit.   Latest known visit with results is:   Admission on 03/21/2024, Discharged on 03/26/2024   Component Date Value    WBC 03/22/2024 7.73     RBC 03/22/2024 3.29 (L)     Hemoglobin 03/22/2024 10.3 (L)     Hematocrit 03/22/2024 32.1 (L)     MCV 03/22/2024 98     MCH 03/22/2024 31.3     MCHC 03/22/2024 32.1     RDW 03/22/2024 13.6     MPV 03/22/2024 9.0     Platelets 03/22/2024 231     nRBC 03/22/2024 0     Segmented % 03/22/2024 52      Immature Grans % 03/22/2024 1     Lymphocytes % 03/22/2024 39     Monocytes % 03/22/2024 6     Eosinophils Relative 03/22/2024 2     Basophils Relative 03/22/2024 0     Absolute Neutrophils 03/22/2024 4.09     Absolute Immature Grans 03/22/2024 0.04     Absolute Lymphocytes 03/22/2024 2.99     Absolute Monocytes 03/22/2024 0.45     Eosinophils Absolute 03/22/2024 0.14     Basophils Absolute 03/22/2024 0.02     Sodium 03/22/2024 140     Potassium 03/22/2024 4.1     Chloride 03/22/2024 107     CO2 03/22/2024 26     ANION GAP 03/22/2024 7     BUN 03/22/2024 22     Creatinine 03/22/2024 1.50 (H)     Glucose 03/22/2024 190 (H)     Calcium 03/22/2024 9.3     AST 03/22/2024 38     ALT 03/22/2024 35     Alkaline Phosphatase 03/22/2024 123 (H)     Total Protein 03/22/2024 7.6     Albumin 03/22/2024 4.0     Total Bilirubin 03/22/2024 0.25     eGFR 03/22/2024 33     Lipase 03/22/2024 <6 (L)     hs TnI 0hr 03/22/2024 3     Sodium 03/22/2024 139     Potassium 03/22/2024 4.5     Chloride 03/22/2024 108     CO2 03/22/2024 25     ANION GAP 03/22/2024 6     BUN 03/22/2024 21     Creatinine 03/22/2024 1.37 (H)     Glucose 03/22/2024 105     Calcium 03/22/2024 8.6     eGFR 03/22/2024 37     Magnesium 03/22/2024 1.8 (L)     Phosphorus 03/22/2024 3.5     Total Bilirubin 03/22/2024 0.21     Bilirubin, Direct 03/22/2024 0.06     Alkaline Phosphatase 03/22/2024 108 (H)     AST 03/22/2024 29     ALT 03/22/2024 29     Total Protein 03/22/2024 6.5     Albumin 03/22/2024 3.5     POC Glucose 03/22/2024 119     POC Glucose 03/22/2024 121     WBC 03/23/2024 5.40     RBC 03/23/2024 3.06 (L)     Hemoglobin 03/23/2024 9.7 (L)     Hematocrit 03/23/2024 29.4 (L)     MCV 03/23/2024 96     MCH 03/23/2024 31.7     MCHC 03/23/2024 33.0     RDW 03/23/2024 13.6     MPV 03/23/2024 8.4 (L)     Platelets 03/23/2024 169     nRBC 03/23/2024 0     Segmented % 03/23/2024 53     Immature Grans % 03/23/2024 0     Lymphocytes % 03/23/2024 39     Monocytes %  03/23/2024 6     Eosinophils Relative 03/23/2024 2     Basophils Relative 03/23/2024 0     Absolute Neutrophils 03/23/2024 2.86     Absolute Immature Grans 03/23/2024 0.02     Absolute Lymphocytes 03/23/2024 2.08     Absolute Monocytes 03/23/2024 0.31     Eosinophils Absolute 03/23/2024 0.11     Basophils Absolute 03/23/2024 0.02     Sodium 03/23/2024 139     Potassium 03/23/2024 4.2     Chloride 03/23/2024 106     CO2 03/23/2024 27     ANION GAP 03/23/2024 6     BUN 03/23/2024 17     Creatinine 03/23/2024 1.27     Glucose 03/23/2024 95     Calcium 03/23/2024 8.9     Corrected Calcium 03/23/2024 9.5     AST 03/23/2024 42 (H)     ALT 03/23/2024 33     Alkaline Phosphatase 03/23/2024 101     Total Protein 03/23/2024 6.5     Albumin 03/23/2024 3.3 (L)     Total Bilirubin 03/23/2024 0.37     eGFR 03/23/2024 40     Magnesium 03/23/2024 2.1     POC Glucose 03/23/2024 84     Ventricular Rate 03/22/2024 82     Atrial Rate 03/22/2024 82     AL Interval 03/22/2024 140     QRSD Interval 03/22/2024 120     QT Interval 03/22/2024 396     QTC Interval 03/22/2024 462     P Axis 03/22/2024 50     QRS Axis 03/22/2024 17     T Wave [place] 03/22/2024 30     POC Glucose 03/23/2024 102     WBC 03/24/2024 4.84     RBC 03/24/2024 2.95 (L)     Hemoglobin 03/24/2024 9.4 (L)     Hematocrit 03/24/2024 28.4 (L)     MCV 03/24/2024 96     MCH 03/24/2024 31.9     MCHC 03/24/2024 33.1     RDW 03/24/2024 13.3     MPV 03/24/2024 8.7 (L)     Platelets 03/24/2024 177     nRBC 03/24/2024 0     Segmented % 03/24/2024 50     Immature Grans % 03/24/2024 1     Lymphocytes % 03/24/2024 41     Monocytes % 03/24/2024 6     Eosinophils Relative 03/24/2024 2     Basophils Relative 03/24/2024 0     Absolute Neutrophils 03/24/2024 2.49     Absolute Immature Grans 03/24/2024 0.03     Absolute Lymphocytes 03/24/2024 1.96     Absolute Monocytes 03/24/2024 0.27     Eosinophils Absolute 03/24/2024 0.08     Basophils Absolute 03/24/2024 0.01     Sodium 03/24/2024 139      Potassium 03/24/2024 4.4     Chloride 03/24/2024 105     CO2 03/24/2024 30     ANION GAP 03/24/2024 4     BUN 03/24/2024 18     Creatinine 03/24/2024 1.49 (H)     Glucose 03/24/2024 97     Calcium 03/24/2024 9.1     eGFR 03/24/2024 33     POC Glucose 03/24/2024 97     POC Glucose 03/25/2024 104     POC Glucose 03/25/2024 102     Salmonella sp PCR 03/25/2024 Negative     Shigella sp/Enteroinvasi* 03/25/2024 Negative     Campylobacter sp (jejuni* 03/25/2024 Negative     Shiga toxin 1/Shiga toxi* 03/25/2024 Negative     C.difficile toxin by PCR 03/25/2024 Negative     POC Glucose 03/25/2024 76     POC Glucose 03/25/2024 230 (H)     WBC 03/26/2024 4.66     RBC 03/26/2024 2.80 (L)     Hemoglobin 03/26/2024 8.8 (L)     Hematocrit 03/26/2024 26.9 (L)     MCV 03/26/2024 96     MCH 03/26/2024 31.4     MCHC 03/26/2024 32.7     RDW 03/26/2024 13.4     MPV 03/26/2024 8.5 (L)     Platelets 03/26/2024 150     nRBC 03/26/2024 0     Segmented % 03/26/2024 48     Immature Grans % 03/26/2024 0     Lymphocytes % 03/26/2024 43     Monocytes % 03/26/2024 6     Eosinophils Relative 03/26/2024 3     Basophils Relative 03/26/2024 0     Absolute Neutrophils 03/26/2024 2.24     Absolute Immature Grans 03/26/2024 0.02     Absolute Lymphocytes 03/26/2024 1.99     Absolute Monocytes 03/26/2024 0.28     Eosinophils Absolute 03/26/2024 0.12     Basophils Absolute 03/26/2024 0.01     Sodium 03/26/2024 138     Potassium 03/26/2024 4.1     Chloride 03/26/2024 106     CO2 03/26/2024 27     ANION GAP 03/26/2024 5     BUN 03/26/2024 14     Creatinine 03/26/2024 1.71 (H)     Glucose 03/26/2024 95     Calcium 03/26/2024 8.8     Corrected Calcium 03/26/2024 9.4     AST 03/26/2024 28     ALT 03/26/2024 22     Alkaline Phosphatase 03/26/2024 86     Total Protein 03/26/2024 6.2 (L)     Albumin 03/26/2024 3.2 (L)     Total Bilirubin 03/26/2024 0.28     eGFR 03/26/2024 28     Magnesium 03/26/2024 1.9        Suicide/Homicide Risk Assessment:    Risk of Harm to  Self:  The following ratings are based on assessment at the time of the interview  Historical Risk Factors include: chronic psychiatric problems  Protective Factors: no current suicidal ideation, access to mental health treatment, being a parent, compliant with medications, compliant with mental health treatment, connection to own children, having a desire to be alive, responsibilities and duties to others, stable living environment, strong relationships    Risk of Harm to Others:  The following ratings are based on assessment at the time of the interview  Historical Risk Factors include: none.  Protective Factors: no current homicidal ideation    The following interventions are recommended: contracts for safety at present - agrees to go to ED if feeling unsafe, contracts for safety at present - agrees to call Crisis Intervention Service if feeling unsafe      Lethality Statement:    Based on today's assessment and clinical criteria, Ira Bonilla contracts for safety and is not an imminent risk of harm to self or others. Outpatient level of care is deemed appropriate at this current time. Ira understands that if they can no longer contract for safety, they need to call the office or report to their nearest Emergency Room for immediate evaluation. They voiced understanding and agreement to call 911 or head to the nearest ED should they have any physical or mental decompensation whatsoever.       Assessment/Plan:     1.) MDD, recurrent, moderate  2.) WINDY  3.)      After discussion of risks, benefits, potential side effects, alternatives, we will continue Trintellix 20 mg daily, initiate Wellbutrin 150 mg every morning.  She will continue to meet with her therapist and other medical specialist and denies acute mental health complaints or concerns at this time        Aware of 24 hour and weekend coverage for urgent situations accessed by calling [facility] main practice  number    Medications Risks/Benefits      Risks, Benefits And Possible Side Effects Of Medications:    Risks, benefits, and possible side effects of medications explained to Gordon and she verbalizes understanding and agreement for treatment.    Controlled Medication Discussion:     Not applicable - controlled prescriptions are not prescribed by this practice    Psychotherapy Provided:     Individual psychotherapy provided: Crisis/safety plan discussed with Ira. Provided at least 16 minutes of distinct psychotherapy using a combination of supportive, interpersonal, motivational, and problem solving approaches to address psychological distress and enhance coping strategies.     Treatment Plan:    Completed and signed during the session: Not applicable - Treatment Plan to be completed by [facility] therapist      Visit Time    Visit Start Time: 1:00 PM  Visit Stop Time: 1:28 PM  Total Visit Duration: 28 minutes     The total visit duration detailed above includes: patient engagement, medication management, psychotherapy/counseling, discussion regarding treatment goals, documentation, review of past medical records, and coordination of care.      Note Share Disclaimer:     This note was not shared with the patient due to reasonable likelihood of causing patient harm      Edward Hinkle DO  Psychiatry  06/24/25

## 2025-06-24 NOTE — TELEPHONE ENCOUNTER
"REASON FOR CONVERSATION: Diarrhea    SYMPTOMS: Intermittent explosive diarrhea with incontinence; constant, postprandial, generalized abdominal pain 8/10 that is sharp; poor appetite.     OTHER HEALTH INFORMATION: Symptoms began 06/19 shortly after dinner. The amount of diarrhea episodes vary- 2 yesterday and none so far today. Pt reports she was started on 150 mg Wellbutrin daily last week. Denies N/V, fever, blood with stool, dizziness or weakness. She is trying her best to hydrate appropriately. Famotidine relived the abdominal pain somewhat last Thursday.    PROTOCOL DISPOSITION: Discuss with Provider and Call Back Patient (overriding Go to ED Now)    CARE ADVICE PROVIDED: ED precautions discussed for severe/uncontrolled abdominal pain, fever. Hydrate with at least 60 oz of water daily. [place] diet.     PRACTICE FOLLOW-UP: Please advise.        Reason for Disposition   SEVERE abdominal pain and age > 60 years    Answer Assessment - Initial Assessment Questions  1. DIARRHEA SEVERITY: \"How bad is the diarrhea?\" \"How many more stools have you had in the past 24 hours than normal?\"       Intermittent explosive diarrhea   2. ONSET: \"When did the diarrhea begin?\"       X 7 days  3. STOOL DESCRIPTION:  \"How loose or watery is the diarrhea?\" \"What is the stool color?\" \"Is there any blood or mucous in the stool?\"      [place]   4. VOMITING: \"Are you also vomiting?\" If Yes, ask: \"How many times in the past 24 hours?\"       denies  5. ABDOMEN PAIN: \"Are you having any abdomen pain?\" If Yes, ask: \"What does it feel like?\" (e.g., crampy, dull, intermittent, constant)       sharp  6. ABDOMEN PAIN SEVERITY: If present, ask: \"How bad is the pain?\"  (e.g., Scale 1-10; mild, moderate, or severe)      8/10  7. ORAL INTAKE: If vomiting, \"Have you been able to drink liquids?\" \"How much liquids have you had in the past 24 hours?\"      Trying to hydrate  8. HYDRATION: \"Any signs of dehydration?\" (e.g., dry mouth [not just dry lips], too " "weak to stand, dizziness, new weight loss) \"When did you last urinate?\"      Denies   9. EXPOSURE: \"Have you traveled to a foreign country recently?\" \"Have you been exposed to anyone with diarrhea?\" \"Could you have eaten any food that was spoiled?\"      Denies   10. ANTIBIOTIC USE: \"Are you taking antibiotics now or have you taken antibiotics in the past 2 months?\"        denies  11. OTHER SYMPTOMS: \"Do you have any other symptoms?\" (e.g., fever, blood in stool)        Constant Generalized sharp abdominal pain 8/10, poor appetite,    Protocols used: Diarrhea-Adult-[place]    "

## 2025-06-24 NOTE — PSYCH
Virtual Regular VisitName: Ira Bonilla      : 1945      MRN: [id number]  Encounter Provider: Maria Ines Farfan LCSW  Encounter Date: 2025   Encounter department: [facility]ERAPY[facility]  :  Assessment & Plan  Moderate episode of recurrent major depressive disorder (HCC)         Anxiety         Grief reaction with prolonged bereavement             Goals addressed in session: Goal 1     DATA: Wendy shared she has had an upset stomach since Friday having diarrhea and pain in her stomach since eating ice cream and sausages.  She is looking for jobs but is feeling frustrated by the choices that are out there.  She may ask someone to help her with the job search including Hope, Art or Dario.   She complained about stomach pain since she started taking a new medication Wellbutrin.  Therapist encouraged her to work with her prescriber to figure out if it's a side effect and how to manage her physical symptoms.  She shared there is not as much arguing going on because Huyen is living with her mom.  Grant's daughter Sara got into an accident from a drunk  hitting their car.  She said Art is asking Dario to get another job that will give him more hours than David does.  He is looking for a job.  Wendy shared she worries about Dario's impulsivity in terms of him getting in trouble potentially because of talking impulsively.  Therapist gave some psychoeducation on ADHD and how it could be affecting her grandson in terms of his impulsivity. Therapist encouraged her to do some social activities and to continue applying for jobs.    During this session, this clinician used the following therapeutic modalities: Client-centered Therapy, Solution-Focused Therapy, and Supportive Psychotherapy    Substance Abuse was not addressed during this session. If the client is diagnosed with a co-occurring substance use disorder, please indicate any changes in the frequency or amount of use: .  "Stage of change for addressing substance use diagnoses: No substance use/Not applicable    ASSESSMENT:  Ira presents with a Euthymic/ normal mood. Helens affect is Normal range and intensity, which is congruent, with their mood and the content of the session. The client has made progress on their goals as evidenced by open communication; agreeing to work with prescriber on possible side effects of medication.    Ira presents with a none risk of suicide, none risk of self-harm, and none risk of harm to others.    For any risk assessment that surpasses a \"low\" rating, a safety plan must be developed.    A safety plan was indicated: no  If yes, describe in detail     PLAN: Between sessions, Ira will utilize open communication with prescriber; look for jobs and social activities to improve mood. At the next session, the therapist will use Client-centered Therapy and Supportive Psychotherapy to address depression.    Behavioral Health Treatment Plan [facility]: Diagnosis and Treatment Plan explained to Ira Roth relates understanding diagnosis and is agreeable to Treatment Plan. Yes     Depression Follow-up Plan Completed: Not applicable     Reason for visit is   Chief Complaint   Patient presents with   • Virtual Regular Visit      Recent Visits  No visits were found meeting these conditions.  Showing recent visits within past 7 days and meeting all other requirements  Today's Visits  Date Type Provider Dept   06/24/25 Telemedicine Maria Ines Farfan LCSW [facility]   Showing today's visits and meeting all other requirements  Future Appointments  No visits were found meeting these conditions.  Showing future appointments within next 150 days and meeting all other requirements     History of Present Illness     HPI    Past Medical History   Past Medical History[1]  Past Surgical History[2]  Current Outpatient Medications   Medication Instructions   • acetaminophen (TYLENOL) 650 mg, " Oral, Every 6 hours PRN   • albuterol (PROVENTIL HFA,VENTOLIN HFA) 90 mcg/act inhaler 2 puffs, Inhalation, As needed   • amLODIPine (NORVASC) 10 mg tablet    • buPROPion (WELLBUTRIN XL) 150 mg, Oral, Every morning   • cetirizine (ZYRTEC) 10 mg, Oral, Daily   • cholecalciferol (VITAMIN D3) 5,000 Units, Oral, Daily   • linaCLOtide 290 MCG CAPS 1 capsule, Oral, Daily   • metoclopramide (REGLAN) 10 mg, Oral, 4 times daily   • multivitamin (THERAGRAN) TABS 1 tablet, Oral, Daily   • Omega-3 Fatty Acids (OMEGA 3 PO) Oral, Daily   • ondansetron (ZOFRAN) 4 mg, Oral, Every 8 hours PRN   • rizatriptan (MAXALT-MLT) 10 mg disintegrating tablet as needed for migraine headache   • spironolactone (ALDACTONE) 25 mg tablet 1/2 tablet Orally Once per day for 30 days   • Vortioxetine HBr (TRINTELLIX) 20 mg, Oral, Daily     Allergies[3]    Objective   There were no vitals taken for this visit.    Video Exam  Physical Exam     Administrative Statements   Encounter provider Maria Ines Farfan LCSW    The Patient is located at Home and in the following state in which I hold an active license [place].    The patient was identified by name and date of birth. Ira Bonilla was informed that this is a telemedicine visit and that the visit is being conducted through the Epic Embedded platform. She agrees to proceed..  My office door was closed. No one else was in the room.  She acknowledged consent and understanding of privacy and security of the video platform. The patient has agreed to participate and understands they can discontinue the visit at any time.      Visit Time  Start Time: 1100  Stop Time: 1152  Total Visit Time: 52 minutes         [1]  Past Medical History:  Diagnosis Date   • Anxiety    • Arthritis    • Asthma    • Cancer (HCC)    • Candida infection, esophageal (HCC)    • Chronic kidney disease    • Chronic pain    • COPD (chronic obstructive pulmonary disease) (HCC)    • Depression    • Depression    • Gastroparesis    •  Gastroparesis    • GERD (gastroesophageal reflux disease)    • Hypertension    • Irritable bowel syndrome (IBS)    • Lactose intolerance Not sure   • Migraines    • MRSA (methicillin resistant Staphylococcus aureus)    • Multiple thyroid nodules    • Osteoporosis    • Pneumonia    • Psychiatric disorder    [2]  Past Surgical History:  Procedure Laterality Date   • BACK SURGERY     •  SECTION     • CHOLECYSTECTOMY     • COLONOSCOPY  10/09/2013   • ESOPHAGEAL DILATION      2017 [facility]   • ESOPHAGOGASTRODUODENOSCOPY N/A 2023    Procedure: ESOPHAGOGASTRODUODENOSCOPY (EGD);  Surgeon: Dontrell Mcmahon MD;  [facility];  Service: General   • ESOPHAGOGASTRODUODENOSCOPY N/A 2024    Procedure: ESOPHAGOGASTRODUODENOSCOPY (EGD);  Surgeon: Shilpa Price MD;  [facility];  Service: Thoracic   • HERNIA REPAIR  2013   • HIATAL HERNIA REPAIR     • NISSEN FUNDOPLICATION     • NM BIOPSY LIVER NEEDLE PERCUTANEOUS N/A 2023    Procedure: EXCISION BIOPSY LIVER;  Surgeon: Dontrell Mcmahon MD;  [facility];  Service: General   • NM ESOPHAGOGASTRODUODENOSCOPY TRANSORAL DIAGNOSTIC N/A 2019    Procedure: ESOPHAGOGASTRODUODENOSCOPY (EGD);  Surgeon: Geovanna Winn MD;  [facility];  Service: Gastroenterology   • NM ESOPHAGOGASTRODUODENOSCOPY TRANSORAL DIAGNOSTIC N/A 2023    Procedure: ESOPHAGOGASTRODUODENOSCOPY (EGD);  Surgeon: Shilpa Price MD;  [facility];  Service: Thoracic   • NM ESOPHAGOSCOPY FLEX BALLOON DILAT <30 MM DIAM N/A 2023    Procedure: CRE WIRE GUIDED BALLOON DILATATION ESOPHAGEAL;  Surgeon: Shilpa Price MD;  [facility];  Service: Thoracic   • NM ESOPHAGOSCOPY FLEX BALLOON DILAT <30 MM DIAM N/A 2024    Procedure: ESOPHAGOGASTRODUODENOSCOPY WITH BALLOON DILATION;  Surgeon: Shilpa Price MD;  [facility];  Service: Thoracic   • NM LAPS RPR PARAESPHGL HRNA INCL FUNDPLSTY W/O MESH N/A  11/08/2023    Procedure: robotic takedown of nissen fundoplication, redo hiatal hernia, partial fundoplication, with mesh;  Surgeon: Shilpa Price MD;  [facility];  Service: Thoracic   • MI PYLOROPLASTY N/A 11/08/2023    Procedure: PYLOROPLASTY LAPAROSCOPIC WITH ROBOT;  Surgeon: Dontrell Mcmahon MD;  [facility];  Service: General   • ROTATOR CUFF REPAIR     • SHOULDER SURGERY     • SPINAL FUSION     • TUBAL LIGATION  May 1975    Last child born   • UPPER GASTROINTESTINAL ENDOSCOPY     • US GUIDED THYROID BIOPSY     • WISDOM TOOTH EXTRACTION     [3]  Allergies  Allergen Reactions   • Latex Hives   • Augmentin [Amoxicillin-Pot Clavulanate] GI Intolerance   • Bactrim [Sulfamethoxazole-Trimethoprim] GI Intolerance   • Clarithromycin GI Intolerance   • Doxycycline Hives   • Neomycin GI Intolerance   • Polymyxin B GI Intolerance   • Zyvox [Linezolid] GI Bleeding   • Cephalosporins GI Intolerance   • Nsaids GI Intolerance   • Penicillins GI Intolerance     Patient can only take levaquin and cipro   • Prednisone GI Intolerance   • Sulphasomidine GI Intolerance

## 2025-06-25 NOTE — TELEPHONE ENCOUNTER
I called and spoke and scheduled office visit for Dr Pedersen's soonest appt which was 7/15/25 and placed the patient on a wait list. I also read the message from Cole Elkins as the patient hadn't read the message yet.

## 2025-07-08 ENCOUNTER — TELEMEDICINE (OUTPATIENT)
Dept: PSYCHIATRY | Facility: CLINIC | Age: 80
End: 2025-07-08
Payer: COMMERCIAL

## 2025-07-08 DIAGNOSIS — F33.1 MODERATE EPISODE OF RECURRENT MAJOR DEPRESSIVE DISORDER (HCC): ICD-10-CM

## 2025-07-08 DIAGNOSIS — F41.9 ANXIETY: Primary | ICD-10-CM

## 2025-07-08 DIAGNOSIS — F43.29 GRIEF REACTION WITH PROLONGED BEREAVEMENT: ICD-10-CM

## 2025-07-08 PROCEDURE — 90834 PSYTX W PT 45 MINUTES: CPT

## 2025-07-08 NOTE — PSYCH
"Virtual Regular VisitName: Ira Bonilla      : 1945      MRN: [id number]  Encounter Provider: Maria Ines Farfan LCSW  Encounter Date: 2025   Encounter department: [facility]  :  Assessment & Plan  Anxiety         Moderate episode of recurrent major depressive disorder (HCC)         Grief reaction with prolonged bereavement             Goals addressed in session: Goal 1     DATA: Wendy shared her stomach has been upset every time she eats and pain in her stomach that gets worse when she eats.  She had a good time over the holiday with family coming over but depressed over stomach pain.  She said Dario has a girlfriend and they have been going well.  Dario has been talking about losing his mom with Grant and has been crying over it.  She shared some thoughts and feelings about grieving her too. She shared thoughts and feelings about getting older and not being around for Dario. She said Dario worries about it too at times.  Therapist encouraged open communication of thoughts and feelings.  During this session, this clinician used the following therapeutic modalities: Client-centered Therapy and Supportive Psychotherapy    Substance Abuse was not addressed during this session. If the client is diagnosed with a co-occurring substance use disorder, please indicate any changes in the frequency or amount of use: . Stage of change for addressing substance use diagnoses: No substance use/Not applicable    ASSESSMENT:  Ira presents with a Euthymic/ normal mood. Helens affect is Normal range and intensity, which is congruent, with their mood and the content of the session. The client has made progress on their goals as evidenced by open communication.    Ira presents with a none risk of suicide, none risk of self-harm, and none risk of harm to others.    For any risk assessment that surpasses a \"low\" rating, a safety plan must be developed.    A safety plan was " indicated: no  If yes, describe in detail     PLAN: Between sessions, Ira will utilize open communication with family, allow expression of grief over losing her daughter. At the next session, the therapist will use Client-centered Therapy and Supportive Psychotherapy to address depression, grief reaction.    Behavioral Health Treatment Plan [facility]: Diagnosis and Treatment Plan explained to Ira, Ira relates understanding diagnosis and is agreeable to Treatment Plan. Yes     Depression Follow-up Plan Completed: Not applicable     Reason for visit is   Chief Complaint   Patient presents with   • Virtual Regular Visit      Recent Visits  No visits were found meeting these conditions.  Showing recent visits within past 7 days and meeting all other requirements  Today's Visits  Date Type Provider Dept   07/08/25 Telemedicine Maria Ines Farfan LCSW [facility]   Showing today's visits and meeting all other requirements  Future Appointments  No visits were found meeting these conditions.  Showing future appointments within next 150 days and meeting all other requirements     History of Present Illness     HPI    Past Medical History   Past Medical History[1]  Past Surgical History[2]  Current Outpatient Medications   Medication Instructions   • acetaminophen (TYLENOL) 650 mg, Oral, Every 6 hours PRN   • albuterol (PROVENTIL HFA,VENTOLIN HFA) 90 mcg/act inhaler 2 puffs, Inhalation, As needed   • amLODIPine (NORVASC) 10 mg tablet    • buPROPion (WELLBUTRIN XL) 150 mg, Oral, Every morning   • cetirizine (ZYRTEC) 10 mg, Oral, Daily   • cholecalciferol (VITAMIN D3) 5,000 Units, Oral, Daily   • linaCLOtide 290 MCG CAPS 1 capsule, Oral, Daily   • metoclopramide (REGLAN) 10 mg, Oral, 4 times daily   • multivitamin (THERAGRAN) TABS 1 tablet, Oral, Daily   • Omega-3 Fatty Acids (OMEGA 3 PO) Oral, Daily   • ondansetron (ZOFRAN) 4 mg, Oral, Every 8 hours PRN   • rizatriptan (MAXALT-MLT) 10 mg  disintegrating tablet as needed for migraine headache   • spironolactone (ALDACTONE) 25 mg tablet 1/2 tablet Orally Once per day for 30 days   • Vortioxetine HBr (TRINTELLIX) 20 mg, Oral, Daily     Allergies[3]    Objective   There were no vitals taken for this visit.    Video Exam  Physical Exam     Administrative Statements   Encounter provider Maria Ines Farfan LCSW    The Patient is located at Home and in the following state in which I hold an active license [place].    The patient was identified by name and date of birth. rIa Bonilla was informed that this is a telemedicine visit and that the visit is being conducted through the Epic Embedded platform. She agrees to proceed..  My office door was closed. Nobody else was in the room.  She acknowledged consent and understanding of privacy and security of the video platform. The patient has agreed to participate and understands they can discontinue the visit at any time.        Visit Time  Start Time: 1100  Stop Time: 1147  Total Visit Time: 47 minutes         [1]  Past Medical History:  Diagnosis Date   • Anxiety    • Arthritis    • Asthma    • Cancer (HCC)    • Candida infection, esophageal (HCC)    • Chronic kidney disease    • Chronic pain    • COPD (chronic obstructive pulmonary disease) (HCC)    • Depression    • Depression    • Gastroparesis    • Gastroparesis    • GERD (gastroesophageal reflux disease)    • Hypertension    • Irritable bowel syndrome (IBS)    • Lactose intolerance Not sure   • Migraines    • MRSA (methicillin resistant Staphylococcus aureus)    • Multiple thyroid nodules    • Osteoporosis    • Pneumonia    • Psychiatric disorder    [2]  Past Surgical History:  Procedure Laterality Date   • BACK SURGERY     •  SECTION     • CHOLECYSTECTOMY     • COLONOSCOPY  10/09/2013   • ESOPHAGEAL DILATION       [facility]   • ESOPHAGOGASTRODUODENOSCOPY N/A 2023    Procedure: ESOPHAGOGASTRODUODENOSCOPY (EGD);  Surgeon: Dontrell Mcmahon MD;   [facility];  Service: General   • ESOPHAGOGASTRODUODENOSCOPY N/A 03/20/2024    Procedure: ESOPHAGOGASTRODUODENOSCOPY (EGD);  Surgeon: Shilpa Price MD;  [facility];  Service: Thoracic   • HERNIA REPAIR  06/14/2013   • HIATAL HERNIA REPAIR     • NISSEN FUNDOPLICATION     • TX BIOPSY LIVER NEEDLE PERCUTANEOUS N/A 11/08/2023    Procedure: EXCISION BIOPSY LIVER;  Surgeon: Dontrell Mcmahon MD;  [facility];  Service: General   • TX ESOPHAGOGASTRODUODENOSCOPY TRANSORAL DIAGNOSTIC N/A 04/03/2019    Procedure: ESOPHAGOGASTRODUODENOSCOPY (EGD);  Surgeon: Geovanna Winn MD;  [facility];  Service: Gastroenterology   • TX ESOPHAGOGASTRODUODENOSCOPY TRANSORAL DIAGNOSTIC N/A 09/20/2023    Procedure: ESOPHAGOGASTRODUODENOSCOPY (EGD);  Surgeon: Shilpa Price MD;  [facility];  Service: Thoracic   • TX ESOPHAGOSCOPY FLEX BALLOON DILAT <30 MM DIAM N/A 09/20/2023    Procedure: CRE WIRE GUIDED BALLOON DILATATION ESOPHAGEAL;  Surgeon: Shilpa Price MD;  [facility];  Service: Thoracic   • TX ESOPHAGOSCOPY FLEX BALLOON DILAT <30 MM DIAM N/A 03/20/2024    Procedure: ESOPHAGOGASTRODUODENOSCOPY WITH BALLOON DILATION;  Surgeon: Shilpa Price MD;  [facility];  Service: Thoracic   • TX LAPS RPR PARAESPHGL HRNA INCL FUNDPLSTY W/O MESH N/A 11/08/2023    Procedure: robotic takedown of nissen fundoplication, redo hiatal hernia, partial fundoplication, with mesh;  Surgeon: Shilpa Price MD;  [facility];  Service: Thoracic   • TX PYLOROPLASTY N/A 11/08/2023    Procedure: PYLOROPLASTY LAPAROSCOPIC WITH ROBOT;  Surgeon: Dontrell Mcmahon MD;  [facility];  Service: General   • ROTATOR CUFF REPAIR     • SHOULDER SURGERY     • SPINAL FUSION     • TUBAL LIGATION  May 1975    Last child born   • UPPER GASTROINTESTINAL ENDOSCOPY     • US GUIDED THYROID BIOPSY     • WISDOM TOOTH EXTRACTION     [3]  Allergies  Allergen Reactions   • Latex  Hives   • Augmentin [Amoxicillin-Pot Clavulanate] GI Intolerance   • Bactrim [Sulfamethoxazole-Trimethoprim] GI Intolerance   • Clarithromycin GI Intolerance   • Doxycycline Hives   • Neomycin GI Intolerance   • Polymyxin B GI Intolerance   • Zyvox [Linezolid] GI Bleeding   • Cephalosporins GI Intolerance   • Nsaids GI Intolerance   • Penicillins GI Intolerance     Patient can only take levaquin and cipro   • Prednisone GI Intolerance   • Sulphasomidine GI Intolerance

## 2025-07-09 ENCOUNTER — OFFICE VISIT (OUTPATIENT)
Dept: PSYCHIATRY | Facility: CLINIC | Age: 80
End: 2025-07-09
Payer: COMMERCIAL

## 2025-07-09 DIAGNOSIS — F33.1 MODERATE EPISODE OF RECURRENT MAJOR DEPRESSIVE DISORDER (HCC): Primary | ICD-10-CM

## 2025-07-09 DIAGNOSIS — F41.1 GENERALIZED ANXIETY DISORDER: ICD-10-CM

## 2025-07-09 PROCEDURE — 99214 OFFICE O/P EST MOD 30 MIN: CPT | Performed by: STUDENT IN AN ORGANIZED HEALTH CARE EDUCATION/TRAINING PROGRAM

## 2025-07-09 PROCEDURE — 90833 PSYTX W PT W E/M 30 MIN: CPT | Performed by: STUDENT IN AN ORGANIZED HEALTH CARE EDUCATION/TRAINING PROGRAM

## 2025-07-10 DIAGNOSIS — F33.1 MODERATE EPISODE OF RECURRENT MAJOR DEPRESSIVE DISORDER (HCC): ICD-10-CM

## 2025-07-11 RX ORDER — BUPROPION HYDROCHLORIDE 150 MG/1
150 TABLET ORAL EVERY MORNING
Qty: 90 TABLET | Refills: 1 | OUTPATIENT
Start: 2025-07-11

## 2025-07-15 ENCOUNTER — HOSPITAL ENCOUNTER (OUTPATIENT)
Dept: RADIOLOGY | Facility: HOSPITAL | Age: 80
Discharge: HOME | End: 2025-07-15
Payer: COMMERCIAL

## 2025-07-15 ENCOUNTER — OFFICE VISIT (OUTPATIENT)
Dept: GASTROENTEROLOGY | Facility: CLINIC | Age: 80
End: 2025-07-15
Payer: COMMERCIAL

## 2025-07-15 VITALS
HEIGHT: 60 IN | DIASTOLIC BLOOD PRESSURE: 78 MMHG | BODY MASS INDEX: 22.42 KG/M2 | WEIGHT: 114.2 LBS | SYSTOLIC BLOOD PRESSURE: 124 MMHG

## 2025-07-15 DIAGNOSIS — K59.09 CHRONIC CONSTIPATION WITH OVERFLOW: ICD-10-CM

## 2025-07-15 DIAGNOSIS — Z98.890 HISTORY OF NISSEN FUNDOPLICATION: ICD-10-CM

## 2025-07-15 DIAGNOSIS — K31.84 GASTROPARESIS: ICD-10-CM

## 2025-07-15 DIAGNOSIS — K59.09 CHRONIC CONSTIPATION WITH OVERFLOW: Primary | ICD-10-CM

## 2025-07-15 PROCEDURE — G2211 COMPLEX E/M VISIT ADD ON: HCPCS | Performed by: INTERNAL MEDICINE

## 2025-07-15 PROCEDURE — 74022 RADEX COMPL AQT ABD SERIES: CPT

## 2025-07-15 PROCEDURE — 99214 OFFICE O/P EST MOD 30 MIN: CPT | Performed by: INTERNAL MEDICINE

## 2025-07-17 ENCOUNTER — TELEPHONE (OUTPATIENT)
Age: 80
End: 2025-07-17

## 2025-07-17 NOTE — PSYCH
MEDICATION MANAGEMENT NOTE        [facility]      Name and Date of Birth:  Ira Bonilla 79 y.o. 1945 MRN: [id number]    Date of Visit: July 17, 2025    Reason for Visit: Follow-up visit for medication management       SUBJECTIVE:    Ira Bonilla is a 79 y.o. female with past psychiatric history significant for MDD, WINDY who was personally seen and evaluated today at the [facility] outpatient clinic for follow-up and medication management. Ira denies SI, HI, AVH, delusions, jose since her last appointment.  After discussion of risks, benefits, potential side effects, alternatives, we will discontinue Wellbutrin as patient reports that she had significant diarrhea consistently after hurting it.  However, she cannot fully rule out whether or not this flare of her chronic GI condition.  She admits to not reporting the side effect to us despite acknowledging that she was encouraged to report any potential issues with the medication.  She denies acute mental health complaints or concerns at this time and states that she will monitor for resolution of her GI issues or go to the emergency room if she experiences any physical signs or symptoms of dehydration or exhaustion among other issues.  She states that she is remaining hydrated.      Current Rating Scores:     Current PHQ-9   PHQ-2/9 Depression Screening           Current WINDY-7 is .    Review Of Systems:      Constitutional negative    ENT negative   Cardiovascular negative   Respiratory negative    Gastrointestinal negative intermittent diarrhea   Genitourinary negative   Musculoskeletal negative    Integumentary negative   Neurological negative   Endocrine negative   Other Symptoms none, all other systems are negative       Past Psychiatric History: (unchanged information from previous note copied and italicized) - Information that is bolded has been updated.     See  intake  Medications trials: Abilify somewhat improved mood but induced too much hunger and so was discontinued.    Substance Abuse History: (unchanged information from previous note copied and italicized) - Information that is bolded has been updated.     See intake    Social History: (unchanged information from previous note copied and italicized) - Information that is bolded has been updated.     See intake    Traumatic History: (unchanged information from previous note copied and italicized) - Information that is bolded has been updated.     See intake      Past Medical History:    Past Medical History:   Diagnosis Date    Anxiety     Arthritis     Asthma     Cancer (HCC)     Candida infection, esophageal (HCC)     Chronic kidney disease     Chronic pain     COPD (chronic obstructive pulmonary disease) ([facility])     Depression     Depression 2016    Gastroparesis     Gastroparesis     GERD (gastroesophageal reflux disease)     01/10/2000    Hypertension     Not sure but it’s been a long time    Irritable bowel syndrome (IBS)     Lactose intolerance Not sure    Migraines     MRSA (methicillin resistant Staphylococcus aureus)     Multiple thyroid nodules     Osteoporosis     Pneumonia     Psychiatric disorder     Visual impairment     [place]/ Amd        Past Surgical History:   Procedure Laterality Date    BACK SURGERY       SECTION  1970-1975    Both my children were born by     CHOLECYSTECTOMY  2000    COLONOSCOPY  10/09/2013    Did cologard test in July negative    ESOPHAGEAL DILATION       [facility]    ESOPHAGOGASTRODUODENOSCOPY N/A 2023    Procedure: ESOPHAGOGASTRODUODENOSCOPY (EGD);  Surgeon: Dontrell Mcmahon MD;  Location: [facility];  Service: General    ESOPHAGOGASTRODUODENOSCOPY N/A 2024    Procedure: ESOPHAGOGASTRODUODENOSCOPY (EGD);  Surgeon: Shilpa Price MD;  Location: [facility];  Service: Thoracic    HERNIA REPAIR  2015    HIATAL HERNIA  REPAIR      NISSEN FUNDOPLICATION      HI BIOPSY LIVER NEEDLE PERCUTANEOUS N/A 11/08/2023    Procedure: EXCISION BIOPSY LIVER;  Surgeon: Dontrell Mcmahon MD;  Location[facility];  Service: General    HI ESOPHAGOGASTRODUODENOSCOPY TRANSORAL DIAGNOSTIC N/A 04/03/2019    Procedure: ESOPHAGOGASTRODUODENOSCOPY (EGD);  Surgeon: Geovanna Winn MD;  Location: [facility];  Service: Gastroenterology    HI ESOPHAGOGASTRODUODENOSCOPY TRANSORAL DIAGNOSTIC N/A 09/20/2023    Procedure: ESOPHAGOGASTRODUODENOSCOPY (EGD);  Surgeon: Shilpa Price MD;  [facility];  Service: Thoracic    HI ESOPHAGOSCOPY FLEX BALLOON DILAT <30 MM DIAM N/A 09/20/2023    Procedure: CRE WIRE GUIDED BALLOON DILATATION ESOPHAGEAL;  Surgeon: Shilpa Price MD;  [facility];  Service: Thoracic    HI ESOPHAGOSCOPY FLEX BALLOON DILAT <30 MM DIAM N/A 03/20/2024    Procedure: ESOPHAGOGASTRODUODENOSCOPY WITH BALLOON DILATION;  Surgeon: Shilpa Price MD;  [facility];  Service: Thoracic    HI LAPS RPR PARAESPHGL HRNA INCL FUNDPLSTY W/O MESH N/A 11/08/2023    Procedure: robotic takedown of nissen fundoplication, redo hiatal hernia, partial fundoplication, with mesh;  Surgeon: Shilpa Price MD;  [facility];  Service: Thoracic    HI PYLOROPLASTY N/A 11/08/2023    Procedure: PYLOROPLASTY LAPAROSCOPIC WITH ROBOT;  Surgeon: Dontrell Mcmahon MD;  [facility];  Service: General    ROTATOR CUFF REPAIR      SHOULDER SURGERY      SPINAL FUSION      TUBAL LIGATION  May 1975    Last child born    UPPER GASTROINTESTINAL ENDOSCOPY      US GUIDED THYROID BIOPSY      WISDOM TOOTH EXTRACTION       Allergies   Allergen Reactions    Latex Hives    Augmentin [Amoxicillin-Pot Clavulanate] GI Intolerance    Bactrim [Sulfamethoxazole-Trimethoprim] GI Intolerance    Cefaclor Other (See Comments)    Clarithromycin GI Intolerance    Doxycycline Hives    Linezolid GI Bleeding and Nausea Only    Neomycin GI Intolerance     Polymyxin B GI Intolerance    Trimethoprim Other (See Comments)    Cephalosporins GI Intolerance    Nsaids GI Intolerance    Penicillins GI Intolerance     Patient can only take levaquin and cipro    Prednisone GI Intolerance    Sulphasomidine GI Intolerance       Substance Abuse History:    Social History     Substance and Sexual Activity   Alcohol Use Not Currently     Social History     Substance and Sexual Activity   Drug Use No       Social History:    Social History     Socioeconomic History    Marital status:      Spouse name: Not on file    Number of children: Not on file    Years of education: Not on file    Highest education level: Not on file   Occupational History    Not on file   Tobacco Use    Smoking status: Former     Current packs/day: 0.00     Average packs/day: 1 pack/day for 15.0 years (15.0 ttl pk-yrs)     Types: Cigarettes     Start date: 10/5/1998     Quit date: 10/1/2003     Years since quittin.8     Passive exposure: Past    Smokeless tobacco: Former   Vaping Use    Vaping status: Never Used   Substance and Sexual Activity    Alcohol use: Not Currently    Drug use: No    Sexual activity: Not Currently     Partners: Male     Birth control/protection: None   Other Topics Concern    Not on file   Social History Narrative    Not on file     Social Drivers of Health     Financial Resource Strain: Not on file   Food Insecurity: No Food Insecurity (3/22/2024)    Nursing - Inadequate Food Risk Classification     Worried About Running Out of Food in the Last Year: Never true     Ran Out of Food in the Last Year: Never true     Ran Out of Food in the Last Year: Not on file   Transportation Needs: No Transportation Needs (3/22/2024)    PRAPARE - Transportation     Lack of Transportation (Medical): No     Lack of Transportation (Non-Medical): No   Physical Activity: Not on file   Stress: Not on file   Social Connections: Not on file   Intimate Partner Violence: Not on file   Housing  Stability: Low Risk  (3/22/2024)    Housing Stability Vital Sign     Unable to Pay for Housing in the Last Year: No     Number of Times Moved in the Last Year: 1     Homeless in the Last Year: No       Family Psychiatric History:     Family History   Problem Relation Name Age of Onset    Other Mother Alysa Lujan         Esophageal disorder    Heart disease Mother Alysa Lujan     Stomach cancer Father James Lujan     Cancer Father James Lujan     Hypertension Maternal Aunt Silvana Buchanan     COPD Maternal Grandmother Vicki Luque     Colon cancer Neg Hx      Colon polyps Neg Hx         History Review: The following portions of the patient's history were reviewed and updated as appropriate: allergies, current medications, past family history, past medical history, past social history, past surgical history, and problem list.         OBJECTIVE:     Vital signs in last 24 hours:    There were no vitals filed for this visit.    Mental Status Evaluation:    Appearance age appropriate, casually dressed   Behavior Cooperative, mildly anxious   Speech normal rate, normal volume, normal pitch   Mood dysphoric   Affect Dysphoric   Thought Processes organized, goal directed   Associations intact associations   Thought Content no overt delusions   Perceptual Disturbances: no auditory hallucinations, no visual hallucinations   Abnormal Thoughts  Risk Potential Suicidal ideation - None at present  Homicidal ideation - None at present  Potential for aggression - Not at present   Orientation oriented to person, place, time/date, and situation   Memory recent and remote memory grossly intact   Consciousness alert and awake   Attention Span Concentration Span attention span and concentration are age appropriate   Intellect appears to be of average intelligence   Insight intact   Judgement intact   Muscle Strength and  Gait Uses a cane to ambulate slowly   Motor activity Within normal limits   Language no difficulty  naming common objects, no difficulty repeating a phrase   Fund of Knowledge adequate knowledge of current events  adequate fund of knowledge regarding past history  adequate fund of knowledge regarding vocabulary    Pain none   Pain Scale Did not ask patient to formally rate       Laboratory Results: I have personally reviewed all pertinent laboratory/tests results    Recent Labs (last 2 months):   No visits with results within 2 Month(s) from this visit.   Latest known visit with results is:   Admission on 03/21/2024, Discharged on 03/26/2024   Component Date Value    WBC 03/22/2024 7.73     RBC 03/22/2024 3.29 (L)     Hemoglobin 03/22/2024 10.3 (L)     Hematocrit 03/22/2024 32.1 (L)     MCV 03/22/2024 98     MCH 03/22/2024 31.3     MCHC 03/22/2024 32.1     RDW 03/22/2024 13.6     MPV 03/22/2024 9.0     Platelets 03/22/2024 231     nRBC 03/22/2024 0     Segmented % 03/22/2024 52     Immature Grans % 03/22/2024 1     Lymphocytes % 03/22/2024 39     Monocytes % 03/22/2024 6     Eosinophils Relative 03/22/2024 2     Basophils Relative 03/22/2024 0     Absolute Neutrophils 03/22/2024 4.09     Absolute Immature Grans 03/22/2024 0.04     Absolute Lymphocytes 03/22/2024 2.99     Absolute Monocytes 03/22/2024 0.45     Eosinophils Absolute 03/22/2024 0.14     Basophils Absolute 03/22/2024 0.02     Sodium 03/22/2024 140     Potassium 03/22/2024 4.1     Chloride 03/22/2024 107     CO2 03/22/2024 26     ANION GAP 03/22/2024 7     BUN 03/22/2024 22     Creatinine 03/22/2024 1.50 (H)     Glucose 03/22/2024 190 (H)     Calcium 03/22/2024 9.3     AST 03/22/2024 38     ALT 03/22/2024 35     Alkaline Phosphatase 03/22/2024 123 (H)     Total Protein 03/22/2024 7.6     Albumin 03/22/2024 4.0     Total Bilirubin 03/22/2024 0.25     eGFR 03/22/2024 33     Lipase 03/22/2024 <6 (L)     hs TnI 0hr 03/22/2024 3     Sodium 03/22/2024 139     Potassium 03/22/2024 4.5     Chloride 03/22/2024 108     CO2 03/22/2024 25     ANION GAP 03/22/2024 6      BUN 03/22/2024 21     Creatinine 03/22/2024 1.37 (H)     Glucose 03/22/2024 105     Calcium 03/22/2024 8.6     eGFR 03/22/2024 37     Magnesium 03/22/2024 1.8 (L)     Phosphorus 03/22/2024 3.5     Total Bilirubin 03/22/2024 0.21     Bilirubin, Direct 03/22/2024 0.06     Alkaline Phosphatase 03/22/2024 108 (H)     AST 03/22/2024 29     ALT 03/22/2024 29     Total Protein 03/22/2024 6.5     Albumin 03/22/2024 3.5     POC Glucose 03/22/2024 119     POC Glucose 03/22/2024 121     WBC 03/23/2024 5.40     RBC 03/23/2024 3.06 (L)     Hemoglobin 03/23/2024 9.7 (L)     Hematocrit 03/23/2024 29.4 (L)     MCV 03/23/2024 96     MCH 03/23/2024 31.7     MCHC 03/23/2024 33.0     RDW 03/23/2024 13.6     MPV 03/23/2024 8.4 (L)     Platelets 03/23/2024 169     nRBC 03/23/2024 0     Segmented % 03/23/2024 53     Immature Grans % 03/23/2024 0     Lymphocytes % 03/23/2024 39     Monocytes % 03/23/2024 6     Eosinophils Relative 03/23/2024 2     Basophils Relative 03/23/2024 0     Absolute Neutrophils 03/23/2024 2.86     Absolute Immature Grans 03/23/2024 0.02     Absolute Lymphocytes 03/23/2024 2.08     Absolute Monocytes 03/23/2024 0.31     Eosinophils Absolute 03/23/2024 0.11     Basophils Absolute 03/23/2024 0.02     Sodium 03/23/2024 139     Potassium 03/23/2024 4.2     Chloride 03/23/2024 106     CO2 03/23/2024 27     ANION GAP 03/23/2024 6     BUN 03/23/2024 17     Creatinine 03/23/2024 1.27     Glucose 03/23/2024 95     Calcium 03/23/2024 8.9     Corrected Calcium 03/23/2024 9.5     AST 03/23/2024 42 (H)     ALT 03/23/2024 33     Alkaline Phosphatase 03/23/2024 101     Total Protein 03/23/2024 6.5     Albumin 03/23/2024 3.3 (L)     Total Bilirubin 03/23/2024 0.37     eGFR 03/23/2024 40     Magnesium 03/23/2024 2.1     POC Glucose 03/23/2024 84     Ventricular Rate 03/22/2024 82     Atrial Rate 03/22/2024 82     OK Interval 03/22/2024 140     QRSD Interval 03/22/2024 120     QT Interval 03/22/2024 396     QTC Interval  03/22/2024 462     P Axis 03/22/2024 50     QRS Axis 03/22/2024 17     T Wave [place] 03/22/2024 30     POC Glucose 03/23/2024 102     WBC 03/24/2024 4.84     RBC 03/24/2024 2.95 (L)     Hemoglobin 03/24/2024 9.4 (L)     Hematocrit 03/24/2024 28.4 (L)     MCV 03/24/2024 96     MCH 03/24/2024 31.9     MCHC 03/24/2024 33.1     RDW 03/24/2024 13.3     MPV 03/24/2024 8.7 (L)     Platelets 03/24/2024 177     nRBC 03/24/2024 0     Segmented % 03/24/2024 50     Immature Grans % 03/24/2024 1     Lymphocytes % 03/24/2024 41     Monocytes % 03/24/2024 6     Eosinophils Relative 03/24/2024 2     Basophils Relative 03/24/2024 0     Absolute Neutrophils 03/24/2024 2.49     Absolute Immature Grans 03/24/2024 0.03     Absolute Lymphocytes 03/24/2024 1.96     Absolute Monocytes 03/24/2024 0.27     Eosinophils Absolute 03/24/2024 0.08     Basophils Absolute 03/24/2024 0.01     Sodium 03/24/2024 139     Potassium 03/24/2024 4.4     Chloride 03/24/2024 105     CO2 03/24/2024 30     ANION GAP 03/24/2024 4     BUN 03/24/2024 18     Creatinine 03/24/2024 1.49 (H)     Glucose 03/24/2024 97     Calcium 03/24/2024 9.1     eGFR 03/24/2024 33     POC Glucose 03/24/2024 97     POC Glucose 03/25/2024 104     POC Glucose 03/25/2024 102     Salmonella sp PCR 03/25/2024 Negative     Shigella sp/Enteroinvasi* 03/25/2024 Negative     Campylobacter sp (jejuni* 03/25/2024 Negative     Shiga toxin 1/Shiga toxi* 03/25/2024 Negative     C.difficile toxin by PCR 03/25/2024 Negative     POC Glucose 03/25/2024 76     POC Glucose 03/25/2024 230 (H)     WBC 03/26/2024 4.66     RBC 03/26/2024 2.80 (L)     Hemoglobin 03/26/2024 8.8 (L)     Hematocrit 03/26/2024 26.9 (L)     MCV 03/26/2024 96     MCH 03/26/2024 31.4     MCHC 03/26/2024 32.7     RDW 03/26/2024 13.4     MPV 03/26/2024 8.5 (L)     Platelets 03/26/2024 150     nRBC 03/26/2024 0     Segmented % 03/26/2024 48     Immature Grans % 03/26/2024 0     Lymphocytes % 03/26/2024 43     Monocytes % 03/26/2024 6      Eosinophils Relative 03/26/2024 3     Basophils Relative 03/26/2024 0     Absolute Neutrophils 03/26/2024 2.24     Absolute Immature Grans 03/26/2024 0.02     Absolute Lymphocytes 03/26/2024 1.99     Absolute Monocytes 03/26/2024 0.28     Eosinophils Absolute 03/26/2024 0.12     Basophils Absolute 03/26/2024 0.01     Sodium 03/26/2024 138     Potassium 03/26/2024 4.1     Chloride 03/26/2024 106     CO2 03/26/2024 27     ANION GAP 03/26/2024 5     BUN 03/26/2024 14     Creatinine 03/26/2024 1.71 (H)     Glucose 03/26/2024 95     Calcium 03/26/2024 8.8     Corrected Calcium 03/26/2024 9.4     AST 03/26/2024 28     ALT 03/26/2024 22     Alkaline Phosphatase 03/26/2024 86     Total Protein 03/26/2024 6.2 (L)     Albumin 03/26/2024 3.2 (L)     Total Bilirubin 03/26/2024 0.28     eGFR 03/26/2024 28     Magnesium 03/26/2024 1.9        Suicide/Homicide Risk Assessment:    Risk of Harm to Self:  The following ratings are based on assessment at the time of the interview  Historical Risk Factors include: chronic psychiatric problems  Protective Factors: no current suicidal ideation, access to mental health treatment, being a parent, compliant with medications, compliant with mental health treatment, connection to own children, having a desire to be alive, responsibilities and duties to others, stable living environment, strong relationships    Risk of Harm to Others:  The following ratings are based on assessment at the time of the interview  Historical Risk Factors include: none.  Protective Factors: no current homicidal ideation    The following interventions are recommended: contracts for safety at present - agrees to go to ED if feeling unsafe, contracts for safety at present - agrees to call Crisis Intervention Service if feeling unsafe      Lethality Statement:    Based on today's assessment and clinical criteria, Ira Bonilla contracts for safety and is not an imminent risk of harm to self or others. Outpatient  level of care is deemed appropriate at this current time. Ira understands that if they can no longer contract for safety, they need to call the office or report to their nearest Emergency Room for immediate evaluation. They voiced understanding and agreement to call 911 or head to the nearest ED should they have any physical or mental decompensation whatsoever.       Assessment/Plan:     1.) MDD, recurrent, moderate  2.) WINDY  3.)      After discussion of risks, benefits, potential side effects, alternatives, we will continue Trintellix 20 mg daily, discontinue Wellbutrin due to potential diarrhea.  She will continue to meet with her therapist and other medical specialist and denies acute mental health complaints or concerns at this time        Aware of 24 hour and weekend coverage for urgent situations accessed by calling [facility] main practice number    Medications Risks/Benefits      Risks, Benefits And Possible Side Effects Of Medications:    Risks, benefits, and possible side effects of medications explained to Ira and she verbalizes understanding and agreement for treatment.    Controlled Medication Discussion:     Not applicable - controlled prescriptions are not prescribed by this practice    Psychotherapy Provided:     Individual psychotherapy provided: Crisis/safety plan discussed with Ira. Provided at least 16 minutes of distinct psychotherapy using a combination of supportive, interpersonal, motivational, and problem solving approaches to address psychological distress and enhance coping strategies.     Treatment Plan:    Completed and signed during the session: Not applicable - Treatment Plan to be completed by [facility] therapist      Visit Time    Visit Start Time: 1:30 PM  Visit Stop Time: 1:58 PM  Total Visit Duration: 28 minutes     The total visit duration detailed above includes: patient engagement, medication management,  psychotherapy/counseling, discussion regarding treatment goals, documentation, review of past medical records, and coordination of care.      Note Share Disclaimer:     This note was not shared with the patient due to reasonable likelihood of causing patient harm      Edward Hinkle DO  Psychiatry  07/17/25

## 2025-07-17 NOTE — TELEPHONE ENCOUNTER
Patient calling regarding x ray results.  Advised they are not back yet.  Would like us to review so can start prep if needed.  Please advise.

## 2025-07-22 ENCOUNTER — TELEMEDICINE (OUTPATIENT)
Dept: PSYCHIATRY | Facility: CLINIC | Age: 80
End: 2025-07-22
Payer: COMMERCIAL

## 2025-07-22 DIAGNOSIS — F33.1 MODERATE EPISODE OF RECURRENT MAJOR DEPRESSIVE DISORDER (HCC): ICD-10-CM

## 2025-07-22 DIAGNOSIS — F43.29 GRIEF REACTION WITH PROLONGED BEREAVEMENT: ICD-10-CM

## 2025-07-22 DIAGNOSIS — F41.9 ANXIETY: Primary | ICD-10-CM

## 2025-07-22 PROCEDURE — 90834 PSYTX W PT 45 MINUTES: CPT

## 2025-07-22 NOTE — PSYCH
"Virtual Regular VisitName: Ira Bonilla      : 1945      MRN: [id number]  Encounter Provider: Maria Ines Farfan LCSW  Encounter Date: 2025   Encounter department: [facility]  :  Assessment & Plan  Anxiety         Moderate episode of recurrent major depressive disorder (HCC)         Grief reaction with prolonged bereavement             Goals addressed in session: Goal 1     DATA: Wendy is having difficulty with stomach pains.  She has been isolating herself and not going out to dinner because of being afraid to go out to eat.  Therapist engaged Wendy in light stream exercise and she said it helped her pain to pati. She shared her mood is depressed because of her pain in her stomach, and that everything else is going well.    During this session, this clinician used the following therapeutic modalities: Client-centered Therapy and Mindfulness-based Strategies    Substance Abuse was not addressed during this session. If the client is diagnosed with a co-occurring substance use disorder, please indicate any changes in the frequency or amount of use: . Stage of change for addressing substance use diagnoses: No substance use/Not applicable    ASSESSMENT:  Ira presents with a Euthymic/ normal mood. Helens affect is Normal range and intensity, which is congruent, with their mood and the content of the session. The client has made progress on their goals as evidenced by engaging in light stream exercise, openly communicating.    Ira presents with a none risk of suicide, none risk of self-harm, and none risk of harm to others.    For any risk assessment that surpasses a \"low\" rating, a safety plan must be developed.    A safety plan was indicated: no  If yes, describe in detail     PLAN: Between sessions, Ira will utilize light stream exercise for reduced pain and improved mood. At the next session, the therapist will use Client-centered Therapy and " Mindfulness-based Strategies to address depression.    Behavioral Health Treatment Plan [facility]: Diagnosis and Treatment Plan explained to Ira Ira relates understanding diagnosis and is agreeable to Treatment Plan. Yes     Depression Follow-up Plan Completed: Yes     Reason for visit is   Chief Complaint   Patient presents with   • Virtual Regular Visit      Recent Visits  No visits were found meeting these conditions.  Showing recent visits within past 7 days and meeting all other requirements  Today's Visits  Date Type Provider Dept   07/22/25 Telemedicine Maria Ines Farfan LCSW [facility]   Showing today's visits and meeting all other requirements  Future Appointments  No visits were found meeting these conditions.  Showing future appointments within next 150 days and meeting all other requirements     History of Present Illness     HPI    Past Medical History   Past Medical History[1]  Past Surgical History[1]  Current Outpatient Medications   Medication Instructions   • acetaminophen (TYLENOL) 650 mg, Oral, Every 6 hours PRN   • albuterol (PROVENTIL HFA,VENTOLIN HFA) 90 mcg/act inhaler 2 puffs, As needed   • amLODIPine (NORVASC) 10 mg tablet    • buPROPion (WELLBUTRIN XL) 150 mg, Oral, Every morning   • cetirizine (ZYRTEC) 10 mg, Daily   • cholecalciferol (VITAMIN D3) 5,000 Units, Daily   • linaCLOtide 290 MCG CAPS 1 capsule, Oral, Daily   • metoclopramide (REGLAN) 10 mg, Oral, 4 times daily   • multivitamin (THERAGRAN) TABS 1 tablet, Daily   • Omega-3 Fatty Acids (OMEGA 3 PO) Daily   • ondansetron (ZOFRAN) 4 mg, Oral, Every 8 hours PRN   • rizatriptan (MAXALT-MLT) 10 mg disintegrating tablet    • spironolactone (ALDACTONE) 25 mg tablet    • Vortioxetine HBr (TRINTELLIX) 20 mg, Oral, Daily     Allergies[1]    Objective   There were no vitals taken for this visit.    Video Exam  Physical Exam     Administrative Statements   Encounter provider Maria Ines Farfan LCSW    The  Patient is located at Home and in the following state in which I hold an active license [place].    The patient was identified by name and date of birth. Ira Bonilla was informed that this is a telemedicine visit and that the visit is being conducted through the Epic Embedded platform. She agrees to proceed..  My office door was closed. No one else was in the room.  She acknowledged consent and understanding of privacy and security of the video platform. The patient has agreed to participate and understands they can discontinue the visit at any time.        Visit Time  Start Time: 1100  Stop Time: 1151  Total Visit Time: 51 minutes         [1]  Past Medical History:  Diagnosis Date   • Anxiety    • Arthritis    • Asthma    • Cancer (HCC)    • Candida infection, esophageal (HCC)    • Chronic kidney disease    • Chronic pain    • COPD (chronic obstructive pulmonary disease) (HCC)    • Depression    • Depression 2016   • Gastroparesis    • Gastroparesis    • GERD (gastroesophageal reflux disease)     01/10/2000   • Hypertension     Not sure but it’s been a long time   • Irritable bowel syndrome (IBS)    • Lactose intolerance Not sure   • Migraines    • MRSA (methicillin resistant Staphylococcus aureus)    • Multiple thyroid nodules    • Osteoporosis    • Pneumonia    • Psychiatric disorder    • Visual impairment     Glaumoa/ Amd   [1]  Past Surgical History:  Procedure Laterality Date   • BACK SURGERY     •  SECTION  1970-1975    Both my children were born by    • CHOLECYSTECTOMY  2000   • COLONOSCOPY  10/09/2013    Did cologard test in July negative   • ESOPHAGEAL DILATION       [facility]   • ESOPHAGOGASTRODUODENOSCOPY N/A 2023    Procedure: ESOPHAGOGASTRODUODENOSCOPY (EGD);  Surgeon: Dontrell Mcmahon MD;  [facility];  Service: General   • ESOPHAGOGASTRODUODENOSCOPY N/A 2024    Procedure: ESOPHAGOGASTRODUODENOSCOPY (EGD);  Surgeon: Shilpa CARLSON  MD Albert;  [facility];  Service: Thoracic   • HERNIA REPAIR  2015   • HIATAL HERNIA REPAIR     • NISSEN FUNDOPLICATION     • VT BIOPSY LIVER NEEDLE PERCUTANEOUS N/A 11/08/2023    Procedure: EXCISION BIOPSY LIVER;  Surgeon: Dontrell Mcmahon MD;  [facility];  Service: General   • VT ESOPHAGOGASTRODUODENOSCOPY TRANSORAL DIAGNOSTIC N/A 04/03/2019    Procedure: ESOPHAGOGASTRODUODENOSCOPY (EGD);  Surgeon: Geovanna Winn MD;  [facility];  Service: Gastroenterology   • VT ESOPHAGOGASTRODUODENOSCOPY TRANSORAL DIAGNOSTIC N/A 09/20/2023    Procedure: ESOPHAGOGASTRODUODENOSCOPY (EGD);  Surgeon: Shilpa Price MD;  [facility];  Service: Thoracic   • VT ESOPHAGOSCOPY FLEX BALLOON DILAT <30 MM DIAM N/A 09/20/2023    Procedure: CRE WIRE GUIDED BALLOON DILATATION ESOPHAGEAL;  Surgeon: Shilpa Price MD;  [facility];  Service: Thoracic   • VT ESOPHAGOSCOPY FLEX BALLOON DILAT <30 MM DIAM N/A 03/20/2024    Procedure: ESOPHAGOGASTRODUODENOSCOPY WITH BALLOON DILATION;  Surgeon: Shilpa Price MD;  [facility];  Service: Thoracic   • VT LAPS RPR PARAESPHGL HRNA INCL FUNDPLSTY W/O MESH N/A 11/08/2023    Procedure: robotic takedown of nissen fundoplication, redo hiatal hernia, partial fundoplication, with mesh;  Surgeon: Shilpa Price MD;  [facility];  Service: Thoracic   • VT PYLOROPLASTY N/A 11/08/2023    Procedure: PYLOROPLASTY LAPAROSCOPIC WITH ROBOT;  Surgeon: Dontrell Mcmahon MD;  [facility];  Service: General   • ROTATOR CUFF REPAIR     • SHOULDER SURGERY     • SPINAL FUSION     • TUBAL LIGATION  May 1975    Last child born   • UPPER GASTROINTESTINAL ENDOSCOPY     • US GUIDED THYROID BIOPSY     • WISDOM TOOTH EXTRACTION     [1]  Allergies  Allergen Reactions   • Latex Hives   • Augmentin [Amoxicillin-Pot Clavulanate] GI Intolerance   • Bactrim [Sulfamethoxazole-Trimethoprim] GI Intolerance   • Cefaclor Other (See Comments)   •  Clarithromycin GI Intolerance   • Doxycycline Hives   • Linezolid GI Bleeding and Nausea Only   • Neomycin GI Intolerance   • Polymyxin B GI Intolerance   • Trimethoprim Other (See Comments)   • Cephalosporins GI Intolerance   • Nsaids GI Intolerance   • Penicillins GI Intolerance     Patient can only take levaquin and cipro   • Prednisone GI Intolerance   • Sulphasomidine GI Intolerance

## 2025-08-04 ENCOUNTER — TELEPHONE (OUTPATIENT)
Age: 80
End: 2025-08-04

## 2025-08-05 ENCOUNTER — TELEMEDICINE (OUTPATIENT)
Dept: PSYCHIATRY | Facility: CLINIC | Age: 80
End: 2025-08-05
Payer: COMMERCIAL

## 2025-08-05 DIAGNOSIS — F41.9 ANXIETY: Primary | ICD-10-CM

## 2025-08-05 DIAGNOSIS — F43.29 GRIEF REACTION WITH PROLONGED BEREAVEMENT: ICD-10-CM

## 2025-08-05 DIAGNOSIS — F33.1 MODERATE EPISODE OF RECURRENT MAJOR DEPRESSIVE DISORDER (HCC): ICD-10-CM

## 2025-08-05 PROCEDURE — 90834 PSYTX W PT 45 MINUTES: CPT

## 2025-08-19 ENCOUNTER — TELEMEDICINE (OUTPATIENT)
Dept: PSYCHIATRY | Facility: CLINIC | Age: 80
End: 2025-08-19
Payer: COMMERCIAL

## 2025-08-19 DIAGNOSIS — F41.9 ANXIETY: Primary | ICD-10-CM

## 2025-08-19 DIAGNOSIS — F43.29 GRIEF REACTION WITH PROLONGED BEREAVEMENT: ICD-10-CM

## 2025-08-19 DIAGNOSIS — F33.0 MILD EPISODE OF RECURRENT MAJOR DEPRESSIVE DISORDER (HCC): ICD-10-CM

## 2025-08-19 PROCEDURE — 90834 PSYTX W PT 45 MINUTES: CPT

## (undated) DEVICE — TUBING SUCTION 5MM X 12 FT

## (undated) DEVICE — 40601 PROLONGED POSITIONING SYSTEM: Brand: 40601 PROLONGED POSITIONING SYSTEM

## (undated) DEVICE — SUT MONOCRYL PLUS 4-0 PS-2 18 IN MCP496G

## (undated) DEVICE — HEAVY DUTY TABLE COVER: Brand: CONVERTORS

## (undated) DEVICE — STERILE EMESIS BASIN                 070: Brand: CARDINAL HEALTH

## (undated) DEVICE — UTILITY MARKER,BLACK WITH LABELS: Brand: DEVON

## (undated) DEVICE — TRAY FOLEY 16FR URIMETER SILICONE SURESTEP

## (undated) DEVICE — 1200CC GUARDIAN II: Brand: GUARDIAN

## (undated) DEVICE — 60 ML SYRINGE,REGULAR TIP: Brand: MONOJECT

## (undated) DEVICE — MONOPOLAR CURVED SCISSORS: Brand: ENDOWRIST

## (undated) DEVICE — FIRST STEP BEDSIDE KIT - STAND-UP POUCH, ENDOSCOPIC CLEANING PAD - 1 POUCH: Brand: FIRST STEP BEDSIDE KIT - STAND-UP POUCH, ENDOSCOPIC CLEANING PAD

## (undated) DEVICE — ANTIBACTERIAL UNDYED BRAIDED (POLYGLACTIN 910), SYNTHETIC ABSORBABLE SUTURE: Brand: COATED VICRYL

## (undated) DEVICE — ESOPHAGEAL WIREGUIDED BALLOON DILATATION CATHETER: Brand: CRE WIREGUIDED

## (undated) DEVICE — CHLORAPREP HI-LITE 26ML ORANGE

## (undated) DEVICE — TIP-UP FENESTRATED GRASPER: Brand: ENDOWRIST

## (undated) DEVICE — Device: Brand: OMNICLOSE TROCAR SITE CLOSURE DEVICE

## (undated) DEVICE — ELECTRO LUBE IS A SINGLE PATIENT USE DEVICE THAT IS INTENDED TO BE USED ON ELECTROSURGICAL ELECTRODES TO REDUCE STICKING.: Brand: KEY SURGICAL ELECTRO LUBE

## (undated) DEVICE — TROCAR: Brand: KII FIOS FIRST ENTRY

## (undated) DEVICE — VISUALIZATION SYSTEM: Brand: CLEARIFY

## (undated) DEVICE — SINGLE-USE SYRINGE/GAUGE ASSEMBLY: Brand: ALLIANCE II

## (undated) DEVICE — MAX-CORE® DISPOSABLE CORE BIOPSY INSTRUMENT, 18G X 20CM: Brand: MAX-CORE

## (undated) DEVICE — TROCARS: Brand: KII® OPTICAL ACCESS SYSTEM

## (undated) DEVICE — TRAVELKIT CONTAINS FIRST STEP KIT (200ML EP-4 KIT) AND SOILED SCOPE BAG - 1 KIT: Brand: TRAVELKIT CONTAINS FIRST STEP KIT AND SOILED SCOPE BAG

## (undated) DEVICE — SYRINGE 30ML LL

## (undated) DEVICE — MARYLAND BIPOLAR FORCEPS: Brand: ENDOWRIST

## (undated) DEVICE — INTENDED FOR TISSUE SEPARATION, AND OTHER PROCEDURES THAT REQUIRE A SHARP SURGICAL BLADE TO PUNCTURE OR CUT.: Brand: BARD-PARKER SAFETY BLADES SIZE 15, STERILE

## (undated) DEVICE — TIP COVER ACCESSORY

## (undated) DEVICE — NEEDLE HYPO 22G X 1-1/2 IN

## (undated) DEVICE — DRAPE TOWEL: Brand: CONVERTORS

## (undated) DEVICE — VESSEL SEALER EXTEND: Brand: ENDOWRIST

## (undated) DEVICE — BLADELESS OBTURATOR: Brand: WECK VISTA

## (undated) DEVICE — GAUZE SPONGES,16 PLY: Brand: CURITY

## (undated) DEVICE — PERMANENT CAUTERY HOOK: Brand: ENDOWRIST

## (undated) DEVICE — SYRINGE 50ML LL

## (undated) DEVICE — CANNULA SEAL

## (undated) DEVICE — ARM DRAPE

## (undated) DEVICE — Device: Brand: DEFENDO AIR/WATER/SUCTION AND BIOPSY VALVE

## (undated) DEVICE — MEGA SUTURECUT ND: Brand: ENDOWRIST

## (undated) DEVICE — GLOVE INDICATOR PI UNDERGLOVE SZ 6.5 BLUE

## (undated) DEVICE — GLOVE SRG BIOGEL 7.5

## (undated) DEVICE — BITE BLOCK MAXI 60FR LF STRAP

## (undated) DEVICE — ADHESIVE SKIN HIGH VISCOSITY EXOFIN 1ML

## (undated) DEVICE — Device

## (undated) DEVICE — COLUMN DRAPE

## (undated) DEVICE — SINGLE-USE BIOPSY FORCEPS: Brand: RADIAL JAW 4

## (undated) DEVICE — CADIERE FORCEPS: Brand: ENDOWRIST

## (undated) DEVICE — SILICONE PENROSE DRAIN: Brand: COOK

## (undated) DEVICE — SUT ETHIBOND 2-0 SH/SH 36 IN X523H

## (undated) DEVICE — KIT, BETHLEHEM THORACIC ROBOT: Brand: CARDINAL HEALTH

## (undated) DEVICE — METZENBAUM ADTEC SINGLE USE DISSECTING SCISSORS, SHAFT ONLY, MONOPOLAR, CURVED TO LEFT, WORKING LENGTH: 12 1/4", (310 MM), DIAM. 5 MM, INSULATED, DOUBLE ACTION, STERILE, DISPOSABLE, PACKAGE OF 10 PIECES: Brand: AESCULAP

## (undated) DEVICE — KIT ENDO BUTTON

## (undated) DEVICE — AIR AND WATER TUBING/CAP SET FOR OLYMPUS® SCOPES: Brand: ERBE

## (undated) DEVICE — INTENDED FOR TISSUE SEPARATION, AND OTHER PROCEDURES THAT REQUIRE A SHARP SURGICAL BLADE TO PUNCTURE OR CUT.: Brand: BARD-PARKER SAFETY BLADES SIZE 11, STERILE

## (undated) DEVICE — SUT VICRYL 0 REEL 54 IN J287G

## (undated) DEVICE — GAUZE ROLL KITTNER

## (undated) DEVICE — SUT ETHIBOND 0 SH/SH 36 IN X524H

## (undated) DEVICE — INSUFFLATION NEEDLE TO ESTABLISH PNEUMOPERITONEUM.: Brand: INSUFFLATION NEEDLE

## (undated) DEVICE — BITE BLOCK 60FR LF W/STRAP BLOX

## (undated) DEVICE — SUT VLOC 90 3-0 V-20 9IN VLOCM0644